# Patient Record
Sex: FEMALE | Race: WHITE | Employment: OTHER | URBAN - NONMETROPOLITAN AREA
[De-identification: names, ages, dates, MRNs, and addresses within clinical notes are randomized per-mention and may not be internally consistent; named-entity substitution may affect disease eponyms.]

---

## 2017-05-23 ENCOUNTER — HOSPITAL ENCOUNTER (OUTPATIENT)
Dept: LAB | Age: 52
Discharge: HOME | End: 2017-05-23
Attending: INTERNAL MEDICINE | Admitting: INTERNAL MEDICINE

## 2017-05-23 LAB
ALBUMIN SERPL-MCNC: 3.3 GM/DL (ref 3.4–5)
ANION GAP SERPL CALCULATED.3IONS-SCNC: 10 MMOL/L (ref 4–16)
BASOPHILS ABSOLUTE: 0.1 K/CU MM
BASOPHILS RELATIVE PERCENT: 0.6 % (ref 0–1)
BUN BLDV-MCNC: 30 MG/DL (ref 6–23)
CALCIUM SERPL-MCNC: 8.9 MG/DL (ref 8.3–10.6)
CHLORIDE BLD-SCNC: 107 MMOL/L (ref 99–110)
CO2: 24 MMOL/L (ref 21–32)
CREAT SERPL-MCNC: 3.2 MG/DL (ref 0.6–1.1)
DIFFERENTIAL TYPE: ABNORMAL
EOSINOPHILS ABSOLUTE: 0.4 K/CU MM
EOSINOPHILS RELATIVE PERCENT: 4.3 % (ref 0–3)
GFR AFRICAN AMERICAN: 18 ML/MIN/1.73M2
GFR NON-AFRICAN AMERICAN: 15 ML/MIN/1.73M2
GLUCOSE BLD-MCNC: 274 MG/DL (ref 70–140)
HCT VFR BLD CALC: 32.5 % (ref 37–47)
HEMOGLOBIN: 10.7 GM/DL (ref 12.5–16)
IMMATURE NEUTROPHIL %: 0.4 % (ref 0–0.43)
LYMPHOCYTES ABSOLUTE: 1.9 K/CU MM
LYMPHOCYTES RELATIVE PERCENT: 20.8 % (ref 24–44)
MCH RBC QN AUTO: 33.1 PG (ref 27–31)
MCHC RBC AUTO-ENTMCNC: 32.9 % (ref 32–36)
MCV RBC AUTO: 100.6 FL (ref 78–100)
MONOCYTES ABSOLUTE: 1 K/CU MM
MONOCYTES RELATIVE PERCENT: 10.8 % (ref 0–4)
PDW BLD-RTO: 13.8 % (ref 11.7–14.9)
PHOSPHORUS: 4.6 MG/DL (ref 2.5–4.9)
PLATELET # BLD: 164 K/CU MM (ref 140–440)
PMV BLD AUTO: 12.1 FL (ref 7.5–11.1)
POTASSIUM SERPL-SCNC: 5.2 MMOL/L (ref 3.5–5.1)
RBC # BLD: 3.23 M/CU MM (ref 4.2–5.4)
SEGMENTED NEUTROPHILS ABSOLUTE COUNT: 5.6 K/CU MM
SEGMENTED NEUTROPHILS RELATIVE PERCENT: 63.1 % (ref 36–66)
SODIUM BLD-SCNC: 141 MMOL/L (ref 135–145)
TOTAL IMMATURE NEUTOROPHIL: 0.04 K/CU MM
WBC # BLD: 8.9 K/CU MM (ref 4–10.5)

## 2017-06-05 ENCOUNTER — HOSPITAL ENCOUNTER (OUTPATIENT)
Dept: WOUND CARE | Age: 52
Discharge: HOME | End: 2017-06-05
Attending: PODIATRIST | Admitting: PODIATRIST

## 2017-06-05 VITALS
DIASTOLIC BLOOD PRESSURE: 92 MMHG | HEART RATE: 75 BPM | RESPIRATION RATE: 16 BRPM | SYSTOLIC BLOOD PRESSURE: 164 MMHG | TEMPERATURE: 96.7 F

## 2017-06-05 DIAGNOSIS — L97.522 ULCER OF LEFT FOOT, WITH FAT LAYER EXPOSED (HCC): ICD-10-CM

## 2017-06-05 DIAGNOSIS — Z72.0 TOBACCO ABUSE: Primary | ICD-10-CM

## 2017-06-05 DIAGNOSIS — E11.9 TYPE 2 DIABETES MELLITUS WITHOUT COMPLICATION, WITHOUT LONG-TERM CURRENT USE OF INSULIN (HCC): ICD-10-CM

## 2017-06-05 DIAGNOSIS — L97.512 CHRONIC ULCER OF RIGHT FOOT WITH FAT LAYER EXPOSED (HCC): ICD-10-CM

## 2017-06-05 DIAGNOSIS — E11.622 DIABETES MELLITUS WITH ULCER OF ANKLE (HCC): ICD-10-CM

## 2017-06-05 DIAGNOSIS — L97.309 DIABETES MELLITUS WITH ULCER OF ANKLE (HCC): ICD-10-CM

## 2017-06-06 ENCOUNTER — HOSPITAL ENCOUNTER (OUTPATIENT)
Dept: GENERAL RADIOLOGY | Age: 52
Discharge: HOME | End: 2017-06-06
Attending: PODIATRIST | Admitting: PODIATRIST

## 2017-06-06 ENCOUNTER — HOSPITAL ENCOUNTER (OUTPATIENT)
Dept: LAB | Age: 52
Discharge: HOME | End: 2017-06-06
Attending: INTERNAL MEDICINE | Admitting: INTERNAL MEDICINE

## 2017-06-06 DIAGNOSIS — L97.522 ULCER OF LEFT FOOT, WITH FAT LAYER EXPOSED (HCC): ICD-10-CM

## 2017-06-06 DIAGNOSIS — Z79.4 CONTROLLED TYPE 2 DIABETES MELLITUS WITHOUT COMPLICATION, WITH LONG-TERM CURRENT USE OF INSULIN (HCC): ICD-10-CM

## 2017-06-06 DIAGNOSIS — E11.9 CONTROLLED TYPE 2 DIABETES MELLITUS WITHOUT COMPLICATION, WITH LONG-TERM CURRENT USE OF INSULIN (HCC): ICD-10-CM

## 2017-06-06 LAB — VITAMIN D 25-HYDROXY: 7.32 NG/ML

## 2017-06-07 LAB — PARATHYROID HORMONE INTACT: 178

## 2017-06-09 LAB
CULTURE: NORMAL
ORGANISM: NORMAL
REPORT STATUS: NORMAL
REQUEST PROBLEM: NORMAL
SPECIMEN: NORMAL

## 2017-06-12 ENCOUNTER — HOSPITAL ENCOUNTER (OUTPATIENT)
Dept: ULTRASOUND IMAGING | Age: 52
Discharge: HOME | End: 2017-06-12
Attending: PODIATRIST | Admitting: PODIATRIST

## 2017-06-12 ENCOUNTER — HOSPITAL ENCOUNTER (OUTPATIENT)
Dept: WOUND CARE | Age: 52
Discharge: HOME | End: 2017-07-11
Attending: PODIATRIST | Admitting: PODIATRIST

## 2017-06-12 VITALS
RESPIRATION RATE: 16 BRPM | DIASTOLIC BLOOD PRESSURE: 83 MMHG | HEART RATE: 74 BPM | TEMPERATURE: 96.9 F | SYSTOLIC BLOOD PRESSURE: 149 MMHG

## 2017-06-12 DIAGNOSIS — I73.9 CLAUDICATION OF BOTH LOWER EXTREMITIES (HCC): ICD-10-CM

## 2017-06-12 DIAGNOSIS — L97.512 CHRONIC ULCER OF RIGHT FOOT WITH FAT LAYER EXPOSED (HCC): ICD-10-CM

## 2017-06-12 DIAGNOSIS — E11.9 TYPE 2 DIABETES MELLITUS WITHOUT COMPLICATION, WITHOUT LONG-TERM CURRENT USE OF INSULIN (HCC): Primary | ICD-10-CM

## 2017-06-12 DIAGNOSIS — E11.42 DIABETIC POLYNEUROPATHY ASSOCIATED WITH TYPE 2 DIABETES MELLITUS (HCC): ICD-10-CM

## 2017-06-12 DIAGNOSIS — L97.522 ULCER OF LEFT FOOT, WITH FAT LAYER EXPOSED (HCC): ICD-10-CM

## 2017-06-12 DIAGNOSIS — L97.522 NON-PRESSURE CHRONIC ULCER OF OTHER PART OF LEFT FOOT WITH FAT LAYER EXPOSED (HCC): ICD-10-CM

## 2017-06-13 ENCOUNTER — OFFICE VISIT (OUTPATIENT)
Dept: CARDIOLOGY CLINIC | Age: 52
End: 2017-06-13

## 2017-06-13 VITALS
HEIGHT: 63 IN | BODY MASS INDEX: 35.19 KG/M2 | HEART RATE: 76 BPM | WEIGHT: 198.6 LBS | SYSTOLIC BLOOD PRESSURE: 126 MMHG | DIASTOLIC BLOOD PRESSURE: 82 MMHG

## 2017-06-13 DIAGNOSIS — Z72.0 TOBACCO ABUSE: ICD-10-CM

## 2017-06-13 DIAGNOSIS — I10 ESSENTIAL HYPERTENSION: ICD-10-CM

## 2017-06-13 DIAGNOSIS — E11.9 TYPE 2 DIABETES MELLITUS WITHOUT COMPLICATION, UNSPECIFIED LONG TERM INSULIN USE STATUS: ICD-10-CM

## 2017-06-13 DIAGNOSIS — E78.5 HYPERLIPIDEMIA, UNSPECIFIED HYPERLIPIDEMIA TYPE: ICD-10-CM

## 2017-06-13 DIAGNOSIS — N18.4 CHRONIC KIDNEY DISEASE (CKD) STAGE G4/A3, SEVERELY DECREASED GLOMERULAR FILTRATION RATE (GFR) BETWEEN 15-29 ML/MIN/1.73 SQUARE METER AND ALBUMINURIA CREATININE RATIO GREATER THAN 300 MG/G (HCC): ICD-10-CM

## 2017-06-13 DIAGNOSIS — I25.10 CORONARY ARTERY DISEASE INVOLVING NATIVE CORONARY ARTERY OF NATIVE HEART WITHOUT ANGINA PECTORIS: Primary | ICD-10-CM

## 2017-06-13 PROCEDURE — G8427 DOCREV CUR MEDS BY ELIG CLIN: HCPCS | Performed by: INTERNAL MEDICINE

## 2017-06-13 PROCEDURE — 3046F HEMOGLOBIN A1C LEVEL >9.0%: CPT | Performed by: INTERNAL MEDICINE

## 2017-06-13 PROCEDURE — 99214 OFFICE O/P EST MOD 30 MIN: CPT | Performed by: INTERNAL MEDICINE

## 2017-06-13 PROCEDURE — 3014F SCREEN MAMMO DOC REV: CPT | Performed by: INTERNAL MEDICINE

## 2017-06-13 PROCEDURE — G8419 CALC BMI OUT NRM PARAM NOF/U: HCPCS | Performed by: INTERNAL MEDICINE

## 2017-06-13 PROCEDURE — 4004F PT TOBACCO SCREEN RCVD TLK: CPT | Performed by: INTERNAL MEDICINE

## 2017-06-13 PROCEDURE — G8598 ASA/ANTIPLAT THER USED: HCPCS | Performed by: INTERNAL MEDICINE

## 2017-06-13 PROCEDURE — 3017F COLORECTAL CA SCREEN DOC REV: CPT | Performed by: INTERNAL MEDICINE

## 2017-06-13 RX ORDER — CARVEDILOL 6.25 MG/1
6.25 TABLET ORAL 2 TIMES DAILY
COMMUNITY
Start: 2017-05-30

## 2017-06-13 RX ORDER — NITROGLYCERIN 0.4 MG/1
0.4 TABLET SUBLINGUAL EVERY 5 MIN PRN
Qty: 25 TABLET | Refills: 3 | Status: ON HOLD | OUTPATIENT
Start: 2017-06-13 | End: 2022-09-25 | Stop reason: HOSPADM

## 2017-06-19 ENCOUNTER — HOSPITAL ENCOUNTER (OUTPATIENT)
Dept: WOUND CARE | Age: 52
Discharge: HOME | End: 2017-06-19
Attending: PODIATRIST | Admitting: PODIATRIST

## 2017-06-19 VITALS
SYSTOLIC BLOOD PRESSURE: 158 MMHG | DIASTOLIC BLOOD PRESSURE: 85 MMHG | TEMPERATURE: 96.7 F | HEART RATE: 80 BPM | RESPIRATION RATE: 16 BRPM

## 2017-06-19 DIAGNOSIS — Z72.0 TOBACCO ABUSE: Primary | ICD-10-CM

## 2017-06-19 DIAGNOSIS — L97.509 TYPE 2 DIABETES MELLITUS WITH FOOT ULCER, WITH LONG-TERM CURRENT USE OF INSULIN (HCC): ICD-10-CM

## 2017-06-19 DIAGNOSIS — L97.522 CHRONIC ULCER OF LEFT FOOT WITH FAT LAYER EXPOSED (HCC): ICD-10-CM

## 2017-06-19 DIAGNOSIS — E11.42 DIABETIC POLYNEUROPATHY ASSOCIATED WITH TYPE 2 DIABETES MELLITUS (HCC): ICD-10-CM

## 2017-06-19 DIAGNOSIS — I73.9 CLAUDICATION OF BOTH LOWER EXTREMITIES (HCC): ICD-10-CM

## 2017-06-19 DIAGNOSIS — E11.621 TYPE 2 DIABETES MELLITUS WITH FOOT ULCER, WITH LONG-TERM CURRENT USE OF INSULIN (HCC): ICD-10-CM

## 2017-06-19 DIAGNOSIS — Z79.4 TYPE 2 DIABETES MELLITUS WITH FOOT ULCER, WITH LONG-TERM CURRENT USE OF INSULIN (HCC): ICD-10-CM

## 2017-06-19 ASSESSMENT — PAIN DESCRIPTION - ORIENTATION: ORIENTATION: LEFT

## 2017-06-19 ASSESSMENT — PAIN DESCRIPTION - PAIN TYPE: TYPE: CHRONIC PAIN

## 2017-06-19 ASSESSMENT — PAIN DESCRIPTION - DESCRIPTORS: DESCRIPTORS: SHOOTING

## 2017-06-19 ASSESSMENT — PAIN DESCRIPTION - FREQUENCY: FREQUENCY: CONTINUOUS

## 2017-06-19 ASSESSMENT — PAIN DESCRIPTION - ONSET: ONSET: ON-GOING

## 2017-06-19 ASSESSMENT — PAIN SCALES - GENERAL: PAINLEVEL_OUTOF10: 6

## 2017-06-19 ASSESSMENT — PAIN DESCRIPTION - LOCATION: LOCATION: FOOT

## 2017-06-26 ENCOUNTER — HOSPITAL ENCOUNTER (OUTPATIENT)
Dept: WOUND CARE | Age: 52
Discharge: HOME | End: 2017-06-26
Attending: PODIATRIST | Admitting: PODIATRIST

## 2017-06-26 VITALS
RESPIRATION RATE: 16 BRPM | HEART RATE: 76 BPM | TEMPERATURE: 97.4 F | DIASTOLIC BLOOD PRESSURE: 82 MMHG | SYSTOLIC BLOOD PRESSURE: 159 MMHG

## 2017-06-26 DIAGNOSIS — L97.509 TYPE 2 DIABETES MELLITUS WITH FOOT ULCER, WITH LONG-TERM CURRENT USE OF INSULIN (HCC): ICD-10-CM

## 2017-06-26 DIAGNOSIS — Z72.0 TOBACCO ABUSE: ICD-10-CM

## 2017-06-26 DIAGNOSIS — E11.42 DIABETIC POLYNEUROPATHY ASSOCIATED WITH TYPE 2 DIABETES MELLITUS (HCC): ICD-10-CM

## 2017-06-26 DIAGNOSIS — Z79.4 TYPE 2 DIABETES MELLITUS WITH FOOT ULCER, WITH LONG-TERM CURRENT USE OF INSULIN (HCC): ICD-10-CM

## 2017-06-26 DIAGNOSIS — E11.621 TYPE 2 DIABETES MELLITUS WITH FOOT ULCER, WITH LONG-TERM CURRENT USE OF INSULIN (HCC): ICD-10-CM

## 2017-06-26 DIAGNOSIS — L97.522 CHRONIC ULCER OF LEFT FOOT WITH FAT LAYER EXPOSED (HCC): Primary | ICD-10-CM

## 2017-06-26 ASSESSMENT — PAIN DESCRIPTION - PAIN TYPE: TYPE: CHRONIC PAIN

## 2017-06-26 ASSESSMENT — PAIN SCALES - GENERAL: PAINLEVEL_OUTOF10: 6

## 2017-06-26 ASSESSMENT — PAIN DESCRIPTION - ORIENTATION: ORIENTATION: LEFT

## 2017-06-26 ASSESSMENT — PAIN DESCRIPTION - LOCATION: LOCATION: FOOT

## 2017-06-26 ASSESSMENT — PAIN DESCRIPTION - ONSET: ONSET: ON-GOING

## 2017-06-26 ASSESSMENT — PAIN DESCRIPTION - DESCRIPTORS: DESCRIPTORS: ACHING;SHARP;DISCOMFORT;CONSTANT

## 2017-06-26 ASSESSMENT — PAIN DESCRIPTION - FREQUENCY: FREQUENCY: CONTINUOUS

## 2017-06-26 ASSESSMENT — PAIN DESCRIPTION - PROGRESSION: CLINICAL_PROGRESSION: GRADUALLY WORSENING

## 2017-06-28 ENCOUNTER — HOSPITAL ENCOUNTER (OUTPATIENT)
Dept: WOUND CARE | Age: 52
Discharge: HOME | End: 2017-06-28
Attending: INTERNAL MEDICINE | Admitting: INTERNAL MEDICINE

## 2017-06-28 VITALS
TEMPERATURE: 97 F | SYSTOLIC BLOOD PRESSURE: 132 MMHG | HEART RATE: 82 BPM | DIASTOLIC BLOOD PRESSURE: 75 MMHG | RESPIRATION RATE: 16 BRPM

## 2017-07-03 ENCOUNTER — HOSPITAL ENCOUNTER (OUTPATIENT)
Dept: WOUND CARE | Age: 52
Discharge: HOME | End: 2017-07-03
Attending: PODIATRIST | Admitting: INTERNAL MEDICINE

## 2017-07-03 VITALS
SYSTOLIC BLOOD PRESSURE: 154 MMHG | TEMPERATURE: 96.8 F | DIASTOLIC BLOOD PRESSURE: 84 MMHG | RESPIRATION RATE: 16 BRPM | HEART RATE: 69 BPM

## 2017-07-03 DIAGNOSIS — L97.522 DIABETIC ULCER OF LEFT FOOT ASSOCIATED WITH TYPE 2 DIABETES MELLITUS, WITH FAT LAYER EXPOSED (HCC): Primary | ICD-10-CM

## 2017-07-03 DIAGNOSIS — E11.621 DIABETIC ULCER OF LEFT FOOT ASSOCIATED WITH TYPE 2 DIABETES MELLITUS, WITH FAT LAYER EXPOSED (HCC): Primary | ICD-10-CM

## 2017-07-03 PROCEDURE — 11042 DBRDMT SUBQ TIS 1ST 20SQCM/<: CPT | Performed by: INTERNAL MEDICINE

## 2017-07-05 ENCOUNTER — HOSPITAL ENCOUNTER (OUTPATIENT)
Dept: WOUND CARE | Age: 52
Discharge: HOME | End: 2017-07-05
Attending: INTERNAL MEDICINE | Admitting: INTERNAL MEDICINE

## 2017-07-05 VITALS
HEART RATE: 76 BPM | TEMPERATURE: 97 F | SYSTOLIC BLOOD PRESSURE: 152 MMHG | RESPIRATION RATE: 16 BRPM | DIASTOLIC BLOOD PRESSURE: 82 MMHG

## 2017-07-05 DIAGNOSIS — L97.522 DIABETIC ULCER OF LEFT FOOT ASSOCIATED WITH TYPE 2 DIABETES MELLITUS, WITH FAT LAYER EXPOSED (HCC): Primary | ICD-10-CM

## 2017-07-05 DIAGNOSIS — E11.621 DIABETIC ULCER OF LEFT FOOT ASSOCIATED WITH TYPE 2 DIABETES MELLITUS, WITH FAT LAYER EXPOSED (HCC): Primary | ICD-10-CM

## 2017-07-05 PROCEDURE — 29445 APPL RIGID TOT CNTC LEG CAST: CPT | Performed by: INTERNAL MEDICINE

## 2017-07-10 ENCOUNTER — HOSPITAL ENCOUNTER (OUTPATIENT)
Dept: WOUND CARE | Age: 52
Discharge: HOME | End: 2017-07-10
Attending: PODIATRIST | Admitting: PODIATRIST

## 2017-07-10 VITALS
DIASTOLIC BLOOD PRESSURE: 78 MMHG | RESPIRATION RATE: 16 BRPM | SYSTOLIC BLOOD PRESSURE: 120 MMHG | TEMPERATURE: 96.2 F | HEART RATE: 73 BPM

## 2017-07-10 DIAGNOSIS — L97.522 DIABETIC ULCER OF LEFT FOOT ASSOCIATED WITH TYPE 2 DIABETES MELLITUS, WITH FAT LAYER EXPOSED (HCC): ICD-10-CM

## 2017-07-10 DIAGNOSIS — L97.509 TYPE 2 DIABETES MELLITUS WITH FOOT ULCER, WITH LONG-TERM CURRENT USE OF INSULIN (HCC): Primary | ICD-10-CM

## 2017-07-10 DIAGNOSIS — Z72.0 TOBACCO ABUSE: ICD-10-CM

## 2017-07-10 DIAGNOSIS — E11.621 TYPE 2 DIABETES MELLITUS WITH FOOT ULCER, WITH LONG-TERM CURRENT USE OF INSULIN (HCC): Primary | ICD-10-CM

## 2017-07-10 DIAGNOSIS — Z79.4 TYPE 2 DIABETES MELLITUS WITH FOOT ULCER, WITH LONG-TERM CURRENT USE OF INSULIN (HCC): Primary | ICD-10-CM

## 2017-07-10 DIAGNOSIS — E11.621 DIABETIC ULCER OF LEFT FOOT ASSOCIATED WITH TYPE 2 DIABETES MELLITUS, WITH FAT LAYER EXPOSED (HCC): ICD-10-CM

## 2017-07-13 ENCOUNTER — HOSPITAL ENCOUNTER (OUTPATIENT)
Dept: WOUND CARE | Age: 52
Discharge: HOME | End: 2017-07-13
Attending: INTERNAL MEDICINE | Admitting: INTERNAL MEDICINE

## 2017-07-13 VITALS
HEART RATE: 76 BPM | TEMPERATURE: 96.4 F | DIASTOLIC BLOOD PRESSURE: 98 MMHG | RESPIRATION RATE: 16 BRPM | SYSTOLIC BLOOD PRESSURE: 181 MMHG

## 2017-07-13 DIAGNOSIS — E11.621 DIABETIC ULCER OF LEFT FOOT ASSOCIATED WITH TYPE 2 DIABETES MELLITUS, WITH FAT LAYER EXPOSED (HCC): Primary | ICD-10-CM

## 2017-07-13 DIAGNOSIS — L97.522 DIABETIC ULCER OF LEFT FOOT ASSOCIATED WITH TYPE 2 DIABETES MELLITUS, WITH FAT LAYER EXPOSED (HCC): Primary | ICD-10-CM

## 2017-07-17 ENCOUNTER — HOSPITAL ENCOUNTER (OUTPATIENT)
Dept: WOUND CARE | Age: 52
Discharge: HOME | End: 2017-07-17
Attending: PODIATRIST | Admitting: INTERNAL MEDICINE

## 2017-07-17 VITALS
TEMPERATURE: 96.5 F | HEART RATE: 77 BPM | SYSTOLIC BLOOD PRESSURE: 159 MMHG | DIASTOLIC BLOOD PRESSURE: 89 MMHG | RESPIRATION RATE: 16 BRPM

## 2017-07-17 DIAGNOSIS — L97.522 DIABETIC ULCER OF LEFT FOOT ASSOCIATED WITH TYPE 2 DIABETES MELLITUS, WITH FAT LAYER EXPOSED (HCC): Primary | ICD-10-CM

## 2017-07-17 DIAGNOSIS — E11.621 DIABETIC ULCER OF LEFT FOOT ASSOCIATED WITH TYPE 2 DIABETES MELLITUS, WITH FAT LAYER EXPOSED (HCC): Primary | ICD-10-CM

## 2017-07-17 PROCEDURE — 97597 DBRDMT OPN WND 1ST 20 CM/<: CPT | Performed by: INTERNAL MEDICINE

## 2017-07-24 ENCOUNTER — HOSPITAL ENCOUNTER (OUTPATIENT)
Dept: WOUND CARE | Age: 52
Discharge: HOME | End: 2017-07-24
Attending: PODIATRIST | Admitting: PODIATRIST

## 2017-07-24 VITALS
RESPIRATION RATE: 16 BRPM | TEMPERATURE: 97.9 F | HEART RATE: 79 BPM | DIASTOLIC BLOOD PRESSURE: 89 MMHG | SYSTOLIC BLOOD PRESSURE: 156 MMHG

## 2017-07-24 DIAGNOSIS — E08.00 DIABETES MELLITUS DUE TO UNDERLYING CONDITION WITH HYPEROSMOLARITY WITHOUT COMA, WITHOUT LONG-TERM CURRENT USE OF INSULIN (HCC): ICD-10-CM

## 2017-07-24 DIAGNOSIS — E11.9 TYPE 2 DIABETES MELLITUS WITHOUT COMPLICATION, WITHOUT LONG-TERM CURRENT USE OF INSULIN (HCC): ICD-10-CM

## 2017-07-24 DIAGNOSIS — E11.621 DIABETIC ULCER OF LEFT FOOT ASSOCIATED WITH TYPE 2 DIABETES MELLITUS, WITH FAT LAYER EXPOSED (HCC): Primary | ICD-10-CM

## 2017-07-24 DIAGNOSIS — E11.9 TYPE 2 DIABETES MELLITUS WITHOUT COMPLICATION, UNSPECIFIED LONG TERM INSULIN USE STATUS: ICD-10-CM

## 2017-07-24 DIAGNOSIS — Z72.0 TOBACCO ABUSE: ICD-10-CM

## 2017-07-24 DIAGNOSIS — I73.9 CLAUDICATION OF BOTH LOWER EXTREMITIES (HCC): ICD-10-CM

## 2017-07-24 DIAGNOSIS — L97.522 DIABETIC ULCER OF LEFT FOOT ASSOCIATED WITH TYPE 2 DIABETES MELLITUS, WITH FAT LAYER EXPOSED (HCC): Primary | ICD-10-CM

## 2017-07-24 DIAGNOSIS — L97.509 ULCER OF OTHER PART OF FOOT: ICD-10-CM

## 2017-07-31 ENCOUNTER — HOSPITAL ENCOUNTER (OUTPATIENT)
Dept: WOUND CARE | Age: 52
Discharge: HOME | End: 2017-07-31
Attending: PODIATRIST | Admitting: PODIATRIST

## 2017-07-31 VITALS
RESPIRATION RATE: 16 BRPM | DIASTOLIC BLOOD PRESSURE: 70 MMHG | SYSTOLIC BLOOD PRESSURE: 180 MMHG | TEMPERATURE: 98 F | HEART RATE: 87 BPM

## 2017-07-31 DIAGNOSIS — L97.422 DIABETIC ULCER OF LEFT MIDFOOT ASSOCIATED WITH TYPE 2 DIABETES MELLITUS, WITH FAT LAYER EXPOSED (HCC): Primary | ICD-10-CM

## 2017-07-31 DIAGNOSIS — E11.42 DIABETIC POLYNEUROPATHY ASSOCIATED WITH TYPE 2 DIABETES MELLITUS (HCC): ICD-10-CM

## 2017-07-31 DIAGNOSIS — Z72.0 TOBACCO ABUSE: ICD-10-CM

## 2017-07-31 DIAGNOSIS — E11.621 DIABETIC ULCER OF LEFT MIDFOOT ASSOCIATED WITH TYPE 2 DIABETES MELLITUS, WITH FAT LAYER EXPOSED (HCC): Primary | ICD-10-CM

## 2017-08-07 ENCOUNTER — HOSPITAL ENCOUNTER (OUTPATIENT)
Dept: WOUND CARE | Age: 52
Discharge: HOME | End: 2017-08-07
Attending: PODIATRIST | Admitting: PODIATRIST

## 2017-08-07 VITALS
SYSTOLIC BLOOD PRESSURE: 163 MMHG | TEMPERATURE: 97.4 F | DIASTOLIC BLOOD PRESSURE: 89 MMHG | RESPIRATION RATE: 16 BRPM | HEART RATE: 76 BPM

## 2017-08-07 DIAGNOSIS — E11.42 DIABETIC POLYNEUROPATHY ASSOCIATED WITH TYPE 2 DIABETES MELLITUS (HCC): ICD-10-CM

## 2017-08-07 DIAGNOSIS — E11.9 TYPE 2 DIABETES MELLITUS WITHOUT COMPLICATION, WITHOUT LONG-TERM CURRENT USE OF INSULIN (HCC): Primary | ICD-10-CM

## 2017-08-07 DIAGNOSIS — L97.422 DIABETIC ULCER OF LEFT MIDFOOT ASSOCIATED WITH TYPE 2 DIABETES MELLITUS, WITH FAT LAYER EXPOSED (HCC): ICD-10-CM

## 2017-08-07 DIAGNOSIS — Z72.0 TOBACCO ABUSE: ICD-10-CM

## 2017-08-07 DIAGNOSIS — E11.621 DIABETIC ULCER OF LEFT MIDFOOT ASSOCIATED WITH TYPE 2 DIABETES MELLITUS, WITH FAT LAYER EXPOSED (HCC): ICD-10-CM

## 2017-08-14 ENCOUNTER — HOSPITAL ENCOUNTER (OUTPATIENT)
Dept: WOUND CARE | Age: 52
Discharge: HOME | End: 2017-08-14
Attending: PODIATRIST | Admitting: INTERNAL MEDICINE

## 2017-08-14 VITALS
TEMPERATURE: 96.8 F | DIASTOLIC BLOOD PRESSURE: 85 MMHG | SYSTOLIC BLOOD PRESSURE: 169 MMHG | HEART RATE: 82 BPM | RESPIRATION RATE: 16 BRPM

## 2017-08-14 DIAGNOSIS — E11.621 DIABETIC ULCER OF RIGHT MIDFOOT ASSOCIATED WITH TYPE 2 DIABETES MELLITUS, LIMITED TO BREAKDOWN OF SKIN (HCC): Primary | ICD-10-CM

## 2017-08-14 DIAGNOSIS — L97.411 DIABETIC ULCER OF RIGHT MIDFOOT ASSOCIATED WITH TYPE 2 DIABETES MELLITUS, LIMITED TO BREAKDOWN OF SKIN (HCC): Primary | ICD-10-CM

## 2017-08-14 PROCEDURE — 99213 OFFICE O/P EST LOW 20 MIN: CPT | Performed by: INTERNAL MEDICINE

## 2017-11-06 ENCOUNTER — HOSPITAL ENCOUNTER (OUTPATIENT)
Dept: WOUND CARE | Age: 52
Discharge: HOME | End: 2017-11-06
Attending: PODIATRIST | Admitting: PODIATRIST

## 2017-11-06 VITALS
HEIGHT: 63 IN | TEMPERATURE: 97.4 F | WEIGHT: 180 LBS | HEART RATE: 65 BPM | DIASTOLIC BLOOD PRESSURE: 86 MMHG | RESPIRATION RATE: 16 BRPM | SYSTOLIC BLOOD PRESSURE: 154 MMHG | BODY MASS INDEX: 31.89 KG/M2

## 2017-11-06 DIAGNOSIS — E11.621 TYPE 2 DIABETES MELLITUS WITH FOOT ULCER, WITH LONG-TERM CURRENT USE OF INSULIN (HCC): Primary | ICD-10-CM

## 2017-11-06 DIAGNOSIS — E11.42 DIABETIC POLYNEUROPATHY ASSOCIATED WITH TYPE 2 DIABETES MELLITUS (HCC): ICD-10-CM

## 2017-11-06 DIAGNOSIS — L97.509 TYPE 2 DIABETES MELLITUS WITH FOOT ULCER, WITH LONG-TERM CURRENT USE OF INSULIN (HCC): Primary | ICD-10-CM

## 2017-11-06 DIAGNOSIS — Z79.4 TYPE 2 DIABETES MELLITUS WITH FOOT ULCER, WITH LONG-TERM CURRENT USE OF INSULIN (HCC): Primary | ICD-10-CM

## 2017-11-06 DIAGNOSIS — E11.9 TYPE 2 DIABETES MELLITUS WITHOUT COMPLICATION, UNSPECIFIED LONG TERM INSULIN USE STATUS: ICD-10-CM

## 2017-11-06 DIAGNOSIS — L97.522 ULCER OF LEFT FOOT, WITH FAT LAYER EXPOSED (HCC): ICD-10-CM

## 2017-11-06 DIAGNOSIS — Z72.0 TOBACCO ABUSE: ICD-10-CM

## 2017-11-06 NOTE — PROGRESS NOTES
[facility] Progress Note with Procedure Note      Omari River  AGE: 46 y.o. GENDER: female  : 1965  EPISODE DATE:  2017     Subjective:     Chief Complaint   Patient presents with    Wound Check     Left Foot          HISTORY of PRESENT ILLNESS      Omari River is a 46 y.o. female who presents today for wound evaluation of Acute diabetic wound(s) of Lt. foot sub 4th metatarsal.  The wound is of moderate severity. The underlying cause of the wound is pressure and DM. Pt had healed the wound previously and it did recur but she did not return for evaluation. She thought she could take care of it on her own. Now, the wound is larger and is turning red around the wound. She is very concerned that there is an infection or abscess forming and wants to do things right. She has not been offloading and has not been wearing any DM inserts or offloading shoes.   Wound Pain Timing/Severity: intermittent  Quality of pain: aching  Severity of pain:  2  10   Modifying Factors: diabetes, chronic pressure and smoking  Associated Signs/Symptoms: edema and erythema        PAST MEDICAL HISTORY        Diagnosis Date    CAD (coronary artery disease)     s/p CABG x 4;  follows with Dr Alyson Lamar tunnel syndrome on both sides     CHF (congestive heart failure) ([address] 10/2010    Chronic diastolic; EF 74%    Chronic kidney disease     stage 4 kidney disease    Chronic ulcer of left ankle with fat layer exposed ([address] 10/7/2015    Chronic ulcer of left foot with fat layer exposed ([address] 3/17/2016    Chronic ulcer of right ankle with fat layer exposed ([address] 3/17/2016    Chronic ulcer of right foot with fat layer exposed ([address] 3/17/2016    Depression     Diabetes mellitus ([address]     Diabetes mellitus with neurological manifestations ([address]     Diabetes mellitus with ulcer of ankle ([address]     Family history of cardiovascular disease     Mother   Mer Mckoy GERD (gastroesophageal reflux disease)     H/O cardiac catheterization 10/18/2010, 6/3/2010    10/18/2010-Four bypass grafts all widely patent. 6/3/2010- Moderate to severe triple vessel disease, preserved LV systolic function.  H/O cardiovascular stress test 7/26/2012, 8/3/2011, 10/14/2010, 5/24/2010 7/26/2012-Lexiscan- Normal Myocardial Perfusion Study. Post stress myocardial perfusion images show a normal pattern of perfusion in all regions. Post stress LV normal size. Normal perfusion in the distribution of all coronaries. Normal LV size and function. Rest EF 70%    H/O chest x-ray 7/12/2012, 6/2/2010 7/12/2012-Perihilar peribronchial cuffing with mild basilar predominant interstital prominence, which may reflect interstitial edema or atypical pneumonia.  H/O Doppler ultrasound 6/4/2010    CAROTID DOPPLER-6/4/2010-No Doppler evidence of hemodynamically significant Carotid Stenosis with antegrade flow in the vertebral arteries bilaterally. 6/4/2010 PERIPHERAL VENOUS DOPPLER_ LEFT Saphenous Vein mapping    H/O echocardiogram 7/26/2012, 8/3/2011, 10/2010, 7/23/2010, 6/8/2010 7/26/2012-LV normal size. Normal LV wall thickness. LV systolic function normal. EF => 55%. LV wall motion normal. Mild MR. Mild TR.  History of complete ECG 7/26/2012 Leopold Dine), 7/13/2012 [facility]), 7/25/2011, 3/25/2011, 10/18/2010, 10/11/2010, 6/23/2010, 5/7/2010    Hx of cardiovascular stress test 9/3/2015    lexiscan-normal,EF66%    Hx of Doppler ultrasound 4/5/16    Arterial: There is a fluid collection in the left thigh, please refer to PCP for further monitoring, no vascular in etiology.  Hx of echocardiogram     4/16 EF40% Mild MR/TR and mild Pulm HTN. 9/15 EF 45-50%, Mildly dilated L atrium, mildly dilated R ventricle. Mild MR & mild TR     Hyperlipidemia     Neuropathy of foot     Pt reports starting approx. 6-7 years ago    Neuropathy of hand     Pt reports starting approx.  6-7 years ago    S/P CABG x 4 6/5/2010    LIMA-> LAD;  VG->CX;  VG->Diagonal; VG-> distal RCA  per  Dr Mei Ann or unspecified type diabetes mellitus with neurological manifestations, not stated as uncontrolled(250.60)     Type II or unspecified type diabetes mellitus with other specified manifestations, not stated as uncontrolled     Ulcer of left foot, with fat layer exposed ([address] 12/19/2013    Ulcer of other part of foot        PAST SURGICAL HISTORY    Past Surgical History:   Procedure Laterality Date    APPENDECTOMY      CARDIAC SURGERY      CARPAL TUNNEL RELEASE      L hand Nov. 2011, R hand Jan. 2012    CARPAL TUNNEL RELEASE Right 6/10/2013    recurrent    CARPAL TUNNEL RELEASE Left 7/29/2013    with ulnar nerve transpostion and L middle finger release    CHOLECYSTECTOMY      COLONOSCOPY      CORONARY ARTERY BYPASS GRAFT  6/5/2010 6/5/2010-LIMA->LAD;  VG-> Diagonal;  VG-> Obtuse marginal;  VG->Distal RCA-   Dr Malinda Schneider Right 6/10/2013    HYSTERECTOMY, TOTAL ABDOMINAL      TOE AMPUTATION Left 02/14/144th toe    TUBAL LIGATION      ULNAR TUNNEL RELEASE Right 6/10/2013       FAMILY HISTORY    Family History   Problem Relation Age of Onset    Heart Disease Mother     Kidney Disease Mother      dialysis    Cancer Father        SOCIAL HISTORY    Social History   Substance Use Topics    Smoking status: Current Every Day Smoker     Packs/day: 0.25     Years: 30.00     Types: Cigarettes    Smokeless tobacco: Never Used      Comment: quit smoking mia 04/04/16    Alcohol use No      Comment:                                    CAFFEINE: 2 sodas daily       ALLERGIES    Allergies   Allergen Reactions    Cortizone     Phenobarbital Other (See Comments)     Hallucinations        MEDICATIONS    Current Outpatient Prescriptions on File Prior to Encounter   Medication Sig Dispense Refill    carvedilol (COREG) 6.25 MG tablet 6.25 mg 2 times daily       nitroGLYCERIN (NITROSTAT) 0.4 MG SL neuropathy ([facility]    Type 2 diabetes mellitus without complication ([facility]      Other Visit Diagnoses    None. Procedure Note    Indications:  Based on my examination of this patient's wound(s) today, sharp excision into necrotic subcutaneous tissue is required to promote healing and evaluate the extent of previous healing. Performed by: Kasia Heller DPM    Consent obtained: Yes    Time out taken:  Yes    Pain Control: none       Debridement:Excisional Debridement    Using curette and tissue nippers the wound(s) was/were sharply debrided down through and including the removal of subcutaneous tissue. Devitalized Tissue Debrided:  biofilm, slough, exudate and callus    Pre Debridement Measurements:  Are located in the Wound Documentation Flow Sheet    All active wounds listed below with today's date are evaluated  Wound(s)    debrided this date include # : 7     Post  Debridement Measurements:  Wound 11/06/17 Wound #7 Left Lateral Plantar (Onset x 2 weeks) (Active)   Wound Image   11/6/2017  9:14 AM   Wound Type Wound 11/6/2017  9:14 AM   Dressing Status Clean;Dry; Intact 11/6/2017 10:18 AM   Dressing Changed Changed/New 11/6/2017 10:18 AM   Wound Cleansed Wound cleanser 11/6/2017  9:14 AM   Wound Length (cm) 3 cm 11/6/2017  9:59 AM   Wound Width (cm) 2.4 cm 11/6/2017  9:59 AM   Wound Depth (cm)  0.3 11/6/2017  9:59 AM   Calculated Wound Size (cm^2) (l*w) 7.2 cm^2 11/6/2017  9:59 AM   Change in Wound Size % (l*w) -1042.86 11/6/2017  9:59 AM   Distance Tunneling (cm) 0 cm 11/6/2017  9:14 AM   Tunneling Position ___ O'Clock 0 11/6/2017  9:14 AM   Undermining Starts ___ O'Clock 0 11/6/2017  9:14 AM   Undermining Ends___ O'Clock 0 11/6/2017  9:14 AM   Undermining Maxium Distance (cm) 0 11/6/2017  9:14 AM   Wound Assessment Red 11/6/2017  9:14 AM   Margins Defined edges 11/6/2017  9:14 AM   Lora-wound Assessment Calloused 11/6/2017  9:14 AM   Non-staged Wound Description Full thickness 11/6/2017  9:14 AM [place]%Wound Bed 0 11/6/2017  9:14 AM   Red%Wound Bed 100 11/6/2017  9:14 AM   Yellow%Wound Bed 0 11/6/2017  9:14 AM   Black%Wound Bed 0 11/6/2017  9:14 AM   Purple%Wound Bed 0 11/6/2017  9:14 AM   Other%Wound Bed 0 11/6/2017  9:14 AM   Drainage Amount None 11/6/2017  9:14 AM   Odor None 11/6/2017  9:14 AM   Debridement per physician Subcutaneous 11/6/2017  9:59 AM   Number of days: 0       Percent of Wound(s) Debrided: approximately 100%    Total Surface Area Debrided:  7.2 sq cm     Bleeding:  Minimal    Hemostasis Achieved:  by pressure    Procedural Pain:  0  / 10     Post Procedural Pain:  0 / 10     Response to treatment:  Well tolerated by patient. Wound(s) has recurred. Plan:       Discharge Instructions         Note:   PHYSICIAN ORDERS AND DISCHARGE INSTRUCTIONS      NOTE: Upon discharge from the [address], you will receive a patient experience survey. We would be grateful if you would take the time to fill this survey out.      Wound care order history:      KATHLEEN's Right Left 6/5/17 unable to compress--arterial study ordered  Vascular studies: Date ordered 6/5/17  Imaging: Date 6/5/17--xray left foot  Cultures: Date  6/5/17, 11/6/17  Labs/ HbA1c: Date Hg A1c--  Grafts: Date   HBO: To be determined  Antibiotics:   Earlier Wound care treatments:   Authorizations:     Consults: Date   PCP: Obed Hernandez  Continuing wound care orders and information:  Residence: home  Continue home health care with:   Your wound-care supplies will be provided by: patient buys own dressings  DME provider:  Compression with tubigrip e  Off loading: Date front offloading shoe-wedge  Wound Meds:  Wound cleansing:   Do not scrub or use excessive force. Wash hands with soap and water before and after dressing changes. Prior to applying a clean dressing, cleanse wound with normal saline,  wound cleanser, or mild soap and water. Ask your physician or nurse before getting the wound(s) wet in the shower.   Daily Wound

## 2017-11-10 LAB
CULTURE: NORMAL
REPORT STATUS: NORMAL
REQUEST PROBLEM: NORMAL
SPECIMEN: NORMAL

## 2017-11-20 ENCOUNTER — HOSPITAL ENCOUNTER (OUTPATIENT)
Dept: WOUND CARE | Age: 52
Discharge: HOME | End: 2017-11-20
Attending: PODIATRIST | Admitting: PODIATRIST

## 2017-11-20 VITALS
TEMPERATURE: 96.8 F | DIASTOLIC BLOOD PRESSURE: 82 MMHG | SYSTOLIC BLOOD PRESSURE: 154 MMHG | RESPIRATION RATE: 16 BRPM | HEART RATE: 71 BPM

## 2017-11-20 DIAGNOSIS — L97.522 ULCER OF LEFT FOOT, WITH FAT LAYER EXPOSED (HCC): ICD-10-CM

## 2017-11-20 DIAGNOSIS — Z72.0 TOBACCO ABUSE: ICD-10-CM

## 2017-11-20 DIAGNOSIS — E08.00 DIABETES MELLITUS DUE TO UNDERLYING CONDITION WITH HYPEROSMOLARITY WITHOUT COMA, WITHOUT LONG-TERM CURRENT USE OF INSULIN (HCC): Primary | ICD-10-CM

## 2017-11-20 DIAGNOSIS — E11.9 TYPE 2 DIABETES MELLITUS WITHOUT COMPLICATION, WITHOUT LONG-TERM CURRENT USE OF INSULIN (HCC): ICD-10-CM

## 2017-11-20 DIAGNOSIS — E11.42 DIABETIC POLYNEUROPATHY ASSOCIATED WITH TYPE 2 DIABETES MELLITUS (HCC): ICD-10-CM

## 2017-11-20 NOTE — PROGRESS NOTES
myocardial perfusion images show a normal pattern of perfusion in all regions. Post stress LV normal size. Normal perfusion in the distribution of all coronaries. Normal LV size and function. Rest EF 70%    H/O chest x-ray 7/12/2012, 6/2/2010 7/12/2012-Perihilar peribronchial cuffing with mild basilar predominant interstital prominence, which may reflect interstitial edema or atypical pneumonia.  H/O Doppler ultrasound 6/4/2010    CAROTID DOPPLER-6/4/2010-No Doppler evidence of hemodynamically significant Carotid Stenosis with antegrade flow in the vertebral arteries bilaterally. 6/4/2010 PERIPHERAL VENOUS DOPPLER_ LEFT Saphenous Vein mapping    H/O echocardiogram 7/26/2012, 8/3/2011, 10/2010, 7/23/2010, 6/8/2010 7/26/2012-LV normal size. Normal LV wall thickness. LV systolic function normal. EF => 55%. LV wall motion normal. Mild MR. Mild TR.  History of complete ECG 7/26/2012 Prateek Batch), 7/13/2012 [facility]), 7/25/2011, 3/25/2011, 10/18/2010, 10/11/2010, 6/23/2010, 5/7/2010    Hx of cardiovascular stress test 9/3/2015    lexiscan-normal,EF66%    Hx of Doppler ultrasound 4/5/16    Arterial: There is a fluid collection in the left thigh, please refer to PCP for further monitoring, no vascular in etiology.  Hx of echocardiogram     4/16 EF40% Mild MR/TR and mild Pulm HTN. 9/15 EF 45-50%, Mildly dilated L atrium, mildly dilated R ventricle. Mild MR & mild TR     Hyperlipidemia     Neuropathy of foot     Pt reports starting approx. 6-7 years ago    Neuropathy of hand     Pt reports starting approx.  6-7 years ago    S/P CABG x 4 6/5/2010    LIMA-> LAD;  VG->CX;  VG->Diagonal; VG-> distal RCA  per  Dr Matthew Waddell    Type II or unspecified type diabetes mellitus with neurological manifestations, not stated as uncontrolled(250.60)     Type II or unspecified type diabetes mellitus with other specified manifestations, not stated as uncontrolled     Ulcer of left foot, with fat layer exposed ([address] 12/19/2013    Ulcer of other part of foot        PAST SURGICAL HISTORY    Past Surgical History:   Procedure Laterality Date    APPENDECTOMY      CARDIAC SURGERY      CARPAL TUNNEL RELEASE      L hand Nov. 2011, R hand Jan. 2012    CARPAL TUNNEL RELEASE Right 6/10/2013    recurrent    CARPAL TUNNEL RELEASE Left 7/29/2013    with ulnar nerve transpostion and L middle finger release    CHOLECYSTECTOMY      COLONOSCOPY      CORONARY ARTERY BYPASS GRAFT  6/5/2010 6/5/2010-LIMA->LAD;  VG-> Diagonal;  VG-> Obtuse marginal;  VG->Distal RCA-   Dr Juana Rosado Right 6/10/2013    HYSTERECTOMY, TOTAL ABDOMINAL      TOE AMPUTATION Left 02/14/144th toe    TUBAL LIGATION      ULNAR TUNNEL RELEASE Right 6/10/2013       FAMILY HISTORY    Family History   Problem Relation Age of Onset    Heart Disease Mother     Kidney Disease Mother      dialysis    Cancer Father        SOCIAL HISTORY    Social History   Substance Use Topics    Smoking status: Current Every Day Smoker     Packs/day: 0.25     Years: 30.00     Types: Cigarettes    Smokeless tobacco: Never Used      Comment: quit smoking mia 04/04/16    Alcohol use No      Comment:                                    CAFFEINE: 2 sodas daily       ALLERGIES    Allergies   Allergen Reactions    Cortizone     Phenobarbital Other (See Comments)     Hallucinations        MEDICATIONS    Current Outpatient Prescriptions on File Prior to Encounter   Medication Sig Dispense Refill    carvedilol (COREG) 6.25 MG tablet 6.25 mg 2 times daily       nitroGLYCERIN (NITROSTAT) 0.4 MG SL tablet Place 1 tablet under the tongue every 5 minutes as needed for Chest pain 25 tablet 3    VICTOZA 18 MG/3ML SOPN SC injection daily       albuterol (PROAIR HFA) 108 (90 BASE) MCG/ACT inhaler Inhale 2 puffs into the lungs every 6 hours as needed for Wheezing      esomeprazole Magnesium (NEXIUM) 40 MG PACK Take 20 mg by mouth daily.  2 tabs      oxyCODONE-acetaminophen (PERCOCET)  MG per tablet Take 1 tablet by mouth every 6 hours as needed for Pain.  LORazepam (ATIVAN) 0.5 MG tablet Take 0.5 mg by mouth every 12 hours Indications: One Tablet twice daily       Insulin Aspart (NOVOLOG FLEXPEN SC) Inject 15 Units into the skin 3 times daily (before meals) On sliding scale      pravastatin (PRAVACHOL) 40 MG tablet 40 mg daily.  lisinopril (PRINIVIL;ZESTRIL) 10 MG tablet Take 20 mg by mouth daily       insulin glargine (LANTUS) 100 UNIT/ML injection Inject 50 Units into the skin nightly.  furosemide (LASIX) 20 MG tablet Take 40 mg by mouth 2 times daily       gabapentin (NEURONTIN) 400 MG capsule Take 1,600 mg by mouth 3 times daily        No current facility-administered medications on file prior to encounter. REVIEW OF SYSTEMS    Pertinent items are noted in HPI. Constitutional: Negative for systemic symptoms including fever, chills and malaise. Objective:      BP (!) 154/82   Pulse 71   Temp 96.8 °F (36 °C) (Temporal)   Resp 16     PHYSICAL EXAM    General: The patient is in no acute distress. Mental status:  Patient is appropriate, is  oriented to place and plan of care. Dermatologic exam: Visual inspection of the periwound reveals the skin to be edematous. Wound exam:  see wound description below     All active wounds listed below with today's date are evaluated           Assessment:       Problem List Items Addressed This Visit     Tobacco abuse    Ulcer of left foot, with fat layer exposed ([address]    Diabetic neuropathy ([address]    Type 2 diabetes mellitus without complication ([address]    DM (diabetes mellitus) ([address] - Primary      Other Visit Diagnoses    None. Wound(s) is healed. Plan:     Discharge Instructions         Note:   PHYSICIAN ORDERS AND DISCHARGE INSTRUCTIONS      NOTE: Upon discharge from the [address], you will receive a patient experience survey.  We would be grateful if you would take

## 2017-12-12 ENCOUNTER — TELEPHONE (OUTPATIENT)
Dept: CARDIOLOGY CLINIC | Age: 52
End: 2017-12-12

## 2018-02-05 ENCOUNTER — HOSPITAL ENCOUNTER (OUTPATIENT)
Dept: WOUND CARE | Age: 53
Discharge: HOME | End: 2018-02-05
Attending: PODIATRIST | Admitting: PODIATRIST

## 2018-02-05 VITALS
DIASTOLIC BLOOD PRESSURE: 70 MMHG | RESPIRATION RATE: 16 BRPM | TEMPERATURE: 98.2 F | HEART RATE: 97 BPM | SYSTOLIC BLOOD PRESSURE: 111 MMHG | BODY MASS INDEX: 31.54 KG/M2 | HEIGHT: 63 IN | WEIGHT: 178 LBS

## 2018-02-05 DIAGNOSIS — E11.9 TYPE 2 DIABETES MELLITUS WITHOUT COMPLICATION, WITHOUT LONG-TERM CURRENT USE OF INSULIN (HCC): ICD-10-CM

## 2018-02-05 DIAGNOSIS — E11.9 TYPE 2 DIABETES MELLITUS WITHOUT COMPLICATION, UNSPECIFIED LONG TERM INSULIN USE STATUS: ICD-10-CM

## 2018-02-05 DIAGNOSIS — E08.00 DIABETES MELLITUS DUE TO UNDERLYING CONDITION WITH HYPEROSMOLARITY WITHOUT COMA, WITHOUT LONG-TERM CURRENT USE OF INSULIN (HCC): Primary | ICD-10-CM

## 2018-02-05 DIAGNOSIS — L84 CALLUS OF FOOT: ICD-10-CM

## 2018-02-05 DIAGNOSIS — Z72.0 TOBACCO ABUSE: ICD-10-CM

## 2018-06-18 ENCOUNTER — HOSPITAL ENCOUNTER (OUTPATIENT)
Dept: WOUND CARE | Age: 53
Discharge: HOME | End: 2018-06-18
Attending: PODIATRIST | Admitting: PODIATRIST

## 2018-06-18 VITALS
SYSTOLIC BLOOD PRESSURE: 136 MMHG | HEART RATE: 73 BPM | DIASTOLIC BLOOD PRESSURE: 70 MMHG | TEMPERATURE: 98.6 F | RESPIRATION RATE: 16 BRPM

## 2018-06-18 DIAGNOSIS — Z72.0 TOBACCO ABUSE: ICD-10-CM

## 2018-06-18 DIAGNOSIS — L97.522 ULCER OF LEFT FOOT, WITH FAT LAYER EXPOSED (HCC): Primary | ICD-10-CM

## 2018-06-18 DIAGNOSIS — L84 CALLUS OF FOOT: ICD-10-CM

## 2018-06-18 DIAGNOSIS — E11.9 TYPE 2 DIABETES MELLITUS WITHOUT COMPLICATION, UNSPECIFIED LONG TERM INSULIN USE STATUS: ICD-10-CM

## 2018-06-25 ENCOUNTER — HOSPITAL ENCOUNTER (OUTPATIENT)
Dept: WOUND CARE | Age: 53
Discharge: HOME | End: 2018-06-25
Attending: PODIATRIST | Admitting: PODIATRIST

## 2018-06-25 VITALS
DIASTOLIC BLOOD PRESSURE: 78 MMHG | SYSTOLIC BLOOD PRESSURE: 154 MMHG | HEART RATE: 72 BPM | TEMPERATURE: 96.8 F | RESPIRATION RATE: 18 BRPM

## 2018-06-25 DIAGNOSIS — Z72.0 TOBACCO ABUSE: ICD-10-CM

## 2018-06-25 DIAGNOSIS — L97.522 ULCER OF LEFT FOOT, WITH FAT LAYER EXPOSED (HCC): Primary | ICD-10-CM

## 2018-06-25 DIAGNOSIS — E11.9 TYPE 2 DIABETES MELLITUS WITHOUT COMPLICATION, WITHOUT LONG-TERM CURRENT USE OF INSULIN (HCC): ICD-10-CM

## 2018-06-25 DIAGNOSIS — E11.9 TYPE 2 DIABETES MELLITUS WITHOUT COMPLICATION, UNSPECIFIED LONG TERM INSULIN USE STATUS: ICD-10-CM

## 2018-07-02 ENCOUNTER — HOSPITAL ENCOUNTER (OUTPATIENT)
Dept: WOUND CARE | Age: 53
Discharge: HOME | End: 2018-07-02
Attending: PODIATRIST | Admitting: PODIATRIST

## 2018-07-02 DIAGNOSIS — Z91.199 NON COMPLIANCE WITH MEDICAL TREATMENT: ICD-10-CM

## 2018-07-02 DIAGNOSIS — L97.522 ULCER OF LEFT FOOT, WITH FAT LAYER EXPOSED (HCC): Primary | ICD-10-CM

## 2018-07-02 DIAGNOSIS — Z72.0 TOBACCO ABUSE: ICD-10-CM

## 2018-07-02 DIAGNOSIS — E11.9 TYPE 2 DIABETES MELLITUS WITHOUT COMPLICATION, UNSPECIFIED LONG TERM INSULIN USE STATUS: ICD-10-CM

## 2018-07-02 NOTE — PROGRESS NOTES
Wound Care Center Progress Note with Procedure Note      Renato Mcmanus  AGE: 48 y.o. GENDER: female  : 1965  EPISODE DATE:  2018     Subjective:     Chief Complaint   Patient presents with    Wound Check     Left plantar         HISTORY of PRESENT ILLNESS      Renato Mcmanus is a 48 y.o. female who presents today for wound evaluation of Chronic diabetic wound(s) of Lt. foot sub 4th metatarsal.  The wound is of moderate severity. The underlying cause of the wound is DM, excess pressure, smoking, and non-compliance. Pt stated she had not been caring much for her wound for the past week. Wound Pain Timing/Severity: none  Quality of pain: N/A  Severity of pain:  0 / 10   Modifying Factors: diabetes  Associated Signs/Symptoms: none        PAST MEDICAL HISTORY        Diagnosis Date    CAD (coronary artery disease)     s/p CABG x 4;  follows with Dr Maldonado [place] of foot 2018    Carpal tunnel syndrome on both sides     CHF (congestive heart failure) ([address] 10/2010    Chronic diastolic; EF 93%    Chronic kidney disease     stage 4 kidney disease    Chronic ulcer of left ankle with fat layer exposed ([address] 10/7/2015    Chronic ulcer of left foot with fat layer exposed ([address] 3/17/2016    Chronic ulcer of right ankle with fat layer exposed ([address] 3/17/2016    Chronic ulcer of right foot with fat layer exposed ([address] 3/17/2016    Depression     Diabetes mellitus ([address]     Diabetes mellitus with neurological manifestations ([address]     Diabetes mellitus with ulcer of ankle ([address]     Family history of cardiovascular disease     Mother    GERD (gastroesophageal reflux disease)     H/O cardiac catheterization 10/18/2010, 6/3/2010    10/18/2010-Four bypass grafts all widely patent. 6/3/2010- Moderate to severe triple vessel disease, preserved LV systolic function.     H/O cardiovascular stress test 2012, 8/3/2011, 10/14/2010, 2010 7/26/2012-Lexiscan- Normal Myocardial Perfusion Study. Post stress myocardial perfusion images show a normal pattern of perfusion in all regions. Post stress LV normal size. Normal perfusion in the distribution of all coronaries. Normal LV size and function. Rest EF 70%    H/O chest x-ray 7/12/2012, 6/2/2010 7/12/2012-Perihilar peribronchial cuffing with mild basilar predominant interstital prominence, which may reflect interstitial edema or atypical pneumonia.  H/O Doppler ultrasound 6/4/2010    CAROTID DOPPLER-6/4/2010-No Doppler evidence of hemodynamically significant Carotid Stenosis with antegrade flow in the vertebral arteries bilaterally. 6/4/2010 PERIPHERAL VENOUS DOPPLER_ LEFT Saphenous Vein mapping    H/O echocardiogram 7/26/2012, 8/3/2011, 10/2010, 7/23/2010, 6/8/2010 7/26/2012-LV normal size. Normal LV wall thickness. LV systolic function normal. EF => 55%. LV wall motion normal. Mild MR. Mild TR.  History of complete ECG 7/26/2012 [facility], 7/13/2012 [facility], 7/25/2011, 3/25/2011, 10/18/2010, 10/11/2010, 6/23/2010, 5/7/2010    Hx of cardiovascular stress test 9/3/2015    lexiscan-normal,EF66%    Hx of Doppler ultrasound 4/5/16    Arterial: There is a fluid collection in the left thigh, please refer to PCP for further monitoring, no vascular in etiology.  Hx of echocardiogram     4/16 EF40% Mild MR/TR and mild Pulm HTN. 9/15 EF 45-50%, Mildly dilated L atrium, mildly dilated R ventricle. Mild MR & mild TR     Hyperlipidemia     Neuropathy of foot     Pt reports starting approx. 6-7 years ago    Neuropathy of hand     Pt reports starting approx.  6-7 years ago    Non compliance with medical treatment 7/2/2018    S/P CABG x 4 6/5/2010    LIMA-> LAD;  VG->CX;  VG->Diagonal; VG-> distal RCA  per  Dr Amalia Wells    Type II or unspecified type diabetes mellitus with neurological manifestations, not stated as uncontrolled(250.60)     Type II or unspecified type diabetes mellitus with other specified manifestations, not stated as uncontrolled     Ulcer of left foot, with fat layer exposed ([address] 12/19/2013    Ulcer of other part of foot        PAST SURGICAL HISTORY    Past Surgical History:   Procedure Laterality Date    APPENDECTOMY      CARDIAC SURGERY      CARPAL TUNNEL RELEASE      L hand Nov. 2011, R hand Jan. 2012    CARPAL TUNNEL RELEASE Right 6/10/2013    recurrent    CARPAL TUNNEL RELEASE Left 7/29/2013    with ulnar nerve transpostion and L middle finger release    CHOLECYSTECTOMY      COLONOSCOPY      CORONARY ARTERY BYPASS GRAFT  6/5/2010 6/5/2010-LIMA->LAD;  VG-> Diagonal;  VG-> Obtuse marginal;  VG->Distal RCA-   Dr Avril Nicole Right 6/10/2013    HYSTERECTOMY, TOTAL ABDOMINAL      TOE AMPUTATION Left 02/14/144th toe    TUBAL LIGATION      ULNAR TUNNEL RELEASE Right 6/10/2013       FAMILY HISTORY    Family History   Problem Relation Age of Onset    Heart Disease Mother     Kidney Disease Mother         dialysis    Cancer Father        SOCIAL HISTORY    Social History   Substance Use Topics    Smoking status: Current Every Day Smoker     Packs/day: 0.25     Years: 30.00     Types: Cigarettes    Smokeless tobacco: Never Used      Comment: quit smoking mia 04/04/16    Alcohol use No      Comment:                                    CAFFEINE: 2 sodas daily       ALLERGIES    Allergies   Allergen Reactions    Cortizone     Phenobarbital Other (See Comments)     Hallucinations        MEDICATIONS    Current Outpatient Prescriptions on File Prior to Encounter   Medication Sig Dispense Refill    carvedilol (COREG) 6.25 MG tablet 6.25 mg 2 times daily       VICTOZA 18 MG/3ML SOPN SC injection daily       albuterol (PROAIR HFA) 108 (90 BASE) MCG/ACT inhaler Inhale 2 puffs into the lungs every 6 hours as needed for Wheezing      oxyCODONE-acetaminophen (PERCOCET)  MG per tablet Take 1 tablet by mouth every 6 hours as needed for Pain.       Yes    Pain Control: none       Debridement:Excisional Debridement    Using tissue nippers the wound(s) was/were sharply debrided down through and including the removal of subcutaneous tissue. Devitalized Tissue Debrided:  biofilm, slough, exudate and callus    Pre Debridement Measurements:  Are located in the Wound Documentation Flow Sheet    All active wounds listed below with today's date are evaluated  Wound(s)    debrided this date include # : 8     Post  Debridement Measurements:  Negative Pressure Wound Therapy Foot (Active)   Number of days: 1598       Negative Pressure Wound Therapy Foot Distal (Active)   Number of days: 1909       Wound 06/18/18 wound 8 left plantar( 4 months) Diabetic Ozuna  (Active)   Wound Image   7/2/2018  8:50 AM   Wound Type Wound 7/2/2018  8:50 AM   Wound Traumatic 7/2/2018  8:50 AM   Dressing Status Clean;Dry; Intact 7/2/2018  9:20 AM   Dressing Changed Changed/New 7/2/2018  9:20 AM   Wound Cleansed Wound cleanser 7/2/2018  8:50 AM   Wound Length (cm) 0.3 cm 7/2/2018  8:50 AM   Wound Width (cm) 0.3 cm 7/2/2018  8:50 AM   Wound Depth (cm)  0.3 7/2/2018  8:50 AM   Calculated Wound Size (cm^2) (l*w) 0.09 cm^2 7/2/2018  8:50 AM   Change in Wound Size % (l*w) 82 7/2/2018  8:50 AM   Distance Tunneling (cm) 0 cm 7/2/2018  8:50 AM   Tunneling Position ___ O'Clock 0 7/2/2018  8:50 AM   Undermining Starts ___ O'Clock 0 7/2/2018  8:50 AM   Undermining Ends___ O'Clock 0 7/2/2018  8:50 AM   Undermining Maxium Distance (cm) 0 7/2/2018  8:50 AM   Wound Assessment Red 6/25/2018  8:50 AM   Drainage Amount Scant 7/2/2018  8:50 AM   Drainage Description Serosanguinous 7/2/2018  8:50 AM   Odor None 7/2/2018  8:50 AM   Margins Defined edges 7/2/2018  8:50 AM   Lora-wound Assessment Calloused 7/2/2018  8:50 AM   Non-staged Wound Description Full thickness 7/2/2018  8:50 AM   [place]%Wound Bed 0 7/2/2018  8:50 AM   Red%Wound Bed 100 7/2/2018  8:50 AM   Yellow%Wound Bed 0 7/2/2018  8:50 AM force.                          LPZO hands with soap and water before and after dressing changes.                         Prior to applying a clean dressing, cleanse wound with normal saline,                          wound cleanser, or mild soap and water.                           Ask your physician or nurse before getting the wound(s) wet in the shower.              Daily Wound management:                          Keep weight off wounds and reposition every 2 hours.                          Avoid standing for long periods of time.                          Apply wraps/stockings in AM and remove at bedtime.                          Elevate legs to the level of the heart or above for 30 minutes 4-5 times a day and/or when sitting.                                               When taking antibiotics take entire prescription as ordered by MD do not stop taking until medicine is all gone.                                                                 Orders for this week: 7/2/18     Left planter- wash with soap and water daily. Pat dry. Cover wound bed with damp dagoberto, fluffed 4x4. Wrap with conform and tape. Change daily.      Follow up with Dr Tai Stahl 1 week in the wound care center     Call [phone] for any questions or concerns.   Date__________   Time____________                    Treatment Note Wound 06/18/18 wound 8 left plantar( 4 months) Diabetic Isabella Juan F -Dressing/Treatment:  (dagoberto, 4x4, conform tape )    Written Patient Dismissal Instructions Given            Electronically signed by Anca Liu DPM on 7/2/2018 at 9:21 AM

## 2018-07-09 ENCOUNTER — HOSPITAL ENCOUNTER (OUTPATIENT)
Dept: WOUND CARE | Age: 53
Discharge: HOME | End: 2018-07-09
Attending: PODIATRIST | Admitting: PODIATRIST

## 2018-07-09 VITALS
TEMPERATURE: 97.5 F | RESPIRATION RATE: 16 BRPM | HEART RATE: 76 BPM | SYSTOLIC BLOOD PRESSURE: 146 MMHG | DIASTOLIC BLOOD PRESSURE: 80 MMHG

## 2018-07-09 DIAGNOSIS — Z79.4 TYPE 2 DIABETES MELLITUS WITH FOOT ULCER, WITH LONG-TERM CURRENT USE OF INSULIN (HCC): ICD-10-CM

## 2018-07-09 DIAGNOSIS — E11.9 TYPE 2 DIABETES MELLITUS WITHOUT COMPLICATION, WITHOUT LONG-TERM CURRENT USE OF INSULIN (HCC): ICD-10-CM

## 2018-07-09 DIAGNOSIS — E11.9 TYPE 2 DIABETES MELLITUS WITHOUT COMPLICATION, UNSPECIFIED LONG TERM INSULIN USE STATUS: ICD-10-CM

## 2018-07-09 DIAGNOSIS — E11.621 TYPE 2 DIABETES MELLITUS WITH FOOT ULCER, WITH LONG-TERM CURRENT USE OF INSULIN (HCC): ICD-10-CM

## 2018-07-09 DIAGNOSIS — Z91.199 NON COMPLIANCE WITH MEDICAL TREATMENT: ICD-10-CM

## 2018-07-09 DIAGNOSIS — Z72.0 TOBACCO ABUSE: ICD-10-CM

## 2018-07-09 DIAGNOSIS — L97.509 TYPE 2 DIABETES MELLITUS WITH FOOT ULCER, WITH LONG-TERM CURRENT USE OF INSULIN (HCC): ICD-10-CM

## 2018-07-09 DIAGNOSIS — L97.522 ULCER OF LEFT FOOT, WITH FAT LAYER EXPOSED (HCC): Primary | ICD-10-CM

## 2018-07-09 NOTE — PROGRESS NOTES
Wound Care Center Progress Note with Procedure Note      Laine Negron  AGE: 48 y.o. GENDER: female  : 1965  EPISODE DATE:  2018     Subjective:     Chief Complaint   Patient presents with    Wound Check     left foot         HISTORY of PRESENT ILLNESS      Laine Negron is a 48 y.o. female who presents today for wound evaluation of Chronic diabetic wound(s) of Lt. foot sub 4th metatarsal.  The wound is of mild severity. The underlying cause of the wound is DM. Wound Pain Timing/Severity: none  Quality of pain: N/A  Severity of pain:  0 / 10   Modifying Factors: diabetes, chronic pressure and smoking  Associated Signs/Symptoms: none        PAST MEDICAL HISTORY        Diagnosis Date    CAD (coronary artery disease)     s/p CABG x 4;  follows with Dr Justus Eller Callus of foot 2018    Carpal tunnel syndrome on both sides     CHF (congestive heart failure) ([address] 10/2010    Chronic diastolic; EF 22%    Chronic kidney disease     stage 4 kidney disease    Chronic ulcer of left ankle with fat layer exposed ([address] 10/7/2015    Chronic ulcer of left foot with fat layer exposed ([address] 3/17/2016    Chronic ulcer of right ankle with fat layer exposed ([address] 3/17/2016    Chronic ulcer of right foot with fat layer exposed ([address] 3/17/2016    Depression     Diabetes mellitus ([address]     Diabetes mellitus with neurological manifestations ([address]     Diabetes mellitus with ulcer of ankle ([address]     Family history of cardiovascular disease     Mother    GERD (gastroesophageal reflux disease)     H/O cardiac catheterization 10/18/2010, 6/3/2010    10/18/2010-Four bypass grafts all widely patent. 6/3/2010- Moderate to severe triple vessel disease, preserved LV systolic function.  H/O cardiovascular stress test 2012, 8/3/2011, 10/14/2010, 2010-Lexiscan- Normal Myocardial Perfusion Study. Post stress myocardial perfusion images show a [facility] 12/19/2013    Ulcer of other part of foot        PAST SURGICAL HISTORY    Past Surgical History:   Procedure Laterality Date    APPENDECTOMY      CARDIAC SURGERY      CARPAL TUNNEL RELEASE      L hand Nov. 2011, R hand Jan. 2012    CARPAL TUNNEL RELEASE Right 6/10/2013    recurrent    CARPAL TUNNEL RELEASE Left 7/29/2013    with ulnar nerve transpostion and L middle finger release    CHOLECYSTECTOMY      COLONOSCOPY      CORONARY ARTERY BYPASS GRAFT  6/5/2010 6/5/2010-LIMA->LAD;  VG-> Diagonal;  VG-> Obtuse marginal;  VG->Distal RCA-   Dr Tommy Morales Right 6/10/2013    HYSTERECTOMY, TOTAL ABDOMINAL      TOE AMPUTATION Left 02/14/144th toe    TUBAL LIGATION      ULNAR TUNNEL RELEASE Right 6/10/2013       FAMILY HISTORY    Family History   Problem Relation Age of Onset    Heart Disease Mother     Kidney Disease Mother         dialysis    Cancer Father        SOCIAL HISTORY    Social History   Substance Use Topics    Smoking status: Current Every Day Smoker     Packs/day: 0.25     Years: 30.00     Types: Cigarettes    Smokeless tobacco: Never Used      Comment: quit smoking ciagarettes 04/04/16    Alcohol use No      Comment:                                    CAFFEINE: 2 sodas daily       ALLERGIES    Allergies   Allergen Reactions    Cortizone     Phenobarbital Other (See Comments)     Hallucinations        MEDICATIONS    Current Outpatient Prescriptions on File Prior to Encounter   Medication Sig Dispense Refill    carvedilol (COREG) 6.25 MG tablet 6.25 mg 2 times daily       nitroGLYCERIN (NITROSTAT) 0.4 MG SL tablet Place 1 tablet under the tongue every 5 minutes as needed for Chest pain 25 tablet 3    VICTOZA 18 MG/3ML SOPN SC injection daily       albuterol (PROAIR HFA) 108 (90 BASE) MCG/ACT inhaler Inhale 2 puffs into the lungs every 6 hours as needed for Wheezing      esomeprazole Magnesium (NEXIUM) 40 MG PACK Take 20 mg by mouth daily.  2 tabs      taken: Yes    Pain Control: none       Debridement:Excisional Debridement    Using curette the wound(s) was/were sharply debrided down through and including the removal of subcutaneous tissue. Devitalized Tissue Debrided:  biofilm, slough, exudate and callus    Pre Debridement Measurements:  Are located in the Wound Documentation Flow Sheet    All active wounds listed below with today's date are evaluated  Wound(s)    debrided this date include # : 8     Post  Debridement Measurements:  Negative Pressure Wound Therapy Foot (Active)   Number of days: 1605       Negative Pressure Wound Therapy Foot Distal (Active)   Number of days: 6243       Wound 06/18/18 wound 8 left plantar( 4 months) Diabetic Ozuna  (Active)   Wound Image   7/2/2018  8:50 AM   Wound Type Wound 7/9/2018  8:52 AM   Wound Traumatic 7/2/2018  8:50 AM   Dressing Status Clean;Dry; Intact 7/9/2018  9:23 AM   Dressing Changed Changed/New 7/9/2018  9:23 AM   Wound Cleansed Wound cleanser 7/9/2018  8:52 AM   Wound Length (cm) 0.3 cm 7/9/2018  9:17 AM   Wound Width (cm) 0.4 cm 7/9/2018  9:17 AM   Wound Depth (cm)  0.2 7/9/2018  9:17 AM   Calculated Wound Size (cm^2) (l*w) 0.12 cm^2 7/9/2018  9:17 AM   Change in Wound Size % (l*w) 76 7/9/2018  9:17 AM   Distance Tunneling (cm) 0 cm 7/9/2018  8:52 AM   Tunneling Position ___ O'Clock 0 7/9/2018  8:52 AM   Undermining Starts ___ O'Clock 0 7/9/2018  8:52 AM   Undermining Ends___ O'Clock 0 7/9/2018  8:52 AM   Undermining Maxium Distance (cm) 0 7/9/2018  8:52 AM   Wound Assessment Red 7/9/2018  8:52 AM   Drainage Amount Scant 7/9/2018  8:52 AM   Drainage Description Serosanguinous 7/9/2018  8:52 AM   Odor None 7/9/2018  8:52 AM   Margins Defined edges 7/9/2018  8:52 AM   Lora-wound Assessment Calloused 7/9/2018  8:52 AM   Non-staged Wound Description Full thickness 7/9/2018  8:52 AM   [place]%Wound Bed 0 7/9/2018  8:52 AM   Red%Wound Bed 100 7/9/2018  8:52 AM   Yellow%Wound Bed 0 7/9/2018  8:52 AM force.                          WBDU hands with soap and water before and after dressing changes.                         Prior to applying a clean dressing, cleanse wound with normal saline,                          wound cleanser, or mild soap and water.                           Ask your physician or nurse before getting the wound(s) wet in the shower.              Daily Wound management:                          Keep weight off wounds and reposition every 2 hours.                          Avoid standing for long periods of time.                          Apply wraps/stockings in AM and remove at bedtime.                          Elevate legs to the level of the heart or above for 30 minutes 4-5 times a day and/or when sitting.                                               When taking antibiotics take entire prescription as ordered by MD do not stop taking until medicine is all gone.                                Orders for this week: 7/9/18     Left planter- wash with soap and water daily. Pat dry. Cover wound bed with damp dagoberto, fluffed 4x4. Wrap with conform and tape. Change daily.      Follow up with Dr Ghanshyam Bernstein 1 week in the wound care center     Call [phone] for any questions or concerns.   Date__________   Time____________                    Treatment Note Wound 06/18/18 wound 8 left plantar( 4 months) Diabetic Gwendolyn Pilsner -Dressing/Treatment:  (dagoberto, 4x4, conform tape )    Written Patient Dismissal Instructions Given            Electronically signed by Nenita Weinberg DPM on 7/9/2018 at 9:36 AM

## 2018-07-30 ENCOUNTER — HOSPITAL ENCOUNTER (OUTPATIENT)
Dept: WOUND CARE | Age: 53
Discharge: HOME | End: 2018-07-30
Attending: PODIATRIST | Admitting: PODIATRIST

## 2018-07-30 VITALS
TEMPERATURE: 96.6 F | HEART RATE: 71 BPM | DIASTOLIC BLOOD PRESSURE: 71 MMHG | SYSTOLIC BLOOD PRESSURE: 145 MMHG | RESPIRATION RATE: 18 BRPM

## 2018-07-30 DIAGNOSIS — L97.522 ULCER OF LEFT FOOT, WITH FAT LAYER EXPOSED (HCC): Primary | ICD-10-CM

## 2018-07-30 DIAGNOSIS — Z91.199 NON COMPLIANCE WITH MEDICAL TREATMENT: ICD-10-CM

## 2018-07-30 DIAGNOSIS — Z79.4 TYPE 2 DIABETES MELLITUS WITH FOOT ULCER, WITH LONG-TERM CURRENT USE OF INSULIN (HCC): ICD-10-CM

## 2018-07-30 DIAGNOSIS — E11.42 TYPE 2 DIABETES MELLITUS WITH DIABETIC POLYNEUROPATHY, WITH LONG-TERM CURRENT USE OF INSULIN (HCC): ICD-10-CM

## 2018-07-30 DIAGNOSIS — E11.621 TYPE 2 DIABETES MELLITUS WITH FOOT ULCER, WITH LONG-TERM CURRENT USE OF INSULIN (HCC): ICD-10-CM

## 2018-07-30 DIAGNOSIS — Z72.0 TOBACCO ABUSE: ICD-10-CM

## 2018-07-30 DIAGNOSIS — Z79.4 TYPE 2 DIABETES MELLITUS WITH DIABETIC POLYNEUROPATHY, WITH LONG-TERM CURRENT USE OF INSULIN (HCC): ICD-10-CM

## 2018-07-30 DIAGNOSIS — L97.509 TYPE 2 DIABETES MELLITUS WITH FOOT ULCER, WITH LONG-TERM CURRENT USE OF INSULIN (HCC): ICD-10-CM

## 2018-07-30 NOTE — PROGRESS NOTES
7/26/2012-Lexiscan- Normal Myocardial Perfusion Study. Post stress myocardial perfusion images show a normal pattern of perfusion in all regions. Post stress LV normal size. Normal perfusion in the distribution of all coronaries. Normal LV size and function. Rest EF 70%    H/O chest x-ray 7/12/2012, 6/2/2010 7/12/2012-Perihilar peribronchial cuffing with mild basilar predominant interstital prominence, which may reflect interstitial edema or atypical pneumonia.  H/O Doppler ultrasound 6/4/2010    CAROTID DOPPLER-6/4/2010-No Doppler evidence of hemodynamically significant Carotid Stenosis with antegrade flow in the vertebral arteries bilaterally. 6/4/2010 PERIPHERAL VENOUS DOPPLER_ LEFT Saphenous Vein mapping    H/O echocardiogram 7/26/2012, 8/3/2011, 10/2010, 7/23/2010, 6/8/2010 7/26/2012-LV normal size. Normal LV wall thickness. LV systolic function normal. EF => 55%. LV wall motion normal. Mild MR. Mild TR.  History of complete ECG 7/26/2012 Aiden Das), 7/13/2012 [facility]), 7/25/2011, 3/25/2011, 10/18/2010, 10/11/2010, 6/23/2010, 5/7/2010    Hx of cardiovascular stress test 9/3/2015    lexiscan-normal,EF66%    Hx of Doppler ultrasound 4/5/16    Arterial: There is a fluid collection in the left thigh, please refer to PCP for further monitoring, no vascular in etiology.  Hx of echocardiogram     4/16 EF40% Mild MR/TR and mild Pulm HTN. 9/15 EF 45-50%, Mildly dilated L atrium, mildly dilated R ventricle. Mild MR & mild TR     Hyperlipidemia     Neuropathy of foot     Pt reports starting approx. 6-7 years ago    Neuropathy of hand     Pt reports starting approx.  6-7 years ago    Non compliance with medical treatment 7/2/2018    S/P CABG x 4 6/5/2010    LIMA-> LAD;  VG->CX;  VG->Diagonal; VG-> distal RCA  per  Dr Gurinder Davila    Type 2 diabetes mellitus with diabetic polyneuropathy, with long-term current use of insulin ([address] 4/5/2016    Type II or unspecified type diabetes mellitus with neurological

## 2018-08-06 ENCOUNTER — HOSPITAL ENCOUNTER (OUTPATIENT)
Dept: WOUND CARE | Age: 53
Discharge: HOME | End: 2018-08-06
Attending: PODIATRIST | Admitting: PODIATRIST

## 2018-08-06 VITALS
RESPIRATION RATE: 16 BRPM | TEMPERATURE: 97 F | HEART RATE: 69 BPM | DIASTOLIC BLOOD PRESSURE: 66 MMHG | SYSTOLIC BLOOD PRESSURE: 129 MMHG

## 2018-08-06 DIAGNOSIS — E11.621 DIABETIC ULCER OF TOE OF LEFT FOOT ASSOCIATED WITH TYPE 2 DIABETES MELLITUS, WITH FAT LAYER EXPOSED (HCC): ICD-10-CM

## 2018-08-06 DIAGNOSIS — L97.522 ULCER OF LEFT FOOT, WITH FAT LAYER EXPOSED (HCC): Primary | ICD-10-CM

## 2018-08-06 DIAGNOSIS — L97.522 DIABETIC ULCER OF TOE OF LEFT FOOT ASSOCIATED WITH TYPE 2 DIABETES MELLITUS, WITH FAT LAYER EXPOSED (HCC): ICD-10-CM

## 2018-08-06 DIAGNOSIS — Z72.0 TOBACCO ABUSE: ICD-10-CM

## 2018-08-06 DIAGNOSIS — L84 CALLUS OF FOOT: ICD-10-CM

## 2018-08-06 DIAGNOSIS — Z91.199 NON COMPLIANCE WITH MEDICAL TREATMENT: ICD-10-CM

## 2018-08-06 NOTE — PROGRESS NOTES
nitroGLYCERIN (NITROSTAT) 0.4 MG SL tablet Place 1 tablet under the tongue every 5 minutes as needed for Chest pain 25 tablet 3    VICTOZA 18 MG/3ML SOPN SC injection daily       albuterol (PROAIR HFA) 108 (90 BASE) MCG/ACT inhaler Inhale 2 puffs into the lungs every 6 hours as needed for Wheezing      esomeprazole Magnesium (NEXIUM) 40 MG PACK Take 20 mg by mouth daily. 2 tabs      oxyCODONE-acetaminophen (PERCOCET)  MG per tablet Take 1 tablet by mouth every 6 hours as needed for Pain.  LORazepam (ATIVAN) 0.5 MG tablet Take 0.5 mg by mouth every 12 hours Indications: One Tablet twice daily       Insulin Aspart (NOVOLOG FLEXPEN SC) Inject 15 Units into the skin 3 times daily (before meals) On sliding scale      pravastatin (PRAVACHOL) 40 MG tablet 40 mg daily.  lisinopril (PRINIVIL;ZESTRIL) 10 MG tablet Take 20 mg by mouth daily       insulin glargine (LANTUS) 100 UNIT/ML injection Inject 50 Units into the skin nightly.  furosemide (LASIX) 20 MG tablet Take 40 mg by mouth 2 times daily       gabapentin (NEURONTIN) 400 MG capsule Take 1,600 mg by mouth 3 times daily        No current facility-administered medications on file prior to encounter. REVIEW OF SYSTEMS    Pertinent items are noted in HPI. Constitutional: Negative for systemic symptoms including fever, chills and malaise. Objective:      /66   Pulse 69   Temp 97 °F (36.1 °C) (Oral)   Resp 16     PHYSICAL EXAM    General: The patient is in no acute distress. Mental status:  Patient is appropriate, is  oriented to place and plan of care.   Dermatologic exam: Visual inspection of the periwound reveals the skin to be atrophic and edematous  Wound exam: see wound description below in procedure note      Assessment:     Problem List Items Addressed This Visit     Tobacco abuse    Ulcer of left foot, with fat layer exposed ([address] - Primary    Callus of foot    Non compliance with medical treatment    Diabetic ulcer Amount Small 8/6/2018  8:32 AM   Drainage Description Serosanguinous 8/6/2018  8:32 AM   Odor None 8/6/2018  8:32 AM   Margins Defined edges; Unattached edges 8/6/2018  8:32 AM   Lora-wound Assessment Calloused;Dark edges 8/6/2018  8:32 AM   Non-staged Wound Description Full thickness 8/6/2018  8:32 AM   [place]%Wound Bed 0 8/6/2018  8:32 AM   Red%Wound Bed 0 8/6/2018  8:32 AM   Yellow%Wound Bed 0 8/6/2018  8:32 AM   Black%Wound Bed 100 8/6/2018  8:32 AM   Purple%Wound Bed 0 8/6/2018  8:32 AM   Other%Wound Bed 0 8/6/2018  8:32 AM   Debridement per physician Subcutaneous 7/2/2018  9:27 AM   Number of days: 49       Incision 06/10/13 Wrist Right (Active)   Number of days: 1882       Incision 07/29/13 Wrist Left (Active)   Number of days: 1879       Incision 02/14/14 Toe (Comment  which one) (Active)   Number of days: 3856       Percent of Wound(s) Debrided: approximately 100%    Total Surface Area Debrided:  0.4 sq cm     Bleeding:  Minimal    Hemostasis Achieved:  by pressure    Procedural Pain:  0  / 10     Post Procedural Pain:  0 / 10     Response to treatment:  Well tolerated by patient. Status of wound progress and description from last visit:   Unchanged - maceration noted to the surrounding tissue around the ulcer. Plan:     PLANNING FOR [facility] NEXT VISIT. PT INFORMED THAT IT WILL BE APPLIED. Discharge Instructions         Plan:         Discharge Instructions           Discharge Instructions        .PHYSICIAN ORDERS AND DISCHARGE INSTRUCTIONS     NOTE: Upon discharge from the [address], you will receive a patient experience survey.  We would be grateful if you would take the time to fill this survey out.     Wound care order history:                 KATHLEEN's   Right       Left               Date 6/18- not needed per Dr. Chalino Cruz studies:   Date               Imaging: Miley Homar               Cultures:   Date               Labs/ HbA1c:   Date               Grafts:  Date               HBO: Given            Electronically signed by Jess Anaya DPM on 8/6/2018 at 9:03 AM

## 2018-08-13 ENCOUNTER — HOSPITAL ENCOUNTER (OUTPATIENT)
Dept: WOUND CARE | Age: 53
Discharge: HOME | End: 2018-08-13
Attending: PODIATRIST | Admitting: PODIATRIST

## 2018-08-13 VITALS
SYSTOLIC BLOOD PRESSURE: 167 MMHG | RESPIRATION RATE: 16 BRPM | TEMPERATURE: 98.4 F | DIASTOLIC BLOOD PRESSURE: 80 MMHG | HEART RATE: 82 BPM

## 2018-08-13 DIAGNOSIS — L97.522 ULCER OF LEFT FOOT, WITH FAT LAYER EXPOSED (HCC): Primary | ICD-10-CM

## 2018-08-13 DIAGNOSIS — E11.621 DIABETIC ULCER OF TOE OF LEFT FOOT ASSOCIATED WITH TYPE 2 DIABETES MELLITUS, WITH FAT LAYER EXPOSED (HCC): ICD-10-CM

## 2018-08-13 DIAGNOSIS — E11.621 TYPE 2 DIABETES MELLITUS WITH FOOT ULCER, WITH LONG-TERM CURRENT USE OF INSULIN (HCC): ICD-10-CM

## 2018-08-13 DIAGNOSIS — E11.42 TYPE 2 DIABETES MELLITUS WITH DIABETIC POLYNEUROPATHY, WITH LONG-TERM CURRENT USE OF INSULIN (HCC): ICD-10-CM

## 2018-08-13 DIAGNOSIS — Z72.0 TOBACCO ABUSE: ICD-10-CM

## 2018-08-13 DIAGNOSIS — Z79.4 TYPE 2 DIABETES MELLITUS WITH FOOT ULCER, WITH LONG-TERM CURRENT USE OF INSULIN (HCC): ICD-10-CM

## 2018-08-13 DIAGNOSIS — L84 CALLUS OF FOOT: ICD-10-CM

## 2018-08-13 DIAGNOSIS — L97.509 TYPE 2 DIABETES MELLITUS WITH FOOT ULCER, WITH LONG-TERM CURRENT USE OF INSULIN (HCC): ICD-10-CM

## 2018-08-13 DIAGNOSIS — Z79.4 TYPE 2 DIABETES MELLITUS WITH DIABETIC POLYNEUROPATHY, WITH LONG-TERM CURRENT USE OF INSULIN (HCC): ICD-10-CM

## 2018-08-13 DIAGNOSIS — Z91.199 NON COMPLIANCE WITH MEDICAL TREATMENT: ICD-10-CM

## 2018-08-13 DIAGNOSIS — L97.522 DIABETIC ULCER OF TOE OF LEFT FOOT ASSOCIATED WITH TYPE 2 DIABETES MELLITUS, WITH FAT LAYER EXPOSED (HCC): ICD-10-CM

## 2018-08-13 NOTE — PLAN OF CARE
Problem: Wound:  Intervention: Assess pain status  See flow sheet  Intervention: Assess wound size, appearance and drainage  See flow sheet  Intervention: Assess pedal pulses bilaterally if patient has a foot or leg ulcer  See flow sheet    Goal: Will show signs of wound healing; wound closure and no evidence of infection  Will show signs of wound healing; wound closure and no evidence of infection   Outcome: Ongoing  See flow sheet

## 2018-08-13 NOTE — PROGRESS NOTES
Progress Note and application of total contact cast      Shannon Domingo  AGE: 48 y.o. GENDER: female  : 1965  EPISODE DATE:  2018     Subjective:     Chief Complaint   Patient presents with    Wound Check     LLE         HISTORY of PRESENT ILLNESS      Shannon Domingo is a 48 y.o. female who presents today for wound evaluation of Chronic diabetic wound(s) of Lt. foot sub 4th metatarsal head. The wound is of moderate severity. The underlying cause of the wound is DM. The patient is being considered as a candidate for use of a total contact cast if indicated. Wound Pain Timing/Severity: none  Quality of pain: N/A  Severity of pain:  0 / 10   Modifying Factors: diabetes and chronic pressure  Associated Signs/Symptoms: none        PAST MEDICAL HISTORY        Diagnosis Date    CAD (coronary artery disease)     s/p CABG x 4;  follows with Dr Jossy Ng Callus of foot 2018    Carpal tunnel syndrome on both sides     CHF (congestive heart failure) ([address] 10/2010    Chronic diastolic; EF 95%    Chronic kidney disease     stage 4 kidney disease    Chronic ulcer of left ankle with fat layer exposed ([address] 10/7/2015    Chronic ulcer of left foot with fat layer exposed ([address] 3/17/2016    Chronic ulcer of right ankle with fat layer exposed ([address] 3/17/2016    Chronic ulcer of right foot with fat layer exposed ([address] 3/17/2016    Depression     Diabetes mellitus ([address]     Diabetes mellitus with neurological manifestations ([address]     Diabetes mellitus with ulcer of ankle ([address]     Diabetic ulcer of left foot associated with type 2 diabetes mellitus, with fat layer exposed ([address] 2018    Family history of cardiovascular disease     Mother    GERD (gastroesophageal reflux disease)     H/O cardiac catheterization 10/18/2010, 6/3/2010    10/18/2010-Four bypass grafts all widely patent. 6/3/2010- Moderate to severe triple vessel disease, preserved LV systolic function.  H/O cardiovascular stress test 7/26/2012, 8/3/2011, 10/14/2010, 5/24/2010 7/26/2012-Lexiscan- Normal Myocardial Perfusion Study. Post stress myocardial perfusion images show a normal pattern of perfusion in all regions. Post stress LV normal size. Normal perfusion in the distribution of all coronaries. Normal LV size and function. Rest EF 70%    H/O chest x-ray 7/12/2012, 6/2/2010 7/12/2012-Perihilar peribronchial cuffing with mild basilar predominant interstital prominence, which may reflect interstitial edema or atypical pneumonia.  H/O Doppler ultrasound 6/4/2010    CAROTID DOPPLER-6/4/2010-No Doppler evidence of hemodynamically significant Carotid Stenosis with antegrade flow in the vertebral arteries bilaterally. 6/4/2010 PERIPHERAL VENOUS DOPPLER_ LEFT Saphenous Vein mapping    H/O echocardiogram 7/26/2012, 8/3/2011, 10/2010, 7/23/2010, 6/8/2010 7/26/2012-LV normal size. Normal LV wall thickness. LV systolic function normal. EF => 55%. LV wall motion normal. Mild MR. Mild TR.  History of complete ECG 7/26/2012 Shima Landon), 7/13/2012 [facility]), 7/25/2011, 3/25/2011, 10/18/2010, 10/11/2010, 6/23/2010, 5/7/2010    Hx of cardiovascular stress test 9/3/2015    lexiscan-normal,EF66%    Hx of Doppler ultrasound 4/5/16    Arterial: There is a fluid collection in the left thigh, please refer to PCP for further monitoring, no vascular in etiology.  Hx of echocardiogram     4/16 EF40% Mild MR/TR and mild Pulm HTN. 9/15 EF 45-50%, Mildly dilated L atrium, mildly dilated R ventricle. Mild MR & mild TR     Hyperlipidemia     Neuropathy of foot     Pt reports starting approx. 6-7 years ago    Neuropathy of hand     Pt reports starting approx.  6-7 years ago    Non compliance with medical treatment 7/2/2018    S/P CABG x 4 6/5/2010    LIMA-> LAD;  VG->CX;  VG->Diagonal; VG-> distal RCA  per  Dr Yane Bray    Type 2 diabetes mellitus with diabetic polyneuropathy, with long-term current use of insulin inhaler Inhale 2 puffs into the lungs every 6 hours as needed for Wheezing      esomeprazole Magnesium (NEXIUM) 40 MG PACK Take 20 mg by mouth daily. 2 tabs      oxyCODONE-acetaminophen (PERCOCET)  MG per tablet Take 1 tablet by mouth every 6 hours as needed for Pain.  LORazepam (ATIVAN) 0.5 MG tablet Take 0.5 mg by mouth every 12 hours Indications: One Tablet twice daily       Insulin Aspart (NOVOLOG FLEXPEN SC) Inject 15 Units into the skin 3 times daily (before meals) On sliding scale      pravastatin (PRAVACHOL) 40 MG tablet 40 mg daily.  lisinopril (PRINIVIL;ZESTRIL) 10 MG tablet Take 20 mg by mouth daily       insulin glargine (LANTUS) 100 UNIT/ML injection Inject 50 Units into the skin nightly.  furosemide (LASIX) 20 MG tablet Take 40 mg by mouth 2 times daily       gabapentin (NEURONTIN) 400 MG capsule Take 1,600 mg by mouth 3 times daily       nitroGLYCERIN (NITROSTAT) 0.4 MG SL tablet Place 1 tablet under the tongue every 5 minutes as needed for Chest pain 25 tablet 3     No current facility-administered medications on file prior to encounter. REVIEW OF SYSTEMS    Pertinent items are noted in HPI. Constitutional: Negative for systemic symptoms including fever, chills and malaise. Objective:      BP (!) 167/80   Pulse 82   Temp 98.4 °F (36.9 °C) (Oral)   Resp 16     PHYSICAL EXAM    General: The patient is in no acute distress. Mental status:  Patient is appropriate, is  oriented to place and plan of care.   Dermatologic exam: Visual inspection of the periwound reveals the skin to be edematous  Wound exam: see wound description below in procedure note      Assessment:     Problem List Items Addressed This Visit     Diabetes mellitus ([address]    Tobacco abuse    Ulcer of left foot, with fat layer exposed ([address] - Primary    Type 2 diabetes mellitus with diabetic polyneuropathy, with long-term current use of insulin ([address]    Callus of foot    Non compliance with medical treatment    Diabetic ulcer of left foot associated with type 2 diabetes mellitus, with fat layer exposed ([address]          Procedure: The wound bed and lower extremity was cleansed and prepared as necessary for casting. Debridement was not required. Wound description and measurements:    Negative Pressure Wound Therapy Foot (Active)   Number of days: 1640       Negative Pressure Wound Therapy Foot Distal (Active)   Number of days: 3873       Wound 06/18/18 Wound #8 (onset 4 months - DM Wag 3) Left Plantar (Active)   Wound Image   7/30/2018  8:31 AM   Wound Type Wound 7/30/2018  8:31 AM   Wound Traumatic 7/30/2018  8:31 AM   Dressing Status Clean;Dry; Intact 8/13/2018 11:27 AM   Dressing Changed Changed/New 8/13/2018 11:27 AM   Wound Cleansed Wound cleanser 8/6/2018  8:32 AM   Wound Length (cm) 0.7 cm 8/13/2018 10:33 AM   Wound Width (cm) 0.2 cm 8/13/2018 10:33 AM   Wound Depth (cm)  0.3 8/13/2018 10:33 AM   Calculated Wound Size (cm^2) (l*w) 0.14 cm^2 8/13/2018 10:33 AM   Change in Wound Size % (l*w) 72 8/13/2018 10:33 AM   Distance Tunneling (cm) 0 cm 8/13/2018 10:33 AM   Tunneling Position ___ O'Clock 0 8/13/2018 10:33 AM   Undermining Starts ___ O'Clock 0 8/13/2018 10:33 AM   Undermining Ends___ O'Clock 0 8/13/2018 10:33 AM   Undermining Maxium Distance (cm) 0 8/13/2018 10:33 AM   Wound Assessment Pale;Pink 8/13/2018 10:33 AM   Drainage Amount Scant 8/13/2018 10:33 AM   Drainage Description Serosanguinous 8/13/2018 10:33 AM   Odor None 8/13/2018 10:33 AM   Margins Defined edges; Unattached edges 8/13/2018 10:33 AM   Lora-wound Assessment Calloused;Dry 8/13/2018 10:33 AM   Non-staged Wound Description Full thickness 8/13/2018 10:33 AM   [place]%Wound Bed 100 8/13/2018 10:33 AM   Red%Wound Bed 0 8/13/2018 10:33 AM   Yellow%Wound Bed 0 8/13/2018 10:33 AM   Black%Wound Bed 0 8/13/2018 10:33 AM   Purple%Wound Bed 0 8/13/2018 10:33 AM   Other%Wound Bed 0 8/13/2018 10:33 AM   Debridement per physician Subcutaneous 8/6/2018  9:04 AM   Number of days: 56       Incision 06/10/13 Wrist Right (Active)   Number of days: 1890       Incision 07/29/13 Wrist Left (Active)   Number of days: 1841       Incision 02/14/14 Toe (Comment  which one) (Active)   Number of days: 6189         Application of cast:    With the indications of casting being present and no contraindications, information and  instructions were given to the patient and the patient consented to the treatment. After proper wound care and appropriate padding. A total contact cast was applied according to protocol. Fall prevention safety and instructions on using various supportive walking devices were discussed with the patient. The patient was also instructed on what to do and how to get help if the cast became uncomfortable. The patient expressed understanding of all of these issues. The patient tolerated the procedure well. Status of wound progress and description from last visit:   No changes. Plan:       Discharge Instructions         Plan:      PLANNING FOR TCC NEXT VISIT. PT INFORMED THAT IT WILL BE APPLIED.     Discharge Instructions           Plan:         Discharge Instructions           Discharge Instructions        .PHYSICIAN ORDERS AND DISCHARGE INSTRUCTIONS     NOTE: Upon discharge from the [address], you will receive a patient experience survey.  We would be grateful if you would take the time to fill this survey out.     Wound care order history:                 KATHLEEN's   Right       Left               Date 6/18- not needed per Dr. Liliane Son studies:   Date               Imaging: Clerance Mort               Cultures:   Date               Labs/ HbA1c:   Date               Grafts:  Date               HBO:  TBD              Antibiotics:               Earlier Wound care treatments: félix Bragg               Authorizations:                        Consults:   Date                           PCP: Dr. Cornelio Che      Continuing wound care orders and information:              Residence: home              Continue home health care with: n/a              Your wound-care supplies will be provided by: Darrick Iniguez provider:              Compression with  Ranjit Dage loading:  Date               MLLPI Meds:              XUAXX cleansing:                           [place] not scrub or use excessive force.                          Wash hands with soap and water before and after dressing changes.                         Prior to applying a clean dressing, cleanse wound with normal saline,                          wound cleanser, or mild soap and water.                           Ask your physician or nurse before getting the wound(s) wet in the shower.              Daily Wound management:                          Keep weight off wounds and reposition every 2 hours.                          Avoid standing for long periods of time.                          Apply wraps/stockings in AM and remove at bedtime.                          Elevate legs to the level of the heart or above for 30 minutes 4-5 times a day and/or when sitting.                                               When taking antibiotics take entire prescription as ordered by MD do not stop taking until medicine is all gone.                                Orders for this week: 8/13/18     Left planter- wash with soap and water daily. Pat dry. Cover wound bed with damp dagoberto, reese alginate. fluffed 4x4. Bordered gauze. TCC. Return on thursday for cast change. Follow up with Dr Aga Galvan 1 week in the wound care center     Call [phone] for any questions or concerns.   Date__________   Time____________          Treatment Note Wound 06/18/18 Wound #8 (onset 4 months - DM Wag 3) Left Plantar-Dressing/Treatment:  (damp dagoberto,  ca alginate, 4x4, border gauze tcc)    Written Patient Dismissal Instructions Given            Electronically signed by Radha Cardona DPM on 8/13/2018 at 12:14 PM

## 2018-08-16 ENCOUNTER — HOSPITAL ENCOUNTER (OUTPATIENT)
Dept: WOUND CARE | Age: 53
Discharge: HOME | End: 2018-08-16
Attending: INTERNAL MEDICINE | Admitting: INTERNAL MEDICINE

## 2018-08-16 VITALS
DIASTOLIC BLOOD PRESSURE: 71 MMHG | RESPIRATION RATE: 16 BRPM | SYSTOLIC BLOOD PRESSURE: 129 MMHG | TEMPERATURE: 97.6 F | HEART RATE: 70 BPM

## 2018-08-16 DIAGNOSIS — L97.522 DIABETIC ULCER OF TOE OF LEFT FOOT ASSOCIATED WITH TYPE 2 DIABETES MELLITUS, WITH FAT LAYER EXPOSED (HCC): Primary | ICD-10-CM

## 2018-08-16 DIAGNOSIS — E11.621 DIABETIC ULCER OF TOE OF LEFT FOOT ASSOCIATED WITH TYPE 2 DIABETES MELLITUS, WITH FAT LAYER EXPOSED (HCC): Primary | ICD-10-CM

## 2018-08-16 NOTE — PROGRESS NOTES
Progress Note and application of total contact cast      Rocky Officer  AGE: 48 y.o. GENDER: female  : 1965  EPISODE DATE:  2018     Subjective:     Chief Complaint   Patient presents with    Wound Check         HISTORY of PRESENT ILLNESS      Rocky Officer is a 48 y.o. female who presents today for wound evaluation of Chronic diabetic ulcer of left foot. The ulcer is of moderate severity. The underlying cause of the wound is callous build-up and ulcer formation. She just had first TCC placed on Monday, this is her f/u for this and cast placement. She has no pain, she did well with the TCC. I discuss with her that she is a good candidate for TCC and that this is the best way to off-load.   Wound Pain Timing/Severity: none  Quality of pain: N/A  Severity of pain:  0 / 10   Modifying Factors: diabetes  Associated Signs/Symptoms: none        PAST MEDICAL HISTORY        Diagnosis Date    CAD (coronary artery disease)     s/p CABG x 4;  follows with Dr Arun Taylor Callus of foot 2018    Carpal tunnel syndrome on both sides     CHF (congestive heart failure) ([address] 10/2010    Chronic diastolic; EF 87%    Chronic kidney disease     stage 4 kidney disease    Chronic ulcer of left ankle with fat layer exposed ([address] 10/7/2015    Chronic ulcer of left foot with fat layer exposed ([address] 3/17/2016    Chronic ulcer of right ankle with fat layer exposed ([address] 3/17/2016    Chronic ulcer of right foot with fat layer exposed ([address] 3/17/2016    Depression     Diabetes mellitus ([address]     Diabetes mellitus with neurological manifestations ([address]     Diabetes mellitus with ulcer of ankle ([address]     Diabetic ulcer of left foot associated with type 2 diabetes mellitus, with fat layer exposed ([address] 2018    Family history of cardiovascular disease     Mother    GERD (gastroesophageal reflux disease)     H/O cardiac catheterization 10/18/2010, 6/3/2010    10/18/2010-Four Procedure: The wound bed and lower extremity was cleansed and prepared as necessary for casting. Debridement was not required. Wound description and measurements:    Negative Pressure Wound Therapy Foot (Active)   Number of days: 1643       Negative Pressure Wound Therapy Foot Distal (Active)   Number of days: 4120       Wound 06/18/18 Wound #8 (onset 4 months - DM Wag 3) Left Plantar (Active)   Wound Image   7/30/2018  8:31 AM   Wound Type Wound 8/16/2018  8:45 AM   Wound Traumatic 7/30/2018  8:31 AM   Dressing Status Clean;Dry; Intact 8/16/2018  9:05 AM   Dressing Changed Changed/New 8/16/2018  9:05 AM   Wound Cleansed Wound cleanser 8/6/2018  8:32 AM   Wound Length (cm) 0.4 cm 8/16/2018  8:45 AM   Wound Width (cm) 0.3 cm 8/16/2018  8:45 AM   Wound Depth (cm)  0.2 8/16/2018  8:45 AM   Calculated Wound Size (cm^2) (l*w) 0.12 cm^2 8/16/2018  8:45 AM   Change in Wound Size % (l*w) 76 8/16/2018  8:45 AM   Distance Tunneling (cm) 0 cm 8/16/2018  8:45 AM   Tunneling Position ___ O'Clock 0 8/16/2018  8:45 AM   Undermining Starts ___ O'Clock 0 8/16/2018  8:45 AM   Undermining Ends___ O'Clock 0 8/16/2018  8:45 AM   Undermining Maxium Distance (cm) 0 8/16/2018  8:45 AM   Wound Assessment Red 8/16/2018  8:45 AM   Drainage Amount Moderate 8/16/2018  8:45 AM   Drainage Description Serosanguinous 8/16/2018  8:45 AM   Odor None 8/16/2018  8:45 AM   Margins Defined edges 8/16/2018  8:45 AM   Lora-wound Assessment Calloused;Dry 8/16/2018  8:45 AM   Non-staged Wound Description Full thickness 8/16/2018  8:45 AM   [place]%Wound Bed 0 8/16/2018  8:45 AM   Red%Wound Bed 100 8/16/2018  8:45 AM   Yellow%Wound Bed 0 8/16/2018  8:45 AM   Black%Wound Bed 0 8/16/2018  8:45 AM   Purple%Wound Bed 0 8/16/2018  8:45 AM   Other%Wound Bed 0 8/16/2018  8:45 AM   Debridement per physician Subcutaneous 8/6/2018  9:04 AM   Number of days: 59       Incision 06/10/13 Wrist Right (Active)   Number of days: 1893       Incision 07/29/13 Wrist

## 2018-08-20 ENCOUNTER — HOSPITAL ENCOUNTER (OUTPATIENT)
Dept: WOUND CARE | Age: 53
Discharge: HOME | End: 2018-08-20
Attending: PODIATRIST | Admitting: PODIATRIST

## 2018-08-20 VITALS
HEART RATE: 73 BPM | DIASTOLIC BLOOD PRESSURE: 73 MMHG | RESPIRATION RATE: 18 BRPM | SYSTOLIC BLOOD PRESSURE: 146 MMHG | TEMPERATURE: 97 F

## 2018-08-20 DIAGNOSIS — E11.621 DIABETIC ULCER OF TOE OF LEFT FOOT ASSOCIATED WITH TYPE 2 DIABETES MELLITUS, WITH FAT LAYER EXPOSED (HCC): ICD-10-CM

## 2018-08-20 DIAGNOSIS — L97.522 DIABETIC ULCER OF TOE OF LEFT FOOT ASSOCIATED WITH TYPE 2 DIABETES MELLITUS, WITH FAT LAYER EXPOSED (HCC): ICD-10-CM

## 2018-08-20 DIAGNOSIS — L97.509 TYPE 2 DIABETES MELLITUS WITH FOOT ULCER, WITH LONG-TERM CURRENT USE OF INSULIN (HCC): ICD-10-CM

## 2018-08-20 DIAGNOSIS — E11.9 TYPE 2 DIABETES MELLITUS WITHOUT COMPLICATION, WITHOUT LONG-TERM CURRENT USE OF INSULIN (HCC): ICD-10-CM

## 2018-08-20 DIAGNOSIS — Z72.0 TOBACCO ABUSE: ICD-10-CM

## 2018-08-20 DIAGNOSIS — E11.42 DIABETIC POLYNEUROPATHY ASSOCIATED WITH TYPE 2 DIABETES MELLITUS (HCC): ICD-10-CM

## 2018-08-20 DIAGNOSIS — E11.42 TYPE 2 DIABETES MELLITUS WITH DIABETIC POLYNEUROPATHY, WITH LONG-TERM CURRENT USE OF INSULIN (HCC): ICD-10-CM

## 2018-08-20 DIAGNOSIS — Z79.4 TYPE 2 DIABETES MELLITUS WITH FOOT ULCER, WITH LONG-TERM CURRENT USE OF INSULIN (HCC): ICD-10-CM

## 2018-08-20 DIAGNOSIS — Z79.4 TYPE 2 DIABETES MELLITUS WITH DIABETIC POLYNEUROPATHY, WITH LONG-TERM CURRENT USE OF INSULIN (HCC): ICD-10-CM

## 2018-08-20 DIAGNOSIS — E11.621 TYPE 2 DIABETES MELLITUS WITH FOOT ULCER, WITH LONG-TERM CURRENT USE OF INSULIN (HCC): ICD-10-CM

## 2018-08-20 DIAGNOSIS — I73.9 CLAUDICATION OF BOTH LOWER EXTREMITIES (HCC): ICD-10-CM

## 2018-08-20 DIAGNOSIS — Z91.199 NON COMPLIANCE WITH MEDICAL TREATMENT: ICD-10-CM

## 2018-08-20 DIAGNOSIS — L97.522 ULCER OF LEFT FOOT, WITH FAT LAYER EXPOSED (HCC): Primary | ICD-10-CM

## 2018-08-20 NOTE — PROGRESS NOTES
neurological manifestations, not stated as uncontrolled(250.60)     Type II or unspecified type diabetes mellitus with other specified manifestations, not stated as uncontrolled     Ulcer of left foot, with fat layer exposed ([address] 12/19/2013    Ulcer of other part of foot        PAST SURGICAL HISTORY    Past Surgical History:   Procedure Laterality Date    APPENDECTOMY      CARDIAC SURGERY      CARPAL TUNNEL RELEASE      L hand Nov. 2011, R hand Jan. 2012    CARPAL TUNNEL RELEASE Right 6/10/2013    recurrent    CARPAL TUNNEL RELEASE Left 7/29/2013    with ulnar nerve transpostion and L middle finger release    CHOLECYSTECTOMY      COLONOSCOPY      CORONARY ARTERY BYPASS GRAFT  6/5/2010 6/5/2010-LIMA->LAD;  VG-> Diagonal;  VG-> Obtuse marginal;  VG->Distal RCA-   Dr Karen Lehman Right 6/10/2013    HYSTERECTOMY, TOTAL ABDOMINAL      TOE AMPUTATION Left 02/14/144th toe    TUBAL LIGATION      ULNAR TUNNEL RELEASE Right 6/10/2013       FAMILY HISTORY    Family History   Problem Relation Age of Onset    Heart Disease Mother     Kidney Disease Mother         dialysis    Cancer Father        SOCIAL HISTORY    Social History   Substance Use Topics    Smoking status: Current Every Day Smoker     Packs/day: 0.25     Years: 30.00     Types: Cigarettes    Smokeless tobacco: Never Used      Comment: quit smoking ciagarettes 04/04/16    Alcohol use No      Comment:                                    CAFFEINE: 2 sodas daily       ALLERGIES    Allergies   Allergen Reactions    Cortizone     Phenobarbital Other (See Comments)     Hallucinations        MEDICATIONS    Current Outpatient Prescriptions on File Prior to Encounter   Medication Sig Dispense Refill    carvedilol (COREG) 6.25 MG tablet 6.25 mg 2 times daily       VICTOZA 18 MG/3ML SOPN SC injection daily       albuterol (PROAIR HFA) 108 (90 BASE) MCG/ACT inhaler Inhale 2 puffs into the lungs every 6 hours as needed for Wheezing      esomeprazole Magnesium (NEXIUM) 40 MG PACK Take 20 mg by mouth daily. 2 tabs      oxyCODONE-acetaminophen (PERCOCET)  MG per tablet Take 1 tablet by mouth every 6 hours as needed for Pain.  LORazepam (ATIVAN) 0.5 MG tablet Take 0.5 mg by mouth every 12 hours Indications: One Tablet twice daily       Insulin Aspart (NOVOLOG FLEXPEN SC) Inject 15 Units into the skin 3 times daily (before meals) On sliding scale      pravastatin (PRAVACHOL) 40 MG tablet 40 mg daily.  lisinopril (PRINIVIL;ZESTRIL) 10 MG tablet Take 20 mg by mouth daily       insulin glargine (LANTUS) 100 UNIT/ML injection Inject 50 Units into the skin nightly.  furosemide (LASIX) 20 MG tablet Take 40 mg by mouth 2 times daily       nitroGLYCERIN (NITROSTAT) 0.4 MG SL tablet Place 1 tablet under the tongue every 5 minutes as needed for Chest pain 25 tablet 3    gabapentin (NEURONTIN) 400 MG capsule Take 1,600 mg by mouth 3 times daily        No current facility-administered medications on file prior to encounter. REVIEW OF SYSTEMS    Pertinent items are noted in HPI. Constitutional: Negative for systemic symptoms including fever, chills and malaise. Objective:      BP (!) 146/73   Pulse 73   Temp 97 °F (36.1 °C) (Oral)   Resp 18     PHYSICAL EXAM    General: The patient is in no acute distress. Mental status:  Patient is appropriate, is  oriented to place and plan of care.   Dermatologic exam: Visual inspection of the periwound reveals the skin to be atrophic  Wound exam: see wound description below in procedure note      Assessment:     Problem List Items Addressed This Visit     Diabetes mellitus ([address]    Tobacco abuse    Ulcer of left foot, with fat layer exposed ([address] - Primary    Diabetic neuropathy ([address]    Type 2 diabetes mellitus with diabetic polyneuropathy, with long-term current use of insulin ([address]    Claudication of both lower extremities ([address]    Type 2 diabetes mellitus without Ends___ O'Clock 0 8/20/2018  8:16 AM   Undermining Maxium Distance (cm) 0 8/20/2018  8:16 AM   Wound Assessment Brown 8/20/2018  8:16 AM   Drainage Amount None 8/20/2018  8:16 AM   Drainage Description Serosanguinous 8/16/2018  8:45 AM   Odor None 8/20/2018  8:16 AM   Margins Defined edges 8/20/2018  8:16 AM   Lora-wound Assessment Calloused 8/20/2018  8:16 AM   Non-staged Wound Description Not applicable 5/11/5854  5:43 AM   [place]%Wound Bed 0 8/20/2018  8:16 AM   Red%Wound Bed 0 8/20/2018  8:16 AM   Yellow%Wound Bed 0 8/20/2018  8:16 AM   Black%Wound Bed 0 8/20/2018  8:16 AM   Purple%Wound Bed 0 8/20/2018  8:16 AM   Other%Wound Bed 0 8/16/2018  8:45 AM   Debridement per physician Subcutaneous 8/20/2018  9:07 AM   Number of days: 63       Incision 06/10/13 Wrist Right (Active)   Number of days: 1896       Incision 07/29/13 Wrist Left (Active)   Number of days: 1848       Incision 02/14/14 Toe (Comment  which one) (Active)   Number of days: 1094       Percent of Wound(s) Debrided: approximately 100%    Total Surface Area Debrided:  0.08 sq cm     Bleeding:  Minimal    Hemostasis Achieved:  by pressure    Procedural Pain:  0  / 10     Post Procedural Pain:  0 / 10     Response to treatment:  Well tolerated by patient. Status of wound progress and description from last visit:   Improved with cast use. Plan:       Discharge Instructions         Plan:         Discharge Instructions           Discharge Instructions        .PHYSICIAN ORDERS AND DISCHARGE INSTRUCTIONS     NOTE: Upon discharge from the [address], you will receive a patient experience survey.  We would be grateful if you would take the time to fill this survey out.     Wound care order history:                 KATHLEEN's   Right       Left               Date 6/18- not needed per Dr. Liliane Son studies:   Date               Imaging: Clerance Mort               Cultures:   Date               Labs/ HbA1c:   Date               Grafts:  Date             HBO:  TBD              Antibiotics:               Earlier Wound care treatments: félix Bragg               Authorizations:                        Consults:   Date                           PCP: Dr. Jessica Hammonds wound care orders and information:              Residence: home              Continue home health care with: n/a              Your wound-care supplies will be provided by: Lidia Lechuga provider:              Compression with  Yeimi Pickerel loading:  Date               WUQTP Meds:              GJRHL cleansing:                           NE not scrub or use excessive force.                          Wash hands with soap and water before and after dressing changes.                         Prior to applying a clean dressing, cleanse wound with normal saline,                          wound cleanser, or mild soap and water.                           Ask your physician or nurse before getting the wound(s) wet in the shower.              Daily Wound management:                          Keep weight off wounds and reposition every 2 hours.                          Avoid standing for long periods of time.                          Apply wraps/stockings in AM and remove at bedtime.                          Elevate legs to the level of the heart or above for 30 minutes 4-5 times a day and/or when sitting.                                               When taking antibiotics take entire prescription as ordered by MD do not stop taking until medicine is all gone.                                Orders for this week: 8/20/18     Left planter- wash with soap and water daily. Pat dry. Cover wound bed with damp dagoberto, reese alginate, fluffed 4x4s wrap with conform. Change daily. Wear walking boot continuously on left foot.       Follow up with Dr Domi Manrique 1 week in the wound care center     Call [phone] for any questions or concerns.   Date__________

## 2018-08-27 ENCOUNTER — HOSPITAL ENCOUNTER (OUTPATIENT)
Dept: WOUND CARE | Age: 53
Discharge: HOME | End: 2018-08-27
Attending: PODIATRIST | Admitting: PODIATRIST

## 2018-08-27 VITALS
HEART RATE: 71 BPM | RESPIRATION RATE: 16 BRPM | TEMPERATURE: 97.1 F | SYSTOLIC BLOOD PRESSURE: 136 MMHG | DIASTOLIC BLOOD PRESSURE: 74 MMHG

## 2018-08-27 DIAGNOSIS — Z79.4 TYPE 2 DIABETES MELLITUS WITH DIABETIC POLYNEUROPATHY, WITH LONG-TERM CURRENT USE OF INSULIN (HCC): ICD-10-CM

## 2018-08-27 DIAGNOSIS — L97.522 ULCER OF LEFT FOOT, WITH FAT LAYER EXPOSED (HCC): Primary | ICD-10-CM

## 2018-08-27 DIAGNOSIS — L84 CALLUS OF FOOT: ICD-10-CM

## 2018-08-27 DIAGNOSIS — E08.00 DIABETES MELLITUS DUE TO UNDERLYING CONDITION WITH HYPEROSMOLARITY WITHOUT COMA, WITHOUT LONG-TERM CURRENT USE OF INSULIN (HCC): ICD-10-CM

## 2018-08-27 DIAGNOSIS — E11.42 TYPE 2 DIABETES MELLITUS WITH DIABETIC POLYNEUROPATHY, WITH LONG-TERM CURRENT USE OF INSULIN (HCC): ICD-10-CM

## 2018-08-27 DIAGNOSIS — E11.9 TYPE 2 DIABETES MELLITUS WITHOUT COMPLICATION, WITHOUT LONG-TERM CURRENT USE OF INSULIN (HCC): ICD-10-CM

## 2018-08-27 DIAGNOSIS — Z72.0 TOBACCO ABUSE: ICD-10-CM

## 2018-08-27 DIAGNOSIS — E11.621 DIABETIC ULCER OF TOE OF LEFT FOOT ASSOCIATED WITH TYPE 2 DIABETES MELLITUS, WITH FAT LAYER EXPOSED (HCC): ICD-10-CM

## 2018-08-27 DIAGNOSIS — L97.522 DIABETIC ULCER OF TOE OF LEFT FOOT ASSOCIATED WITH TYPE 2 DIABETES MELLITUS, WITH FAT LAYER EXPOSED (HCC): ICD-10-CM

## 2018-08-27 NOTE — PROGRESS NOTES
test 7/26/2012, 8/3/2011, 10/14/2010, 5/24/2010 7/26/2012-Lexiscan- Normal Myocardial Perfusion Study. Post stress myocardial perfusion images show a normal pattern of perfusion in all regions. Post stress LV normal size. Normal perfusion in the distribution of all coronaries. Normal LV size and function. Rest EF 70%    H/O chest x-ray 7/12/2012, 6/2/2010 7/12/2012-Perihilar peribronchial cuffing with mild basilar predominant interstital prominence, which may reflect interstitial edema or atypical pneumonia.  H/O Doppler ultrasound 6/4/2010    CAROTID DOPPLER-6/4/2010-No Doppler evidence of hemodynamically significant Carotid Stenosis with antegrade flow in the vertebral arteries bilaterally. 6/4/2010 PERIPHERAL VENOUS DOPPLER_ LEFT Saphenous Vein mapping    H/O echocardiogram 7/26/2012, 8/3/2011, 10/2010, 7/23/2010, 6/8/2010 7/26/2012-LV normal size. Normal LV wall thickness. LV systolic function normal. EF => 55%. LV wall motion normal. Mild MR. Mild TR.  History of complete ECG 7/26/2012 Denis Larissa), 7/13/2012 [facility]), 7/25/2011, 3/25/2011, 10/18/2010, 10/11/2010, 6/23/2010, 5/7/2010    Hx of cardiovascular stress test 9/3/2015    lexiscan-normal,EF66%    Hx of Doppler ultrasound 4/5/16    Arterial: There is a fluid collection in the left thigh, please refer to PCP for further monitoring, no vascular in etiology.  Hx of echocardiogram     4/16 EF40% Mild MR/TR and mild Pulm HTN. 9/15 EF 45-50%, Mildly dilated L atrium, mildly dilated R ventricle. Mild MR & mild TR     Hyperlipidemia     Neuropathy of foot     Pt reports starting approx. 6-7 years ago    Neuropathy of hand     Pt reports starting approx.  6-7 years ago    Non compliance with medical treatment 7/2/2018    S/P CABG x 4 6/5/2010    LIMA-> LAD;  VG->CX;  VG->Diagonal; VG-> distal RCA  per  Dr Jerald Hanley    Type 2 diabetes mellitus with diabetic polyneuropathy, with long-term current use of insulin ([address] 4/5/2016    Type II or unspecified type diabetes mellitus with neurological manifestations, not stated as uncontrolled(250.60)     Type II or unspecified type diabetes mellitus with other specified manifestations, not stated as uncontrolled     Ulcer of left foot, with fat layer exposed ([address] 12/19/2013    Ulcer of other part of foot        PAST SURGICAL HISTORY    Past Surgical History:   Procedure Laterality Date    APPENDECTOMY      CARDIAC SURGERY      CARPAL TUNNEL RELEASE      L hand Nov. 2011, R hand Jan. 2012    CARPAL TUNNEL RELEASE Right 6/10/2013    recurrent    CARPAL TUNNEL RELEASE Left 7/29/2013    with ulnar nerve transpostion and L middle finger release    CHOLECYSTECTOMY      COLONOSCOPY      CORONARY ARTERY BYPASS GRAFT  6/5/2010 6/5/2010-LIMA->LAD;  VG-> Diagonal;  VG-> Obtuse marginal;  VG->Distal RCA-   Dr James Ravi Right 6/10/2013    HYSTERECTOMY, TOTAL ABDOMINAL      TOE AMPUTATION Left 02/14/144th toe    TUBAL LIGATION      ULNAR TUNNEL RELEASE Right 6/10/2013       FAMILY HISTORY    Family History   Problem Relation Age of Onset    Heart Disease Mother     Kidney Disease Mother         dialysis    Cancer Father        SOCIAL HISTORY    Social History   Substance Use Topics    Smoking status: Current Every Day Smoker     Packs/day: 0.25     Years: 30.00     Types: Cigarettes    Smokeless tobacco: Never Used      Comment: quit smoking ciagarettes 04/04/16    Alcohol use No      Comment:                                    CAFFEINE: 2 sodas daily       ALLERGIES    Allergies   Allergen Reactions    Cortizone     Phenobarbital Other (See Comments)     Hallucinations        MEDICATIONS    Current Outpatient Prescriptions on File Prior to Encounter   Medication Sig Dispense Refill    carvedilol (COREG) 6.25 MG tablet 6.25 mg 2 times daily       nitroGLYCERIN (NITROSTAT) 0.4 MG SL tablet Place 1 tablet under the tongue every 5 minutes as needed for Chest pain 25 tablet 3 Distance (cm) 0 8/27/2018  8:51 AM   Wound Assessment Red 8/27/2018  8:51 AM   Drainage Amount Scant 8/27/2018  8:51 AM   Drainage Description Serosanguinous 8/27/2018  8:51 AM   Odor None 8/27/2018  8:51 AM   Margins Defined edges 8/27/2018  8:51 AM   Lora-wound Assessment Calloused 8/27/2018  8:51 AM   Non-staged Wound Description Full thickness 8/27/2018  8:51 AM   [place]%Wound Bed 0 8/27/2018  8:51 AM   Red%Wound Bed 100 8/27/2018  8:51 AM   Yellow%Wound Bed 0 8/27/2018  8:51 AM   Black%Wound Bed 0 8/27/2018  8:51 AM   Purple%Wound Bed 0 8/27/2018  8:51 AM   Other%Wound Bed 0 8/16/2018  8:45 AM   Debridement per physician Subcutaneous 8/20/2018  9:07 AM   Number of days: 70       Wound 08/27/18 Foot Left;Plantar Wound # 9 (onset 1 day) Left Distal plantar (Active)   Dressing Status Clean;Dry; Intact 8/27/2018  9:23 AM   Dressing Changed Changed/New 8/27/2018  9:23 AM   Dressing/Treatment Alginate;Silver dressing;4x4 8/27/2018  9:23 AM   Wound Cleansed Rinsed/Irrigated with saline 8/27/2018  9:23 AM   Wound Length (cm) 0.3 cm 8/27/2018  9:23 AM   Wound Width (cm) 0.2 cm 8/27/2018  9:23 AM   Wound Depth (cm)  0.1 8/27/2018  9:23 AM   Calculated Wound Size (cm^2) (l*w) 0.06 cm^2 8/27/2018  9:23 AM   Distance Tunneling (cm) 0 cm 8/27/2018  9:23 AM   Tunneling Position ___ O'Clock 0 8/27/2018  9:23 AM   Undermining Starts ___ O'Clock 0 8/27/2018  9:23 AM   Undermining Ends___ O'Clock 0 8/27/2018  9:23 AM   Undermining Maxium Distance (cm) 0 8/27/2018  9:23 AM   Wound Assessment Red 8/27/2018  9:23 AM   Drainage Amount Moderate 8/27/2018  9:23 AM   Drainage Description Sanguinous 8/27/2018  9:23 AM   Odor None 8/27/2018  9:23 AM   Margins Defined edges; Unattached edges 8/27/2018  9:23 AM   Lora-wound Assessment Dry;Pink 8/27/2018  9:23 AM   Non-staged Wound Description Full thickness 8/27/2018  9:23 AM   [place]%Wound Bed 0 8/27/2018  9:23 AM   Red%Wound Bed 100 8/27/2018  9:23 AM   Yellow%Wound Bed 0 8/27/2018  9:23 AM Black%Wound Bed 0 8/27/2018  9:23 AM   Purple%Wound Bed 0 8/27/2018  9:23 AM   Other%Wound Bed 0 8/27/2018  9:23 AM   Number of days: 0       Incision 06/10/13 Wrist Right (Active)   Number of days: 1903       Incision 07/29/13 Wrist Left (Active)   Number of days: 1855       Incision 02/14/14 Toe (Comment  which one) (Active)   Number of days: 0781       Percent of Wound(s) Debrided: approximately 100%    Total Surface Area Debrided:  0.1 sq cm     Bleeding:  Minimal    Hemostasis Achieved:  by pressure    Procedural Pain:  0  / 10     Post Procedural Pain:  0 / 10     Response to treatment:  Well tolerated by patient. Status of wound progress and description from last visit:   Original wound improved, new wound noted distal to first not due to old wound healing into two wounds. Plan:       Discharge Instructions         Plan:         Discharge Instructions           Discharge Instructions        .PHYSICIAN ORDERS AND DISCHARGE INSTRUCTIONS     NOTE: Upon discharge from the [address], you will receive a patient experience survey.  We would be grateful if you would take the time to fill this survey out.     Wound care order history:                 KATHLE[employer]'s [employer]       Left               Date 6/18- not needed per Dr. Zuhair Pierce studies:   Date               Imaging: Gracie [place]               Cultures:   Date               Labs/ HbA1c:   Date               Grafts:  Date               HBO:  TBD              Antibiotics:               Earlier Wound care treatments: félix Ragland               Authorizations:                        Consults:   Date                           PCP: Dr. Porsha Rodriguez wound care orders and information:              Residence: home              Continue home health care with: n/a              Your wound-care supplies will be provided by: Tracie Moura provider:              Compression with              BEQ loading:  Date               ZAGFJ Meds:              KLBPZ cleansing:                           BC not scrub or use excessive force.                          Wash hands with soap and water before and after dressing changes.                         Prior to applying a clean dressing, cleanse wound with normal saline,                          wound cleanser, or mild soap and water.                           Ask your physician or nurse before getting the wound(s) wet in the shower.              Daily Wound management:                          Keep weight off wounds and reposition every 2 hours.                          Avoid standing for long periods of time.                          Apply wraps/stockings in AM and remove at bedtime.                          Elevate legs to the level of the heart or above for 30 minutes 4-5 times a day and/or when sitting.                                               When taking antibiotics take entire prescription as ordered by MD do not stop taking until medicine is all gone.                                Orders for this week: 8/27/18     Left planter- wash with soap and water daily. Pat dry. Cover wound bed with damp dagoberto, reese alginate, fluffed 4x4s wrap with conform. Change daily.     Wear walking boot continuously on left foot.       Follow up with Dr Dale Can 2 weeks in the wound care center     Call [phone] for any questions or concerns.   Date__________   Time____________          Treatment Note Wound 06/18/18 Wound #8 (onset 4 months - DM Wag 3) Left Plantar-Dressing/Treatment: Alginate, Silver dressing, 4x4 (moist dagoberto, calcium alginate, fluffed 4x4, conform, tape,)  Wound 08/27/18 Foot Left;Plantar Wound # 9 (onset 1 day) Left Distal plantar-Dressing/Treatment: Alginate, Silver dressing, 4x4 (moist dagoberto, calcium alginate, fluffed 4x4, conform, tape,)    Written Patient Dismissal Instructions Given            Electronically signed by Jose L Cruz DPM on 8/27/2018 at 9:34 AM

## 2018-09-10 ENCOUNTER — HOSPITAL ENCOUNTER (OUTPATIENT)
Dept: WOUND CARE | Age: 53
Discharge: HOME | End: 2018-09-10
Attending: PODIATRIST | Admitting: PODIATRIST

## 2018-09-10 VITALS
HEART RATE: 75 BPM | TEMPERATURE: 98.3 F | DIASTOLIC BLOOD PRESSURE: 57 MMHG | SYSTOLIC BLOOD PRESSURE: 146 MMHG | RESPIRATION RATE: 18 BRPM

## 2018-09-10 DIAGNOSIS — L97.522 ULCER OF LEFT FOOT, WITH FAT LAYER EXPOSED (HCC): Primary | ICD-10-CM

## 2018-09-10 DIAGNOSIS — L97.509 TYPE 2 DIABETES MELLITUS WITH FOOT ULCER, WITH LONG-TERM CURRENT USE OF INSULIN (HCC): ICD-10-CM

## 2018-09-10 DIAGNOSIS — L84 CALLUS OF FOOT: ICD-10-CM

## 2018-09-10 DIAGNOSIS — L97.522 DIABETIC ULCER OF TOE OF LEFT FOOT ASSOCIATED WITH TYPE 2 DIABETES MELLITUS, WITH FAT LAYER EXPOSED (HCC): ICD-10-CM

## 2018-09-10 DIAGNOSIS — E11.621 DIABETIC ULCER OF TOE OF LEFT FOOT ASSOCIATED WITH TYPE 2 DIABETES MELLITUS, WITH FAT LAYER EXPOSED (HCC): ICD-10-CM

## 2018-09-10 DIAGNOSIS — Z79.4 TYPE 2 DIABETES MELLITUS WITH DIABETIC POLYNEUROPATHY, WITH LONG-TERM CURRENT USE OF INSULIN (HCC): ICD-10-CM

## 2018-09-10 DIAGNOSIS — E11.621 TYPE 2 DIABETES MELLITUS WITH FOOT ULCER, WITH LONG-TERM CURRENT USE OF INSULIN (HCC): ICD-10-CM

## 2018-09-10 DIAGNOSIS — Z91.199 NON COMPLIANCE WITH MEDICAL TREATMENT: ICD-10-CM

## 2018-09-10 DIAGNOSIS — E11.42 TYPE 2 DIABETES MELLITUS WITH DIABETIC POLYNEUROPATHY, WITH LONG-TERM CURRENT USE OF INSULIN (HCC): ICD-10-CM

## 2018-09-10 DIAGNOSIS — Z79.4 TYPE 2 DIABETES MELLITUS WITH FOOT ULCER, WITH LONG-TERM CURRENT USE OF INSULIN (HCC): ICD-10-CM

## 2018-09-10 NOTE — PROGRESS NOTES
Wound Care Center Progress Note with Procedure Note      Ankit Tian  AGE: 48 y.o. GENDER: female  : 1965  EPISODE DATE:  9/10/2018     Subjective:     Chief Complaint   Patient presents with    Wound Check     LEFT FOOT         HISTORY of PRESENT ILLNESS      Ankit Tian is a 48 y.o. female who presents today for wound evaluation of Chronic diabetic wound(s) of Lt. foot sub 4th met head. The wound is of mild severity. The underlying cause of the wound is DM. Wound Pain Timing/Severity: none  Quality of pain: N/A  Severity of pain:  0 / 10   Modifying Factors: diabetes and chronic pressure  Associated Signs/Symptoms: none        PAST MEDICAL HISTORY        Diagnosis Date    CAD (coronary artery disease)     s/p CABG x 4;  follows with Dr Werner Durand Callus of foot 2018    Carpal tunnel syndrome on both sides     CHF (congestive heart failure) ([address] 10/2010    Chronic diastolic; EF 34%    Chronic kidney disease     stage 4 kidney disease    Chronic ulcer of left ankle with fat layer exposed ([address] 10/7/2015    Chronic ulcer of left foot with fat layer exposed ([address] 3/17/2016    Chronic ulcer of right ankle with fat layer exposed ([address] 3/17/2016    Chronic ulcer of right foot with fat layer exposed ([address] 3/17/2016    Depression     Diabetes mellitus ([address]     Diabetes mellitus with neurological manifestations ([address]     Diabetes mellitus with ulcer of ankle ([address]     Diabetic ulcer of left foot associated with type 2 diabetes mellitus, with fat layer exposed ([address] 2018    Family history of cardiovascular disease     Mother    GERD (gastroesophageal reflux disease)     H/O cardiac catheterization 10/18/2010, 6/3/2010    10/18/2010-Four bypass grafts all widely patent. 6/3/2010- Moderate to severe triple vessel disease, preserved LV systolic function.     H/O cardiovascular stress test 2012, 8/3/2011, 10/14/2010, 2010 7/26/2012-Lexiscan- Normal Myocardial Perfusion Study. Post stress myocardial perfusion images show a normal pattern of perfusion in all regions. Post stress LV normal size. Normal perfusion in the distribution of all coronaries. Normal LV size and function. Rest EF 70%    H/O chest x-ray 7/12/2012, 6/2/2010 7/12/2012-Perihilar peribronchial cuffing with mild basilar predominant interstital prominence, which may reflect interstitial edema or atypical pneumonia.  H/O Doppler ultrasound 6/4/2010    CAROTID DOPPLER-6/4/2010-No Doppler evidence of hemodynamically significant Carotid Stenosis with antegrade flow in the vertebral arteries bilaterally. 6/4/2010 PERIPHERAL VENOUS DOPPLER_ LEFT Saphenous Vein mapping    H/O echocardiogram 7/26/2012, 8/3/2011, 10/2010, 7/23/2010, 6/8/2010 7/26/2012-LV normal size. Normal LV wall thickness. LV systolic function normal. EF => 55%. LV wall motion normal. Mild MR. Mild TR.  History of complete ECG 7/26/2012 Annetta Kehr), 7/13/2012 [facility]), 7/25/2011, 3/25/2011, 10/18/2010, 10/11/2010, 6/23/2010, 5/7/2010    Hx of cardiovascular stress test 9/3/2015    lexiscan-normal,EF66%    Hx of Doppler ultrasound 4/5/16    Arterial: There is a fluid collection in the left thigh, please refer to PCP for further monitoring, no vascular in etiology.  Hx of echocardiogram     4/16 EF40% Mild MR/TR and mild Pulm HTN. 9/15 EF 45-50%, Mildly dilated L atrium, mildly dilated R ventricle. Mild MR & mild TR     Hyperlipidemia     Neuropathy of foot     Pt reports starting approx. 6-7 years ago    Neuropathy of hand     Pt reports starting approx.  6-7 years ago    Non compliance with medical treatment 7/2/2018    S/P CABG x 4 6/5/2010    LIMA-> LAD;  VG->CX;  VG->Diagonal; VG-> distal RCA  per  Dr Paige Baron    Type 2 diabetes mellitus with diabetic polyneuropathy, with long-term current use of insulin ([address] 4/5/2016    Type II or unspecified type diabetes mellitus with neurological manifestations, not stated as uncontrolled(250.60)     Type II or unspecified type diabetes mellitus with other specified manifestations, not stated as uncontrolled     Ulcer of left foot, with fat layer exposed ([address] 12/19/2013    Ulcer of other part of foot        PAST SURGICAL HISTORY    Past Surgical History:   Procedure Laterality Date    APPENDECTOMY      CARDIAC SURGERY      CARPAL TUNNEL RELEASE      L hand Nov. 2011, R hand Jan. 2012    CARPAL TUNNEL RELEASE Right 6/10/2013    recurrent    CARPAL TUNNEL RELEASE Left 7/29/2013    with ulnar nerve transpostion and L middle finger release    CHOLECYSTECTOMY      COLONOSCOPY      CORONARY ARTERY BYPASS GRAFT  6/5/2010 6/5/2010-LIMA->LAD;  VG-> Diagonal;  VG-> Obtuse marginal;  VG->Distal RCA-   Dr Steffany Ritter Right 6/10/2013    HYSTERECTOMY, TOTAL ABDOMINAL      TOE AMPUTATION Left 02/14/144th toe    TUBAL LIGATION      ULNAR TUNNEL RELEASE Right 6/10/2013       FAMILY HISTORY    Family History   Problem Relation Age of Onset    Heart Disease Mother     Kidney Disease Mother         dialysis    Cancer Father        SOCIAL HISTORY    Social History   Substance Use Topics    Smoking status: Current Every Day Smoker     Packs/day: 0.25     Years: 30.00     Types: Cigarettes    Smokeless tobacco: Never Used      Comment: quit smoking ciagarettes 04/04/16    Alcohol use No      Comment:                                    CAFFEINE: 2 sodas daily       ALLERGIES    Allergies   Allergen Reactions    Cortizone     Phenobarbital Other (See Comments)     Hallucinations        MEDICATIONS    Current Outpatient Prescriptions on File Prior to Encounter   Medication Sig Dispense Refill    carvedilol (COREG) 6.25 MG tablet 6.25 mg 2 times daily       albuterol (PROAIR HFA) 108 (90 BASE) MCG/ACT inhaler Inhale 2 puffs into the lungs every 6 hours as needed for Wheezing      esomeprazole Magnesium (NEXIUM) 40 MG diabetes mellitus, with fat layer exposed ([facility]        Procedure Note    Indications:  Based on my examination of this patient's wound(s) today, sharp excision into necrotic subcutaneous tissue is required to promote healing and evaluate the extent of previous healing. Performed by: Brian Patterson DPM    Consent obtained: Yes    Time out taken:  Yes    Pain Control: none       Debridement:Excisional Debridement    Using #15 blade scalpel the wound(s) was/were sharply debrided down through and including the removal of subcutaneous tissue. Devitalized Tissue Debrided:  biofilm, slough, exudate and callus    Pre Debridement Measurements:  Are located in the Wound Documentation Flow Sheet    All active wounds listed below with today's date are evaluated  Wound(s)    debrided this date include # : 1     Post  Debridement Measurements:  Negative Pressure Wound Therapy Foot (Active)   Number of days: 1668       Negative Pressure Wound Therapy Foot Distal (Active)   Number of days: 1395       Wound 09/10/18 left plantar  ( wound # 1 onset date 1/2018) DM OZUNA 2 (Active)   Wound Image   9/10/2018  2:04 PM   Wound Type Wound 9/10/2018  2:04 PM   Wound Diabetic [place] 1 9/10/2018  2:04 PM   Wound Cleansed Rinsed/Irrigated with saline; Wound cleanser 9/10/2018  2:04 PM   Wound Length (cm) 1 cm 9/10/2018  2:46 PM   Wound Width (cm) 0.8 cm 9/10/2018  2:46 PM   Wound Depth (cm)  0.1 9/10/2018  2:46 PM   Calculated Wound Size (cm^2) (l*w) 0.8 cm^2 9/10/2018  2:46 PM   Change in Wound Size % (l*w) 0 9/10/2018  2:46 PM   Distance Tunneling (cm) 0 cm 9/10/2018  2:04 PM   Tunneling Position ___ O'Clock 0 9/10/2018  2:04 PM   Undermining Starts ___ O'Clock 0 9/10/2018  2:04 PM   Undermining Ends___ O'Clock 0 9/10/2018  2:04 PM   Undermining Maxium Distance (cm) 0 9/10/2018  2:04 PM   Wound Assessment Black 9/10/2018  2:04 PM   Drainage Amount Moderate 9/10/2018  2:04 PM   Drainage Description Serosanguinous; Yellow 9/10/2018

## 2018-09-17 ENCOUNTER — HOSPITAL ENCOUNTER (OUTPATIENT)
Dept: WOUND CARE | Age: 53
Discharge: HOME | End: 2018-09-17
Attending: NURSE PRACTITIONER | Admitting: NURSE PRACTITIONER

## 2018-09-17 VITALS
SYSTOLIC BLOOD PRESSURE: 126 MMHG | TEMPERATURE: 97.6 F | DIASTOLIC BLOOD PRESSURE: 68 MMHG | HEART RATE: 75 BPM | RESPIRATION RATE: 20 BRPM

## 2018-09-17 DIAGNOSIS — E11.42 TYPE 2 DIABETES MELLITUS WITH DIABETIC POLYNEUROPATHY, WITH LONG-TERM CURRENT USE OF INSULIN (HCC): ICD-10-CM

## 2018-09-17 DIAGNOSIS — Z79.4 TYPE 2 DIABETES MELLITUS WITH DIABETIC POLYNEUROPATHY, WITH LONG-TERM CURRENT USE OF INSULIN (HCC): ICD-10-CM

## 2018-09-17 DIAGNOSIS — L89.151 DECUBITUS ULCER OF SACRAL REGION, STAGE 1: ICD-10-CM

## 2018-09-17 DIAGNOSIS — L97.522 ULCER OF LEFT FOOT, WITH FAT LAYER EXPOSED (HCC): Primary | ICD-10-CM

## 2018-09-17 PROCEDURE — 11042 DBRDMT SUBQ TIS 1ST 20SQCM/<: CPT | Performed by: NURSE PRACTITIONER

## 2018-09-17 NOTE — PROGRESS NOTES
[facility] Center Progress Note with Procedure Note    Gary Huitron  AGE: 48 y.o. GENDER: female  : 1965  EPISODE DATE:  2018     Subjective:     Chief Complaint   Patient presents with    Wound Check         HISTORY of PRESENT ILLNESS      Gary Huitron is a 48 y.o. female who presents today for wound evaluation of Chronic diabetic wound(s) of the left foot sub 4th metatarsal head. The wound is of mild severity. The underlying cause of the wound is diabetes. Wound extends to fat layer. Patient also complains of pain at the coccyx area. On exam, there is unblanchable redness to the coccyx area. Order given for donut cushion for patient to use during hemodialysis. Wound Pain Timing/Severity: none  Quality of pain: N/A  Severity of pain:  0 / 10   Modifying Factors: diabetes and chronic pressure  Associated Signs/Symptoms: none        PAST MEDICAL HISTORY        Diagnosis Date    CAD (coronary artery disease)     s/p CABG x 4;  follows with Dr Micaela Pearce Callus of foot 2018    Carpal tunnel syndrome on both sides     CHF (congestive heart failure) ([address] 10/2010    Chronic diastolic; EF 27%    Chronic kidney disease     stage 4 kidney disease    Chronic ulcer of left ankle with fat layer exposed ([address] 10/7/2015    Chronic ulcer of left foot with fat layer exposed ([address] 3/17/2016    Chronic ulcer of right ankle with fat layer exposed ([address] 3/17/2016    Chronic ulcer of right foot with fat layer exposed ([address] 3/17/2016    Depression     Diabetes mellitus ([address]     Diabetes mellitus with neurological manifestations ([address]     Diabetes mellitus with ulcer of ankle ([address]     Diabetic ulcer of left foot associated with type 2 diabetes mellitus, with fat layer exposed ([address] 2018    Family history of cardiovascular disease     Mother    GERD (gastroesophageal reflux disease)     H/O cardiac catheterization 10/18/2010, 6/3/2010    10/18/2010-Four bypass grafts all widely patent. 6/3/2010- Moderate to severe triple vessel disease, preserved LV systolic function.  H/O cardiovascular stress test 7/26/2012, 8/3/2011, 10/14/2010, 5/24/2010 7/26/2012-Lexiscan- Normal Myocardial Perfusion Study. Post stress myocardial perfusion images show a normal pattern of perfusion in all regions. Post stress LV normal size. Normal perfusion in the distribution of all coronaries. Normal LV size and function. Rest EF 70%    H/O chest x-ray 7/12/2012, 6/2/2010 7/12/2012-Perihilar peribronchial cuffing with mild basilar predominant interstital prominence, which may reflect interstitial edema or atypical pneumonia.  H/O Doppler ultrasound 6/4/2010    CAROTID DOPPLER-6/4/2010-No Doppler evidence of hemodynamically significant Carotid Stenosis with antegrade flow in the vertebral arteries bilaterally. 6/4/2010 PERIPHERAL VENOUS DOPPLER_ LEFT Saphenous Vein mapping    H/O echocardiogram 7/26/2012, 8/3/2011, 10/2010, 7/23/2010, 6/8/2010 7/26/2012-LV normal size. Normal LV wall thickness. LV systolic function normal. EF => 55%. LV wall motion normal. Mild MR. Mild TR.  History of complete ECG 7/26/2012 Shima Landon), 7/13/2012 [facility]), 7/25/2011, 3/25/2011, 10/18/2010, 10/11/2010, 6/23/2010, 5/7/2010    Hx of cardiovascular stress test 9/3/2015    lexiscan-normal,EF66%    Hx of Doppler ultrasound 4/5/16    Arterial: There is a fluid collection in the left thigh, please refer to PCP for further monitoring, no vascular in etiology.  Hx of echocardiogram     4/16 EF40% Mild MR/TR and mild Pulm HTN. 9/15 EF 45-50%, Mildly dilated L atrium, mildly dilated R ventricle. Mild MR & mild TR     Hyperlipidemia     Neuropathy of foot     Pt reports starting approx. 6-7 years ago    Neuropathy of hand     Pt reports starting approx.  6-7 years ago    Non compliance with medical treatment 7/2/2018    S/P CABG x 4 6/5/2010    LIMA-> LAD;  VG->CX;  VG->Diagonal; VG-> distal RCA  per  Dr Che Speaker    Type 2 diabetes mellitus with diabetic polyneuropathy, with long-term current use of insulin ([facility] 4/5/2016    Type II or unspecified type diabetes mellitus with neurological manifestations, not stated as uncontrolled(250.60)     Type II or unspecified type diabetes mellitus with other specified manifestations, not stated as uncontrolled     Ulcer of left foot, with fat layer exposed ([address] 12/19/2013    Ulcer of other part of foot        PAST SURGICAL HISTORY    Past Surgical History:   Procedure Laterality Date    APPENDECTOMY      CARDIAC SURGERY      CARPAL TUNNEL RELEASE      L hand Nov. 2011, R hand Jan. 2012    CARPAL TUNNEL RELEASE Right 6/10/2013    recurrent    CARPAL TUNNEL RELEASE Left 7/29/2013    with ulnar nerve transpostion and L middle finger release    CHOLECYSTECTOMY      COLONOSCOPY      CORONARY ARTERY BYPASS GRAFT  6/5/2010 6/5/2010-LIMA->LAD;  VG-> Diagonal;  VG-> Obtuse marginal;  VG->Distal RCA-   Dr James Ravi Right 6/10/2013    HYSTERECTOMY, TOTAL ABDOMINAL      TOE AMPUTATION Left 02/14/144th toe    TUBAL LIGATION      ULNAR TUNNEL RELEASE Right 6/10/2013       FAMILY HISTORY    Family History   Problem Relation Age of Onset    Heart Disease Mother     Kidney Disease Mother         dialysis    Cancer Father        SOCIAL HISTORY    Social History   Substance Use Topics    Smoking status: Current Every Day Smoker     Packs/day: 0.25     Years: 30.00     Types: Cigarettes    Smokeless tobacco: Never Used      Comment: quit smoking mia 04/04/16    Alcohol use No      Comment:                                    CAFFEINE: 2 sodas daily       ALLERGIES    Allergies   Allergen Reactions    Latex     Cortizone     Phenobarbital Other (See Comments)     Hallucinations        MEDICATIONS    Current Outpatient Prescriptions on File Prior to Encounter   Medication Sig Dispense Refill    OXYGEN Inhale 2 L into the lungs daily as needed      carvedilol (COREG) 6.25 MG tablet 6.25 mg 2 times daily       nitroGLYCERIN (NITROSTAT) 0.4 MG SL tablet Place 1 tablet under the tongue every 5 minutes as needed for Chest pain 25 tablet 3    VICTOZA 18 MG/3ML SOPN SC injection daily       albuterol (PROAIR HFA) 108 (90 BASE) MCG/ACT inhaler Inhale 2 puffs into the lungs every 6 hours as needed for Wheezing      esomeprazole Magnesium (NEXIUM) 40 MG PACK Take 20 mg by mouth daily. 2 tabs      oxyCODONE-acetaminophen (PERCOCET)  MG per tablet Take 1 tablet by mouth every 6 hours as needed for Pain.  LORazepam (ATIVAN) 0.5 MG tablet Take 0.5 mg by mouth every 12 hours Indications: One Tablet twice daily       Insulin Aspart (NOVOLOG FLEXPEN SC) Inject 15 Units into the skin 3 times daily (before meals) On sliding scale      pravastatin (PRAVACHOL) 40 MG tablet 40 mg daily.  lisinopril (PRINIVIL;ZESTRIL) 10 MG tablet Take 20 mg by mouth daily       insulin glargine (LANTUS) 100 UNIT/ML injection Inject 50 Units into the skin nightly.  furosemide (LASIX) 20 MG tablet Take 40 mg by mouth 2 times daily       gabapentin (NEURONTIN) 400 MG capsule Take 1,600 mg by mouth 3 times daily        No current facility-administered medications on file prior to encounter. REVIEW OF SYSTEMS    Pertinent items are noted in HPI. Constitutional: Negative for systemic symptoms including fever, chills and malaise. Objective:      /68   Pulse 75   Temp 97.6 °F (36.4 °C) (Temporal)   Resp 20     PHYSICAL EXAM    General: The patient is in no acute distress. Mental status:  Patient is appropriate, is  oriented to place and plan of care.   Dermatologic exam: Visual inspection of the periwound reveals the skin to be normal in turgor and texture  Wound exam: see wound description below in procedure note      Assessment:     Problem List Items Addressed This Visit     Ulcer of left foot, with fat layer exposed ([address] - Primary    Type 2 loading: ELEVATE             RX DONUT SEAT CUSHION DX L89.151-- PATIENT TO TAKE TO LOCAL PHARMACY PER PATIENT REQUEST              NDJWR Meds:        Wound cleansing:                           NB not scrub or use excessive force.                          Wash hands with soap and water before and after dressing changes.                         Prior to applying a clean dressing, cleanse wound with normal saline,                          wound cleanser, or mild soap and water.                           Ask your physician or nurse before getting the wound(s) wet in the shower.                   Daily Wound management:                          Keep weight off wounds and reposition every 2 hours.                          Avoid standing for long periods of time.                          Apply wraps/stockings in AM and remove at bedtime.                          Elevate legs to the level of the heart or above for 30 minutes 4-5 times a day and/or when sitting.                                               When taking antibiotics take entire prescription as ordered by MD do not stop taking until medicine is all gone.                                        Orders for this week: 9/17/18     Left planter- wash with soap and water daily. Pat dry. Cover wound bed with damp dagoberto, reese alginate, fluffed 4x4s wrap with conform or cover with border gauze. Change daily.     Wear DIABETIC SHOES AT ALL TIMES when up!!!            Follow up with Dr Aga Galvan 1 week in the wound care center     Call [phone] for any questions or concerns.     Date__________   Time____________        Treatment Note Wound 09/10/18 left plantar  ( wound # 1 onset date 1/2018) DM ANTHONY 2-Dressing/Treatment:  (damp dagoberto,ca alginate,4x4,border gauze)    Written Patient Dismissal Instructions Given            Electronically signed by RYAN Sky CNP on 9/17/2018 at 4:55 PM

## 2018-09-24 ENCOUNTER — HOSPITAL ENCOUNTER (OUTPATIENT)
Dept: WOUND CARE | Age: 53
Discharge: HOME | End: 2018-09-24
Payer: MEDICARE

## 2018-09-24 VITALS — TEMPERATURE: 97.5 F

## 2018-09-24 DIAGNOSIS — Z79.4 TYPE 2 DIABETES MELLITUS WITH DIABETIC POLYNEUROPATHY, WITH LONG-TERM CURRENT USE OF INSULIN (HCC): ICD-10-CM

## 2018-09-24 DIAGNOSIS — E11.621 DIABETIC ULCER OF TOE OF LEFT FOOT ASSOCIATED WITH TYPE 2 DIABETES MELLITUS, WITH FAT LAYER EXPOSED (HCC): ICD-10-CM

## 2018-09-24 DIAGNOSIS — L97.522 DIABETIC ULCER OF TOE OF LEFT FOOT ASSOCIATED WITH TYPE 2 DIABETES MELLITUS, WITH FAT LAYER EXPOSED (HCC): ICD-10-CM

## 2018-09-24 DIAGNOSIS — L97.509 TYPE 2 DIABETES MELLITUS WITH FOOT ULCER, WITH LONG-TERM CURRENT USE OF INSULIN (HCC): ICD-10-CM

## 2018-09-24 DIAGNOSIS — L97.522 ULCER OF LEFT FOOT, WITH FAT LAYER EXPOSED (HCC): Primary | ICD-10-CM

## 2018-09-24 DIAGNOSIS — E11.621 TYPE 2 DIABETES MELLITUS WITH FOOT ULCER, WITH LONG-TERM CURRENT USE OF INSULIN (HCC): ICD-10-CM

## 2018-09-24 DIAGNOSIS — Z79.4 TYPE 2 DIABETES MELLITUS WITH FOOT ULCER, WITH LONG-TERM CURRENT USE OF INSULIN (HCC): ICD-10-CM

## 2018-09-24 DIAGNOSIS — E11.42 TYPE 2 DIABETES MELLITUS WITH DIABETIC POLYNEUROPATHY, WITH LONG-TERM CURRENT USE OF INSULIN (HCC): ICD-10-CM

## 2018-09-24 PROCEDURE — 99213 OFFICE O/P EST LOW 20 MIN: CPT

## 2018-09-24 NOTE — PROGRESS NOTES
manifestations, not stated as uncontrolled(250.60)     Type II or unspecified type diabetes mellitus with other specified manifestations, not stated as uncontrolled     Ulcer of left foot, with fat layer exposed ([address] 12/19/2013    Ulcer of other part of foot        PAST SURGICAL HISTORY    Past Surgical History:   Procedure Laterality Date    APPENDECTOMY      CARDIAC SURGERY      CARPAL TUNNEL RELEASE      L hand Nov. 2011, R hand Jan. 2012    CARPAL TUNNEL RELEASE Right 6/10/2013    recurrent    CARPAL TUNNEL RELEASE Left 7/29/2013    with ulnar nerve transpostion and L middle finger release    CHOLECYSTECTOMY      COLONOSCOPY      CORONARY ARTERY BYPASS GRAFT  6/5/2010 6/5/2010-LIMA->LAD;  VG-> Diagonal;  VG-> Obtuse marginal;  VG->Distal RCA-   Dr Maria Isabel Morel Right 6/10/2013    HYSTERECTOMY, TOTAL ABDOMINAL      TOE AMPUTATION Left 02/14/144th toe    TUBAL LIGATION      ULNAR TUNNEL RELEASE Right 6/10/2013       FAMILY HISTORY    Family History   Problem Relation Age of Onset    Heart Disease Mother     Kidney Disease Mother         dialysis    Cancer Father        SOCIAL HISTORY    Social History   Substance Use Topics    Smoking status: Current Every Day Smoker     Packs/day: 0.25     Years: 30.00     Types: Cigarettes    Smokeless tobacco: Never Used      Comment: quit smoking ciagarettes 04/04/16    Alcohol use No      Comment:                                    CAFFEINE: 2 sodas daily       ALLERGIES    Allergies   Allergen Reactions    Latex     Cortizone     Phenobarbital Other (See Comments)     Hallucinations        MEDICATIONS    Current Outpatient Prescriptions on File Prior to Encounter   Medication Sig Dispense Refill    carvedilol (COREG) 6.25 MG tablet 6.25 mg 2 times daily       VICTOZA 18 MG/3ML SOPN SC injection daily       albuterol (PROAIR HFA) 108 (90 BASE) MCG/ACT inhaler Inhale 2 puffs into the lungs every 6 hours as needed for Wheezing

## 2018-10-11 ENCOUNTER — TELEPHONE (OUTPATIENT)
Dept: CARDIOLOGY CLINIC | Age: 53
End: 2018-10-11

## 2018-10-11 NOTE — TELEPHONE ENCOUNTER
Faxed medical records to Dr Teresa Velasco at [facility]- 10/11/18 to [phone].

## 2018-11-26 ENCOUNTER — HOSPITAL ENCOUNTER (OUTPATIENT)
Dept: WOUND CARE | Age: 53
Discharge: HOME | End: 2018-11-26
Payer: MEDICARE

## 2018-11-26 VITALS
DIASTOLIC BLOOD PRESSURE: 81 MMHG | RESPIRATION RATE: 16 BRPM | HEART RATE: 75 BPM | BODY MASS INDEX: 31.54 KG/M2 | HEIGHT: 63 IN | WEIGHT: 178 LBS | TEMPERATURE: 98.3 F | SYSTOLIC BLOOD PRESSURE: 152 MMHG

## 2018-11-26 DIAGNOSIS — L97.522 ULCER OF LEFT FOOT, WITH FAT LAYER EXPOSED (HCC): Primary | ICD-10-CM

## 2018-11-26 DIAGNOSIS — Z72.0 TOBACCO ABUSE: ICD-10-CM

## 2018-11-26 DIAGNOSIS — L97.522 DIABETIC ULCER OF TOE OF LEFT FOOT ASSOCIATED WITH TYPE 2 DIABETES MELLITUS, WITH FAT LAYER EXPOSED (HCC): ICD-10-CM

## 2018-11-26 DIAGNOSIS — E11.621 DIABETIC ULCER OF TOE OF LEFT FOOT ASSOCIATED WITH TYPE 2 DIABETES MELLITUS, WITH FAT LAYER EXPOSED (HCC): ICD-10-CM

## 2018-11-26 PROCEDURE — 11042 DBRDMT SUBQ TIS 1ST 20SQCM/<: CPT

## 2018-11-26 PROCEDURE — 99213 OFFICE O/P EST LOW 20 MIN: CPT

## 2018-11-26 ASSESSMENT — PAIN DESCRIPTION - DESCRIPTORS: DESCRIPTORS: TENDER

## 2018-11-26 ASSESSMENT — PAIN SCALES - GENERAL: PAINLEVEL_OUTOF10: 3

## 2018-11-26 ASSESSMENT — PAIN DESCRIPTION - LOCATION: LOCATION: FOOT

## 2018-11-26 ASSESSMENT — PAIN DESCRIPTION - PAIN TYPE: TYPE: CHRONIC PAIN

## 2018-11-26 ASSESSMENT — PAIN DESCRIPTION - FREQUENCY: FREQUENCY: CONTINUOUS

## 2018-11-26 ASSESSMENT — PAIN DESCRIPTION - ORIENTATION: ORIENTATION: LEFT

## 2018-11-26 NOTE — PROGRESS NOTES
Vascular studies:   Date    Imaging:   Date    Cultures:   Date    Labs/ HbA1c:   Date    Grafts:  Date    HBO:     Antibiotics:               Earlier Wound care treatments:                Authorizations:      Consults:   Date     PCP:     Continuing wound care orders and information:              Residence:               Continue home health care with:    Your wound-care supplies will be provided by: Sunita SANDOVAL provider:   Compression with   Off loading:  Date    Wound Meds:    Wound cleansing:      Do not scrub or use excessive force. Wash hands with soap and water before and after dressing changes. Prior to applying a clean dressing, cleanse wound with normal saline,    wound cleanser, or mild soap and water. Ask your physician or nurse before getting the wound(s) wet in the shower. Daily Wound management:    Keep weight off wounds and reposition every 2 hours. Avoid standing for long periods of time. Apply wraps/stockings in AM and remove at bedtime. Elevate legs to the level of the heart or above for 30 minutes 4-5 times a day and/or when sitting. When taking antibiotics take entire prescription as ordered by MD do not stop taking until medicine is all gone. Orders for this week: 11/26/18   Left planter- wash with soap and water daily. Pat dry. Apply damp fibracol to wound bed, cover with fluffed 2x2s, wrap with conform and tape. Change daily    Follow up with Dr Hugh Parsons  In 1 week in the wound care center    Call [phone] for any questions or concerns.   Date__________   Time____________                  Treatment Note Wound 11/26/18 wound #10 left plantar -Dressing/Treatment:  (fibracol, 4x4, conform & tape, coban lightly wraped )    Written Patient Dismissal Instructions Given            Electronically signed by Rosa Thurman DPM on 11/26/2018 at 12:53 PM

## 2018-12-03 ENCOUNTER — HOSPITAL ENCOUNTER (OUTPATIENT)
Dept: WOUND CARE | Age: 53
Discharge: HOME | End: 2018-12-03

## 2018-12-10 ENCOUNTER — HOSPITAL ENCOUNTER (OUTPATIENT)
Dept: WOUND CARE | Age: 53
Discharge: HOME | End: 2018-12-10

## 2018-12-31 ENCOUNTER — HOSPITAL ENCOUNTER (OUTPATIENT)
Dept: WOUND CARE | Age: 53
Discharge: HOME | End: 2018-12-31

## 2019-01-07 ENCOUNTER — HOSPITAL ENCOUNTER (OUTPATIENT)
Dept: WOUND CARE | Age: 54
Discharge: HOME | End: 2019-01-07
Payer: MEDICARE

## 2019-01-07 VITALS
HEART RATE: 74 BPM | RESPIRATION RATE: 16 BRPM | SYSTOLIC BLOOD PRESSURE: 125 MMHG | TEMPERATURE: 97 F | DIASTOLIC BLOOD PRESSURE: 81 MMHG

## 2019-01-07 DIAGNOSIS — L97.522 DIABETIC ULCER OF TOE OF LEFT FOOT ASSOCIATED WITH TYPE 2 DIABETES MELLITUS, WITH FAT LAYER EXPOSED (HCC): ICD-10-CM

## 2019-01-07 DIAGNOSIS — Z72.0 TOBACCO ABUSE: ICD-10-CM

## 2019-01-07 DIAGNOSIS — E11.42 DIABETIC POLYNEUROPATHY ASSOCIATED WITH TYPE 2 DIABETES MELLITUS (HCC): ICD-10-CM

## 2019-01-07 DIAGNOSIS — E08.00 DIABETES MELLITUS DUE TO UNDERLYING CONDITION WITH HYPEROSMOLARITY WITHOUT COMA, WITHOUT LONG-TERM CURRENT USE OF INSULIN (HCC): ICD-10-CM

## 2019-01-07 DIAGNOSIS — Z79.4 TYPE 2 DIABETES MELLITUS WITH DIABETIC POLYNEUROPATHY, WITH LONG-TERM CURRENT USE OF INSULIN (HCC): ICD-10-CM

## 2019-01-07 DIAGNOSIS — E11.621 DIABETIC ULCER OF TOE OF LEFT FOOT ASSOCIATED WITH TYPE 2 DIABETES MELLITUS, WITH FAT LAYER EXPOSED (HCC): ICD-10-CM

## 2019-01-07 DIAGNOSIS — E11.42 TYPE 2 DIABETES MELLITUS WITH DIABETIC POLYNEUROPATHY, WITH LONG-TERM CURRENT USE OF INSULIN (HCC): ICD-10-CM

## 2019-01-07 DIAGNOSIS — N18.4 CHRONIC KIDNEY DISEASE (CKD) STAGE G4/A3, SEVERELY DECREASED GLOMERULAR FILTRATION RATE (GFR) BETWEEN 15-29 ML/MIN/1.73 SQUARE METER AND ALBUMINURIA CREATININE RATIO GREATER THAN 300 MG/G (HCC): ICD-10-CM

## 2019-01-07 DIAGNOSIS — L97.522 ULCER OF LEFT FOOT, WITH FAT LAYER EXPOSED (HCC): Primary | ICD-10-CM

## 2019-01-07 PROCEDURE — 11042 DBRDMT SUBQ TIS 1ST 20SQCM/<: CPT

## 2019-01-28 ENCOUNTER — HOSPITAL ENCOUNTER (OUTPATIENT)
Dept: WOUND CARE | Age: 54
Discharge: HOME | End: 2019-01-28
Payer: MEDICARE

## 2019-01-28 VITALS
DIASTOLIC BLOOD PRESSURE: 79 MMHG | RESPIRATION RATE: 16 BRPM | SYSTOLIC BLOOD PRESSURE: 144 MMHG | TEMPERATURE: 97.2 F | HEART RATE: 81 BPM

## 2019-01-28 DIAGNOSIS — E11.42 TYPE 2 DIABETES MELLITUS WITH DIABETIC POLYNEUROPATHY, WITH LONG-TERM CURRENT USE OF INSULIN (HCC): ICD-10-CM

## 2019-01-28 DIAGNOSIS — Z91.199 NON COMPLIANCE WITH MEDICAL TREATMENT: ICD-10-CM

## 2019-01-28 DIAGNOSIS — Z72.0 TOBACCO ABUSE: ICD-10-CM

## 2019-01-28 DIAGNOSIS — L97.522 DIABETIC ULCER OF TOE OF LEFT FOOT ASSOCIATED WITH TYPE 2 DIABETES MELLITUS, WITH FAT LAYER EXPOSED (HCC): ICD-10-CM

## 2019-01-28 DIAGNOSIS — Z79.4 TYPE 2 DIABETES MELLITUS WITH DIABETIC POLYNEUROPATHY, WITH LONG-TERM CURRENT USE OF INSULIN (HCC): ICD-10-CM

## 2019-01-28 DIAGNOSIS — L97.522 ULCER OF LEFT FOOT, WITH FAT LAYER EXPOSED (HCC): Primary | ICD-10-CM

## 2019-01-28 DIAGNOSIS — E11.621 DIABETIC ULCER OF TOE OF LEFT FOOT ASSOCIATED WITH TYPE 2 DIABETES MELLITUS, WITH FAT LAYER EXPOSED (HCC): ICD-10-CM

## 2019-01-28 PROCEDURE — 99212 OFFICE O/P EST SF 10 MIN: CPT

## 2019-02-11 PROBLEM — T82.898A PROBLEM WITH DIALYSIS ACCESS (HCC): Status: ACTIVE | Noted: 2018-10-25

## 2019-02-11 PROBLEM — N18.4 CHRONIC RENAL FAILURE, STAGE 4 (SEVERE) (HCC): Status: ACTIVE | Noted: 2018-03-22

## 2019-04-08 ENCOUNTER — HOSPITAL ENCOUNTER (OUTPATIENT)
Dept: WOUND CARE | Age: 54
Discharge: HOME | End: 2019-04-08
Payer: MEDICARE

## 2019-04-08 VITALS
RESPIRATION RATE: 16 BRPM | DIASTOLIC BLOOD PRESSURE: 61 MMHG | TEMPERATURE: 97 F | SYSTOLIC BLOOD PRESSURE: 119 MMHG | HEART RATE: 75 BPM

## 2019-04-08 DIAGNOSIS — Z87.891 HISTORY OF TOBACCO ABUSE: ICD-10-CM

## 2019-04-08 DIAGNOSIS — L97.422 DIABETIC ULCER OF LEFT MIDFOOT ASSOCIATED WITH TYPE 2 DIABETES MELLITUS, WITH FAT LAYER EXPOSED (HCC): Primary | ICD-10-CM

## 2019-04-08 DIAGNOSIS — E11.621 DIABETIC ULCER OF LEFT MIDFOOT ASSOCIATED WITH TYPE 2 DIABETES MELLITUS, WITH FAT LAYER EXPOSED (HCC): Primary | ICD-10-CM

## 2019-04-08 DIAGNOSIS — Z91.199 NON COMPLIANCE WITH MEDICAL TREATMENT: ICD-10-CM

## 2019-04-08 DIAGNOSIS — E11.8 TYPE 2 DIABETES MELLITUS WITH COMPLICATION, WITH LONG-TERM CURRENT USE OF INSULIN (HCC): ICD-10-CM

## 2019-04-08 DIAGNOSIS — L97.522 DIABETIC ULCER OF TOE OF LEFT FOOT ASSOCIATED WITH TYPE 2 DIABETES MELLITUS, WITH FAT LAYER EXPOSED (HCC): ICD-10-CM

## 2019-04-08 DIAGNOSIS — E11.621 DIABETIC ULCER OF TOE OF LEFT FOOT ASSOCIATED WITH TYPE 2 DIABETES MELLITUS, WITH FAT LAYER EXPOSED (HCC): ICD-10-CM

## 2019-04-08 DIAGNOSIS — N18.4 CHRONIC KIDNEY DISEASE (CKD) STAGE G4/A3, SEVERELY DECREASED GLOMERULAR FILTRATION RATE (GFR) BETWEEN 15-29 ML/MIN/1.73 SQUARE METER AND ALBUMINURIA CREATININE RATIO GREATER THAN 300 MG/G (HCC): ICD-10-CM

## 2019-04-08 DIAGNOSIS — Z79.4 TYPE 2 DIABETES MELLITUS WITH COMPLICATION, WITH LONG-TERM CURRENT USE OF INSULIN (HCC): ICD-10-CM

## 2019-04-08 PROCEDURE — 11042 DBRDMT SUBQ TIS 1ST 20SQCM/<: CPT

## 2019-04-08 PROCEDURE — 99213 OFFICE O/P EST LOW 20 MIN: CPT

## 2019-04-08 NOTE — PROGRESS NOTES
[facility]ound Care Center Progress Note With Procedure    Shalom Kerr  AGE: 48 y.o. GENDER: female  : 1965  EPISODE DATE:  2019     Subjective:     Chief Complaint   Patient presents with    Wound Check     Left foot          HISTORY of PRESENT ILLNESS      Shalom Kerr is a 48 y.o. female who presents today for wound evaluation of Chronic diabetic ulcer(s) of the left foot sub 4th metatarsal.  The ulcer is of moderate severity. The underlying cause of the wound is DM. Wound Pain Timing/Severity: none  Quality of pain: N/A  Severity of pain:  0 / 10   Modifying Factors: diabetes, arterial insufficiency and non-adherence  Associated Signs/Symptoms: edema        PAST MEDICAL HISTORY        Diagnosis Date    CAD (coronary artery disease)     s/p CABG x 4;  follows with Dr Samantha Tobias Callus of foot 2018    Carpal tunnel syndrome on both sides     CHF (congestive heart failure) ([address] 10/2010    Chronic diastolic; EF 97%    Chronic kidney disease     stage 4 kidney disease    Chronic ulcer of left ankle with fat layer exposed ([address] 10/7/2015    Chronic ulcer of left foot with fat layer exposed ([address] 3/17/2016    Chronic ulcer of right ankle with fat layer exposed ([address] 3/17/2016    Chronic ulcer of right foot with fat layer exposed ([address] 3/17/2016    Depression     Diabetes mellitus ([address]     Diabetes mellitus with neurological manifestations ([address]     Diabetes mellitus with ulcer of ankle ([address]     Diabetic ulcer of left foot associated with type 2 diabetes mellitus, with fat layer exposed ([address] 2018    Family history of cardiovascular disease     Mother    GERD (gastroesophageal reflux disease)     H/O cardiac catheterization 10/18/2010, 6/3/2010    10/18/2010-Four bypass grafts all widely patent. 6/3/2010- Moderate to severe triple vessel disease, preserved LV systolic function.     H/O cardiovascular stress test 2012, 8/3/2011, 10/14/2010, 2010 7/26/2012-Lexiscan- Normal Myocardial Perfusion Study. Post stress myocardial perfusion images show a normal pattern of perfusion in all regions. Post stress LV normal size. Normal perfusion in the distribution of all coronaries. Normal LV size and function. Rest EF 70%    H/O chest x-ray 7/12/2012, 6/2/2010 7/12/2012-Perihilar peribronchial cuffing with mild basilar predominant interstital prominence, which may reflect interstitial edema or atypical pneumonia.  H/O Doppler ultrasound 6/4/2010    CAROTID DOPPLER-6/4/2010-No Doppler evidence of hemodynamically significant Carotid Stenosis with antegrade flow in the vertebral arteries bilaterally. 6/4/2010 PERIPHERAL VENOUS DOPPLER_ LEFT Saphenous Vein mapping    H/O echocardiogram 7/26/2012, 8/3/2011, 10/2010, 7/23/2010, 6/8/2010 7/26/2012-LV normal size. Normal LV wall thickness. LV systolic function normal. EF => 55%. LV wall motion normal. Mild MR. Mild TR.  History of complete ECG 7/26/2012 Ramila Correia), 7/13/2012 [facility]), 7/25/2011, 3/25/2011, 10/18/2010, 10/11/2010, 6/23/2010, 5/7/2010    Hx of cardiovascular stress test 9/3/2015    lexiscan-normal,EF66%    Hx of Doppler ultrasound 4/5/16    Arterial: There is a fluid collection in the left thigh, please refer to PCP for further monitoring, no vascular in etiology.  Hx of echocardiogram     4/16 EF40% Mild MR/TR and mild Pulm HTN. 9/15 EF 45-50%, Mildly dilated L atrium, mildly dilated R ventricle. Mild MR & mild TR     Hyperlipidemia     Neuropathy of foot     Pt reports starting approx. 6-7 years ago    Neuropathy of hand     Pt reports starting approx.  6-7 years ago    Non compliance with medical treatment 7/2/2018    S/P CABG x 4 6/5/2010    LIMA-> LAD;  VG->CX;  VG->Diagonal; VG-> distal RCA  per  Dr Zackary Valenzuela    Type 2 diabetes mellitus with diabetic polyneuropathy, with long-term current use of insulin ([address] 4/5/2016    Type II or unspecified type diabetes mellitus with neurological manifestations, not stated as uncontrolled(250.60)     Type II or unspecified type diabetes mellitus with other specified manifestations, not stated as uncontrolled     Ulcer of left foot, with fat layer exposed ([address] 12/19/2013    Ulcer of other part of foot        PAST SURGICAL HISTORY    Past Surgical History:   Procedure Laterality Date    APPENDECTOMY      CARDIAC SURGERY      CARPAL TUNNEL RELEASE      L hand Nov. 2011, R hand Jan. 2012    CARPAL TUNNEL RELEASE Right 6/10/2013    recurrent    CARPAL TUNNEL RELEASE Left 7/29/2013    with ulnar nerve transpostion and L middle finger release    CHOLECYSTECTOMY      COLONOSCOPY      CORONARY ARTERY BYPASS GRAFT  6/5/2010 6/5/2010-LIMA->LAD;  VG-> Diagonal;  VG-> Obtuse marginal;  VG->Distal RCA-   Dr Guerrero Peters Right 6/10/2013    HYSTERECTOMY, TOTAL ABDOMINAL      TOE AMPUTATION Left 02/14/144th toe    TUBAL LIGATION      ULNAR TUNNEL RELEASE Right 6/10/2013       FAMILY HISTORY    Family History   Problem Relation Age of Onset    Heart Disease Mother     Kidney Disease Mother         dialysis    Cancer Father        SOCIAL HISTORY    Social History     Tobacco Use    Smoking status: Current Every Day Smoker     Packs/day: 0.25     Years: 30.00     Pack years: 7.50     Types: Cigarettes    Smokeless tobacco: Never Used    Tobacco comment: quit smoking mia 04/04/16   Substance Use Topics    Alcohol use: No     Alcohol/week: 0.0 oz     Comment:                                    CAFFEINE: 2 sodas daily    Drug use: No       ALLERGIES    Allergies   Allergen Reactions    Latex     Codeine     Cortisone     Cortizone     Iodides     Phenobarbital Other (See Comments)     Hallucinations     Vancomycin Other (See Comments)     \"makes me very sick\"       MEDICATIONS    Current Outpatient Medications on File Prior to Encounter   Medication Sig Dispense Refill    OXYGEN Inhale 2 L into the lungs daily as needed      carvedilol (COREG) 6.25 MG tablet 6.25 mg 2 times daily       VICTOZA 18 MG/3ML SOPN SC injection daily       albuterol (PROAIR HFA) 108 (90 BASE) MCG/ACT inhaler Inhale 2 puffs into the lungs every 6 hours as needed for Wheezing      esomeprazole Magnesium (NEXIUM) 40 MG PACK Take 20 mg by mouth daily. 2 tabs      oxyCODONE-acetaminophen (PERCOCET)  MG per tablet Take 1 tablet by mouth every 6 hours as needed for Pain.  LORazepam (ATIVAN) 0.5 MG tablet Take 0.5 mg by mouth every 12 hours Indications: One Tablet twice daily       Insulin Aspart (NOVOLOG FLEXPEN SC) Inject 15 Units into the skin 3 times daily (before meals) On sliding scale      pravastatin (PRAVACHOL) 40 MG tablet 40 mg daily.  lisinopril (PRINIVIL;ZESTRIL) 10 MG tablet Take 20 mg by mouth daily       insulin glargine (LANTUS) 100 UNIT/ML injection Inject 50 Units into the skin nightly.  furosemide (LASIX) 20 MG tablet Take 40 mg by mouth 2 times daily       gabapentin (NEURONTIN) 400 MG capsule Take 1,600 mg by mouth 3 times daily       nitroGLYCERIN (NITROSTAT) 0.4 MG SL tablet Place 1 tablet under the tongue every 5 minutes as needed for Chest pain 25 tablet 3     No current facility-administered medications on file prior to encounter. REVIEW OF SYSTEMS    Pertinent items are noted in HPI. Constitutional: Negative for systemic symptoms including fever, chills and malaise. Objective:      /61   Pulse 75   Temp 97 °F (36.1 °C) (Temporal)   Resp 16     PHYSICAL EXAM    General: The patient is in no acute distress. Mental status:  Patient is appropriate, is  oriented to place and plan of care.   Dermatologic exam: Visual inspection of the periwound reveals the skin to be atrophic and edematous  Wound exam: see wound description below in procedure note      Assessment:     Problem List Items Addressed This Visit     Diabetic foot ulcer ([address] - Primary    History of tobacco abuse Diabetes mellitus type 2 with complications (HCC)    Chronic kidney disease (CKD) stage G4/A3, severely decreased glomerular filtration rate (GFR) between 15-29 mL/min/1.73 square meter and albuminuria creatinine ratio greater than 300 mg/g (HCC)    Non compliance with medical treatment    Diabetic ulcer of left foot associated with type 2 diabetes mellitus, with fat layer exposed ([facility]        Procedure Note    Indications:  Based on my examination of this patient's wound(s) today, sharp excision into necrotic subcutaneous tissue is required to promote healing and evaluate the extent of previous healing. Performed by: Tommy Humphries DPM    Consent obtained: Yes    Time out taken:  Yes    Pain Control: none       Debridement:Excisional Debridement    Using #15 blade scalpel the wound(s) was/were sharply debrided down through and including the removal of subcutaneous tissue. Devitalized Tissue Debrided:  biofilm, slough, exudate and callus    Pre Debridement Measurements:   Are located in the Wound Documentation Flow Sheet    All active wounds listed below with today's date are evaluated  Wound(s)    debrided this date include # : 11     Post  Debridement Measurements:  Negative Pressure Wound Therapy Foot (Active)   Number of days: 1878       Negative Pressure Wound Therapy Foot Distal (Active)   Number of days: 1874       Incision 06/10/13 Wrist Right (Active)   Number of days: 2128       Incision 07/29/13 Wrist Left (Active)   Number of days: 2079       Incision 02/14/14 Toe (Comment  which one) (Active)   Number of days: 1878       Wound 04/08/19 Foot Left;Plantar Wound #11 Left Plantar (Onset x 2 weeks) (Active)   Wound Image   4/8/2019 11:06 AM   Dressing Status Clean;Dry; Intact 4/8/2019 11:52 AM   Dressing Changed Changed/New 4/8/2019 11:52 AM   Wound Cleansed Wound cleanser 4/8/2019 11:06 AM   Wound Length (cm) 0.1 cm 4/8/2019 11:06 AM   Wound Width (cm) 0.1 cm 4/8/2019 11:06 AM   Wound Depth (cm) 0.1 cm 4/8/2019 11:06 AM   Wound Surface Area (cm^2) 0.01 cm^2 4/8/2019 11:06 AM   Wound Volume (cm^3) 0 cm^3 4/8/2019 11:06 AM   Post-Procedure Length (cm) 1.2 cm 4/8/2019 11:29 AM   Post-Procedure Width (cm) 0.3 cm 4/8/2019 11:29 AM   Post-Procedure Depth (cm) 0.1 cm 4/8/2019 11:29 AM   Post-Procedure Surface Area (cm^2) 0.36 cm^2 4/8/2019 11:29 AM   Post-Procedure Volume (cm^3) 0.04 cm^3 4/8/2019 11:29 AM   Distance Tunneling (cm) 0 cm 4/8/2019 11:06 AM   Tunneling Position ___ O'Clock 0 4/8/2019 11:06 AM   Undermining Starts ___ O'Clock 0 4/8/2019 11:06 AM   Undermining Ends___ O'Clock 0 4/8/2019 11:06 AM   Undermining Maxium Distance (cm) 0 4/8/2019 11:06 AM   Wound Assessment Red 4/8/2019 11:06 AM   Drainage Amount None 4/8/2019 11:06 AM   Odor None 4/8/2019 11:06 AM   Margins Defined edges 4/8/2019 11:06 AM   Lora-wound Assessment Calloused 4/8/2019 11:06 AM   Non-staged Wound Description Partial thickness 4/8/2019 11:06 AM   Universal%Wound Bed 0 4/8/2019 11:06 AM   Red%Wound Bed 100 4/8/2019 11:06 AM   Yellow%Wound Bed 0 4/8/2019 11:06 AM   Black%Wound Bed 0 4/8/2019 11:06 AM   Purple%Wound Bed 0 4/8/2019 11:06 AM   Other%Wound Bed 0 4/8/2019 11:06 AM   Number of days: 0       Percent of Wound(s) Debrided: approximately 100%    Total  Area  Debrided:  0.36 sq cm     Bleeding:  Minimal    Hemostasis Achieved:  by pressure    Procedural Pain:  0  / 10     Post Procedural Pain:  0 / 10     Response to treatment:  Well tolerated by patient. Status of wound progress and description from last visit:   Wound is recurrent. Plan:       Discharge Instructions       PHYSICIAN ORDERS AND DISCHARGE INSTRUCTIONS    NOTE: Upon discharge from the [address] [place] [address],[address], you will receive a patient experience survey. We would be grateful if you would take the time to fill this survey out.     Wound care order history:     KATHLEEN's   Right       Left    Date NA to obtained   Vascular studies:   Date arterial study 6/2017   Imaging: Date    Cultures:   Date    Labs/ HbA1c:   Date    Grafts:  Date    HBO:  To be determined   Antibiotics:               Earlier Wound care treatments:                Authorizations:      Consults:   Date     PCP: Mario    Continuing wound care orders and information:              Residence: home              Continue home health care with:    Your wound-care supplies will be provided by: Francisco SANDOVAL provider:   Compression with tubigrip e   Off loading:  Date    Wound Meds:    Wound cleansing:      Do not scrub or use excessive force. Wash hands with soap and water before and after dressing changes. Prior to applying a clean dressing, cleanse wound with normal saline,    wound cleanser, or mild soap and water. Ask your physician or nurse before getting the wound(s) wet in the shower. Daily Wound management:    Keep weight off wounds and reposition every 2 hours. Avoid standing for long periods of time. Apply wraps/stockings in AM and remove at bedtime. Elevate legs to the level of the heart or above for 30 minutes 4-5 times a day and/or when sitting. When taking antibiotics take entire prescription as ordered by MD do not stop taking until medicine is all gone. Orders for this week: 4/8/19  Left plantar wound--dagoberto, ca alginate, fluffed 4x4, conform tape--tubigrip e--change dressing daily--please use scooter at home to stay off foot to help with healing      Poss culture next week if not improved    Follow up with Dr MARQUE[facility]  In 1 week in the wound care center    Call [phone] for any questions or concerns.     Date__________   Time____________                Treatment Note Wound 04/08/19 Foot Left;Plantar Wound #11 Left Plantar (Onset x 2 weeks)-Dressing/Treatment: (dagoberto, Ca+ alg, fluffed 4x4, conform & tape )    Written Patient Dismissal Instructions Given            Electronically signed by Ashwini Gambino DPM on 4/8/2019 at 1:30 PM

## 2019-04-15 ENCOUNTER — HOSPITAL ENCOUNTER (OUTPATIENT)
Dept: WOUND CARE | Age: 54
Discharge: HOME | End: 2019-04-15
Payer: MEDICARE

## 2019-04-15 VITALS
DIASTOLIC BLOOD PRESSURE: 64 MMHG | TEMPERATURE: 97.6 F | HEART RATE: 77 BPM | SYSTOLIC BLOOD PRESSURE: 133 MMHG | RESPIRATION RATE: 16 BRPM

## 2019-04-15 DIAGNOSIS — E11.42 DIABETIC POLYNEUROPATHY ASSOCIATED WITH TYPE 2 DIABETES MELLITUS (HCC): ICD-10-CM

## 2019-04-15 DIAGNOSIS — L97.422 DIABETIC ULCER OF LEFT MIDFOOT ASSOCIATED WITH TYPE 2 DIABETES MELLITUS, WITH FAT LAYER EXPOSED (HCC): Primary | ICD-10-CM

## 2019-04-15 DIAGNOSIS — Z79.4 TYPE 2 DIABETES MELLITUS WITH COMPLICATION, WITH LONG-TERM CURRENT USE OF INSULIN (HCC): ICD-10-CM

## 2019-04-15 DIAGNOSIS — Z87.891 HISTORY OF TOBACCO ABUSE: ICD-10-CM

## 2019-04-15 DIAGNOSIS — E11.8 TYPE 2 DIABETES MELLITUS WITH COMPLICATION, WITH LONG-TERM CURRENT USE OF INSULIN (HCC): ICD-10-CM

## 2019-04-15 DIAGNOSIS — E11.621 DIABETIC ULCER OF LEFT MIDFOOT ASSOCIATED WITH TYPE 2 DIABETES MELLITUS, WITH FAT LAYER EXPOSED (HCC): Primary | ICD-10-CM

## 2019-04-15 DIAGNOSIS — E11.621 DIABETIC ULCER OF TOE OF LEFT FOOT ASSOCIATED WITH TYPE 2 DIABETES MELLITUS, WITH FAT LAYER EXPOSED (HCC): ICD-10-CM

## 2019-04-15 DIAGNOSIS — L97.522 DIABETIC ULCER OF TOE OF LEFT FOOT ASSOCIATED WITH TYPE 2 DIABETES MELLITUS, WITH FAT LAYER EXPOSED (HCC): ICD-10-CM

## 2019-04-15 PROCEDURE — 99212 OFFICE O/P EST SF 10 MIN: CPT

## 2019-04-15 NOTE — PROGRESS NOTES
Wound Care Center Progress Note       Marylou Covington  AGE: 48 y.o. GENDER: female  : 1965  TODAY'S DATE:  4/15/2019        Subjective:     Chief Complaint   Patient presents with    Wound Check     Left foot          HISTORY of PRESENT ILLNESS     Marylou Covington is a 48 y.o. female who presents today for wound evaluation of Chronic diabetic ulcer(s) of the left plantar foot. The ulcer is of mild severity. The underlying cause of the wound is DM. Wound Pain Timing/Severity: none  Quality of pain: N/A  Severity of pain:  0 / 10   Modifying Factors: diabetes  Associated Signs/Symptoms: none        PAST MEDICAL HISTORY        Diagnosis Date    CAD (coronary artery disease)     s/p CABG x 4;  follows with Dr Mamadou Calvert Callus of foot 2018    Carpal tunnel syndrome on both sides     CHF (congestive heart failure) ([address] 10/2010    Chronic diastolic; EF 55%    Chronic kidney disease     stage 4 kidney disease    Chronic ulcer of left ankle with fat layer exposed ([address] 10/7/2015    Chronic ulcer of left foot with fat layer exposed ([address] 3/17/2016    Chronic ulcer of right ankle with fat layer exposed ([address] 3/17/2016    Chronic ulcer of right foot with fat layer exposed ([address] 3/17/2016    Depression     Diabetes mellitus ([address]     Diabetes mellitus with neurological manifestations ([address]     Diabetes mellitus with ulcer of ankle ([address]     Diabetic ulcer of left foot associated with type 2 diabetes mellitus, with fat layer exposed ([address] 2018    Family history of cardiovascular disease     Mother    GERD (gastroesophageal reflux disease)     H/O cardiac catheterization 10/18/2010, 6/3/2010    10/18/2010-Four bypass grafts all widely patent. 6/3/2010- Moderate to severe triple vessel disease, preserved LV systolic function.  H/O cardiovascular stress test 2012, 8/3/2011, 10/14/2010, 2010-Lexiscan- Normal Myocardial Perfusion Study. Post stress myocardial perfusion images show a normal pattern of perfusion in all regions. Post stress LV normal size. Normal perfusion in the distribution of all coronaries. Normal LV size and function. Rest EF 70%    H/O chest x-ray 7/12/2012, 6/2/2010 7/12/2012-Perihilar peribronchial cuffing with mild basilar predominant interstital prominence, which may reflect interstitial edema or atypical pneumonia.  H/O Doppler ultrasound 6/4/2010    CAROTID DOPPLER-6/4/2010-No Doppler evidence of hemodynamically significant Carotid Stenosis with antegrade flow in the vertebral arteries bilaterally. 6/4/2010 PERIPHERAL VENOUS DOPPLER_ LEFT Saphenous Vein mapping    H/O echocardiogram 7/26/2012, 8/3/2011, 10/2010, 7/23/2010, 6/8/2010 7/26/2012-LV normal size. Normal LV wall thickness. LV systolic function normal. EF => 55%. LV wall motion normal. Mild MR. Mild TR.  History of complete ECG 7/26/2012 Michael Stern), 7/13/2012 [facility]), 7/25/2011, 3/25/2011, 10/18/2010, 10/11/2010, 6/23/2010, 5/7/2010    Hx of cardiovascular stress test 9/3/2015    lexiscan-normal,EF66%    Hx of Doppler ultrasound 4/5/16    Arterial: There is a fluid collection in the left thigh, please refer to PCP for further monitoring, no vascular in etiology.  Hx of echocardiogram     4/16 EF40% Mild MR/TR and mild Pulm HTN. 9/15 EF 45-50%, Mildly dilated L atrium, mildly dilated R ventricle. Mild MR & mild TR     Hyperlipidemia     Neuropathy of foot     Pt reports starting approx. 6-7 years ago    Neuropathy of hand     Pt reports starting approx.  6-7 years ago    Non compliance with medical treatment 7/2/2018    S/P CABG x 4 6/5/2010    LIMA-> LAD;  VG->CX;  VG->Diagonal; VG-> distal RCA  per  Dr James Willis    Type 2 diabetes mellitus with diabetic polyneuropathy, with long-term current use of insulin ([facility] [address] 4/5/2016    Type II or unspecified type diabetes mellitus with neurological manifestations, not stated as uncontrolled(250.60)     Type II or unspecified type diabetes mellitus with other specified manifestations, not stated as uncontrolled     Ulcer of left foot, with fat layer exposed ([facility] [address] 12/19/2013    Ulcer of other part of foot        PAST SURGICAL HISTORY    Past Surgical History:   Procedure Laterality Date    APPENDECTOMY      CARDIAC SURGERY      CARPAL TUNNEL RELEASE      L hand Nov. 2011, R hand Jan. 2012    CARPAL TUNNEL RELEASE Right 6/10/2013    recurrent    CARPAL TUNNEL RELEASE Left 7/29/2013    with ulnar nerve transpostion and L middle finger release    CHOLECYSTECTOMY      COLONOSCOPY      CORONARY ARTERY BYPASS GRAFT  6/5/2010 6/5/2010-LIMA->LAD;  VG-> Diagonal;  VG-> Obtuse marginal;  VG->Distal RCA-   Dr Quintin Pulido Right 6/10/2013    HYSTERECTOMY, TOTAL ABDOMINAL      TOE AMPUTATION Left 02/14/144th toe    TUBAL LIGATION      ULNAR TUNNEL RELEASE Right 6/10/2013       FAMILY HISTORY    Family History   Problem Relation Age of Onset    Heart Disease Mother     Kidney Disease Mother         dialysis    Cancer Father        SOCIAL HISTORY    Social History     Tobacco Use    Smoking status: Current Every Day Smoker     Packs/day: 0.25     Years: 30.00     Pack years: 7.50     Types: Cigarettes    Smokeless tobacco: Never Used    Tobacco comment: quit smoking mia 04/04/16   Substance Use Topics    Alcohol use: No     Alcohol/week: 0.0 oz     Comment:                                    CAFFEINE: 2 sodas daily    Drug use: No       ALLERGIES    Allergies   Allergen Reactions    Latex     Codeine     Cortisone     Cortizone     Iodides     Phenobarbital Other (See Comments)     Hallucinations     Vancomycin Other (See Comments)     \"makes me very sick\"       MEDICATIONS    Current Outpatient Medications on File Prior to Encounter   Medication Sig Dispense Refill    OXYGEN Inhale 2 L into the lungs daily as needed      carvedilol (COREG) 6.25 MG tablet 6.25 mg 2 times daily Incision 06/10/13 Wrist Right (Active)   Number of days: 2134       Incision 07/29/13 Wrist Left (Active)   Number of days: 2086       Incision 02/14/14 Toe (Comment  which one) (Active)   Number of days: 1885       Wound 04/08/19 Foot Left;Plantar Wound #11 Left Plantar (Onset x 2 weeks)--dm wag II (Active)   Wound Image   4/8/2019 11:06 AM   Wound Diabetic [place] 2 4/15/2019 11:16 AM   Dressing Status Clean;Dry; Intact 4/8/2019 11:52 AM   Dressing Changed Changed/New 4/8/2019 11:52 AM   Wound Cleansed Wound cleanser 4/15/2019 11:16 AM   Wound Length (cm) 0 cm 4/15/2019 11:16 AM   Wound Width (cm) 0 cm 4/15/2019 11:16 AM   Wound Depth (cm) 0 cm 4/15/2019 11:16 AM   Wound Surface Area (cm^2) 0 cm^2 4/15/2019 11:16 AM   Change in Wound Size % (l*w) 100 4/15/2019 11:16 AM   Wound Volume (cm^3) 0 cm^3 4/15/2019 11:16 AM   Post-Procedure Length (cm) 1.2 cm 4/8/2019 11:29 AM   Post-Procedure Width (cm) 0.3 cm 4/8/2019 11:29 AM   Post-Procedure Depth (cm) 0.1 cm 4/8/2019 11:29 AM   Post-Procedure Surface Area (cm^2) 0.36 cm^2 4/8/2019 11:29 AM   Post-Procedure Volume (cm^3) 0.04 cm^3 4/8/2019 11:29 AM   Distance Tunneling (cm) 0 cm 4/15/2019 11:16 AM   Tunneling Position ___ O'Clock 0 4/15/2019 11:16 AM   Undermining Starts ___ O'Clock 0 4/15/2019 11:16 AM   Undermining Ends___ O'Clock 0 4/15/2019 11:16 AM   Undermining Maxium Distance (cm) 0 4/15/2019 11:16 AM   Wound Assessment Red 4/15/2019 11:16 AM   Drainage Amount None 4/15/2019 11:16 AM   Odor None 4/15/2019 11:16 AM   Margins Defined edges 4/15/2019 11:16 AM   Lora-wound Assessment Calloused 4/15/2019 11:16 AM   Non-staged Wound Description Partial thickness 4/15/2019 11:16 AM   [place]%Wound Bed 0 4/15/2019 11:16 AM   Red%Wound Bed 100 4/15/2019 11:16 AM   Yellow%Wound Bed 0 4/15/2019 11:16 AM   Black%Wound Bed 0 4/15/2019 11:16 AM   Purple%Wound Bed 0 4/15/2019 11:16 AM   Other%Wound Bed 0 4/15/2019 11:16 AM   Number of days: 7       Assessment:       Problem List Items Addressed This Visit     Diabetic foot ulcer ([facility] - Primary    History of tobacco abuse    Diabetic neuropathy ([facility]    Diabetes mellitus type 2 with complications ([facility]    Diabetic ulcer of left foot associated with type 2 diabetes mellitus, with fat layer exposed ([facility]          Status of wound progress and description from last visit:   Wound is closed. Plan:     Discharge Instructions         Plan:         Discharge Instructions        PHYSICIAN ORDERS AND DISCHARGE INSTRUCTIONS     NOTE: Upon discharge from the [address], you will receive a patient experience survey. We would be grateful if you would take the time to fill this survey out.     Wound care order history:                 KATHLEEN's   Right       Left               Date NA to obtained              Vascular studies:   Date arterial study 6/2017              Imaging:   Date               Cultures:   Date               Labs/ HbA1c:   Date               Grafts:  Date               HBO:  To be determined              Antibiotics:               Earlier Wound care treatments:                Authorizations:                        Consults:   Date                           PCP: Mario     Continuing wound care orders and information:              Residence: home              Continue home health care with: n/a              Your wound-care supplies will be provided by: chayo SANDOVAL provider:              Compression with tubigrip e              Off loading:  Date               Wound Meds:              Wound cleansing:                           Do not scrub or use excessive force. Wash hands with soap and water before and after dressing changes. Prior to applying a clean dressing, cleanse wound with normal saline,                          wound cleanser, or mild soap and water. Ask your physician or nurse before getting the wound(s) wet in the shower. Daily Wound management:                          Keep weight off wounds and reposition every 2 hours. Avoid standing for long periods of time. Apply wraps/stockings in AM and remove at bedtime. Elevate legs to the level of the heart or above for 30 minutes 4-5 times a day and/or when sitting. When taking antibiotics take entire prescription as ordered by MD do not stop taking until medicine is all gone.                                                                 Orders for this week: 4/15/19  Left plantar wound--healed. Use lotion and shoes.  please use scooter at home to stay off foot to help with healing           Follow up with Dr Vianca Dias  In 3 weeks in the wound care center     Call [phone] for any questions or concerns.     Date__________   Time____________                          Treatment Note      Written Patient Dismissal Instructions Given            Electronically signed by Jimmy Guevara DPM on 4/15/2019 at 11:32 AM

## 2019-06-03 ENCOUNTER — HOSPITAL ENCOUNTER (OUTPATIENT)
Dept: WOUND CARE | Age: 54
Discharge: HOME | End: 2019-05-20
Payer: MEDICARE

## 2019-08-05 ENCOUNTER — HOSPITAL ENCOUNTER (OUTPATIENT)
Dept: WOUND CARE | Age: 54
Discharge: HOME | End: 2019-08-05
Payer: MEDICARE

## 2019-08-05 VITALS
HEART RATE: 78 BPM | DIASTOLIC BLOOD PRESSURE: 65 MMHG | RESPIRATION RATE: 16 BRPM | TEMPERATURE: 98.3 F | SYSTOLIC BLOOD PRESSURE: 117 MMHG | HEIGHT: 63 IN | WEIGHT: 160 LBS | BODY MASS INDEX: 28.35 KG/M2

## 2019-08-05 DIAGNOSIS — E11.42 DIABETIC POLYNEUROPATHY ASSOCIATED WITH TYPE 2 DIABETES MELLITUS (HCC): ICD-10-CM

## 2019-08-05 DIAGNOSIS — L97.522 DIABETIC ULCER OF TOE OF LEFT FOOT ASSOCIATED WITH TYPE 2 DIABETES MELLITUS, WITH FAT LAYER EXPOSED (HCC): ICD-10-CM

## 2019-08-05 DIAGNOSIS — L97.509 TYPE 2 DIABETES MELLITUS WITH FOOT ULCER, WITH LONG-TERM CURRENT USE OF INSULIN (HCC): ICD-10-CM

## 2019-08-05 DIAGNOSIS — E11.9 TYPE 2 DIABETES MELLITUS WITHOUT COMPLICATION, WITHOUT LONG-TERM CURRENT USE OF INSULIN (HCC): ICD-10-CM

## 2019-08-05 DIAGNOSIS — E11.621 TYPE 2 DIABETES MELLITUS WITH FOOT ULCER, WITH LONG-TERM CURRENT USE OF INSULIN (HCC): ICD-10-CM

## 2019-08-05 DIAGNOSIS — E11.621 DIABETIC ULCER OF LEFT MIDFOOT ASSOCIATED WITH TYPE 2 DIABETES MELLITUS, WITH FAT LAYER EXPOSED (HCC): Primary | ICD-10-CM

## 2019-08-05 DIAGNOSIS — L97.422 DIABETIC ULCER OF LEFT MIDFOOT ASSOCIATED WITH TYPE 2 DIABETES MELLITUS, WITH FAT LAYER EXPOSED (HCC): Primary | ICD-10-CM

## 2019-08-05 DIAGNOSIS — E11.621 DIABETIC ULCER OF TOE OF LEFT FOOT ASSOCIATED WITH TYPE 2 DIABETES MELLITUS, WITH FAT LAYER EXPOSED (HCC): ICD-10-CM

## 2019-08-05 DIAGNOSIS — Z79.4 TYPE 2 DIABETES MELLITUS WITH FOOT ULCER, WITH LONG-TERM CURRENT USE OF INSULIN (HCC): ICD-10-CM

## 2019-08-05 PROCEDURE — 11042 DBRDMT SUBQ TIS 1ST 20SQCM/<: CPT

## 2019-08-05 PROCEDURE — 99213 OFFICE O/P EST LOW 20 MIN: CPT

## 2019-08-05 NOTE — PROGRESS NOTES
ulcer ([address] - Primary    Diabetes mellitus ([address]    Diabetic neuropathy ([address]    Type 2 diabetes mellitus without complication ([address]    Diabetic ulcer of left foot associated with type 2 diabetes mellitus, with fat layer exposed ([address]        Procedure Note    Indications:  Based on my examination of this patient's wound(s) today, sharp excision into necrotic subcutaneous tissue is required to promote healing and evaluate the extent of previous healing. Performed by: Rose Marie Silverio DPM    Consent obtained: Yes    Time out taken:  Yes    Pain Control: none       Debridement:Excisional Debridement    Using tissue nippers the wound(s) was/were sharply debrided down through and including the removal of subcutaneous tissue.         Devitalized Tissue Debrided:  biofilm, slough and exudate    Pre Debridement Measurements:  Are located in the Wound Documentation Flow Sheet    All active wounds listed below with today's date are evaluated  Wound(s)    debrided this date include # : 12     Post  Debridement Measurements:  Negative Pressure Wound Therapy Foot (Active)   Number of days: 1997       Negative Pressure Wound Therapy Foot Distal (Active)   Number of days: 1993       Incision 06/10/13 Wrist Right (Active)   Number of days: 2246       Incision 07/29/13 Wrist Left (Active)   Number of days: 2198       Incision 02/14/14 Toe (Comment  which one) (Active)   Number of days: 1997       Wound 08/05/19 #12 (onset 1 month) Left Distal Plantar (Active)   Wound Image   8/5/2019 11:05 AM   Wound Diabetic 8/5/2019 11:05 AM   Wound Cleansed Rinsed/Irrigated with saline 8/5/2019 11:05 AM   Wound Length (cm) 0.5 cm 8/5/2019 11:05 AM   Wound Width (cm) 0.1 cm 8/5/2019 11:05 AM   Wound Depth (cm) 0.1 cm 8/5/2019 11:05 AM   Wound Surface Area (cm^2) 0.05 cm^2 8/5/2019 11:05 AM   Wound Volume (cm^3) 0 cm^3 8/5/2019 11:05 AM   Distance Tunneling (cm) 0 cm 8/5/2019 11:05 AM   Tunneling Position ___ O'Clock 0 8/5/2019 11:05 AM

## 2019-08-26 ENCOUNTER — HOSPITAL ENCOUNTER (OUTPATIENT)
Dept: WOUND CARE | Age: 54
Discharge: HOME | End: 2019-08-26
Payer: MEDICARE

## 2019-08-26 ENCOUNTER — HOSPITAL ENCOUNTER (OUTPATIENT)
Age: 54
Discharge: HOME | End: 2019-08-26
Payer: MEDICARE

## 2019-08-26 ENCOUNTER — HOSPITAL ENCOUNTER (OUTPATIENT)
Dept: GENERAL RADIOLOGY | Age: 54
Discharge: HOME | End: 2019-08-26
Payer: MEDICARE

## 2019-08-26 VITALS
DIASTOLIC BLOOD PRESSURE: 61 MMHG | HEART RATE: 78 BPM | TEMPERATURE: 97.1 F | SYSTOLIC BLOOD PRESSURE: 138 MMHG | RESPIRATION RATE: 16 BRPM

## 2019-08-26 DIAGNOSIS — E11.621 DIABETIC ULCER OF TOE OF LEFT FOOT ASSOCIATED WITH TYPE 2 DIABETES MELLITUS, WITH FAT LAYER EXPOSED (HCC): ICD-10-CM

## 2019-08-26 DIAGNOSIS — E11.621 DIABETIC FOOT ULCER WITH OSTEOMYELITIS (HCC): ICD-10-CM

## 2019-08-26 DIAGNOSIS — L97.422 DIABETIC ULCER OF LEFT MIDFOOT ASSOCIATED WITH TYPE 2 DIABETES MELLITUS, WITH FAT LAYER EXPOSED (HCC): ICD-10-CM

## 2019-08-26 DIAGNOSIS — Z87.891 HISTORY OF TOBACCO ABUSE: Primary | ICD-10-CM

## 2019-08-26 DIAGNOSIS — L97.522 DIABETIC ULCER OF TOE OF LEFT FOOT ASSOCIATED WITH TYPE 2 DIABETES MELLITUS, WITH FAT LAYER EXPOSED (HCC): ICD-10-CM

## 2019-08-26 DIAGNOSIS — E11.621 DIABETIC ULCER OF LEFT MIDFOOT ASSOCIATED WITH TYPE 2 DIABETES MELLITUS, WITH FAT LAYER EXPOSED (HCC): ICD-10-CM

## 2019-08-26 DIAGNOSIS — Z79.4 TYPE 2 DIABETES MELLITUS WITH COMPLICATION, WITH LONG-TERM CURRENT USE OF INSULIN (HCC): ICD-10-CM

## 2019-08-26 DIAGNOSIS — E11.8 TYPE 2 DIABETES MELLITUS WITH COMPLICATION, WITH LONG-TERM CURRENT USE OF INSULIN (HCC): ICD-10-CM

## 2019-08-26 DIAGNOSIS — Z91.199 NON COMPLIANCE WITH MEDICAL TREATMENT: ICD-10-CM

## 2019-08-26 DIAGNOSIS — M86.9 DIABETIC FOOT ULCER WITH OSTEOMYELITIS (HCC): ICD-10-CM

## 2019-08-26 DIAGNOSIS — L97.522 ULCER OF LEFT FOOT, WITH FAT LAYER EXPOSED (HCC): ICD-10-CM

## 2019-08-26 DIAGNOSIS — E11.69 DIABETIC FOOT ULCER WITH OSTEOMYELITIS (HCC): ICD-10-CM

## 2019-08-26 DIAGNOSIS — L97.509 DIABETIC FOOT ULCER WITH OSTEOMYELITIS (HCC): ICD-10-CM

## 2019-08-26 PROCEDURE — 11042 DBRDMT SUBQ TIS 1ST 20SQCM/<: CPT

## 2019-08-26 PROCEDURE — 73630 X-RAY EXAM OF FOOT: CPT

## 2019-08-26 NOTE — PROGRESS NOTES
Wound Care Center Progress Note With Procedure    Glo Locke  AGE: 47 y.o. GENDER: female  : 1965  EPISODE DATE:  2019     Subjective:     Chief Complaint   Patient presents with    Wound Check     Left distal plantar         HISTORY of PRESENT ILLNESS      Glo Locke is a 47 y.o. female who presents today for wound evaluation of Chronic diabetic ulcer(s) of the the left foot sub 4th met head. The ulcer is of moderate severity. The underlying cause of the wound is pressure with DM and PVD issues. 4th metatarsal is noted to be elongated. Wound Pain Timing/Severity: none  Quality of pain: N/A  Severity of pain:  0 / 10   Modifying Factors: diabetes, chronic pressure, arterial insufficiency and non-adherence  Associated Signs/Symptoms: edema        PAST MEDICAL HISTORY        Diagnosis Date    CAD (coronary artery disease)     s/p CABG x 4;  follows with Dr Yakov Lake Callus of foot 2018    Carpal tunnel syndrome on both sides     CHF (congestive heart failure) ([address] 10/2010    Chronic diastolic; EF 38%    Chronic kidney disease     stage 4 kidney disease    Chronic ulcer of left ankle with fat layer exposed ([address] 10/7/2015    Chronic ulcer of left foot with fat layer exposed ([address] 3/17/2016    Chronic ulcer of right ankle with fat layer exposed ([address] 3/17/2016    Chronic ulcer of right foot with fat layer exposed ([address] 3/17/2016    Depression     Diabetes mellitus ([address]     Diabetes mellitus with neurological manifestations ([address]     Diabetes mellitus with ulcer of ankle ([address]     Diabetic ulcer of left foot associated with type 2 diabetes mellitus, with fat layer exposed ([address] 2018    Family history of cardiovascular disease     Mother    GERD (gastroesophageal reflux disease)     H/O cardiac catheterization 10/18/2010, 6/3/2010    10/18/2010-Four bypass grafts all widely patent.  6/3/2010- Moderate to severe triple vessel disease, preserved LV systolic take the time to fill this survey out.     Wound care order history:                 KATHLEEN's   Right       Left               Date               Vascular studies:   Date               Imaging:   Date               Cultures:   Date               Labs/ HbA1c:   Date               Grafts:  Date               HBO:                Antibiotics:               Earlier Wound care treatments:                Authorizations:                        Consults:   Date                           PCP: Dr. Gege Anderson wound care orders and information:              Residence: home              Continue home health care with:               Your wound-care supplies will be provided by: Hollie SANDOVAL provider:              Compression with              Off loading:  Date               Wound Meds:              Wound cleansing:                           Do not scrub or use excessive force. Wash hands with soap and water before and after dressing changes. Prior to applying a clean dressing, cleanse wound with normal saline,                          wound cleanser, or mild soap and water. Ask your physician or nurse before getting the wound(s) wet in the shower. Daily Wound management:                          Keep weight off wounds and reposition every 2 hours. Avoid standing for long periods of time. Apply wraps/stockings in AM and remove at bedtime. Elevate legs to the level of the heart or above for 30 minutes 4-5 times a day and/or when sitting. When taking antibiotics take entire prescription as ordered by MD do not stop taking until medicine is all gone.                                                                 Orders for this week: 8/26/19                Left planter- wash with soap and water daily. Pat dry.

## 2019-09-09 ENCOUNTER — HOSPITAL ENCOUNTER (OUTPATIENT)
Dept: WOUND CARE | Age: 54
Discharge: HOME | End: 2019-09-09
Payer: MEDICARE

## 2019-09-09 VITALS — SYSTOLIC BLOOD PRESSURE: 181 MMHG | DIASTOLIC BLOOD PRESSURE: 74 MMHG | RESPIRATION RATE: 16 BRPM | HEART RATE: 78 BPM

## 2019-09-09 DIAGNOSIS — E11.621 DIABETIC ULCER OF TOE OF LEFT FOOT ASSOCIATED WITH TYPE 2 DIABETES MELLITUS, WITH FAT LAYER EXPOSED (HCC): Primary | ICD-10-CM

## 2019-09-09 DIAGNOSIS — L97.522 DIABETIC ULCER OF TOE OF LEFT FOOT ASSOCIATED WITH TYPE 2 DIABETES MELLITUS, WITH FAT LAYER EXPOSED (HCC): Primary | ICD-10-CM

## 2019-09-09 PROCEDURE — 11042 DBRDMT SUBQ TIS 1ST 20SQCM/<: CPT

## 2019-09-09 PROCEDURE — 11042 DBRDMT SUBQ TIS 1ST 20SQCM/<: CPT | Performed by: NURSE PRACTITIONER

## 2019-09-09 NOTE — PROGRESS NOTES
Wound Care Center Progress Note With Procedure    Shilpa Epstein  AGE: 47 y.o. GENDER: female  : 1965  EPISODE DATE:  2019     Subjective:     Chief Complaint   Patient presents with    Wound Check     left plantar         HISTORY of PRESENT ILLNESS      Shilpa Epstein is a 47 y.o. female who presents today for wound evaluation of Chronic diabetic ulcer(s) of the left foot sub 4th met head. The ulcer is of moderate severity. The underlying cause of the wound is pressure with DM and PVD. Wound Pain Timing/Severity: none  Quality of pain: N/A  Severity of pain:  0 / 10   Modifying Factors: diabetes, chronic pressure, arterial insufficiency and non-adherence  Associated Signs/Symptoms: edema        PAST MEDICAL HISTORY        Diagnosis Date    CAD (coronary artery disease)     s/p CABG x 4;  follows with Dr Margaretta Canavan Callus of foot 2018    Carpal tunnel syndrome on both sides     CHF (congestive heart failure) ([address] 10/2010    Chronic diastolic; EF 10%    Chronic kidney disease     stage 4 kidney disease    Chronic ulcer of left ankle with fat layer exposed ([address] 10/7/2015    Chronic ulcer of left foot with fat layer exposed ([address] 3/17/2016    Chronic ulcer of right ankle with fat layer exposed ([address] 3/17/2016    Chronic ulcer of right foot with fat layer exposed ([address] 3/17/2016    Depression     Diabetes mellitus ([address]     Diabetes mellitus with neurological manifestations ([address]     Diabetes mellitus with ulcer of ankle ([address]     Diabetic ulcer of left foot associated with type 2 diabetes mellitus, with fat layer exposed ([address] 2018    Family history of cardiovascular disease     Mother    GERD (gastroesophageal reflux disease)     H/O cardiac catheterization 10/18/2010, 6/3/2010    10/18/2010-Four bypass grafts all widely patent. 6/3/2010- Moderate to severe triple vessel disease, preserved LV systolic function.     H/O cardiovascular stress test 2012, 8/3/2011, mellitus with neurological manifestations, not stated as uncontrolled(250.60)     Type II or unspecified type diabetes mellitus with other specified manifestations, not stated as uncontrolled     Ulcer of left foot, with fat layer exposed ([address] 12/19/2013    Ulcer of other part of foot        PAST SURGICAL HISTORY    Past Surgical History:   Procedure Laterality Date    APPENDECTOMY      CARDIAC SURGERY      CARPAL TUNNEL RELEASE      L hand Nov. 2011, R hand Jan. 2012    CARPAL TUNNEL RELEASE Right 6/10/2013    recurrent    CARPAL TUNNEL RELEASE Left 7/29/2013    with ulnar nerve transpostion and L middle finger release    CHOLECYSTECTOMY      COLONOSCOPY      CORONARY ARTERY BYPASS GRAFT  6/5/2010 6/5/2010-LIMA->LAD;  VG-> Diagonal;  VG-> Obtuse marginal;  VG->Distal RCA-   Dr Finn Sanchez Right 6/10/2013    HYSTERECTOMY, TOTAL ABDOMINAL      TOE AMPUTATION Left 02/14/144th toe    TUBAL LIGATION      ULNAR TUNNEL RELEASE Right 6/10/2013       FAMILY HISTORY    Family History   Problem Relation Age of Onset    Heart Disease Mother     Kidney Disease Mother         dialysis    Cancer Father        SOCIAL HISTORY    Social History     Tobacco Use    Smoking status: Current Every Day Smoker     Packs/day: 0.25     Years: 30.00     Pack years: 7.50     Types: Cigarettes    Smokeless tobacco: Never Used    Tobacco comment: quit smoking mia 04/04/16   Substance Use Topics    Alcohol use: No     Alcohol/week: 0.0 standard drinks     Comment:                                    CAFFEINE: 2 sodas daily    Drug use: No       ALLERGIES    Allergies   Allergen Reactions    Latex     Codeine     Cortisone     Cortizone     Iodides     Phenobarbital Other (See Comments)     Hallucinations     Vancomycin Other (See Comments)     \"makes me very sick\"       MEDICATIONS    Current Outpatient Medications on File Prior to Encounter   Medication Sig Dispense Refill    OXYGEN

## 2019-09-16 ENCOUNTER — HOSPITAL ENCOUNTER (OUTPATIENT)
Dept: WOUND CARE | Age: 54
Discharge: HOME | End: 2019-09-16
Payer: MEDICARE

## 2019-09-16 VITALS
DIASTOLIC BLOOD PRESSURE: 77 MMHG | RESPIRATION RATE: 16 BRPM | TEMPERATURE: 97 F | SYSTOLIC BLOOD PRESSURE: 137 MMHG | HEART RATE: 71 BPM

## 2019-09-16 DIAGNOSIS — Z87.891 HISTORY OF TOBACCO ABUSE: ICD-10-CM

## 2019-09-16 DIAGNOSIS — E08.00 DIABETES MELLITUS DUE TO UNDERLYING CONDITION WITH HYPEROSMOLARITY WITHOUT COMA, WITHOUT LONG-TERM CURRENT USE OF INSULIN (HCC): ICD-10-CM

## 2019-09-16 DIAGNOSIS — E11.621 TYPE 2 DIABETES MELLITUS WITH FOOT ULCER, WITH LONG-TERM CURRENT USE OF INSULIN (HCC): ICD-10-CM

## 2019-09-16 DIAGNOSIS — E11.621 DIABETIC ULCER OF LEFT MIDFOOT ASSOCIATED WITH TYPE 2 DIABETES MELLITUS, WITH FAT LAYER EXPOSED (HCC): Primary | ICD-10-CM

## 2019-09-16 DIAGNOSIS — L97.509 TYPE 2 DIABETES MELLITUS WITH FOOT ULCER, WITH LONG-TERM CURRENT USE OF INSULIN (HCC): ICD-10-CM

## 2019-09-16 DIAGNOSIS — L97.422 DIABETIC ULCER OF LEFT MIDFOOT ASSOCIATED WITH TYPE 2 DIABETES MELLITUS, WITH FAT LAYER EXPOSED (HCC): Primary | ICD-10-CM

## 2019-09-16 DIAGNOSIS — Z79.4 TYPE 2 DIABETES MELLITUS WITH FOOT ULCER, WITH LONG-TERM CURRENT USE OF INSULIN (HCC): ICD-10-CM

## 2019-09-16 DIAGNOSIS — E11.621 DIABETIC ULCER OF TOE OF LEFT FOOT ASSOCIATED WITH TYPE 2 DIABETES MELLITUS, WITH FAT LAYER EXPOSED (HCC): ICD-10-CM

## 2019-09-16 DIAGNOSIS — Z91.199 NON COMPLIANCE WITH MEDICAL TREATMENT: ICD-10-CM

## 2019-09-16 DIAGNOSIS — L97.522 DIABETIC ULCER OF TOE OF LEFT FOOT ASSOCIATED WITH TYPE 2 DIABETES MELLITUS, WITH FAT LAYER EXPOSED (HCC): ICD-10-CM

## 2019-09-16 PROCEDURE — 99213 OFFICE O/P EST LOW 20 MIN: CPT

## 2019-09-16 NOTE — PROGRESS NOTES
as uncontrolled(250.60)     Type II or unspecified type diabetes mellitus with other specified manifestations, not stated as uncontrolled     Ulcer of left foot, with fat layer exposed ([address] 12/19/2013    Ulcer of other part of foot        PAST SURGICAL HISTORY    Past Surgical History:   Procedure Laterality Date    APPENDECTOMY      CARDIAC SURGERY      CARPAL TUNNEL RELEASE      L hand Nov. 2011, R hand Jan. 2012    CARPAL TUNNEL RELEASE Right 6/10/2013    recurrent    CARPAL TUNNEL RELEASE Left 7/29/2013    with ulnar nerve transpostion and L middle finger release    CHOLECYSTECTOMY      COLONOSCOPY      CORONARY ARTERY BYPASS GRAFT  6/5/2010 6/5/2010-LIMA->LAD;  VG-> Diagonal;  VG-> Obtuse marginal;  VG->Distal RCA-   Dr Stiven Olivas Right 6/10/2013    HYSTERECTOMY, TOTAL ABDOMINAL      TOE AMPUTATION Left 02/14/144th toe    TUBAL LIGATION      ULNAR TUNNEL RELEASE Right 6/10/2013       FAMILY HISTORY    Family History   Problem Relation Age of Onset    Heart Disease Mother     Kidney Disease Mother         dialysis    Cancer Father        SOCIAL HISTORY    Social History     Tobacco Use    Smoking status: Current Every Day Smoker     Packs/day: 0.25     Years: 30.00     Pack years: 7.50     Types: Cigarettes    Smokeless tobacco: Never Used    Tobacco comment: quit smoking ciagarettes 04/04/16   Substance Use Topics    Alcohol use: No     Alcohol/week: 0.0 standard drinks     Comment:                                    CAFFEINE: 2 sodas daily    Drug use: No       ALLERGIES    Allergies   Allergen Reactions    Latex     Codeine     Cortisone     Cortizone     Iodides     Phenobarbital Other (See Comments)     Hallucinations     Vancomycin Other (See Comments)     \"makes me very sick\"       MEDICATIONS    Current Outpatient Medications on File Prior to Encounter   Medication Sig Dispense Refill    OXYGEN Inhale 2 L into the lungs daily as needed      carvedilol (COREG) 6.25 MG tablet 6.25 mg 2 times daily       nitroGLYCERIN (NITROSTAT) 0.4 MG SL tablet Place 1 tablet under the tongue every 5 minutes as needed for Chest pain 25 tablet 3    VICTOZA 18 MG/3ML SOPN SC injection daily       albuterol (PROAIR HFA) 108 (90 BASE) MCG/ACT inhaler Inhale 2 puffs into the lungs every 6 hours as needed for Wheezing      esomeprazole Magnesium (NEXIUM) 40 MG PACK Take 20 mg by mouth daily. 2 tabs      oxyCODONE-acetaminophen (PERCOCET)  MG per tablet Take 1 tablet by mouth every 6 hours as needed for Pain.  LORazepam (ATIVAN) 0.5 MG tablet Take 0.5 mg by mouth every 12 hours Indications: One Tablet twice daily       Insulin Aspart (NOVOLOG FLEXPEN SC) Inject 15 Units into the skin 3 times daily (before meals) On sliding scale      pravastatin (PRAVACHOL) 40 MG tablet 40 mg daily.  lisinopril (PRINIVIL;ZESTRIL) 10 MG tablet Take 20 mg by mouth daily       insulin glargine (LANTUS) 100 UNIT/ML injection Inject 50 Units into the skin nightly.  furosemide (LASIX) 20 MG tablet Take 40 mg by mouth 2 times daily       gabapentin (NEURONTIN) 400 MG capsule Take 1,600 mg by mouth 3 times daily        No current facility-administered medications on file prior to encounter. REVIEW OF SYSTEMS    Pertinent items are noted in HPI. Constitutional: Negative for systemic symptoms including fever, chills and malaise. Objective:      /77   Pulse 71   Temp 97 °F (36.1 °C) (Temporal)   Resp 16     PHYSICAL EXAM    General: The patient is in no acute distress. Mental status:  Patient is appropriate, is  oriented to place and plan of care. Dermatologic exam: Visual inspection of the periwound reveals the skin to be atrophic and edematous.   Wound exam:  see wound description below     All active wounds listed below with today's date are evaluated      Negative Pressure Wound Therapy Foot (Active)   Number of days: 2039       Negative Pressure Black%Wound Bed 0 9/16/2019 10:50 AM   Purple%Wound Bed 0 9/16/2019 10:50 AM   Other%Wound Bed 0 8/26/2019 11:12 AM   Number of days: 42       Assessment:       Problem List Items Addressed This Visit     Diabetic foot ulcer [address] - Primary    Diabetes mellitus ([facility]    History of tobacco abuse    DM (diabetes mellitus) ([facility]    Non compliance with medical treatment    Diabetic ulcer of left foot associated with type 2 diabetes mellitus, with fat layer exposed ([facility] [facility]          Status of wound progress and description from last visit:   improved. Plan:     Discharge Instructions         Plan:         Discharge Instructions        .PHYSICIAN ORDERS AND DISCHARGE INSTRUCTIONS     NOTE: Upon discharge from the [address], you will receive a patient experience survey. We would be grateful if you would take the time to fill this survey out.     Wound care order history:                 KATHLEEN's   Right       Left               Date               Vascular studies:   Date               Imaging:   Date               Cultures:   Date               Labs/ HbA1c:   Date               Grafts:  Date               HBO:                Antibiotics:               Earlier Wound care treatments:                Authorizations:                        Consults:   Date                           PCP: Dr. Michele Brown     Continuing wound care orders and information:              Residence: home              Continue home health care with:               Your wound-care supplies will be provided by: Tara Farias provider:              [email or web address] with              Off loading:  Date               EIZ Meds:              VWUEE cleansing:                           IV not scrub or use excessive force.                          Wash hands with soap and water before and after dressing changes.                           Prior to applying a clean dressing, cleanse wound with normal saline,                          wound cleanser, or mild soap

## 2019-10-07 ENCOUNTER — HOSPITAL ENCOUNTER (OUTPATIENT)
Dept: WOUND CARE | Age: 54
Discharge: HOME | End: 2019-10-07
Payer: MEDICARE

## 2019-10-07 VITALS
SYSTOLIC BLOOD PRESSURE: 147 MMHG | DIASTOLIC BLOOD PRESSURE: 67 MMHG | TEMPERATURE: 97.3 F | RESPIRATION RATE: 16 BRPM | HEART RATE: 74 BPM

## 2019-10-07 DIAGNOSIS — Z87.891 HISTORY OF TOBACCO ABUSE: ICD-10-CM

## 2019-10-07 DIAGNOSIS — I73.9 CLAUDICATION OF BOTH LOWER EXTREMITIES (HCC): ICD-10-CM

## 2019-10-07 DIAGNOSIS — L97.422 DIABETIC ULCER OF LEFT MIDFOOT ASSOCIATED WITH TYPE 2 DIABETES MELLITUS, WITH FAT LAYER EXPOSED (HCC): ICD-10-CM

## 2019-10-07 DIAGNOSIS — Z91.199 NON COMPLIANCE WITH MEDICAL TREATMENT: Primary | ICD-10-CM

## 2019-10-07 DIAGNOSIS — E11.621 DIABETIC ULCER OF LEFT MIDFOOT ASSOCIATED WITH TYPE 2 DIABETES MELLITUS, WITH FAT LAYER EXPOSED (HCC): ICD-10-CM

## 2019-10-07 DIAGNOSIS — E11.9 TYPE 2 DIABETES MELLITUS WITHOUT COMPLICATION, WITHOUT LONG-TERM CURRENT USE OF INSULIN (HCC): ICD-10-CM

## 2019-10-07 PROCEDURE — 11042 DBRDMT SUBQ TIS 1ST 20SQCM/<: CPT

## 2019-10-14 ENCOUNTER — HOSPITAL ENCOUNTER (OUTPATIENT)
Dept: WOUND CARE | Age: 54
Discharge: HOME | End: 2019-10-14
Payer: MEDICARE

## 2019-10-14 VITALS
RESPIRATION RATE: 16 BRPM | DIASTOLIC BLOOD PRESSURE: 71 MMHG | SYSTOLIC BLOOD PRESSURE: 142 MMHG | HEART RATE: 89 BPM | TEMPERATURE: 97 F

## 2019-10-14 DIAGNOSIS — L97.422 DIABETIC ULCER OF LEFT MIDFOOT ASSOCIATED WITH TYPE 2 DIABETES MELLITUS, WITH FAT LAYER EXPOSED (HCC): Primary | ICD-10-CM

## 2019-10-14 DIAGNOSIS — E11.621 DIABETIC ULCER OF TOE OF LEFT FOOT ASSOCIATED WITH TYPE 2 DIABETES MELLITUS, WITH FAT LAYER EXPOSED (HCC): ICD-10-CM

## 2019-10-14 DIAGNOSIS — L97.522 DIABETIC ULCER OF TOE OF LEFT FOOT ASSOCIATED WITH TYPE 2 DIABETES MELLITUS, WITH FAT LAYER EXPOSED (HCC): ICD-10-CM

## 2019-10-14 DIAGNOSIS — Z87.891 HISTORY OF TOBACCO ABUSE: ICD-10-CM

## 2019-10-14 DIAGNOSIS — E11.621 DIABETIC ULCER OF LEFT MIDFOOT ASSOCIATED WITH TYPE 2 DIABETES MELLITUS, WITH FAT LAYER EXPOSED (HCC): Primary | ICD-10-CM

## 2019-10-14 DIAGNOSIS — Z91.199 NON COMPLIANCE WITH MEDICAL TREATMENT: ICD-10-CM

## 2019-10-14 PROCEDURE — 11042 DBRDMT SUBQ TIS 1ST 20SQCM/<: CPT

## 2019-10-21 ENCOUNTER — HOSPITAL ENCOUNTER (OUTPATIENT)
Dept: WOUND CARE | Age: 54
Discharge: HOME | End: 2019-10-21
Payer: MEDICARE

## 2019-10-21 VITALS
DIASTOLIC BLOOD PRESSURE: 69 MMHG | HEART RATE: 79 BPM | SYSTOLIC BLOOD PRESSURE: 126 MMHG | RESPIRATION RATE: 17 BRPM | TEMPERATURE: 98.2 F

## 2019-10-21 DIAGNOSIS — N18.4 CHRONIC KIDNEY DISEASE (CKD) STAGE G4/A3, SEVERELY DECREASED GLOMERULAR FILTRATION RATE (GFR) BETWEEN 15-29 ML/MIN/1.73 SQUARE METER AND ALBUMINURIA CREATININE RATIO GREATER THAN 300 MG/G (HCC): ICD-10-CM

## 2019-10-21 DIAGNOSIS — L97.522 DIABETIC ULCER OF TOE OF LEFT FOOT ASSOCIATED WITH TYPE 2 DIABETES MELLITUS, WITH FAT LAYER EXPOSED (HCC): ICD-10-CM

## 2019-10-21 DIAGNOSIS — E11.621 DIABETIC ULCER OF TOE OF LEFT FOOT ASSOCIATED WITH TYPE 2 DIABETES MELLITUS, WITH FAT LAYER EXPOSED (HCC): ICD-10-CM

## 2019-10-21 DIAGNOSIS — E11.621 DIABETIC ULCER OF LEFT MIDFOOT ASSOCIATED WITH TYPE 2 DIABETES MELLITUS, WITH FAT LAYER EXPOSED (HCC): Primary | ICD-10-CM

## 2019-10-21 DIAGNOSIS — L97.422 DIABETIC ULCER OF LEFT MIDFOOT ASSOCIATED WITH TYPE 2 DIABETES MELLITUS, WITH FAT LAYER EXPOSED (HCC): Primary | ICD-10-CM

## 2019-10-21 DIAGNOSIS — Z91.199 NON COMPLIANCE WITH MEDICAL TREATMENT: ICD-10-CM

## 2019-10-21 DIAGNOSIS — N18.4 CHRONIC RENAL FAILURE, STAGE 4 (SEVERE) (HCC): ICD-10-CM

## 2019-10-21 DIAGNOSIS — I25.700 CORONARY ARTERY DISEASE INVOLVING CORONARY BYPASS GRAFT OF NATIVE HEART WITH UNSTABLE ANGINA PECTORIS (HCC): ICD-10-CM

## 2019-10-21 DIAGNOSIS — Z87.891 HISTORY OF TOBACCO ABUSE: ICD-10-CM

## 2019-10-21 PROCEDURE — 11042 DBRDMT SUBQ TIS 1ST 20SQCM/<: CPT

## 2019-11-11 ENCOUNTER — HOSPITAL ENCOUNTER (OUTPATIENT)
Dept: WOUND CARE | Age: 54
Discharge: HOME | End: 2019-11-11
Payer: MEDICARE

## 2019-11-11 ENCOUNTER — TELEPHONE (OUTPATIENT)
Dept: CARDIOLOGY CLINIC | Age: 54
End: 2019-11-11

## 2019-11-11 VITALS
HEART RATE: 78 BPM | RESPIRATION RATE: 15 BRPM | TEMPERATURE: 98.5 F | DIASTOLIC BLOOD PRESSURE: 65 MMHG | SYSTOLIC BLOOD PRESSURE: 118 MMHG

## 2019-11-11 DIAGNOSIS — Z87.891 HISTORY OF TOBACCO ABUSE: ICD-10-CM

## 2019-11-11 DIAGNOSIS — L97.422 DIABETIC ULCER OF LEFT MIDFOOT ASSOCIATED WITH TYPE 2 DIABETES MELLITUS, WITH FAT LAYER EXPOSED (HCC): Primary | ICD-10-CM

## 2019-11-11 DIAGNOSIS — Z91.199 NON COMPLIANCE WITH MEDICAL TREATMENT: ICD-10-CM

## 2019-11-11 DIAGNOSIS — E11.621 DIABETIC ULCER OF LEFT MIDFOOT ASSOCIATED WITH TYPE 2 DIABETES MELLITUS, WITH FAT LAYER EXPOSED (HCC): Primary | ICD-10-CM

## 2019-11-11 DIAGNOSIS — L97.522 DIABETIC ULCER OF TOE OF LEFT FOOT ASSOCIATED WITH TYPE 2 DIABETES MELLITUS, WITH FAT LAYER EXPOSED (HCC): ICD-10-CM

## 2019-11-11 DIAGNOSIS — E11.621 DIABETIC ULCER OF TOE OF LEFT FOOT ASSOCIATED WITH TYPE 2 DIABETES MELLITUS, WITH FAT LAYER EXPOSED (HCC): ICD-10-CM

## 2019-11-11 PROCEDURE — 11042 DBRDMT SUBQ TIS 1ST 20SQCM/<: CPT

## 2019-11-18 ENCOUNTER — HOSPITAL ENCOUNTER (OUTPATIENT)
Dept: WOUND CARE | Age: 54
Discharge: HOME | End: 2019-11-18

## 2019-11-25 ENCOUNTER — HOSPITAL ENCOUNTER (OUTPATIENT)
Dept: WOUND CARE | Age: 54
Discharge: HOME | End: 2019-11-25
Payer: MEDICARE

## 2019-11-25 VITALS
DIASTOLIC BLOOD PRESSURE: 74 MMHG | RESPIRATION RATE: 16 BRPM | HEART RATE: 75 BPM | TEMPERATURE: 97.3 F | SYSTOLIC BLOOD PRESSURE: 123 MMHG

## 2019-11-25 DIAGNOSIS — E11.42 DIABETIC POLYNEUROPATHY ASSOCIATED WITH TYPE 2 DIABETES MELLITUS (HCC): ICD-10-CM

## 2019-11-25 DIAGNOSIS — E08.00 DIABETES MELLITUS DUE TO UNDERLYING CONDITION WITH HYPEROSMOLARITY WITHOUT COMA, WITHOUT LONG-TERM CURRENT USE OF INSULIN (HCC): ICD-10-CM

## 2019-11-25 DIAGNOSIS — E11.9 TYPE 2 DIABETES MELLITUS WITHOUT COMPLICATION, WITHOUT LONG-TERM CURRENT USE OF INSULIN (HCC): ICD-10-CM

## 2019-11-25 DIAGNOSIS — Z87.891 HISTORY OF TOBACCO ABUSE: ICD-10-CM

## 2019-11-25 DIAGNOSIS — L97.522 DIABETIC ULCER OF TOE OF LEFT FOOT ASSOCIATED WITH TYPE 2 DIABETES MELLITUS, WITH FAT LAYER EXPOSED (HCC): Primary | ICD-10-CM

## 2019-11-25 DIAGNOSIS — E11.621 DIABETIC ULCER OF TOE OF LEFT FOOT ASSOCIATED WITH TYPE 2 DIABETES MELLITUS, WITH FAT LAYER EXPOSED (HCC): Primary | ICD-10-CM

## 2019-11-25 DIAGNOSIS — E11.621 DIABETIC ULCER OF LEFT MIDFOOT ASSOCIATED WITH TYPE 2 DIABETES MELLITUS, WITH FAT LAYER EXPOSED (HCC): ICD-10-CM

## 2019-11-25 DIAGNOSIS — Z91.199 NON COMPLIANCE WITH MEDICAL TREATMENT: ICD-10-CM

## 2019-11-25 DIAGNOSIS — E11.8 DIABETES MELLITUS TYPE 2 WITH COMPLICATIONS (HCC): ICD-10-CM

## 2019-11-25 DIAGNOSIS — L97.422 DIABETIC ULCER OF LEFT MIDFOOT ASSOCIATED WITH TYPE 2 DIABETES MELLITUS, WITH FAT LAYER EXPOSED (HCC): ICD-10-CM

## 2019-11-25 PROCEDURE — 11042 DBRDMT SUBQ TIS 1ST 20SQCM/<: CPT

## 2019-12-02 ENCOUNTER — HOSPITAL ENCOUNTER (OUTPATIENT)
Dept: WOUND CARE | Age: 54
Discharge: HOME | End: 2019-12-02
Payer: MEDICARE

## 2019-12-02 VITALS
RESPIRATION RATE: 16 BRPM | HEART RATE: 83 BPM | TEMPERATURE: 97.7 F | DIASTOLIC BLOOD PRESSURE: 71 MMHG | SYSTOLIC BLOOD PRESSURE: 161 MMHG

## 2019-12-02 DIAGNOSIS — L97.522 DIABETIC ULCER OF TOE OF LEFT FOOT ASSOCIATED WITH TYPE 2 DIABETES MELLITUS, WITH FAT LAYER EXPOSED (HCC): Primary | ICD-10-CM

## 2019-12-02 DIAGNOSIS — E11.621 DIABETIC ULCER OF TOE OF LEFT FOOT ASSOCIATED WITH TYPE 2 DIABETES MELLITUS, WITH FAT LAYER EXPOSED (HCC): Primary | ICD-10-CM

## 2019-12-02 DIAGNOSIS — E08.00 DIABETES MELLITUS DUE TO UNDERLYING CONDITION WITH HYPEROSMOLARITY WITHOUT COMA, WITHOUT LONG-TERM CURRENT USE OF INSULIN (HCC): ICD-10-CM

## 2019-12-02 DIAGNOSIS — Z91.199 NON COMPLIANCE WITH MEDICAL TREATMENT: ICD-10-CM

## 2019-12-02 PROCEDURE — 11042 DBRDMT SUBQ TIS 1ST 20SQCM/<: CPT

## 2019-12-09 ENCOUNTER — HOSPITAL ENCOUNTER (OUTPATIENT)
Dept: WOUND CARE | Age: 54
Discharge: HOME | End: 2019-12-09
Payer: MEDICARE

## 2019-12-09 VITALS
SYSTOLIC BLOOD PRESSURE: 128 MMHG | HEART RATE: 77 BPM | TEMPERATURE: 97.1 F | DIASTOLIC BLOOD PRESSURE: 73 MMHG | RESPIRATION RATE: 16 BRPM

## 2019-12-09 DIAGNOSIS — E08.00 DIABETES MELLITUS DUE TO UNDERLYING CONDITION WITH HYPEROSMOLARITY WITHOUT COMA, WITHOUT LONG-TERM CURRENT USE OF INSULIN (HCC): ICD-10-CM

## 2019-12-09 DIAGNOSIS — E11.621 DIABETIC ULCER OF TOE OF LEFT FOOT ASSOCIATED WITH TYPE 2 DIABETES MELLITUS, WITH FAT LAYER EXPOSED (HCC): Primary | ICD-10-CM

## 2019-12-09 DIAGNOSIS — Z91.199 NON COMPLIANCE WITH MEDICAL TREATMENT: ICD-10-CM

## 2019-12-09 DIAGNOSIS — L97.522 DIABETIC ULCER OF TOE OF LEFT FOOT ASSOCIATED WITH TYPE 2 DIABETES MELLITUS, WITH FAT LAYER EXPOSED (HCC): Primary | ICD-10-CM

## 2019-12-09 PROCEDURE — 29445 APPL RIGID TOT CNTC LEG CAST: CPT

## 2019-12-16 ENCOUNTER — HOSPITAL ENCOUNTER (OUTPATIENT)
Dept: WOUND CARE | Age: 54
Discharge: HOME | End: 2019-12-16
Payer: MEDICARE

## 2019-12-16 VITALS
TEMPERATURE: 98.5 F | HEART RATE: 76 BPM | SYSTOLIC BLOOD PRESSURE: 130 MMHG | DIASTOLIC BLOOD PRESSURE: 65 MMHG | RESPIRATION RATE: 16 BRPM

## 2019-12-16 DIAGNOSIS — E11.621 DIABETIC ULCER OF TOE OF LEFT FOOT ASSOCIATED WITH TYPE 2 DIABETES MELLITUS, WITH FAT LAYER EXPOSED (HCC): ICD-10-CM

## 2019-12-16 DIAGNOSIS — E11.621 DIABETIC ULCER OF LEFT MIDFOOT ASSOCIATED WITH TYPE 2 DIABETES MELLITUS, WITH FAT LAYER EXPOSED (HCC): Primary | ICD-10-CM

## 2019-12-16 DIAGNOSIS — E11.8 DIABETES MELLITUS TYPE 2 WITH COMPLICATIONS (HCC): ICD-10-CM

## 2019-12-16 DIAGNOSIS — Z91.199 NON COMPLIANCE WITH MEDICAL TREATMENT: ICD-10-CM

## 2019-12-16 DIAGNOSIS — Z87.891 HISTORY OF TOBACCO ABUSE: ICD-10-CM

## 2019-12-16 DIAGNOSIS — L97.522 DIABETIC ULCER OF TOE OF LEFT FOOT ASSOCIATED WITH TYPE 2 DIABETES MELLITUS, WITH FAT LAYER EXPOSED (HCC): ICD-10-CM

## 2019-12-16 DIAGNOSIS — L97.422 DIABETIC ULCER OF LEFT MIDFOOT ASSOCIATED WITH TYPE 2 DIABETES MELLITUS, WITH FAT LAYER EXPOSED (HCC): Primary | ICD-10-CM

## 2019-12-16 PROCEDURE — 29445 APPL RIGID TOT CNTC LEG CAST: CPT

## 2019-12-23 ENCOUNTER — HOSPITAL ENCOUNTER (OUTPATIENT)
Dept: WOUND CARE | Age: 54
Discharge: HOME | End: 2019-12-23
Payer: MEDICARE

## 2019-12-23 VITALS
DIASTOLIC BLOOD PRESSURE: 83 MMHG | TEMPERATURE: 97.6 F | SYSTOLIC BLOOD PRESSURE: 149 MMHG | HEART RATE: 71 BPM | RESPIRATION RATE: 16 BRPM

## 2019-12-23 DIAGNOSIS — L97.422 DIABETIC ULCER OF LEFT MIDFOOT ASSOCIATED WITH TYPE 2 DIABETES MELLITUS, WITH FAT LAYER EXPOSED (HCC): Primary | ICD-10-CM

## 2019-12-23 DIAGNOSIS — L97.522 DIABETIC ULCER OF TOE OF LEFT FOOT ASSOCIATED WITH TYPE 2 DIABETES MELLITUS, WITH FAT LAYER EXPOSED (HCC): ICD-10-CM

## 2019-12-23 DIAGNOSIS — Z91.199 NON COMPLIANCE WITH MEDICAL TREATMENT: ICD-10-CM

## 2019-12-23 DIAGNOSIS — E11.621 DIABETIC ULCER OF LEFT MIDFOOT ASSOCIATED WITH TYPE 2 DIABETES MELLITUS, WITH FAT LAYER EXPOSED (HCC): Primary | ICD-10-CM

## 2019-12-23 DIAGNOSIS — E11.621 DIABETIC ULCER OF TOE OF LEFT FOOT ASSOCIATED WITH TYPE 2 DIABETES MELLITUS, WITH FAT LAYER EXPOSED (HCC): ICD-10-CM

## 2019-12-23 PROCEDURE — 11042 DBRDMT SUBQ TIS 1ST 20SQCM/<: CPT

## 2019-12-30 ENCOUNTER — HOSPITAL ENCOUNTER (OUTPATIENT)
Dept: WOUND CARE | Age: 54
Discharge: HOME | End: 2019-12-30
Payer: MEDICARE

## 2019-12-30 VITALS
DIASTOLIC BLOOD PRESSURE: 79 MMHG | TEMPERATURE: 97.6 F | HEART RATE: 73 BPM | RESPIRATION RATE: 16 BRPM | SYSTOLIC BLOOD PRESSURE: 132 MMHG

## 2019-12-30 DIAGNOSIS — L97.522 DIABETIC ULCER OF TOE OF LEFT FOOT ASSOCIATED WITH TYPE 2 DIABETES MELLITUS, WITH FAT LAYER EXPOSED (HCC): Primary | ICD-10-CM

## 2019-12-30 DIAGNOSIS — E11.621 DIABETIC ULCER OF TOE OF LEFT FOOT ASSOCIATED WITH TYPE 2 DIABETES MELLITUS, WITH FAT LAYER EXPOSED (HCC): Primary | ICD-10-CM

## 2019-12-30 PROCEDURE — 11042 DBRDMT SUBQ TIS 1ST 20SQCM/<: CPT | Performed by: NURSE PRACTITIONER

## 2019-12-30 PROCEDURE — 11042 DBRDMT SUBQ TIS 1ST 20SQCM/<: CPT

## 2020-01-06 ENCOUNTER — HOSPITAL ENCOUNTER (OUTPATIENT)
Dept: WOUND CARE | Age: 55
Discharge: HOME | End: 2020-01-06
Payer: MEDICARE

## 2020-01-06 VITALS
SYSTOLIC BLOOD PRESSURE: 126 MMHG | HEART RATE: 73 BPM | RESPIRATION RATE: 17 BRPM | TEMPERATURE: 96.3 F | DIASTOLIC BLOOD PRESSURE: 60 MMHG

## 2020-01-06 PROCEDURE — 87073 CULTURE BACTERIA ANAEROBIC: CPT

## 2020-01-06 PROCEDURE — 11042 DBRDMT SUBQ TIS 1ST 20SQCM/<: CPT

## 2020-01-06 PROCEDURE — 87071 CULTURE AEROBIC QUANT OTHER: CPT

## 2020-01-06 RX ORDER — LIDOCAINE HYDROCHLORIDE 40 MG/ML
SOLUTION TOPICAL ONCE
Status: DISCONTINUED | OUTPATIENT
Start: 2020-01-06 | End: 2020-01-07 | Stop reason: HOSPADM

## 2020-01-06 NOTE — PROGRESS NOTES
Wound Care Center Progress Note With Procedure    Tom Roy  AGE: 47 y.o. GENDER: female  : 1965  EPISODE DATE:  2020     Subjective:     Chief Complaint   Patient presents with    Wound Check     left foot         HISTORY of PRESENT ILLNESS      Tom Roy is a 47 y.o. female who presents today for wound evaluation of Chronic diabetic ulcer(s) of the left foot sub 4th metatarsal.  The ulcer is of moderate severity. The underlying cause of the wound is DM and non-compliance. Pt wound is much worse secondary to her weight bearing and non-compliance. Wound Pain Timing/Severity: none  Quality of pain: N/A  Severity of pain:  0 / 10   Modifying Factors: diabetes, chronic pressure and non-adherence  Associated Signs/Symptoms: edema and drainage        PAST MEDICAL HISTORY        Diagnosis Date    CAD (coronary artery disease)     s/p CABG x 4;  follows with Dr Augie Lopez Callus of foot 2018    Carpal tunnel syndrome on both sides     CHF (congestive heart failure) ([address] 10/2010    Chronic diastolic; EF 97%    Chronic kidney disease     stage 4 kidney disease    Chronic ulcer of left ankle with fat layer exposed ([address] 10/7/2015    Chronic ulcer of left foot with fat layer exposed ([address] 3/17/2016    Chronic ulcer of right ankle with fat layer exposed ([address] 3/17/2016    Chronic ulcer of right foot with fat layer exposed ([address] 3/17/2016    Depression     Diabetes mellitus ([address]     Diabetes mellitus with neurological manifestations ([address]     Diabetes mellitus with ulcer of ankle ([address]     Diabetic ulcer of left foot associated with type 2 diabetes mellitus, with fat layer exposed ([address] 2018    Family history of cardiovascular disease     Mother    GERD (gastroesophageal reflux disease)     H/O cardiac catheterization 10/18/2010, 6/3/2010    10/18/2010-Four bypass grafts all widely patent.  6/3/2010- Moderate to severe triple vessel disease, preserved LV systolic function.  H/O cardiovascular stress test 7/26/2012, 8/3/2011, 10/14/2010, 5/24/2010 7/26/2012-Lexiscan- Normal Myocardial Perfusion Study. Post stress myocardial perfusion images show a normal pattern of perfusion in all regions. Post stress LV normal size. Normal perfusion in the distribution of all coronaries. Normal LV size and function. Rest EF 70%    H/O chest x-ray 7/12/2012, 6/2/2010 7/12/2012-Perihilar peribronchial cuffing with mild basilar predominant interstital prominence, which may reflect interstitial edema or atypical pneumonia.  H/O Doppler ultrasound 6/4/2010    CAROTID DOPPLER-6/4/2010-No Doppler evidence of hemodynamically significant Carotid Stenosis with antegrade flow in the vertebral arteries bilaterally. 6/4/2010 PERIPHERAL VENOUS DOPPLER_ LEFT Saphenous Vein mapping    H/O echocardiogram 7/26/2012, 8/3/2011, 10/2010, 7/23/2010, 6/8/2010 7/26/2012-LV normal size. Normal LV wall thickness. LV systolic function normal. EF => 55%. LV wall motion normal. Mild MR. Mild TR.  History of complete ECG 7/26/2012 Vickie Noreen), 7/13/2012 [facility] 7/25/2011, 3/25/2011, 10/18/2010, 10/11/2010, 6/23/2010, 5/7/2010    Hx of cardiovascular stress test 9/3/2015    lexiscan-normal,EF66%    Hx of Doppler ultrasound 4/5/16    Arterial: There is a fluid collection in the left thigh, please refer to PCP for further monitoring, no vascular in etiology.  Hx of echocardiogram     4/16 EF40% Mild MR/TR and mild Pulm HTN. 9/15 EF 45-50%, Mildly dilated L atrium, mildly dilated R ventricle. Mild MR & mild TR     Hyperlipidemia     Neuropathy of foot     Pt reports starting approx. 6-7 years ago    Neuropathy of hand     Pt reports starting approx.  6-7 years ago    Non compliance with medical treatment 7/2/2018    S/P CABG x 4 6/5/2010    LIMA-> LAD;  VG->CX;  VG->Diagonal; VG-> distal RCA  per  Dr Rachell Barker    Type 2 diabetes mellitus with diabetic polyneuropathy, with long-term current use of insulin ([facility] 4/5/2016    Type II or unspecified type diabetes mellitus with neurological manifestations, not stated as uncontrolled(250.60)     Type II or unspecified type diabetes mellitus with other specified manifestations, not stated as uncontrolled     Ulcer of left foot, with fat layer exposed ([facility] 12/19/2013    Ulcer of other part of foot        PAST SURGICAL HISTORY    Past Surgical History:   Procedure Laterality Date    APPENDECTOMY      CARDIAC SURGERY      CARPAL TUNNEL RELEASE      L hand Nov. 2011, R hand Jan. 2012    CARPAL TUNNEL RELEASE Right 6/10/2013    recurrent    CARPAL TUNNEL RELEASE Left 7/29/2013    with ulnar nerve transpostion and L middle finger release    CHOLECYSTECTOMY      COLONOSCOPY      CORONARY ARTERY BYPASS GRAFT  6/5/2010 6/5/2010-LIMA->LAD;  VG-> Diagonal;  VG-> Obtuse marginal;  VG->Distal RCA-   Dr Estela Jonas Right 6/10/2013    HYSTERECTOMY, TOTAL ABDOMINAL      TOE AMPUTATION Left 02/14/144th toe    TUBAL LIGATION      ULNAR TUNNEL RELEASE Right 6/10/2013       FAMILY HISTORY    Family History   Problem Relation Age of Onset    Heart Disease Mother     Kidney Disease Mother         dialysis    Cancer Father        SOCIAL HISTORY    Social History     Tobacco Use    Smoking status: Current Every Day Smoker     Packs/day: 0.25     Years: 30.00     Pack years: 7.50     Types: Cigarettes    Smokeless tobacco: Never Used    Tobacco comment: quit smoking mia 04/04/16   Substance Use Topics    Alcohol use: No     Alcohol/week: 0.0 standard drinks     Comment:                                    CAFFEINE: 2 sodas daily    Drug use: No       ALLERGIES    Allergies   Allergen Reactions    Latex     Codeine     Cortisone     Cortizone     Iodides     Phenobarbital Other (See Comments)     Hallucinations     Vancomycin Other (See Comments)     \"makes me very sick\"       MEDICATIONS    Current Outpatient Medications on 1/6/2020  9:35 AM   Wound Depth (cm) 0 cm 1/6/2020  9:35 AM   Wound Surface Area (cm^2) 0 cm^2 1/6/2020  9:35 AM   Change in Wound Size % (l*w) 100 1/6/2020  9:35 AM   Wound Volume (cm^3) 0 cm^3 1/6/2020  9:35 AM   Post-Procedure Length (cm) 1.9 cm 12/30/2019  9:25 AM   Post-Procedure Width (cm) 0.6 cm 12/30/2019  9:25 AM   Post-Procedure Depth (cm) 0.3 cm 12/30/2019  9:25 AM   Post-Procedure Surface Area (cm^2) 1.14 cm^2 12/30/2019  9:25 AM   Post-Procedure Volume (cm^3) 0.34 cm^3 12/30/2019  9:25 AM   Distance Tunneling (cm) 0 cm 1/6/2020  9:35 AM   Tunneling Position ___ O'Clock 0 1/6/2020  9:35 AM   Undermining Starts ___ O'Clock 0 1/6/2020  9:35 AM   Undermining Ends___ O'Clock 0 1/6/2020  9:35 AM   Undermining Maxium Distance (cm) 0 1/6/2020  9:35 AM   Wound Assessment Red 1/6/2020  9:35 AM   Drainage Amount None 1/6/2020  9:35 AM   Drainage Description Serosanguinous 12/30/2019  9:08 AM   Odor None 1/6/2020  9:35 AM   Margins Defined edges 1/6/2020  9:35 AM   Lora-wound Assessment Calloused 1/6/2020  9:35 AM   Non-staged Wound Description Full thickness 12/30/2019  9:08 AM   [place]%Wound Bed 0 1/6/2020  9:35 AM   Red%Wound Bed 100 1/6/2020  9:35 AM   Yellow%Wound Bed 0 1/6/2020  9:35 AM   Black%Wound Bed 0 1/6/2020  9:35 AM   Purple%Wound Bed 0 1/6/2020  9:35 AM   Other%Wound Bed 0 1/6/2020  9:35 AM   Number of days: 154       Percent of Wound(s) Debrided: approximately 100%    Total  Area  Debrided:  1.14 sq cm     Bleeding:  Minimal    Hemostasis Achieved:  by pressure    Procedural Pain:  0  / 10     Post Procedural Pain:  0 / 10     Response to treatment:  Well tolerated by patient. Status of wound progress and description from last visit:   Wound significantly worse. Plan:       Discharge Instructions         Plan:         Discharge Instructions        .PHYSICIAN ORDERS AND DISCHARGE INSTRUCTIONS     NOTE: Upon discharge from the [address], you will receive a patient experience survey.  We would be grateful if you would take the time to fill this survey out.     Wound care order history:                 KATHLEEN's   Right       Left               Date               Vascular studies:   Date               Imaging:   Date               Cultures:   Date               Labs/ HbA1c:   Date               Grafts:  Date               HBO:                Antibiotics:               Earlier Wound care treatments:                Authorizations:                        Consults:   Date                           PCP: Dr. Iris Lopez     Continuing wound care orders and information:              Residence: home              Continue home health care with:               Your wound-care supplies will be provided by: Willie Koenig provider:              LOU with              Off loading:  Date               [facility] Meds:              RDAPT cleansing:                           FO not scrub or use excessive force.                          Wash hands with soap and water before and after dressing changes.                         Prior to applying a clean dressing, cleanse wound with normal saline,                          wound cleanser, or mild soap and water.                           Ask your physician or nurse before getting the wound(s) wet in the shower.              Daily Wound management:                          Keep weight off wounds and reposition every 2 hours.                          Avoid standing for long periods of time.                          Apply wraps/stockings in AM and remove at bedtime.                          Elevate legs to the level of the heart or above for 30 minutes 4-5 times a day and/or when sitting.                                               When taking antibiotics take entire prescription as ordered by MD do not stop taking until medicine is all gone.                                                                 Orders for this week: 1/6/20     Left planter- wash with mild soap and water. Place saline damp dagoberto in wound bed, cover with calcium alginate. Cover with fluffed 4x4s,  Wrap with conform and secure with tape. change daily  Wear offloading boot.      Follow up with Dr. Irene Gan 1 week in the wound care center     Xray of foot ordered cbc, ua, bmp ekg    Surgery to be scheduled by office of Dr. MARQUEZ [facility]   Call [phone] for any questions or concerns.   Date__________   Time____________                   Treatment Note      Written Patient Dismissal Instructions Given            Electronically signed by Kait Malhotra DPM on 1/6/2020 at 10:17 AM

## 2020-01-07 ENCOUNTER — HOSPITAL ENCOUNTER (OUTPATIENT)
Dept: GENERAL RADIOLOGY | Age: 55
Discharge: HOME | End: 2020-01-07
Payer: MEDICARE

## 2020-01-07 ENCOUNTER — HOSPITAL ENCOUNTER (OUTPATIENT)
Age: 55
Discharge: HOME | End: 2020-01-07
Payer: MEDICARE

## 2020-01-07 LAB
ALBUMIN SERPL-MCNC: 4.4 GM/DL (ref 3.4–5)
ALP BLD-CCNC: 120 IU/L (ref 40–129)
ALT SERPL-CCNC: 14 U/L (ref 10–40)
ANION GAP SERPL CALCULATED.3IONS-SCNC: 13 MMOL/L (ref 4–16)
AST SERPL-CCNC: 21 IU/L (ref 15–37)
BACTERIA: ABNORMAL /HPF
BASOPHILS ABSOLUTE: 0.1 K/CU MM
BASOPHILS RELATIVE PERCENT: 0.6 % (ref 0–1)
BILIRUB SERPL-MCNC: 0.5 MG/DL (ref 0–1)
BILIRUBIN URINE: NEGATIVE MG/DL
BLOOD, URINE: ABNORMAL
BUN BLDV-MCNC: 25 MG/DL (ref 6–23)
CALCIUM SERPL-MCNC: 10 MG/DL (ref 8.3–10.6)
CAST TYPE: ABNORMAL /HPF
CHLORIDE BLD-SCNC: 95 MMOL/L (ref 99–110)
CLARITY: CLEAR
CO2: 31 MMOL/L (ref 21–32)
COLOR: YELLOW
CREAT SERPL-MCNC: 5.1 MG/DL (ref 0.6–1.1)
CRYSTAL TYPE: ABNORMAL /HPF
DIFFERENTIAL TYPE: ABNORMAL
EKG ATRIAL RATE: 68 BPM
EKG DIAGNOSIS: NORMAL
EKG P AXIS: 62 DEGREES
EKG P-R INTERVAL: 162 MS
EKG Q-T INTERVAL: 486 MS
EKG QRS DURATION: 150 MS
EKG QTC CALCULATION (BAZETT): 516 MS
EKG R AXIS: 111 DEGREES
EKG T AXIS: 102 DEGREES
EKG VENTRICULAR RATE: 68 BPM
EOSINOPHILS ABSOLUTE: 0.4 K/CU MM
EOSINOPHILS RELATIVE PERCENT: 4.3 % (ref 0–3)
EPITHELIAL CELLS, UA: ABNORMAL /HPF
GFR AFRICAN AMERICAN: 11 ML/MIN/1.73M2
GFR NON-AFRICAN AMERICAN: 9 ML/MIN/1.73M2
GLUCOSE FASTING: 159 MG/DL (ref 70–99)
GLUCOSE, URINE: NEGATIVE MG/DL
HCT VFR BLD CALC: 42.1 % (ref 37–47)
HEMOGLOBIN: 13.9 GM/DL (ref 12.5–16)
IMMATURE NEUTROPHIL %: 0.6 % (ref 0–0.43)
KETONES, URINE: NEGATIVE MG/DL
LEUKOCYTE ESTERASE, URINE: NEGATIVE
LYMPHOCYTES ABSOLUTE: 1.6 K/CU MM
LYMPHOCYTES RELATIVE PERCENT: 16 % (ref 24–44)
MCH RBC QN AUTO: 34.4 PG (ref 27–31)
MCHC RBC AUTO-ENTMCNC: 33 % (ref 32–36)
MCV RBC AUTO: 104.2 FL (ref 78–100)
MONOCYTES ABSOLUTE: 1.1 K/CU MM
MONOCYTES RELATIVE PERCENT: 10.5 % (ref 0–4)
MUCUS: ABNORMAL HPF
NITRITE URINE, QUANTITATIVE: NEGATIVE
PDW BLD-RTO: 12.8 % (ref 11.7–14.9)
PH, URINE: 7.5 (ref 5–8)
PLATELET # BLD: 182 K/CU MM (ref 140–440)
PMV BLD AUTO: 11.1 FL (ref 7.5–11.1)
POTASSIUM SERPL-SCNC: 4.5 MMOL/L (ref 3.5–5.1)
PROTEIN UA: >300 MG/DL
RBC # BLD: 4.04 M/CU MM (ref 4.2–5.4)
RBC URINE: ABNORMAL /HPF (ref 0–6)
SEGMENTED NEUTROPHILS ABSOLUTE COUNT: 6.9 K/CU MM
SEGMENTED NEUTROPHILS RELATIVE PERCENT: 68 % (ref 36–66)
SODIUM BLD-SCNC: 139 MMOL/L (ref 135–145)
SPECIFIC GRAVITY UA: 1.02 (ref 1–1.03)
TOTAL IMMATURE NEUTOROPHIL: 0.06 K/CU MM
TOTAL PROTEIN: 8.1 GM/DL (ref 6.4–8.2)
UROBILINOGEN, URINE: 2 MG/DL (ref 0.2–1)
VOLUME, (UVOL): 12 ML (ref 10–12)
WBC # BLD: 10.2 K/CU MM (ref 4–10.5)
WBC UA: ABNORMAL /HPF (ref 0–5)

## 2020-01-07 PROCEDURE — 85025 COMPLETE CBC W/AUTO DIFF WBC: CPT

## 2020-01-07 PROCEDURE — 36415 COLL VENOUS BLD VENIPUNCTURE: CPT

## 2020-01-07 PROCEDURE — 80053 COMPREHEN METABOLIC PANEL: CPT

## 2020-01-07 PROCEDURE — 71046 X-RAY EXAM CHEST 2 VIEWS: CPT

## 2020-01-07 PROCEDURE — 93010 ELECTROCARDIOGRAM REPORT: CPT | Performed by: INTERNAL MEDICINE

## 2020-01-07 PROCEDURE — 73630 X-RAY EXAM OF FOOT: CPT

## 2020-01-07 PROCEDURE — 81001 URINALYSIS AUTO W/SCOPE: CPT

## 2020-01-07 PROCEDURE — 93005 ELECTROCARDIOGRAM TRACING: CPT | Performed by: PODIATRIST

## 2020-01-10 LAB
CULTURE: ABNORMAL
Lab: ABNORMAL
SPECIMEN: ABNORMAL

## 2020-01-13 ENCOUNTER — HOSPITAL ENCOUNTER (OUTPATIENT)
Dept: WOUND CARE | Age: 55
Discharge: HOME | End: 2020-01-13
Payer: MEDICARE

## 2020-01-13 VITALS
HEART RATE: 81 BPM | RESPIRATION RATE: 18 BRPM | SYSTOLIC BLOOD PRESSURE: 124 MMHG | TEMPERATURE: 97.4 F | DIASTOLIC BLOOD PRESSURE: 62 MMHG

## 2020-01-13 PROBLEM — Z78.9 PRESENCE OF SURGICAL INCISION: Status: ACTIVE | Noted: 2020-01-13

## 2020-01-13 PROCEDURE — 11042 DBRDMT SUBQ TIS 1ST 20SQCM/<: CPT | Performed by: NURSE PRACTITIONER

## 2020-01-13 PROCEDURE — 11042 DBRDMT SUBQ TIS 1ST 20SQCM/<: CPT

## 2020-01-13 RX ORDER — CEPHALEXIN 500 MG/1
500 CAPSULE ORAL 2 TIMES DAILY
Status: ON HOLD | COMMUNITY
End: 2022-09-25 | Stop reason: HOSPADM

## 2020-01-13 ASSESSMENT — PAIN SCALES - GENERAL: PAINLEVEL_OUTOF10: 3

## 2020-01-13 NOTE — PROGRESS NOTES
[facility] Progress Note With Procedure    Ciaran Bender  AGE: 47 y.o. GENDER: female  : 1965  EPISODE DATE:  2020     Subjective:     Chief Complaint   Patient presents with    Wound Check     left foot          HISTORY of PRESENT ILLNESS      Ciaran Bender is a 47 y.o. female who presents today for wound evaluation of Chronic diabetic ulcer(s) of the left foot sub 4th metatarsal.  The ulcer is of moderate severity. The underlying cause of the wound is diabetes and non-compliance. Wound Pain Timing/Severity: none  Quality of pain: N/A  Severity of pain:  0 / 10   Modifying Factors: diabetes, chronic pressure and non-adherence  Associated Signs/Symptoms: edema        PAST MEDICAL HISTORY        Diagnosis Date    CAD (coronary artery disease)     s/p CABG x 4;  follows with Dr Alba Gottron Callus of foot 2018    Carpal tunnel syndrome on both sides     CHF (congestive heart failure) ([address] 10/2010    Chronic diastolic; EF 87%    Chronic kidney disease     stage 4 kidney disease    Chronic ulcer of left ankle with fat layer exposed ([address] 10/7/2015    Chronic ulcer of left foot with fat layer exposed ([address] 3/17/2016    Chronic ulcer of right ankle with fat layer exposed ([address] 3/17/2016    Chronic ulcer of right foot with fat layer exposed ([address] 3/17/2016    Depression     Diabetes mellitus ([address]     Diabetes mellitus with neurological manifestations ([address]     Diabetes mellitus with ulcer of ankle ([address]     Diabetic ulcer of left foot associated with type 2 diabetes mellitus, with fat layer exposed ([address] 2018    Family history of cardiovascular disease     Mother    GERD (gastroesophageal reflux disease)     H/O cardiac catheterization 10/18/2010, 6/3/2010    10/18/2010-Four bypass grafts all widely patent. 6/3/2010- Moderate to severe triple vessel disease, preserved LV systolic function.     H/O cardiovascular stress test 2012, 8/3/2011, 10/14/2010, 2010 7/26/2012-Lexiscan- Normal Myocardial Perfusion Study. Post stress myocardial perfusion images show a normal pattern of perfusion in all regions. Post stress LV normal size. Normal perfusion in the distribution of all coronaries. Normal LV size and function. Rest EF 70%    H/O chest x-ray 7/12/2012, 6/2/2010 7/12/2012-Perihilar peribronchial cuffing with mild basilar predominant interstital prominence, which may reflect interstitial edema or atypical pneumonia.  H/O Doppler ultrasound 6/4/2010    CAROTID DOPPLER-6/4/2010-No Doppler evidence of hemodynamically significant Carotid Stenosis with antegrade flow in the vertebral arteries bilaterally. 6/4/2010 PERIPHERAL VENOUS DOPPLER_ LEFT Saphenous Vein mapping    H/O echocardiogram 7/26/2012, 8/3/2011, 10/2010, 7/23/2010, 6/8/2010 7/26/2012-LV normal size. Normal LV wall thickness. LV systolic function normal. EF => 55%. LV wall motion normal. Mild MR. Mild TR.  History of complete ECG 7/26/2012 Cammy Shaikh), 7/13/2012 [facility]), 7/25/2011, 3/25/2011, 10/18/2010, 10/11/2010, 6/23/2010, 5/7/2010    Hx of cardiovascular stress test 9/3/2015    lexiscan-normal,EF66%    Hx of Doppler ultrasound 4/5/16    Arterial: There is a fluid collection in the left thigh, please refer to PCP for further monitoring, no vascular in etiology.  Hx of echocardiogram     4/16 EF40% Mild MR/TR and mild Pulm HTN. 9/15 EF 45-50%, Mildly dilated L atrium, mildly dilated R ventricle. Mild MR & mild TR     Hyperlipidemia     Neuropathy of foot     Pt reports starting approx. 6-7 years ago    Neuropathy of hand     Pt reports starting approx.  6-7 years ago    Non compliance with medical treatment 7/2/2018    S/P CABG x 4 6/5/2010    LIMA-> LAD;  VG->CX;  VG->Diagonal; VG-> distal RCA  per  Dr Erica Cortés    Type 2 diabetes mellitus with diabetic polyneuropathy, with long-term current use of insulin ([address] 4/5/2016    Type II or unspecified type diabetes mellitus with Items Addressed This Visit     Diabetic ulcer of left foot associated with type 2 diabetes mellitus, with fat layer exposed ([address] - Primary    WD-Presence of surgical incision        Procedure Note    Indications:  Based on my examination of this patient's wound(s) today, sharp excision into necrotic subcutaneous tissue is required to promote healing and evaluate the extent of previous healing. Performed by: RYNA Chavez CNP    Consent obtained: Yes    Time out taken:  Yes    Pain Control: None needed       Debridement:Excisional Debridement    Using curette the wound(s) was/were sharply debrided down through and including the removal of subcutaneous tissue. Devitalized Tissue Debrided:  biofilm, slough, necrotic/eschar and exudate    Pre Debridement Measurements:  Are located in the Wound Documentation Flow Sheet    All active wounds listed below with today's date are evaluated  Wound(s)    debrided this date include # : 12     Post  Debridement Measurements:      Wound 08/05/19 #12 (onset 1 month) Left Distal Plantar (Active)   Wound Image   12/30/2019  9:08 AM   Wound Diabetic [place] 2 1/13/2020  9:26 AM   Offloading for Diabetic Foot Ulcers Wheelchair 1/13/2020  9:26 AM   Dressing Status Clean;Dry; Intact 1/13/2020 10:02 AM   Dressing Changed Changed/New 1/13/2020 10:02 AM   Wound Cleansed Not Cleansed 1/13/2020  9:26 AM   Wound Length (cm) 0.1 cm 1/13/2020  9:26 AM   Wound Width (cm) 0.1 cm 1/13/2020  9:26 AM   Wound Depth (cm) 0.1 cm 1/13/2020  9:26 AM   Wound Surface Area (cm^2) 0.01 cm^2 1/13/2020  9:26 AM   Change in Wound Size % (l*w) 80 1/13/2020  9:26 AM   Wound Volume (cm^3) 0 cm^3 1/13/2020  9:26 AM   Post-Procedure Length (cm) 0.1 cm 1/13/2020 10:01 AM   Post-Procedure Width (cm) 0.1 cm 1/13/2020 10:01 AM   Post-Procedure Depth (cm) 0.1 cm 1/13/2020 10:01 AM   Post-Procedure Surface Area (cm^2) 0.01 cm^2 1/13/2020 10:01 AM   Post-Procedure Volume (cm^3) 0 cm^3 1/13/2020 10:01 AM Distance Tunneling (cm) 0 cm 1/13/2020  9:26 AM   Tunneling Position ___ O'Clock 0 1/13/2020  9:26 AM   Undermining Starts ___ O'Clock 0 1/13/2020  9:26 AM   Undermining Ends___ O'Clock 0 1/13/2020  9:26 AM   Undermining Maxium Distance (cm) 0 1/13/2020  9:26 AM   Wound Assessment [place] 1/13/2020  9:26 AM   Drainage Amount Scant 1/13/2020  9:26 AM   Drainage Description Serosanguinous 1/13/2020  9:26 AM   Odor None 1/13/2020  9:26 AM   Margins Defined edges 1/13/2020  9:26 AM   Lora-wound Assessment [place] 1/13/2020  9:26 AM   Non-staged Wound Description Full thickness 1/13/2020  9:26 AM   [place]%Wound Bed 100 1/13/2020  9:26 AM   Red%Wound Bed 0 1/13/2020  9:26 AM   Yellow%Wound Bed 0 1/13/2020  9:26 AM   Black%Wound Bed 0 1/13/2020  9:26 AM   Purple%Wound Bed 0 1/13/2020  9:26 AM   Other%Wound Bed 0 1/13/2020  9:26 AM   Number of days: 161       Percent of Wound(s) Debrided: approximately 100%    Total  Area  Debrided:  0.01 sq cm     Bleeding:  None    Hemostasis Achieved:  not needed    Procedural Pain:  0  / 10     Post Procedural Pain:  0 / 10     Response to treatment:  Well tolerated by patient. Status of wound progress and description from last visit: Wound greatly improved. The patient had a partial 4th ray amputation left foot per Dr. Saint Clair 1/10/20. The incision is well approximated, no S&S infection or dehiscence. Plan:       Discharge Instructions         Plan:         Discharge Instructions        .PHYSICIAN ORDERS AND DISCHARGE INSTRUCTIONS     NOTE: Upon discharge from the [address], you will receive a patient experience survey.  We would be grateful if you would take the time to fill this survey out.     Wound care order history:                 KATHLEEN's   Right       Left               Date               Vascular studies:   Date               Imaging:   Date               Cultures:   Date               Labs/ HbA1c:   Date               Grafts:  Date               HBO:

## 2020-01-20 ENCOUNTER — HOSPITAL ENCOUNTER (OUTPATIENT)
Dept: WOUND CARE | Age: 55
Discharge: HOME | End: 2020-01-20
Payer: MEDICARE

## 2020-01-20 VITALS
SYSTOLIC BLOOD PRESSURE: 113 MMHG | HEART RATE: 78 BPM | TEMPERATURE: 97.7 F | DIASTOLIC BLOOD PRESSURE: 65 MMHG | RESPIRATION RATE: 16 BRPM

## 2020-01-20 PROCEDURE — 99213 OFFICE O/P EST LOW 20 MIN: CPT

## 2020-01-20 NOTE — PROGRESS NOTES
neurological manifestations, not stated as uncontrolled(250.60)     Type II or unspecified type diabetes mellitus with other specified manifestations, not stated as uncontrolled     Ulcer of left foot, with fat layer exposed ([address] 12/19/2013    Ulcer of other part of foot        PAST SURGICAL HISTORY    Past Surgical History:   Procedure Laterality Date    APPENDECTOMY      CARDIAC SURGERY      CARPAL TUNNEL RELEASE      L hand Nov. 2011, R hand Jan. 2012    CARPAL TUNNEL RELEASE Right 6/10/2013    recurrent    CARPAL TUNNEL RELEASE Left 7/29/2013    with ulnar nerve transpostion and L middle finger release    CHOLECYSTECTOMY      COLONOSCOPY      CORONARY ARTERY BYPASS GRAFT  6/5/2010 6/5/2010-LIMA->LAD;  VG-> Diagonal;  VG-> Obtuse marginal;  VG->Distal RCA-   Dr Honorio Saravia Right 6/10/2013    HYSTERECTOMY, TOTAL ABDOMINAL      TOE AMPUTATION Left 02/14/144th toe    TUBAL LIGATION      ULNAR TUNNEL RELEASE Right 6/10/2013       FAMILY HISTORY    Family History   Problem Relation Age of Onset    Heart Disease Mother     Kidney Disease Mother         dialysis    Cancer Father        SOCIAL HISTORY    Social History     Tobacco Use    Smoking status: Current Every Day Smoker     Packs/day: 0.25     Years: 30.00     Pack years: 7.50     Types: Cigarettes    Smokeless tobacco: Never Used    Tobacco comment: quit smoking ciagarettes 04/04/16   Substance Use Topics    Alcohol use: No     Alcohol/week: 0.0 standard drinks     Comment:                                    CAFFEINE: 2 sodas daily    Drug use: No       ALLERGIES    Allergies   Allergen Reactions    Latex     Codeine     Cortisone     Cortizone     Iodides     Phenobarbital Other (See Comments)     Hallucinations     Vancomycin Other (See Comments)     \"makes me very sick\"       MEDICATIONS    Current Outpatient Medications on File Prior to Encounter   Medication Sig Dispense Refill    cephALEXin (Remi Nay) 500 MG capsule Take 500 mg by mouth 2 times daily      OXYGEN Inhale 2 L into the lungs daily as needed      carvedilol (COREG) 6.25 MG tablet 6.25 mg 2 times daily       nitroGLYCERIN (NITROSTAT) 0.4 MG SL tablet Place 1 tablet under the tongue every 5 minutes as needed for Chest pain 25 tablet 3    VICTOZA 18 MG/3ML SOPN SC injection daily       albuterol (PROAIR HFA) 108 (90 BASE) MCG/ACT inhaler Inhale 2 puffs into the lungs every 6 hours as needed for Wheezing      esomeprazole Magnesium (NEXIUM) 40 MG PACK Take 20 mg by mouth daily. 2 tabs      oxyCODONE-acetaminophen (PERCOCET)  MG per tablet Take 1 tablet by mouth every 6 hours as needed for Pain.  LORazepam (ATIVAN) 0.5 MG tablet Take 0.5 mg by mouth every 12 hours Indications: One Tablet twice daily       pravastatin (PRAVACHOL) 40 MG tablet 40 mg daily.  lisinopril (PRINIVIL;ZESTRIL) 10 MG tablet Take 20 mg by mouth daily       furosemide (LASIX) 20 MG tablet Take 40 mg by mouth 2 times daily       gabapentin (NEURONTIN) 400 MG capsule Take 1,600 mg by mouth 3 times daily       Insulin Aspart (NOVOLOG FLEXPEN SC) Inject 15 Units into the skin 3 times daily (before meals) On sliding scale      insulin glargine (LANTUS) 100 UNIT/ML injection Inject 50 Units into the skin nightly. No current facility-administered medications on file prior to encounter. REVIEW OF SYSTEMS    Pertinent items are noted in HPI. Constitutional: Negative for systemic symptoms including fever, chills and malaise. Objective:      /65   Pulse 78   Temp 97.7 °F (36.5 °C) (Temporal)   Resp 16     PHYSICAL EXAM    General: The patient is in no acute distress. Mental status:  Patient is appropriate, is  oriented to place and plan of care. Dermatologic exam: Visual inspection of the periwound reveals the skin to be atrophic and edematous.   Wound exam:  see wound description below     All active wounds listed below with today's

## 2020-01-20 NOTE — PLAN OF CARE
Problem: Wound:  Goal: Will show signs of wound healing; wound closure and no evidence of infection  Description  Will show signs of wound healing; wound closure and no evidence of infection  Outcome: Ongoing     Problem: Wound:  Intervention: Assess pain status  Note:   See flowsheet  Intervention: Assess wound size, appearance and drainage  Note:   See flowsheet  Intervention: Assess pedal pulses bilaterally if patient has a foot or leg ulcer  Note:   See flowsheet  Intervention: Doppler if unable to palpate pedal pulse  Note:   See flowsheet

## 2020-01-27 ENCOUNTER — HOSPITAL ENCOUNTER (OUTPATIENT)
Dept: WOUND CARE | Age: 55
Discharge: HOME | End: 2020-01-27
Payer: MEDICARE

## 2020-01-27 VITALS
TEMPERATURE: 97 F | DIASTOLIC BLOOD PRESSURE: 65 MMHG | SYSTOLIC BLOOD PRESSURE: 108 MMHG | RESPIRATION RATE: 16 BRPM | HEART RATE: 80 BPM

## 2020-01-27 PROCEDURE — 99213 OFFICE O/P EST LOW 20 MIN: CPT

## 2020-01-27 NOTE — PROGRESS NOTES
7/26/2012-Lexiscan- Normal Myocardial Perfusion Study. Post stress myocardial perfusion images show a normal pattern of perfusion in all regions. Post stress LV normal size. Normal perfusion in the distribution of all coronaries. Normal LV size and function. Rest EF 70%    H/O chest x-ray 7/12/2012, 6/2/2010 7/12/2012-Perihilar peribronchial cuffing with mild basilar predominant interstital prominence, which may reflect interstitial edema or atypical pneumonia.  H/O Doppler ultrasound 6/4/2010    CAROTID DOPPLER-6/4/2010-No Doppler evidence of hemodynamically significant Carotid Stenosis with antegrade flow in the vertebral arteries bilaterally. 6/4/2010 PERIPHERAL VENOUS DOPPLER_ LEFT Saphenous Vein mapping    H/O echocardiogram 7/26/2012, 8/3/2011, 10/2010, 7/23/2010, 6/8/2010 7/26/2012-LV normal size. Normal LV wall thickness. LV systolic function normal. EF => 55%. LV wall motion normal. Mild MR. Mild TR.  History of complete ECG 7/26/2012 Vimal Alvarado), 7/13/2012 [facility]), 7/25/2011, 3/25/2011, 10/18/2010, 10/11/2010, 6/23/2010, 5/7/2010    Hx of cardiovascular stress test 9/3/2015    lexiscan-normal,EF66%    Hx of Doppler ultrasound 4/5/16    Arterial: There is a fluid collection in the left thigh, please refer to PCP for further monitoring, no vascular in etiology.  Hx of echocardiogram     4/16 EF40% Mild MR/TR and mild Pulm HTN. 9/15 EF 45-50%, Mildly dilated L atrium, mildly dilated R ventricle. Mild MR & mild TR     Hyperlipidemia     Neuropathy of foot     Pt reports starting approx. 6-7 years ago    Neuropathy of hand     Pt reports starting approx.  6-7 years ago    Non compliance with medical treatment 7/2/2018    S/P CABG x 4 6/5/2010    LIMA-> LAD;  VG->CX;  VG->Diagonal; VG-> distal RCA  per  Dr Prince Shipley    Type 2 diabetes mellitus with diabetic polyneuropathy, with long-term current use of insulin ([facility] 4/5/2016    Type II or unspecified type diabetes mellitus with neurological manifestations, not stated as uncontrolled(250.60)     Type II or unspecified type diabetes mellitus with other specified manifestations, not stated as uncontrolled     Ulcer of left foot, with fat layer exposed ([address] 12/19/2013    Ulcer of other part of foot        PAST SURGICAL HISTORY    Past Surgical History:   Procedure Laterality Date    APPENDECTOMY      CARDIAC SURGERY      CARPAL TUNNEL RELEASE      L hand Nov. 2011, R hand Jan. 2012    CARPAL TUNNEL RELEASE Right 6/10/2013    recurrent    CARPAL TUNNEL RELEASE Left 7/29/2013    with ulnar nerve transpostion and L middle finger release    CHOLECYSTECTOMY      COLONOSCOPY      CORONARY ARTERY BYPASS GRAFT  6/5/2010 6/5/2010-LIMA->LAD;  VG-> Diagonal;  VG-> Obtuse marginal;  VG->Distal RCA-   Dr Nellie Padilla Right 6/10/2013    HYSTERECTOMY, TOTAL ABDOMINAL      TOE AMPUTATION Left 02/14/144th toe    TUBAL LIGATION      ULNAR TUNNEL RELEASE Right 6/10/2013       FAMILY HISTORY    Family History   Problem Relation Age of Onset    Heart Disease Mother     Kidney Disease Mother         dialysis    Cancer Father        SOCIAL HISTORY    Social History     Tobacco Use    Smoking status: Current Every Day Smoker     Packs/day: 0.25     Years: 30.00     Pack years: 7.50     Types: Cigarettes    Smokeless tobacco: Never Used    Tobacco comment: quit smoking ciagarettes 04/04/16   Substance Use Topics    Alcohol use: No     Alcohol/week: 0.0 standard drinks     Comment:                                    CAFFEINE: 2 sodas daily    Drug use: No       ALLERGIES    Allergies   Allergen Reactions    Latex     Codeine     Cortisone     Cortizone     Iodides     Phenobarbital Other (See Comments)     Hallucinations     Vancomycin Other (See Comments)     \"makes me very sick\"       MEDICATIONS    Current Outpatient Medications on File Prior to Encounter   Medication Sig Dispense Refill    cephALEXin (KEFLEX) 500 MG capsule Take 500 mg by mouth 2 times daily      OXYGEN Inhale 2 L into the lungs daily as needed      carvedilol (COREG) 6.25 MG tablet 6.25 mg 2 times daily       VICTOZA 18 MG/3ML SOPN SC injection daily       albuterol (PROAIR HFA) 108 (90 BASE) MCG/ACT inhaler Inhale 2 puffs into the lungs every 6 hours as needed for Wheezing      esomeprazole Magnesium (NEXIUM) 40 MG PACK Take 20 mg by mouth daily. 2 tabs      oxyCODONE-acetaminophen (PERCOCET)  MG per tablet Take 1 tablet by mouth every 6 hours as needed for Pain.  LORazepam (ATIVAN) 0.5 MG tablet Take 0.5 mg by mouth every 12 hours Indications: One Tablet twice daily       Insulin Aspart (NOVOLOG FLEXPEN SC) Inject 15 Units into the skin 3 times daily (before meals) On sliding scale      pravastatin (PRAVACHOL) 40 MG tablet 40 mg daily.  lisinopril (PRINIVIL;ZESTRIL) 10 MG tablet Take 20 mg by mouth daily       insulin glargine (LANTUS) 100 UNIT/ML injection Inject 50 Units into the skin nightly.  furosemide (LASIX) 20 MG tablet Take 40 mg by mouth 2 times daily       gabapentin (NEURONTIN) 400 MG capsule Take 1,600 mg by mouth 3 times daily       nitroGLYCERIN (NITROSTAT) 0.4 MG SL tablet Place 1 tablet under the tongue every 5 minutes as needed for Chest pain 25 tablet 3     No current facility-administered medications on file prior to encounter. REVIEW OF SYSTEMS    Pertinent items are noted in HPI. Constitutional: Negative for systemic symptoms including fever, chills and malaise. Objective:      /65   Pulse 80   Temp 97 °F (36.1 °C) (Temporal)   Resp 16     PHYSICAL EXAM    General: The patient is in no acute distress. Mental status:  Patient is appropriate, is  oriented to place and plan of care. Dermatologic exam: Visual inspection of the periwound reveals the skin to be edematous.   Wound exam:  see wound description below     All active wounds listed below with today's date are

## 2020-02-03 ENCOUNTER — HOSPITAL ENCOUNTER (OUTPATIENT)
Dept: WOUND CARE | Age: 55
Discharge: HOME | End: 2020-02-03
Payer: MEDICARE

## 2020-02-03 VITALS
SYSTOLIC BLOOD PRESSURE: 114 MMHG | RESPIRATION RATE: 16 BRPM | TEMPERATURE: 97.2 F | DIASTOLIC BLOOD PRESSURE: 61 MMHG | HEART RATE: 80 BPM

## 2020-02-03 PROCEDURE — 99212 OFFICE O/P EST SF 10 MIN: CPT

## 2020-02-03 NOTE — PROGRESS NOTES
Wound Care Center Progress Note       Adelaida Collins  AGE: 47 y.o. GENDER: female  : 1965  TODAY'S DATE:  2/3/2020        Subjective:     Chief Complaint   Patient presents with    Wound Check     le ft ampsite         HISTORY of PRESENT ILLNESS     Adelaida Collins is a 47 y.o. female who presents today for wound evaluation of Chronic diabetic ulcer(s) of the left foot. The ulcer is of mild severity. The underlying cause of the wound is DM and pressure. Pt is s/p partial 4th ray amputation with noted plantar ulceration. Wound Pain Timing/Severity: none  Quality of pain: N/A  Severity of pain:  0 / 10   Modifying Factors: diabetes, chronic pressure and non-adherence  Associated Signs/Symptoms: none        PAST MEDICAL HISTORY        Diagnosis Date    CAD (coronary artery disease)     s/p CABG x 4;  follows with Dr Tsering Mojicaoring Callus of foot 2018    Carpal tunnel syndrome on both sides     CHF (congestive heart failure) ([address] 10/2010    Chronic diastolic; EF 14%    Chronic kidney disease     stage 4 kidney disease    Chronic ulcer of left ankle with fat layer exposed ([address] 10/7/2015    Chronic ulcer of left foot with fat layer exposed ([address] 3/17/2016    Chronic ulcer of right ankle with fat layer exposed ([address] 3/17/2016    Chronic ulcer of right foot with fat layer exposed ([address] 3/17/2016    Depression     Diabetes mellitus ([address]     Diabetes mellitus with neurological manifestations ([address]     Diabetes mellitus with ulcer of ankle ([address]     Diabetic ulcer of left foot associated with type 2 diabetes mellitus, with fat layer exposed ([address] 2018    Family history of cardiovascular disease     Mother    GERD (gastroesophageal reflux disease)     H/O cardiac catheterization 10/18/2010, 6/3/2010    10/18/2010-Four bypass grafts all widely patent. 6/3/2010- Moderate to severe triple vessel disease, preserved LV systolic function.     H/O cardiovascular stress test 2012, 8/3/2011, 10/14/2010, 5/24/2010 7/26/2012-Lexiscan- Normal Myocardial Perfusion Study. Post stress myocardial perfusion images show a normal pattern of perfusion in all regions. Post stress LV normal size. Normal perfusion in the distribution of all coronaries. Normal LV size and function. Rest EF 70%    H/O chest x-ray 7/12/2012, 6/2/2010 7/12/2012-Perihilar peribronchial cuffing with mild basilar predominant interstital prominence, which may reflect interstitial edema or atypical pneumonia.  H/O Doppler ultrasound 6/4/2010    CAROTID DOPPLER-6/4/2010-No Doppler evidence of hemodynamically significant Carotid Stenosis with antegrade flow in the vertebral arteries bilaterally. 6/4/2010 PERIPHERAL VENOUS DOPPLER_ LEFT Saphenous Vein mapping    H/O echocardiogram 7/26/2012, 8/3/2011, 10/2010, 7/23/2010, 6/8/2010 7/26/2012-LV normal size. Normal LV wall thickness. LV systolic function normal. EF => 55%. LV wall motion normal. Mild MR. Mild TR.  History of complete ECG 7/26/2012 Norshana Alvarado), 7/13/2012 [facility]), 7/25/2011, 3/25/2011, 10/18/2010, 10/11/2010, 6/23/2010, 5/7/2010    Hx of cardiovascular stress test 9/3/2015    lexiscan-normal,EF66%    Hx of Doppler ultrasound 4/5/16    Arterial: There is a fluid collection in the left thigh, please refer to PCP for further monitoring, no vascular in etiology.  Hx of echocardiogram     4/16 EF40% Mild MR/TR and mild Pulm HTN. 9/15 EF 45-50%, Mildly dilated L atrium, mildly dilated R ventricle. Mild MR & mild TR     Hyperlipidemia     Neuropathy of foot     Pt reports starting approx. 6-7 years ago    Neuropathy of hand     Pt reports starting approx.  6-7 years ago    Non compliance with medical treatment 7/2/2018    S/P CABG x 4 6/5/2010    LIMA-> LAD;  VG->CX;  VG->Diagonal; VG-> distal RCA  per  Dr Naomi Matias    Type 2 diabetes mellitus with diabetic polyneuropathy, with long-term current use of insulin ([facility] 4/5/2016    Type II or unspecified type diabetes cephALEXin (KEFLEX) 500 MG capsule Take 500 mg by mouth 2 times daily      OXYGEN Inhale 2 L into the lungs daily as needed      carvedilol (COREG) 6.25 MG tablet 6.25 mg 2 times daily       nitroGLYCERIN (NITROSTAT) 0.4 MG SL tablet Place 1 tablet under the tongue every 5 minutes as needed for Chest pain 25 tablet 3    VICTOZA 18 MG/3ML SOPN SC injection daily       albuterol (PROAIR HFA) 108 (90 BASE) MCG/ACT inhaler Inhale 2 puffs into the lungs every 6 hours as needed for Wheezing      esomeprazole Magnesium (NEXIUM) 40 MG PACK Take 20 mg by mouth daily. 2 tabs      oxyCODONE-acetaminophen (PERCOCET)  MG per tablet Take 1 tablet by mouth every 6 hours as needed for Pain.  LORazepam (ATIVAN) 0.5 MG tablet Take 0.5 mg by mouth every 12 hours Indications: One Tablet twice daily       Insulin Aspart (NOVOLOG FLEXPEN SC) Inject 15 Units into the skin 3 times daily (before meals) On sliding scale      pravastatin (PRAVACHOL) 40 MG tablet 40 mg daily.  lisinopril (PRINIVIL;ZESTRIL) 10 MG tablet Take 20 mg by mouth daily       insulin glargine (LANTUS) 100 UNIT/ML injection Inject 50 Units into the skin nightly.  furosemide (LASIX) 20 MG tablet Take 40 mg by mouth 2 times daily       gabapentin (NEURONTIN) 400 MG capsule Take 1,600 mg by mouth 3 times daily        No current facility-administered medications on file prior to encounter. REVIEW OF SYSTEMS    Pertinent items are noted in HPI. Constitutional: Negative for systemic symptoms including fever, chills and malaise. Objective:      /61   Pulse 80   Temp 97.2 °F (36.2 °C) (Oral)   Resp 16     PHYSICAL EXAM    General: The patient is in no acute distress. Mental status:  Patient is appropriate, is  oriented to place and plan of care. Dermatologic exam: Visual inspection of the periwound reveals the skin to be atrophic and edematous.   Wound exam:  see wound description below     All active wounds listed below not scrub or use excessive force.                          Wash hands with soap and water before and after dressing changes.                         Prior to applying a clean dressing, cleanse wound with normal saline,                          wound cleanser, or mild soap and water.                           Ask your physician or nurse before getting the wound(s) wet in the shower.              Daily Wound management:                          Keep weight off wounds and reposition every 2 hours.                          Avoid standing for long periods of time.                          Apply wraps/stockings in AM and remove at bedtime.                          Elevate legs to the level of the heart or above for 30 minutes 4-5 times a day and/or when sitting.                                               When taking antibiotics take entire prescription as ordered by MD do not stop taking until medicine is all gone.                                                                 Orders for this week: 2/3/20   discharged, follow up with Dr. Jerald Camp in one month in his office     Left foot - healed    Wear inserts at all times      Follow up with the wound care center if needed           Call [phone] for any questions or concerns.   Date__________   Time____________          Treatment Note      Written Patient Dismissal Instructions Given            Electronically signed by Destinee Angela DPM on 2/3/2020 at 10:49 AM

## 2022-04-05 ENCOUNTER — TELEPHONE (OUTPATIENT)
Dept: OTHER | Age: 57
End: 2022-04-05

## 2022-04-05 NOTE — TELEPHONE ENCOUNTER
Mary Meier were referred to the CP program by Court Scale  of [facility]. Both have health issues and they are struggling to take care of themselves. I reached out to see if they would agree to a home visit. It is scheduled for Monday 4/11/22 at 2:00.

## 2022-04-11 ENCOUNTER — PARAMEDICINE (OUTPATIENT)
Dept: OTHER | Age: 57
End: 2022-04-11

## 2022-04-11 NOTE — PROGRESS NOTES
Made a home visit today with Cam Hatchet and [facility]. Both are participants in the program. [facility] is on dialysis three days a week. She drives herself because she said it would cost her $75 a week for transportation. She is also on a transplant list. She is in need of transportation. She also needs some help around the home. I called Passport/Waiver intake to see if [facility] qualified. They will be calling her to come out and do an assessment. Cam Hatchet was just approved for [employer]. Will continue to follow both her and Cam Hatchet.

## 2022-04-19 ENCOUNTER — TELEPHONE (OUTPATIENT)
Dept: OTHER | Age: 57
End: 2022-04-19

## 2022-04-19 NOTE — TELEPHONE ENCOUNTER
Reached out to [facility] to see how she and Fabián Eid are doing. Stated they were doing fine. They have three upcoming appointments this week from [facility] for assessments. I advised I would call her on Monday to see what services they were able to receive.

## 2022-05-06 ENCOUNTER — HOSPITAL ENCOUNTER (EMERGENCY)
Age: 57
Discharge: HOME | End: 2022-05-06
Attending: EMERGENCY MEDICINE

## 2022-05-06 DIAGNOSIS — Z02.83 ENCOUNTER FOR BLOOD-ALCOHOL AND BLOOD-DRUG TEST: Primary | ICD-10-CM

## 2022-05-06 NOTE — ED PROVIDER NOTES
Patient was brought to the emergency department by police for legal blood draw. I was not involved in any of the care treatment or evaluation of this patient. She declined medical screening evaluation.      Gisell Angela MD  05/06/22 2521

## 2022-05-06 NOTE — ED NOTES
Blood drawn with #21 butterfly needle via right forearm. Tolerated without difficulty. Pressure applied to site for 2 minutes. Specimens labeled and sealed. Box given to officer. No complaint voiced.       Kristen Adams RN  05/06/22 9975

## 2022-06-11 ENCOUNTER — HOSPITAL ENCOUNTER (EMERGENCY)
Age: 57
Discharge: HOME | End: 2022-06-11
Attending: EMERGENCY MEDICINE
Payer: MEDICAID

## 2022-06-11 VITALS
OXYGEN SATURATION: 98 % | DIASTOLIC BLOOD PRESSURE: 71 MMHG | SYSTOLIC BLOOD PRESSURE: 162 MMHG | BODY MASS INDEX: 28.34 KG/M2 | WEIGHT: 160 LBS | RESPIRATION RATE: 18 BRPM | HEART RATE: 83 BPM | TEMPERATURE: 97.1 F

## 2022-06-11 DIAGNOSIS — R21 RASH AND OTHER NONSPECIFIC SKIN ERUPTION: Primary | ICD-10-CM

## 2022-06-11 DIAGNOSIS — L29.9 PRURITIC DISORDER: ICD-10-CM

## 2022-06-11 PROCEDURE — 6370000000 HC RX 637 (ALT 250 FOR IP): Performed by: EMERGENCY MEDICINE

## 2022-06-11 PROCEDURE — 99283 EMERGENCY DEPT VISIT LOW MDM: CPT

## 2022-06-11 RX ORDER — HYDROXYZINE PAMOATE 25 MG/1
50 CAPSULE ORAL ONCE
Status: COMPLETED | OUTPATIENT
Start: 2022-06-11 | End: 2022-06-11

## 2022-06-11 RX ORDER — HYDROXYZINE PAMOATE 25 MG/1
25 CAPSULE ORAL 3 TIMES DAILY PRN
Qty: 12 CAPSULE | Refills: 0 | Status: SHIPPED | OUTPATIENT
Start: 2022-06-11 | End: 2022-06-12 | Stop reason: SDUPTHER

## 2022-06-11 RX ADMIN — HYDROXYZINE PAMOATE 50 MG: 25 CAPSULE ORAL at 20:26

## 2022-06-11 NOTE — ED PROVIDER NOTES
CHIEF COMPLAINT  Chief Complaint   Patient presents with    Rash     pt report a rash and feeling itchy all over do to extra stress in her life         HPI  Stefani Cox is a 64 y.o. female with history of CAD status post CABG, ESRD on HD without any missed dialysis, diabetes who presents pruritic arm rash and \"itching all over\". Patient has had diffuse pruritus that started Wednesday after she put her  in a inpatient facility. He had a stroke and has been having multiple medical problems which has been stressing her out. She denies any abnormalities or uremia or abnormal labs. She denies any problems with her blood sugar. She does scratch her arms and has multiple excoriations over the left forearm as she has some neuropathy in bilateral hands which makes scratching more dangerous for her. She denies any active bleeding, redness, fevers, chills, chest pain, shortness of breath, abdominal pain, vomiting. She denies any new exposures. She states \"I am just really itching and scratching because I am so stressed out, this always happens to me\".       REVIEW OF SYSTEMS  Review of Systems   History obtained from chart review and the patient  General ROS: negative for - fever  Ophthalmic ROS: negative for - double vision  ENT ROS: No oral rashes  Hematological and Lymphatic ROS: negative for - bleeding problems  Endocrine ROS: negative  Respiratory ROS: no cough, shortness of breath, or wheezing  Cardiovascular ROS: no chest pain or dyspnea on exertion  Gastrointestinal ROS: no abdominal pain, change in bowel habits, or black or bloody stools  Genito-Urinary ROS: no dysuria, trouble voiding, or hematuria  Musculoskeletal ROS: positive for -diffuse pruritus  Neurological ROS: no TIA or stroke symptoms      PAST MEDICAL HISTORY  Past Medical History:   Diagnosis Date    CAD (coronary artery disease)     s/p CABG x 4;  follows with Dr Kayla Maciel    Callus of foot 2/5/2018    Carpal tunnel syndrome on both sides     CHF (congestive heart failure) ([address] 10/2010    Chronic diastolic; EF 85%    Chronic kidney disease     stage 4 kidney disease    Chronic ulcer of left ankle with fat layer exposed ([address] 10/7/2015    Chronic ulcer of left foot with fat layer exposed ([address] 3/17/2016    Chronic ulcer of right ankle with fat layer exposed ([address] 3/17/2016    Chronic ulcer of right foot with fat layer exposed ([address] 3/17/2016    Depression     Diabetes mellitus ([address]     Diabetes mellitus with neurological manifestations ([address]     Diabetes mellitus with ulcer of ankle ([address]     Diabetic ulcer of left foot associated with type 2 diabetes mellitus, with fat layer exposed ([address] 8/6/2018    Family history of cardiovascular disease     Mother    GERD (gastroesophageal reflux disease)     H/O cardiac catheterization 10/18/2010, 6/3/2010    10/18/2010-Four bypass grafts all widely patent. 6/3/2010- Moderate to severe triple vessel disease, preserved LV systolic function.  H/O cardiovascular stress test 7/26/2012, 8/3/2011, 10/14/2010, 5/24/2010 7/26/2012-Lexiscan- Normal Myocardial Perfusion Study. Post stress myocardial perfusion images show a normal pattern of perfusion in all regions. Post stress LV normal size. Normal perfusion in the distribution of all coronaries. Normal LV size and function. Rest EF 70%    H/O chest x-ray 7/12/2012, 6/2/2010 7/12/2012-Perihilar peribronchial cuffing with mild basilar predominant interstital prominence, which may reflect interstitial edema or atypical pneumonia.  H/O Doppler ultrasound 6/4/2010    CAROTID DOPPLER-6/4/2010-No Doppler evidence of hemodynamically significant Carotid Stenosis with antegrade flow in the vertebral arteries bilaterally. 6/4/2010 PERIPHERAL VENOUS DOPPLER_ LEFT Saphenous Vein mapping    H/O echocardiogram 7/26/2012, 8/3/2011, 10/2010, 7/23/2010, 6/8/2010 7/26/2012-LV normal size. Normal LV wall thickness.  LV systolic function normal. EF => 55%. LV wall motion normal. Mild MR. Mild TR.  History of complete ECG 7/26/2012 María Michael), 7/13/2012 [facility]), 7/25/2011, 3/25/2011, 10/18/2010, 10/11/2010, 6/23/2010, 5/7/2010    Hx of cardiovascular stress test 9/3/2015    lexiscan-normal,EF66%    Hx of Doppler ultrasound 4/5/16    Arterial: There is a fluid collection in the left thigh, please refer to PCP for further monitoring, no vascular in etiology.  Hx of echocardiogram     4/16 EF40% Mild MR/TR and mild Pulm HTN. 9/15 EF 45-50%, Mildly dilated L atrium, mildly dilated R ventricle. Mild MR & mild TR     Hyperlipidemia     Neuropathy of foot     Pt reports starting approx. 6-7 years ago    Neuropathy of hand     Pt reports starting approx.  6-7 years ago    Non compliance with medical treatment 7/2/2018    S/P CABG x 4 6/5/2010    LIMA-> LAD;  VG->CX;  VG->Diagonal; VG-> distal RCA  per  Dr Johnson Never    Type 2 diabetes mellitus with diabetic polyneuropathy, with long-term current use of insulin ([address] 4/5/2016    Type II or unspecified type diabetes mellitus with neurological manifestations, not stated as uncontrolled(250.60)     Type II or unspecified type diabetes mellitus with other specified manifestations, not stated as uncontrolled     Ulcer of left foot, with fat layer exposed ([address] 12/19/2013    Ulcer of other part of foot        FAMILY HISTORY  Family History   Problem Relation Age of Onset    Heart Disease Mother     Kidney Disease Mother         dialysis    Cancer Father        SOCIAL HISTORY  Social History     Socioeconomic History    Marital status:      Spouse name: None    Number of children: 3    Years of education: None    Highest education level: None   Occupational History    None   Tobacco Use    Smoking status: Current Every Day Smoker     Packs/day: 0.25     Years: 30.00     Pack years: 7.50     Types: Cigarettes    Smokeless tobacco: Never Used    Tobacco comment: quit smoking mia 04/04/16 Vaping Use    Vaping Use: Every day    Substances: Never   Substance and Sexual Activity    Alcohol use: No     Alcohol/week: 0.0 standard drinks     Comment:                                    CAFFEINE: 2 sodas daily    Drug use: No    Sexual activity: Not Currently   Other Topics Concern    None   Social History Narrative    None     Social Determinants of Health     Financial Resource Strain:     Difficulty of Paying Living Expenses: Not on file   Food Insecurity:     Worried About Running Out of Food in the Last Year: Not on file    Nusrat of Food in the Last Year: Not on file   Transportation Needs:     Lack of Transportation (Medical): Not on file    Lack of Transportation (Non-Medical):  Not on file   Physical Activity:     Days of Exercise per Week: Not on file    Minutes of Exercise per Session: Not on file   Stress:     Feeling of Stress : Not on file   Social Connections:     Frequency of Communication with Friends and Family: Not on file    Frequency of Social Gatherings with Friends and Family: Not on file    Attends Holiness Services: Not on file    Active Member of [address] [place] [address] or Organizations: Not on file    Attends Club or Organization Meetings: Not on file    Marital Status: Not on file   Intimate Partner Violence:     Fear of Current or Ex-Partner: Not on file    Emotionally Abused: Not on file    Physically Abused: Not on file    Sexually Abused: Not on file   Housing Stability:     Unable to Pay for Housing in the Last Year: Not on file    Number of [place] in the Last Year: Not on file    Unstable Housing in the Last Year: Not on file       SURGICAL HISTORY  Past Surgical History:   Procedure Laterality Date    [address]Y     [address]      L hand Nov. 2011, R hand Jan. 2012    CARPAL TUNNEL RELEASE Right 6/10/2013    recurrent    CARPAL TUNNEL RELEASE Left 7/29/2013    with ulnar nerve transpostion and L middle finger release    CHOLECYSTECTOMY      COLONOSCOPY      CORONARY ARTERY BYPASS GRAFT  6/5/2010 6/5/2010-LIMA->LAD;  VG-> Diagonal;  VG-> Obtuse marginal;  VG->Distal RCA-   Dr Alisha Pelayo Right 6/10/2013    HYSTERECTOMY, TOTAL ABDOMINAL (CERVIX REMOVED)      TOE AMPUTATION Left 02/14/144th toe    TUBAL LIGATION      ULNAR TUNNEL RELEASE Right 6/10/2013       CURRENT MEDICATIONS  No current facility-administered medications on file prior to encounter. Current Outpatient Medications on File Prior to Encounter   Medication Sig Dispense Refill    cephALEXin (KEFLEX) 500 MG capsule Take 500 mg by mouth 2 times daily      OXYGEN Inhale 2 L into the lungs daily as needed      carvedilol (COREG) 6.25 MG tablet 6.25 mg 2 times daily       nitroGLYCERIN (NITROSTAT) 0.4 MG SL tablet Place 1 tablet under the tongue every 5 minutes as needed for Chest pain 25 tablet 3    VICTOZA 18 MG/3ML SOPN SC injection daily       albuterol (PROAIR HFA) 108 (90 BASE) MCG/ACT inhaler Inhale 2 puffs into the lungs every 6 hours as needed for Wheezing      esomeprazole Magnesium (NEXIUM) 40 MG PACK Take 20 mg by mouth daily. 2 tabs      oxyCODONE-acetaminophen (PERCOCET)  MG per tablet Take 1 tablet by mouth every 6 hours as needed for Pain.  LORazepam (ATIVAN) 0.5 MG tablet Take 0.5 mg by mouth every 12 hours Indications: One Tablet twice daily       Insulin Aspart (NOVOLOG FLEXPEN SC) Inject 15 Units into the skin 3 times daily (before meals) On sliding scale      pravastatin (PRAVACHOL) 40 MG tablet 40 mg daily.  lisinopril (PRINIVIL;ZESTRIL) 10 MG tablet Take 20 mg by mouth daily       insulin glargine (LANTUS) 100 UNIT/ML injection Inject 50 Units into the skin nightly.       furosemide (LASIX) 20 MG tablet Take 40 mg by mouth 2 times daily       gabapentin (NEURONTIN) 400 MG capsule Take 1,600 mg by mouth 3 times daily            ALLERGIES  Allergies   Allergen Reactions    Latex  Codeine     Cortisone     Cortizone     Iodides     Phenobarbital Other (See Comments)     Hallucinations     Vancomycin Other (See Comments)     \"makes me very sick\"       PHYSICAL EXAM  VITAL SIGNS: BP (!) 162/71   Pulse 83   Temp 97.1 °F (36.2 °C) (Oral)   Resp 18   Wt 160 lb (72.6 kg)   SpO2 98%   BMI 28.34 kg/m²   Constitutional: Resting in bed, chronically unwell in appearance  HENT: Normocephalic, Atraumatic, Bilateral external ears normal, Oropharynx moist, No oral exudates, Nose normal.   Eyes: PERRL, EOMI, Conjunctiva normal, No discharge. Neck: Normal range of motion, Supple, No stridor. Cardiovascular: Normal heart rate, Normal rhythm, No murmurs, No rubs, No gallops. Thorax & Lungs: Normal breath sounds, No respiratory distress, No wheezing, No chest tenderness. Abdomen:  Soft, No tenderness, no guarding, no rebound, No masses, No pulsatile masses. Skin: Warm, Dry, No erythema, No rash. Excoriations left forearm, no surrounding erythema, no lymphangitis  Extremities: Intact distal pulses, No edema, No tenderness, No cyanosis, No clubbing. Palpable left fistula proximal arm with bruit intact, no overlying erythema or skin break  Musculoskeletal: Good gross range of motion in all major joints. No major deformities noted. Neurologic: Alert & oriented x 3, Normal gross motor function, Normal gross sensory function, No focal deficits noted. Psychiatric: Affect flat with mood anxious      RADIOLOGY/PROCEDURES/LABS      Medications   hydrOXYzine pamoate (VISTARIL) capsule 50 mg (50 mg Oral Given 6/11/22 2026)       COURSE & MEDICAL DECISION MAKING  Pertinent Labs & Imaging studies reviewed. (See chart for details)    59-year-old female presents with pruritic rash, she relates it to stress that she has had similar signs and symptoms in the past with stress. She has been going to dialysis, she does not have any signs of uremic frost, there is no clear irruption or urticaria. There is no superimposed infection overlying the area. She will be started on Aquaphor, directed to wear socks on her hands when she sleeps. I discussed gentle scratching using a washcloth to decrease risk of infection. She be started on Vistaril which will decrease not only her anxiety but also the pruritus. She is agreeable with plan of care, is discharged to follow with dialysis and primary care for recheck. FINAL IMPRESSION  Problem List Items Addressed This Visit     None      Visit Diagnoses     Rash and other nonspecific skin eruption    -  Primary    Pruritic disorder          1.    2.   3.    Patient gave me permission to discuss medical history, care, and plan with those present in the room.   Electronically signed by: Katelynn Soto MD, 6/11/2022  MD Katelynn Helton MD  06/11/22 2032

## 2022-06-12 RX ORDER — HYDROXYZINE PAMOATE 25 MG/1
25 CAPSULE ORAL 3 TIMES DAILY PRN
Qty: 12 CAPSULE | Refills: 0 | Status: SHIPPED | OUTPATIENT
Start: 2022-06-12 | End: 2022-06-26

## 2022-08-04 ENCOUNTER — TELEPHONE (OUTPATIENT)
Dept: OTHER | Age: 57
End: 2022-08-04

## 2022-08-04 NOTE — TELEPHONE ENCOUNTER
Reached out to [facility] to see how Shahram Schafer was and to follow up to make sure the [employer] had contacted her. She stated the [employer] came out and built the wheelchair ramp for Shahram Schafer. They also provided her with a gas card to get back and fourth to dialysis. [facility] is very grateful. Denied any other needs at this time. Will continue to follow.

## 2022-09-12 NOTE — DISCHARGE INSTRUCTIONS
PHYSICIAN ORDERS AND DISCHARGE INSTRUCTIONS  NOTE: Upon discharge from the [address], you will receive a patient experience survey via E-mail. We would be grateful if you would take the time to fill this survey out. Wound care order history:   KATHLEEN's   Right       Left    Date    Vascular studies/Intervention: . Cultures: .9/13/22               Antibiotics: .Cipro and Doxy 9/13/22               HbA1c:  .6/29/22 8.2               Grafts: Fernando Kettle: . Continuing wound care orders and information:              Residence: . Private              Continue home health care with: . N/A    Your wound-care supplies will be provided by: . [facility]              Pharmacy: [facility] in [place]  Wound cleansing:      Do not scrub or use excessive force. Wash hands with soap and water before and after dressing changes. Prior to applying a clean dressing, cleanse wound with normal saline,    wound cleanser, or mild soap and water. Ask your physician or nurse before getting the wound(s) wet in the shower. Daily Wound management:    Keep weight off wounds and reposition every 2 hours. Avoid standing for long periods of time. Evaluate legs to the level of the heart or above for 30 minutes 4-5 times a day and/or when sitting. When taking antibiotics take entire prescription as ordered by MD do not stop taking until medicine is all gone. Orders for this week (9/12/22) : Right Heel- Wash with mild soap and water, rinse with saline, pat dry with 4x4  Apply Ioplex to wound bed  Cover with Sorbex  Wrap with conform and secure with tape  Change Tuesday, Thursday, Saturday     Please give Heel Off  today 9/13/22    Oral Antiobiotics sent to pharmacy    Xray Ordered 9/13/22    Follow up with Leah Hernandez CNP In 1 weeks in the wound care center  Call [phone] for any questions or concerns.   Date__________   Time____________

## 2022-09-13 ENCOUNTER — HOSPITAL ENCOUNTER (OUTPATIENT)
Dept: WOUND CARE | Age: 57
Discharge: HOME | End: 2022-09-13
Payer: MEDICARE

## 2022-09-13 VITALS
HEART RATE: 79 BPM | RESPIRATION RATE: 18 BRPM | DIASTOLIC BLOOD PRESSURE: 71 MMHG | TEMPERATURE: 98.3 F | SYSTOLIC BLOOD PRESSURE: 149 MMHG

## 2022-09-13 DIAGNOSIS — E11.621 DIABETIC ULCER OF RIGHT HEEL ASSOCIATED WITH TYPE 2 DIABETES MELLITUS, WITH MUSCLE INVOLVEMENT WITHOUT EVIDENCE OF NECROSIS (HCC): ICD-10-CM

## 2022-09-13 DIAGNOSIS — M79.671 ACUTE PAIN OF RIGHT FOOT: ICD-10-CM

## 2022-09-13 DIAGNOSIS — L97.415 DIABETIC ULCER OF RIGHT HEEL ASSOCIATED WITH TYPE 2 DIABETES MELLITUS, WITH MUSCLE INVOLVEMENT WITHOUT EVIDENCE OF NECROSIS (HCC): ICD-10-CM

## 2022-09-13 PROCEDURE — 11042 DBRDMT SUBQ TIS 1ST 20SQCM/<: CPT

## 2022-09-13 PROCEDURE — 87075 CULTR BACTERIA EXCEPT BLOOD: CPT

## 2022-09-13 PROCEDURE — 87186 SC STD MICRODIL/AGAR DIL: CPT

## 2022-09-13 PROCEDURE — 99213 OFFICE O/P EST LOW 20 MIN: CPT

## 2022-09-13 PROCEDURE — 87070 CULTURE OTHR SPECIMN AEROBIC: CPT

## 2022-09-13 PROCEDURE — 11043 DBRDMT MUSC&/FSCA 1ST 20/<: CPT

## 2022-09-13 PROCEDURE — 87076 CULTURE ANAEROBE IDENT EACH: CPT

## 2022-09-13 PROCEDURE — 87077 CULTURE AEROBIC IDENTIFY: CPT

## 2022-09-13 RX ORDER — LIDOCAINE HYDROCHLORIDE 20 MG/ML
JELLY TOPICAL ONCE
Status: CANCELLED | OUTPATIENT
Start: 2022-09-13 | End: 2022-09-13

## 2022-09-13 RX ORDER — GINSENG 100 MG
CAPSULE ORAL ONCE
Status: CANCELLED | OUTPATIENT
Start: 2022-09-13 | End: 2022-09-13

## 2022-09-13 RX ORDER — BETAMETHASONE DIPROPIONATE 0.05 %
OINTMENT (GRAM) TOPICAL ONCE
Status: CANCELLED | OUTPATIENT
Start: 2022-09-13 | End: 2022-09-13

## 2022-09-13 RX ORDER — BACITRACIN ZINC AND POLYMYXIN B SULFATE 500; 1000 [USP'U]/G; [USP'U]/G
OINTMENT TOPICAL ONCE
Status: CANCELLED | OUTPATIENT
Start: 2022-09-13 | End: 2022-09-13

## 2022-09-13 RX ORDER — LIDOCAINE 40 MG/G
CREAM TOPICAL ONCE
Status: CANCELLED | OUTPATIENT
Start: 2022-09-13 | End: 2022-09-13

## 2022-09-13 RX ORDER — BACITRACIN, NEOMYCIN, POLYMYXIN B 400; 3.5; 5 [USP'U]/G; MG/G; [USP'U]/G
OINTMENT TOPICAL ONCE
Status: CANCELLED | OUTPATIENT
Start: 2022-09-13 | End: 2022-09-13

## 2022-09-13 RX ORDER — CLOBETASOL PROPIONATE 0.5 MG/G
OINTMENT TOPICAL ONCE
Status: CANCELLED | OUTPATIENT
Start: 2022-09-13 | End: 2022-09-13

## 2022-09-13 RX ORDER — LIDOCAINE 50 MG/G
OINTMENT TOPICAL ONCE
Status: CANCELLED | OUTPATIENT
Start: 2022-09-13 | End: 2022-09-13

## 2022-09-13 RX ORDER — DOXYCYCLINE HYCLATE 100 MG
100 TABLET ORAL 2 TIMES DAILY
Qty: 14 TABLET | Refills: 0 | Status: ON HOLD
Start: 2022-09-13 | End: 2022-09-25 | Stop reason: HOSPADM

## 2022-09-13 RX ORDER — CIPROFLOXACIN 250 MG/1
250 TABLET, FILM COATED ORAL 2 TIMES DAILY
Qty: 14 TABLET | Refills: 0 | Status: ON HOLD
Start: 2022-09-13 | End: 2022-09-25 | Stop reason: HOSPADM

## 2022-09-13 RX ORDER — LIDOCAINE HYDROCHLORIDE 40 MG/ML
SOLUTION TOPICAL ONCE
Status: CANCELLED | OUTPATIENT
Start: 2022-09-13 | End: 2022-09-13

## 2022-09-13 RX ORDER — GENTAMICIN SULFATE 1 MG/G
OINTMENT TOPICAL ONCE
Status: CANCELLED | OUTPATIENT
Start: 2022-09-13 | End: 2022-09-13

## 2022-09-13 NOTE — PROGRESS NOTES
Wound Care Center Progress Note With Procedure    Ottoniel Vincent  AGE: 62 y.o. GENDER: female  : 1965  EPISODE DATE:  2022     Subjective:     Chief Complaint   Patient presents with    Wound Check     Right Foot          HISTORY of PRESENT ILLNESS      Ottoniel Vincent is a 62 y.o. female who presents today for wound evaluation of Chronic diabetic and pressure ulcer(s) of the right heel. The ulcer is of marked severity. The underlying cause of the wound is diabetes and pressure.     Wound Pain Timing/Severity: none  Quality of pain: tender  Severity of pain:  2 / 10   Modifying Factors: diabetes, chronic pressure, smoking, and poor hygiene  Associated Signs/Symptoms: edema, erythema, drainage, and odor        PAST MEDICAL HISTORY        Diagnosis Date    Acute pain of right foot 2022    CAD (coronary artery disease)     s/p CABG x 4;  follows with Dr David Huggins of foot 2018    Carpal tunnel syndrome on both sides     CHF (congestive heart failure) ([address] 10/2010    Chronic diastolic; EF 53%    Chronic kidney disease     stage 4 kidney disease    Chronic ulcer of left ankle with fat layer exposed ([address] 10/7/2015    Chronic ulcer of left foot with fat layer exposed ([address] 3/17/2016    Chronic ulcer of right ankle with fat layer exposed ([address] 3/17/2016    Chronic ulcer of right foot with fat layer exposed ([address] 3/17/2016    Depression     Diabetes mellitus ([address]     Diabetes mellitus with neurological manifestations ([address]     Diabetes mellitus with ulcer of ankle ([address]     Diabetic ulcer of left foot associated with type 2 diabetes mellitus, with fat layer exposed ([address] 2018    Diabetic ulcer of right heel associated with type 2 diabetes mellitus, with muscle involvement without evidence of necrosis ([address] 2022    Family history of cardiovascular disease     Mother    GERD (gastroesophageal reflux disease)     H/O cardiac catheterization 10/18/2010, 6/3/2010    10/18/2010-[facility]    Type 2 diabetes mellitus with diabetic polyneuropathy, with long-term current use of insulin ([facility] 4/5/2016    Type II or unspecified type diabetes mellitus with neurological manifestations, not stated as uncontrolled(250.60)     Type II or unspecified type diabetes mellitus with other specified manifestations, not stated as uncontrolled     Ulcer of left foot, with fat layer exposed ([address] 12/19/2013    Ulcer of other part of foot        PAST SURGICAL HISTORY    Past Surgical History:   Procedure Laterality Date    APPENDECTOMY      CARDIAC SURGERY      CARPAL TUNNEL RELEASE      L hand Nov. 2011, R hand Jan. 2012    CARPAL TUNNEL RELEASE Right 6/10/2013    recurrent    CARPAL TUNNEL RELEASE Left 7/29/2013    with ulnar nerve transpostion and L middle finger release    CHOLECYSTECTOMY      COLONOSCOPY      CORONARY ARTERY BYPASS GRAFT  6/5/2010 6/5/2010-LIMA->LAD;  VG-> Diagonal;  VG-> Obtuse marginal;  VG->Distal RCA-   Dr Vernard Blizzard Right 6/10/2013    HYSTERECTOMY, TOTAL ABDOMINAL (CERVIX REMOVED)      TOE AMPUTATION Left 02/14/144th toe    TUBAL LIGATION      ULNAR TUNNEL RELEASE Right 6/10/2013       FAMILY HISTORY    Family History   Problem Relation Age of Onset    Heart Disease Mother     Kidney Disease Mother         dialysis    Cancer Father        SOCIAL HISTORY    Social History     Tobacco Use    Smoking status: Every Day     Packs/day: 0.25     Years: 30.00     Pack years: 7.50     Types: Cigarettes    Smokeless tobacco: Never    Tobacco comments:     quit smoking mia 04/04/16   Vaping Use    Vaping Use: Every day    Substances: Never   Substance Use Topics    Alcohol use: No     Alcohol/week: 0.0 standard drinks     Comment:                                    CAFFEINE: 2 sodas daily    Drug use: No       ALLERGIES    Allergies   Allergen Reactions    Latex     Codeine     Cortisone     Cortizone     Iodides     Phenobarbital Other (See Comments) Hallucinations     Vancomycin Other (See Comments)     \"makes me very sick\"       MEDICATIONS    Current Outpatient Medications on File Prior to Encounter   Medication Sig Dispense Refill    mineral oil-hydrophilic petrolatum (AQUAPHOR) ointment Apply topically as needed. 396 g 0    cephALEXin (KEFLEX) 500 MG capsule Take 500 mg by mouth 2 times daily      OXYGEN Inhale 2 L into the lungs daily as needed      carvedilol (COREG) 6.25 MG tablet 6.25 mg 2 times daily       nitroGLYCERIN (NITROSTAT) 0.4 MG SL tablet Place 1 tablet under the tongue every 5 minutes as needed for Chest pain 25 tablet 3    VICTOZA 18 MG/3ML SOPN SC injection daily       albuterol (PROAIR HFA) 108 (90 BASE) MCG/ACT inhaler Inhale 2 puffs into the lungs every 6 hours as needed for Wheezing      esomeprazole Magnesium (NEXIUM) 40 MG PACK Take 20 mg by mouth daily. 2 tabs      oxyCODONE-acetaminophen (PERCOCET)  MG per tablet Take 1 tablet by mouth every 6 hours as needed for Pain. LORazepam (ATIVAN) 0.5 MG tablet Take 0.5 mg by mouth every 12 hours Indications: One Tablet twice daily       Insulin Aspart (NOVOLOG FLEXPEN SC) Inject 15 Units into the skin 3 times daily (before meals) On sliding scale      pravastatin (PRAVACHOL) 40 MG tablet 40 mg daily. lisinopril (PRINIVIL;ZESTRIL) 10 MG tablet Take 20 mg by mouth daily       insulin glargine (LANTUS) 100 UNIT/ML injection Inject 50 Units into the skin nightly. furosemide (LASIX) 20 MG tablet Take 40 mg by mouth 2 times daily       gabapentin (NEURONTIN) 400 MG capsule Take 1,600 mg by mouth 3 times daily        No current facility-administered medications on file prior to encounter. REVIEW OF SYSTEMS    Pertinent items are noted in HPI. Constitutional: Negative for systemic symptoms including fever, chills and malaise.       Objective:      BP (!) 149/71   Pulse 79   Temp 98.3 °F (36.8 °C)   Resp 18     PHYSICAL EXAM      General: The patient is in no acute distress. Mental status:  Patient is appropriate, is  oriented to place and plan of care. Dermatologic exam: Visual inspection of the periwound reveals the skin to be moist, hot, and edematous  Wound exam: see wound description below in procedure note      Assessment:     Problem List Items Addressed This Visit       Diabetic ulcer of right heel associated with type 2 diabetes mellitus, with muscle involvement without evidence of necrosis ([address]    Relevant Orders    Culture, Wound Aerobic Only    Acute pain of right foot     Procedure Note    Indications:  Based on my examination of this patient's wound(s) today, sharp excision into necrotic muscle/fascia is required to promote healing and evaluate the extent of previous healing. Performed by: Ping Barba MD    Consent obtained: Yes    Time out taken:  Yes    Pain Control:       Debridement:Excisional Debridement    Using scissors, forceps, and # 10 blade scalpel the wound(s) was/were sharply debrided down through and including the removal of muscle/fascia.         Devitalized Tissue Debrided:  slough, necrotic/eschar, and exudate    Pre Debridement Measurements:  Are located in the Wound Documentation Flow Sheet    All active wounds listed below with today's date are evaluated  Wound(s)    debrided this date include # : 1     Post  Debridement Measurements:  Negative Pressure Wound Therapy Foot (Active)   Number of days: 3132       Negative Pressure Wound Therapy Foot Distal (Active)   Number of days: 3128       Incision 06/10/13 Wrist Right (Active)   Number of days: 3381       Incision 07/29/13 Wrist Left (Active)   Number of days: 6397       Incision 02/14/14 Toe (Comment  which one) (Active)   Number of days: 2960       Wound 09/13/22 #1 Right Foot Heel (Active)   Wound Image   09/13/22 0924   Wound Cleansed Wound cleanser 09/13/22 0924   Wound Length (cm) 6.5 cm 09/13/22 0924   Wound Width (cm) 5.8 cm 09/13/22 0924   Wound Depth (cm) 0.3 cm 09/13/22 0924   Wound Surface Area (cm^2) 37.7 cm^2 09/13/22 0924   Wound Volume (cm^3) 11.31 cm^3 09/13/22 0924   Post-Procedure Length (cm) 6.5 cm 09/13/22 1002   Post-Procedure Width (cm) 5.8 cm 09/13/22 1002   Post-Procedure Depth (cm) 0.3 cm 09/13/22 1002   Post-Procedure Surface Area (cm^2) 37.7 cm^2 09/13/22 1002   Post-Procedure Volume (cm^3) 11.31 cm^3 09/13/22 1002   Distance Tunneling (cm) 0 cm 09/13/22 0924   Tunneling Position ___ O'Clock 0 09/13/22 0924   Undermining Starts ___ O'Clock 0 09/13/22 0924   Undermining Ends___ O'Clock 0 09/13/22 0924   Undermining Maxium Distance (cm) 0 09/13/22 0924   Wound Assessment Dry;Eschar dry;Devitalized tissue;Slough;Pink/red 09/13/22 0924   Drainage Amount Moderate 09/13/22 0924   Drainage Description Serosanguinous; Yellow 09/13/22 0924   Odor None 09/13/22 0924   Lora-wound Assessment Edematous; Blanchable erythema; Hyperkeratosis (callous) 09/13/22 0924   Margins Attached edges; Undefined edges; Unattached edges 09/13/22 3189   Wound Thickness Description not for Pressure Injury Full thickness 09/13/22 0924   Number of days: 0       Percent of Wound(s) Debrided: approximately 50%    Total  Area  Debrided:  18 sq cm     Bleeding:  Minimal    Hemostasis Achieved:  not needed    Procedural Pain:  0  / 10     Post Procedural Pain:  0 / 10     Response to treatment:  Well tolerated by patient. Status of wound progress and description from last visit:   Necrotic tissue debrided. Start abx's, f/u wound cx. Plan:       Discharge Instructions         PHYSICIAN ORDERS AND DISCHARGE INSTRUCTIONS  NOTE: Upon discharge from the [address], you will receive a patient experience survey via E-mail. We would be grateful if you would take the time to fill this survey out. Wound care order history:   KATHLEEN's   Right       Left    Date    Vascular studies/Intervention: .      Cultures: .9/13/22               Antibiotics: .Cipro and Doxy 9/13/22               HbA1c:  .6/29/22 8.2               Grafts: Jovanny Peña: . Continuing wound care orders and information:              Residence: . Private              Continue home health care with: . N/A    Your wound-care supplies will be provided by: . [facility]              Pharmacy: [facility] in [place]  Wound cleansing:      Do not scrub or use excessive force. Wash hands with soap and water before and after dressing changes. Prior to applying a clean dressing, cleanse wound with normal saline,    wound cleanser, or mild soap and water. Ask your physician or nurse before getting the wound(s) wet in the shower. Daily Wound management:    Keep weight off wounds and reposition every 2 hours. Avoid standing for long periods of time. Evaluate legs to the level of the heart or above for 30 minutes 4-5 times a day and/or when sitting. When taking antibiotics take entire prescription as ordered by MD do not stop taking until medicine is all gone. Orders for this week (9/12/22) : Right Heel- Wash with mild soap and water, rinse with saline, pat dry with 4x4  Apply Ioplex to wound bed  Cover with Sorbex  Wrap with conform and secure with tape  Change Tuesday, Thursday, Saturday     Please give Heel Off  today 9/13/22    Oral Antiobiotics sent to pharmacy    Xray Ordered 9/13/22    Follow up with Brigida Peter CNP In 1 weeks in the wound care center  Call [phone] for any questions or concerns.   Date__________   Time____________                Treatment Note      Written Patient Dismissal Instructions Given            Electronically signed by Shabbir Krishnamurthy MD on 9/13/2022 at 10:33 AM

## 2022-09-19 ENCOUNTER — HOSPITAL ENCOUNTER (OUTPATIENT)
Dept: WOUND CARE | Age: 57
Discharge: HOME | End: 2022-09-19
Payer: MEDICARE

## 2022-09-19 VITALS
TEMPERATURE: 97.5 F | RESPIRATION RATE: 18 BRPM | HEART RATE: 81 BPM | SYSTOLIC BLOOD PRESSURE: 125 MMHG | DIASTOLIC BLOOD PRESSURE: 70 MMHG

## 2022-09-19 LAB
CULTURE: ABNORMAL
Lab: ABNORMAL
SPECIMEN: ABNORMAL

## 2022-09-19 PROCEDURE — 99213 OFFICE O/P EST LOW 20 MIN: CPT

## 2022-09-20 ENCOUNTER — HOSPITAL ENCOUNTER (OUTPATIENT)
Dept: WOUND CARE | Age: 57
Discharge: HOME | End: 2022-09-20
Payer: MEDICARE

## 2022-09-20 ENCOUNTER — APPOINTMENT (OUTPATIENT)
Dept: GENERAL RADIOLOGY | Age: 57
DRG: 638 | End: 2022-09-20
Payer: MEDICARE

## 2022-09-20 ENCOUNTER — HOSPITAL ENCOUNTER (INPATIENT)
Age: 57
LOS: 5 days | Discharge: HOME HEALTH | DRG: 638 | End: 2022-09-25
Attending: EMERGENCY MEDICINE | Admitting: STUDENT IN AN ORGANIZED HEALTH CARE EDUCATION/TRAINING PROGRAM
Payer: MEDICARE

## 2022-09-20 DIAGNOSIS — E11.621 DIABETIC ULCER OF RIGHT HEEL ASSOCIATED WITH TYPE 2 DIABETES MELLITUS, WITH MUSCLE INVOLVEMENT WITHOUT EVIDENCE OF NECROSIS (HCC): Primary | ICD-10-CM

## 2022-09-20 DIAGNOSIS — M79.671 ACUTE PAIN OF RIGHT FOOT: ICD-10-CM

## 2022-09-20 DIAGNOSIS — N18.6 ESRD (END STAGE RENAL DISEASE) (HCC): ICD-10-CM

## 2022-09-20 DIAGNOSIS — L97.415 DIABETIC ULCER OF RIGHT HEEL ASSOCIATED WITH TYPE 2 DIABETES MELLITUS, WITH MUSCLE INVOLVEMENT WITHOUT EVIDENCE OF NECROSIS (HCC): Primary | ICD-10-CM

## 2022-09-20 DIAGNOSIS — D72.829 LEUKOCYTOSIS, UNSPECIFIED TYPE: ICD-10-CM

## 2022-09-20 PROBLEM — S91.302S: Status: ACTIVE | Noted: 2022-09-20

## 2022-09-20 LAB
ALBUMIN SERPL-MCNC: 3.8 GM/DL (ref 3.4–5)
ALP BLD-CCNC: 108 IU/L (ref 40–128)
ALT SERPL-CCNC: 6 U/L (ref 10–40)
ANION GAP SERPL CALCULATED.3IONS-SCNC: 13 MMOL/L (ref 4–16)
AST SERPL-CCNC: 12 IU/L (ref 15–37)
BASOPHILS ABSOLUTE: 0.1 K/CU MM
BASOPHILS RELATIVE PERCENT: 0.5 % (ref 0–1)
BILIRUB SERPL-MCNC: 0.4 MG/DL (ref 0–1)
BUN BLDV-MCNC: 20 MG/DL (ref 6–23)
CALCIUM SERPL-MCNC: 9.2 MG/DL (ref 8.3–10.6)
CHLORIDE BLD-SCNC: 96 MMOL/L (ref 99–110)
CO2: 27 MMOL/L (ref 21–32)
CREAT SERPL-MCNC: 7 MG/DL (ref 0.6–1.1)
DIFFERENTIAL TYPE: ABNORMAL
EOSINOPHILS ABSOLUTE: 0.2 K/CU MM
EOSINOPHILS RELATIVE PERCENT: 1.1 % (ref 0–3)
GFR AFRICAN AMERICAN: 7 ML/MIN/1.73M2
GFR NON-AFRICAN AMERICAN: 6 ML/MIN/1.73M2
GLUCOSE BLD-MCNC: 169 MG/DL (ref 70–99)
GLUCOSE BLD-MCNC: 201 MG/DL (ref 70–99)
HCT VFR BLD CALC: 35 % (ref 37–47)
HEMOGLOBIN: 11.8 GM/DL (ref 12.5–16)
IMMATURE NEUTROPHIL %: 0.9 % (ref 0–0.43)
LACTIC ACID, SEPSIS: 1.2 MMOL/L (ref 0.5–1.9)
LYMPHOCYTES ABSOLUTE: 1.3 K/CU MM
LYMPHOCYTES RELATIVE PERCENT: 8.9 % (ref 24–44)
MCH RBC QN AUTO: 34.5 PG (ref 27–31)
MCHC RBC AUTO-ENTMCNC: 33.7 % (ref 32–36)
MCV RBC AUTO: 102.3 FL (ref 78–100)
MONOCYTES ABSOLUTE: 1.5 K/CU MM
MONOCYTES RELATIVE PERCENT: 9.9 % (ref 0–4)
NUCLEATED RBC %: 0 %
PDW BLD-RTO: 12.1 % (ref 11.7–14.9)
PLATELET # BLD: 291 K/CU MM (ref 140–440)
PMV BLD AUTO: 10 FL (ref 7.5–11.1)
POTASSIUM SERPL-SCNC: 4 MMOL/L (ref 3.5–5.1)
RBC # BLD: 3.42 M/CU MM (ref 4.2–5.4)
SEGMENTED NEUTROPHILS ABSOLUTE COUNT: 11.7 K/CU MM
SEGMENTED NEUTROPHILS RELATIVE PERCENT: 78.7 % (ref 36–66)
SODIUM BLD-SCNC: 136 MMOL/L (ref 135–145)
TOTAL IMMATURE NEUTOROPHIL: 0.13 K/CU MM
TOTAL NUCLEATED RBC: 0 K/CU MM
TOTAL PROTEIN: 7.4 GM/DL (ref 6.4–8.2)
VITAMIN D 25-HYDROXY: 9.83 NG/ML
WBC # BLD: 14.8 K/CU MM (ref 4–10.5)

## 2022-09-20 PROCEDURE — 6370000000 HC RX 637 (ALT 250 FOR IP): Performed by: STUDENT IN AN ORGANIZED HEALTH CARE EDUCATION/TRAINING PROGRAM

## 2022-09-20 PROCEDURE — 2580000003 HC RX 258: Performed by: STUDENT IN AN ORGANIZED HEALTH CARE EDUCATION/TRAINING PROGRAM

## 2022-09-20 PROCEDURE — 86141 C-REACTIVE PROTEIN HS: CPT

## 2022-09-20 PROCEDURE — 6360000002 HC RX W HCPCS: Performed by: NURSE PRACTITIONER

## 2022-09-20 PROCEDURE — 87040 BLOOD CULTURE FOR BACTERIA: CPT

## 2022-09-20 PROCEDURE — 80053 COMPREHEN METABOLIC PANEL: CPT

## 2022-09-20 PROCEDURE — 11043 DBRDMT MUSC&/FSCA 1ST 20/<: CPT

## 2022-09-20 PROCEDURE — 96374 THER/PROPH/DIAG INJ IV PUSH: CPT

## 2022-09-20 PROCEDURE — 2500000003 HC RX 250 WO HCPCS: Performed by: NURSE PRACTITIONER

## 2022-09-20 PROCEDURE — 85652 RBC SED RATE AUTOMATED: CPT

## 2022-09-20 PROCEDURE — 83605 ASSAY OF LACTIC ACID: CPT

## 2022-09-20 PROCEDURE — 73630 X-RAY EXAM OF FOOT: CPT

## 2022-09-20 PROCEDURE — 1200000000 HC SEMI PRIVATE

## 2022-09-20 PROCEDURE — 96375 TX/PRO/DX INJ NEW DRUG ADDON: CPT

## 2022-09-20 PROCEDURE — 82306 VITAMIN D 25 HYDROXY: CPT

## 2022-09-20 PROCEDURE — 6370000000 HC RX 637 (ALT 250 FOR IP): Performed by: EMERGENCY MEDICINE

## 2022-09-20 PROCEDURE — 82962 GLUCOSE BLOOD TEST: CPT

## 2022-09-20 PROCEDURE — 2580000003 HC RX 258: Performed by: NURSE PRACTITIONER

## 2022-09-20 PROCEDURE — 85025 COMPLETE CBC W/AUTO DIFF WBC: CPT

## 2022-09-20 PROCEDURE — 6360000002 HC RX W HCPCS: Performed by: STUDENT IN AN ORGANIZED HEALTH CARE EDUCATION/TRAINING PROGRAM

## 2022-09-20 PROCEDURE — 99285 EMERGENCY DEPT VISIT HI MDM: CPT

## 2022-09-20 RX ORDER — HEPARIN SODIUM 5000 [USP'U]/ML
5000 INJECTION, SOLUTION INTRAVENOUS; SUBCUTANEOUS EVERY 8 HOURS SCHEDULED
Status: DISCONTINUED | OUTPATIENT
Start: 2022-09-20 | End: 2022-09-25 | Stop reason: HOSPADM

## 2022-09-20 RX ORDER — LISINOPRIL 20 MG/1
20 TABLET ORAL DAILY
Status: CANCELLED | OUTPATIENT
Start: 2022-09-21

## 2022-09-20 RX ORDER — LIDOCAINE 40 MG/G
CREAM TOPICAL ONCE
Status: CANCELLED | OUTPATIENT
Start: 2022-09-20 | End: 2022-09-20

## 2022-09-20 RX ORDER — BETAMETHASONE DIPROPIONATE 0.05 %
OINTMENT (GRAM) TOPICAL ONCE
Status: CANCELLED | OUTPATIENT
Start: 2022-09-20 | End: 2022-09-20

## 2022-09-20 RX ORDER — ACETAMINOPHEN 325 MG/1
650 TABLET ORAL EVERY 6 HOURS PRN
Status: DISCONTINUED | OUTPATIENT
Start: 2022-09-20 | End: 2022-09-25 | Stop reason: HOSPADM

## 2022-09-20 RX ORDER — ONDANSETRON 2 MG/ML
4 INJECTION INTRAMUSCULAR; INTRAVENOUS EVERY 6 HOURS PRN
Status: DISCONTINUED | OUTPATIENT
Start: 2022-09-20 | End: 2022-09-25 | Stop reason: HOSPADM

## 2022-09-20 RX ORDER — ONDANSETRON 4 MG/1
4 TABLET, ORALLY DISINTEGRATING ORAL EVERY 8 HOURS PRN
Status: DISCONTINUED | OUTPATIENT
Start: 2022-09-20 | End: 2022-09-25 | Stop reason: HOSPADM

## 2022-09-20 RX ORDER — SODIUM CHLORIDE 0.9 % (FLUSH) 0.9 %
5-40 SYRINGE (ML) INJECTION PRN
Status: DISCONTINUED | OUTPATIENT
Start: 2022-09-20 | End: 2022-09-25 | Stop reason: HOSPADM

## 2022-09-20 RX ORDER — ACETAMINOPHEN 650 MG/1
650 SUPPOSITORY RECTAL EVERY 6 HOURS PRN
Status: DISCONTINUED | OUTPATIENT
Start: 2022-09-20 | End: 2022-09-25 | Stop reason: HOSPADM

## 2022-09-20 RX ORDER — INSULIN GLARGINE 100 [IU]/ML
30 INJECTION, SOLUTION SUBCUTANEOUS NIGHTLY
Status: CANCELLED | OUTPATIENT
Start: 2022-09-20

## 2022-09-20 RX ORDER — ASPIRIN 81 MG/1
81 TABLET, CHEWABLE ORAL DAILY
Status: DISCONTINUED | OUTPATIENT
Start: 2022-09-21 | End: 2022-09-25 | Stop reason: HOSPADM

## 2022-09-20 RX ORDER — PANTOPRAZOLE SODIUM 20 MG/1
40 TABLET, DELAYED RELEASE ORAL
Status: DISCONTINUED | OUTPATIENT
Start: 2022-09-21 | End: 2022-09-25 | Stop reason: HOSPADM

## 2022-09-20 RX ORDER — INSULIN GLARGINE 100 [IU]/ML
10 INJECTION, SOLUTION SUBCUTANEOUS NIGHTLY
Status: DISCONTINUED | OUTPATIENT
Start: 2022-09-20 | End: 2022-09-25 | Stop reason: HOSPADM

## 2022-09-20 RX ORDER — FUROSEMIDE 40 MG/1
40 TABLET ORAL 2 TIMES DAILY
Status: DISCONTINUED | OUTPATIENT
Start: 2022-09-20 | End: 2022-09-23

## 2022-09-20 RX ORDER — OXYCODONE HYDROCHLORIDE AND ACETAMINOPHEN 5; 325 MG/1; MG/1
1 TABLET ORAL EVERY 4 HOURS PRN
Status: DISCONTINUED | OUTPATIENT
Start: 2022-09-20 | End: 2022-09-25 | Stop reason: HOSPADM

## 2022-09-20 RX ORDER — OXYCODONE HYDROCHLORIDE AND ACETAMINOPHEN 5; 325 MG/1; MG/1
2 TABLET ORAL EVERY 4 HOURS PRN
Status: DISCONTINUED | OUTPATIENT
Start: 2022-09-20 | End: 2022-09-25 | Stop reason: HOSPADM

## 2022-09-20 RX ORDER — DEXTROSE MONOHYDRATE 100 MG/ML
INJECTION, SOLUTION INTRAVENOUS CONTINUOUS PRN
Status: DISCONTINUED | OUTPATIENT
Start: 2022-09-20 | End: 2022-09-25 | Stop reason: HOSPADM

## 2022-09-20 RX ORDER — SODIUM CHLORIDE 9 MG/ML
INJECTION, SOLUTION INTRAVENOUS PRN
Status: DISCONTINUED | OUTPATIENT
Start: 2022-09-20 | End: 2022-09-25 | Stop reason: HOSPADM

## 2022-09-20 RX ORDER — POLYETHYLENE GLYCOL 3350 17 G/17G
17 POWDER, FOR SOLUTION ORAL DAILY PRN
Status: DISCONTINUED | OUTPATIENT
Start: 2022-09-20 | End: 2022-09-25 | Stop reason: HOSPADM

## 2022-09-20 RX ORDER — LIDOCAINE HYDROCHLORIDE 20 MG/ML
JELLY TOPICAL ONCE
Status: CANCELLED | OUTPATIENT
Start: 2022-09-20 | End: 2022-09-20

## 2022-09-20 RX ORDER — INSULIN GLARGINE 100 [IU]/ML
25 INJECTION, SOLUTION SUBCUTANEOUS NIGHTLY
Status: CANCELLED | OUTPATIENT
Start: 2022-09-20

## 2022-09-20 RX ORDER — INSULIN LISPRO 100 [IU]/ML
0-8 INJECTION, SOLUTION INTRAVENOUS; SUBCUTANEOUS
Status: DISCONTINUED | OUTPATIENT
Start: 2022-09-21 | End: 2022-09-25 | Stop reason: HOSPADM

## 2022-09-20 RX ORDER — LORAZEPAM 0.5 MG/1
0.5 TABLET ORAL EVERY 12 HOURS
Status: DISCONTINUED | OUTPATIENT
Start: 2022-09-20 | End: 2022-09-21

## 2022-09-20 RX ORDER — CLOBETASOL PROPIONATE 0.5 MG/G
OINTMENT TOPICAL ONCE
Status: CANCELLED | OUTPATIENT
Start: 2022-09-20 | End: 2022-09-20

## 2022-09-20 RX ORDER — LIDOCAINE 50 MG/G
OINTMENT TOPICAL ONCE
Status: CANCELLED | OUTPATIENT
Start: 2022-09-20 | End: 2022-09-20

## 2022-09-20 RX ORDER — BACITRACIN ZINC AND POLYMYXIN B SULFATE 500; 1000 [USP'U]/G; [USP'U]/G
OINTMENT TOPICAL ONCE
Status: CANCELLED | OUTPATIENT
Start: 2022-09-20 | End: 2022-09-20

## 2022-09-20 RX ORDER — INSULIN LISPRO 100 [IU]/ML
0-4 INJECTION, SOLUTION INTRAVENOUS; SUBCUTANEOUS NIGHTLY
Status: DISCONTINUED | OUTPATIENT
Start: 2022-09-20 | End: 2022-09-25 | Stop reason: HOSPADM

## 2022-09-20 RX ORDER — METRONIDAZOLE 500 MG/100ML
500 INJECTION, SOLUTION INTRAVENOUS EVERY 8 HOURS
Status: DISCONTINUED | OUTPATIENT
Start: 2022-09-20 | End: 2022-09-20

## 2022-09-20 RX ORDER — OXYCODONE HYDROCHLORIDE AND ACETAMINOPHEN 5; 325 MG/1; MG/1
1 TABLET ORAL ONCE
Status: COMPLETED | OUTPATIENT
Start: 2022-09-20 | End: 2022-09-20

## 2022-09-20 RX ORDER — GINSENG 100 MG
CAPSULE ORAL ONCE
Status: CANCELLED | OUTPATIENT
Start: 2022-09-20 | End: 2022-09-20

## 2022-09-20 RX ORDER — GABAPENTIN 400 MG/1
400 CAPSULE ORAL 3 TIMES DAILY
Status: DISCONTINUED | OUTPATIENT
Start: 2022-09-20 | End: 2022-09-25 | Stop reason: HOSPADM

## 2022-09-20 RX ORDER — BACITRACIN, NEOMYCIN, POLYMYXIN B 400; 3.5; 5 [USP'U]/G; MG/G; [USP'U]/G
OINTMENT TOPICAL ONCE
Status: CANCELLED | OUTPATIENT
Start: 2022-09-20 | End: 2022-09-20

## 2022-09-20 RX ORDER — ALBUTEROL SULFATE 90 UG/1
2 AEROSOL, METERED RESPIRATORY (INHALATION) EVERY 6 HOURS PRN
Status: DISCONTINUED | OUTPATIENT
Start: 2022-09-20 | End: 2022-09-25 | Stop reason: HOSPADM

## 2022-09-20 RX ORDER — PRAVASTATIN SODIUM 40 MG
40 TABLET ORAL NIGHTLY
Status: DISCONTINUED | OUTPATIENT
Start: 2022-09-20 | End: 2022-09-25 | Stop reason: HOSPADM

## 2022-09-20 RX ORDER — LIDOCAINE HYDROCHLORIDE 40 MG/ML
SOLUTION TOPICAL ONCE
Status: CANCELLED | OUTPATIENT
Start: 2022-09-20 | End: 2022-09-20

## 2022-09-20 RX ORDER — GENTAMICIN SULFATE 1 MG/G
OINTMENT TOPICAL ONCE
Status: CANCELLED | OUTPATIENT
Start: 2022-09-20 | End: 2022-09-20

## 2022-09-20 RX ORDER — CARVEDILOL 6.25 MG/1
6.25 TABLET ORAL 2 TIMES DAILY
Status: DISCONTINUED | OUTPATIENT
Start: 2022-09-20 | End: 2022-09-25 | Stop reason: HOSPADM

## 2022-09-20 RX ORDER — LINEZOLID 2 MG/ML
600 INJECTION, SOLUTION INTRAVENOUS EVERY 12 HOURS
Status: DISCONTINUED | OUTPATIENT
Start: 2022-09-21 | End: 2022-09-24

## 2022-09-20 RX ORDER — SODIUM CHLORIDE 0.9 % (FLUSH) 0.9 %
5-40 SYRINGE (ML) INJECTION EVERY 12 HOURS SCHEDULED
Status: DISCONTINUED | OUTPATIENT
Start: 2022-09-20 | End: 2022-09-25 | Stop reason: HOSPADM

## 2022-09-20 RX ADMIN — METRONIDAZOLE 500 MG: 500 INJECTION, SOLUTION INTRAVENOUS at 17:20

## 2022-09-20 RX ADMIN — OXYCODONE AND ACETAMINOPHEN 1 TABLET: 5; 325 TABLET ORAL at 17:17

## 2022-09-20 RX ADMIN — HEPARIN SODIUM 5000 UNITS: 5000 INJECTION INTRAVENOUS; SUBCUTANEOUS at 23:36

## 2022-09-20 RX ADMIN — SODIUM CHLORIDE, PRESERVATIVE FREE 10 ML: 5 INJECTION INTRAVENOUS at 23:43

## 2022-09-20 RX ADMIN — LORAZEPAM 0.5 MG: 0.5 TABLET ORAL at 23:35

## 2022-09-20 RX ADMIN — CEFAZOLIN 2000 MG: 2 INJECTION, POWDER, FOR SOLUTION INTRAMUSCULAR; INTRAVENOUS at 17:19

## 2022-09-20 RX ADMIN — PRAVASTATIN SODIUM 40 MG: 40 TABLET ORAL at 23:35

## 2022-09-20 RX ADMIN — GABAPENTIN 400 MG: 400 CAPSULE ORAL at 23:35

## 2022-09-20 ASSESSMENT — PAIN DESCRIPTION - LOCATION
LOCATION: GENERALIZED
LOCATION: FOOT
LOCATION: FOOT

## 2022-09-20 ASSESSMENT — PAIN DESCRIPTION - FREQUENCY: FREQUENCY: INTERMITTENT

## 2022-09-20 ASSESSMENT — ENCOUNTER SYMPTOMS
WHEEZING: 0
BACK PAIN: 0
COUGH: 0
NAUSEA: 0
SORE THROAT: 0
DIARRHEA: 0
SINUS PRESSURE: 0
SHORTNESS OF BREATH: 0
ABDOMINAL PAIN: 0
COLOR CHANGE: 0
CONSTIPATION: 0
VOMITING: 0
SINUS PAIN: 0

## 2022-09-20 ASSESSMENT — PAIN SCALES - GENERAL
PAINLEVEL_OUTOF10: 6
PAINLEVEL_OUTOF10: 5
PAINLEVEL_OUTOF10: 9
PAINLEVEL_OUTOF10: 4
PAINLEVEL_OUTOF10: 6

## 2022-09-20 ASSESSMENT — PAIN DESCRIPTION - DESCRIPTORS
DESCRIPTORS: SHARP
DESCRIPTORS: HEAVINESS;POUNDING;PRESSURE

## 2022-09-20 ASSESSMENT — PAIN DESCRIPTION - PAIN TYPE: TYPE: CHRONIC PAIN

## 2022-09-20 ASSESSMENT — PAIN DESCRIPTION - ORIENTATION: ORIENTATION: RIGHT

## 2022-09-20 ASSESSMENT — PAIN - FUNCTIONAL ASSESSMENT: PAIN_FUNCTIONAL_ASSESSMENT: ACTIVITIES ARE NOT PREVENTED

## 2022-09-20 ASSESSMENT — PAIN DESCRIPTION - ONSET: ONSET: ON-GOING

## 2022-09-20 NOTE — DISCHARGE INSTRUCTIONS
PHYSICIAN ORDERS AND DISCHARGE INSTRUCTIONS  NOTE: Upon discharge from the [address], you will receive a patient experience survey via E-mail. We would be grateful if you would take the time to fill this survey out. Wound care order history:              KATHLEEN's   Right       Left    Date               Vascular studies/Intervention: . Cultures: .9/13/22               Antibiotics: .Cipro and Doxy 9/13/22                         HbA1c:  .6/29/22 8.2               Grafts: Harlon Grates: . Continuing wound care orders and information:              Residence: . Private              Continue home health care with: . N/A               Your wound-care supplies will be provided by: . [facility]              Pharmacy: [facility] in [place]  Wound cleansing:                           Do not scrub or use excessive force. Wash hands with soap and water before and after dressing changes. Prior to applying a clean dressing, cleanse wound with normal saline,                          wound cleanser, or mild soap and water. Ask your physician or nurse before getting the wound(s) wet in the shower. Daily Wound management:                          Keep weight off wounds and reposition every 2 hours. Avoid standing for long periods of time. Evaluate legs to the level of the heart or above for 30 minutes 4-5 times a day and/or when sitting. When taking antibiotics take entire prescription as ordered by MD do not stop taking until medicine is all gone. Orders for this week (9/20/22) :   Right Heel- Wash with mild soap and water, rinse with saline, pat dry with 4x4  Apply Ioplex to wound bed  Cover with Sorbex  Wrap with conform and secure with tape  Change Tuesday, Thursday, Saturday Recommend patient go to [facility] for admission for IV antibiotics and Wound Vac    Follow up with Dr. America Morris In 1 weeks in the wound care center  Call [phone] for any questions or concerns.   Date__________   Time____________

## 2022-09-20 NOTE — ED PROVIDER NOTES
[address] ENCOUNTER        Pt Name: Georgi Carballo  MRN: [id number]  [place] 1965  Date of evaluation: 9/20/2022  Provider: RYAN Lopez - JULES  PCP: Shahid Gonzalez MD     I have seen and evaluated this patient with my supervising physician No att. providers found. Dr. Shanel Sanders       Chief Complaint   Patient presents with    Wound Check     Right foot Dr. Sirena Edwards wound care  [address] [facility] [address]      Chief Complaint: Right foot wound infection    Georgi Carballo is a 62 y.o. female who presents for evaluation of a nonhealing and infected diabetic right heel wound. Patient has been following with general surgery Dr. Shalom Hall for treatment. He reevaluated her this morning and sent her to the ED for admission for IV antibiotics and wound VAC placement. Patient has been on oral Cipro and Doxy since 9/13/2022. She denies any pain. She has a history of significant diabetic peripheral neuropathy. She denies any fevers, chills, nausea, vomiting, body aches or general malaise. She has a history significant for diabetes, coronary disease, hyperlipidemia, depression, and end-stage renal disease on dialysis. She was dialyzed yesterday. Nursing Notes were all reviewed and agreed with or any disagreements were addressed in the HPI.     REVIEW OF SYSTEMS     Constitutional:   Denies fever, chills, weight loss or weakness   HENT:   Denies URI symptoms  Cardiovascular:   Denies chest pain, palpitations   Respiratory:  Denies cough or shortness of breath    GI:   Denies abdominal pain, nausea, vomiting, or diarrhea  :  Denies any urinary symptoms   Musculoskeletal:   Denies back pain, extremity pain  Skin:   Endorses right heel diabetic wound  Neurologic:   Denies headache, focal weakness or sensory changes   Endocrine:  Denies polyuria or polydypsia Lymphatic:  Denies swollen glands     PAST MEDICAL HISTORY     Past Medical History:   Diagnosis Date    Acute pain of right foot 9/13/2022    CAD (coronary artery disease)     s/p CABG x 4;  follows with Dr Daniela Aragon of foot 2/5/2018    Carpal tunnel syndrome on both sides     CHF (congestive heart failure) ([address] 10/2010    Chronic diastolic; EF 15%    Chronic kidney disease     stage 4 kidney disease    Chronic ulcer of left ankle with fat layer exposed ([address] 10/7/2015    Chronic ulcer of left foot with fat layer exposed ([address] 3/17/2016    Chronic ulcer of right ankle with fat layer exposed ([address] 3/17/2016    Chronic ulcer of right foot with fat layer exposed ([address] 3/17/2016    Depression     Diabetes mellitus ([address]     Diabetes mellitus with neurological manifestations ([address]     Diabetes mellitus with ulcer of ankle ([address]     Diabetic ulcer of left foot associated with type 2 diabetes mellitus, with fat layer exposed ([address] 8/6/2018    Diabetic ulcer of right heel associated with type 2 diabetes mellitus, with muscle involvement without evidence of necrosis ([address] 9/13/2022    Family history of cardiovascular disease     Mother    GERD (gastroesophageal reflux disease)     H/O cardiac catheterization 10/18/2010, 6/3/2010    10/18/2010-Four bypass grafts all widely patent. 6/3/2010- Moderate to severe triple vessel disease, preserved LV systolic function. H/O cardiovascular stress test 7/26/2012, 8/3/2011, 10/14/2010, 5/24/2010 7/26/2012-Lexiscan- Normal Myocardial Perfusion Study. Post stress myocardial perfusion images show a normal pattern of perfusion in all regions. Post stress LV normal size. Normal perfusion in the distribution of all coronaries. Normal LV size and function. Rest EF 70%    H/O chest x-ray 7/12/2012, 6/2/2010 7/12/2012-Perihilar peribronchial cuffing with mild basilar predominant interstital prominence, which may reflect interstitial edema or atypical pneumonia.     H/O Doppler ultrasound 6/4/2010    CAROTID DOPPLER-6/4/2010-No Doppler evidence of hemodynamically significant Carotid Stenosis with antegrade flow in the vertebral arteries bilaterally. 6/4/2010 PERIPHERAL VENOUS DOPPLER_ LEFT Saphenous Vein mapping    H/O echocardiogram 7/26/2012, 8/3/2011, 10/2010, 7/23/2010, 6/8/2010 7/26/2012-LV normal size. Normal LV wall thickness. LV systolic function normal. EF => 55%. LV wall motion normal. Mild MR. Mild TR. History of complete ECG 7/26/2012 [facility]), 7/13/2012 [facility]), 7/25/2011, 3/25/2011, 10/18/2010, 10/11/2010, 6/23/2010, 5/7/2010    Hx of cardiovascular stress test 9/3/2015    lexiscan-normal,EF66%    Hx of Doppler ultrasound 4/5/16    Arterial: There is a fluid collection in the left thigh, please refer to PCP for further monitoring, no vascular in etiology. Hx of echocardiogram     4/16 EF40% Mild MR/TR and mild Pulm HTN. 9/15 EF 45-50%, Mildly dilated L atrium, mildly dilated R ventricle. Mild MR & mild TR     Hyperlipidemia     Neuropathy of foot     Pt reports starting approx. 6-7 years ago    Neuropathy of hand     Pt reports starting approx.  6-7 years ago    Non compliance with medical treatment 7/2/2018    S/P CABG x 4 6/5/2010    LIMA-> LAD;  VG->CX;  VG->Diagonal; VG-> distal RCA  per  Dr Cintia Cortes    Type 2 diabetes mellitus with diabetic polyneuropathy, with long-term current use of insulin ([address] 4/5/2016    Type II or unspecified type diabetes mellitus with neurological manifestations, not stated as uncontrolled(250.60)     Type II or unspecified type diabetes mellitus with other specified manifestations, not stated as uncontrolled     Ulcer of left foot, with fat layer exposed ([address] 12/19/2013    Ulcer of other part of foot        SURGICAL HISTORY     Past Surgical History:   Procedure Laterality Date    APPENDECTOMY      CARDIAC SURGERY      CARPAL TUNNEL RELEASE      L hand Nov. 2011, R hand Jan. 2012    [address] Right 6/10/2013    recurrent CARPAL TUNNEL RELEASE Left 7/29/2013    with ulnar nerve transpostion and L middle finger release    CHOLECYSTECTOMY      COLONOSCOPY      CORONARY ARTERY BYPASS GRAFT  6/5/2010 6/5/2010-LIMA->LAD;  VG-> Diagonal;  VG-> Obtuse marginal;  VG->Distal RCA-   Dr Zander Sorenson Right 6/10/2013    HYSTERECTOMY, TOTAL ABDOMINAL (CERVIX REMOVED)      TOE AMPUTATION Left 02/14/144th toe    TUBAL LIGATION      ULNAR TUNNEL RELEASE Right 6/10/2013       CURRENTMEDICATIONS       Previous Medications    ALBUTEROL SULFATE HFA (PROVENTIL;VENTOLIN;PROAIR) 108 (90 BASE) MCG/ACT INHALER    Inhale 2 puffs into the lungs every 6 hours as needed for Wheezing    CARVEDILOL (COREG) 6.25 MG TABLET    6.25 mg 2 times daily     CEPHALEXIN (KEFLEX) 500 MG CAPSULE    Take 500 mg by mouth 2 times daily    CIPROFLOXACIN (CIPRO) 250 MG TABLET    Take 1 tablet by mouth 2 times daily for 7 days    DOXYCYCLINE HYCLATE (VIBRA-TABS) 100 MG TABLET    Take 1 tablet by mouth 2 times daily for 7 days    ESOMEPRAZOLE MAGNESIUM (NEXIUM) 40 MG PACK    Take 20 mg by mouth daily. 2 tabs    FUROSEMIDE (LASIX) 20 MG TABLET    Take 40 mg by mouth 2 times daily     GABAPENTIN (NEURONTIN) 400 MG CAPSULE    Take 1,600 mg by mouth 3 times daily     INSULIN ASPART (NOVOLOG FLEXPEN SC)    Inject 15 Units into the skin 3 times daily (before meals) On sliding scale    INSULIN GLARGINE (LANTUS) 100 UNIT/ML INJECTION    Inject 50 Units into the skin nightly. LISINOPRIL (PRINIVIL;ZESTRIL) 10 MG TABLET    Take 20 mg by mouth daily     LORAZEPAM (ATIVAN) 0.5 MG TABLET    Take 0.5 mg by mouth every 12 hours Indications: One Tablet twice daily     MINERAL OIL-HYDROPHILIC PETROLATUM (AQUAPHOR) OINTMENT    Apply topically as needed.     NITROGLYCERIN (NITROSTAT) 0.4 MG SL TABLET    Place 1 tablet under the tongue every 5 minutes as needed for Chest pain    OXYCODONE-ACETAMINOPHEN (PERCOCET)  MG PER TABLET    Take 1 tablet by mouth every 6 hours as needed for Pain. OXYGEN    Inhale 2 L into the lungs daily as needed    PRAVASTATIN (PRAVACHOL) 40 MG TABLET    40 mg daily. VICTOZA 18 MG/3ML SOPN SC INJECTION    daily        ALLERGIES     Latex, Codeine, Cortisone, Cortizone, Iodides, Phenobarbital, and Vancomycin    FAMILYHISTORY       Family History   Problem Relation Age of Onset    Heart Disease Mother     Kidney Disease Mother         dialysis    Cancer Father         SOCIAL HISTORY       Social History     Socioeconomic History    Marital status:      Spouse name: None    Number of children: 4    Years of education: None    Highest education level: None   Tobacco Use    Smoking status: Every Day     Packs/day: 0.25     Years: 30.00     Pack years: 7.50     Types: Cigarettes    Smokeless tobacco: Never    Tobacco comments:     quit smoking ciagarettes 04/04/16   Vaping Use    Vaping Use: Every day    Substances: Never   Substance and Sexual Activity    Alcohol use: No     Alcohol/week: 0.0 standard drinks     Comment:                                    CAFFEINE: 2 sodas daily    Drug use: No    Sexual activity: Not Currently       SCREENINGS           PHYSICAL EXAM       ED Triage Vitals [09/20/22 1315]   BP Temp Temp Source Heart Rate Resp SpO2 Height Weight   (!) 133/56 98.7 °F (37.1 °C) Oral 87 18 96 % -- --      Constitutional:  Well developed, Well nourished. No distress  HENT:  Normocephalic, Atraumatic, PERRL. Neck/Lymphatics: supple, no JVD, no swollen nodes  Cardiovascular:   RRR,  no murmurs/rubs/gallops. 1+ pedal and dorsalis pedis pulses  Respiratory:   Nonlabored breathing. Normal breath sounds, No wheezing  Abdomen: Bowel sounds normal, Soft, No tenderness, no masses. Musculoskeletal: No tenderness noted over right foot. Integument: Ulcerated and necrotic deep diabetic ulcer noted over the lateral aspect of the right heel. There is minimal surrounding edema and erythema.   Neurologic:  Alert & oriented , No focal deficits noted.     Psychiatric:  Affect normal, Mood normal.     DIAGNOSTIC RESULTS   LABS:    Labs Reviewed   CBC WITH AUTO DIFFERENTIAL - Abnormal; Notable for the following components:       Result Value    WBC 14.8 (*)     RBC 3.42 (*)     Hemoglobin 11.8 (*)     Hematocrit 35.0 (*)     .3 (*)     MCH 34.5 (*)     Segs Relative 78.7 (*)     Lymphocytes % 8.9 (*)     Monocytes % 9.9 (*)     Immature Neutrophil % 0.9 (*)     All other components within normal limits   COMPREHENSIVE METABOLIC PANEL - Abnormal; Notable for the following components:    Chloride 96 (*)     Creatinine 7.0 (*)     Glucose 169 (*)     ALT 6 (*)     AST 12 (*)     GFR Non- 6 (*)     GFR  7 (*)     All other components within normal limits   CULTURE, BLOOD 1   CULTURE, BLOOD 1   LACTATE, SEPSIS   CBC WITH AUTO DIFFERENTIAL   LACTATE, SEPSIS   C-REACTIVE PROTEIN   SEDIMENTATION RATE   VITAMIN D 25 HYDROXY       When ordered, only abnormal lab results are displayed. All other labs were within normal range or not returned as of this dictation. EKG: When ordered, EKG's are interpreted by the Emergency Department Physician in the absence of a cardiologist.  Please see their note for interpretation of EKG. RADIOLOGY:   Non-plain film images such as CT, Ultrasound and MRI are read by the radiologist. Plain radiographic images are visualized and preliminarily interpreted by the  ED Provider with the below findings:    Interpretation [facility] Radiologist below, if available at the time of this note:    XR FOOT RIGHT (MIN 3 VIEWS)   Final Result   No acute osseous abnormality. XR FOOT RIGHT (MIN 3 VIEWS)    Result Date: 9/20/2022  EXAMINATION: THREE XRAY VIEWS OF THE RIGHT FOOT 9/20/2022 3:52 pm COMPARISON: None.  HISTORY: ORDERING SYSTEM PROVIDED HISTORY: r/o osteomyelitis TECHNOLOGIST PROVIDED HISTORY: Reason for exam:->r/o osteomyelitis Reason for Exam: r/o osteomyelitis Additional signs and symptoms: na Relevant Medical/Surgical History: diabetes, cad, chf, ckd FINDINGS: There is no evidence of acute fracture. There is normal alignment of the tarsometatarsal joints. No acute joint abnormality. No focal osseous lesion. Soft tissue ulceration in the region of the calcaneus. No acute osseous abnormality. PROCEDURES   Unless otherwise noted below, none         CRITICAL CARE   CRITICAL CARE NOTE:   N/A    CONSULTS:  IP CONSULT TO HOSPITALIST  IP CONSULT TO GENERAL SURGERY  PHARMACY TO DOSE VANCOMYCIN  IP CONSULT TO GENERAL SURGERY  IP CONSULT TO NEPHROLOGY      EMERGENCY DEPARTMENT COURSE and MDM:   Vitals:    Vitals:    09/20/22 1315 09/20/22 1445   BP: (!) 133/56 (!) 136/58   Pulse: 87 81   Resp: 18 18   Temp: 98.7 °F (37.1 °C) 98.7 °F (37.1 °C)   TempSrc: Oral    SpO2: 96% 97%       Patient was given thefollowing medications:  Medications   ceFAZolin (ANCEF) 2,000 mg in dextrose 5 % 50 mL IVPB (mini-bag) (2,000 mg IntraVENous New Bag 9/20/22 1719)   metronidazole (FLAGYL) 500 mg in 0.9% NaCl 100 mL IVPB premix (500 mg IntraVENous New Bag 9/20/22 1720)   oxyCODONE-acetaminophen (PERCOCET) 5-325 MG per tablet 1 tablet (1 tablet Oral Given 9/20/22 1717)         Is this patient to be included in the SEP-1 Core Measure due to severe sepsis or septic shock? No   Exclusion criteria - the patient is NOT to be included for SEP-1 Core Measure due to:  May have criteria for sepsis, but does not meet criteria for severe sepsis or septic shock    MDM:  Patient presents as above. Emergent etiologies considered. Patient seen and examined. Work-up initiated secondary to presentation, physical exam findings, vital signs and medical chart review. In brief, patient presents for evaluation of a nonhealing right diabetic foot ulcer. She was sent by Dr. Jeannette Gaytan, gen surgery for admission for IV antibiotics and wound VAC placement. On exam, she has a deep necrotic wound on her right heel.   X-ray today shows no evidence of osteomyelitis. CMP shows mildly elevated glucose and creatinine of 7.0, patient was dialyzed yesterday. WBC is 14.8. Lactate is pending. Blood cultures were ordered as well as initial antibiotics of Ancef and Flagyl based on culture results from 9/13/2022. Patient will be admitted for further evaluation and care of her diabetic foot wound. Dr. Nallely Bach consulted. 1656:  Spoke with Dr. Nilesh Nagel with hospitalist service, updated on history, exam, and ED course. Will accept admission. 1537:  Spoke with Dr. Nallely Bach and updated on ED course, labs and plan. He was in agreement. CLINICAL IMPRESSION      1. Diabetic ulcer of right heel associated with type 2 diabetes mellitus, with muscle involvement without evidence of necrosis (HCC)    2. Leukocytosis, unspecified type    3. ESRD (end stage renal disease) ([facility]           DISPOSITION/PLAN   DISPOSITION Admitted 09/20/2022 05:34:34 PM      PATIENT REFERREDTO:  No follow-up provider specified.     DISCHARGE MEDICATIONS:  New Prescriptions    No medications on file       DISCONTINUED MEDICATIONS:  Discontinued Medications    No medications on file              (Please note that portions ofthis note were completed with a voice recognition program.  Efforts were made to edit the dictations but occasionally words are mis-transcribed.)    Donay Wolfe, RYAN - CNP (electronically signed)             Jhoana Cano, RYAN - CNP  09/20/22 Linda Linton 1485, RYAN - CNP  09/20/22 6123 Franklin Campbell, RYAN - CNP  09/20/22 1138

## 2022-09-20 NOTE — LETTER
WORK RELEASE  [facility]  [address] [place] [address] [place]  [phone]  Dept: [address] RECORD NUMBER: [id number]   AGE: 62 y.o. GENDER: female : 1965   TODAYS DATE: 2022       Ms. Espitia was seen in the [address] [place] [address] Road on 2022. It was recommended at this time that she go to [facility] to be evaluated in the emergency room. She was admitted for a diabetic ulcer of her right foot. The amount of time she will be hospitalized is unknown but is estimated to be approximately 7 to 10 days.     Electronically signed by Deanna Tirado RN on 2022 at 8:52 AM    Sincerely,      [address] [place] [address] Pkwy and [facility]

## 2022-09-20 NOTE — H&P
V2.0  History and Physical      Name:  Rosas Ledezma /Age/Sex: 1965  (62 y.o. female)   MRN & CSN:  [id number] & [id number] Encounter Date/Time: 2022 4:50 PM EDT   Location:  ED18/ED-18 PCP: Bela Morris [address] Maria T Farias Day: 1    History from:     The Patient    History of Present Illness:     Chief Complaint: <principal problem not specified>    Rosas Ledezma is a 62 y.o. female with pmh of type 2 diabetes, ESRD on HD [], hypertension, GERD, Hyperlipidemia, CAD s/p CABG x4 in , neuropathy, COPD, and anxiety who presents from wound care center at the recommendation of her surgeon. It was determined that she would benefit from hospitalization IV antibiotics and a wound VAC. Patient presents with a wound on her right heel, and elevated WBC count, is afebrile, and has no other acute complaints at this time. Patient's vitals are stable and she is in no acute distress saturating well on room air. Assessment and Plan:   Rosas Ledezma is a 62 y.o. female with a pmh of ype 2 diabetes, ESRD on HD [], hypertension, GERD, Hyperlipidemia, CAD s/p CABG x4 in , neuropathy, COPD, and anxiety who presents with right heel wound. Right heel diabetic foot wound. Patient presents from her wound care clinic for admission and higher level wound care with wound VAC from her surgeon. General surgery consulted  IV antibiotics: Vancomycin and cefepime  Inflammatory markers ordered  Trend WBC count  I will make patient n.p.o. at midnight to ensure she is able to get procedures needs with tomorrow. ESRD on HD []  Patient appears euvolemic  Nephrology consulted for inpatient dialysis    Hypertension  Blood pressure controlled  Coreg 6.5 twice daily  Lasix 40 twice daily    CAD s/p CABG x4 in   Patient with a complex cardiac history. Has no chest pain at this time.   Continue home ASA and Statin    Type 2 diabetes  Per the patient's last PCP note on 9/8/2022 she takes 15 units of Lantus nightly and Januvia. Patient was having problems with hypoglycemia. Will start Lantus 10 units nightly  Medium dose sliding scale    Neuropathy  Gabapentin 400 3 times daily. [Patient does take more this at home]    GERD  Continue home Nexium    Hyperlipidemia  Continue statin    COPD, not in acute exacerbation  Patient is satting well on room air  As needed inhalers    Anxiety   Patient takes Ativan 0.5 twice daily  Continue as needed    Disposition:   Current Living situation: home with  who had a recent stroke and brother in law who is helping take care of her   Expected Disposition: home vs SNF/Swing   Estimated D/C: 3-5 days    Diet No diet orders on file   DVT Prophylaxis [] Lovenox, [x]  Heparin, [] SCDs, [] Ambulation,  [] Eliquis, [] Xarelto   Code Status Prior   Surrogate Decision Maker/ POA       Review of Systems: Need 10 Elements   Review of Systems   Constitutional:  Negative for activity change, appetite change, chills, fatigue and fever. HENT:  Negative for congestion, sinus pressure, sinus pain and sore throat. Eyes:  Negative for visual disturbance. Respiratory:  Negative for cough, shortness of breath and wheezing. Cardiovascular:  Negative for chest pain, palpitations and leg swelling. Gastrointestinal:  Negative for abdominal pain, constipation, diarrhea, nausea and vomiting. Endocrine: Negative for polydipsia and polyuria. Genitourinary:  Negative for dysuria. Musculoskeletal:  Negative for arthralgias and back pain. Skin:  Positive for wound. Negative for color change. Neurological:  Negative for dizziness, weakness and headaches. Psychiatric/Behavioral:  Negative for agitation, behavioral problems and confusion.         Objective:   No intake or output data in the 24 hours ending 09/20/22 1650   Vitals:   Vitals:    09/20/22 1315 09/20/22 1445   BP: (!) 133/56 (!) 136/58   Pulse: 87 81 Resp: 18 18   Temp: 98.7 °F (37.1 °C) 98.7 °F (37.1 °C)   TempSrc: Oral    SpO2: 96% 97%       Medications Prior to Admission     Prior to Admission medications    Medication Sig Start Date End Date Taking? Authorizing Provider   ciprofloxacin (CIPRO) 250 MG tablet Take 1 tablet by mouth 2 times daily for 7 days 9/13/22 9/20/22  Peggy Ballard MD   doxycycline hyclate (VIBRA-TABS) 100 MG tablet Take 1 tablet by mouth 2 times daily for 7 days 9/13/22 9/20/22  Peggy Ballard MD   mineral oil-hydrophilic petrolatum (AQUAPHOR) ointment Apply topically as needed. 6/12/22   Heidi Perez MD   cephALEXin (KEFLEX) 500 MG capsule Take 500 mg by mouth 2 times daily    Historical Provider, MD   OXYGEN Inhale 2 L into the lungs daily as needed    Historical Provider, MD   carvedilol (COREG) 6.25 MG tablet 6.25 mg 2 times daily  5/30/17   Historical Provider, MD   nitroGLYCERIN (NITROSTAT) 0.4 MG SL tablet Place 1 tablet under the tongue every 5 minutes as needed for Chest pain 6/13/17   Tarik Varela MD   VICTOZA 18 MG/3ML SOPN SC injection daily  3/7/16   Historical Provider, MD   albuterol sulfate HFA (PROVENTIL;VENTOLIN;PROAIR) 108 (90 Base) MCG/ACT inhaler Inhale 2 puffs into the lungs every 6 hours as needed for Wheezing    Historical Provider, MD   esomeprazole Magnesium (NEXIUM) 40 MG PACK Take 20 mg by mouth daily. 2 tabs    Historical Provider, MD   oxyCODONE-acetaminophen (PERCOCET)  MG per tablet Take 1 tablet by mouth every 6 hours as needed for Pain. Historical Provider, MD   LORazepam (ATIVAN) 0.5 MG tablet Take 0.5 mg by mouth every 12 hours Indications: One Tablet twice daily     Historical Provider, MD   Insulin Aspart (NOVOLOG FLEXPEN SC) Inject 15 Units into the skin 3 times daily (before meals) On sliding scale    Historical Provider, MD   pravastatin (PRAVACHOL) 40 MG tablet 40 mg daily.  3/9/13   Historical Provider, MD   lisinopril (PRINIVIL;ZESTRIL) 10 MG tablet Take 20 mg by mouth daily  12/18/12   Historical Provider, MD   insulin glargine (LANTUS) 100 UNIT/ML injection Inject 50 Units into the skin nightly. Historical Provider, MD   furosemide (LASIX) 20 MG tablet Take 40 mg by mouth 2 times daily     Historical Provider, MD   gabapentin (NEURONTIN) 400 MG capsule Take 1,600 mg by mouth 3 times daily     Historical Provider, MD       Physical Exam: Need 8 Elements     Physical Exam  Vitals reviewed. Constitutional:       Appearance: Normal appearance. She is normal weight. HENT:      Head: Normocephalic and atraumatic. Mouth/Throat:      Mouth: Mucous membranes are moist.      Pharynx: Oropharynx is clear. Eyes:      Extraocular Movements: Extraocular movements intact. Conjunctiva/sclera: Conjunctivae normal.      Pupils: Pupils are equal, round, and reactive to light. Cardiovascular:      Rate and Rhythm: Normal rate and regular rhythm. Pulses: Normal pulses. Heart sounds: Normal heart sounds. Pulmonary:      Effort: Pulmonary effort is normal.      Breath sounds: Normal breath sounds. Abdominal:      General: Abdomen is flat. Bowel sounds are normal.      Palpations: Abdomen is soft. Musculoskeletal:         General: No swelling. Normal range of motion. Cervical back: Normal range of motion and neck supple. Feet:    Feet:      Right foot:      Skin integrity: Skin breakdown, erythema and warmth present. Skin:     General: Skin is warm and dry. Neurological:      General: No focal deficit present. Mental Status: She is alert and oriented to person, place, and time. Mental status is at baseline. Psychiatric:         Mood and Affect: Mood normal.         Behavior: Behavior normal.         Thought Content:  Thought content normal.         Judgment: Judgment normal.       Past Medical History:   PMHx   Past Medical History:   Diagnosis Date    Acute pain of right foot 9/13/2022    CAD (coronary artery disease)     s/p CABG x 4; follows with Dr Markus Adam of foot 2/5/2018    Carpal tunnel syndrome on both sides     CHF (congestive heart failure) ([address] 10/2010    Chronic diastolic; EF 32%    Chronic kidney disease     stage 4 kidney disease    Chronic ulcer of left ankle with fat layer exposed ([address] 10/7/2015    Chronic ulcer of left foot with fat layer exposed ([address] 3/17/2016    Chronic ulcer of right ankle with fat layer exposed ([address] 3/17/2016    Chronic ulcer of right foot with fat layer exposed ([address] 3/17/2016    Depression     Diabetes mellitus ([address]     Diabetes mellitus with neurological manifestations ([address]     Diabetes mellitus with ulcer of ankle ([address]     Diabetic ulcer of left foot associated with type 2 diabetes mellitus, with fat layer exposed ([address] 8/6/2018    Diabetic ulcer of right heel associated with type 2 diabetes mellitus, with muscle involvement without evidence of necrosis ([address] 9/13/2022    Family history of cardiovascular disease     Mother    GERD (gastroesophageal reflux disease)     H/O cardiac catheterization 10/18/2010, 6/3/2010    10/18/2010-Four bypass grafts all widely patent. 6/3/2010- Moderate to severe triple vessel disease, preserved LV systolic function. H/O cardiovascular stress test 7/26/2012, 8/3/2011, 10/14/2010, 5/24/2010 7/26/2012-Lexiscan- Normal Myocardial Perfusion Study. Post stress myocardial perfusion images show a normal pattern of perfusion in all regions. Post stress LV normal size. Normal perfusion in the distribution of all coronaries. Normal LV size and function. Rest EF 70%    H/O chest x-ray 7/12/2012, 6/2/2010 7/12/2012-Perihilar peribronchial cuffing with mild basilar predominant interstital prominence, which may reflect interstitial edema or atypical pneumonia. H/O Doppler ultrasound 6/4/2010    CAROTID DOPPLER-6/4/2010-No Doppler evidence of hemodynamically significant Carotid Stenosis with antegrade flow in the vertebral arteries bilaterally.  6/4/2010 PERIPHERAL VENOUS DOPPLER_ LEFT Saphenous Vein mapping    H/O echocardiogram 7/26/2012, 8/3/2011, 10/2010, 7/23/2010, 6/8/2010 7/26/2012-LV normal size. Normal LV wall thickness. LV systolic function normal. EF => 55%. LV wall motion normal. Mild MR. Mild TR. History of complete ECG 7/26/2012 Brendon Christian), 7/13/2012 [facility]), 7/25/2011, 3/25/2011, 10/18/2010, 10/11/2010, 6/23/2010, 5/7/2010    Hx of cardiovascular stress test 9/3/2015    lexiscan-normal,EF66%    Hx of Doppler ultrasound 4/5/16    Arterial: There is a fluid collection in the left thigh, please refer to PCP for further monitoring, no vascular in etiology. Hx of echocardiogram     4/16 EF40% Mild MR/TR and mild Pulm HTN. 9/15 EF 45-50%, Mildly dilated L atrium, mildly dilated R ventricle. Mild MR & mild TR     Hyperlipidemia     Neuropathy of foot     Pt reports starting approx. 6-7 years ago    Neuropathy of hand     Pt reports starting approx. 6-7 years ago    Non compliance with medical treatment 7/2/2018    S/P CABG x 4 6/5/2010    LIMA-> LAD;  VG->CX;  VG->Diagonal; VG-> distal RCA  per  Dr Hanane Langley    Type 2 diabetes mellitus with diabetic polyneuropathy, with long-term current use of insulin ([address] 4/5/2016    Type II or unspecified type diabetes mellitus with neurological manifestations, not stated as uncontrolled(250.60)     Type II or unspecified type diabetes mellitus with other specified manifestations, not stated as uncontrolled     Ulcer of left foot, with fat layer exposed ([address] 12/19/2013    Ulcer of other part of foot      PSHX:  has a past surgical history that includes Carpal tunnel release; Hysterectomy, total abdominal; Cholecystectomy; Appendectomy; Tubal ligation; Coronary artery bypass graft (6/5/2010); Finger trigger release (Right, 6/10/2013); Ulnar tunnel release (Right, 6/10/2013); Carpal tunnel release (Right, 6/10/2013); Carpal tunnel release (Left, 7/29/2013); Cardiac surgery;  Toe amputation (Left, 02/14/144th toe); and Colonoscopy. Allergies: Allergies   Allergen Reactions    Latex     Codeine     Cortisone     Cortizone     Iodides     Phenobarbital Other (See Comments)     Hallucinations     Vancomycin Other (See Comments)     \"makes me very sick\"     Fam HX:  family history includes Cancer in her father; Heart Disease in her mother; Kidney Disease in her mother. Soc HX:   Social History     Socioeconomic History    Marital status:      Spouse name: None    Number of children: 4    Years of education: None    Highest education level: None   Tobacco Use    Smoking status: Every Day     Packs/day: 0.25     Years: 30.00     Pack years: 7.50     Types: Cigarettes    Smokeless tobacco: Never    Tobacco comments:     quit smoking ciagarettes 04/04/16   Vaping Use    Vaping Use: Every day    Substances: Never   Substance and Sexual Activity    Alcohol use: No     Alcohol/week: 0.0 standard drinks     Comment:                                    CAFFEINE: 2 sodas daily    Drug use: No    Sexual activity: Not Currently       Medications:   Medications:    ceFAZolin  2,000 mg IntraVENous Once    metroNIDAZOLE  500 mg IntraVENous Q8H      Infusions:   PRN Meds:      Labs      CBC:   Recent Labs     09/20/22  1510   WBC 14.8*   HGB 11.8*        BMP:    Recent Labs     09/20/22  1510      K 4.0   CL 96*   CO2 27   BUN 20   CREATININE 7.0*   GLUCOSE 169*     Hepatic:   Recent Labs     09/20/22  1510   AST 12*   ALT 6*   BILITOT 0.4   ALKPHOS 108     Lipids:   Lab Results   Component Value Date/Time    CHOL 248 10/15/2014 09:56 AM    HDL 31 10/15/2014 09:56 AM    TRIG 953 10/15/2014 09:56 AM     Hemoglobin A1C:   Lab Results   Component Value Date/Time    LABA1C 13.6 10/15/2014 09:56 AM     TSH: No results found for: TSH  Troponin: No results found for: TROPONINT  Lactic Acid: No results for input(s): LACTA in the last 72 hours. BNP: No results for input(s): PROBNP in the last 72 hours.   UA:  Lab Results   Component Value Date/Time    NITRU NEGATIVE 01/07/2020 04:00 PM    COLORU YELLOW 01/07/2020 04:00 PM    WBCUA 0 TO 1 01/07/2020 04:00 PM    RBCUA 2 TO 3 01/07/2020 04:00 PM    MUCUS 1+ 01/07/2020 04:00 PM    YEAST OCCASIONAL 11/17/2010 09:30 AM    BACTERIA OCCASIONAL 01/07/2020 04:00 PM    CLARITYU CLEAR 01/07/2020 04:00 PM    SPECGRAV 1.020 01/07/2020 04:00 PM    LEUKOCYTESUR NEGATIVE 01/07/2020 04:00 PM    UROBILINOGEN 2.0 01/07/2020 04:00 PM    BILIRUBINUR NEGATIVE 01/07/2020 04:00 PM    BLOODU SMALL 01/07/2020 04:00 PM    KETUA NEGATIVE 01/07/2020 04:00 PM     Urine Cultures: No results found for: LABURIN  Blood Cultures: No results found for: BC  No results found for: BLOODCULT2  Organism:   Lab Results   Component Value Date/Time    ORG ENC 06/05/2017 09:12 AM       Imaging/Diagnostics Last 24 Hours   XR FOOT RIGHT (MIN 3 VIEWS)    Result Date: 9/20/2022  EXAMINATION: THREE XRAY VIEWS OF THE RIGHT FOOT 9/20/2022 3:52 pm COMPARISON: None. HISTORY: ORDERING SYSTEM PROVIDED HISTORY: r/o osteomyelitis TECHNOLOGIST PROVIDED HISTORY: Reason for exam:->r/o osteomyelitis Reason for Exam: r/o osteomyelitis Additional signs and symptoms: na Relevant Medical/Surgical History: diabetes, cad, chf, ckd FINDINGS: There is no evidence of acute fracture. There is normal alignment of the tarsometatarsal joints. No acute joint abnormality. No focal osseous lesion. Soft tissue ulceration in the region of the calcaneus. No acute osseous abnormality. Personally reviewed Lab Studies, Imaging, and discussed case with the patient.     Electronically signed by Sandra House MD on 9/20/2022 at 4:50 PM

## 2022-09-20 NOTE — PROGRESS NOTES
Wound Care Center Progress Note With Procedure    Yessi Esquivel  AGE: 62 y.o. GENDER: female  : 1965  EPISODE DATE:  2022     Subjective:     Chief Complaint   Patient presents with    Wound Check     Right Foot          HISTORY of PRESENT ILLNESS      Yessi Esquivel is a 62 y.o. female who presents today for wound evaluation of Chronic diabetic and pressure ulcer(s) of the right heel. The ulcer is of marked severity. The underlying cause of the wound is diabetes and pressure. I did review the wound culture results and the organisms are sensitive to the Cipro. The wound does look worse today with more necrotic tissue on the heel I will debride and I have also recommended she go to the [facility] ER and be admitted under the hospitalist service, placed on IV antibiotics and then I would like to start an irrigating wound VAC. She is in agreement to be hospitalized.   Wound Pain Timing/Severity: none  Quality of pain: N/A  Severity of pain:  0 / 10   Modifying Factors: diabetes, chronic pressure, and ESRD on HD  Associated Signs/Symptoms: edema, erythema, drainage, and odor        PAST MEDICAL HISTORY        Diagnosis Date    Acute pain of right foot 2022    CAD (coronary artery disease)     s/p CABG x 4;  follows with Dr Florin Alonzo of foot 2018    Carpal tunnel syndrome on both sides     CHF (congestive heart failure) ([address] 10/2010    Chronic diastolic; EF 27%    Chronic kidney disease     stage 4 kidney disease    Chronic ulcer of left ankle with fat layer exposed ([address] 10/7/2015    Chronic ulcer of left foot with fat layer exposed ([address] 3/17/2016    Chronic ulcer of right ankle with fat layer exposed ([address] 3/17/2016    Chronic ulcer of right foot with fat layer exposed ([address] 3/17/2016    Depression     Diabetes mellitus ([address]     Diabetes mellitus with neurological manifestations ([address]     Diabetes mellitus with ulcer of ankle ([address]     Diabetic ulcer of left foot associated with type 2 diabetes mellitus, with fat layer exposed ([facility] 8/6/2018    Diabetic ulcer of right heel associated with type 2 diabetes mellitus, with muscle involvement without evidence of necrosis ([facility] [facility] 9/13/2022    Family history of cardiovascular disease     Mother    GERD (gastroesophageal reflux disease)     H/O cardiac catheterization 10/18/2010, 6/3/2010    10/18/2010-Four bypass grafts all widely patent. 6/3/2010- Moderate to severe triple vessel disease, preserved LV systolic function. H/O cardiovascular stress test 7/26/2012, 8/3/2011, 10/14/2010, 5/24/2010 7/26/2012-Lexiscan- Normal Myocardial Perfusion Study. Post stress myocardial perfusion images show a normal pattern of perfusion in all regions. Post stress LV normal size. Normal perfusion in the distribution of all coronaries. Normal LV size and function. Rest EF 70%    H/O chest x-ray 7/12/2012, 6/2/2010 7/12/2012-Perihilar peribronchial cuffing with mild basilar predominant interstital prominence, which may reflect interstitial edema or atypical pneumonia. H/O Doppler ultrasound 6/4/2010    CAROTID DOPPLER-6/4/2010-No Doppler evidence of hemodynamically significant Carotid Stenosis with antegrade flow in the vertebral arteries bilaterally. 6/4/2010 PERIPHERAL VENOUS DOPPLER_ LEFT Saphenous Vein mapping    H/O echocardiogram 7/26/2012, 8/3/2011, 10/2010, 7/23/2010, 6/8/2010 7/26/2012-LV normal size. Normal LV wall thickness. LV systolic function normal. EF => 55%. LV wall motion normal. Mild MR. Mild TR. History of complete ECG 7/26/2012 [facility]), 7/13/2012 [facility]), 7/25/2011, 3/25/2011, 10/18/2010, 10/11/2010, 6/23/2010, 5/7/2010    Hx of cardiovascular stress test 9/3/2015    lexiscan-normal,EF66%    Hx of Doppler ultrasound 4/5/16    Arterial: There is a fluid collection in the left thigh, please refer to PCP for further monitoring, no vascular in etiology.      Hx of echocardiogram     4/16 EF40% Mild MR/TR and mild Pulm HTN. 9/15 EF 45-50%, Mildly dilated L atrium, mildly dilated R ventricle. Mild MR & mild TR     Hyperlipidemia     Neuropathy of foot     Pt reports starting approx. 6-7 years ago    Neuropathy of hand     Pt reports starting approx.  6-7 years ago    Non compliance with medical treatment 7/2/2018    S/P CABG x 4 6/5/2010    LIMA-> LAD;  VG->CX;  VG->Diagonal; VG-> distal RCA  per  Dr Sarahi Morales    Type 2 diabetes mellitus with diabetic polyneuropathy, with long-term current use of insulin ([facility] 4/5/2016    Type II or unspecified type diabetes mellitus with neurological manifestations, not stated as uncontrolled(250.60)     Type II or unspecified type diabetes mellitus with other specified manifestations, not stated as uncontrolled     Ulcer of left foot, with fat layer exposed ([facility] 12/19/2013    Ulcer of other part of foot        PAST SURGICAL HISTORY    Past Surgical History:   Procedure Laterality Date    APPENDECTOMY      CARDIAC SURGERY      CARPAL TUNNEL RELEASE      L hand Nov. 2011, R hand Jan. 2012    CARPAL TUNNEL RELEASE Right 6/10/2013    recurrent    CARPAL TUNNEL RELEASE Left 7/29/2013    with ulnar nerve transpostion and L middle finger release    CHOLECYSTECTOMY      COLONOSCOPY      CORONARY ARTERY BYPASS GRAFT  6/5/2010 6/5/2010-LIMA->LAD;  VG-> Diagonal;  VG-> Obtuse marginal;  VG->Distal RCA-   Dr Zach Varghese Right 6/10/2013    HYSTERECTOMY, TOTAL ABDOMINAL (CERVIX REMOVED)      TOE AMPUTATION Left 02/14/144th toe    TUBAL LIGATION      ULNAR TUNNEL RELEASE Right 6/10/2013       FAMILY HISTORY    Family History   Problem Relation Age of Onset    Heart Disease Mother     Kidney Disease Mother         dialysis    Cancer Father        SOCIAL HISTORY    Social History     Tobacco Use    Smoking status: Every Day     Packs/day: 0.25     Years: 30.00     Pack years: 7.50     Types: Cigarettes    Smokeless tobacco: Never    Tobacco comments:     quit smoking mia 04/04/16 Vaping Use    Vaping Use: Every day    Substances: Never   Substance Use Topics    Alcohol use: No     Alcohol/week: 0.0 standard drinks     Comment:                                    CAFFEINE: 2 sodas daily    Drug use: No       ALLERGIES    Allergies   Allergen Reactions    Latex     Codeine     Cortisone     Cortizone     Iodides     Phenobarbital Other (See Comments)     Hallucinations     Vancomycin Other (See Comments)     \"makes me very sick\"       MEDICATIONS    Current Outpatient Medications on File Prior to Encounter   Medication Sig Dispense Refill    ciprofloxacin (CIPRO) 250 MG tablet Take 1 tablet by mouth 2 times daily for 7 days 14 tablet 0    doxycycline hyclate (VIBRA-TABS) 100 MG tablet Take 1 tablet by mouth 2 times daily for 7 days 14 tablet 0    mineral oil-hydrophilic petrolatum (AQUAPHOR) ointment Apply topically as needed. 396 g 0    cephALEXin (KEFLEX) 500 MG capsule Take 500 mg by mouth 2 times daily      OXYGEN Inhale 2 L into the lungs daily as needed      carvedilol (COREG) 6.25 MG tablet 6.25 mg 2 times daily       nitroGLYCERIN (NITROSTAT) 0.4 MG SL tablet Place 1 tablet under the tongue every 5 minutes as needed for Chest pain 25 tablet 3    VICTOZA 18 MG/3ML SOPN SC injection daily       albuterol sulfate HFA (PROVENTIL;VENTOLIN;PROAIR) 108 (90 Base) MCG/ACT inhaler Inhale 2 puffs into the lungs every 6 hours as needed for Wheezing      esomeprazole Magnesium (NEXIUM) 40 MG PACK Take 20 mg by mouth daily. 2 tabs      oxyCODONE-acetaminophen (PERCOCET)  MG per tablet Take 1 tablet by mouth every 6 hours as needed for Pain. LORazepam (ATIVAN) 0.5 MG tablet Take 0.5 mg by mouth every 12 hours Indications: One Tablet twice daily       Insulin Aspart (NOVOLOG FLEXPEN SC) Inject 15 Units into the skin 3 times daily (before meals) On sliding scale      pravastatin (PRAVACHOL) 40 MG tablet 40 mg daily.       lisinopril (PRINIVIL;ZESTRIL) 10 MG tablet Take 20 mg by mouth daily insulin glargine (LANTUS) 100 UNIT/ML injection Inject 50 Units into the skin nightly. furosemide (LASIX) 20 MG tablet Take 40 mg by mouth 2 times daily       gabapentin (NEURONTIN) 400 MG capsule Take 1,600 mg by mouth 3 times daily        No current facility-administered medications on file prior to encounter. REVIEW OF SYSTEMS    Pertinent items are noted in HPI. Constitutional: Negative for systemic symptoms including fever, chills and malaise. Objective: There were no vitals taken for this visit. PHYSICAL EXAM      General: The patient is in no acute distress. Mental status:  Patient is appropriate, is  oriented to place and plan of care. Dermatologic exam: Visual inspection of the periwound reveals the skin to be moist and edematous  Wound exam: see wound description below in procedure note      Assessment:     Problem List Items Addressed This Visit       Diabetic ulcer of right heel associated with type 2 diabetes mellitus, with muscle involvement without evidence of necrosis ([facility] - Primary    Relevant Orders    Initiate Outpatient Wound Care Protocol    Acute pain of right foot    Relevant Orders    Initiate Outpatient Wound Care Protocol     Procedure Note    Indications:  Based on my examination of this patient's wound(s) today, sharp excision into necrotic muscle/fascia is required to promote healing and evaluate the extent of previous healing. Performed by: Shabbir Krishnamurthy MD    Consent obtained: Yes    Time out taken:  Yes    Pain Control:       Debridement:Excisional Debridement    Using #15 blade scalpel, scissors, and forceps the wound(s) was/were sharply debrided down through and including the removal of muscle/fascia.         Devitalized Tissue Debrided:  slough, necrotic/eschar, and exudate    Pre Debridement Measurements:  Are located in the Wound Documentation Flow Sheet    All active wounds listed below with today's date are evaluated  Wound(s) debrided this date include # : 1     Post  Debridement Measurements:  Negative Pressure Wound Therapy Foot (Active)   Number of days: 3139       Negative Pressure Wound Therapy Foot Distal (Active)   Number of days: 7873       Incision 06/10/13 Wrist Right (Active)   Number of days: 5972       Incision 07/29/13 Wrist Left (Active)   Number of days: 3340       Incision 02/14/14 Toe (Comment  which one) (Active)   Number of days: 6608       Wound 09/13/22 #1 Right Foot Heel (Active)   Wound Image   09/20/22 0927   Dressing Status New dressing applied;Clean;Dry 09/20/22 1006   Wound Cleansed Wound cleanser 09/20/22 0927   Offloading for Diabetic Foot Ulcers Back offloading shoe 09/20/22 0927   Wound Length (cm) 3.5 cm 09/20/22 0927   Wound Width (cm) 3.7 cm 09/20/22 0927   Wound Depth (cm) 0.1 cm 09/20/22 0927   Wound Surface Area (cm^2) 12.95 cm^2 09/20/22 0927   Change in Wound Size % (l*w) 65.65 09/20/22 0927   Wound Volume (cm^3) 1.295 cm^3 09/20/22 0927   Wound Healing % 89 09/20/22 0927   Post-Procedure Length (cm) 3.5 cm 09/20/22 0949   Post-Procedure Width (cm) 3.7 cm 09/20/22 0949   Post-Procedure Depth (cm) 0.1 cm 09/20/22 0949   Post-Procedure Surface Area (cm^2) 12.95 cm^2 09/20/22 0949   Post-Procedure Volume (cm^3) 1.295 cm^3 09/20/22 0949   Distance Tunneling (cm) 0 cm 09/20/22 0927   Tunneling Position ___ O'Clock 0 09/20/22 0927   Undermining Starts ___ O'Clock 0 09/20/22 0927   Undermining Ends___ O'Clock 0 09/20/22 0927   Undermining Maxium Distance (cm) 0 09/20/22 0927   Wound Assessment Dry;Eschar dry 09/20/22 0927   Drainage Amount Moderate 09/20/22 0927   Drainage Description Yellow;Serosanguinous 09/20/22 0927   Odor None 09/20/22 0927   Lora-wound Assessment Dry/flaky;Fragile 09/20/22 0927   Margins Defined edges 09/20/22 0927   Wound Thickness Description not for Pressure Injury Full thickness 09/20/22 0927   Number of days: 7       Percent of Wound(s) Debrided: approximately 100%    Total Area  Debrided:  13 sq cm     Bleeding:  Minimal    Hemostasis Achieved:  by pressure and by silver nitrate stick    Procedural Pain:  0  / 10     Post Procedural Pain:  0 / 10     Response to treatment:  Well tolerated by patient. Status of wound progress and description from last visit:   Has worsened. Plan:       Discharge Instructions         PHYSICIAN ORDERS AND DISCHARGE INSTRUCTIONS  NOTE: Upon discharge from the [address], you will receive a patient experience survey via E-mail. We would be grateful if you would take the time to fill this survey out. Wound care order history:              KATHLEEN's   Right       Left    Date               Vascular studies/Intervention: . Cultures: .9/13/22               Antibiotics: .Cipro and Doxy 9/13/22                         HbA1c:  .6/29/22 8.2               Grafts: Gabriela Mcdonald: . Continuing wound care orders and information:              Residence: . Private              Continue home health care with: . N/A               Your wound-care supplies will be provided by: . [facility]              Pharmacy: [facility] in [place]  Wound cleansing:                           Do not scrub or use excessive force. Wash hands with soap and water before and after dressing changes. Prior to applying a clean dressing, cleanse wound with normal saline,                          wound cleanser, or mild soap and water. Ask your physician or nurse before getting the wound(s) wet in the shower. Daily Wound management:                          Keep weight off wounds and reposition every 2 hours. Avoid standing for long periods of time. Evaluate legs to the level of the heart or above for 30 minutes 4-5 times a day and/or when sitting.                                                When taking antibiotics take entire prescription as ordered by MD

## 2022-09-20 NOTE — ED PROVIDER NOTES
I independently examined and evaluated Caitlin Brizuela. I personally saw the patient and performed a substantive portion of the visit including all aspects of the medical decision making. In brief their history revealed patient is here with worsening right heel ulcer for the past month. Patient has been working with general surgery for this. Patient was evaluated today and sent in for need for wound VAC and further inpatient care as she is failing outpatient management. Patient is working with Dr. Rico Haines. No fevers. Patient does still have some sensation into the foot with some increasing pain. Patient otherwise is with neuropathy which is at baseline. Their focused exam revealed the patient is afebrile and hemodynamically stable on room air. The patient appears age appropriate, appears well-hydrated, well-nourished. Laying comfortably in bed watching TV. Mucous membranes are moist. Speech is clear. Breathing is unlabored. Speaking clearly in full sentences. Skin is dry. 4.5 x 6 cm deep R heel dm ulcer. There is surrounding erythema and induration. No crepitance. No lymphangitic streaking. Mental status is normal. The patient moves all extremities and is without facial droop. ED course: Pt presents as above. Emergent conditions considered. Presentation prompted initial labs and imaging. IVs established and IV Ancef and Flagyl are given based on prior culture sensitivity. Blood cultures are sent prior to antibiotic administration. Patient is overall hemodynamically stable and other than leukocytosis does not meet SIRS criteria. I have low suspicion for sepsis at this time. Patient's general surgery team is on board. Patient is amendable to plan for admission for the above. Case discussed with hospitalist, and patient admitted to medicine. Questions sought and answered with the patient. They voice understanding and agree with plan.  Instructed to return for any worsening or worrisome concerns. Is this patient to be included in the SEP-1 Core Measure due to severe sepsis or septic shock? No   Exclusion criteria - the patient is NOT to be included for SEP-1 Core Measure due to:  2+ SIRS criteria are not met        All decisions regarding differential diagnosis, lab/radiology/EKG interpretation, risk of significant illness, specific reasons for performing tests, management/treatment, response to management/treatment, disposition, reasons for consults, results of consults, etc. were made by myself in conjunction with the Advanced Practice Provider. For all further details of the patient's emergency department visit, please see the Advanced Practice Provider's documentation.        Florin Srinivasan MD  09/20/22 4112

## 2022-09-21 ENCOUNTER — APPOINTMENT (OUTPATIENT)
Dept: ULTRASOUND IMAGING | Age: 57
DRG: 638 | End: 2022-09-21
Payer: MEDICARE

## 2022-09-21 PROBLEM — Z99.2 ESRD ON HEMODIALYSIS (HCC): Status: ACTIVE | Noted: 2022-09-21

## 2022-09-21 PROBLEM — N18.6 ESRD ON HEMODIALYSIS (HCC): Status: ACTIVE | Noted: 2022-09-21

## 2022-09-21 LAB
ANION GAP SERPL CALCULATED.3IONS-SCNC: 12 MMOL/L (ref 4–16)
BASOPHILS ABSOLUTE: 0 K/CU MM
BASOPHILS RELATIVE PERCENT: 0.4 % (ref 0–1)
BUN BLDV-MCNC: 25 MG/DL (ref 6–23)
CALCIUM SERPL-MCNC: 8.6 MG/DL (ref 8.3–10.6)
CHLORIDE BLD-SCNC: 96 MMOL/L (ref 99–110)
CO2: 29 MMOL/L (ref 21–32)
CREAT SERPL-MCNC: 8.4 MG/DL (ref 0.6–1.1)
DIFFERENTIAL TYPE: ABNORMAL
EOSINOPHILS ABSOLUTE: 0.3 K/CU MM
EOSINOPHILS RELATIVE PERCENT: 2.3 % (ref 0–3)
ERYTHROCYTE SEDIMENTATION RATE: 79 MM/HR (ref 0–30)
GFR AFRICAN AMERICAN: 6 ML/MIN/1.73M2
GFR NON-AFRICAN AMERICAN: 5 ML/MIN/1.73M2
GLUCOSE BLD-MCNC: 100 MG/DL (ref 70–99)
GLUCOSE BLD-MCNC: 106 MG/DL (ref 70–99)
GLUCOSE BLD-MCNC: 106 MG/DL (ref 70–99)
GLUCOSE BLD-MCNC: 107 MG/DL (ref 70–99)
GLUCOSE BLD-MCNC: 166 MG/DL (ref 70–99)
GLUCOSE BLD-MCNC: 168 MG/DL (ref 70–99)
GLUCOSE BLD-MCNC: 171 MG/DL (ref 70–99)
HCT VFR BLD CALC: 33.4 % (ref 37–47)
HEMOGLOBIN: 10.9 GM/DL (ref 12.5–16)
HIGH SENSITIVE C-REACTIVE PROTEIN: 137.6 MG/L (ref 0–5)
IMMATURE NEUTROPHIL %: 0.9 % (ref 0–0.43)
INR BLD: 1.11 INDEX
LYMPHOCYTES ABSOLUTE: 1.6 K/CU MM
LYMPHOCYTES RELATIVE PERCENT: 14.8 % (ref 24–44)
MCH RBC QN AUTO: 34.1 PG (ref 27–31)
MCHC RBC AUTO-ENTMCNC: 32.6 % (ref 32–36)
MCV RBC AUTO: 104.4 FL (ref 78–100)
MONOCYTES ABSOLUTE: 1.3 K/CU MM
MONOCYTES RELATIVE PERCENT: 12.4 % (ref 0–4)
NUCLEATED RBC %: 0 %
PDW BLD-RTO: 12.1 % (ref 11.7–14.9)
PLATELET # BLD: 262 K/CU MM (ref 140–440)
PMV BLD AUTO: 10.3 FL (ref 7.5–11.1)
POTASSIUM SERPL-SCNC: 3.8 MMOL/L (ref 3.5–5.1)
PROTHROMBIN TIME: 14.3 SECONDS (ref 11.7–14.5)
RBC # BLD: 3.2 M/CU MM (ref 4.2–5.4)
SEGMENTED NEUTROPHILS ABSOLUTE COUNT: 7.4 K/CU MM
SEGMENTED NEUTROPHILS RELATIVE PERCENT: 69.2 % (ref 36–66)
SODIUM BLD-SCNC: 137 MMOL/L (ref 135–145)
TOTAL IMMATURE NEUTOROPHIL: 0.1 K/CU MM
TOTAL NUCLEATED RBC: 0 K/CU MM
WBC # BLD: 10.7 K/CU MM (ref 4–10.5)

## 2022-09-21 PROCEDURE — 97605 NEG PRS WND THER DME<=50SQCM: CPT

## 2022-09-21 PROCEDURE — 85610 PROTHROMBIN TIME: CPT

## 2022-09-21 PROCEDURE — 82962 GLUCOSE BLOOD TEST: CPT

## 2022-09-21 PROCEDURE — 85025 COMPLETE CBC W/AUTO DIFF WBC: CPT

## 2022-09-21 PROCEDURE — 93925 LOWER EXTREMITY STUDY: CPT

## 2022-09-21 PROCEDURE — 1200000000 HC SEMI PRIVATE

## 2022-09-21 PROCEDURE — 6360000002 HC RX W HCPCS: Performed by: STUDENT IN AN ORGANIZED HEALTH CARE EDUCATION/TRAINING PROGRAM

## 2022-09-21 PROCEDURE — 80048 BASIC METABOLIC PNL TOTAL CA: CPT

## 2022-09-21 PROCEDURE — 2580000003 HC RX 258: Performed by: STUDENT IN AN ORGANIZED HEALTH CARE EDUCATION/TRAINING PROGRAM

## 2022-09-21 PROCEDURE — 6360000002 HC RX W HCPCS: Performed by: NURSE PRACTITIONER

## 2022-09-21 PROCEDURE — 6370000000 HC RX 637 (ALT 250 FOR IP): Performed by: STUDENT IN AN ORGANIZED HEALTH CARE EDUCATION/TRAINING PROGRAM

## 2022-09-21 PROCEDURE — 36415 COLL VENOUS BLD VENIPUNCTURE: CPT

## 2022-09-21 RX ORDER — LORAZEPAM 0.5 MG/1
0.5 TABLET ORAL EVERY 12 HOURS PRN
Status: DISCONTINUED | OUTPATIENT
Start: 2022-09-21 | End: 2022-09-25 | Stop reason: HOSPADM

## 2022-09-21 RX ADMIN — FUROSEMIDE 40 MG: 40 TABLET ORAL at 09:38

## 2022-09-21 RX ADMIN — HEPARIN SODIUM 5000 UNITS: 5000 INJECTION INTRAVENOUS; SUBCUTANEOUS at 23:45

## 2022-09-21 RX ADMIN — FUROSEMIDE 40 MG: 40 TABLET ORAL at 20:05

## 2022-09-21 RX ADMIN — SODIUM CHLORIDE, PRESERVATIVE FREE 10 ML: 5 INJECTION INTRAVENOUS at 09:44

## 2022-09-21 RX ADMIN — OXYCODONE HYDROCHLORIDE AND ACETAMINOPHEN 2 TABLET: 5; 325 TABLET ORAL at 09:52

## 2022-09-21 RX ADMIN — OXYCODONE HYDROCHLORIDE AND ACETAMINOPHEN 2 TABLET: 5; 325 TABLET ORAL at 20:03

## 2022-09-21 RX ADMIN — LINEZOLID 600 MG: 600 INJECTION, SOLUTION INTRAVENOUS at 14:07

## 2022-09-21 RX ADMIN — OXYCODONE HYDROCHLORIDE AND ACETAMINOPHEN 2 TABLET: 5; 325 TABLET ORAL at 01:38

## 2022-09-21 RX ADMIN — GABAPENTIN 400 MG: 400 CAPSULE ORAL at 20:03

## 2022-09-21 RX ADMIN — PIPERACILLIN AND TAZOBACTAM 2250 MG: 2; .25 INJECTION, POWDER, LYOPHILIZED, FOR SOLUTION INTRAVENOUS at 09:43

## 2022-09-21 RX ADMIN — HEPARIN SODIUM 5000 UNITS: 5000 INJECTION INTRAVENOUS; SUBCUTANEOUS at 14:08

## 2022-09-21 RX ADMIN — PIPERACILLIN AND TAZOBACTAM 2250 MG: 2; .25 INJECTION, POWDER, LYOPHILIZED, FOR SOLUTION INTRAVENOUS at 17:23

## 2022-09-21 RX ADMIN — GABAPENTIN 400 MG: 400 CAPSULE ORAL at 14:08

## 2022-09-21 RX ADMIN — SODIUM CHLORIDE: 9 INJECTION, SOLUTION INTRAVENOUS at 17:21

## 2022-09-21 RX ADMIN — GABAPENTIN 400 MG: 400 CAPSULE ORAL at 09:38

## 2022-09-21 RX ADMIN — SODIUM CHLORIDE: 9 INJECTION, SOLUTION INTRAVENOUS at 09:40

## 2022-09-21 RX ADMIN — CEFEPIME HYDROCHLORIDE 1000 MG: 1 INJECTION, POWDER, FOR SOLUTION INTRAMUSCULAR; INTRAVENOUS at 00:17

## 2022-09-21 RX ADMIN — LINEZOLID 600 MG: 600 INJECTION, SOLUTION INTRAVENOUS at 01:00

## 2022-09-21 RX ADMIN — CARVEDILOL 6.25 MG: 6.25 TABLET, FILM COATED ORAL at 09:38

## 2022-09-21 RX ADMIN — OXYCODONE HYDROCHLORIDE AND ACETAMINOPHEN 2 TABLET: 5; 325 TABLET ORAL at 14:15

## 2022-09-21 RX ADMIN — PRAVASTATIN SODIUM 40 MG: 40 TABLET ORAL at 20:05

## 2022-09-21 RX ADMIN — PANTOPRAZOLE SODIUM 40 MG: 20 TABLET, DELAYED RELEASE ORAL at 06:20

## 2022-09-21 RX ADMIN — ASPIRIN 81 MG CHEWABLE TABLET 81 MG: 81 TABLET CHEWABLE at 09:38

## 2022-09-21 RX ADMIN — SODIUM CHLORIDE, PRESERVATIVE FREE 10 ML: 5 INJECTION INTRAVENOUS at 20:08

## 2022-09-21 RX ADMIN — HEPARIN SODIUM 5000 UNITS: 5000 INJECTION INTRAVENOUS; SUBCUTANEOUS at 06:20

## 2022-09-21 ASSESSMENT — ENCOUNTER SYMPTOMS
WHEEZING: 0
SHORTNESS OF BREATH: 0
DIARRHEA: 0
COLOR CHANGE: 0
SINUS PAIN: 0
VOMITING: 0
SINUS PRESSURE: 0
CONSTIPATION: 0
NAUSEA: 0
ABDOMINAL PAIN: 0
SORE THROAT: 0
COUGH: 0
BACK PAIN: 0

## 2022-09-21 ASSESSMENT — PAIN DESCRIPTION - ORIENTATION
ORIENTATION: RIGHT
ORIENTATION: RIGHT
ORIENTATION: LEFT;RIGHT

## 2022-09-21 ASSESSMENT — PAIN - FUNCTIONAL ASSESSMENT
PAIN_FUNCTIONAL_ASSESSMENT: ACTIVITIES ARE NOT PREVENTED
PAIN_FUNCTIONAL_ASSESSMENT: PREVENTS OR INTERFERES SOME ACTIVE ACTIVITIES AND ADLS

## 2022-09-21 ASSESSMENT — PAIN SCALES - GENERAL
PAINLEVEL_OUTOF10: 2
PAINLEVEL_OUTOF10: 7
PAINLEVEL_OUTOF10: 9
PAINLEVEL_OUTOF10: 8
PAINLEVEL_OUTOF10: 6
PAINLEVEL_OUTOF10: 6
PAINLEVEL_OUTOF10: 5

## 2022-09-21 ASSESSMENT — PAIN DESCRIPTION - LOCATION
LOCATION: OTHER (COMMENT)
LOCATION: LEG
LOCATION: OTHER (COMMENT)
LOCATION: BACK

## 2022-09-21 ASSESSMENT — PAIN DESCRIPTION - DESCRIPTORS
DESCRIPTORS: SHARP;STABBING;THROBBING
DESCRIPTORS: CRAMPING;DISCOMFORT
DESCRIPTORS: THROBBING

## 2022-09-21 NOTE — CONSULTS
[facility] Continence Nurse  Consult Note       Rosas Ledezma  AGE: 62 y.o. GENDER: female  : 1965  TODAY'S DATE:  2022    Subjective:     Reason for  Evaluation and Assessment wound care eval for irrigating NPWT placement rt heel. Rosas Ledezma is a 62 y.o. female referred by:   [x] Physician  [] Nursing  [] Other:     Wound Identification:  Wound Type: diabetic and pressure  Contributing Factors: diabetes and chronic pressure        PAST MEDICAL HISTORY        Diagnosis Date    Acute pain of right foot 2022    CAD (coronary artery disease)     s/p CABG x 4;  follows with Dr Krysten Bhatti of foot 2018    Carpal tunnel syndrome on both sides     CHF (congestive heart failure) ([address] 10/2010    Chronic diastolic; EF 50%    Chronic kidney disease     stage 4 kidney disease    Chronic ulcer of left ankle with fat layer exposed ([address] 10/7/2015    Chronic ulcer of left foot with fat layer exposed ([address] 3/17/2016    Chronic ulcer of right ankle with fat layer exposed ([address] 3/17/2016    Chronic ulcer of right foot with fat layer exposed ([address] 3/17/2016    Depression     Diabetes mellitus ([address]     Diabetes mellitus with neurological manifestations ([address]     Diabetes mellitus with ulcer of ankle ([address]     Diabetic ulcer of left foot associated with type 2 diabetes mellitus, with fat layer exposed ([address] 2018    Diabetic ulcer of right heel associated with type 2 diabetes mellitus, with muscle involvement without evidence of necrosis ([address] 2022    ESRD on hemodialysis ([address] 2022    Family history of cardiovascular disease     Mother    GERD (gastroesophageal reflux disease)     H/O cardiac catheterization 10/18/2010, 6/3/2010    10/18/2010-Four bypass grafts all widely patent. 6/3/2010- Moderate to severe triple vessel disease, preserved LV systolic function.     H/O cardiovascular stress test 2012, 8/3/2011, 10/14/2010, 2010-Lexiscan- Normal Myocardial Perfusion Study. Post stress myocardial perfusion images show a normal pattern of perfusion in all regions. Post stress LV normal size. Normal perfusion in the distribution of all coronaries. Normal LV size and function. Rest EF 70%    H/O chest x-ray 7/12/2012, 6/2/2010 7/12/2012-Perihilar peribronchial cuffing with mild basilar predominant interstital prominence, which may reflect interstitial edema or atypical pneumonia. H/O Doppler ultrasound 6/4/2010    CAROTID DOPPLER-6/4/2010-No Doppler evidence of hemodynamically significant Carotid Stenosis with antegrade flow in the vertebral arteries bilaterally. 6/4/2010 PERIPHERAL VENOUS DOPPLER_ LEFT Saphenous Vein mapping    H/O echocardiogram 7/26/2012, 8/3/2011, 10/2010, 7/23/2010, 6/8/2010 7/26/2012-LV normal size. Normal LV wall thickness. LV systolic function normal. EF => 55%. LV wall motion normal. Mild MR. Mild TR. History of complete ECG 7/26/2012 Robby Joaquin), 7/13/2012 [facility]), 7/25/2011, 3/25/2011, 10/18/2010, 10/11/2010, 6/23/2010, 5/7/2010    Hx of cardiovascular stress test 9/3/2015    lexiscan-normal,EF66%    Hx of Doppler ultrasound 4/5/16    Arterial: There is a fluid collection in the left thigh, please refer to PCP for further monitoring, no vascular in etiology. Hx of echocardiogram     4/16 EF40% Mild MR/TR and mild Pulm HTN. 9/15 EF 45-50%, Mildly dilated L atrium, mildly dilated R ventricle. Mild MR & mild TR     Hyperlipidemia     Neuropathy of foot     Pt reports starting approx. 6-7 years ago    Neuropathy of hand     Pt reports starting approx.  6-7 years ago    Non compliance with medical treatment 7/2/2018    S/P CABG x 4 6/5/2010    LIMA-> LAD;  VG->CX;  VG->Diagonal; VG-> distal RCA  per  Dr Malone Proffer    Type 2 diabetes mellitus with diabetic polyneuropathy, with long-term current use of insulin ([facility] 4/5/2016    Type II or unspecified type diabetes mellitus with neurological manifestations, not stated as Encounter   Medication Sig Dispense Refill    SITagliptin (JANUVIA) 25 MG tablet Take 25 mg by mouth daily      mineral oil-hydrophilic petrolatum (AQUAPHOR) ointment Apply topically as needed. (Patient not taking: Reported on 9/21/2022) 396 g 0    cephALEXin (KEFLEX) 500 MG capsule Take 500 mg by mouth 2 times daily (Patient not taking: Reported on 9/21/2022)      OXYGEN Inhale 2 L into the lungs daily as needed      carvedilol (COREG) 6.25 MG tablet 6.25 mg 2 times daily       nitroGLYCERIN (NITROSTAT) 0.4 MG SL tablet Place 1 tablet under the tongue every 5 minutes as needed for Chest pain 25 tablet 3    VICTOZA 18 MG/3ML SOPN SC injection daily  (Patient not taking: Reported on 9/21/2022)      albuterol sulfate HFA (PROVENTIL;VENTOLIN;PROAIR) 108 (90 Base) MCG/ACT inhaler Inhale 2 puffs into the lungs every 6 hours as needed for Wheezing      esomeprazole Magnesium (NEXIUM) 40 MG PACK Take 20 mg by mouth 2 times daily 2 tabs      oxyCODONE-acetaminophen (PERCOCET)  MG per tablet Take 1 tablet by mouth every 6 hours as needed for Pain. LORazepam (ATIVAN) 0.5 MG tablet Take 0.5 mg by mouth every 12 hours Indications: One Tablet twice daily       Insulin Aspart (NOVOLOG FLEXPEN SC) Inject 15 Units into the skin 3 times daily (before meals) On sliding scale (Patient not taking: Reported on 9/21/2022)      pravastatin (PRAVACHOL) 40 MG tablet 40 mg daily. lisinopril (PRINIVIL;ZESTRIL) 10 MG tablet Take 20 mg by mouth daily  (Patient not taking: Reported on 9/21/2022)      insulin glargine (LANTUS) 100 UNIT/ML injection Inject 50 Units into the skin nightly.       furosemide (LASIX) 20 MG tablet Take 40 mg by mouth 2 times daily       gabapentin (NEURONTIN) 400 MG capsule Take 1,600 mg by mouth 3 times daily            Objective:      BP (!) 128/50   Pulse 68   Temp 97.9 °F (36.6 °C)   Resp 15   Ht 5' 3\" (1.6 m)   Wt 157 lb 10.1 oz (71.5 kg)   SpO2 93%   BMI 27.92 kg/m²   Grady Risk Score: Grady Scale Score: 20    LABS    CBC:   Lab Results   Component Value Date/Time    WBC 10.7 09/21/2022 06:10 AM    RBC 3.20 09/21/2022 06:10 AM    HGB 10.9 09/21/2022 06:10 AM    HCT 33.4 09/21/2022 06:10 AM    .4 09/21/2022 06:10 AM    MCH 34.1 09/21/2022 06:10 AM    MCHC 32.6 09/21/2022 06:10 AM    RDW 12.1 09/21/2022 06:10 AM     09/21/2022 06:10 AM    MPV 10.3 09/21/2022 06:10 AM     CMP:    Lab Results   Component Value Date/Time     09/21/2022 06:10 AM    K 3.8 09/21/2022 06:10 AM    CL 96 09/21/2022 06:10 AM    CO2 29 09/21/2022 06:10 AM    BUN 25 09/21/2022 06:10 AM    CREATININE 8.4 09/21/2022 06:10 AM    GFRAA 6 09/21/2022 06:10 AM    LABGLOM 5 09/21/2022 06:10 AM    GLUCOSE 107 09/21/2022 06:10 AM    PROT 7.4 09/20/2022 03:10 PM    PROT 6.2 02/01/2013 07:55 AM    LABALBU 3.8 09/20/2022 03:10 PM    CALCIUM 8.6 09/21/2022 06:10 AM    BILITOT 0.4 09/20/2022 03:10 PM    ALKPHOS 108 09/20/2022 03:10 PM    AST 12 09/20/2022 03:10 PM    ALT 6 09/20/2022 03:10 PM     Albumin:    Lab Results   Component Value Date/Time    LABALBU 3.8 09/20/2022 03:10 PM     PT/INR:    Lab Results   Component Value Date/Time    PROTIME 14.3 09/21/2022 06:10 AM    PROTIME 10.6 10/18/2010 11:21 AM    INR 1.11 09/21/2022 06:10 AM     HgBA1c:    Lab Results   Component Value Date/Time    LABA1C 13.6 10/15/2014 09:56 AM         Assessment:     Patient Active Problem List   Diagnosis    CAD (coronary artery disease)    Hyperlipidemia    Diabetes mellitus (HCC)    Chest pain    Hoarseness of voice    Fracture of metacarpal bone    Wrist sprain    History of tobacco abuse    Wrist pain    Carpal tunnel syndrome    Cubital tunnel syndrome    Trigger finger    S/P CABG x 4    Diabetic foot ulcer (HCC)    Diabetic neuropathy ([facility]    Diabetes mellitus type 2 with complications (HCC)    Claudication of both lower extremities (HCC)    SOB (shortness of breath)    Bilateral leg edema    SOB (shortness of breath)    Type 2 diabetes mellitus without complication ([facility]    Coronary artery disease involving coronary bypass graft of native heart with unstable angina pectoris ([facility])    S/P cardiac cath    Chronic renal failure, stage 2 (mild)    Chronic kidney disease (CKD) stage G4/A3, severely decreased glomerular filtration rate (GFR) between 15-29 mL/min/1.73 square meter and albuminuria creatinine ratio greater than 300 mg/g ([facility])    DM (diabetes mellitus) ([facility]    Coronary atherosclerosis    HTN (hypertension)    Proteinuria    Callus of foot    Non compliance with medical treatment    Diabetic ulcer of left foot associated with type 2 diabetes mellitus, with fat layer exposed ([facility]    Decubitus ulcer of sacral region, stage 1    Chronic renal failure, stage 4 (severe) ([facility])    Problem with dialysis access [facility])    WD-Presence of surgical incision    Diabetic ulcer of right heel associated with type 2 diabetes mellitus, with muscle involvement without evidence of necrosis ([facility])    Acute pain of right foot    Wound, open, foot with complication, left, sequela    ESRD on hemodialysis ([facility]       Measurements:  Negative Pressure Wound Therapy Foot (Active)   Number of days: 3140       Negative Pressure Wound Therapy Foot Distal (Active)   Number of days: 3136       Negative Pressure Wound Therapy (Active)   Wound Type Diabetic foot ulcer 09/21/22 1421   Unit Type kci 09/21/22 1421   Dressing Type Other (Comment) 09/21/22 1421   Number of pieces used 4 09/21/22 1421   Cycle Continuous 09/21/22 1421   Target Pressure (mmHg) 125 09/21/22 1421   Irrigation Solution Sodium chloride 0.9% 09/21/22 1421   Instillation Volume  8 mL 09/21/22 1421   Soak Time  2 minutes 09/21/22 1421   Vac Frequency 2 hours 09/21/22 1421   Canister changed?  Yes 09/21/22 1421   Dressing Changed Changed/New 09/21/22 1421   Drainage Amount Moderate 09/21/22 1421   Drainage Description Serosanguinous 09/21/22 1421   Dressing Change Due 09/23/22 09/21/22 1421 Lora-wound Assessment Fragile 09/21/22 1421   Odor None 09/21/22 1421   Number of days: 0       Incision 06/10/13 Wrist Right (Active)   Number of days: 3390       Incision 07/29/13 Wrist Left (Active)   Number of days: 3341       Incision 02/14/14 Toe (Comment  which one) (Active)   Number of days: 3140       Wound 09/13/22 #1 Right Foot Heel (Active)   Wound Image   09/21/22 1421   Wound Etiology Diabetic 09/21/22 1421   Dressing Status New dressing applied 09/21/22 1421   Wound Cleansed Cleansed with saline 09/21/22 1421   Dressing/Treatment Negative pressure wound therapy 09/21/22 1421   Offloading for Diabetic Foot Ulcers Back offloading shoe 09/20/22 0927   Dressing Change Due 09/23/22 09/21/22 1421   Wound Length (cm) 5 cm 09/21/22 1421   Wound Width (cm) 3.2 cm 09/21/22 1421   Wound Depth (cm) 1 cm 09/21/22 1421   Wound Surface Area (cm^2) 16 cm^2 09/21/22 1421   Change in Wound Size % (l*w) 57.56 09/21/22 1421   Wound Volume (cm^3) 16 cm^3 09/21/22 1421   Wound Healing % -41 09/21/22 1421   Post-Procedure Length (cm) 3.5 cm 09/20/22 0949   Post-Procedure Width (cm) 3.7 cm 09/20/22 0949   Post-Procedure Depth (cm) 0.1 cm 09/20/22 0949   Post-Procedure Surface Area (cm^2) 12.95 cm^2 09/20/22 0949   Post-Procedure Volume (cm^3) 1.295 cm^3 09/20/22 0949   Distance Tunneling (cm) 0 cm 09/21/22 1421   Tunneling Position ___ O'Clock 0 09/21/22 1421   Undermining Starts ___ O'Clock 0 09/21/22 1421   Undermining Ends___ O'Clock 0 09/21/22 1421   Undermining Maxium Distance (cm) 0 09/21/22 1421   Wound Assessment Eschar dry 09/21/22 1421   Drainage Amount Moderate 09/21/22 1421   Drainage Description Serosanguinous 09/21/22 1421   Odor None 09/21/22 1421   Lora-wound Assessment Dry/flaky;Fragile 09/21/22 1421   Margins Defined edges 09/21/22 1421   Wound Thickness Description not for Pressure Injury Full thickness 09/21/22 1421   Number of days: 8       Response to treatment:  With complaints of pain.      Pain Assessment:  Severity:  moderate  Quality of pain: sore  Wound Pain Timing/Severity: wound care  Premedicated: yes    Plan:     Plan of Care: Wound 09/13/22 #1 Right Foot Heel-Dressing/Treatment: Negative pressure wound therapy (veraflo-cleansechoice x 4 pieces)    Patient in bed nurse at bedside administering pain medication. Pt agreeable to wound care kaylah/irrigating vac placement. Pt has a rt heel wound diabetic/pressure chronic has been going to the wound clinic treated by Dr Shalom Hall had a debridement yesterday. Dr Shalom Hall asked wound care to apply irrigating wound vac. Cleansed with NS. Measured and pictured. Wound with eschar/brown tissue. Applied veraflo-cleanse choice dressing x 4 pieces bridged to rt dorsal foot adequate seal obtained @ 125mmhg continuous. Settings 8ml ns soak for minutes every 2 hrs. Wound care to change again on Friday. Pt is generally not at risk for skin breakdown AEB guerda. Specialty Bed Required : no  [] Low Air Loss   [] Pressure Redistribution  [] Fluid Immersion  [] Bariatric  [] Total Pressure Relief  [] Other:     Discharge Plan:  Placement for patient upon discharge: tbd  Hospice Care: no  Patient appropriate for Outpatient [address]: yes    Patient/Caregiver Teaching:  Level of patient/caregiver understanding able to: pt voiced understanding. Electronically signed by Ilene Edmondson RN,  on 9/21/2022 at 3:38 PM

## 2022-09-21 NOTE — PROGRESS NOTES
V2.0  [facility] Hospitalist Progress Note      Name:  Ottoniel Vincent /Age/Sex: 1965  (62 y.o. female)   MRN & CSN:  [id number] & [id number] Encounter Date/Time: 2022 7:22 AM EDT    Location:  1222/9154-V PCP: Vane Molina MD       Hospital Day: 2    Subjective:     Chief Complaint: infected wound. Sent from wound care center     Patient ok today. She is anxiously awaiting her wound vac placement. She denies chest pain, shortness of breath and abdominal pain at this time. Assessment and Plan:   Ottoniel Vincent is a 62 y.o. female with a pmh of ype 2 diabetes, ESRD on HD [], hypertension, GERD, Hyperlipidemia, CAD s/p CABG x4 in , neuropathy, COPD, and anxiety who presents with right heel wound. Plan:    Ottoniel Vincent is a 62 y.o. female with a pmh of ype 2 diabetes, ESRD on HD [], hypertension, GERD, Hyperlipidemia, CAD s/p CABG x4 in , neuropathy, COPD, and anxiety who presents with right heel wound. Right heel diabetic foot wound. Patient presents from her wound care clinic for admission and higher level wound care with wound VAC from her surgeon. General surgery consulted  IV antibiotics: Zosyn and Zyvox  Patient is allergic to Vancomycin  Trend  markers ordered  Trend WBC count     ESRD on HD []  Patient appears euvolemic  Nephrology consulted for inpatient dialysis     Hypertension  Blood pressure controlled  Coreg 6.5 twice daily  Lasix 40 twice daily     CAD s/p CABG x4 in   Patient with a complex cardiac history. Has no chest pain at this time. Continue home ASA and Statin     Type 2 diabetes  Per the patient's last PCP note on 2022 she takes 15 units of Lantus nightly and Januvia. Patient was having problems with hypoglycemia. Will start Lantus 10 units nightly  Medium dose sliding scale     Neuropathy  Gabapentin 400 3 times daily.   [Patient does take more this at home] GERD  Continue home Nexium     Hyperlipidemia  Continue statin     COPD, not in acute exacerbation  Patient is satting well on room air  As needed inhalers     Anxiety   Patient takes Ativan 0.5 twice daily  Continue as needed    Diet Diet NPO Exceptions are: Sips of Water with Meds, Ice Chips   DVT Prophylaxis [] Lovenox, [x]  Heparin, [] SCDs, [] Ambulation,    [] Eliquis, [] Xarelto  [] Coumadin [] other   Code Status Full Code   Disposition From: home  Expected Disposition: home  Estimated Date of Discharge: 2-3 days  Patient requires continued admission due to wound vac placement + IV Abx   Surrogate Decision Maker/ POA        Review of Systems:    Review of Systems   Constitutional:  Negative for activity change, appetite change, chills, fatigue and fever. HENT:  Negative for congestion, sinus pressure, sinus pain and sore throat. Eyes:  Negative for visual disturbance. Respiratory:  Negative for cough, shortness of breath and wheezing. Cardiovascular:  Negative for chest pain, palpitations and leg swelling. Gastrointestinal:  Negative for abdominal pain, constipation, diarrhea, nausea and vomiting. Endocrine: Negative for polydipsia and polyuria. Genitourinary:  Negative for dysuria. Musculoskeletal:  Negative for arthralgias and back pain. Skin:  Positive for wound. Negative for color change. Neurological:  Negative for dizziness, weakness and headaches. Psychiatric/Behavioral:  Negative for agitation, behavioral problems and confusion. Objective: Intake/Output Summary (Last 24 hours) at 9/21/2022 0722  Last data filed at 9/21/2022 0200  Gross per 24 hour   Intake 345.24 ml   Output 200 ml   Net 145.24 ml        Vitals:   Vitals:    09/21/22 0418   BP: (!) 112/49   Pulse: 69   Resp: 15   Temp: 98.4 °F (36.9 °C)   SpO2: 93%       Physical Exam:     Physical Exam  Vitals reviewed. Constitutional:       Appearance: Normal appearance. She is normal weight.    HENT: Head: Normocephalic and atraumatic. Mouth/Throat:      Mouth: Mucous membranes are moist.      Pharynx: Oropharynx is clear. Eyes:      Extraocular Movements: Extraocular movements intact. Conjunctiva/sclera: Conjunctivae normal.      Pupils: Pupils are equal, round, and reactive to light. Cardiovascular:      Rate and Rhythm: Normal rate and regular rhythm. Pulses: Normal pulses. Heart sounds: Normal heart sounds. Pulmonary:      Effort: Pulmonary effort is normal.      Breath sounds: Normal breath sounds. Abdominal:      General: Abdomen is flat. Bowel sounds are normal.      Palpations: Abdomen is soft. Musculoskeletal:         General: No swelling. Normal range of motion. Cervical back: Normal range of motion and neck supple. Feet:    Feet:      Right foot:      Skin integrity: Ulcer present. Comments: Wound on right heal  Skin:     General: Skin is warm and dry. Neurological:      General: No focal deficit present. Mental Status: She is alert and oriented to person, place, and time. Mental status is at baseline. Psychiatric:         Mood and Affect: Mood normal.         Behavior: Behavior normal.         Thought Content:  Thought content normal.         Judgment: Judgment normal.       Medications:   Medications:    carvedilol  6.25 mg Oral BID    pantoprazole  40 mg Oral QAM AC    insulin lispro  0-8 Units SubCUTAneous TID WC    insulin lispro  0-4 Units SubCUTAneous Nightly    LORazepam  0.5 mg Oral Q12H    pravastatin  40 mg Oral Nightly    sodium chloride flush  5-40 mL IntraVENous 2 times per day    heparin (porcine)  5,000 Units SubCUTAneous 3 times per day    cefepime  1,000 mg IntraVENous Q24H    furosemide  40 mg Oral BID    gabapentin  400 mg Oral TID    aspirin  81 mg Oral Daily    insulin glargine  10 Units SubCUTAneous Nightly    linezolid  600 mg IntraVENous Q12H      Infusions:    sodium chloride      dextrose       PRN Meds: albuterol sulfate HFA, 2 puff, Q6H PRN  oxyCODONE-acetaminophen, 1 tablet, Q4H PRN   Or  oxyCODONE-acetaminophen, 2 tablet, Q4H PRN  sodium chloride flush, 5-40 mL, PRN  sodium chloride, , PRN  ondansetron, 4 mg, Q8H PRN   Or  ondansetron, 4 mg, Q6H PRN  polyethylene glycol, 17 g, Daily PRN  acetaminophen, 650 mg, Q6H PRN   Or  acetaminophen, 650 mg, Q6H PRN  glucose, 4 tablet, PRN  dextrose bolus, 125 mL, PRN   Or  dextrose bolus, 250 mL, PRN  glucagon (rDNA), 1 mg, PRN  dextrose, , Continuous PRN        Labs      Recent Results (from the past 24 hour(s))   CBC with Auto Differential    Collection Time: 09/20/22  3:10 PM   Result Value Ref Range    WBC 14.8 (H) 4.0 - 10.5 K/CU MM    RBC 3.42 (L) 4.2 - 5.4 M/CU MM    Hemoglobin 11.8 (L) 12.5 - 16.0 GM/DL    Hematocrit 35.0 (L) 37 - 47 %    .3 (H) 78 - 100 FL    MCH 34.5 (H) 27 - 31 PG    MCHC 33.7 32.0 - 36.0 %    RDW 12.1 11.7 - 14.9 %    Platelets 713 534 - 891 K/CU MM    MPV 10.0 7.5 - 11.1 FL    Differential Type AUTOMATED DIFFERENTIAL     Segs Relative 78.7 (H) 36 - 66 %    Lymphocytes % 8.9 (L) 24 - 44 %    Monocytes % 9.9 (H) 0 - 4 %    Eosinophils % 1.1 0 - 3 %    Basophils % 0.5 0 - 1 %    Segs Absolute 11.7 K/CU MM    Lymphocytes Absolute 1.3 K/CU MM    Monocytes Absolute 1.5 K/CU MM    Eosinophils Absolute 0.2 K/CU MM    Basophils Absolute 0.1 K/CU MM    Nucleated RBC % 0.0 %    Total Nucleated RBC 0.0 K/CU MM    Total Immature Neutrophil 0.13 K/CU MM    Immature Neutrophil % 0.9 (H) 0 - 0.43 %   Comprehensive Metabolic Panel    Collection Time: 09/20/22  3:10 PM   Result Value Ref Range    Sodium 136 135 - 145 MMOL/L    Potassium 4.0 3.5 - 5.1 MMOL/L    Chloride 96 (L) 99 - 110 mMol/L    CO2 27 21 - 32 MMOL/L    BUN 20 6 - 23 MG/DL    Creatinine 7.0 (H) 0.6 - 1.1 MG/DL    Glucose 169 (H) 70 - 99 MG/DL    Calcium 9.2 8.3 - 10.6 MG/DL    Albumin 3.8 3.4 - 5.0 GM/DL    Total Protein 7.4 6.4 - 8.2 GM/DL    Total Bilirubin 0.4 0.0 - 1.0 MG/DL    ALT 6 (L) 10 - 40 U/L    AST 12 (L) 15 - 37 IU/L    Alkaline Phosphatase 108 40 - 128 IU/L    GFR Non-African American 6 (L) >60 mL/min/1.73m2    GFR  7 (L) >60 mL/min/1.73m2    Anion Gap 13 4 - 16   C-Reactive Protein    Collection Time: 09/20/22  3:10 PM   Result Value Ref Range    CRP, High Sensitivity 137.6 (H) 0.0 - 5.0 mg/L   Sedimentation Rate    Collection Time: 09/20/22  3:10 PM   Result Value Ref Range    Sed Rate 79 (H) 0 - 30 MM/HR   Vitamin D 25 Hydroxy    Collection Time: 09/20/22  3:10 PM   Result Value Ref Range    Vit D, 25-Hydroxy 9.83 (L) >20 NG/ML   Lactate, Sepsis    Collection Time: 09/20/22  4:52 PM   Result Value Ref Range    Lactic Acid, Sepsis 1.2 0.5 - 1.9 mMOL/L   POCT Glucose    Collection Time: 09/20/22  9:32 PM   Result Value Ref Range    POC Glucose 201 (H) 70 - 99 MG/DL        Imaging/Diagnostics Last 24 Hours   XR FOOT RIGHT (MIN 3 VIEWS)    Result Date: 9/20/2022  EXAMINATION: THREE XRAY VIEWS OF THE RIGHT FOOT 9/20/2022 3:52 pm COMPARISON: None. HISTORY: ORDERING SYSTEM PROVIDED HISTORY: r/o osteomyelitis TECHNOLOGIST PROVIDED HISTORY: Reason for exam:->r/o osteomyelitis Reason for Exam: r/o osteomyelitis Additional signs and symptoms: na Relevant Medical/Surgical History: diabetes, cad, chf, ckd FINDINGS: There is no evidence of acute fracture. There is normal alignment of the tarsometatarsal joints. No acute joint abnormality. No focal osseous lesion. Soft tissue ulceration in the region of the calcaneus. No acute osseous abnormality.        Electronically signed by Glenda Madsen MD on 9/21/2022 at 7:22 AM

## 2022-09-21 NOTE — CONSULTS
Patient:   Trey Gatica    Date:  22  :  1965, 62 y.o. Nephrologist: Yue Welsh MD  Provider: Yessenia Gutiérrez MD    Reason for Consult: End-stage kidney disease on maintenance dialysis    Consult requested by : Dr Nadeem Gonzalez    Chief Complaint:   Right heel ulcer    HISTORY OF PRESENT ILLNESS/Hospital course  AND  brief background history    Ms. Rhiannon Patricio, is a 51-year-old female, was sent to the emergency department by general surgeon for potential wound VAC and other treatment for nonhealing right heel ulcer. She had it for a long time and seeing Dr. Edie Huntley in the wound clinic. On arrival in the emergency department, she was afebrile and hemodynamic stable, her oxygen saturation was 96%, while breathing ambient air. She underwent a foot x-ray which was unrevealing. Biochemical testing showed leukocytosis but acceptable hemoglobin and electrolytes. Appropriate culture were drawn and empirical antibiotic were started, she was subsequently admitted to medical floor for further evaluation. When I saw her this morning, she is alert awake and oriented without any distress. I recently met her in [facility] dialysis unit. Although I saw her back in 2016 in the office, only once for progressive CKD and supra nephrotic range proteinuria. I have not had a chance to see her, she was followed by nephrologist locally in [place] as she is from [facility]. Apparently her kidney function worsened and started dialysis about 4 years ago. Although initially she was dialyzing in [place] recently transferred to [facility] hemodialysis unit, that is where I saw her again recently. She dialyzes Monday, Wednesday, Friday only 2-1/2 hours. She still has some residual kidney function left. She had minimal intradialytic weight gain. She does have a well-developed left upper extremity brachiocephalic fistula. Her last dialysis was Monday.     PMH :   End-stage kidney disease on etc.    PLAN:    Hemodialysis with ultrafiltration today  Appropriate treatment for heel ulcer  Strongly advised to stop smoking  She is on fairly good dose of gabapentin, watch for adverse effect  Optimize diet and lifestyle  Education, counseling, follow clinically

## 2022-09-21 NOTE — PLAN OF CARE
Problem: Skin/Tissue Integrity  Goal: Absence of new skin breakdown  Description: 1. Monitor for areas of redness and/or skin breakdown  2. Assess vascular access sites hourly  3. Every 4-6 hours minimum:  Change oxygen saturation probe site  4. Every 4-6 hours:  If on nasal continuous positive airway pressure, respiratory therapy assess nares and determine need for appliance change or resting period.   Outcome: Progressing     Problem: Discharge Planning  Goal: Discharge to home or other facility with appropriate resources  Outcome: Progressing     Problem: Pain  Goal: Verbalizes/displays adequate comfort level or baseline comfort level  Outcome: Progressing     Problem: ABCDS Injury Assessment  Goal: Absence of physical injury  Outcome: Progressing

## 2022-09-21 NOTE — PROGRESS NOTES
Comprehensive Nutrition Assessment    Type and Reason for Visit:  Initial, Positive Nutrition Screen (weight loss, poor intake)    Nutrition Recommendations/Plan:   Continue carb controlled diet with renal restrictions as needed   Offer oral nutrition supplement  Will continue to follow up during stay      Malnutrition Assessment:  Malnutrition Status: At risk for malnutrition (Comment) (09/21/22 1440)    Context:  Chronic Illness       Nutrition Assessment:    Admit with non-healing right foot ulcer. NPO this morning, now able to resume carb controlled diet. Will follow at moderate nutrition risk with increased needs. No significant weight loss noted per weight hx past 3-4 months. Limited po data at this time. Nutrition Related Findings:    not available on attemtped visits-receiving care/wound care. hx DM recent HbA1c 8.2%, abn renal labs with ESRD on HD , meds noted insulin, zyvox Wound Type: Wound Vac (foot ulcer)       Current Nutrition Intake & Therapies:    Average Meal Intake: Unable to assess (has been NPO today)  Average Supplements Intake: None Ordered  ADULT DIET; Regular; 4 carb choices (60 gm/meal)    Anthropometric Measures:  Height: 5' 3\" (160 cm)  Ideal Body Weight (IBW): 115 lbs (52 kg)       Current Body Weight: 157 lb 10.1 oz (71.5 kg), 137.1 % IBW. Weight Source: Bed Scale  Current BMI (kg/m2): 27.9  Usual Body Weight: 160 lb 0.9 oz (72.6 kg) (per hx June)  % Weight Change (Calculated): -1.5  Weight Adjustment For: No Adjustment                 BMI Categories: Overweight (BMI 25.0-29. 9)    Estimated Daily Nutrient Needs:  Energy Requirements Based On: Kcal/kg  Weight Used for Energy Requirements: Ideal  Energy (kcal/day): 2601-4926 (30-35 reese/kg)  Weight Used for Protein Requirements: Ideal  Protein (g/day): 62-73 (1.2-1.4 g/kg)  Method Used for Fluid Requirements: Standard Renal  Fluid (ml/day):      Nutrition Diagnosis:   Increased nutrient needs related to increase demand for energy/nutrients, renal dysfunction as evidenced by wounds, dialysis    Nutrition Interventions:   Food and/or Nutrient Delivery: Continue Current Diet, Start Oral Nutrition Supplement  Nutrition Education/Counseling: No recommendation at this time  Coordination of Nutrition Care: Continue to monitor while inpatient       Goals:     Goals: PO intake 75% or greater, by next RD assessment       Nutrition Monitoring and Evaluation:   Behavioral-Environmental Outcomes: None Identified  Food/Nutrient Intake Outcomes: Diet Advancement/Tolerance, Food and Nutrient Intake, Supplement Intake  Physical Signs/Symptoms Outcomes: Biochemical Data, Meal Time Behavior, Skin, Weight    Discharge Planning:    Continue current diet     Andrea Zavaleta RD, LD  Contact: [phone]

## 2022-09-22 LAB
ANION GAP SERPL CALCULATED.3IONS-SCNC: 17 MMOL/L (ref 4–16)
BASOPHILS ABSOLUTE: 0.1 K/CU MM
BASOPHILS RELATIVE PERCENT: 0.5 % (ref 0–1)
BUN BLDV-MCNC: 28 MG/DL (ref 6–23)
C-REACTIVE PROTEIN, HIGH SENSITIVITY: 77 MG/L
CALCIUM SERPL-MCNC: 8.1 MG/DL (ref 8.3–10.6)
CHLORIDE BLD-SCNC: 94 MMOL/L (ref 99–110)
CO2: 25 MMOL/L (ref 21–32)
CREAT SERPL-MCNC: 8.7 MG/DL (ref 0.6–1.1)
DIFFERENTIAL TYPE: ABNORMAL
EOSINOPHILS ABSOLUTE: 0.3 K/CU MM
EOSINOPHILS RELATIVE PERCENT: 3 % (ref 0–3)
GFR AFRICAN AMERICAN: 6 ML/MIN/1.73M2
GFR NON-AFRICAN AMERICAN: 5 ML/MIN/1.73M2
GLUCOSE BLD-MCNC: 138 MG/DL (ref 70–99)
GLUCOSE BLD-MCNC: 155 MG/DL (ref 70–99)
GLUCOSE BLD-MCNC: 185 MG/DL (ref 70–99)
GLUCOSE BLD-MCNC: 224 MG/DL (ref 70–99)
HCT VFR BLD CALC: 32.1 % (ref 37–47)
HEMOGLOBIN: 10.4 GM/DL (ref 12.5–16)
IMMATURE NEUTROPHIL %: 1.2 % (ref 0–0.43)
LYMPHOCYTES ABSOLUTE: 1.1 K/CU MM
LYMPHOCYTES RELATIVE PERCENT: 10.8 % (ref 24–44)
MCH RBC QN AUTO: 34.2 PG (ref 27–31)
MCHC RBC AUTO-ENTMCNC: 32.4 % (ref 32–36)
MCV RBC AUTO: 105.6 FL (ref 78–100)
MONOCYTES ABSOLUTE: 1.1 K/CU MM
MONOCYTES RELATIVE PERCENT: 10.6 % (ref 0–4)
NUCLEATED RBC %: 0 %
PDW BLD-RTO: 12.2 % (ref 11.7–14.9)
PLATELET # BLD: 238 K/CU MM (ref 140–440)
PMV BLD AUTO: 10.2 FL (ref 7.5–11.1)
POTASSIUM SERPL-SCNC: 3.8 MMOL/L (ref 3.5–5.1)
RBC # BLD: 3.04 M/CU MM (ref 4.2–5.4)
SEGMENTED NEUTROPHILS ABSOLUTE COUNT: 7.6 K/CU MM
SEGMENTED NEUTROPHILS RELATIVE PERCENT: 73.9 % (ref 36–66)
SODIUM BLD-SCNC: 136 MMOL/L (ref 135–145)
TOTAL IMMATURE NEUTOROPHIL: 0.12 K/CU MM
TOTAL NUCLEATED RBC: 0 K/CU MM
WBC # BLD: 10.3 K/CU MM (ref 4–10.5)

## 2022-09-22 PROCEDURE — 36415 COLL VENOUS BLD VENIPUNCTURE: CPT

## 2022-09-22 PROCEDURE — 86140 C-REACTIVE PROTEIN: CPT

## 2022-09-22 PROCEDURE — 6360000002 HC RX W HCPCS: Performed by: NURSE PRACTITIONER

## 2022-09-22 PROCEDURE — 6370000000 HC RX 637 (ALT 250 FOR IP): Performed by: STUDENT IN AN ORGANIZED HEALTH CARE EDUCATION/TRAINING PROGRAM

## 2022-09-22 PROCEDURE — 80048 BASIC METABOLIC PNL TOTAL CA: CPT

## 2022-09-22 PROCEDURE — 2580000003 HC RX 258: Performed by: STUDENT IN AN ORGANIZED HEALTH CARE EDUCATION/TRAINING PROGRAM

## 2022-09-22 PROCEDURE — 82962 GLUCOSE BLOOD TEST: CPT

## 2022-09-22 PROCEDURE — 85025 COMPLETE CBC W/AUTO DIFF WBC: CPT

## 2022-09-22 PROCEDURE — 94761 N-INVAS EAR/PLS OXIMETRY MLT: CPT

## 2022-09-22 PROCEDURE — 1200000000 HC SEMI PRIVATE

## 2022-09-22 PROCEDURE — 6360000002 HC RX W HCPCS: Performed by: STUDENT IN AN ORGANIZED HEALTH CARE EDUCATION/TRAINING PROGRAM

## 2022-09-22 RX ADMIN — OXYCODONE HYDROCHLORIDE AND ACETAMINOPHEN 2 TABLET: 5; 325 TABLET ORAL at 15:37

## 2022-09-22 RX ADMIN — GABAPENTIN 400 MG: 400 CAPSULE ORAL at 09:18

## 2022-09-22 RX ADMIN — FUROSEMIDE 40 MG: 40 TABLET ORAL at 09:18

## 2022-09-22 RX ADMIN — PANTOPRAZOLE SODIUM 40 MG: 20 TABLET, DELAYED RELEASE ORAL at 06:25

## 2022-09-22 RX ADMIN — GABAPENTIN 400 MG: 400 CAPSULE ORAL at 21:51

## 2022-09-22 RX ADMIN — HEPARIN SODIUM 5000 UNITS: 5000 INJECTION INTRAVENOUS; SUBCUTANEOUS at 13:27

## 2022-09-22 RX ADMIN — PRAVASTATIN SODIUM 40 MG: 40 TABLET ORAL at 22:03

## 2022-09-22 RX ADMIN — ASPIRIN 81 MG CHEWABLE TABLET 81 MG: 81 TABLET CHEWABLE at 09:18

## 2022-09-22 RX ADMIN — OXYCODONE HYDROCHLORIDE AND ACETAMINOPHEN 2 TABLET: 5; 325 TABLET ORAL at 09:20

## 2022-09-22 RX ADMIN — PIPERACILLIN AND TAZOBACTAM 2250 MG: 2; .25 INJECTION, POWDER, LYOPHILIZED, FOR SOLUTION INTRAVENOUS at 10:20

## 2022-09-22 RX ADMIN — LINEZOLID 600 MG: 600 INJECTION, SOLUTION INTRAVENOUS at 13:44

## 2022-09-22 RX ADMIN — HEPARIN SODIUM 5000 UNITS: 5000 INJECTION INTRAVENOUS; SUBCUTANEOUS at 06:28

## 2022-09-22 RX ADMIN — SODIUM CHLORIDE: 9 INJECTION, SOLUTION INTRAVENOUS at 10:19

## 2022-09-22 RX ADMIN — HEPARIN SODIUM 5000 UNITS: 5000 INJECTION INTRAVENOUS; SUBCUTANEOUS at 21:54

## 2022-09-22 RX ADMIN — SODIUM CHLORIDE, PRESERVATIVE FREE 10 ML: 5 INJECTION INTRAVENOUS at 21:57

## 2022-09-22 RX ADMIN — OXYCODONE HYDROCHLORIDE AND ACETAMINOPHEN 2 TABLET: 5; 325 TABLET ORAL at 21:51

## 2022-09-22 RX ADMIN — PIPERACILLIN AND TAZOBACTAM 2250 MG: 2; .25 INJECTION, POWDER, LYOPHILIZED, FOR SOLUTION INTRAVENOUS at 18:59

## 2022-09-22 RX ADMIN — CARVEDILOL 6.25 MG: 6.25 TABLET, FILM COATED ORAL at 21:52

## 2022-09-22 RX ADMIN — PIPERACILLIN AND TAZOBACTAM 2250 MG: 2; .25 INJECTION, POWDER, LYOPHILIZED, FOR SOLUTION INTRAVENOUS at 03:18

## 2022-09-22 RX ADMIN — GABAPENTIN 400 MG: 400 CAPSULE ORAL at 13:27

## 2022-09-22 RX ADMIN — CARVEDILOL 6.25 MG: 6.25 TABLET, FILM COATED ORAL at 09:18

## 2022-09-22 RX ADMIN — LINEZOLID 600 MG: 600 INJECTION, SOLUTION INTRAVENOUS at 01:47

## 2022-09-22 ASSESSMENT — ENCOUNTER SYMPTOMS
SORE THROAT: 0
ABDOMINAL PAIN: 0
CONSTIPATION: 0
BACK PAIN: 0
DIARRHEA: 0
SINUS PAIN: 0
WHEEZING: 0
COLOR CHANGE: 0
NAUSEA: 0
SHORTNESS OF BREATH: 0
SINUS PRESSURE: 0
VOMITING: 0
COUGH: 0

## 2022-09-22 ASSESSMENT — PAIN DESCRIPTION - ONSET: ONSET: ON-GOING

## 2022-09-22 ASSESSMENT — PAIN DESCRIPTION - LOCATION
LOCATION: BACK
LOCATION: LEG

## 2022-09-22 ASSESSMENT — PAIN DESCRIPTION - ORIENTATION
ORIENTATION: LOWER
ORIENTATION: LEFT;RIGHT

## 2022-09-22 ASSESSMENT — PAIN SCALES - GENERAL
PAINLEVEL_OUTOF10: 7
PAINLEVEL_OUTOF10: 4
PAINLEVEL_OUTOF10: 8
PAINLEVEL_OUTOF10: 7

## 2022-09-22 ASSESSMENT — PAIN DESCRIPTION - DESCRIPTORS
DESCRIPTORS: ACHING
DESCRIPTORS: ACHING

## 2022-09-22 ASSESSMENT — PAIN - FUNCTIONAL ASSESSMENT: PAIN_FUNCTIONAL_ASSESSMENT: PREVENTS OR INTERFERES SOME ACTIVE ACTIVITIES AND ADLS

## 2022-09-22 ASSESSMENT — PAIN DESCRIPTION - PAIN TYPE: TYPE: CHRONIC PAIN

## 2022-09-22 ASSESSMENT — PAIN DESCRIPTION - FREQUENCY: FREQUENCY: INTERMITTENT

## 2022-09-22 ASSESSMENT — PAIN SCALES - WONG BAKER: WONGBAKER_NUMERICALRESPONSE: 0

## 2022-09-22 NOTE — PROGRESS NOTES
Nephrology Progress Note  9/22/2022 7:05 AM        Subjective:   Admit Date: 9/20/2022  PCP: Rafa Gruber MD    Interval History: Tolerated dialysis well  No major event overnight    Diet: Reasonable    ROS: No shortness of breath  No fever      Data:     Current meds:    piperacillin-tazobactam  2,250 mg IntraVENous Q8H    carvedilol  6.25 mg Oral BID    pantoprazole  40 mg Oral QAM AC    insulin lispro  0-8 Units SubCUTAneous TID WC    insulin lispro  0-4 Units SubCUTAneous Nightly    pravastatin  40 mg Oral Nightly    sodium chloride flush  5-40 mL IntraVENous 2 times per day    heparin (porcine)  5,000 Units SubCUTAneous 3 times per day    furosemide  40 mg Oral BID    gabapentin  400 mg Oral TID    aspirin  81 mg Oral Daily    insulin glargine  10 Units SubCUTAneous Nightly    linezolid  600 mg IntraVENous Q12H      sodium chloride 25 mL/hr at 09/21/22 1721    dextrose           I/O last 3 completed shifts:   In: 345.2 [I.V.:10; IV Piggyback:335.2]  Out: 300 [Urine:300]    CBC:   Recent Labs     09/20/22  1510 09/21/22  0610 09/22/22  0553   WBC 14.8* 10.7* 10.3   HGB 11.8* 10.9* 10.4*    262 238          Recent Labs     09/20/22  1510 09/21/22  0610    137   K 4.0 3.8   CL 96* 96*   CO2 27 29   BUN 20 25*   CREATININE 7.0* 8.4*   GLUCOSE 169* 107*       Lab Results   Component Value Date    CALCIUM 8.6 09/21/2022    PHOS 4.6 05/23/2017       Objective:     Vitals: BP (!) 125/54   Pulse 69   Temp 98.4 °F (36.9 °C) (Oral)   Resp 10   Ht 5' 3\" (1.6 m)   Wt 162 lb 3.2 oz (73.6 kg)   SpO2 93%   BMI 28.73 kg/m² ,    General appearance: Alert, awake and oriented  HEENT: Negative conjunctival pallor  Neck: Supple  Lungs: No crackles on auscultation  Heart: Seemed regular rate and rhythm-well-healed incision from previous sternotomy  Abdomen: Soft  Extremities: Right heel ulcer, some leg edema  Left upper extremity brachiocephalic AV fistula  Looks like she has a wound VAC      Problem List : Impression :     End-stage kidney disease on maintenance dialysis-she was dialyzed yesterday  Right heel ulcer with likely underlying atherosclerotic cardiovascular disease  Underlying coronary artery disease, diabetes, dyslipidemia, COPD and smoking    Recommendation/Plan  :     I strongly advised her to abstain from smoking  With wound VAC and antimicrobial agent-I will discuss with Dr. Shanna Washburn the necessity and if so the duration of antibiotic-as he was able to see the wound very well  Good glycemic control, follow clinically  If she stays here, will arrange for dialysis tomorrow    Priscilla Watson MD MD

## 2022-09-22 NOTE — PLAN OF CARE
Problem: Skin/Tissue Integrity  Goal: Absence of new skin breakdown  Description: 1. Monitor for areas of redness and/or skin breakdown  2. Assess vascular access sites hourly  3. Every 4-6 hours minimum:  Change oxygen saturation probe site  4. Every 4-6 hours:  If on nasal continuous positive airway pressure, respiratory therapy assess nares and determine need for appliance change or resting period.   Outcome: Progressing     Problem: Pain  Goal: Verbalizes/displays adequate comfort level or baseline comfort level  Outcome: Progressing  Flowsheets (Taken 9/22/2022 0151 by Fouzia Sosa RN)  Verbalizes/displays adequate comfort level or baseline comfort level: Encourage patient to monitor pain and request assistance

## 2022-09-22 NOTE — PROGRESS NOTES
V2.0  [facility] Hospitalist Progress Note      Name:  Trey Gatica /Age/Sex: 1965  (62 y.o. female)   MRN & CSN:  [id number] & [id number] Encounter Date/Time: 2022 7:22 AM EDT    Location:  7413/4060-D PCP: Yessenia Gutiérrez MD       Hospital Day: 3    Subjective:     Chief Complaint: infected wound. Sent from wound care center     Wound vac placed yesterday and having high output. Patient continues to be anxious surrounding her treatment. She does understand she will be in the hospital for a number of days while she will VAC is in place and on IV antibiotics. Assessment and Plan:   Trey Gatica is a 62 y.o. female with a pmh of ype 2 diabetes, ESRD on HD [], hypertension, GERD, Hyperlipidemia, CAD s/p CABG x4 in , neuropathy, COPD, and anxiety who presents with right heel wound. Plan:    Trey Gatica is a 62 y.o. female with a pmh of ype 2 diabetes, ESRD on HD [], hypertension, GERD, Hyperlipidemia, CAD s/p CABG x4 in , neuropathy, COPD, and anxiety who presents with right heel wound. Right heel diabetic foot wound. Patient presents from her wound care clinic for admission and higher level wound care with wound VAC from her surgeon. General surgery consulted  IV antibiotics: Zosyn and Zyvox  Patient is allergic to Vancomycin  Trend  markers ordered  Trend WBC count     ESRD on HD []  Patient appears euvolemic  Nephrology consulted for inpatient dialysis     Hypertension  Blood pressure controlled  Coreg 6.5 twice daily  Lasix 40 twice daily     CAD s/p CABG x4 in   Patient with a complex cardiac history. Has no chest pain at this time. Continue home ASA and Statin     Type 2 diabetes  Per the patient's last PCP note on 2022 she takes 15 units of Lantus nightly and Januvia. Patient was having problems with hypoglycemia.   Will start Lantus 10 units nightly  Medium dose sliding scale Physical Exam  Vitals reviewed. Constitutional:       Appearance: Normal appearance. She is normal weight. HENT:      Head: Normocephalic and atraumatic. Mouth/Throat:      Mouth: Mucous membranes are moist.      Pharynx: Oropharynx is clear. Eyes:      Extraocular Movements: Extraocular movements intact. Conjunctiva/sclera: Conjunctivae normal.      Pupils: Pupils are equal, round, and reactive to light. Cardiovascular:      Rate and Rhythm: Normal rate and regular rhythm. Pulses: Normal pulses. Heart sounds: Normal heart sounds. Pulmonary:      Effort: Pulmonary effort is normal.      Breath sounds: Normal breath sounds. Abdominal:      General: Abdomen is flat. Bowel sounds are normal.      Palpations: Abdomen is soft. Musculoskeletal:         General: No swelling. Normal range of motion. Cervical back: Normal range of motion and neck supple. Feet:    Feet:      Right foot:      Skin integrity: Ulcer present. Comments: Wound on right heal  Skin:     General: Skin is warm and dry. Neurological:      General: No focal deficit present. Mental Status: She is alert and oriented to person, place, and time. Mental status is at baseline. Psychiatric:         Mood and Affect: Mood normal.         Behavior: Behavior normal.         Thought Content:  Thought content normal.         Judgment: Judgment normal.       Medications:   Medications:    piperacillin-tazobactam  2,250 mg IntraVENous Q8H    carvedilol  6.25 mg Oral BID    pantoprazole  40 mg Oral QAM AC    insulin lispro  0-8 Units SubCUTAneous TID WC    insulin lispro  0-4 Units SubCUTAneous Nightly    pravastatin  40 mg Oral Nightly    sodium chloride flush  5-40 mL IntraVENous 2 times per day    heparin (porcine)  5,000 Units SubCUTAneous 3 times per day    furosemide  40 mg Oral BID    gabapentin  400 mg Oral TID    aspirin  81 mg Oral Daily    insulin glargine  10 Units SubCUTAneous Nightly linezolid  600 mg IntraVENous Q12H      Infusions:    sodium chloride 25 mL/hr at 09/21/22 1721    dextrose       PRN Meds: LORazepam, 0.5 mg, Q12H PRN  albuterol sulfate HFA, 2 puff, Q6H PRN  oxyCODONE-acetaminophen, 1 tablet, Q4H PRN   Or  oxyCODONE-acetaminophen, 2 tablet, Q4H PRN  sodium chloride flush, 5-40 mL, PRN  sodium chloride, , PRN  ondansetron, 4 mg, Q8H PRN   Or  ondansetron, 4 mg, Q6H PRN  polyethylene glycol, 17 g, Daily PRN  acetaminophen, 650 mg, Q6H PRN   Or  acetaminophen, 650 mg, Q6H PRN  glucose, 4 tablet, PRN  dextrose bolus, 125 mL, PRN   Or  dextrose bolus, 250 mL, PRN  glucagon (rDNA), 1 mg, PRN  dextrose, , Continuous PRN      Labs      Recent Results (from the past 24 hour(s))   POCT Glucose    Collection Time: 09/21/22 11:13 AM   Result Value Ref Range    POC Glucose 106 (H) 70 - 99 MG/DL   POCT Glucose    Collection Time: 09/21/22  1:44 PM   Result Value Ref Range    POC Glucose 106 (H) 70 - 99 MG/DL   POCT Glucose    Collection Time: 09/21/22  7:54 PM   Result Value Ref Range    POC Glucose 171 (H) 70 - 99 MG/DL   POCT Glucose    Collection Time: 09/21/22  9:33 PM   Result Value Ref Range    POC Glucose 166 (H) 70 - 99 MG/DL   POCT Glucose    Collection Time: 09/21/22 10:17 PM   Result Value Ref Range    POC Glucose 168 (H) 70 - 99 MG/DL   POCT Glucose    Collection Time: 09/22/22  1:51 AM   Result Value Ref Range    POC Glucose 155 (H) 70 - 99 MG/DL   Basic Metabolic Panel w/ Reflex to MG    Collection Time: 09/22/22  5:53 AM   Result Value Ref Range    Sodium 136 135 - 145 MMOL/L    Potassium 3.8 3.5 - 5.1 MMOL/L    Chloride 94 (L) 99 - 110 mMol/L    CO2 25 21 - 32 MMOL/L    Anion Gap 17 (H) 4 - 16    BUN 28 (H) 6 - 23 MG/DL    Creatinine 8.7 (H) 0.6 - 1.1 MG/DL    Glucose 224 (H) 70 - 99 MG/DL    Calcium 8.1 (L) 8.3 - 10.6 MG/DL    GFR Non-African American 5 (L) >60 mL/min/1.73m2    GFR  6 (L) >60 mL/min/1.73m2   CBC with Auto Differential    Collection Time:

## 2022-09-22 NOTE — CONSULTS
[address] [place] [address], [address] [place] [address]                                  CONSULTATION    PATIENT NAME: Corwin Alvarado                   :        1965  MED REC NO:   [id number]                          ROOM:       5897  ACCOUNT NO:   [de-identified]                           ADMIT DATE: 2022  PROVIDER:     Gasper Fisher MD    CONSULT DATE:  2022    SURGICAL CONSULTATION    REFERRING PROVIDER:  Hospitalist Service. REASON FOR CONSULTATION:  Right heel ulceration. CHIEF COMPLAINT AND HISTORY OF PRESENT ILLNESS:  As follows: The  patient is a 62year-old  female, who I saw for the first time  last week at the [facility]. She at that time presented  with a necrotic pressure and diabetic ulceration on the right heel. I  then had to extensively debride this area and yesterday, she came back  for followup and there was more area of necrotic tissue, which also  required excisional debridement all the way down to the plantar fascia. During that excisional debridement, I was able to remove the majority of  necrotic tissue and there was no exposure of the calcaneus but she still  has some erythematous changes around this ulceration. I had already  previously placed her on antibiotics and her wound culture was  polymicrobial.  Due to my concern of the necrotic tissue increasing and  also with underlying cellulitis, I did instruct her to go to the  emergency room at [facility] for evaluation and  admission to the hospital for IV antibiotics and then I will be placing  an irrigating negative pressure wound therapy system on the wound. I  told her that I was very concerned that this could lead to an amputation  if we do not get her hospitalized. She was in agreement to get  hospitalized.   She did get evaluated, and she had a right foot x-ray,  which revealed no acute osseous abnormalities. No evidence of any  underlying osteomyelitis. No crepitus and no soft tissue gas either. She was also shown to have a leukocytosis of 15,000 with CRP of 137. Sed rate was also elevated at 79. She does have multiple comorbidities  and also has end-stage renal artery disease and is on hemodialysis. She  does not report any fevers or chills. PAST MEDICAL HISTORY:  End-stage renal artery disease - on hemodialysis,  insulin-dependent diabetes mellitus, essential hypertension, GERD,  hyperlipidemia, coronary artery disease status post a CABG in 2010,  COPD, and anxiety. PAST SURGICAL HISTORY:  Total abdominal hysterectomy, cholecystectomy,  appendectomy, CABG x4 in 2010, right trigger finger release 2013, ulnar  tunnel release in 2013 on the right, bilateral carpal tunnel releases,  left fourth toe amputation in 2014, and colonoscopy. ALLERGIES:  Latex, CODEINE, CORTISONE, _____, PHENOBARBITAL, and  VANCOMYCIN. FAMILY HISTORY:  Noncontributory. SOCIAL HISTORY:  , four kids. Quarter pack per day for smoking,  30 years. Denies alcohol or IV drug abuse. MEDICATIONS:  Please refer to medication reconciliation sheet. PHYSICAL EXAMINATION:  VITAL SIGNS:  Temperature is 98.4. Respirations 15. Pulse 69. Blood  pressure 112/49. O2 sat 96% on room air. GENERAL:  Generally speaking, no acute distress. Alert, awake, and  oriented x3. HEAD, EYES, EARS, NOSE, AND THROAT:  Normocephalic, atraumatic. Pupils  are equal, round, and reactive to light. Extraocular movements are  intact. Trachea is midline. No lymphadenopathy. Anicteric sclerae. Dry mucous membranes. NECK:  Supple. HEART:  Positive S1, S2.  Normal sinus rhythms, no murmurs or gallops. Point of maximal impulse, fifth intercostal space at midclavicular line. Well-healed midline sternotomy incision. CHEST:  Clear to auscultation bilaterally. No use of accessory muscles  of respiration. ABDOMEN:  Soft.   No rebound, no guarding and nontender. No hernias,  pulsations, or fluid shifts. No inguinal lymphadenopathy. No  organomegaly. No flank tenderness. No CVA tenderness. EXTREMITIES:  Negative for erythema, edema, cellulitis, or cyanosis  except for the lateral aspect of the right heel. It does have some  erythematous changes around the wound. There is no soft tissue  crepitus. She has got bilateral palpable femoral popliteal with weak PT  pulses. Some slight hyperpigmentation, anterior lower legs. Negative  Homans' sign. PLAN:  The patient is a 35-year-old  female, who has a very  severe skin and soft tissue infection of the right heel, secondary to  her diabetes and end-stage renal artery disease. She has been placed on  broad-spectrum antibiotics. I will contact the wound care nurse, so we  can go ahead and start an irrigating wound VAC system and then follow  this closely because it may require further excisional debridement. We  will continue to get tight control of the blood glucose levels and treat  all of her comorbidities as well. I will go ahead and check a duplex  bilateral arterial ultrasound to make sure she does not have any  underlying PAD or stenotic areas, which would benefit from surgical  intervention. We will offload the right foot as much as possible, as  this has been a contributing factor as well. I did inform the patient  that I am very concerned that we need to get good control of the right  foot infection and wound because this could easily lead to an  amputation. We will go ahead and consult her nephrologist, Dr. Maxine Babb,  so he may be able to manage her dialysis during this hospitalization. I would like to thank the Hospitalist Service for giving me the  opportunity to assist in surgical care and evaluation.         Marek Adams MD    D: 09/21/2022 19:06:16       T: 09/21/2022 19:10:28     [email or web address]  Job#: [id number]     Doc#: [id number]    CC:

## 2022-09-22 NOTE — PROGRESS NOTES
Pt seen and examined. AF VSS. Irrigating VAC in place and working well. Appreciate WCN help. WBC normalized. Blood cx's NGTD. Arterial U/S reviewed and no acute issues to address with her LE's, but she could have aortoiliac dz. I can address this as an outpt and will eventually have her see Dr. Adarsh Pierce. Severe DM right heel ulceration to the fascia with SSTI. Continue with broad spectrum abx's. Offload foot. Change VAC tomorrow. ESRD on HD.   CAD, h/o CABG  CPM for comorbidities.

## 2022-09-23 LAB
ANION GAP SERPL CALCULATED.3IONS-SCNC: 18 MMOL/L (ref 4–16)
BASOPHILS ABSOLUTE: 0.1 K/CU MM
BASOPHILS RELATIVE PERCENT: 0.5 % (ref 0–1)
BUN BLDV-MCNC: 31 MG/DL (ref 6–23)
CALCIUM SERPL-MCNC: 8.3 MG/DL (ref 8.3–10.6)
CHLORIDE BLD-SCNC: 96 MMOL/L (ref 99–110)
CO2: 24 MMOL/L (ref 21–32)
CREAT SERPL-MCNC: 9.9 MG/DL (ref 0.6–1.1)
DIFFERENTIAL TYPE: ABNORMAL
EOSINOPHILS ABSOLUTE: 0.3 K/CU MM
EOSINOPHILS RELATIVE PERCENT: 3.3 % (ref 0–3)
GFR AFRICAN AMERICAN: 5 ML/MIN/1.73M2
GFR NON-AFRICAN AMERICAN: 4 ML/MIN/1.73M2
GLUCOSE BLD-MCNC: 103 MG/DL (ref 70–99)
GLUCOSE BLD-MCNC: 106 MG/DL (ref 70–99)
GLUCOSE BLD-MCNC: 140 MG/DL (ref 70–99)
GLUCOSE BLD-MCNC: 92 MG/DL (ref 70–99)
GLUCOSE BLD-MCNC: 96 MG/DL (ref 70–99)
HBV SURFACE AB TITR SER: <3.5 {TITER}
HCT VFR BLD CALC: 34.8 % (ref 37–47)
HEMOGLOBIN: 11.5 GM/DL (ref 12.5–16)
HEPATITIS B SURFACE ANTIGEN: NON REACTIVE
HIGH SENSITIVE C-REACTIVE PROTEIN: 90.1 MG/L (ref 0–5)
IMMATURE NEUTROPHIL %: 1.2 % (ref 0–0.43)
LYMPHOCYTES ABSOLUTE: 1.3 K/CU MM
LYMPHOCYTES RELATIVE PERCENT: 12.5 % (ref 24–44)
MCH RBC QN AUTO: 34.3 PG (ref 27–31)
MCHC RBC AUTO-ENTMCNC: 33 % (ref 32–36)
MCV RBC AUTO: 103.9 FL (ref 78–100)
MONOCYTES ABSOLUTE: 1 K/CU MM
MONOCYTES RELATIVE PERCENT: 9.5 % (ref 0–4)
NUCLEATED RBC %: 0 %
PDW BLD-RTO: 12.2 % (ref 11.7–14.9)
PLATELET # BLD: 262 K/CU MM (ref 140–440)
PMV BLD AUTO: 10.1 FL (ref 7.5–11.1)
POTASSIUM SERPL-SCNC: 4.4 MMOL/L (ref 3.5–5.1)
RBC # BLD: 3.35 M/CU MM (ref 4.2–5.4)
SEGMENTED NEUTROPHILS ABSOLUTE COUNT: 7.6 K/CU MM
SEGMENTED NEUTROPHILS RELATIVE PERCENT: 73 % (ref 36–66)
SODIUM BLD-SCNC: 138 MMOL/L (ref 135–145)
TOTAL IMMATURE NEUTOROPHIL: 0.12 K/CU MM
TOTAL NUCLEATED RBC: 0 K/CU MM
WBC # BLD: 10.3 K/CU MM (ref 4–10.5)

## 2022-09-23 PROCEDURE — 87340 HEPATITIS B SURFACE AG IA: CPT

## 2022-09-23 PROCEDURE — 86706 HEP B SURFACE ANTIBODY: CPT

## 2022-09-23 PROCEDURE — 6370000000 HC RX 637 (ALT 250 FOR IP): Performed by: STUDENT IN AN ORGANIZED HEALTH CARE EDUCATION/TRAINING PROGRAM

## 2022-09-23 PROCEDURE — 6360000002 HC RX W HCPCS: Performed by: STUDENT IN AN ORGANIZED HEALTH CARE EDUCATION/TRAINING PROGRAM

## 2022-09-23 PROCEDURE — 82962 GLUCOSE BLOOD TEST: CPT

## 2022-09-23 PROCEDURE — 99221 1ST HOSP IP/OBS SF/LOW 40: CPT | Performed by: NURSE PRACTITIONER

## 2022-09-23 PROCEDURE — 6360000002 HC RX W HCPCS: Performed by: NURSE PRACTITIONER

## 2022-09-23 PROCEDURE — 90935 HEMODIALYSIS ONE EVALUATION: CPT

## 2022-09-23 PROCEDURE — 5A1D70Z PERFORMANCE OF URINARY FILTRATION, INTERMITTENT, LESS THAN 6 HOURS PER DAY: ICD-10-PCS | Performed by: INTERNAL MEDICINE

## 2022-09-23 PROCEDURE — 1200000000 HC SEMI PRIVATE

## 2022-09-23 PROCEDURE — 86704 HEP B CORE ANTIBODY TOTAL: CPT

## 2022-09-23 PROCEDURE — 36415 COLL VENOUS BLD VENIPUNCTURE: CPT

## 2022-09-23 PROCEDURE — 80048 BASIC METABOLIC PNL TOTAL CA: CPT

## 2022-09-23 PROCEDURE — 94761 N-INVAS EAR/PLS OXIMETRY MLT: CPT

## 2022-09-23 PROCEDURE — 86141 C-REACTIVE PROTEIN HS: CPT

## 2022-09-23 PROCEDURE — 2580000003 HC RX 258: Performed by: STUDENT IN AN ORGANIZED HEALTH CARE EDUCATION/TRAINING PROGRAM

## 2022-09-23 PROCEDURE — 85025 COMPLETE CBC W/AUTO DIFF WBC: CPT

## 2022-09-23 PROCEDURE — 97605 NEG PRS WND THER DME<=50SQCM: CPT

## 2022-09-23 RX ORDER — GABAPENTIN 400 MG/1
800 CAPSULE ORAL 2 TIMES DAILY
COMMUNITY

## 2022-09-23 RX ORDER — FUROSEMIDE 40 MG/1
40 TABLET ORAL 2 TIMES DAILY
Status: DISCONTINUED | OUTPATIENT
Start: 2022-09-23 | End: 2022-09-25 | Stop reason: HOSPADM

## 2022-09-23 RX ADMIN — HEPARIN SODIUM 5000 UNITS: 5000 INJECTION INTRAVENOUS; SUBCUTANEOUS at 20:48

## 2022-09-23 RX ADMIN — SODIUM CHLORIDE, PRESERVATIVE FREE 10 ML: 5 INJECTION INTRAVENOUS at 20:47

## 2022-09-23 RX ADMIN — GABAPENTIN 400 MG: 400 CAPSULE ORAL at 09:25

## 2022-09-23 RX ADMIN — PIPERACILLIN AND TAZOBACTAM 2250 MG: 2; .25 INJECTION, POWDER, LYOPHILIZED, FOR SOLUTION INTRAVENOUS at 09:32

## 2022-09-23 RX ADMIN — SODIUM CHLORIDE, PRESERVATIVE FREE 10 ML: 5 INJECTION INTRAVENOUS at 09:25

## 2022-09-23 RX ADMIN — SODIUM CHLORIDE: 9 INJECTION, SOLUTION INTRAVENOUS at 17:27

## 2022-09-23 RX ADMIN — GABAPENTIN 400 MG: 400 CAPSULE ORAL at 20:47

## 2022-09-23 RX ADMIN — PRAVASTATIN SODIUM 40 MG: 40 TABLET ORAL at 20:47

## 2022-09-23 RX ADMIN — HEPARIN SODIUM 5000 UNITS: 5000 INJECTION INTRAVENOUS; SUBCUTANEOUS at 17:16

## 2022-09-23 RX ADMIN — PIPERACILLIN AND TAZOBACTAM 2250 MG: 2; .25 INJECTION, POWDER, LYOPHILIZED, FOR SOLUTION INTRAVENOUS at 01:15

## 2022-09-23 RX ADMIN — CARVEDILOL 6.25 MG: 6.25 TABLET, FILM COATED ORAL at 20:48

## 2022-09-23 RX ADMIN — LINEZOLID 600 MG: 600 INJECTION, SOLUTION INTRAVENOUS at 01:15

## 2022-09-23 RX ADMIN — SODIUM CHLORIDE: 9 INJECTION, SOLUTION INTRAVENOUS at 09:31

## 2022-09-23 RX ADMIN — SODIUM CHLORIDE: 9 INJECTION, SOLUTION INTRAVENOUS at 17:31

## 2022-09-23 RX ADMIN — LINEZOLID 600 MG: 600 INJECTION, SOLUTION INTRAVENOUS at 17:33

## 2022-09-23 RX ADMIN — GABAPENTIN 400 MG: 400 CAPSULE ORAL at 17:17

## 2022-09-23 RX ADMIN — CARVEDILOL 6.25 MG: 6.25 TABLET, FILM COATED ORAL at 09:25

## 2022-09-23 RX ADMIN — OXYCODONE HYDROCHLORIDE AND ACETAMINOPHEN 2 TABLET: 5; 325 TABLET ORAL at 09:25

## 2022-09-23 RX ADMIN — PIPERACILLIN AND TAZOBACTAM 2250 MG: 2; .25 INJECTION, POWDER, LYOPHILIZED, FOR SOLUTION INTRAVENOUS at 17:28

## 2022-09-23 RX ADMIN — PANTOPRAZOLE SODIUM 40 MG: 20 TABLET, DELAYED RELEASE ORAL at 06:14

## 2022-09-23 RX ADMIN — FUROSEMIDE 40 MG: 40 TABLET ORAL at 09:25

## 2022-09-23 RX ADMIN — OXYCODONE HYDROCHLORIDE AND ACETAMINOPHEN 2 TABLET: 5; 325 TABLET ORAL at 17:14

## 2022-09-23 RX ADMIN — ASPIRIN 81 MG CHEWABLE TABLET 81 MG: 81 TABLET CHEWABLE at 09:25

## 2022-09-23 RX ADMIN — OXYCODONE HYDROCHLORIDE AND ACETAMINOPHEN 2 TABLET: 5; 325 TABLET ORAL at 21:08

## 2022-09-23 RX ADMIN — HEPARIN SODIUM 5000 UNITS: 5000 INJECTION INTRAVENOUS; SUBCUTANEOUS at 06:14

## 2022-09-23 ASSESSMENT — ENCOUNTER SYMPTOMS
NAUSEA: 0
SINUS PRESSURE: 0
SINUS PAIN: 0
ABDOMINAL PAIN: 0
COLOR CHANGE: 0
SHORTNESS OF BREATH: 0
BACK PAIN: 0
VOMITING: 0
CONSTIPATION: 0
SORE THROAT: 0
COUGH: 0
WHEEZING: 0
DIARRHEA: 0

## 2022-09-23 ASSESSMENT — PAIN DESCRIPTION - LOCATION
LOCATION: OTHER (COMMENT)
LOCATION: ABDOMEN;BACK

## 2022-09-23 ASSESSMENT — PAIN DESCRIPTION - ORIENTATION: ORIENTATION: RIGHT

## 2022-09-23 ASSESSMENT — PAIN SCALES - GENERAL
PAINLEVEL_OUTOF10: 8
PAINLEVEL_OUTOF10: 8

## 2022-09-23 ASSESSMENT — PAIN DESCRIPTION - DESCRIPTORS
DESCRIPTORS: ACHING
DESCRIPTORS: DISCOMFORT;CRAMPING;NAGGING

## 2022-09-23 ASSESSMENT — PAIN - FUNCTIONAL ASSESSMENT: PAIN_FUNCTIONAL_ASSESSMENT: PREVENTS OR INTERFERES SOME ACTIVE ACTIVITIES AND ADLS

## 2022-09-23 NOTE — CONSULTS
[address] [address] Continence Nurse  Consult Note       Nikita Oh  AGE: 62 y.o. GENDER: female  : 1965  TODAY'S DATE:  2022    Subjective:     Reason for Evaluation and Assessment: NPWT dressing change to rt heel. Nikita Oh is a 62 y.o. female referred by:   [x] Physician  [] Nursing  [] Other:     Wound Identification:  Wound Type: diabetic and pressure  Contributing Factors: diabetes and chronic pressure        PAST MEDICAL HISTORY        Diagnosis Date    Acute pain of right foot 2022    CAD (coronary artery disease)     s/p CABG x 4;  follows with Dr Pascual Garcia of foot 2018    Carpal tunnel syndrome on both sides     CHF (congestive heart failure) ([address] 10/2010    Chronic diastolic; EF 74%    Chronic kidney disease     stage 4 kidney disease    Chronic ulcer of left ankle with fat layer exposed ([address] 10/7/2015    Chronic ulcer of left foot with fat layer exposed ([address] 3/17/2016    Chronic ulcer of right ankle with fat layer exposed ([address] 3/17/2016    Chronic ulcer of right foot with fat layer exposed ([address] 3/17/2016    Depression     Diabetes mellitus ([address]     Diabetes mellitus with neurological manifestations ([address]     Diabetes mellitus with ulcer of ankle ([address]     Diabetic ulcer of left foot associated with type 2 diabetes mellitus, with fat layer exposed ([address] 2018    Diabetic ulcer of right heel associated with type 2 diabetes mellitus, with muscle involvement without evidence of necrosis ([address] 2022    ESRD on hemodialysis ([address] 2022    Family history of cardiovascular disease     Mother    GERD (gastroesophageal reflux disease)     H/O cardiac catheterization 10/18/2010, 6/3/2010    10/18/2010-Four bypass grafts all widely patent. 6/3/2010- Moderate to severe triple vessel disease, preserved LV systolic function.     H/O cardiovascular stress test 2012, 8/3/2011, 10/14/2010, 2010-Lexiscan- Normal Myocardial Perfusion Study.Post stress myocardial perfusion images show a normal pattern of perfusion in all regions. Post stress LV normal size. Normal perfusion in the distribution of all coronaries. Normal LV size and function. Rest EF 70%    H/O chest x-ray 7/12/2012, 6/2/2010 7/12/2012-Perihilar peribronchial cuffing with mild basilar predominant interstital prominence, which may reflect interstitial edema or atypical pneumonia. H/O Doppler ultrasound 6/4/2010    CAROTID DOPPLER-6/4/2010-No Doppler evidence of hemodynamically significant Carotid Stenosis with antegrade flow in the vertebral arteries bilaterally. 6/4/2010 PERIPHERAL VENOUS DOPPLER_ LEFT Saphenous Vein mapping    H/O echocardiogram 7/26/2012, 8/3/2011, 10/2010, 7/23/2010, 6/8/2010 7/26/2012-LV normal size. Normal LV wall thickness. LV systolic function normal. EF => 55%. LV wall motion normal. Mild MR. Mild TR. History of complete ECG 7/26/2012 Lysbeth Ralphs), 7/13/2012 [facility]), 7/25/2011, 3/25/2011, 10/18/2010, 10/11/2010, 6/23/2010, 5/7/2010    Hx of cardiovascular stress test 9/3/2015    lexiscan-normal,EF66%    Hx of Doppler ultrasound 4/5/16    Arterial: There is a fluid collection in the left thigh, please refer to PCP for further monitoring, no vascular in etiology. Hx of echocardiogram     4/16 EF40% Mild MR/TR and mild Pulm HTN. 9/15 EF 45-50%, Mildly dilated L atrium, mildly dilated R ventricle. Mild MR & mild TR     Hyperlipidemia     Neuropathy of foot     Pt reports starting approx. 6-7 years ago    Neuropathy of hand     Pt reports starting approx.  6-7 years ago    Non compliance with medical treatment 7/2/2018    S/P CABG x 4 6/5/2010    LIMA-> LAD;  VG->CX;  VG->Diagonal; VG-> distal RCA  per  Dr Franklin Cardoza    Type 2 diabetes mellitus with diabetic polyneuropathy, with long-term current use of insulin ([facility] 4/5/2016    Type II or unspecified type diabetes mellitus with neurological manifestations, not stated as uncontrolled(250.60)     Type II or unspecified type diabetes mellitus with other specified manifestations, not stated as uncontrolled     Ulcer of left foot, with fat layer exposed ([facility] 12/19/2013    Ulcer of other part of foot        PAST SURGICAL HISTORY    Past Surgical History:   Procedure Laterality Date    APPENDECTOMY      CARDIAC SURGERY      CARPAL TUNNEL RELEASE      L hand Nov. 2011, R hand Jan. 2012    CARPAL TUNNEL RELEASE Right 6/10/2013    recurrent    CARPAL TUNNEL RELEASE Left 7/29/2013    with ulnar nerve transpostion and L middle finger release    CHOLECYSTECTOMY      COLONOSCOPY      CORONARY ARTERY BYPASS GRAFT  6/5/2010 6/5/2010-LIMA->LAD;  VG-> Diagonal;  VG-> Obtuse marginal;  VG->Distal RCA-   Dr Zander Sorenson Right 6/10/2013    HYSTERECTOMY, TOTAL ABDOMINAL (CERVIX REMOVED)      TOE AMPUTATION Left 02/14/144th toe    TUBAL LIGATION      ULNAR TUNNEL RELEASE Right 6/10/2013       FAMILY HISTORY    Family History   Problem Relation Age of Onset    Heart Disease Mother     Kidney Disease Mother         dialysis    Cancer Father        SOCIAL HISTORY    Social History     Tobacco Use    Smoking status: Every Day     Packs/day: 0.25     Years: 30.00     Pack years: 7.50     Types: Cigarettes    Smokeless tobacco: Never    Tobacco comments:     quit smoking mia 04/04/16   Vaping Use    Vaping Use: Every day    Substances: Never   Substance Use Topics    Alcohol use: No     Alcohol/week: 0.0 standard drinks     Comment:                                    CAFFEINE: 2 sodas daily    Drug use: No       ALLERGIES    Allergies   Allergen Reactions    Latex     Codeine     Cortisone     Cortizone     Iodides     Phenobarbital Other (See Comments)     Hallucinations     Vancomycin Other (See Comments)     \"makes me very sick\"       MEDICATIONS    No current facility-administered medications on file prior to encounter.      Current Outpatient Medications on File Prior to Encounter   Medication Sig Dispense Refill    SITagliptin (JANUVIA) 25 MG tablet Take 25 mg by mouth daily      mineral oil-hydrophilic petrolatum (AQUAPHOR) ointment Apply topically as needed. (Patient not taking: Reported on 9/21/2022) 396 g 0    cephALEXin (KEFLEX) 500 MG capsule Take 500 mg by mouth 2 times daily (Patient not taking: Reported on 9/21/2022)      OXYGEN Inhale 2 L into the lungs daily as needed      carvedilol (COREG) 6.25 MG tablet 6.25 mg 2 times daily       nitroGLYCERIN (NITROSTAT) 0.4 MG SL tablet Place 1 tablet under the tongue every 5 minutes as needed for Chest pain 25 tablet 3    VICTOZA 18 MG/3ML SOPN SC injection daily  (Patient not taking: Reported on 9/21/2022)      albuterol sulfate HFA (PROVENTIL;VENTOLIN;PROAIR) 108 (90 Base) MCG/ACT inhaler Inhale 2 puffs into the lungs every 6 hours as needed for Wheezing      esomeprazole Magnesium (NEXIUM) 40 MG PACK Take 20 mg by mouth 2 times daily 2 tabs      oxyCODONE-acetaminophen (PERCOCET)  MG per tablet Take 1 tablet by mouth every 6 hours as needed for Pain. LORazepam (ATIVAN) 0.5 MG tablet Take 0.5 mg by mouth every 12 hours Indications: One Tablet twice daily       Insulin Aspart (NOVOLOG FLEXPEN SC) Inject 15 Units into the skin 3 times daily (before meals) On sliding scale (Patient not taking: Reported on 9/21/2022)      pravastatin (PRAVACHOL) 40 MG tablet 40 mg daily. lisinopril (PRINIVIL;ZESTRIL) 10 MG tablet Take 20 mg by mouth daily  (Patient not taking: Reported on 9/21/2022)      insulin glargine (LANTUS) 100 UNIT/ML injection Inject 50 Units into the skin nightly.       furosemide (LASIX) 20 MG tablet Take 40 mg by mouth 2 times daily       gabapentin (NEURONTIN) 400 MG capsule Take 1,600 mg by mouth 3 times daily            Objective:      BP (!) 149/65   Pulse 81   Temp 98.1 °F (36.7 °C)   Resp 16   Ht 5' 3\" (1.6 m)   Wt 166 lb 14.4 oz (75.7 kg)   SpO2 93%   BMI 29.57 kg/m²   Grady Risk Score: Grady Scale Score: 20    LABS    CBC: Lab Results   Component Value Date/Time    WBC 10.3 09/23/2022 07:55 AM    RBC 3.35 09/23/2022 07:55 AM    HGB 11.5 09/23/2022 07:55 AM    HCT 34.8 09/23/2022 07:55 AM    .9 09/23/2022 07:55 AM    MCH 34.3 09/23/2022 07:55 AM    MCHC 33.0 09/23/2022 07:55 AM    RDW 12.2 09/23/2022 07:55 AM     09/23/2022 07:55 AM    MPV 10.1 09/23/2022 07:55 AM     CMP:    Lab Results   Component Value Date/Time     09/23/2022 07:55 AM    K 4.4 09/23/2022 07:55 AM    CL 96 09/23/2022 07:55 AM    CO2 24 09/23/2022 07:55 AM    BUN 31 09/23/2022 07:55 AM    CREATININE 9.9 09/23/2022 07:55 AM    GFRAA 5 09/23/2022 07:55 AM    LABGLOM 4 09/23/2022 07:55 AM    GLUCOSE 103 09/23/2022 07:55 AM    PROT 7.4 09/20/2022 03:10 PM    PROT 6.2 02/01/2013 07:55 AM    LABALBU 3.8 09/20/2022 03:10 PM    CALCIUM 8.3 09/23/2022 07:55 AM    BILITOT 0.4 09/20/2022 03:10 PM    ALKPHOS 108 09/20/2022 03:10 PM    AST 12 09/20/2022 03:10 PM    ALT 6 09/20/2022 03:10 PM     Albumin:    Lab Results   Component Value Date/Time    LABALBU 3.8 09/20/2022 03:10 PM     PT/INR:    Lab Results   Component Value Date/Time    PROTIME 14.3 09/21/2022 06:10 AM    PROTIME 10.6 10/18/2010 11:21 AM    INR 1.11 09/21/2022 06:10 AM     HgBA1c:    Lab Results   Component Value Date/Time    LABA1C 13.6 10/15/2014 09:56 AM         Assessment:     Patient Active Problem List   Diagnosis    CAD (coronary artery disease)    Hyperlipidemia    Diabetes mellitus (HCC)    Chest pain    Hoarseness of voice    Fracture of metacarpal bone    Wrist sprain    History of tobacco abuse    Wrist pain    Carpal tunnel syndrome    Cubital tunnel syndrome    Trigger finger    S/P CABG x 4    Diabetic foot ulcer (HCC)    Diabetic neuropathy ([address]    Diabetes mellitus type 2 with complications ([address]    Claudication of both lower extremities (HCC)    SOB (shortness of breath)    Bilateral leg edema    SOB (shortness of breath)    Type 2 diabetes mellitus without complication [facility])    Coronary artery disease involving coronary bypass graft of native heart with unstable angina pectoris ([facility]    S/P cardiac cath    Chronic renal failure, stage 2 (mild)    Chronic kidney disease (CKD) stage G4/A3, severely decreased glomerular filtration rate (GFR) between 15-29 mL/min/1.73 square meter and albuminuria creatinine ratio greater than 300 mg/g ([facility])    DM (diabetes mellitus) ([place] [id number]    Coronary atherosclerosis    HTN (hypertension)    Proteinuria    Callus of foot    Non compliance with medical treatment    Diabetic ulcer of left foot associated with type 2 diabetes mellitus, with fat layer exposed ([place] [id number]    Decubitus ulcer of sacral region, stage 1    Chronic renal failure, stage 4 (severe) ([facility])    Problem with dialysis access [facility])    WD-Presence of surgical incision    Diabetic ulcer of right heel associated with type 2 diabetes mellitus, with muscle involvement without evidence of necrosis ([facility])    Acute pain of right foot    Wound, open, foot with complication, left, sequela    ESRD on hemodialysis ([place] [id number].)       Measurements:  Negative Pressure Wound Therapy Foot (Active)   Number of days: 3142       Negative Pressure Wound Therapy Foot Distal (Active)   Number of days: 3138       Negative Pressure Wound Therapy (Active)   Wound Type Diabetic foot ulcer 09/23/22 0845   Unit Type kci 09/23/22 0845   Dressing Type Other (Comment) 09/23/22 0845   Number of pieces used 3 09/23/22 0845   Number of pieces removed 4 09/23/22 0845   Cycle Continuous 09/23/22 0845   Target Pressure (mmHg) 125 09/23/22 0845   Irrigation Solution Sodium chloride 0.9% 09/23/22 0845   Instillation Volume  10 mL 09/23/22 0845   Soak Time  2 minutes 09/23/22 0845   Vac Frequency 2 hours 09/23/22 0845   Canister changed?  No 09/23/22 0845   Dressing Status New dressing applied 09/23/22 0845   Dressing Changed Changed/New 09/23/22 0845   Drainage Amount Moderate 09/23/22 0845   Drainage Description Serosanguinous 09/23/22 0845 Dressing Change Due 09/26/22 09/23/22 0845   Lora-wound Assessment Fragile 09/23/22 0845   Odor None 09/23/22 0845   Number of days: 1       Incision 06/10/13 Wrist Right (Active)   Number of days: 3757       Incision 07/29/13 Wrist Left (Active)   Number of days: 3343       Incision 02/14/14 Toe (Comment  which one) (Active)   Number of days: 2486       Wound 09/13/22 #1 Right Foot Heel (Active)   Wound Image   09/23/22 0845   Wound Etiology Diabetic 09/23/22 0845   Dressing Status New dressing applied 09/23/22 0845   Wound Cleansed Cleansed with saline 09/23/22 0845   Dressing/Treatment Negative pressure wound therapy 09/23/22 0845   Offloading for Diabetic Foot Ulcers Back offloading shoe 09/20/22 0927   Dressing Change Due 09/26/22 09/23/22 0845   Wound Length (cm) 5 cm 09/21/22 1421   Wound Width (cm) 3.2 cm 09/21/22 1421   Wound Depth (cm) 1 cm 09/21/22 1421   Wound Surface Area (cm^2) 16 cm^2 09/21/22 1421   Change in Wound Size % (l*w) 57.56 09/21/22 1421   Wound Volume (cm^3) 16 cm^3 09/21/22 1421   Wound Healing % -41 09/21/22 1421   Post-Procedure Length (cm) 3.5 cm 09/20/22 0949   Post-Procedure Width (cm) 3.7 cm 09/20/22 0949   Post-Procedure Depth (cm) 0.1 cm 09/20/22 0949   Post-Procedure Surface Area (cm^2) 12.95 cm^2 09/20/22 0949   Post-Procedure Volume (cm^3) 1.295 cm^3 09/20/22 0949   Distance Tunneling (cm) 0 cm 09/23/22 0845   Tunneling Position ___ O'Clock 0 09/23/22 0845   Undermining Starts ___ O'Clock 0 09/23/22 0845   Undermining Ends___ O'Clock 0 09/23/22 0845   Undermining Maxium Distance (cm) 0 09/23/22 0845   Wound Assessment Pink/red 09/23/22 0845   Drainage Amount Moderate 09/23/22 0845   Drainage Description Serosanguinous 09/23/22 0845   Odor None 09/23/22 0845   Lora-wound Assessment Fragile 09/23/22 0845   Margins Defined edges 09/23/22 0845   Wound Thickness Description not for Pressure Injury Full thickness 09/23/22 0845   Number of days: 10       Response to treatment:  Well tolerated by patient. Pain Assessment:  Severity:  none  Quality of pain:   Wound Pain Timing/Severity:   Premedicated: no    Plan:     Plan of Care: Wound 09/13/22 #1 Right Foot Heel-Dressing/Treatment: Negative pressure wound therapy (cleanse choice x 3 pieces)    Patient in bed asleep woke up for wound care to change vac dressing to rt heel. Removed vac dressing cleansed wound with NS. Wound has more pink tissue and surrounding kamla wound with flaky/red/fragile. Applied new vac dressing veraflo-cleanse choice x 3 pieces with adequate seal obtained @ 125mmhg continuous. Wound care to change again on Monday. Pt is generally not at risk for skin breakdown AEB guerda. Specialty Bed Required : no  [] Low Air Loss   [] Pressure Redistribution  [] Fluid Immersion  [] Bariatric  [] Total Pressure Relief  [] Other:     Discharge Plan:  Placement for patient upon discharge: home   Hospice Care: no  Patient appropriate for Outpatient [address]: yes to continue to follow up. Patient/Caregiver Teaching:  Level of patient/caregiver understanding able to: pt voiced understanding. Electronically signed by Ernst Durand.  LORE Edmondson,  on 9/23/2022 at 9:41 AM

## 2022-09-23 NOTE — PROGRESS NOTES
V2.0  [facility] Hospitalist Progress Note      Name:  Ottoniel Vincent /Age/Sex: 1965  (62 y.o. female)   MRN & CSN:  [id number] & [id number] Encounter Date/Time: 2022 7:22 AM EDT    Location:  9664/[id number] PCP: Vane Molina MD       Hospital Day: 4    Subjective:     Chief Complaint: infected wound. Sent from wound care center     Patient has a wound VAC in place and will be receiving dialysis today. Arrangements are being made for her to have her wound VAC upon discharge. This likely will not be able to be done today. Assessment and Plan:   Ottoniel Vincent is a 62 y.o. female with a pmh of ype 2 diabetes, ESRD on HD [], hypertension, GERD, Hyperlipidemia, CAD s/p CABG x4 in , neuropathy, COPD, and anxiety who presents with right heel wound. Plan:    Ottoniel Vincent is a 62 y.o. female with a pmh of ype 2 diabetes, ESRD on HD [], hypertension, GERD, Hyperlipidemia, CAD s/p CABG x4 in , neuropathy, COPD, and anxiety who presents with right heel wound. Right heel diabetic foot wound. Patient presents from her wound care clinic for admission and higher level wound care with wound VAC from her surgeon. General surgery consulted  IV antibiotics: Zosyn and Zyvox:  Patient is allergic to Vancomycin  Continue while in-house  Her surgeon is okay with her leaving on oral antibiotics and be seen as an outpatient once her wound VAC has been arranged. Trend  markers ordered  Trend WBC count     ESRD on HD []  Patient appears euvolemic  Nephrology consulted for inpatient dialysis     Hypertension  Blood pressure controlled  Coreg 6.5 twice daily  Lasix 40 twice daily     CAD s/p CABG x4 in   Patient with a complex cardiac history. Has no chest pain at this time. Continue home ASA and Statin     Type 2 diabetes  Per the patient's last PCP note on 2022 she takes 15 units of Lantus nightly and Januvia.   Patient was having problems with hypoglycemia. Will start Lantus 10 units nightly  Medium dose sliding scale     Neuropathy  Gabapentin 400 3 times daily. [Patient does take more this at home]     GERD  Continue home Nexium     Hyperlipidemia  Continue statin     COPD, not in acute exacerbation  Patient is satting well on room air  As needed inhalers     Anxiety   Patient takes Ativan 0.5 twice daily  Continue as needed    Diet ADULT DIET; Regular; 4 carb choices (60 gm/meal)  ADULT ORAL NUTRITION SUPPLEMENT; Dinner; Renal Oral Supplement   DVT Prophylaxis [] Lovenox, [x]  Heparin, [] SCDs, [] Ambulation,    [] Eliquis, [] Xarelto  [] Coumadin [] other   Code Status Full Code   Disposition From: home  Expected Disposition: home  Estimated Date of Discharge: 2-3 days  Patient requires continued admission due to wound vac placement + IV Abx   Surrogate Decision Maker/ POA        Review of Systems:    Review of Systems   Constitutional:  Negative for activity change, appetite change, chills, fatigue and fever. HENT:  Negative for congestion, sinus pressure, sinus pain and sore throat. Eyes:  Negative for visual disturbance. Respiratory:  Negative for cough, shortness of breath and wheezing. Cardiovascular:  Negative for chest pain, palpitations and leg swelling. Gastrointestinal:  Negative for abdominal pain, constipation, diarrhea, nausea and vomiting. Endocrine: Negative for polydipsia and polyuria. Genitourinary:  Negative for dysuria. Musculoskeletal:  Negative for arthralgias and back pain. Skin:  Positive for wound. Negative for color change. Neurological:  Negative for dizziness, weakness and headaches. Psychiatric/Behavioral:  Negative for agitation, behavioral problems and confusion. Objective:      Intake/Output Summary (Last 24 hours) at 9/23/2022 1155  Last data filed at 9/23/2022 0255  Gross per 24 hour   Intake 360 ml   Output 251 ml   Net 109 ml          Vitals:   Vitals: 09/23/22 0955   BP:    Pulse:    Resp: 16   Temp:    SpO2:        Physical Exam:     Physical Exam  Vitals reviewed. Constitutional:       Appearance: Normal appearance. She is normal weight. HENT:      Head: Normocephalic and atraumatic. Mouth/Throat:      Mouth: Mucous membranes are moist.      Pharynx: Oropharynx is clear. Eyes:      Extraocular Movements: Extraocular movements intact. Conjunctiva/sclera: Conjunctivae normal.      Pupils: Pupils are equal, round, and reactive to light. Cardiovascular:      Rate and Rhythm: Normal rate and regular rhythm. Pulses: Normal pulses. Heart sounds: Normal heart sounds. Pulmonary:      Effort: Pulmonary effort is normal.      Breath sounds: Normal breath sounds. Abdominal:      General: Abdomen is flat. Bowel sounds are normal.      Palpations: Abdomen is soft. Musculoskeletal:         General: No swelling. Normal range of motion. Cervical back: Normal range of motion and neck supple. Feet:    Feet:      Right foot:      Skin integrity: Ulcer present. Comments: Wound on right heal  Skin:     General: Skin is warm and dry. Neurological:      General: No focal deficit present. Mental Status: She is alert and oriented to person, place, and time. Mental status is at baseline. Psychiatric:         Mood and Affect: Mood normal.         Behavior: Behavior normal.         Thought Content:  Thought content normal.         Judgment: Judgment normal.       Medications:   Medications:    furosemide  40 mg Oral BID    piperacillin-tazobactam  2,250 mg IntraVENous Q8H    carvedilol  6.25 mg Oral BID    pantoprazole  40 mg Oral QAM AC    insulin lispro  0-8 Units SubCUTAneous TID WC    insulin lispro  0-4 Units SubCUTAneous Nightly    pravastatin  40 mg Oral Nightly    sodium chloride flush  5-40 mL IntraVENous 2 times per day    heparin (porcine)  5,000 Units SubCUTAneous 3 times per day    gabapentin  400 mg Oral TID aspirin  81 mg Oral Daily    [Held by provider] insulin glargine  10 Units SubCUTAneous Nightly    linezolid  600 mg IntraVENous Q12H      Infusions:    sodium chloride 50 mL/hr at 09/23/22 0931    dextrose       PRN Meds: LORazepam, 0.5 mg, Q12H PRN  albuterol sulfate HFA, 2 puff, Q6H PRN  oxyCODONE-acetaminophen, 1 tablet, Q4H PRN   Or  oxyCODONE-acetaminophen, 2 tablet, Q4H PRN  sodium chloride flush, 5-40 mL, PRN  sodium chloride, , PRN  ondansetron, 4 mg, Q8H PRN   Or  ondansetron, 4 mg, Q6H PRN  polyethylene glycol, 17 g, Daily PRN  acetaminophen, 650 mg, Q6H PRN   Or  acetaminophen, 650 mg, Q6H PRN  glucose, 4 tablet, PRN  dextrose bolus, 125 mL, PRN   Or  dextrose bolus, 250 mL, PRN  glucagon (rDNA), 1 mg, PRN  dextrose, , Continuous PRN      Labs      Recent Results (from the past 24 hour(s))   POCT Glucose    Collection Time: 09/22/22  8:17 PM   Result Value Ref Range    POC Glucose 138 (H) 70 - 99 MG/DL   POCT Glucose    Collection Time: 09/23/22  6:54 AM   Result Value Ref Range    POC Glucose 106 (H) 70 - 99 MG/DL   Basic Metabolic Panel w/ Reflex to MG    Collection Time: 09/23/22  7:55 AM   Result Value Ref Range    Sodium 138 135 - 145 MMOL/L    Potassium 4.4 3.5 - 5.1 MMOL/L    Chloride 96 (L) 99 - 110 mMol/L    CO2 24 21 - 32 MMOL/L    Anion Gap 18 (H) 4 - 16    BUN 31 (H) 6 - 23 MG/DL    Creatinine 9.9 (H) 0.6 - 1.1 MG/DL    Glucose 103 (H) 70 - 99 MG/DL    Calcium 8.3 8.3 - 10.6 MG/DL    GFR Non-African American 4 (L) >60 mL/min/1.73m2    GFR  5 (L) >60 mL/min/1.73m2   CBC with Auto Differential    Collection Time: 09/23/22  7:55 AM   Result Value Ref Range    WBC 10.3 4.0 - 10.5 K/CU MM    RBC 3.35 (L) 4.2 - 5.4 M/CU MM    Hemoglobin 11.5 (L) 12.5 - 16.0 GM/DL    Hematocrit 34.8 (L) 37 - 47 %    .9 (H) 78 - 100 FL    MCH 34.3 (H) 27 - 31 PG    MCHC 33.0 32.0 - 36.0 %    RDW 12.2 11.7 - 14.9 %    Platelets 442 708 - 538 K/CU MM    MPV 10.1 7.5 - 11.1 FL    Differential Type AUTOMATED DIFFERENTIAL     Segs Relative 73.0 (H) 36 - 66 %    Lymphocytes % 12.5 (L) 24 - 44 %    Monocytes % 9.5 (H) 0 - 4 %    Eosinophils % 3.3 (H) 0 - 3 %    Basophils % 0.5 0 - 1 %    Segs Absolute 7.6 K/CU MM    Lymphocytes Absolute 1.3 K/CU MM    Monocytes Absolute 1.0 K/CU MM    Eosinophils Absolute 0.3 K/CU MM    Basophils Absolute 0.1 K/CU MM    Nucleated RBC % 0.0 %    Total Nucleated RBC 0.0 K/CU MM    Total Immature Neutrophil 0.12 K/CU MM    Immature Neutrophil % 1.2 (H) 0 - 0.43 %   C-Reactive Protein    Collection Time: 09/23/22  7:55 AM   Result Value Ref Range    CRP, High Sensitivity 90.1 (H) 0.0 - 5.0 mg/L   Hepatitis B Surface Antibody    Collection Time: 09/23/22  7:55 AM   Result Value Ref Range    Hep B S Ab <3.5    Hepatitis B Surface Antigen    Collection Time: 09/23/22  7:55 AM   Result Value Ref Range    Hepatitis B Surface Ag NON REACTIVE NON REACTIVE   POCT Glucose    Collection Time: 09/23/22 10:53 AM   Result Value Ref Range    POC Glucose 96 70 - 99 MG/DL        Imaging/Diagnostics Last 24 Hours   XR FOOT RIGHT (MIN 3 VIEWS)    Result Date: 9/20/2022  EXAMINATION: THREE XRAY VIEWS OF THE RIGHT FOOT 9/20/2022 3:52 pm COMPARISON: None. HISTORY: ORDERING SYSTEM PROVIDED HISTORY: r/o osteomyelitis TECHNOLOGIST PROVIDED HISTORY: Reason for exam:->r/o osteomyelitis Reason for Exam: r/o osteomyelitis Additional signs and symptoms: na Relevant Medical/Surgical History: diabetes, cad, chf, ckd FINDINGS: There is no evidence of acute fracture. There is normal alignment of the tarsometatarsal joints. No acute joint abnormality. No focal osseous lesion. Soft tissue ulceration in the region of the calcaneus. No acute osseous abnormality.        Electronically signed by Giovanni Rosas MD on 9/23/2022 at 11:55 AM

## 2022-09-23 NOTE — CONSULTS
Initial Psychiatric History and Physical    Addy Tinajero  [id number]  9/20/2022 09/23/22    ID: Patient is a 62 yrs y.o. female    CC:\"Oh no!!\"    HPI: Addy Tinajero is a 62 y.o. female with a pmh of ype 2 diabetes, ESRD on HD [Monday Wednesday Friday], hypertension, GERD, Hyperlipidemia, CAD s/p CABG x4 in 2010, neuropathy, COPD, and anxiety who presents with right heel wound. Dc is expected soon with [facility]. Consults included: wound care, general surgery and nephrology. Psychiatry was consulted by Dr Jaclyn Downing as Elizabethine Kimberly voices. Also had very bad anxiety. \"    Met with patient at bedside. She is alert, anxious but oriented x 4. Patient is upset that psychiatry was called and believes \"this will go on my record. \" And possibly effect her on a transplant list. Patient was provided with a lot of support and encouragement and was able to calm herself. Currently denies auditory hallucinations. She believes she had a reaction to medication. Also as she has not had dailysis per her report this may be 2/2 to that and discussed other possibilities that lend hallucinations to medical and not psychiatric in nature. Denies SI/HI. Denies auditory and visual hallucinations. She denies depression and anxiety but S/S suggest otherwise. She declines any medication assistance but will discuss with her PCP at [place]. She is willing to obtain counseling due to increase need for support. Other supports at home include her  and brother in law. Insight is fair. Judgment is adequate. Past Psychiatric History:   Ativan 0.5 mg q12 hours prn anxiety. Family Psychiatric History:   Family History   Problem Relation Age of Onset    Heart Disease Mother     Kidney Disease Mother         dialysis    Cancer Father         Allergies:   Allergies   Allergen Reactions    Latex     Codeine     Cortisone     Cortizone     Iodides     Phenobarbital Other (See Comments)     Hallucinations     Vancomycin Other (See Comments)     \"makes me very sick\"        OBJECTIVE  Vital Signs:  Vitals:    09/23/22 0955   BP:    Pulse:    Resp: 16   Temp:    SpO2:        Labs:  Recent Results (from the past 48 hour(s))   POCT Glucose    Collection Time: 09/21/22  1:44 PM   Result Value Ref Range    POC Glucose 106 (H) 70 - 99 MG/DL   POCT Glucose    Collection Time: 09/21/22  7:54 PM   Result Value Ref Range    POC Glucose 171 (H) 70 - 99 MG/DL   POCT Glucose    Collection Time: 09/21/22  9:33 PM   Result Value Ref Range    POC Glucose 166 (H) 70 - 99 MG/DL   POCT Glucose    Collection Time: 09/21/22 10:17 PM   Result Value Ref Range    POC Glucose 168 (H) 70 - 99 MG/DL   POCT Glucose    Collection Time: 09/22/22  1:51 AM   Result Value Ref Range    POC Glucose 155 (H) 70 - 99 MG/DL   Basic Metabolic Panel w/ Reflex to MG    Collection Time: 09/22/22  5:53 AM   Result Value Ref Range    Sodium 136 135 - 145 MMOL/L    Potassium 3.8 3.5 - 5.1 MMOL/L    Chloride 94 (L) 99 - 110 mMol/L    CO2 25 21 - 32 MMOL/L    Anion Gap 17 (H) 4 - 16    BUN 28 (H) 6 - 23 MG/DL    Creatinine 8.7 (H) 0.6 - 1.1 MG/DL    Glucose 224 (H) 70 - 99 MG/DL    Calcium 8.1 (L) 8.3 - 10.6 MG/DL    GFR Non-African American 5 (L) >60 mL/min/1.73m2    GFR  6 (L) >60 mL/min/1.73m2   CBC with Auto Differential    Collection Time: 09/22/22  5:53 AM   Result Value Ref Range    WBC 10.3 4.0 - 10.5 K/CU MM    RBC 3.04 (L) 4.2 - 5.4 M/CU MM    Hemoglobin 10.4 (L) 12.5 - 16.0 GM/DL    Hematocrit 32.1 (L) 37 - 47 %    .6 (H) 78 - 100 FL    MCH 34.2 (H) 27 - 31 PG    MCHC 32.4 32.0 - 36.0 %    RDW 12.2 11.7 - 14.9 %    Platelets 059 365 - 547 K/CU MM    MPV 10.2 7.5 - 11.1 FL    Differential Type AUTOMATED DIFFERENTIAL     Segs Relative 73.9 (H) 36 - 66 %    Lymphocytes % 10.8 (L) 24 - 44 %    Monocytes % 10.6 (H) 0 - 4 %    Eosinophils % 3.0 0 - 3 %    Basophils % 0.5 0 - 1 %    Segs Absolute 7.6 K/CU MM    Lymphocytes Absolute 1.1 K/CU MM    Monocytes Absolute 1.1 K/CU MM Eosinophils Absolute 0.3 K/CU MM    Basophils Absolute 0.1 K/CU MM    Nucleated RBC % 0.0 %    Total Nucleated RBC 0.0 K/CU MM    Total Immature Neutrophil 0.12 K/CU MM    Immature Neutrophil % 1.2 (H) 0 - 0.43 %   High sensitivity CRP    Collection Time: 09/22/22  5:53 AM   Result Value Ref Range    CRP High Sensitivity 77.0 (H) <5.0 mg/L   POCT Glucose    Collection Time: 09/22/22  6:56 AM   Result Value Ref Range    POC Glucose 185 (H) 70 - 99 MG/DL   POCT Glucose    Collection Time: 09/22/22  8:17 PM   Result Value Ref Range    POC Glucose 138 (H) 70 - 99 MG/DL   POCT Glucose    Collection Time: 09/23/22  6:54 AM   Result Value Ref Range    POC Glucose 106 (H) 70 - 99 MG/DL   Basic Metabolic Panel w/ Reflex to MG    Collection Time: 09/23/22  7:55 AM   Result Value Ref Range    Sodium 138 135 - 145 MMOL/L    Potassium 4.4 3.5 - 5.1 MMOL/L    Chloride 96 (L) 99 - 110 mMol/L    CO2 24 21 - 32 MMOL/L    Anion Gap 18 (H) 4 - 16    BUN 31 (H) 6 - 23 MG/DL    Creatinine 9.9 (H) 0.6 - 1.1 MG/DL    Glucose 103 (H) 70 - 99 MG/DL    Calcium 8.3 8.3 - 10.6 MG/DL    GFR Non-African American 4 (L) >60 mL/min/1.73m2    GFR  5 (L) >60 mL/min/1.73m2   CBC with Auto Differential    Collection Time: 09/23/22  7:55 AM   Result Value Ref Range    WBC 10.3 4.0 - 10.5 K/CU MM    RBC 3.35 (L) 4.2 - 5.4 M/CU MM    Hemoglobin 11.5 (L) 12.5 - 16.0 GM/DL    Hematocrit 34.8 (L) 37 - 47 %    .9 (H) 78 - 100 FL    MCH 34.3 (H) 27 - 31 PG    MCHC 33.0 32.0 - 36.0 %    RDW 12.2 11.7 - 14.9 %    Platelets 197 863 - 638 K/CU MM    MPV 10.1 7.5 - 11.1 FL    Differential Type AUTOMATED DIFFERENTIAL     Segs Relative 73.0 (H) 36 - 66 %    Lymphocytes % 12.5 (L) 24 - 44 %    Monocytes % 9.5 (H) 0 - 4 %    Eosinophils % 3.3 (H) 0 - 3 %    Basophils % 0.5 0 - 1 %    Segs Absolute 7.6 K/CU MM    Lymphocytes Absolute 1.3 K/CU MM    Monocytes Absolute 1.0 K/CU MM    Eosinophils Absolute 0.3 K/CU MM    Basophils Absolute 0.1 K/CU MM Nucleated RBC % 0.0 %    Total Nucleated RBC 0.0 K/CU MM    Total Immature Neutrophil 0.12 K/CU MM    Immature Neutrophil % 1.2 (H) 0 - 0.43 %   C-Reactive Protein    Collection Time: 09/23/22  7:55 AM   Result Value Ref Range    CRP, High Sensitivity 90.1 (H) 0.0 - 5.0 mg/L   Hepatitis B Surface Antibody    Collection Time: 09/23/22  7:55 AM   Result Value Ref Range    Hep B S Ab <3.5    Hepatitis B Surface Antigen    Collection Time: 09/23/22  7:55 AM   Result Value Ref Range    Hepatitis B Surface Ag NON REACTIVE NON REACTIVE   POCT Glucose    Collection Time: 09/23/22 10:53 AM   Result Value Ref Range    POC Glucose 96 70 - 99 MG/DL       Review of Systems:  Reports of no current cardiovascular, respiratory, gastrointestinal, genitourinary, integumentary, neurological, muscuoskeletal, or immunological symptoms today. PSYCHIATRIC: See HPI above. PSYCHIATRIC EXAMINATION / MENTAL STATUS EXAM    CONSTITUTIONAL:    Vitals:   Vitals:    09/23/22 0955   BP:    Pulse:    Resp: 16   Temp:    SpO2:       General appearance: [] appears age, [x]  appears older than stated age,               [x]  adequately dressed and groomed, [] disheveled,               []  in no acute distress, [x] appears mildly distressed, [] other           MUSCULOSKELETAL:   Gait:   [] normal, [] antalgic, [] unsteady, [x] gait not evaluated   Station:             [] erect, [] sitting, [x] recumbent, [] other        Strength/tone:  [x] muscle strength and tone appear consistent with age and                                        condition     [] atrophy      [] abnormal movements     Vitals: Blood pressure (!) 149/65, pulse 81, temperature 98.1 °F (36.7 °C), resp. rate 16, height 5' 3\" (1.6 m), weight 166 lb 14.4 oz (75.7 kg), SpO2 93 %. CONSTITUTIONAL:    Appearance: appears stated age. alert and oriented to person, place, time & situation. Mild distress. Adequate grooming and hygeine. Good eye contact.  No prominent physical abnormalities. Attitude: Manner is cooperative and pleasant but guarded  Motor: Noted psychomotor agitation, no retardation or abnormal movements noted  Speech: Clearly articulated; normal rate, volume, tone & amount. Language: intact understanding and production  Mood:anxious  Affect: tearful  Thought Production: Spontaneous. Thought Form: Coherent, linear, logical & goal-directed. No tangentiality or circumstantiality. No flight of ideas or loosening of associations. Thought Content/Perceptions: No GABI, no AVH, no delusion  Insight: good  Judgment adequate  Memory: Immediate, recent, and remote appear intact, though not formally tested. Attention: maintained throughout interview  Fund of knowledge: Average  Gait/Balance: ALEJANDRO    Impression:   SIMRAN  MDD, recurrent moderate    Problem List:   Wound, open, foot with complication, left, sequela    Plan:   Patient is ok to be dc from a psychiatric point of view when medically appropriate. Recommend starting zoloft 25 mg po daily. Patient declines at this time but could be beneficial in assisting with anxiety. Recommend melatonin to assist with improved sleep  Continue with ativan 0.5 mg po q 12 hours for anxiety. Do not recommend increasing medication. Recommend counseling to assist with ongoing support. Psychiatry will sign off. Thank you for this consult  PS to Dr Renata Holloway regarding recommendations.     Electronically signed by RYAN Lopez CNP on 9/23/2022 at 11:54 AM

## 2022-09-23 NOTE — CARE COORDINATION
Spoke with patient regarding discharge planning. Patient is from home with her  and reported normally being independent with mobility but added that she does have a w/c at home . Patient has PCP and insurance to assist with RX coverage. Patient stated that she goes to HD in [place] MWF with Dr William Torres and added that her brother in law drives her. Patient agreeable to [facility] and has no preference on the agency. Referral to [facility] at [facility]. Phoned Familia Stewart  [phone] at [facility] to order wound [facility] and faxed orders fax # [phone].

## 2022-09-23 NOTE — PROGRESS NOTES
Pt seen and examined. AF VSS. Irrigating VAC in place and working well. WBC normalized. Blood cx's NGTD. Arterial U/S reviewed and no acute issues to address with her LE's, but she could have aortoiliac dz. I can address this as an outpt and will eventually have her see Dr. Harris Beltran. Severe DM right heel ulceration to the fascia with SSTI. Continue with broad spectrum abx's. Offload foot. Change VAC today. ESRD on HD. Will do HD today. Discussed with Dr. Blake Fitzgerald. CAD, h/o CABG  CPM for comorbidities. Ok to discharge home with [facility] if we can get it arranged. Discharge on PO abx's and f/u next Tuesday with me at the [facility].   My partner Dr. Darrell Mcgraw is on call this weekend for me if needed,

## 2022-09-23 NOTE — CARE COORDINATION
[id number] Liaison spoke with pt and pt is agreeable to c at discharge. Spoke with pt and verified address and number.  Please place inpatient consult to home health needs order in [facility] [place].

## 2022-09-23 NOTE — PROGRESS NOTES
Patient Name: Marylee Chestnut  Patient : 1965  MRN: [id number]     Acct: [de-identified]  Date of Admission: 2022  Room/Bed: 1118/[facility]-A  Code Status:  Full Code  Allergies:    Allergies   Allergen Reactions    Latex     Codeine     Cortisone     Cortizone     Iodides     Phenobarbital Other (See Comments)     Hallucinations     Vancomycin Other (See Comments)     \"makes me very sick\"     Diagnosis:    Patient Active Problem List   Diagnosis    CAD (coronary artery disease)    Hyperlipidemia    Diabetes mellitus ([facility])    Chest pain    Hoarseness of voice    Fracture of metacarpal bone    Wrist sprain    History of tobacco abuse    Wrist pain    Carpal tunnel syndrome    Cubital tunnel syndrome    Trigger finger    S/P CABG x 4    Diabetic foot ulcer ([address]    Diabetic neuropathy ([address]    Diabetes mellitus type 2 with complications ([facility])    Claudication of both lower extremities ([facility])    SOB (shortness of breath)    Bilateral leg edema    SOB (shortness of breath)    Type 2 diabetes mellitus without complication (HCC)    Coronary artery disease involving coronary bypass graft of native heart with unstable angina pectoris ([facility])    S/P cardiac cath    Chronic renal failure, stage 2 (mild)    Chronic kidney disease (CKD) stage G4/A3, severely decreased glomerular filtration rate (GFR) between 15-29 mL/min/1.73 square meter and albuminuria creatinine ratio greater than 300 mg/g ([facility])    DM (diabetes mellitus) ([facility])    Coronary atherosclerosis    HTN (hypertension)    Proteinuria    Callus of foot    Non compliance with medical treatment    Diabetic ulcer of left foot associated with type 2 diabetes mellitus, with fat layer exposed ([address]    Decubitus ulcer of sacral region, stage 1    Chronic renal failure, stage 4 (severe) ([facility])    Problem with dialysis access [facility])    WD-Presence of surgical incision    Diabetic ulcer of right heel associated with type 2 diabetes mellitus, with muscle involvement without evidence of necrosis ([facility]    Acute pain of right foot    Wound, open, foot with complication, left, sequela    ESRD on hemodialysis ([facility]         Treatment:  Hemodilaysis 2:1  Priority: Routine  Location: Acute Room    Diabetic: Yes  NPO: No  Isolation Precautions: Dialysis     Consent for Treatment Verified: Yes  Blood Consent Verified: Not Applicable     Safety Verified: Identify (I), Consent (C), Equipment (E), HepB Status (B), Orders Complete (O), Access Verified (A), and Timeliness (T)  Time out performed prior to access at 1323 hours. Report Received from Primary RN at 1300 hours. Primary RN (First Initial, Last Name, Title): MAHSA Ortiz RN  Incapacitated Nurse Education Completed: Not Applicable     HBsAg ONLY:  Date Drawn: September 23, 2022       Results: Negative  HBsAb:  Date Drawn:  September 23, 2022       Results: Susceptible <10    Order  Dialysis Bath  K+ (Potassium): 3  Ca+ (Calcium): 2.5  Na+ (Sodium): 137  HCO3 (Bicarb): 30  Bicarbonate Concentrate Lot No.: [id number]  Acid Concentrate Lot No.: [id number]     Na+ Modeling: Not Applicable  Dialyzer: [id number]  Dialysate Temperature (C):  35  Blood Flow Rate (BFR):  350   Dialysate Flow Rate (DFR):   700        Access to be Utilized   Access: AVF  Location: Upper Extremity  Side: Left   Needle gauge:  16  + Bruit/Thrill: Yes    First Use X-ray Verified: Not Applicable  OK to use line order: Not Applicable    Site Assessment:  Signs and Symptoms of Infection/Inflammation: None  If yes: Not Applicable  Dressing: Dry and Intact  Site Prep: Medical Aseptic Technique  Comment:    Flows: Good, Patent  If access problem, who was notified:     Pre and Post-Assessment  Patient Vitals for the past 8 hrs:   Level of Consciousness Oriented X Heart Rhythm Respiratory Quality/Effort O2 Device Bilateral Breath Sounds Skin Condition/Temp Abdomen Inspection Bowel Sounds (All Quadrants) Edema RLE Edema   09/23/22 1333 0 4 Regular Unlabored None (Room air) Clear Dry; Warm Soft Active Right lower extremity +2   09/23/22 1600 0 4 Regular Unlabored None (Room air) Clear Dry; Warm Soft Active Right lower extremity +2     Labs  Recent Labs     09/21/22  0610 09/22/22  0553 09/23/22  0755   WBC 10.7* 10.3 10.3   HGB 10.9* 10.4* 11.5*   HCT 33.4* 32.1* 34.8*    238 262                                                                  Recent Labs     09/21/22  0610 09/22/22  0553 09/23/22  0755    136 138   K 3.8 3.8 4.4   CL 96* 94* 96*   CO2 29 25 24   BUN 25* 28* 31*   CREATININE 8.4* 8.7* 9.9*   GLUCOSE 107* 224* 103*     IV Drips and Rate/Dose   sodium chloride 50 mL/hr at 09/23/22 0931    dextrose        Safety - Before each treatment:   Dialysis Machine No.: [id number]   Machine Number: [id number]  Dialyzer Lot No.: [id number]   Machine Log Sheet Completed: Yes  Machine Alarm Self Test: Completed; Passed (09/23/22 1333)     Air Foam Detector: Tested, Proper Function, pH Reading  Extracorporeal Circuit Tested for Integrity: Yes  Machine Conductivity: 13.6  Manual Conductivity: 13.6  Machine Ph: 7.2  Bicarbonate Concentrate Lot No.: [id number]  Acid Concentrate Lot No.: [id number]     Bleach Test (Neg): Yes  Bath Temperature: 95 °F (35 °C)  Tubing Lot#: [id number]  Conductivity Meter Serial #: [id number]  All Connections Secure?: Yes  Venous Parameters Set?: Yes  Arterial Parameters Set?: Yes  Saline Line Double Clamped?: Yes  Air Foam Detector Engaged?: Yes  Machine Functioning Alarm Free?  Yes  Prime Given: 200ml    Chlorine Testing - Before each treatment and every 4 hours:   Treatment  Time On: 1333  Time Off: 1600  Weight: 166 lb 14.4 oz (75.7 kg) (09/23/22 0600)  1st check: less than 0.1 ppm at: 1130 hours  2nd check: less than 0.1 ppm at: 1455 hours  3rd check: Not Applicable  (if greater than 0.1 ppm, then check every 30 minutes from secondary)    Access Flows and Pressures  Patient Vitals for the past 8 hrs:   Blood Flow Rate (ml/min) Ultrafiltration Rate (ml/hr) Arterial Pressure (mmHg) Venous Pressure (mmHg) TMP DFR Comments Access Visible   09/23/22 1333 200 ml/min 600 ml/hr -30 mmHg 100 50 500 tx initiated Yes   09/23/22 1345 350 ml/min 600 ml/hr -170 mmHg 180 40 500 lines secure Yes   09/23/22 1415 350 ml/min 600 ml/hr -170 mmHg 180 40 500 pt stable Yes   09/23/22 1430 350 ml/min 600 ml/hr -130 mmHg 200 40 500 on bedpan Yes   09/23/22 1445 350 ml/min 600 ml/hr -130 mmHg 210 40 500 pt stable Yes   09/23/22 1500 350 ml/min 600 ml/hr -130 mmHg 210 40 500 alert Yes   09/23/22 1515 350 ml/min 600 ml/hr -130 mmHg 180 40 500 pt stable Yes   09/23/22 1530 350 ml/min 600 ml/hr -130 mmHg 180 40 500 pt stable Yes   09/23/22 1545 350 ml/min 600 ml/hr -130 mmHg 180 40 500 alert Yes   09/23/22 1600 -- -- -- -- -- -- tx ended Yes     Vital Signs  Patient Vitals for the past 8 hrs:   BP Temp Pulse Resp SpO2   09/23/22 0820 (!) 149/65 98.1 °F (36.7 °C) 81 -- 93 %   09/23/22 0955 -- -- -- 16 --   09/23/22 1333 (!) 153/60 98.1 °F (36.7 °C) 81 (!) 7 94 %   09/23/22 1345 (!) 122/52 -- 77 (!) 7 --   09/23/22 1400 (!) 122/56 -- 80 14 --   09/23/22 1415 (!) 125/49 -- 78 (!) 9 --   09/23/22 1430 (!) 125/91 -- 85 15 --   09/23/22 1445 (!) 142/62 -- 80 12 --   09/23/22 1500 (!) 128/59 -- 78 12 --   09/23/22 1515 (!) 107/55 -- 75 (!) 8 --   09/23/22 1530 (!) 100/53 -- 75 (!) 7 --   09/23/22 1545 (!) 102/52 -- 75 (!) 7 --   09/23/22 1600 (!) 129/57 98 °F (36.7 °C) 76 11 95 %     Post-Dialysis  Arterial Catheter Locking Solution: Not Applicable  Venous Catheter Locking Solution: Not Applicable  Post-Treatment Procedures: Blood returned, Access bleeding time < 10 minutes  Machine Disinfection Process: Acid/Vinegar Clean, Heat Disinfect, Exterior Machine Disinfection  Rinseback Volume (ml): 300 ml     Dialyzer Clearance: Lightly streaked  Duration of Treatment (minutes): 150 minutes     Hemodialysis Intake (ml): 500 ml  Hemodialysis Output (ml): 1500 ml     Tolerated Treatment: Good  Patient Response to Treatment: good          Provider Notification        Handoff complete and report given to Primary RN at 1403 hours. Primary RN (First Initial, Last Name, Title): MAHSA Lopes RN     Education  Person Educated: Patient   Knowledge Base: Substantial  Barriers to Learning?: None  Preferred method of Learning: Oral  Topic(s): Access Care, Signs and Symptoms of Infection, and Procedural   Teaching Tools: Explanation   Response to Education: Verbalized Understanding       Electronically signed by Windy Liriano RN on 9/23/2022 at 4:04 PM

## 2022-09-23 NOTE — PROGRESS NOTES
Nephrology Progress Note  9/23/2022 6:50 AM        Subjective:   Admit Date: 9/20/2022  PCP: John Ge MD    Interval History: No major event overnight, somehow her dialysis was missed on Wednesday  When I found it out, I offered her dialysis yesterday but she would prefer to do it only today  X-ray electrolytes are okay and she did not gain much weight and not drinking and eating    Diet: Will get better now    ROS: Has wound VAC now  No shortness of breath  No fever and acceptable blood pressure    Data:     Current meds:    piperacillin-tazobactam  2,250 mg IntraVENous Q8H    carvedilol  6.25 mg Oral BID    pantoprazole  40 mg Oral QAM AC    insulin lispro  0-8 Units SubCUTAneous TID WC    insulin lispro  0-4 Units SubCUTAneous Nightly    pravastatin  40 mg Oral Nightly    sodium chloride flush  5-40 mL IntraVENous 2 times per day    heparin (porcine)  5,000 Units SubCUTAneous 3 times per day    furosemide  40 mg Oral BID    gabapentin  400 mg Oral TID    aspirin  81 mg Oral Daily    insulin glargine  10 Units SubCUTAneous Nightly    linezolid  600 mg IntraVENous Q12H      sodium chloride 75 mL/hr at 09/22/22 1858    dextrose           I/O last 3 completed shifts:   In: 480 [P.O.:480]  Out: 101 [Urine:101]    CBC:   Recent Labs     09/20/22  1510 09/21/22  0610 09/22/22  0553   WBC 14.8* 10.7* 10.3   HGB 11.8* 10.9* 10.4*    262 238          Recent Labs     09/20/22  1510 09/21/22  0610 09/22/22  0553    137 136   K 4.0 3.8 3.8   CL 96* 96* 94*   CO2 27 29 25   BUN 20 25* 28*   CREATININE 7.0* 8.4* 8.7*   GLUCOSE 169* 107* 224*       Lab Results   Component Value Date    CALCIUM 8.1 (L) 09/22/2022    PHOS 4.6 05/23/2017       Objective:     Vitals: /61   Pulse 76   Temp 98.1 °F (36.7 °C)   Resp 16   Ht 5' 3\" (1.6 m)   Wt 166 lb 14.4 oz (75.7 kg)   SpO2 92%   BMI 29.57 kg/m² ,    General appearance: Alert, awake and oriented, she is thin  HEENT: Mild conjunctival pallor, no scleral icterus  Neck: Supple  Lungs: Coarse breath sound but no gross crackles  Heart: Regular rate and rhythm, well-healed incision from previous sternotomy  Abdomen: Soft, nontender on palpation  Extremities: Mild leg edema and ankle edema with wound VAC in the right heel  Left upper extremity brachiocephalic AV fistula with good thrill on palpation good bruit auscultation      Problem List :         Impression :     End-stage kidney disease on maintenance dialysis-she was not dialyzed on Wednesday by mistake.   She will get dialysis today  Right heel ulcer with wound VAC now  Underlying coronary artery disease, diabetes, COPD with smoking, anemia and secondary hyperparathyroidism    Recommendation/Plan  :     Hemodialysis and ultrafiltration today  From kidney standpoint she can be discharged  She can resume her outpatient dialysis on Monday   Good glycemic control, watch for iatrogenic and nosocomial complication  Follow clinically and biochemically  If she needs antibiotic-please let me know, either I can arrange HD during dialysis, but if she needs frequent antibiotic she may need tunnel right IJ PICC      Erin Toribio MD MD

## 2022-09-24 LAB
ANION GAP SERPL CALCULATED.3IONS-SCNC: 15 MMOL/L (ref 4–16)
BASOPHILS ABSOLUTE: 0.1 K/CU MM
BASOPHILS RELATIVE PERCENT: 0.6 % (ref 0–1)
BUN BLDV-MCNC: 21 MG/DL (ref 6–23)
CALCIUM SERPL-MCNC: 8.9 MG/DL (ref 8.3–10.6)
CHLORIDE BLD-SCNC: 97 MMOL/L (ref 99–110)
CO2: 24 MMOL/L (ref 21–32)
CREAT SERPL-MCNC: 7.7 MG/DL (ref 0.6–1.1)
DIFFERENTIAL TYPE: ABNORMAL
EOSINOPHILS ABSOLUTE: 0.4 K/CU MM
EOSINOPHILS RELATIVE PERCENT: 3 % (ref 0–3)
GFR AFRICAN AMERICAN: 7 ML/MIN/1.73M2
GFR NON-AFRICAN AMERICAN: 5 ML/MIN/1.73M2
GLUCOSE BLD-MCNC: 107 MG/DL (ref 70–99)
GLUCOSE BLD-MCNC: 115 MG/DL (ref 70–99)
GLUCOSE BLD-MCNC: 138 MG/DL (ref 70–99)
GLUCOSE BLD-MCNC: 150 MG/DL (ref 70–99)
GLUCOSE BLD-MCNC: 188 MG/DL (ref 70–99)
HCT VFR BLD CALC: 34.8 % (ref 37–47)
HEMOGLOBIN: 11.6 GM/DL (ref 12.5–16)
HEPATITIS B CORE TOTAL ANTIBODY: NEGATIVE
HIGH SENSITIVE C-REACTIVE PROTEIN: 96.5 MG/L (ref 0–5)
IMMATURE NEUTROPHIL %: 1.3 % (ref 0–0.43)
LYMPHOCYTES ABSOLUTE: 1.5 K/CU MM
LYMPHOCYTES RELATIVE PERCENT: 11.9 % (ref 24–44)
MCH RBC QN AUTO: 34.8 PG (ref 27–31)
MCHC RBC AUTO-ENTMCNC: 33.3 % (ref 32–36)
MCV RBC AUTO: 104.5 FL (ref 78–100)
MONOCYTES ABSOLUTE: 1.1 K/CU MM
MONOCYTES RELATIVE PERCENT: 8.8 % (ref 0–4)
NUCLEATED RBC %: 0 %
PDW BLD-RTO: 12.3 % (ref 11.7–14.9)
PLATELET # BLD: 267 K/CU MM (ref 140–440)
PMV BLD AUTO: 10.5 FL (ref 7.5–11.1)
POTASSIUM SERPL-SCNC: 5 MMOL/L (ref 3.5–5.1)
RBC # BLD: 3.33 M/CU MM (ref 4.2–5.4)
SEGMENTED NEUTROPHILS ABSOLUTE COUNT: 9.4 K/CU MM
SEGMENTED NEUTROPHILS RELATIVE PERCENT: 74.4 % (ref 36–66)
SODIUM BLD-SCNC: 136 MMOL/L (ref 135–145)
TOTAL IMMATURE NEUTOROPHIL: 0.16 K/CU MM
TOTAL NUCLEATED RBC: 0 K/CU MM
WBC # BLD: 12.7 K/CU MM (ref 4–10.5)

## 2022-09-24 PROCEDURE — 6360000002 HC RX W HCPCS: Performed by: STUDENT IN AN ORGANIZED HEALTH CARE EDUCATION/TRAINING PROGRAM

## 2022-09-24 PROCEDURE — 82962 GLUCOSE BLOOD TEST: CPT

## 2022-09-24 PROCEDURE — 94761 N-INVAS EAR/PLS OXIMETRY MLT: CPT

## 2022-09-24 PROCEDURE — 6370000000 HC RX 637 (ALT 250 FOR IP): Performed by: STUDENT IN AN ORGANIZED HEALTH CARE EDUCATION/TRAINING PROGRAM

## 2022-09-24 PROCEDURE — 80048 BASIC METABOLIC PNL TOTAL CA: CPT

## 2022-09-24 PROCEDURE — 36415 COLL VENOUS BLD VENIPUNCTURE: CPT

## 2022-09-24 PROCEDURE — 85025 COMPLETE CBC W/AUTO DIFF WBC: CPT

## 2022-09-24 PROCEDURE — 1200000000 HC SEMI PRIVATE

## 2022-09-24 PROCEDURE — 6360000002 HC RX W HCPCS: Performed by: NURSE PRACTITIONER

## 2022-09-24 PROCEDURE — 2580000003 HC RX 258: Performed by: STUDENT IN AN ORGANIZED HEALTH CARE EDUCATION/TRAINING PROGRAM

## 2022-09-24 PROCEDURE — 86141 C-REACTIVE PROTEIN HS: CPT

## 2022-09-24 RX ADMIN — SODIUM CHLORIDE: 9 INJECTION, SOLUTION INTRAVENOUS at 14:22

## 2022-09-24 RX ADMIN — FUROSEMIDE 40 MG: 40 TABLET ORAL at 13:40

## 2022-09-24 RX ADMIN — HEPARIN SODIUM 5000 UNITS: 5000 INJECTION INTRAVENOUS; SUBCUTANEOUS at 22:57

## 2022-09-24 RX ADMIN — LINEZOLID 600 MG: 600 INJECTION, SOLUTION INTRAVENOUS at 14:25

## 2022-09-24 RX ADMIN — SODIUM CHLORIDE, PRESERVATIVE FREE 10 ML: 5 INJECTION INTRAVENOUS at 09:55

## 2022-09-24 RX ADMIN — PIPERACILLIN AND TAZOBACTAM 2250 MG: 2; .25 INJECTION, POWDER, LYOPHILIZED, FOR SOLUTION INTRAVENOUS at 05:05

## 2022-09-24 RX ADMIN — GABAPENTIN 400 MG: 400 CAPSULE ORAL at 09:44

## 2022-09-24 RX ADMIN — GABAPENTIN 400 MG: 400 CAPSULE ORAL at 13:40

## 2022-09-24 RX ADMIN — PRAVASTATIN SODIUM 40 MG: 40 TABLET ORAL at 22:57

## 2022-09-24 RX ADMIN — OXYCODONE HYDROCHLORIDE AND ACETAMINOPHEN 2 TABLET: 5; 325 TABLET ORAL at 22:57

## 2022-09-24 RX ADMIN — OXYCODONE HYDROCHLORIDE AND ACETAMINOPHEN 2 TABLET: 5; 325 TABLET ORAL at 09:43

## 2022-09-24 RX ADMIN — SODIUM CHLORIDE: 9 INJECTION, SOLUTION INTRAVENOUS at 17:53

## 2022-09-24 RX ADMIN — OXYCODONE HYDROCHLORIDE AND ACETAMINOPHEN 2 TABLET: 5; 325 TABLET ORAL at 14:16

## 2022-09-24 RX ADMIN — PANTOPRAZOLE SODIUM 40 MG: 20 TABLET, DELAYED RELEASE ORAL at 05:52

## 2022-09-24 RX ADMIN — HEPARIN SODIUM 5000 UNITS: 5000 INJECTION INTRAVENOUS; SUBCUTANEOUS at 05:52

## 2022-09-24 RX ADMIN — ASPIRIN 81 MG CHEWABLE TABLET 81 MG: 81 TABLET CHEWABLE at 09:44

## 2022-09-24 RX ADMIN — PIPERACILLIN AND TAZOBACTAM 2250 MG: 2; .25 INJECTION, POWDER, LYOPHILIZED, FOR SOLUTION INTRAVENOUS at 10:00

## 2022-09-24 RX ADMIN — HEPARIN SODIUM 5000 UNITS: 5000 INJECTION INTRAVENOUS; SUBCUTANEOUS at 13:40

## 2022-09-24 RX ADMIN — LINEZOLID 600 MG: 600 INJECTION, SOLUTION INTRAVENOUS at 01:25

## 2022-09-24 RX ADMIN — SODIUM CHLORIDE: 9 INJECTION, SOLUTION INTRAVENOUS at 09:59

## 2022-09-24 RX ADMIN — CARVEDILOL 6.25 MG: 6.25 TABLET, FILM COATED ORAL at 22:57

## 2022-09-24 RX ADMIN — GABAPENTIN 400 MG: 400 CAPSULE ORAL at 22:57

## 2022-09-24 RX ADMIN — FUROSEMIDE 40 MG: 40 TABLET ORAL at 09:44

## 2022-09-24 RX ADMIN — PIPERACILLIN AND TAZOBACTAM 2250 MG: 2; .25 INJECTION, POWDER, LYOPHILIZED, FOR SOLUTION INTRAVENOUS at 17:55

## 2022-09-24 RX ADMIN — CARVEDILOL 6.25 MG: 6.25 TABLET, FILM COATED ORAL at 09:44

## 2022-09-24 ASSESSMENT — PAIN DESCRIPTION - DESCRIPTORS
DESCRIPTORS: ACHING
DESCRIPTORS: THROBBING
DESCRIPTORS: ACHING
DESCRIPTORS: ACHING

## 2022-09-24 ASSESSMENT — PAIN SCALES - GENERAL
PAINLEVEL_OUTOF10: 7
PAINLEVEL_OUTOF10: 8
PAINLEVEL_OUTOF10: 5
PAINLEVEL_OUTOF10: 5
PAINLEVEL_OUTOF10: 0
PAINLEVEL_OUTOF10: 8
PAINLEVEL_OUTOF10: 5

## 2022-09-24 ASSESSMENT — ENCOUNTER SYMPTOMS
COLOR CHANGE: 0
ABDOMINAL PAIN: 0
SINUS PRESSURE: 0
VOMITING: 0
COUGH: 0
SHORTNESS OF BREATH: 0
BACK PAIN: 0
CONSTIPATION: 0
NAUSEA: 0
SORE THROAT: 0
DIARRHEA: 0
WHEEZING: 0
SINUS PAIN: 0

## 2022-09-24 ASSESSMENT — PAIN DESCRIPTION - ORIENTATION
ORIENTATION: LOWER
ORIENTATION: LOWER;RIGHT;LEFT
ORIENTATION: RIGHT;LEFT;UPPER;LOWER
ORIENTATION: LOWER

## 2022-09-24 ASSESSMENT — PAIN - FUNCTIONAL ASSESSMENT
PAIN_FUNCTIONAL_ASSESSMENT: ACTIVITIES ARE NOT PREVENTED
PAIN_FUNCTIONAL_ASSESSMENT: PREVENTS OR INTERFERES SOME ACTIVE ACTIVITIES AND ADLS
PAIN_FUNCTIONAL_ASSESSMENT: ACTIVITIES ARE NOT PREVENTED
PAIN_FUNCTIONAL_ASSESSMENT: ACTIVITIES ARE NOT PREVENTED

## 2022-09-24 ASSESSMENT — PAIN DESCRIPTION - FREQUENCY
FREQUENCY: INTERMITTENT

## 2022-09-24 ASSESSMENT — PAIN DESCRIPTION - ONSET
ONSET: ON-GOING

## 2022-09-24 ASSESSMENT — PAIN DESCRIPTION - PAIN TYPE
TYPE: CHRONIC PAIN

## 2022-09-24 ASSESSMENT — PAIN DESCRIPTION - LOCATION
LOCATION: BACK
LOCATION: BACK;LEG
LOCATION: BACK
LOCATION: ABDOMEN;BACK

## 2022-09-24 ASSESSMENT — PAIN SCALES - WONG BAKER
WONGBAKER_NUMERICALRESPONSE: 6
WONGBAKER_NUMERICALRESPONSE: 8

## 2022-09-24 NOTE — PROGRESS NOTES
01/07/2020 04:00 PM    WBCUA 0 TO 1 01/07/2020 04:00 PM    RBCUA 2 TO 3 01/07/2020 04:00 PM    MUCUS 1+ 01/07/2020 04:00 PM    YEAST OCCASIONAL 11/17/2010 09:30 AM    BACTERIA OCCASIONAL 01/07/2020 04:00 PM    CLARITYU CLEAR 01/07/2020 04:00 PM    SPECGRAV 1.020 01/07/2020 04:00 PM    UROBILINOGEN 2.0 01/07/2020 04:00 PM    BILIRUBINUR NEGATIVE 01/07/2020 04:00 PM    BLOODU SMALL 01/07/2020 04:00 PM    KETUA NEGATIVE 01/07/2020 04:00 PM     ABG:  No results found for: PHART, OJI5FYY, PO2ART, LVT5DMS, BEART, THGBART, FFW0HIC, R8GPJHRM  HgBA1c:    Lab Results   Component Value Date/Time    LABA1C 13.6 10/15/2014 09:56 AM     Microalbumen/Creatinine ratio:  No components found for: RUCREAT  TSH:  No results found for: TSH  IRON:  No results found for: IRON  Iron Saturation:  No components found for: PERCENTFE  TIBC:  No results found for: TIBC  FERRITIN:  No results found for: FERRITIN  RPR:  No results found for: RPR  COLE:  No results found for: ANATITER, COLE  24 Hour Urine for Creatinine Clearance:  No components found for: CREAT4, UHRS10, UTV10      Objective:   I/O: 09/23 0701 - 09/24 0700  In: 500   Out: 1500   I/O last 3 completed shifts: In: 500   Out: 1750 [Urine:250]  I/O this shift:  In: 340 [P.O.:330; I.V.:10]  Out: -   Vitals: BP (!) 141/62   Pulse 77   Temp 98.6 °F (37 °C) (Oral)   Resp 16   Ht 5' 3\" (1.6 m)   Wt 166 lb 14.4 oz (75.7 kg)   SpO2 98%   BMI 29.57 kg/m²  {  General appearance: awake weak  HEENT: Head: Normal, normocephalic, atraumatic.   Neck: supple, symmetrical, trachea midline  Lungs: diminished breath sounds bilaterally  Heart: S1, S2 normal  Abdomen: abnormal findings:  soft nt  Extremities: edema trace positive wound VAC  Neurologic: Mental status: alertness: alert        Assessment and Plan:      IMP:  #1 end-stage renal disease on dialysis Monday Wednesday Friday  #2 right heel ulcer with wound VAC  #3 coronary disease  #4 type 2 diabetes    Plan     #1 do next dialysis on Monday  #2 wound care wound VAC  #3 cardiac stable  #4 glucose monitor  We will follow supportive care           Keyonna Hogue MD, MD

## 2022-09-24 NOTE — CARE COORDINATION
Patient on the weekend follow up list. NERIW checked the [facility] website and the patient wound vac has been approved. LSW assigned the wound vac number of [id number].  Message to PCP notifying of the approval.

## 2022-09-24 NOTE — PROGRESS NOTES
V2.0  [facility] Hospitalist Progress Note      Name:  Addy Tinajero /Age/Sex: 1965  (62 y.o. female)   MRN & CSN:  [id number] & [id number] Encounter Date/Time: 2022 7:22 AM EDT    Location:  6669/1250-D PCP: Rafa Gruber MD       Hospital Day: 5    Subjective:     Chief Complaint: infected wound. Sent from wound care center     Wound vac in place, overnight patient have no new complaints, denies any complaints at this time pending wound VAC approval patient continue with IV antibiotics. Patient's blood cultures from  no growth till today patient's wound culture from  showed Klebsiella oxytoca, E. coli, Streptococcus canis, beta Streptococcus, Prevotella species sensitivities Zosyn        Assessment and Plan:   Addy Tinajero is a 62 y.o. female with a pmh of ype 2 diabetes, ESRD on HD [], hypertension, GERD, Hyperlipidemia, CAD s/p CABG x4 in , neuropathy, COPD, and anxiety who presents with right heel wound. Plan:    Addy Tinajero is a 62 y.o. female with a pmh of ype 2 diabetes, ESRD on HD [], hypertension, GERD, Hyperlipidemia, CAD s/p CABG x4 in , neuropathy, COPD, and anxiety who presents with right heel wound. Right heel diabetic foot wound. Patient presents from her wound care clinic for admission and higher level wound care with wound VAC from her surgeon. General surgery consulted  IV antibiotics: Zosyn and will DC Zyvox  Patient is allergic to Vancomycin  Trend  markers ordered  Trend WBC count     ESRD on HD []  Patient appears euvolemic  Nephrology consulted for inpatient dialysis     Hypertension  Blood pressure controlled  Coreg 6.5 twice daily  Lasix 40 twice daily     CAD s/p CABG x4 in   Patient with a complex cardiac history. Has no chest pain at this time.   Continue home ASA and Statin     Type 2 diabetes  Per the patient's last PCP note on 2022 she takes 15 units of Lantus nightly and Januvia. Patient was having problems with hypoglycemia. Continue Lantus 10 units nightly  Medium dose sliding scale     Neuropathy  Gabapentin 400 3 times daily. [Patient does take more this at home]     GERD  Continue home Nexium     Hyperlipidemia  Continue statin     COPD, not in acute exacerbation  Patient is satting well on room air  As needed inhalers     Anxiety   Patient takes Ativan 0.5 twice daily  Continue as needed    Diet ADULT DIET; Regular; 4 carb choices (60 gm/meal)  ADULT ORAL NUTRITION SUPPLEMENT; Dinner; Renal Oral Supplement   DVT Prophylaxis [] Lovenox, [x]  Heparin, [] SCDs, [] Ambulation,    [] Eliquis, [] Xarelto  [] Coumadin [] other   Code Status Full Code   Disposition From: home  Expected Disposition: home  Estimated Date of Discharge: 2-3 days  Patient requires continued admission due to wound vac placement + IV Abx   Surrogate Decision Maker/ POA        Review of Systems:    Review of Systems   Constitutional:  Negative for activity change, appetite change, chills, fatigue and fever. HENT:  Negative for congestion, sinus pressure, sinus pain and sore throat. Eyes:  Negative for visual disturbance. Respiratory:  Negative for cough, shortness of breath and wheezing. Cardiovascular:  Negative for chest pain, palpitations and leg swelling. Gastrointestinal:  Negative for abdominal pain, constipation, diarrhea, nausea and vomiting. Endocrine: Negative for polydipsia and polyuria. Genitourinary:  Negative for dysuria. Musculoskeletal:  Negative for arthralgias and back pain. Skin:  Positive for wound. Negative for color change. Neurological:  Negative for dizziness, weakness and headaches. Psychiatric/Behavioral:  Negative for agitation, behavioral problems and confusion. Objective:      Intake/Output Summary (Last 24 hours) at 9/24/2022 1404  Last data filed at 9/24/2022 0955  Gross per 24 hour   Intake 840 ml   Output 1500 ml   Net -660 ml          Vitals:   Vitals:    09/24/22 0940   BP: (!) 141/62   Pulse: 77   Resp: 16   Temp: 98.6 °F (37 °C)   SpO2: 98%       Physical Exam:     Physical Exam  Vitals reviewed. Constitutional:       Appearance: Normal appearance. She is normal weight. HENT:      Head: Normocephalic and atraumatic. Mouth/Throat:      Mouth: Mucous membranes are moist.      Pharynx: Oropharynx is clear. Eyes:      Extraocular Movements: Extraocular movements intact. Conjunctiva/sclera: Conjunctivae normal.      Pupils: Pupils are equal, round, and reactive to light. Cardiovascular:      Rate and Rhythm: Normal rate and regular rhythm. Pulses: Normal pulses. Heart sounds: Normal heart sounds. Pulmonary:      Effort: Pulmonary effort is normal.      Breath sounds: Normal breath sounds. Abdominal:      General: Abdomen is flat. Bowel sounds are normal.      Palpations: Abdomen is soft. Musculoskeletal:         General: No swelling. Normal range of motion. Cervical back: Normal range of motion and neck supple. Feet:    Feet:      Right foot:      Skin integrity: Ulcer present. Comments: Wound on right heal  Skin:     General: Skin is warm and dry. Neurological:      General: No focal deficit present. Mental Status: She is alert and oriented to person, place, and time. Mental status is at baseline. Psychiatric:         Mood and Affect: Mood normal.         Behavior: Behavior normal.         Thought Content:  Thought content normal.         Judgment: Judgment normal.       Medications:   Medications:    furosemide  40 mg Oral BID    piperacillin-tazobactam  2,250 mg IntraVENous Q8H    carvedilol  6.25 mg Oral BID    pantoprazole  40 mg Oral QAM AC    insulin lispro  0-8 Units SubCUTAneous TID WC    insulin lispro  0-4 Units SubCUTAneous Nightly    pravastatin  40 mg Oral Nightly    sodium chloride flush  5-40 mL IntraVENous 2 times per day    heparin (porcine)  5,000 Units SubCUTAneous 3 times per day    gabapentin  400 mg Oral TID    aspirin  81 mg Oral Daily    [Held by provider] insulin glargine  10 Units SubCUTAneous Nightly    linezolid  600 mg IntraVENous Q12H      Infusions:    sodium chloride 25 mL/hr at 09/24/22 0959    dextrose       PRN Meds: LORazepam, 0.5 mg, Q12H PRN  albuterol sulfate HFA, 2 puff, Q6H PRN  oxyCODONE-acetaminophen, 1 tablet, Q4H PRN   Or  oxyCODONE-acetaminophen, 2 tablet, Q4H PRN  sodium chloride flush, 5-40 mL, PRN  sodium chloride, , PRN  ondansetron, 4 mg, Q8H PRN   Or  ondansetron, 4 mg, Q6H PRN  polyethylene glycol, 17 g, Daily PRN  acetaminophen, 650 mg, Q6H PRN   Or  acetaminophen, 650 mg, Q6H PRN  glucose, 4 tablet, PRN  dextrose bolus, 125 mL, PRN   Or  dextrose bolus, 250 mL, PRN  glucagon (rDNA), 1 mg, PRN  dextrose, , Continuous PRN      Labs      Recent Results (from the past 24 hour(s))   POCT Glucose    Collection Time: 09/23/22  4:17 PM   Result Value Ref Range    POC Glucose 92 70 - 99 MG/DL   POCT Glucose    Collection Time: 09/23/22  8:47 PM   Result Value Ref Range    POC Glucose 140 (H) 70 - 99 MG/DL   POCT Glucose    Collection Time: 09/24/22  7:05 AM   Result Value Ref Range    POC Glucose 138 (H) 70 - 99 MG/DL   CBC with Auto Differential    Collection Time: 09/24/22 10:26 AM   Result Value Ref Range    WBC 12.7 (H) 4.0 - 10.5 K/CU MM    RBC 3.33 (L) 4.2 - 5.4 M/CU MM    Hemoglobin 11.6 (L) 12.5 - 16.0 GM/DL    Hematocrit 34.8 (L) 37 - 47 %    .5 (H) 78 - 100 FL    MCH 34.8 (H) 27 - 31 PG    MCHC 33.3 32.0 - 36.0 %    RDW 12.3 11.7 - 14.9 %    Platelets 984 111 - 357 K/CU MM    MPV 10.5 7.5 - 11.1 FL    Differential Type AUTOMATED DIFFERENTIAL     Segs Relative 74.4 (H) 36 - 66 %    Lymphocytes % 11.9 (L) 24 - 44 %    Monocytes % 8.8 (H) 0 - 4 %    Eosinophils % 3.0 0 - 3 %    Basophils % 0.6 0 - 1 %    Segs Absolute 9.4 K/CU MM    Lymphocytes Absolute 1.5 K/CU MM    Monocytes Absolute 1.1 K/CU MM    Eosinophils Absolute 0.4 K/CU MM    Basophils Absolute 0.1 K/CU MM    Nucleated RBC % 0.0 %    Total Nucleated RBC 0.0 K/CU MM    Total Immature Neutrophil 0.16 K/CU MM    Immature Neutrophil % 1.3 (H) 0 - 0.43 %   POCT Glucose    Collection Time: 09/24/22 11:13 AM   Result Value Ref Range    POC Glucose 115 (H) 70 - 99 MG/DL        Imaging/Diagnostics Last 24 Hours   XR FOOT RIGHT (MIN 3 VIEWS)    Result Date: 9/20/2022  EXAMINATION: THREE XRAY VIEWS OF THE RIGHT FOOT 9/20/2022 3:52 pm COMPARISON: None. HISTORY: ORDERING SYSTEM PROVIDED HISTORY: r/o osteomyelitis TECHNOLOGIST PROVIDED HISTORY: Reason for exam:->r/o osteomyelitis Reason for Exam: r/o osteomyelitis Additional signs and symptoms: na Relevant Medical/Surgical History: diabetes, cad, chf, ckd FINDINGS: There is no evidence of acute fracture. There is normal alignment of the tarsometatarsal joints. No acute joint abnormality. No focal osseous lesion. Soft tissue ulceration in the region of the calcaneus. No acute osseous abnormality.        Electronically signed by Rachell Garber MD on 9/24/2022 at 2:04 PM

## 2022-09-25 VITALS
RESPIRATION RATE: 16 BRPM | HEIGHT: 63 IN | SYSTOLIC BLOOD PRESSURE: 149 MMHG | OXYGEN SATURATION: 95 % | TEMPERATURE: 98.1 F | WEIGHT: 166.9 LBS | BODY MASS INDEX: 29.57 KG/M2 | DIASTOLIC BLOOD PRESSURE: 59 MMHG | HEART RATE: 76 BPM

## 2022-09-25 LAB
ANION GAP SERPL CALCULATED.3IONS-SCNC: 15 MMOL/L (ref 4–16)
BASOPHILS ABSOLUTE: 0.1 K/CU MM
BASOPHILS RELATIVE PERCENT: 0.7 % (ref 0–1)
BUN BLDV-MCNC: 25 MG/DL (ref 6–23)
CALCIUM SERPL-MCNC: 9 MG/DL (ref 8.3–10.6)
CHLORIDE BLD-SCNC: 100 MMOL/L (ref 99–110)
CO2: 22 MMOL/L (ref 21–32)
CREAT SERPL-MCNC: 8.4 MG/DL (ref 0.6–1.1)
CULTURE: NORMAL
CULTURE: NORMAL
DIFFERENTIAL TYPE: ABNORMAL
EOSINOPHILS ABSOLUTE: 0.3 K/CU MM
EOSINOPHILS RELATIVE PERCENT: 2.9 % (ref 0–3)
GFR AFRICAN AMERICAN: 6 ML/MIN/1.73M2
GFR NON-AFRICAN AMERICAN: 5 ML/MIN/1.73M2
GLUCOSE BLD-MCNC: 100 MG/DL (ref 70–99)
GLUCOSE BLD-MCNC: 103 MG/DL (ref 70–99)
GLUCOSE BLD-MCNC: 90 MG/DL (ref 70–99)
GLUCOSE BLD-MCNC: 99 MG/DL (ref 70–99)
HCT VFR BLD CALC: 32 % (ref 37–47)
HEMOGLOBIN: 10.3 GM/DL (ref 12.5–16)
HIGH SENSITIVE C-REACTIVE PROTEIN: 78.3 MG/L (ref 0–5)
IMMATURE NEUTROPHIL %: 1.1 % (ref 0–0.43)
LYMPHOCYTES ABSOLUTE: 1.7 K/CU MM
LYMPHOCYTES RELATIVE PERCENT: 15.8 % (ref 24–44)
Lab: NORMAL
Lab: NORMAL
MCH RBC QN AUTO: 33.2 PG (ref 27–31)
MCHC RBC AUTO-ENTMCNC: 32.2 % (ref 32–36)
MCV RBC AUTO: 103.2 FL (ref 78–100)
MONOCYTES ABSOLUTE: 1.1 K/CU MM
MONOCYTES RELATIVE PERCENT: 10.1 % (ref 0–4)
NUCLEATED RBC %: 0 %
PDW BLD-RTO: 12.3 % (ref 11.7–14.9)
PLATELET # BLD: 231 K/CU MM (ref 140–440)
PMV BLD AUTO: 10.2 FL (ref 7.5–11.1)
POTASSIUM SERPL-SCNC: 4.7 MMOL/L (ref 3.5–5.1)
RBC # BLD: 3.1 M/CU MM (ref 4.2–5.4)
SEGMENTED NEUTROPHILS ABSOLUTE COUNT: 7.6 K/CU MM
SEGMENTED NEUTROPHILS RELATIVE PERCENT: 69.4 % (ref 36–66)
SODIUM BLD-SCNC: 137 MMOL/L (ref 135–145)
SPECIMEN: NORMAL
SPECIMEN: NORMAL
TOTAL IMMATURE NEUTOROPHIL: 0.12 K/CU MM
TOTAL NUCLEATED RBC: 0 K/CU MM
WBC # BLD: 10.9 K/CU MM (ref 4–10.5)

## 2022-09-25 PROCEDURE — 2580000003 HC RX 258: Performed by: STUDENT IN AN ORGANIZED HEALTH CARE EDUCATION/TRAINING PROGRAM

## 2022-09-25 PROCEDURE — 86141 C-REACTIVE PROTEIN HS: CPT

## 2022-09-25 PROCEDURE — 6360000002 HC RX W HCPCS: Performed by: STUDENT IN AN ORGANIZED HEALTH CARE EDUCATION/TRAINING PROGRAM

## 2022-09-25 PROCEDURE — 94761 N-INVAS EAR/PLS OXIMETRY MLT: CPT

## 2022-09-25 PROCEDURE — 82962 GLUCOSE BLOOD TEST: CPT

## 2022-09-25 PROCEDURE — 6370000000 HC RX 637 (ALT 250 FOR IP): Performed by: STUDENT IN AN ORGANIZED HEALTH CARE EDUCATION/TRAINING PROGRAM

## 2022-09-25 PROCEDURE — 85025 COMPLETE CBC W/AUTO DIFF WBC: CPT

## 2022-09-25 PROCEDURE — 36415 COLL VENOUS BLD VENIPUNCTURE: CPT

## 2022-09-25 PROCEDURE — 80048 BASIC METABOLIC PNL TOTAL CA: CPT

## 2022-09-25 RX ORDER — SULFAMETHOXAZOLE AND TRIMETHOPRIM 800; 160 MG/1; MG/1
1 TABLET ORAL 2 TIMES DAILY
Qty: 14 TABLET | Refills: 0 | Status: SHIPPED | OUTPATIENT
Start: 2022-09-25 | End: 2022-10-02

## 2022-09-25 RX ORDER — ASPIRIN 81 MG/1
81 TABLET, CHEWABLE ORAL DAILY
Qty: 30 TABLET | Refills: 3 | Status: SHIPPED | OUTPATIENT
Start: 2022-09-26 | End: 2022-10-12

## 2022-09-25 RX ADMIN — SODIUM CHLORIDE, PRESERVATIVE FREE 10 ML: 5 INJECTION INTRAVENOUS at 09:34

## 2022-09-25 RX ADMIN — GABAPENTIN 400 MG: 400 CAPSULE ORAL at 13:19

## 2022-09-25 RX ADMIN — FUROSEMIDE 40 MG: 40 TABLET ORAL at 08:20

## 2022-09-25 RX ADMIN — OXYCODONE HYDROCHLORIDE AND ACETAMINOPHEN 2 TABLET: 5; 325 TABLET ORAL at 13:12

## 2022-09-25 RX ADMIN — GABAPENTIN 400 MG: 400 CAPSULE ORAL at 08:20

## 2022-09-25 RX ADMIN — OXYCODONE HYDROCHLORIDE AND ACETAMINOPHEN 2 TABLET: 5; 325 TABLET ORAL at 03:24

## 2022-09-25 RX ADMIN — PANTOPRAZOLE SODIUM 40 MG: 20 TABLET, DELAYED RELEASE ORAL at 06:21

## 2022-09-25 RX ADMIN — FUROSEMIDE 40 MG: 40 TABLET ORAL at 13:19

## 2022-09-25 RX ADMIN — PIPERACILLIN AND TAZOBACTAM 2250 MG: 2; .25 INJECTION, POWDER, LYOPHILIZED, FOR SOLUTION INTRAVENOUS at 02:39

## 2022-09-25 RX ADMIN — PIPERACILLIN AND TAZOBACTAM 2250 MG: 2; .25 INJECTION, POWDER, LYOPHILIZED, FOR SOLUTION INTRAVENOUS at 09:33

## 2022-09-25 RX ADMIN — OXYCODONE HYDROCHLORIDE AND ACETAMINOPHEN 2 TABLET: 5; 325 TABLET ORAL at 08:20

## 2022-09-25 RX ADMIN — ASPIRIN 81 MG CHEWABLE TABLET 81 MG: 81 TABLET CHEWABLE at 08:21

## 2022-09-25 RX ADMIN — SODIUM CHLORIDE 25 ML: 9 INJECTION, SOLUTION INTRAVENOUS at 09:32

## 2022-09-25 RX ADMIN — CARVEDILOL 6.25 MG: 6.25 TABLET, FILM COATED ORAL at 08:21

## 2022-09-25 ASSESSMENT — PAIN SCALES - GENERAL
PAINLEVEL_OUTOF10: 8
PAINLEVEL_OUTOF10: 8
PAINLEVEL_OUTOF10: 1
PAINLEVEL_OUTOF10: 8
PAINLEVEL_OUTOF10: 0

## 2022-09-25 ASSESSMENT — PAIN DESCRIPTION - FREQUENCY: FREQUENCY: INTERMITTENT

## 2022-09-25 ASSESSMENT — PAIN DESCRIPTION - ORIENTATION
ORIENTATION: LOWER
ORIENTATION: RIGHT;LEFT;LOWER

## 2022-09-25 ASSESSMENT — PAIN - FUNCTIONAL ASSESSMENT
PAIN_FUNCTIONAL_ASSESSMENT: ACTIVITIES ARE NOT PREVENTED
PAIN_FUNCTIONAL_ASSESSMENT: ACTIVITIES ARE NOT PREVENTED

## 2022-09-25 ASSESSMENT — PAIN DESCRIPTION - ONSET: ONSET: ON-GOING

## 2022-09-25 ASSESSMENT — PAIN DESCRIPTION - LOCATION: LOCATION: LEG

## 2022-09-25 ASSESSMENT — PAIN DESCRIPTION - DESCRIPTORS: DESCRIPTORS: THROBBING

## 2022-09-25 ASSESSMENT — PAIN DESCRIPTION - PAIN TYPE: TYPE: CHRONIC PAIN

## 2022-09-25 ASSESSMENT — PAIN SCALES - WONG BAKER
WONGBAKER_NUMERICALRESPONSE: 8
WONGBAKER_NUMERICALRESPONSE: 0

## 2022-09-25 NOTE — CARE COORDINATION
NERIW delivered wound vac to the patient room. NERIW faxed the confirmation to [facility] the [facility]. NERIW called and faxed information to [phone].

## 2022-09-25 NOTE — PROGRESS NOTES
Reviewed discharge instructions with patient. Patient verbalized understanding. Patient discharged via wheelchair with wound-vac, belongings, home medications-januvia, and patient instructions. IV access removed and telemetry discontinued prior to departure. Wound -vac machine with dressing change supplies and extra canisters sent home with patient. Patient left hospital with  to home with home-care services.

## 2022-09-25 NOTE — PROGRESS NOTES
Nephrology Progress Note  9/25/2022 1:35 PM  Subjective: Interval History: Dory Black is a 62 y.o. female presented stable and questions about her diabetic meds but otherwise stable        Data:   Scheduled Meds:   furosemide  40 mg Oral BID    piperacillin-tazobactam  2,250 mg IntraVENous Q8H    carvedilol  6.25 mg Oral BID    pantoprazole  40 mg Oral QAM AC    insulin lispro  0-8 Units SubCUTAneous TID WC    insulin lispro  0-4 Units SubCUTAneous Nightly    pravastatin  40 mg Oral Nightly    sodium chloride flush  5-40 mL IntraVENous 2 times per day    heparin (porcine)  5,000 Units SubCUTAneous 3 times per day    gabapentin  400 mg Oral TID    aspirin  81 mg Oral Daily    [Held by provider] insulin glargine  10 Units SubCUTAneous Nightly     Continuous Infusions:   sodium chloride 25 mL (09/25/22 0932)    dextrose           CBC   Recent Labs     09/23/22  0755 09/24/22  1026 09/25/22 0227   WBC 10.3 12.7* 10.9*   HGB 11.5* 11.6* 10.3*   HCT 34.8* 34.8* 32.0*    267 231      BMP   Recent Labs     09/23/22  0755 09/24/22  1026 09/25/22 0227    136 137   K 4.4 5.0 4.7   CL 96* 97* 100   CO2 24 24 22   BUN 31* 21 25*   CREATININE 9.9* 7.7* 8.4*     Hepatic: No results for input(s): AST, ALT, ALB, BILITOT, ALKPHOS in the last 72 hours. Troponin: No results for input(s): TROPONINI in the last 72 hours. BNP: No results for input(s): BNP in the last 72 hours. Lipids: No results for input(s): CHOL, HDL in the last 72 hours. Invalid input(s): LDLCALCU  ABGs: No results found for: PHART, PO2ART, IPG2WRP  INR: No results for input(s): INR in the last 72 hours.   Renal Labs  Albumin:    Lab Results   Component Value Date/Time    LABALBU 3.8 09/20/2022 03:10 PM     Calcium:    Lab Results   Component Value Date/Time    CALCIUM 9.0 09/25/2022 02:27 AM     Phosphorus:    Lab Results   Component Value Date/Time    PHOS 4.6 05/23/2017 01:00 PM     U/A:    Lab Results   Component Value Date/Time NITRU NEGATIVE 01/07/2020 04:00 PM    COLORU YELLOW 01/07/2020 04:00 PM    WBCUA 0 TO 1 01/07/2020 04:00 PM    RBCUA 2 TO 3 01/07/2020 04:00 PM    MUCUS 1+ 01/07/2020 04:00 PM    YEAST OCCASIONAL 11/17/2010 09:30 AM    BACTERIA OCCASIONAL 01/07/2020 04:00 PM    CLARITYU CLEAR 01/07/2020 04:00 PM    SPECGRAV 1.020 01/07/2020 04:00 PM    UROBILINOGEN 2.0 01/07/2020 04:00 PM    BILIRUBINUR NEGATIVE 01/07/2020 04:00 PM    BLOODU SMALL 01/07/2020 04:00 PM    KETUA NEGATIVE 01/07/2020 04:00 PM     ABG:  No results found for: PHART, AWD4JUD, PO2ART, HOL8GQF, BEART, THGBART, CSR0MPP, S6GIQDWQ  HgBA1c:    Lab Results   Component Value Date/Time    LABA1C 13.6 10/15/2014 09:56 AM     Microalbumen/Creatinine ratio:  No components found for: RUCREAT  TSH:  No results found for: TSH  IRON:  No results found for: IRON  Iron Saturation:  No components found for: PERCENTFE  TIBC:  No results found for: TIBC  FERRITIN:  No results found for: FERRITIN  RPR:  No results found for: RPR  COLE:  No results found for: ANATITER, COLE  24 Hour Urine for Creatinine Clearance:  No components found for: CREAT4, UHRS10, UTV10      Objective:   I/O: 09/24 0701 - 09/25 0700  In: 340 [P.O.:330; I.V.:10]  Out: -   I/O last 3 completed shifts: In: 340 [P.O.:330; I.V.:10]  Out: -   No intake/output data recorded. Vitals: BP (!) 149/59   Pulse 76   Temp 98.1 °F (36.7 °C) (Oral)   Resp 18   Ht 5' 3\" (1.6 m)   Wt 166 lb 14.4 oz (75.7 kg)   SpO2 95%   BMI 29.57 kg/m²  {  General appearance: awake weak  HEENT: Head: Normal, normocephalic, atraumatic.   Neck: supple, symmetrical, trachea midline  Lungs: diminished breath sounds bilaterally  Heart: S1, S2 normal  Abdomen: abnormal findings:  soft nt  Extremities: edema trace positive wound VAC  Neurologic: Mental status: alertness: alert        Assessment and Plan:      IMP:  #1 end-stage renal disease on dialysis Monday Wednesday Friday  #2 right heel ulcer with wound VAC  #3 coronary disease  #4 type 2 diabetes    Plan     #1 Next dialysis Monday  #2 wound care follow-up with surgery  #3 cardiac stable  #4 maintain on Neurontin to help with pain control  Okay for discharge planning from renal monitor blood pressure           Brunilda Benavides MD, MD

## 2022-09-25 NOTE — DISCHARGE SUMMARY
V2.0  Discharge Summary    Name:  Hiram Shaw /Age/Sex: 1965 (62 y.o. female)   Admit Date: 2022  Discharge Date: 22    MRN & CSN:  [id number] & [id number] Encounter Date and Time 22 2:05 PM EDT    Attending:  Ever Pressley MD Discharging Provider: Ever Pressley MD       Hospital Course:     Brief HPI: Hiram Shaw is a 62 y.o. female with a pmh of ype 2 diabetes, ESRD on HD [], hypertension, GERD, Hyperlipidemia, CAD s/p CABG x4 in , neuropathy, COPD, and anxiety who presents with right heel wound. Brief Problem Based Course:   Severe skin and soft tissue infection of the right heel secondary to diabetes and end-stage renal disease: Sent from wound care clinic, obtain blood and wound cultures and placed on broad-spectrum antibiotics, general surgery consulted wound VAC was irrigated. WBC normalized, blood cultures no growth till date, arterial ultrasound no acute issues, continued on broad-spectrum antibiotics with offload foot and wound VAC care, when the wound VAC is set up for home discharge patient in a stable condition on p.o. Bactrim. To follow-up with surgeon on Tuesday at [place] wound care clinic. ESRD on hemodialysis followed by nephrology and got dialysis while inpatient    Hypertension continued on Coreg and Lasix    CAD s/p CABG x4 in  continued on aspirin and statin    Type 2 diabetes mellitus continued on Lantus and sliding scale    Hyperlipidemia continued on statin    GERD continued on PPI    Discharged home in a stable condition to complete oral antibiotic Bactrim and to follow-up with surgery on Tuesday for wound VAC management. The patient expressed appropriate understanding of, and agreement with the discharge recommendations, medications, and plan.      Consults this admission:  IP CONSULT TO HOSPITALIST  IP CONSULT TO GENERAL SURGERY  IP CONSULT TO GENERAL SURGERY  IP CONSULT TO NEPHROLOGY  PHARMACY TO DOSE VANCOMYCIN  IP CONSULT TO NEPHROLOGY  IP CONSULT TO PHARMACY  IP CONSULT TO PSYCHIATRY  IP CONSULT TO SOCIAL WORK  IP CONSULT TO HOME CARE NEEDS    Discharge Diagnosis:   Wound, open, foot with complication, left, sequela  ESRD on hemodialysis  Hypertension  Diabetes mellitus  GERD    Discharge Instruction:   Follow up appointments: PCP and general surgery  Primary care physician: Bela Morris MD within 2 weeks  Diet: diabetic diet   Activity: activity as tolerated  Disposition: Discharged to:   []Home, []HHC, []SNF, []Acute Rehab, []Hospice   Condition on discharge: Stable  Labs and Tests to be Followed up as an outpatient by PCP or Specialist:     Discharge Medications:        Medication List        START taking these medications      aspirin 81 MG chewable tablet  Take 1 tablet by mouth daily  Start taking on: September 26, 2022     sulfamethoxazole-trimethoprim 800-160 MG per tablet  Commonly known as: BACTRIM DS;SEPTRA DS  Take 1 tablet by mouth 2 times daily for 7 days            CONTINUE taking these medications      albuterol sulfate  (90 Base) MCG/ACT inhaler  Commonly known as: PROVENTIL;VENTOLIN;PROAIR     carvedilol 6.25 MG tablet  Commonly known as: COREG     esomeprazole Magnesium 40 MG Pack  Commonly known as: NEXIUM     furosemide 20 MG tablet  Commonly known as: LASIX     gabapentin 400 MG capsule  Commonly known as: NEURONTIN     insulin glargine 100 UNIT/ML injection vial  Commonly known as: LANTUS     Januvia 25 MG tablet  Generic drug: SITagliptin     LORazepam 0.5 MG tablet  Commonly known as: ATIVAN     NOVOLOG FLEXPEN SC     oxyCODONE-acetaminophen  MG per tablet  Commonly known as: PERCOCET     OXYGEN     pravastatin 40 MG tablet  Commonly known as: PRAVACHOL            STOP taking these medications      cephALEXin 500 MG capsule  Commonly known as: KEFLEX     ciprofloxacin 250 MG tablet  Commonly known as: CIPRO     doxycycline hyclate 100 MG tablet  Commonly known as: VIBRA-TABS     mineral oil-hydrophilic petrolatum ointment     nitroGLYCERIN 0.4 MG SL tablet  Commonly known as: Nitrostat            ASK your doctor about these medications      lisinopril 10 MG tablet  Commonly known as: PRINIVIL;ZESTRIL     Victoza 18 MG/3ML Sopn SC injection  Generic drug: Liraglutide               Where to Get Your Medications        These medications were sent to [address] [place] [address], [facility]  [address], [address] [place] [address]      Phone: [phone]   aspirin 81 MG chewable tablet  sulfamethoxazole-trimethoprim 800-160 MG per tablet        Objective Findings at Discharge:   BP (!) 149/59   Pulse 76   Temp 98.1 °F (36.7 °C) (Oral)   Resp 18   Ht 5' 3\" (1.6 m)   Wt 166 lb 14.4 oz (75.7 kg)   SpO2 95%   BMI 29.57 kg/m²       Physical Exam:   General: Afebrile, no distress  Eyes: EOMI  ENT: neck supple  Cardiovascular: Regular rate. Respiratory: Clear to auscultation  Gastrointestinal: Soft, non tender  Genitourinary: no suprapubic tenderness  Musculoskeletal:      Right foot:      Skin integrity: Ulcer present. Comments: Wound on right heal with wound VAC in place  Skin: warm, dry  Neuro: Alert. Psych: Mood appropriate. Labs and Imaging   XR FOOT RIGHT (MIN 3 VIEWS)    Result Date: 9/20/2022  EXAMINATION: THREE XRAY VIEWS OF THE RIGHT FOOT 9/20/2022 3:52 pm COMPARISON: None. HISTORY: ORDERING SYSTEM PROVIDED HISTORY: r/o osteomyelitis TECHNOLOGIST PROVIDED HISTORY: Reason for exam:->r/o osteomyelitis Reason for Exam: r/o osteomyelitis Additional signs and symptoms: na Relevant Medical/Surgical History: diabetes, cad, chf, ckd FINDINGS: There is no evidence of acute fracture. There is normal alignment of the tarsometatarsal joints. No acute joint abnormality. No focal osseous lesion. Soft tissue ulceration in the region of the calcaneus. No acute osseous abnormality.      VL DUP LOWER EXTREMITY ARTERIES BILATERAL    Result Date: 9/21/2022  EXAMINATION: ARTERIAL DUPLEX ULTRASOUND OF THE BILATERAL LOWER EXTREMITIES  9/21/2022 3:54 pm TECHNIQUE: Duplex ultrasound using B-mode/gray scaled imaging, Doppler spectral analysis and color flow Doppler was obtained of the bilateral lower extremities. COMPARISON: June 12, 2017 HISTORY: ORDERING SYSTEM PROVIDED HISTORY: PAD, diabetic foot ulceration TECHNOLOGIST PROVIDED HISTORY: Reason for exam:->PAD, diabetic foot ulceration FINDINGS: The right circulation demonstrates mostly monophasic waveforms from the femoral circulation to the trifurcation vessels. This implicates aorta iliac disease. Similarly, the left arterial circulation also demonstrates mostly monophasic waveforms implicating aortoiliac occlusive disease. In the left medial distal thigh a complex cystic structure can be seen which may represent seroma, lymphocele or old hematoma. This has a benign appearance. Right: Flow velocities were measured as follows: Com. Fem. 145 cm/sec Prof.           93 cm/sec SFA Prox. 79 cm/sec SFA Mid.     149 cm/sec SFA Dist.     107 cm/sec Pop.           95 cm/sec PTA           46 cm/sec Peron. 58 cm/sec JAN           51 cm/sec Left: Flow velocities were measured as follows: Com. Fem. 134 cm/sec Prof.           138 cm/sec SFA Prox. 106 cm/sec SFA Mid. 101 cm/sec SFA Dist.     65 cm/sec Pop.           46 cm/sec PTA           24 cm/sec Peron. 51 cm/sec JAN           24 cm/sec     Arterial circulation is patent in bilateral lower extremities; however, monophasic waveforms from the femoral to the trifurcation arteries implicates significant aortoiliac disease. No evidence of arterial occlusions. Large cystic structure in the left medial distal thigh which may represent a lymphocele, seroma or old hematoma.  RECOMMENDATIONS: Unavailable       CBC:   Recent Labs     09/23/22  0755 09/24/22  1026 09/25/22  0227   WBC 10.3 12.7* 10.9*   HGB 11.5* 11.6* 10.3*    267 231     BMP:    Recent Labs     09/23/22  0755 09/24/22  1026 09/25/22  0227    136 137   K 4.4 5.0 4.7   CL 96* 97* 100   CO2 24 24 22   BUN 31* 21 25*   CREATININE 9.9* 7.7* 8.4*   GLUCOSE 103* 107* 90     Hepatic: No results for input(s): AST, ALT, ALB, BILITOT, ALKPHOS in the last 72 hours. Lipids:   Lab Results   Component Value Date/Time    CHOL 248 10/15/2014 09:56 AM    HDL 31 10/15/2014 09:56 AM    TRIG 953 10/15/2014 09:56 AM     Hemoglobin A1C:   Lab Results   Component Value Date/Time    LABA1C 13.6 10/15/2014 09:56 AM     TSH: No results found for: TSH  Troponin: No results found for: TROPONINT  Lactic Acid: No results for input(s): LACTA in the last 72 hours. BNP: No results for input(s): PROBNP in the last 72 hours.   UA:  Lab Results   Component Value Date/Time    NITRU NEGATIVE 01/07/2020 04:00 PM    COLORU YELLOW 01/07/2020 04:00 PM    WBCUA 0 TO 1 01/07/2020 04:00 PM    RBCUA 2 TO 3 01/07/2020 04:00 PM    MUCUS 1+ 01/07/2020 04:00 PM    YEAST OCCASIONAL 11/17/2010 09:30 AM    BACTERIA OCCASIONAL 01/07/2020 04:00 PM    CLARITYU CLEAR 01/07/2020 04:00 PM    SPECGRAV 1.020 01/07/2020 04:00 PM    LEUKOCYTESUR NEGATIVE 01/07/2020 04:00 PM    UROBILINOGEN 2.0 01/07/2020 04:00 PM    BILIRUBINUR NEGATIVE 01/07/2020 04:00 PM    BLOODU SMALL 01/07/2020 04:00 PM    KETUA NEGATIVE 01/07/2020 04:00 PM     Urine Cultures: No results found for: LABURIN  Blood Cultures: No results found for: BC  No results found for: BLOODCULT2  Organism:   Lab Results   Component Value Date/Time    [facility] 06/05/2017 09:12 AM       Time Spent Discharging patient 33 minutes    Electronically signed by Santa Ba MD on 9/25/2022 at 2:05 PM

## 2022-09-27 ENCOUNTER — HOSPITAL ENCOUNTER (OUTPATIENT)
Dept: WOUND CARE | Age: 57
Discharge: HOME | End: 2022-09-27
Payer: MEDICARE

## 2022-09-27 DIAGNOSIS — M79.671 ACUTE PAIN OF RIGHT FOOT: ICD-10-CM

## 2022-09-27 DIAGNOSIS — L97.415 DIABETIC ULCER OF RIGHT HEEL ASSOCIATED WITH TYPE 2 DIABETES MELLITUS, WITH MUSCLE INVOLVEMENT WITHOUT EVIDENCE OF NECROSIS (HCC): Primary | ICD-10-CM

## 2022-09-27 DIAGNOSIS — E11.621 DIABETIC ULCER OF RIGHT HEEL ASSOCIATED WITH TYPE 2 DIABETES MELLITUS, WITH MUSCLE INVOLVEMENT WITHOUT EVIDENCE OF NECROSIS (HCC): Primary | ICD-10-CM

## 2022-09-27 PROCEDURE — 97605 NEG PRS WND THER DME<=50SQCM: CPT

## 2022-09-27 PROCEDURE — 11043 DBRDMT MUSC&/FSCA 1ST 20/<: CPT

## 2022-09-27 RX ORDER — CLOBETASOL PROPIONATE 0.5 MG/G
OINTMENT TOPICAL ONCE
Status: CANCELLED | OUTPATIENT
Start: 2022-09-27 | End: 2022-09-27

## 2022-09-27 RX ORDER — BETAMETHASONE DIPROPIONATE 0.05 %
OINTMENT (GRAM) TOPICAL ONCE
Status: CANCELLED | OUTPATIENT
Start: 2022-09-27 | End: 2022-09-27

## 2022-09-27 RX ORDER — LIDOCAINE HYDROCHLORIDE 20 MG/ML
JELLY TOPICAL ONCE
Status: CANCELLED | OUTPATIENT
Start: 2022-09-27 | End: 2022-09-27

## 2022-09-27 RX ORDER — GENTAMICIN SULFATE 1 MG/G
OINTMENT TOPICAL ONCE
Status: CANCELLED | OUTPATIENT
Start: 2022-09-27 | End: 2022-09-27

## 2022-09-27 RX ORDER — LIDOCAINE 40 MG/G
CREAM TOPICAL ONCE
Status: CANCELLED | OUTPATIENT
Start: 2022-09-27 | End: 2022-09-27

## 2022-09-27 RX ORDER — GINSENG 100 MG
CAPSULE ORAL ONCE
Status: CANCELLED | OUTPATIENT
Start: 2022-09-27 | End: 2022-09-27

## 2022-09-27 RX ORDER — BACITRACIN, NEOMYCIN, POLYMYXIN B 400; 3.5; 5 [USP'U]/G; MG/G; [USP'U]/G
OINTMENT TOPICAL ONCE
Status: CANCELLED | OUTPATIENT
Start: 2022-09-27 | End: 2022-09-27

## 2022-09-27 RX ORDER — BACITRACIN ZINC AND POLYMYXIN B SULFATE 500; 1000 [USP'U]/G; [USP'U]/G
OINTMENT TOPICAL ONCE
Status: CANCELLED | OUTPATIENT
Start: 2022-09-27 | End: 2022-09-27

## 2022-09-27 RX ORDER — LIDOCAINE 50 MG/G
OINTMENT TOPICAL ONCE
Status: CANCELLED | OUTPATIENT
Start: 2022-09-27 | End: 2022-09-27

## 2022-09-27 RX ORDER — LIDOCAINE HYDROCHLORIDE 40 MG/ML
SOLUTION TOPICAL ONCE
Status: CANCELLED | OUTPATIENT
Start: 2022-09-27 | End: 2022-09-27

## 2022-09-27 NOTE — PROGRESS NOTES
Wound Care Center Progress Note With Procedure    Addy Tinajero  AGE: 62 y.o. GENDER: female  : 1965  EPISODE DATE:  2022     Subjective:     Chief Complaint   Patient presents with    Wound Check     Right Foot          HISTORY of PRESENT ILLNESS      Addy Tinajero is a 62 y.o. female who presents today for wound evaluation of Chronic diabetic and pressure ulcer(s) of the right heel. The ulcer is of moderate severity. The underlying cause of the wound is diabetes, pressure, ESRD on HD. Improved since being in hospital last week.    Wound Pain Timing/Severity: none  Quality of pain: N/A  Severity of pain:  0 / 10   Modifying Factors: diabetes, smoking, and ESRD on HD  Associated Signs/Symptoms: edema and drainage        PAST MEDICAL HISTORY        Diagnosis Date    Acute pain of right foot 2022    CAD (coronary artery disease)     s/p CABG x 4;  follows with Dr Oleg Avalos of foot 2018    Carpal tunnel syndrome on both sides     CHF (congestive heart failure) ([address] 10/2010    Chronic diastolic; EF 43%    Chronic kidney disease     stage 4 kidney disease    Chronic ulcer of left ankle with fat layer exposed ([address] 10/7/2015    Chronic ulcer of left foot with fat layer exposed ([address] 3/17/2016    Chronic ulcer of right ankle with fat layer exposed ([address] 3/17/2016    Chronic ulcer of right foot with fat layer exposed ([address] 3/17/2016    Depression     Diabetes mellitus ([address]     Diabetes mellitus with neurological manifestations ([address]     Diabetes mellitus with ulcer of ankle ([address]     Diabetic ulcer of left foot associated with type 2 diabetes mellitus, with fat layer exposed ([address] 2018    Diabetic ulcer of right heel associated with type 2 diabetes mellitus, with muscle involvement without evidence of necrosis ([address] 2022    ESRD on hemodialysis ([address] 2022    Family history of cardiovascular disease     Mother    GERD (gastroesophageal reflux disease)     H/O cardiac catheterization 10/18/2010, 6/3/2010    10/18/2010-Four bypass grafts all widely patent. 6/3/2010- Moderate to severe triple vessel disease, preserved LV systolic function. H/O cardiovascular stress test 7/26/2012, 8/3/2011, 10/14/2010, 5/24/2010 7/26/2012-Lexiscan- Normal Myocardial Perfusion Study. Post stress myocardial perfusion images show a normal pattern of perfusion in all regions. Post stress LV normal size. Normal perfusion in the distribution of all coronaries. Normal LV size and function. Rest EF 70%    H/O chest x-ray 7/12/2012, 6/2/2010 7/12/2012-Perihilar peribronchial cuffing with mild basilar predominant interstital prominence, which may reflect interstitial edema or atypical pneumonia. H/O Doppler ultrasound 6/4/2010    CAROTID DOPPLER-6/4/2010-No Doppler evidence of hemodynamically significant Carotid Stenosis with antegrade flow in the vertebral arteries bilaterally. 6/4/2010 PERIPHERAL VENOUS DOPPLER_ LEFT Saphenous Vein mapping    H/O echocardiogram 7/26/2012, 8/3/2011, 10/2010, 7/23/2010, 6/8/2010 7/26/2012-LV normal size. Normal LV wall thickness. LV systolic function normal. EF => 55%. LV wall motion normal. Mild MR. Mild TR. History of complete ECG 7/26/2012 Tiffanie Augustin), 7/13/2012 [facility]), 7/25/2011, 3/25/2011, 10/18/2010, 10/11/2010, 6/23/2010, 5/7/2010    Hx of cardiovascular stress test 9/3/2015    lexiscan-normal,EF66%    Hx of Doppler ultrasound 4/5/16    Arterial: There is a fluid collection in the left thigh, please refer to PCP for further monitoring, no vascular in etiology. Hx of echocardiogram     4/16 EF40% Mild MR/TR and mild Pulm HTN. 9/15 EF 45-50%, Mildly dilated L atrium, mildly dilated R ventricle. Mild MR & mild TR     Hyperlipidemia     Neuropathy of foot     Pt reports starting approx. 6-7 years ago    Neuropathy of hand     Pt reports starting approx.  6-7 years ago    Non compliance with medical treatment 7/2/2018    S/P CABG x 4 6/5/2010    LIMA-> LAD;  VG->CX;  VG->Diagonal; VG-> distal RCA  per  Dr Marina Cain    Type 2 diabetes mellitus with diabetic polyneuropathy, with long-term current use of insulin ([facility] 4/5/2016    Type II or unspecified type diabetes mellitus with neurological manifestations, not stated as uncontrolled(250.60)     Type II or unspecified type diabetes mellitus with other specified manifestations, not stated as uncontrolled     Ulcer of left foot, with fat layer exposed ([address] 12/19/2013    Ulcer of other part of foot        PAST SURGICAL HISTORY    Past Surgical History:   Procedure Laterality Date    APPENDECTOMY      CARDIAC SURGERY      CARPAL TUNNEL RELEASE      L hand Nov. 2011, R hand Jan. 2012    CARPAL TUNNEL RELEASE Right 6/10/2013    recurrent    CARPAL TUNNEL RELEASE Left 7/29/2013    with ulnar nerve transpostion and L middle finger release    CHOLECYSTECTOMY      COLONOSCOPY      CORONARY ARTERY BYPASS GRAFT  6/5/2010 6/5/2010-LIMA->LAD;  VG-> Diagonal;  VG-> Obtuse marginal;  VG->Distal RCA-   Dr Amezcua Mode Right 6/10/2013    HYSTERECTOMY, TOTAL ABDOMINAL (CERVIX REMOVED)      TOE AMPUTATION Left 02/14/144th toe    TUBAL LIGATION      ULNAR TUNNEL RELEASE Right 6/10/2013       FAMILY HISTORY    Family History   Problem Relation Age of Onset    Heart Disease Mother     Kidney Disease Mother         dialysis    Cancer Father        SOCIAL HISTORY    Social History     Tobacco Use    Smoking status: Every Day     Packs/day: 0.25     Years: 30.00     Pack years: 7.50     Types: Cigarettes    Smokeless tobacco: Never    Tobacco comments:     quit smoking mia 04/04/16   Vaping Use    Vaping Use: Every day    Substances: Never   Substance Use Topics    Alcohol use: No     Alcohol/week: 0.0 standard drinks     Comment:                                    CAFFEINE: 2 sodas daily    Drug use: No       ALLERGIES    Allergies   Allergen Reactions    Latex     Codeine     Cortisone     Cortizone     Iodides symptoms including fever, chills and malaise. Objective: There were no vitals taken for this visit. PHYSICAL EXAM      General: The patient is in no acute distress. Mental status:  Patient is appropriate, is  oriented to place and plan of care. Dermatologic exam: Visual inspection of the periwound reveals the skin to be moist  Wound exam: see wound description below in procedure note      Assessment:     Problem List Items Addressed This Visit       Diabetic ulcer of right heel associated with type 2 diabetes mellitus, with muscle involvement without evidence of necrosis ([address] - Primary    Relevant Orders    Initiate Outpatient Wound Care Protocol    Acute pain of right foot    Relevant Orders    Initiate Outpatient Wound Care Protocol     Procedure Note    Indications:  Based on my examination of this patient's wound(s) today, sharp excision into necrotic muscle/fascia is required to promote healing and evaluate the extent of previous healing. Performed by: Steven Ledbetter MD    Consent obtained: Yes    Time out taken:  Yes    Pain Control:       Debridement:Excisional Debridement    Using #15 blade scalpel the wound(s) was/were sharply debrided down through and including the removal of muscle/fascia.         Devitalized Tissue Debrided:  slough, necrotic/eschar, and exudate    Pre Debridement Measurements:  Are located in the Wound Documentation Flow Sheet    All active wounds listed below with today's date are evaluated  Wound(s)    debrided this date include # : 1     Post  Debridement Measurements:  Negative Pressure Wound Therapy Foot (Active)   Number of days: 3146       Negative Pressure Wound Therapy Foot Distal (Active)   Number of days: 9884       Negative Pressure Wound Therapy (Active)   Wound Type Diabetic foot ulcer 09/25/22 0905   Unit Type kci 09/25/22 0905   Dressing Type Other (Comment) 09/23/22 0845   Number of pieces used 3 09/25/22 0905   Number of pieces removed 4 O'Clock 0 09/27/22 0925   Undermining Ends___ O'Clock 0 09/27/22 0925   Undermining Maxium Distance (cm) 0 09/27/22 0925   Wound Assessment Eschar moist;Pink/red;Slough 09/27/22 0925   Drainage Amount Moderate 09/27/22 0925   Drainage Description Serosanguinous 09/27/22 0925   Odor None 09/27/22 0925   Lora-wound Assessment Fragile; Hyperkeratosis (callous) 09/27/22 0925   Margins Defined edges 09/27/22 0925   Wound Thickness Description not for Pressure Injury Full thickness 09/27/22 0925   Number of days: 14       Percent of Wound(s) Debrided: approximately 100%    Total  Area  Debrided:  20 sq cm     Bleeding:  Minimal    Hemostasis Achieved:  by pressure    Procedural Pain:  0  / 10     Post Procedural Pain:  0 / 10     Response to treatment:  Well tolerated by patient. Status of wound progress and description from last visit:   Improving. Plan:       Discharge Instructions           PHYSICIAN ORDERS AND DISCHARGE INSTRUCTIONS  NOTE: Upon discharge from the [address], you will receive a patient experience survey via E-mail. We would be grateful if you would take the time to fill this survey out. Wound care order history:              KATHLEEN's   Right       Left    Date               Vascular studies/Intervention: . Cultures: .9/13/22               Antibiotics: .Cipro and Doxy 9/13/22                         HbA1c:  .6/29/22 8.2               Grafts: Jazmyne Grey: . Continuing wound care orders and information:              Residence: . [facility] home health care with: . [address]              Your wound-care supplies will be provided by: . [facility]              Pharmacy: [facility] in [facility]  Wound cleansing:                           Do not scrub or use excessive force. Wash hands with soap and water before and after dressing changes.                           Prior to applying a clean dressing, cleanse wound with normal saline, wound cleanser, or mild soap and water. Ask your physician or nurse before getting the wound(s) wet in the shower. Daily Wound management:                          Keep weight off wounds and reposition every 2 hours. Avoid standing for long periods of time. Evaluate legs to the level of the heart or above for 30 minutes 4-5 times a day and/or when sitting. When taking antibiotics take entire prescription as ordered by MD do not stop taking until medicine is all gone. Orders for this week (9/27/22) : Right Heel- Wash with mild soap and water, rinse with saline, pat dry with 4x4  Apply mastisol and duoderm to periwound for protection. Santyl to wound bed   Gently pack wound with black foam, be sure to tuck into any tunnels or undermining  Secure with vac drape. Bridge up to leg, pad any areas where tubing will lay with foam   Set wound vac to 125 continuous suction. Change canister with each dressing change or as needed. Change on Tuesday, Thursday and Saturday    Script for theresa sent to pharmacy     Follow up with Dr. Myah Padron In 1 weeks in the wound care center  Call [phone] for any questions or concerns.   Date__________   Time____________               Treatment Note      Written Patient Dismissal Instructions Given            Electronically signed by Kimberlee Metzger MD on 9/27/2022 at 10:25 AM

## 2022-09-27 NOTE — PROGRESS NOTES
Nonselective enzymatic debridement performed with Santyl per physician order to wound(s) of the right heel. Patient tolerated the procedure well. Negative Pressure    NAME:  Gaby Aguero  YOB: 1965  MEDICAL RECORD NUMBER:  [id number]  DATE:  9/27/2022    Applied Negative Pressure to Right Heel wound(s)/ulcer(s). [x] Applied mastisol to kamla-wound. [x] Cut strips of duoderm to picture frame wound so that kamla-wound is  covered   [x] If bridging dressing to less prominent site, cover any intact skin that will come in contact with the Negative Pressure Therapy sponge, gauze or channel drain with plastic drape. The sponge should never touch intact skin. [x] Cut sponge, gauze or channel drain to size which will fit into the wound/ulcer bed without being forced. [x] Be sure the sponge is large enough to hold the entire round plastic flange which is attached to the tubing. Never allow flange to be larger than the sponge or it will produce suction damaging intact skin. [x] If bridging the dressing away from the primary site, be sure the bridge leads to a piece of sponge large enough to hold the entire flange without allowing any of the flange to overlap onto intact skin. [x] Covered sponge, gauze or channel drain with plastic drape. [x] Cut a hole in this plastic drape directly over the sponge the same size as the plastic drain tubing. [x] Removed plastic liner from flange and apply it directly over the hole you cut. [x] Removed the plastic cover from the flange. [x] Attached the tubing to the wound/ulcer Negative Pressure Therapy and turn it on to be sure a vacuum is created and that there are no leaks. [x] If air leaks occur, use plastic drape to patch them. [x] Secured Negative Pressure Therapy dressing with ace wrap loosely if located on an extremity. Maintain tubing outside of ace wrap. Tubing must not exert pressure on intact skin.     Applied per Garcia Gerardo

## 2022-09-27 NOTE — DISCHARGE INSTRUCTIONS
PHYSICIAN ORDERS AND DISCHARGE INSTRUCTIONS  NOTE: Upon discharge from the [address], you will receive a patient experience survey via E-mail. We would be grateful if you would take the time to fill this survey out. Wound care order history:              KATHLEEN's   Right       Left    Date               Vascular studies/Intervention: . Cultures: .9/13/22               Antibiotics: .Cipro and Doxy 9/13/22                         HbA1c:  .6/29/22 8.2               Grafts: Vivian Taoism: . Continuing wound care orders and information:              Residence: . Private              [facility] home health care with: . [facility]              Your wound-care supplies will be provided by: . [facility]              Pharmacy: [facility]  Wound cleansing:                           Do not scrub or use excessive force. Wash hands with soap and water before and after dressing changes. Prior to applying a clean dressing, cleanse wound with normal saline,                          wound cleanser, or mild soap and water. Ask your physician or nurse before getting the wound(s) wet in the shower. Daily Wound management:                          Keep weight off wounds and reposition every 2 hours. Avoid standing for long periods of time. Evaluate legs to the level of the heart or above for 30 minutes 4-5 times a day and/or when sitting. When taking antibiotics take entire prescription as ordered by MD do not stop taking until medicine is all gone. Orders for this week (9/27/22) : Right Heel- Wash with mild soap and water, rinse with saline, pat dry with 4x4  Apply mastisol and duoderm to periwound for protection.   Santyl to wound bed   Gently pack wound with black foam, be sure to tuck into any tunnels or undermining  Secure with vac drape. Bridge up to leg, pad any areas where tubing will lay with foam   Set wound vac to 125 continuous suction. Change canister with each dressing change or as needed. Change on Tuesday, Thursday and Saturday    Script for theresa sent to pharmacy     Follow up with Dr. Luiz Milan In 1 weeks in the wound care center  Call [phone] for any questions or concerns.   Date__________   Time____________

## 2022-09-29 ENCOUNTER — HOSPITAL ENCOUNTER (EMERGENCY)
Age: 57
Discharge: HOME | End: 2022-09-30
Payer: MEDICARE

## 2022-09-29 VITALS
WEIGHT: 157 LBS | SYSTOLIC BLOOD PRESSURE: 167 MMHG | TEMPERATURE: 98.2 F | BODY MASS INDEX: 27.82 KG/M2 | OXYGEN SATURATION: 98 % | HEART RATE: 84 BPM | HEIGHT: 63 IN | RESPIRATION RATE: 18 BRPM | DIASTOLIC BLOOD PRESSURE: 67 MMHG

## 2022-09-29 DIAGNOSIS — Z46.89 ENCOUNTER FOR MANAGEMENT OF WOUND VAC: Primary | ICD-10-CM

## 2022-09-29 PROCEDURE — 99282 EMERGENCY DEPT VISIT SF MDM: CPT

## 2022-09-30 NOTE — ED NOTES
Translucent dressing reinforced at this time. PT and family changed wound vac canister at their will at this time.        Dominique Daugherty RN  09/30/22 3243

## 2022-09-30 NOTE — ED PROVIDER NOTES
Triage Chief Complaint:   Wound Check (Wound vac problem, wound bleeding through dressing)    [place]:  Today in the ED I had the pleasure of caring for Shorty Echeverria who is a 62 y.o. female that presents today to the ED for wound check. Pt has chronic wound along the R lateral heel with wound vac in place. She states it is leaking on her socks and request wound care. She states she has supplies at home to change it herlsef but was told to come here. No f/c/n/v/d. No worsening pain     ROS:  REVIEW OF SYSTEMS    At least 10 systems reviewed      All other review of systems are negative  See HPI and nursing notes for additional information       Past Medical History:   Diagnosis Date    Acute pain of right foot 9/13/2022    CAD (coronary artery disease)     s/p CABG x 4;  follows with Dr Anup Orozco of foot 2/5/2018    Carpal tunnel syndrome on both sides     CHF (congestive heart failure) ([address] 10/2010    Chronic diastolic; EF 10%    Chronic kidney disease     stage 4 kidney disease    Chronic ulcer of left ankle with fat layer exposed ([address] 10/7/2015    Chronic ulcer of left foot with fat layer exposed ([address] 3/17/2016    Chronic ulcer of right ankle with fat layer exposed ([address] 3/17/2016    Chronic ulcer of right foot with fat layer exposed ([address] 3/17/2016    Depression     Diabetes mellitus ([address]     Diabetes mellitus with neurological manifestations ([address]     Diabetes mellitus with ulcer of ankle ([address]     Diabetic ulcer of left foot associated with type 2 diabetes mellitus, with fat layer exposed ([address] 8/6/2018    Diabetic ulcer of right heel associated with type 2 diabetes mellitus, with muscle involvement without evidence of necrosis ([address] 9/13/2022    ESRD on hemodialysis ([address] 9/21/2022    Family history of cardiovascular disease     Mother    GERD (gastroesophageal reflux disease)     H/O cardiac catheterization 10/18/2010, 6/3/2010    10/18/2010-Four bypass grafts all widely patent.  6/3/2010- Moderate to severe triple vessel disease, preserved LV systolic function. H/O cardiovascular stress test 7/26/2012, 8/3/2011, 10/14/2010, 5/24/2010 7/26/2012-Lexiscan- Normal Myocardial Perfusion Study. Post stress myocardial perfusion images show a normal pattern of perfusion in all regions. Post stress LV normal size. Normal perfusion in the distribution of all coronaries. Normal LV size and function. Rest EF 70%    H/O chest x-ray 7/12/2012, 6/2/2010 7/12/2012-Perihilar peribronchial cuffing with mild basilar predominant interstital prominence, which may reflect interstitial edema or atypical pneumonia. H/O Doppler ultrasound 6/4/2010    CAROTID DOPPLER-6/4/2010-No Doppler evidence of hemodynamically significant Carotid Stenosis with antegrade flow in the vertebral arteries bilaterally. 6/4/2010 PERIPHERAL VENOUS DOPPLER_ LEFT Saphenous Vein mapping    H/O echocardiogram 7/26/2012, 8/3/2011, 10/2010, 7/23/2010, 6/8/2010 7/26/2012-LV normal size. Normal LV wall thickness. LV systolic function normal. EF => 55%. LV wall motion normal. Mild MR. Mild TR. History of complete ECG 7/26/2012 [facility]), 7/13/2012 [facility]), 7/25/2011, 3/25/2011, 10/18/2010, 10/11/2010, 6/23/2010, 5/7/2010    Hx of cardiovascular stress test 9/3/2015    lexiscan-normal,EF66%    Hx of Doppler ultrasound 4/5/16    Arterial: There is a fluid collection in the left thigh, please refer to PCP for further monitoring, no vascular in etiology. Hx of echocardiogram     4/16 EF40% Mild MR/TR and mild Pulm HTN. 9/15 EF 45-50%, Mildly dilated L atrium, mildly dilated R ventricle. Mild MR & mild TR     Hyperlipidemia     Neuropathy of foot     Pt reports starting approx. 6-7 years ago    Neuropathy of hand     Pt reports starting approx.  6-7 years ago    Non compliance with medical treatment 7/2/2018    S/P CABG x 4 6/5/2010    LIMA-> LAD;  VG->CX;  VG->Diagonal; VG-> distal RCA  per  Dr Martha Alvarez    Type 2 diabetes mellitus with diabetic polyneuropathy, with long-term current use of insulin ([facility] 2016    Type II or unspecified type diabetes mellitus with neurological manifestations, not stated as uncontrolled(250.60)     Type II or unspecified type diabetes mellitus with other specified manifestations, not stated as uncontrolled     Ulcer of left foot, with fat layer exposed ([facility] 2013    Ulcer of other part of foot      Past Surgical History:   Procedure Laterality Date    APPENDECTOMY      CARDIAC SURGERY      CARPAL TUNNEL RELEASE      L hand 2011, R hand 2012    CARPAL TUNNEL RELEASE Right 6/10/2013    recurrent    CARPAL TUNNEL RELEASE Left 2013    with ulnar nerve transpostion and L middle finger release    CHOLECYSTECTOMY      COLONOSCOPY      CORONARY ARTERY BYPASS GRAFT  2010-LIMA->LAD;  VG-> Diagonal;  VG-> Obtuse marginal;  VG->Distal RCA-   Dr Deana Lizarraga Right 6/10/2013    HYSTERECTOMY, TOTAL ABDOMINAL (CERVIX REMOVED)      TOE AMPUTATION Left  toe    TUBAL LIGATION      ULNAR TUNNEL RELEASE Right 6/10/2013     Family History   Problem Relation Age of Onset    Heart Disease Mother     Kidney Disease Mother         dialysis    Cancer Father      Social History     Socioeconomic History    Marital status:      Spouse name: Not on file    Number of children: 4    Years of education: Not on file    Highest education level: Not on file   Occupational History    Not on file   Tobacco Use    Smoking status: Former     Packs/day: 0.25     Years: 30.00     Pack years: 7.50     Types: Cigarettes     Quit date: 2022     Years since quittin.0    Smokeless tobacco: Never    Tobacco comments:     quit smoking mia 16   Vaping Use    Vaping Use: Every day    Substances: Never   Substance and Sexual Activity    Alcohol use: No     Alcohol/week: 0.0 standard drinks     Comment:                                    CAFFEINE: 2 sodas daily    Drug use:  No Sexual activity: Not Currently   Other Topics Concern    Not on file   Social History Narrative    Not on file     Social Determinants of Health     Financial Resource Strain: Not on file   Food Insecurity: Not on file   Transportation Needs: Not on file   Physical Activity: Not on file   Stress: Not on file   Social Connections: Not on file   Intimate Partner Violence: Not on file   Housing Stability: Not on file     No current facility-administered medications for this encounter. Current Outpatient Medications   Medication Sig Dispense Refill    collagenase (SANTYL) 250 UNIT/GM ointment Apply topically daily. 30 g 2    aspirin 81 MG chewable tablet Take 1 tablet by mouth daily 30 tablet 3    sulfamethoxazole-trimethoprim (BACTRIM DS;SEPTRA DS) 800-160 MG per tablet Take 1 tablet by mouth 2 times daily for 7 days 14 tablet 0    gabapentin (NEURONTIN) 400 MG capsule Take 800 mg by mouth in the morning and at bedtime. SITagliptin (JANUVIA) 25 MG tablet Take 25 mg by mouth daily      OXYGEN Inhale 2 L into the lungs daily as needed      carvedilol (COREG) 6.25 MG tablet 6.25 mg 2 times daily       VICTOZA 18 MG/3ML SOPN SC injection daily  (Patient not taking: No sig reported)      albuterol sulfate HFA (PROVENTIL;VENTOLIN;PROAIR) 108 (90 Base) MCG/ACT inhaler Inhale 2 puffs into the lungs every 6 hours as needed for Wheezing      esomeprazole Magnesium (NEXIUM) 40 MG PACK Take 20 mg by mouth 2 times daily 2 tabs      oxyCODONE-acetaminophen (PERCOCET)  MG per tablet Take 1 tablet by mouth every 6 hours as needed for Pain. LORazepam (ATIVAN) 0.5 MG tablet Take 0.5 mg by mouth every 12 hours Indications: One Tablet twice daily       Insulin Aspart (NOVOLOG FLEXPEN SC) Inject 15 Units into the skin 3 times daily (before meals) On sliding scale (Patient not taking: No sig reported)      pravastatin (PRAVACHOL) 40 MG tablet 40 mg daily.       lisinopril (PRINIVIL;ZESTRIL) 10 MG tablet Take 20 mg by mouth daily  (Patient not taking: No sig reported)      insulin glargine (LANTUS) 100 UNIT/ML injection Inject 50 Units into the skin nightly. furosemide (LASIX) 20 MG tablet Take 40 mg by mouth 2 times daily        Allergies   Allergen Reactions    Latex     Codeine     Cortisone     Cortizone     Iodides     Phenobarbital Other (See Comments)     Hallucinations     Vancomycin Other (See Comments)     \"makes me very sick\"       Nursing Notes Reviewed    Physical Exam:  ED Triage Vitals [09/29/22 2151]   Enc Vitals Group      BP (!) 167/67      Heart Rate 84      Resp 18      Temp 98.2 °F (36.8 °C)      Temp src       SpO2 98 %      Weight 157 lb (71.2 kg)      Height 5' 3\" (1.6 m)      Head Circumference       Peak Flow       Pain Score       Pain Loc       Pain Edu? Excl. in [address]? General :Patient is awake alert oriented person place and time no acute distress nontoxic appearing  HEENT: Pupils are equally round and reactive to light extraocular motors are intact conjunctivae clear sclerae white there is no injection no icterus. Nose without any rhinorrhea or epistaxis. Oral mucosa is moist no exudate buccal mucosa shows no ulcerations. Uvula is midline    Neck: Neck is supple full range of motion trachea midline thyroid nonpalpable  Cardiac: Heart regular rate rhythm no murmurs rubs clicks or gallops  Lungs: Lungs are clear to auscultation there is no wheezing rhonchi or rales. There is no use of accessory muscles no nasal flaring identified. Chest wall: There is no tenderness to palpation over the chest wall or over ribs  Abdomen: Abdomen is soft nontender nondistended.  There is no firm or pulsatile masses no rebound rigidity or guarding negative Stevenson's negative McBurney, no peritoneal signs  Suprapubic:  there is no tenderness to palpation over the external bladder   Musculoskeletal: 5 out of 5 strength in all 4 extremities full flexion extension abduction and adduction supination pronation of all extremities and all digits. No obvious muscle atrophy is noted. No focal muscle deficits are appreciated  Dermatology: Skin is warm and dry there is no obvious abscesses. R lateral heel shows wound with VAC attached. Patent and actively draining serosangeuinous fluid. No surrounding erythema. No crepitus. There overlying tegaderm does leak scantly along the bottom portion. No detatchment. Psych: Mentation is grossly normal cognition is grossly normal. Affect is appropriate  Neuro: Motor intact sensory intact cranial nerves II through XII are intact level of consciousness is normal cerebellar function is normal reflexes are grossly normal. No evidence of incontinence or loss of bowel or bladder no saddle anesthesia noted Lymphatic: There is no submandibular or cervical adenopathy appreciated. I have reviewed and interpreted all of the currently available lab results from this visit (if applicable):  No results found for this visit on 09/29/22. Radiographs (if obtained):  [] The following radiograph was interpreted by myself in the absence of a radiologist:   [] Radiologist's Report Reviewed:  No orders to display       EKG (if obtained):   Please See Note of attending physician for EKG interpretation. Chart review shows recent radiograph(s):  XR FOOT RIGHT (MIN 3 VIEWS)    Result Date: 9/20/2022  EXAMINATION: THREE XRAY VIEWS OF THE RIGHT FOOT 9/20/2022 3:52 pm COMPARISON: None. HISTORY: ORDERING SYSTEM PROVIDED HISTORY: r/o osteomyelitis TECHNOLOGIST PROVIDED HISTORY: Reason for exam:->r/o osteomyelitis Reason for Exam: r/o osteomyelitis Additional signs and symptoms: na Relevant Medical/Surgical History: diabetes, cad, chf, ckd FINDINGS: There is no evidence of acute fracture. There is normal alignment of the tarsometatarsal joints. No acute joint abnormality. No focal osseous lesion. Soft tissue ulceration in the region of the calcaneus. No acute osseous abnormality.      VL DUP LOWER EXTREMITY ARTERIES BILATERAL    Result Date: 9/21/2022  EXAMINATION: ARTERIAL DUPLEX ULTRASOUND OF THE BILATERAL LOWER EXTREMITIES  9/21/2022 3:54 pm TECHNIQUE: Duplex ultrasound using B-mode/gray scaled imaging, Doppler spectral analysis and color flow Doppler was obtained of the bilateral lower extremities. COMPARISON: June 12, 2017 HISTORY: ORDERING SYSTEM PROVIDED HISTORY: PAD, diabetic foot ulceration TECHNOLOGIST PROVIDED HISTORY: Reason for exam:->PAD, diabetic foot ulceration FINDINGS: The right circulation demonstrates mostly monophasic waveforms from the femoral circulation to the trifurcation vessels. This implicates aorta iliac disease. Similarly, the left arterial circulation also demonstrates mostly monophasic waveforms implicating aortoiliac occlusive disease. In the left medial distal thigh a complex cystic structure can be seen which may represent seroma, lymphocele or old hematoma. This has a benign appearance. Right: Flow velocities were measured as follows: Com. Fem. 145 cm/sec Prof.           93 cm/sec SFA Prox. 79 cm/sec SFA Mid.     149 cm/sec SFA Dist.     107 cm/sec Pop.           95 cm/sec PTA           46 cm/sec Peron. 58 cm/sec JAN           51 cm/sec Left: Flow velocities were measured as follows: Com. Fem. 134 cm/sec Prof.           138 cm/sec SFA Prox. 106 cm/sec SFA Mid. 101 cm/sec SFA Dist.     65 cm/sec Pop.           46 cm/sec PTA           24 cm/sec Peron. 51 cm/sec JAN           24 cm/sec     Arterial circulation is patent in bilateral lower extremities; however, monophasic waveforms from the femoral to the trifurcation arteries implicates significant aortoiliac disease. No evidence of arterial occlusions. Large cystic structure in the left medial distal thigh which may represent a lymphocele, seroma or old hematoma.  RECOMMENDATIONS: Unavailable       MDM:     Interventions given this visit: No orders of the defined types were placed in this encounter. This today to the emergency department with wound check. Hemodynamically stable here in the emergency department no evidence of superimposed infection. Wound VAC was leaking a little bit of serosanguineous fluid out side of the bandage. So the bandage was reinforced. Wound VAC itself was not tampered with as it is evident that it is patent and seems to be working very well. She is educated on return precautions, follow-up with wound care   I independently managed patient today in the ED.     BP (!) 167/67   Pulse 84   Temp 98.2 °F (36.8 °C)   Resp 18   Ht 5' 3\" (1.6 m)   Wt 157 lb (71.2 kg)   SpO2 98%   BMI 27.81 kg/m²       Clinical Impression:  1. Encounter for management of wound VAC        Disposition referral (if applicable):  Fercho Martin MD  [address] [place] [address]  [phone]    In 2 days      [facility] Emergency Department  [place] [id number] [phone]    If symptoms worsen or persist  Disposition medications (if applicable):  New Prescriptions    No medications on file         Comment: Please note this report has been produced using speech recognition software and may contain errors related to that system including errors in grammar, punctuation, and spelling, as well as words and phrases that may be inappropriate. If there are any questions or concerns please feel free to contact the dictating provider for clarification.       Falguni Sanchez, [address] [place] [address]  09/30/22 [id number]

## 2022-09-30 NOTE — ED NOTES
Pt was questioning order to reinforce transparent dressing. MAGALIE Simon notified.       Carolina Blanton RN  09/29/22 [id number]

## 2022-10-04 ENCOUNTER — HOSPITAL ENCOUNTER (OUTPATIENT)
Dept: WOUND CARE | Age: 57
Discharge: HOME | End: 2022-10-04
Payer: MEDICARE

## 2022-10-04 VITALS
SYSTOLIC BLOOD PRESSURE: 105 MMHG | DIASTOLIC BLOOD PRESSURE: 67 MMHG | RESPIRATION RATE: 18 BRPM | HEART RATE: 88 BPM | TEMPERATURE: 98.1 F

## 2022-10-04 DIAGNOSIS — E11.621 DIABETIC ULCER OF RIGHT HEEL ASSOCIATED WITH TYPE 2 DIABETES MELLITUS, WITH NECROSIS OF BONE (HCC): ICD-10-CM

## 2022-10-04 DIAGNOSIS — M79.671 ACUTE PAIN OF RIGHT FOOT: ICD-10-CM

## 2022-10-04 DIAGNOSIS — L97.414 DIABETIC ULCER OF RIGHT HEEL ASSOCIATED WITH TYPE 2 DIABETES MELLITUS, WITH NECROSIS OF BONE (HCC): ICD-10-CM

## 2022-10-04 DIAGNOSIS — E11.621 DIABETIC ULCER OF RIGHT HEEL ASSOCIATED WITH TYPE 2 DIABETES MELLITUS, WITH MUSCLE INVOLVEMENT WITHOUT EVIDENCE OF NECROSIS (HCC): Primary | ICD-10-CM

## 2022-10-04 DIAGNOSIS — L97.415 DIABETIC ULCER OF RIGHT HEEL ASSOCIATED WITH TYPE 2 DIABETES MELLITUS, WITH MUSCLE INVOLVEMENT WITHOUT EVIDENCE OF NECROSIS (HCC): Primary | ICD-10-CM

## 2022-10-04 PROCEDURE — 11044 DBRDMT BONE 1ST 20 SQ CM/<: CPT

## 2022-10-04 PROCEDURE — 11043 DBRDMT MUSC&/FSCA 1ST 20/<: CPT

## 2022-10-04 PROCEDURE — 97605 NEG PRS WND THER DME<=50SQCM: CPT

## 2022-10-04 RX ORDER — GENTAMICIN SULFATE 1 MG/G
OINTMENT TOPICAL ONCE
Status: CANCELLED | OUTPATIENT
Start: 2022-10-04 | End: 2022-10-04

## 2022-10-04 RX ORDER — BACITRACIN, NEOMYCIN, POLYMYXIN B 400; 3.5; 5 [USP'U]/G; MG/G; [USP'U]/G
OINTMENT TOPICAL ONCE
Status: CANCELLED | OUTPATIENT
Start: 2022-10-04 | End: 2022-10-04

## 2022-10-04 RX ORDER — LIDOCAINE HYDROCHLORIDE 40 MG/ML
SOLUTION TOPICAL ONCE
Status: CANCELLED | OUTPATIENT
Start: 2022-10-04 | End: 2022-10-04

## 2022-10-04 RX ORDER — LIDOCAINE HYDROCHLORIDE 20 MG/ML
JELLY TOPICAL ONCE
Status: CANCELLED | OUTPATIENT
Start: 2022-10-04 | End: 2022-10-04

## 2022-10-04 RX ORDER — CLOBETASOL PROPIONATE 0.5 MG/G
OINTMENT TOPICAL ONCE
Status: CANCELLED | OUTPATIENT
Start: 2022-10-04 | End: 2022-10-04

## 2022-10-04 RX ORDER — GINSENG 100 MG
CAPSULE ORAL ONCE
Status: CANCELLED | OUTPATIENT
Start: 2022-10-04 | End: 2022-10-04

## 2022-10-04 RX ORDER — LIDOCAINE 50 MG/G
OINTMENT TOPICAL ONCE
Status: CANCELLED | OUTPATIENT
Start: 2022-10-04 | End: 2022-10-04

## 2022-10-04 RX ORDER — BETAMETHASONE DIPROPIONATE 0.05 %
OINTMENT (GRAM) TOPICAL ONCE
Status: CANCELLED | OUTPATIENT
Start: 2022-10-04 | End: 2022-10-04

## 2022-10-04 RX ORDER — BACITRACIN ZINC AND POLYMYXIN B SULFATE 500; 1000 [USP'U]/G; [USP'U]/G
OINTMENT TOPICAL ONCE
Status: CANCELLED | OUTPATIENT
Start: 2022-10-04 | End: 2022-10-04

## 2022-10-04 RX ORDER — LIDOCAINE 40 MG/G
CREAM TOPICAL ONCE
Status: CANCELLED | OUTPATIENT
Start: 2022-10-04 | End: 2022-10-04

## 2022-10-04 NOTE — DISCHARGE INSTRUCTIONS
PHYSICIAN ORDERS AND DISCHARGE INSTRUCTIONS  NOTE: Upon discharge from the [address], you will receive a patient experience survey via E-mail. We would be grateful if you would take the time to fill this survey out. Wound care order history:              KATHLEEN's   Right       Left    Date               Vascular studies/Intervention: . Cultures: .9/13/22               Antibiotics: .Cipro and Doxy 9/13/22                         HbA1c:  .6/29/22 8.2               Grafts: Octavio Slot: . Continuing wound care orders and information:              Residence: . Private              [facility] home health care with: [address]              Your wound-care supplies will be provided by: . [facility]              Pharmacy: .[facility] in [facility]  Wound cleansing:                           Do not scrub or use excessive force. Wash hands with soap and water before and after dressing changes. Prior to applying a clean dressing, cleanse wound with normal saline,                          wound cleanser, or mild soap and water. Ask your physician or nurse before getting the wound(s) wet in the shower. Daily Wound management:                          Keep weight off wounds and reposition every 2 hours. Avoid standing for long periods of time. Evaluate legs to the level of the heart or above for 30 minutes 4-5 times a day and/or when sitting. When taking antibiotics take entire prescription as ordered by MD do not stop taking until medicine is all gone. Orders for this week (10/4/22) : Right Heel- Wash with mild soap and water, rinse with saline, pat dry with 4x4  Apply mastisol and duoderm to periwound for protection.   Santyl to wound bed   Gently pack wound with black foam, be sure to tuck into any tunnels or undermining  Secure with vac drape. Bridge up to leg, pad any areas where tubing will lay with foam   Set wound vac to 125 continuous suction. Change canister with each dressing change or as needed. Change on Tuesday, Thursday and Saturday    Follow up with Dr. Aristeo Perales In 1 weeks in the wound care center  Call [phone] for any questions or concerns.   Date__________   Time____________

## 2022-10-04 NOTE — WOUND CARE
Nonselective enzymatic debridement performed with Santyl per physician order to wound(s) of the foot   Patient tolerated the procedure well.

## 2022-10-04 NOTE — PROGRESS NOTES
Wound Care Center Progress Note With Procedure    William Mckeon  AGE: 62 y.o. GENDER: female  : 1965  EPISODE DATE:  10/4/2022     Subjective:     Chief Complaint   Patient presents with    Wound Check     RLE         HISTORY of PRESENT ILLNESS      William Mckeon is a 62 y.o. female who presents today for wound evaluation of Chronic diabetic and pressure ulcer(s) of the right heel. The ulcer is of marked severity. The underlying cause of the wound is diabetes, pressure, SSTI. Has worsened.   Wound Pain Timing/Severity: none  Quality of pain: N/A  Severity of pain:  0 / 10   Modifying Factors: diabetes and ESRD on HD  Associated Signs/Symptoms: edema, erythema, drainage, and odor        PAST MEDICAL HISTORY        Diagnosis Date    Acute pain of right foot 2022    CAD (coronary artery disease)     s/p CABG x 4;  follows with Dr Jl Malagon of foot 2018    Carpal tunnel syndrome on both sides     CHF (congestive heart failure) ([address] 10/2010    Chronic diastolic; EF 44%    Chronic kidney disease     stage 4 kidney disease    Chronic ulcer of left ankle with fat layer exposed ([address] 10/7/2015    Chronic ulcer of left foot with fat layer exposed ([address] 3/17/2016    Chronic ulcer of right ankle with fat layer exposed ([address] 3/17/2016    Chronic ulcer of right foot with fat layer exposed ([address] 3/17/2016    Depression     Diabetes mellitus ([address]     Diabetes mellitus with neurological manifestations ([address]     Diabetes mellitus with ulcer of ankle ([address]     Diabetic ulcer of left foot associated with type 2 diabetes mellitus, with fat layer exposed ([address] 2018    Diabetic ulcer of right heel associated with type 2 diabetes mellitus, with muscle involvement without evidence of necrosis ([address] 2022    Diabetic ulcer of right heel associated with type 2 diabetes mellitus, with necrosis of bone ([address] 2022    ESRD on hemodialysis ([address] 2022    Family history of cardiovascular disease     Mother    GERD (gastroesophageal reflux disease)     H/O cardiac catheterization 10/18/2010, 6/3/2010    10/18/2010-Four bypass grafts all widely patent. 6/3/2010- Moderate to severe triple vessel disease, preserved LV systolic function. H/O cardiovascular stress test 7/26/2012, 8/3/2011, 10/14/2010, 5/24/2010 7/26/2012-Lexiscan- Normal Myocardial Perfusion Study. Post stress myocardial perfusion images show a normal pattern of perfusion in all regions. Post stress LV normal size. Normal perfusion in the distribution of all coronaries. Normal LV size and function. Rest EF 70%    H/O chest x-ray 7/12/2012, 6/2/2010 7/12/2012-Perihilar peribronchial cuffing with mild basilar predominant interstital prominence, which may reflect interstitial edema or atypical pneumonia. H/O Doppler ultrasound 6/4/2010    CAROTID DOPPLER-6/4/2010-No Doppler evidence of hemodynamically significant Carotid Stenosis with antegrade flow in the vertebral arteries bilaterally. 6/4/2010 PERIPHERAL VENOUS DOPPLER_ LEFT Saphenous Vein mapping    H/O echocardiogram 7/26/2012, 8/3/2011, 10/2010, 7/23/2010, 6/8/2010 7/26/2012-LV normal size. Normal LV wall thickness. LV systolic function normal. EF => 55%. LV wall motion normal. Mild MR. Mild TR. History of complete ECG 7/26/2012 [facility]), 7/13/2012 [facility]), 7/25/2011, 3/25/2011, 10/18/2010, 10/11/2010, 6/23/2010, 5/7/2010    Hx of cardiovascular stress test 9/3/2015    lexiscan-normal,EF66%    Hx of Doppler ultrasound 4/5/16    Arterial: There is a fluid collection in the left thigh, please refer to PCP for further monitoring, no vascular in etiology. Hx of echocardiogram     4/16 EF40% Mild MR/TR and mild Pulm HTN. 9/15 EF 45-50%, Mildly dilated L atrium, mildly dilated R ventricle. Mild MR & mild TR     Hyperlipidemia     Neuropathy of foot     Pt reports starting approx. 6-7 years ago    Neuropathy of hand     Pt reports starting approx.  6-7 years ago    Non compliance with medical treatment 2018    S/P CABG x 4 2010    LIMA-> LAD;  VG->CX;  VG->Diagonal; VG-> distal RCA  per  Dr Ambrose Segundo    Type 2 diabetes mellitus with diabetic polyneuropathy, with long-term current use of insulin ([facility] 2016    Type II or unspecified type diabetes mellitus with neurological manifestations, not stated as uncontrolled(250.60)     Type II or unspecified type diabetes mellitus with other specified manifestations, not stated as uncontrolled     Ulcer of left foot, with fat layer exposed ([facility] 2013    Ulcer of other part of foot        PAST SURGICAL HISTORY    Past Surgical History:   Procedure Laterality Date    APPENDECTOMY      CARDIAC SURGERY      CARPAL TUNNEL RELEASE      L hand 2011, R hand 2012    CARPAL TUNNEL RELEASE Right 6/10/2013    recurrent    CARPAL TUNNEL RELEASE Left 2013    with ulnar nerve transpostion and L middle finger release    CHOLECYSTECTOMY      COLONOSCOPY      CORONARY ARTERY BYPASS GRAFT  2010-LIMA->LAD;  VG-> Diagonal;  VG-> Obtuse marginal;  VG->Distal RCA-   Dr Ana Carolina Right 6/10/2013    HYSTERECTOMY, TOTAL ABDOMINAL (CERVIX REMOVED)      TOE AMPUTATION Left 144 toe    TUBAL LIGATION      ULNAR TUNNEL RELEASE Right 6/10/2013       FAMILY HISTORY    Family History   Problem Relation Age of Onset    Heart Disease Mother     Kidney Disease Mother         dialysis    Cancer Father        SOCIAL HISTORY    Social History     Tobacco Use    Smoking status: Former     Packs/day: 0.25     Years: 30.00     Pack years: 7.50     Types: Cigarettes     Quit date: 2022     Years since quittin.0    Smokeless tobacco: Never    Tobacco comments:     quit smoking mia 16   Vaping Use    Vaping Use: Every day    Substances: Never   Substance Use Topics    Alcohol use: No     Alcohol/week: 0.0 standard drinks     Comment:                                    CAFFEINE: 2 sodas daily    Drug use: No       ALLERGIES    Allergies   Allergen Reactions    Latex     Codeine     Cortisone     Cortizone     Iodides     Phenobarbital Other (See Comments)     Hallucinations     Vancomycin Other (See Comments)     \"makes me very sick\"       MEDICATIONS    Current Outpatient Medications on File Prior to Encounter   Medication Sig Dispense Refill    collagenase (SANTYL) 250 UNIT/GM ointment Apply topically daily. 30 g 2    aspirin 81 MG chewable tablet Take 1 tablet by mouth daily 30 tablet 3    gabapentin (NEURONTIN) 400 MG capsule Take 800 mg by mouth in the morning and at bedtime. SITagliptin (JANUVIA) 25 MG tablet Take 25 mg by mouth daily      OXYGEN Inhale 2 L into the lungs daily as needed      carvedilol (COREG) 6.25 MG tablet 6.25 mg 2 times daily       VICTOZA 18 MG/3ML SOPN SC injection daily  (Patient not taking: No sig reported)      albuterol sulfate HFA (PROVENTIL;VENTOLIN;PROAIR) 108 (90 Base) MCG/ACT inhaler Inhale 2 puffs into the lungs every 6 hours as needed for Wheezing      esomeprazole Magnesium (NEXIUM) 40 MG PACK Take 20 mg by mouth 2 times daily 2 tabs      oxyCODONE-acetaminophen (PERCOCET)  MG per tablet Take 1 tablet by mouth every 6 hours as needed for Pain. LORazepam (ATIVAN) 0.5 MG tablet Take 0.5 mg by mouth every 12 hours Indications: One Tablet twice daily       Insulin Aspart (NOVOLOG FLEXPEN SC) Inject 15 Units into the skin 3 times daily (before meals) On sliding scale (Patient not taking: No sig reported)      pravastatin (PRAVACHOL) 40 MG tablet 40 mg daily. lisinopril (PRINIVIL;ZESTRIL) 10 MG tablet Take 20 mg by mouth daily  (Patient not taking: No sig reported)      insulin glargine (LANTUS) 100 UNIT/ML injection Inject 50 Units into the skin nightly. furosemide (LASIX) 20 MG tablet Take 40 mg by mouth 2 times daily        No current facility-administered medications on file prior to encounter.        REVIEW OF SYSTEMS    Pertinent items are noted in HPI. Constitutional: Negative for systemic symptoms including fever, chills and malaise. Objective:      /67   Pulse 88   Temp 98.1 °F (36.7 °C) (Temporal)   Resp 18     PHYSICAL EXAM      General: The patient is in no acute distress. Mental status:  Patient is appropriate, is  oriented to place and plan of care. Dermatologic exam: Visual inspection of the periwound reveals the skin to be moist and cool   Wound exam: see wound description below in procedure note      Assessment:     Problem List Items Addressed This Visit       Diabetic ulcer of right heel associated with type 2 diabetes mellitus, with necrosis of bone ([facility] - Primary    Relevant Medications    collagenase ointment (Start on 10/4/2022 10:30 AM)    Acute pain of right foot    Relevant Medications    collagenase ointment (Start on 10/4/2022 10:30 AM)    Other Relevant Orders    Initiate Outpatient Wound Care Protocol     Procedure Note    Indications:  Based on my examination of this patient's wound(s) today, sharp excision into necrotic bone is required to promote healing and evaluate the extent of previous healing. Performed by: Zarina Toro MD    Consent obtained: Yes    Time out taken:  Yes    Pain Control:       Debridement:Excisional Debridement    Using scissors, forceps, and # 10 blade scalpel the wound(s) was/were sharply debrided down through and including the removal of bone.         Devitalized Tissue Debrided:  slough, necrotic/eschar, exudate, and clots    Pre Debridement Measurements:  Are located in the Wound Documentation Flow Sheet    All active wounds listed below with today's date are evaluated  Wound(s)    debrided this date include # : 1     Post  Debridement Measurements:  Negative Pressure Wound Therapy Foot (Active)   Number of days: 3153       Negative Pressure Wound Therapy Foot Distal (Active)   Number of days: 3149       Negative Pressure Wound Therapy (Active)   Wound Type Diabetic foot ulcer 09/25/22 0905   Unit Type kci 09/27/22 1045   Dressing Type Black Foam 09/27/22 1045   Number of pieces used 2 09/27/22 1045   Number of pieces removed 4 09/25/22 0905   Cycle Continuous 09/27/22 1045   Target Pressure (mmHg) 125 09/27/22 1045   Irrigation Solution Sodium chloride 0.9% 09/23/22 0845   Instillation Volume  10 mL 09/23/22 0845   Soak Time  2 minutes 09/23/22 0845   Vac Frequency 2 hours 09/23/22 0845   Canister changed? No 09/25/22 0905   Dressing Status New dressing applied;Clean, dry & intact 09/27/22 1045   Dressing Changed Changed/New 09/27/22 1045   Drainage Amount Moderate 09/25/22 0905   Drainage Description Serosanguinous 09/25/22 0905   Dressing Change Due 09/26/22 09/25/22 0905   Lora-wound Assessment Fragile 09/23/22 0845   Odor None 09/23/22 0845   Number of days: 12       Incision 06/10/13 Wrist Right (Active)   Number of days: 2548       Incision 07/29/13 Wrist Left (Active)   Number of days: 3354       Incision 02/14/14 Toe (Comment  which one) (Active)   Number of days: 4061       Wound 09/13/22 #1 Right Foot Heel (Active)   Wound Image   10/04/22 0935   Wound Etiology Diabetic 09/23/22 0845   Dressing Status New dressing applied;Clean;Dry 09/27/22 1017   Wound Cleansed Soap and water; Wound cleanser 10/04/22 0935   Dressing/Treatment Negative pressure wound therapy 09/27/22 1017   Offloading for Diabetic Foot Ulcers Back offloading shoe 10/04/22 0935   Dressing Change Due 09/26/22 09/25/22 0905   Wound Length (cm) 5.5 cm 10/04/22 0935   Wound Width (cm) 3.5 cm 10/04/22 0935   Wound Depth (cm) 1 cm 10/04/22 0935   Wound Surface Area (cm^2) 19.25 cm^2 10/04/22 0935   Change in Wound Size % (l*w) 48.94 10/04/22 0935   Wound Volume (cm^3) 19.25 cm^3 10/04/22 0935   Wound Healing % -70 10/04/22 0935   Post-Procedure Length (cm) 5.5 cm 10/04/22 1000   Post-Procedure Width (cm) 3.5 cm 10/04/22 1000   Post-Procedure Depth (cm) 0.1 cm 10/04/22 1000   Post-Procedure Surface Area (cm^2) 19.25 cm^2 10/04/22 1000   Post-Procedure Volume (cm^3) 1.925 cm^3 10/04/22 1000   Distance Tunneling (cm) 0 cm 10/04/22 0935   Tunneling Position ___ O'Clock 0 10/04/22 0935   Undermining Starts ___ O'Clock 0 10/04/22 0935   Undermining Ends___ O'Clock 0 10/04/22 0935   Undermining Maxium Distance (cm) 0 10/04/22 0935   Wound Assessment Eschar moist;Pink/red;Ruptured blister 10/04/22 0935   Drainage Amount Moderate 10/04/22 0935   Drainage Description Serosanguinous 10/04/22 0935   Odor None 10/04/22 0935   Lora-wound Assessment Fragile; Hyperkeratosis (callous) 10/04/22 0935   Margins Defined edges 10/04/22 0935   Wound Thickness Description not for Pressure Injury Full thickness 10/04/22 0935   Number of days: 21       Percent of Wound(s) Debrided: approximately 100%    Total  Area  Debrided:  19 sq cm     Bleeding:  Minimal    Hemostasis Achieved:  by pressure    Procedural Pain:  0  / 10     Post Procedural Pain:  0 / 10     Response to treatment:  Well tolerated by patient. Status of wound progress and description from last visit:   Slightly worse, concerned that I may not be able to salvage her foot. Plan:       Discharge Instructions         PHYSICIAN ORDERS AND DISCHARGE INSTRUCTIONS  NOTE: Upon discharge from the [address], you will receive a patient experience survey via E-mail. We would be grateful if you would take the time to fill this survey out. Wound care order history:              KATHLEEN's   Right       Left    Date               Vascular studies/Intervention: . Cultures: .9/13/22               Antibiotics: .Cipro and Doxy 9/13/22                         HbA1c:  .6/29/22 8.2               Grafts: Maru Culver: . Continuing wound care orders and information:              Residence: . Private              Continue home health care with: . N/A               Your wound-care supplies will be provided by: . [facility]              Pharmacy: [facility] in [place]  Wound cleansing:                           Do not scrub or use excessive force. Wash hands with soap and water before and after dressing changes. Prior to applying a clean dressing, cleanse wound with normal saline,                          wound cleanser, or mild soap and water. Ask your physician or nurse before getting the wound(s) wet in the shower. Daily Wound management:                          Keep weight off wounds and reposition every 2 hours. Avoid standing for long periods of time. Evaluate legs to the level of the heart or above for 30 minutes 4-5 times a day and/or when sitting. When taking antibiotics take entire prescription as ordered by MD do not stop taking until medicine is all gone. Orders for this week (10/4/22) : Right Heel- Wash with mild soap and water, rinse with saline, pat dry with 4x4  Apply mastisol and duoderm to periwound for protection. Santyl to wound bed   Gently pack wound with black foam, be sure to tuck into any tunnels or undermining  Secure with vac drape. Bridge up to leg, pad any areas where tubing will lay with foam   Set wound vac to 125 continuous suction. Change canister with each dressing change or as needed. Change on Tuesday, Thursday and Saturday    Follow up with Dr. Ellis Jackson In 1 weeks in the wound care center  Call [phone] for any questions or concerns.   Date__________   Time____________        Treatment Note      Written Patient Dismissal Instructions Given            Electronically signed by Amalia Jessica MD on 10/4/2022 at 10:09 AM

## 2022-10-04 NOTE — WOUND CARE
WOUND VAC THERAPY:     DUODERM TO PERIWOUND FOR PROTECTION. APPLY BLACK FOAM TO WOUND  APPLY  AREAS AT  Coccyx O'CLOCK  MAY USE MEPITEL TO WOUND BED TO PREVENT BLACK FOAM FROM ADHERING TO WOUND BED. SECURE VAC. DRESSING WITH DRAPE. SET WOUND VAC  CONTINUOUS SUCTION. CANISTER CHANGE WITH EACH DRESSING CHANGE OR ACCORDING TO VOLUME OF DRAINAGE.     WOUND VAC DRESSING TO BE CHANGED MON, WED, FRI

## 2022-10-11 ENCOUNTER — HOSPITAL ENCOUNTER (OUTPATIENT)
Dept: WOUND CARE | Age: 57
Discharge: HOME | End: 2022-10-11
Payer: MEDICARE

## 2022-10-11 VITALS
RESPIRATION RATE: 18 BRPM | TEMPERATURE: 98.4 F | SYSTOLIC BLOOD PRESSURE: 127 MMHG | DIASTOLIC BLOOD PRESSURE: 72 MMHG | HEART RATE: 88 BPM

## 2022-10-11 DIAGNOSIS — M79.671 ACUTE PAIN OF RIGHT FOOT: ICD-10-CM

## 2022-10-11 DIAGNOSIS — E11.621 DIABETIC ULCER OF RIGHT HEEL ASSOCIATED WITH TYPE 2 DIABETES MELLITUS, WITH NECROSIS OF BONE (HCC): Primary | ICD-10-CM

## 2022-10-11 DIAGNOSIS — L97.414 DIABETIC ULCER OF RIGHT HEEL ASSOCIATED WITH TYPE 2 DIABETES MELLITUS, WITH NECROSIS OF BONE (HCC): Primary | ICD-10-CM

## 2022-10-11 PROCEDURE — 11044 DBRDMT BONE 1ST 20 SQ CM/<: CPT

## 2022-10-11 PROCEDURE — 97605 NEG PRS WND THER DME<=50SQCM: CPT

## 2022-10-11 RX ORDER — LIDOCAINE HYDROCHLORIDE 20 MG/ML
JELLY TOPICAL ONCE
Status: CANCELLED | OUTPATIENT
Start: 2022-10-11 | End: 2022-10-11

## 2022-10-11 RX ORDER — BETAMETHASONE DIPROPIONATE 0.05 %
OINTMENT (GRAM) TOPICAL ONCE
Status: CANCELLED | OUTPATIENT
Start: 2022-10-11 | End: 2022-10-11

## 2022-10-11 RX ORDER — LIDOCAINE 50 MG/G
OINTMENT TOPICAL ONCE
Status: CANCELLED | OUTPATIENT
Start: 2022-10-11 | End: 2022-10-11

## 2022-10-11 RX ORDER — LIDOCAINE 40 MG/G
CREAM TOPICAL ONCE
Status: CANCELLED | OUTPATIENT
Start: 2022-10-11 | End: 2022-10-11

## 2022-10-11 RX ORDER — GINSENG 100 MG
CAPSULE ORAL ONCE
Status: CANCELLED | OUTPATIENT
Start: 2022-10-11 | End: 2022-10-11

## 2022-10-11 RX ORDER — BACITRACIN ZINC AND POLYMYXIN B SULFATE 500; 1000 [USP'U]/G; [USP'U]/G
OINTMENT TOPICAL ONCE
Status: CANCELLED | OUTPATIENT
Start: 2022-10-11 | End: 2022-10-11

## 2022-10-11 RX ORDER — LIDOCAINE HYDROCHLORIDE 40 MG/ML
SOLUTION TOPICAL ONCE
Status: CANCELLED | OUTPATIENT
Start: 2022-10-11 | End: 2022-10-11

## 2022-10-11 RX ORDER — GENTAMICIN SULFATE 1 MG/G
OINTMENT TOPICAL ONCE
Status: CANCELLED | OUTPATIENT
Start: 2022-10-11 | End: 2022-10-11

## 2022-10-11 RX ORDER — BACITRACIN, NEOMYCIN, POLYMYXIN B 400; 3.5; 5 [USP'U]/G; MG/G; [USP'U]/G
OINTMENT TOPICAL ONCE
Status: CANCELLED | OUTPATIENT
Start: 2022-10-11 | End: 2022-10-11

## 2022-10-11 RX ORDER — CLOBETASOL PROPIONATE 0.5 MG/G
OINTMENT TOPICAL ONCE
Status: CANCELLED | OUTPATIENT
Start: 2022-10-11 | End: 2022-10-11

## 2022-10-11 NOTE — PROGRESS NOTES
Negative Pressure Wound Therapy    NAME:  Massiel Monroe  YOB: 1965  MEDICAL RECORD NUMBER:  [id number]  DATE:  10/11/2022    Applied Negative Pressure to right heel wound(s)/ulcer(s). [x] Applied skin barrier prep to kamla-wound. [x] Cut strips of plastic drape to picture frame wound so that kamla-wound is     covered with the drape. [x] If bridging dressing to less prominent site, cover any intact skin that will come in contact with the Negative Pressure Therapy sponge, gauze or channel drain with plastic drape. The sponge should never touch intact skin. [x] Cut sponge, gauze or channel drain to size which will fit into the wound/ulcer bed without being forced. [x] Be sure the sponge is large enough to hold the entire round plastic flange which is attached to the tubing. Never allow flange to be larger than the sponge or it will produce suction damaging intact skin. Total number of individual pieces of foam used within the wound bed: ONE    [x] If bridging the dressing away from the primary site, be sure the bridge leads to a piece of sponge large enough to hold the entire flange without allowing any of the flange to overlap onto intact skin. [x] Covered sponge, gauze or channel drain with plastic drape. [x] Cut a hole in this plastic drape directly over the sponge the same size as the plastic drain tubing. [x] Removed plastic liner from flange and apply it directly over the hole you cut. [x] Removed the plastic cover from the flange. [x] Attached the tubing to the wound/ulcer Negative Pressure Therapy and turn it on to be sure a vacuum is created and that there are no leaks. [x] If air leaks occur, use plastic drape to patch them. [x] Secured Negative Pressure Therapy dressing with ace wrap loosely if located on an extremity. Maintain tubing outside of ace wrap. Tubing must not exert pressure on intact skin.     Applied per  Guidelines      Electronically signed by Donell Donald LPN on 17/80/3775 at 11:43 AM

## 2022-10-11 NOTE — DISCHARGE INSTRUCTIONS
PHYSICIAN ORDERS AND DISCHARGE INSTRUCTIONS  NOTE: Upon discharge from the [address], you will receive a patient experience survey via E-mail. We would be grateful if you would take the time to fill this survey out. Wound care order history:              KATHLEEN's   Right       Left    Date               Vascular studies/Intervention: . Cultures: .9/13/22               Antibiotics: .Cipro and Doxy 9/13/22                         HbA1c:  .6/29/22 8.2               Grafts: Gloria Edge: . Continuing wound care orders and information:              Residence: . Private              [facility] home health care with: [id number]              Your wound-care supplies will be provided by: . [facility]              Pharmacy: [facility]  Wound cleansing:                           Do not scrub or use excessive force. Wash hands with soap and water before and after dressing changes. Prior to applying a clean dressing, cleanse wound with normal saline,                          wound cleanser, or mild soap and water. Ask your physician or nurse before getting the wound(s) wet in the shower. Daily Wound management:                          Keep weight off wounds and reposition every 2 hours. Avoid standing for long periods of time. Evaluate legs to the level of the heart or above for 30 minutes 4-5 times a day and/or when sitting. When taking antibiotics take entire prescription as ordered by MD do not stop taking until medicine is all gone. Orders for this week (10/11/22) : Right Heel- Wash with mild soap and water, rinse with saline, pat dry with 4x4  Apply mastisol and duoderm to periwound for protection.   Santyl to wound bed   Gently pack wound with black foam, be sure to tuck

## 2022-10-11 NOTE — PROGRESS NOTES
Nonselective enzymatic debridement performed with Santyl per physician order to wound(s) of the right heel. Patient tolerated the procedure well.

## 2022-10-12 ENCOUNTER — HOSPITAL ENCOUNTER (EMERGENCY)
Age: 57
Discharge: ANOTHER HOSPITAL | End: 2022-10-12
Attending: EMERGENCY MEDICINE
Payer: MEDICARE

## 2022-10-12 ENCOUNTER — APPOINTMENT (OUTPATIENT)
Dept: GENERAL RADIOLOGY | Age: 57
End: 2022-10-12
Payer: MEDICARE

## 2022-10-12 VITALS
OXYGEN SATURATION: 100 % | BODY MASS INDEX: 27.82 KG/M2 | WEIGHT: 157 LBS | TEMPERATURE: 100.2 F | HEART RATE: 84 BPM | DIASTOLIC BLOOD PRESSURE: 43 MMHG | SYSTOLIC BLOOD PRESSURE: 123 MMHG | HEIGHT: 63 IN | RESPIRATION RATE: 11 BRPM

## 2022-10-12 DIAGNOSIS — A41.9 SEPSIS, DUE TO UNSPECIFIED ORGANISM, UNSPECIFIED WHETHER ACUTE ORGAN DYSFUNCTION PRESENT (HCC): Primary | ICD-10-CM

## 2022-10-12 DIAGNOSIS — R41.0 DISORIENTATION: ICD-10-CM

## 2022-10-12 LAB
ALBUMIN SERPL-MCNC: 3.4 GM/DL (ref 3.4–5)
ALP BLD-CCNC: 234 IU/L (ref 40–129)
ALT SERPL-CCNC: 69 U/L (ref 10–40)
ANION GAP SERPL CALCULATED.3IONS-SCNC: 18 MMOL/L (ref 4–16)
AST SERPL-CCNC: 161 IU/L (ref 15–37)
BILIRUB SERPL-MCNC: 0.6 MG/DL (ref 0–1)
BUN BLDV-MCNC: 37 MG/DL (ref 6–23)
CALCIUM SERPL-MCNC: 8.9 MG/DL (ref 8.3–10.6)
CHLORIDE BLD-SCNC: 95 MMOL/L (ref 99–110)
CO2: 25 MMOL/L (ref 21–32)
CREAT SERPL-MCNC: 5.6 MG/DL (ref 0.6–1.1)
DIFFERENTIAL TYPE: ABNORMAL
GFR AFRICAN AMERICAN: 9 ML/MIN/1.73M2
GFR NON-AFRICAN AMERICAN: 8 ML/MIN/1.73M2
GLUCOSE BLD-MCNC: 108 MG/DL (ref 70–99)
GLUCOSE BLD-MCNC: 110 MG/DL (ref 70–99)
GLUCOSE BLD-MCNC: 96 MG/DL (ref 70–99)
HCT VFR BLD CALC: 30.4 % (ref 37–47)
HEMOGLOBIN: 10.2 GM/DL (ref 12.5–16)
LACTIC ACID, SEPSIS: 1.3 MMOL/L (ref 0.5–1.9)
LYMPHOCYTES ABSOLUTE: 1.2 K/CU MM
LYMPHOCYTES RELATIVE PERCENT: 5 % (ref 24–44)
MACROCYTES: ABNORMAL
MCH RBC QN AUTO: 33.7 PG (ref 27–31)
MCHC RBC AUTO-ENTMCNC: 33.6 % (ref 32–36)
MCV RBC AUTO: 100.3 FL (ref 78–100)
MONOCYTES ABSOLUTE: 2.4 K/CU MM
MONOCYTES RELATIVE PERCENT: 10 % (ref 0–4)
PDW BLD-RTO: 12.5 % (ref 11.7–14.9)
PLATELET # BLD: 313 K/CU MM (ref 140–440)
PLT MORPHOLOGY: ABNORMAL
PMV BLD AUTO: 11.6 FL (ref 7.5–11.1)
POTASSIUM SERPL-SCNC: 5.2 MMOL/L (ref 3.5–5.1)
RBC # BLD: 3.03 M/CU MM (ref 4.2–5.4)
SARS-COV-2, NAAT: NOT DETECTED
SEGMENTED NEUTROPHILS ABSOLUTE COUNT: 20.1 K/CU MM
SEGMENTED NEUTROPHILS RELATIVE PERCENT: 85 % (ref 36–66)
SODIUM BLD-SCNC: 138 MMOL/L (ref 135–145)
SOURCE: NORMAL
TOTAL PROTEIN: 6.7 GM/DL (ref 6.4–8.2)
WBC # BLD: 23.7 K/CU MM (ref 4–10.5)

## 2022-10-12 PROCEDURE — 96365 THER/PROPH/DIAG IV INF INIT: CPT

## 2022-10-12 PROCEDURE — 99285 EMERGENCY DEPT VISIT HI MDM: CPT

## 2022-10-12 PROCEDURE — 87635 SARS-COV-2 COVID-19 AMP PRB: CPT

## 2022-10-12 PROCEDURE — 87150 DNA/RNA AMPLIFIED PROBE: CPT

## 2022-10-12 PROCEDURE — 6360000002 HC RX W HCPCS: Performed by: EMERGENCY MEDICINE

## 2022-10-12 PROCEDURE — 6370000000 HC RX 637 (ALT 250 FOR IP): Performed by: EMERGENCY MEDICINE

## 2022-10-12 PROCEDURE — 96368 THER/DIAG CONCURRENT INF: CPT

## 2022-10-12 PROCEDURE — 96366 THER/PROPH/DIAG IV INF ADDON: CPT

## 2022-10-12 PROCEDURE — 82962 GLUCOSE BLOOD TEST: CPT

## 2022-10-12 PROCEDURE — 71045 X-RAY EXAM CHEST 1 VIEW: CPT

## 2022-10-12 PROCEDURE — 73630 X-RAY EXAM OF FOOT: CPT

## 2022-10-12 PROCEDURE — 80053 COMPREHEN METABOLIC PANEL: CPT

## 2022-10-12 PROCEDURE — 83605 ASSAY OF LACTIC ACID: CPT

## 2022-10-12 PROCEDURE — 85027 COMPLETE CBC AUTOMATED: CPT

## 2022-10-12 PROCEDURE — 87186 SC STD MICRODIL/AGAR DIL: CPT

## 2022-10-12 PROCEDURE — 85007 BL SMEAR W/DIFF WBC COUNT: CPT

## 2022-10-12 PROCEDURE — 2580000003 HC RX 258: Performed by: EMERGENCY MEDICINE

## 2022-10-12 PROCEDURE — 87040 BLOOD CULTURE FOR BACTERIA: CPT

## 2022-10-12 RX ORDER — 0.9 % SODIUM CHLORIDE 0.9 %
1000 INTRAVENOUS SOLUTION INTRAVENOUS ONCE
Status: DISCONTINUED | OUTPATIENT
Start: 2022-10-12 | End: 2022-10-12

## 2022-10-12 RX ORDER — ACETAMINOPHEN 650 MG/1
650 SUPPOSITORY RECTAL ONCE
Status: DISCONTINUED | OUTPATIENT
Start: 2022-10-12 | End: 2022-10-12

## 2022-10-12 RX ORDER — ACETAMINOPHEN 325 MG/1
650 TABLET ORAL ONCE
Status: COMPLETED | OUTPATIENT
Start: 2022-10-12 | End: 2022-10-12

## 2022-10-12 RX ORDER — GABAPENTIN 400 MG/1
800 CAPSULE ORAL ONCE
Status: COMPLETED | OUTPATIENT
Start: 2022-10-12 | End: 2022-10-12

## 2022-10-12 RX ORDER — LINEZOLID 2 MG/ML
600 INJECTION, SOLUTION INTRAVENOUS EVERY 12 HOURS
Status: DISCONTINUED | OUTPATIENT
Start: 2022-10-12 | End: 2022-10-12 | Stop reason: HOSPADM

## 2022-10-12 RX ADMIN — LINEZOLID 600 MG: 600 INJECTION, SOLUTION INTRAVENOUS at 16:01

## 2022-10-12 RX ADMIN — ACETAMINOPHEN 650 MG: 325 TABLET, FILM COATED ORAL at 19:45

## 2022-10-12 RX ADMIN — GABAPENTIN 800 MG: 400 CAPSULE ORAL at 18:33

## 2022-10-12 RX ADMIN — CEFEPIME 1000 MG: 1 INJECTION, POWDER, FOR SOLUTION INTRAMUSCULAR; INTRAVENOUS at 17:34

## 2022-10-12 ASSESSMENT — PAIN - FUNCTIONAL ASSESSMENT: PAIN_FUNCTIONAL_ASSESSMENT: FACE, LEGS, ACTIVITY, CRY, AND CONSOLABILITY (FLACC)

## 2022-10-12 NOTE — ED NOTES
[id number] spoke with Justin Thurman and gave update that family now wants pt to go to [address] [facility] [address]  10/12/22 3650

## 2022-10-12 NOTE — ED NOTES
22G IV placed in right forearm. Blood cultures and labs all drawn and sent to lab. Patient placed in gown and monitor applied, pulse ox applied, oxygen is at 3lpm via [place]. Family is at bedside at this time.       Negra Arriola RN  10/12/22 [id number]

## 2022-10-12 NOTE — ED PROVIDER NOTES
[address]      Pt Name: Medardo Rodas  MRN: [id number]  [place] 1965  Date of evaluation: 10/12/2022  Provider: Christina Frost, [address] [place] [address]       Chief Complaint   Patient presents with    Altered Mental Status     Arrived via EMS for c/o AMS that began Sunday. Has not had any medications since Saturday. Patient has wound vac to right foot and also coccyx. HISTORY OF PRESENT ILLNESS   (Location/Symptom, Timing/Onset, Context/Setting, Quality, Duration, Modifying Factors, Severity)  Note limiting factors. Medardo Rodas is a 62 y.o. female who presents to the emergency department with altered mental status family states that she has not been taking her medicine she did miss dialysis on Monday but did have it this morning states that she has been groggy unresponsive she does have peripheral neuropathy she does have a wound VAC on her foot which was changed yesterday she did finish dialysis today no other aggravating associated alleviating signs or symptoms    The history is provided by the patient. Nursing Notes were reviewed. REVIEW OF SYSTEMS    (2-9 systems for level 4, 10 or more for level 5)     Review of Systems    Except as noted above the remainder of the review of systems was reviewed and negative.        PAST MEDICAL HISTORY     Past Medical History:   Diagnosis Date    Acute pain of right foot 9/13/2022    CAD (coronary artery disease)     s/p CABG x 4;  follows with Dr Na Moy of foot 2/5/2018    Carpal tunnel syndrome on both sides     CHF (congestive heart failure) ([address] 10/2010    Chronic diastolic; EF 91%    Chronic kidney disease     stage 4 kidney disease    Chronic ulcer of left ankle with fat layer exposed ([address] 10/7/2015    Chronic ulcer of left foot with fat layer exposed ([address] 3/17/2016    Chronic ulcer of right ankle with fat layer exposed ([address] 3/17/2016    Chronic ulcer of right foot with fat layer exposed ([address] 3/17/2016    Depression     Diabetes mellitus ([address]     Diabetes mellitus with neurological manifestations ([address]     Diabetes mellitus with ulcer of ankle ([address]     Diabetic ulcer of left foot associated with type 2 diabetes mellitus, with fat layer exposed ([address] 8/6/2018    Diabetic ulcer of right heel associated with type 2 diabetes mellitus, with muscle involvement without evidence of necrosis ([address] 9/13/2022    Diabetic ulcer of right heel associated with type 2 diabetes mellitus, with necrosis of bone ([address] 9/13/2022    ESRD on hemodialysis ([address] 9/21/2022    Family history of cardiovascular disease     Mother    GERD (gastroesophageal reflux disease)     H/O cardiac catheterization 10/18/2010, 6/3/2010    10/18/2010-Four bypass grafts all widely patent. 6/3/2010- Moderate to severe triple vessel disease, preserved LV systolic function. H/O cardiovascular stress test 7/26/2012, 8/3/2011, 10/14/2010, 5/24/2010 7/26/2012-Lexiscan- Normal Myocardial Perfusion Study. Post stress myocardial perfusion images show a normal pattern of perfusion in all regions. Post stress LV normal size. Normal perfusion in the distribution of all coronaries. Normal LV size and function. Rest EF 70%    H/O chest x-ray 7/12/2012, 6/2/2010 7/12/2012-Perihilar peribronchial cuffing with mild basilar predominant interstital prominence, which may reflect interstitial edema or atypical pneumonia. H/O Doppler ultrasound 6/4/2010    CAROTID DOPPLER-6/4/2010-No Doppler evidence of hemodynamically significant Carotid Stenosis with antegrade flow in the vertebral arteries bilaterally. 6/4/2010 PERIPHERAL VENOUS DOPPLER_ LEFT Saphenous Vein mapping    H/O echocardiogram 7/26/2012, 8/3/2011, 10/2010, 7/23/2010, 6/8/2010 7/26/2012-LV normal size. Normal LV wall thickness. LV systolic function normal. EF => 55%. LV wall motion normal. Mild MR. Mild TR.      History of complete ECG 7/26/2012 Maxim Perkins), 7/13/2012 ([facility]), 7/25/2011, 3/25/2011, 10/18/2010, 10/11/2010, 6/23/2010, 5/7/2010    Hx of cardiovascular stress test 9/3/2015    lexiscan-normal,EF66%    Hx of Doppler ultrasound 4/5/16    Arterial: There is a fluid collection in the left thigh, please refer to PCP for further monitoring, no vascular in etiology. Hx of echocardiogram     4/16 EF40% Mild MR/TR and mild Pulm HTN. 9/15 EF 45-50%, Mildly dilated L atrium, mildly dilated R ventricle. Mild MR & mild TR     Hyperlipidemia     Neuropathy of foot     Pt reports starting approx. 6-7 years ago    Neuropathy of hand     Pt reports starting approx.  6-7 years ago    Non compliance with medical treatment 7/2/2018    S/P CABG x 4 6/5/2010    LIMA-> LAD;  VG->CX;  VG->Diagonal; VG-> distal RCA  per  Dr Yaneth Marquez    Type 2 diabetes mellitus with diabetic polyneuropathy, with long-term current use of insulin ([address] 4/5/2016    Type II or unspecified type diabetes mellitus with neurological manifestations, not stated as uncontrolled(250.60)     Type II or unspecified type diabetes mellitus with other specified manifestations, not stated as uncontrolled     Ulcer of left foot, with fat layer exposed ([address] 12/19/2013    Ulcer of other part of foot          SURGICAL HISTORY       Past Surgical History:   Procedure Laterality Date    APPENDECTOMY      CARDIAC SURGERY      CARPAL TUNNEL RELEASE      L hand Nov. 2011, R hand Jan. 2012    CARPAL TUNNEL RELEASE Right 6/10/2013    recurrent    CARPAL TUNNEL RELEASE Left 7/29/2013    with ulnar nerve transpostion and L middle finger release    CHOLECYSTECTOMY      COLONOSCOPY      CORONARY ARTERY BYPASS GRAFT  6/5/2010 6/5/2010-LIMA->LAD;  VG-> Diagonal;  VG-> Obtuse marginal;  VG->Distal RCA-   Dr Vanessa Thao Right 6/10/2013    HYSTERECTOMY, TOTAL ABDOMINAL (CERVIX REMOVED)      TOE AMPUTATION Left 02/14/144th toe    TUBAL LIGATION      ULNAR TUNNEL RELEASE Right 6/10/2013         CURRENT MEDICATIONS Previous Medications    ALBUTEROL SULFATE HFA (PROVENTIL;VENTOLIN;PROAIR) 108 (90 BASE) MCG/ACT INHALER    Inhale 2 puffs into the lungs every 6 hours as needed for Wheezing    CARVEDILOL (COREG) 6.25 MG TABLET    6.25 mg 2 times daily     ESOMEPRAZOLE MAGNESIUM (NEXIUM) 40 MG PACK    Take 20 mg by mouth 2 times daily 2 tabs    FUROSEMIDE (LASIX) 20 MG TABLET    Take 40 mg by mouth 2 times daily     GABAPENTIN (NEURONTIN) 400 MG CAPSULE    Take 800 mg by mouth in the morning and at bedtime. INSULIN ASPART (NOVOLOG FLEXPEN SC)    Inject 15 Units into the skin 3 times daily (before meals) On sliding scale    INSULIN GLARGINE (LANTUS) 100 UNIT/ML INJECTION    Inject 50 Units into the skin nightly. LISINOPRIL (PRINIVIL;ZESTRIL) 10 MG TABLET    Take 20 mg by mouth daily     LORAZEPAM (ATIVAN) 0.5 MG TABLET    Take 0.5 mg by mouth every 12 hours Indications: One Tablet twice daily     OXYCODONE-ACETAMINOPHEN (PERCOCET)  MG PER TABLET    Take 1 tablet by mouth every 6 hours as needed for Pain. OXYGEN    Inhale 2 L into the lungs daily as needed    PRAVASTATIN (PRAVACHOL) 40 MG TABLET    40 mg daily.     VICTOZA 18 MG/3ML SOPN SC INJECTION    daily        ALLERGIES     Latex, Codeine, Cortisone, Cortizone, Iodides, Phenobarbital, and Vancomycin    FAMILY HISTORY       Family History   Problem Relation Age of Onset    Heart Disease Mother     Kidney Disease Mother         dialysis    Cancer Father           SOCIAL HISTORY       Social History     Socioeconomic History    Marital status:      Spouse name: None    Number of children: 4    Years of education: None    Highest education level: None   Tobacco Use    Smoking status: Former     Packs/day: 0.25     Years: 30.00     Pack years: 7.50     Types: Cigarettes     Quit date: 2022     Years since quittin.0    Smokeless tobacco: Never    Tobacco comments:     quit smoking toddgarettes 16   Vaping Use    Vaping Use: Every day Substances: Never   Substance and Sexual Activity    Alcohol use: No     Alcohol/week: 0.0 standard drinks     Comment:                                    CAFFEINE: 2 sodas daily    Drug use: No    Sexual activity: Not Currently       SCREENINGS         [place] Coma Scale  Eye Opening: Spontaneous  Best Verbal Response: Oriented  Best Motor Response: Obeys commands  [place] Coma Scale Score: 15                     CIWA Assessment  BP: (!) 137/51  Heart Rate: 86                 PHYSICAL EXAM    (up to 7 for level 4, 8 or more for level 5)     ED Triage Vitals [10/12/22 1422]   BP Temp Temp Source Heart Rate Resp SpO2 Height Weight   (!) 145/65 (!) 101.2 °F (38.4 °C) Oral 96 16 99 % 5' 3\" (1.6 m) 157 lb (71.2 kg)       Physical Exam  Constitutional: Patient is awake alert oriented to person place and time. In no acute distress. Well nourished, non-toxic appearance. HENT:  Head is normocephalic and atraumatic. Neck:  Supple full ROM   Eyes:  pupils are equally round reactive light extraoccular motor are intact there's no injection no icterus, Conjunctiva normal, no discharge. Respiratory:  Lungs are clear no increased work of breathing noted. No obvious wheezing or stridor  Cardiovascular: No tachycardia noted  GI:  Abdomen does not appear distended   Musculoskeletal:  Moving all 4 extremities with ease extremities distally are warm and viable wound VAC on right lateral ankle she is hypersensitive to touch bilateral lower extremities secondary to her chronic pain and neuropathy  Integument:  Warm, dry there are no lacerations no abscess identified. Neurologic:  Alert & oriented to person and place but not time with no focal deficits noted.   Groggy but does follow commands and answers questions  Psychiatric:  Affect appropriate for the situation no homicidal or suicidal thoughts      DIAGNOSTIC RESULTS     EKG: All EKG's are interpreted by the Emergency Department Physician who either signs or Co-signs this chart in the absence of a cardiologist.      RADIOLOGY:   Non-plain film images such as CT, Ultrasound and MRI are read by the radiologist. Plain radiographic images are visualized and preliminarily interpreted by the emergency physician with the below findings:    Cardiomegaly vascular congestion    Interpretation per the Radiologist below, if available at the time of this note:    XR CHEST PORTABLE   Final Result   1. Cardiomegaly with pulmonary vascular congestion and low lung volumes. ED BEDSIDE ULTRASOUND:   Performed by ED Physician - none    LABS:  Labs Reviewed   CBC WITH AUTO DIFFERENTIAL - Abnormal; Notable for the following components:       Result Value    WBC 23.7 (*)     RBC 3.03 (*)     Hemoglobin 10.2 (*)     Hematocrit 30.4 (*)     .3 (*)     MCH 33.7 (*)     MPV 11.6 (*)     Segs Relative 85.0 (*)     Lymphocytes % 5.0 (*)     Monocytes % 10.0 (*)     All other components within normal limits   COMPREHENSIVE METABOLIC PANEL - Abnormal; Notable for the following components:    Potassium 5.2 (*)     Chloride 95 (*)     BUN 37 (*)     Creatinine 5.6 (*)     Glucose 108 (*)     ALT 69 (*)      (*)     Alkaline Phosphatase 234 (*)     GFR Non- 8 (*)     GFR  9 (*)     Anion Gap 18 (*)     All other components within normal limits   POCT GLUCOSE - Abnormal; Notable for the following components:    POC Glucose 110 (*)     All other components within normal limits   COVID-19, RAPID   CULTURE, BLOOD 1   CULTURE, BLOOD 2   LACTATE, SEPSIS   LACTATE, SEPSIS   LACTATE, SEPSIS   URINALYSIS       All other labs were within normal range or not returned as of this dictation.     EMERGENCY DEPARTMENT COURSE and DIFFERENTIAL DIAGNOSIS/MDM:   Vitals:    Vitals:    10/12/22 1422 10/12/22 1535 10/12/22 1600 10/12/22 1630   BP: (!) 145/65 (!) 134/51 (!) 115/39 (!) 137/51   Pulse: 96  86    Resp: 16      Temp: (!) 101.2 °F (38.4 °C)      TempSrc: Oral      SpO2: 99% 99% 99% 99%   Weight: 157 lb (71.2 kg)      Height: 5' 3\" (1.6 m)          Patient with fever one 1.2 she was given some rectal Tylenol she does trigger sepsis she was started on broad-spectrum antibiotic with Zid and cefepime per emesis recommendation as she does have a Zosyn allergy she was given fluids she is being transferred to [facility] after discussion with Dr. Derek Carmona:   There was a high probability of life-threatening clinical deterioration in the patient's condition requiring my urgent intervention. The services I provided to this patient were to treat and/or prevent clinically significant deterioration that could result in:   __respiratory failure   _X_neurologic failure   __circulatory failure/ACS/ Chest pain/ Unstable angina  __ Sever Blood pressure abnormality requiring intervention   __overwhelming infection/ possible urgent surgical situation   __renal failure   _X_severe Sepsis  __metabolic failure   __multi-organ failure   __preventing self harm/harm to staff or other Psychiatric  Services included the following: chart data review, reviewing nursing notes and/or old charts, documentation time, consultant collaboration regarding findings and treatment options, medication orders and management, direct patient care, re-evaluations, vital sign assessments and ordering, interpreting and reviewing diagnostic studies/lab tests.    Aggregate critical care time was 65 minutes, which includes only time during which I was engaged in work directly related to the patient's care, as described above, whether at the bedside or elsewhere in the Emergency Department, or in consultation with specialist. It did not include time spent performing other reported procedures or the services of residents, students, nurses or physician assistants and is in addition to and exclusion of all other seperatly billable charges and procedures. This does include blood pressure monitoring, monitoring of other vital signs as well as frequent patient reassessment for any deterioration in their condition. CONSULTS:  None  Dr. Nolan Curtis hospitalist     PROCEDURES:  Unless otherwise noted below, none     Procedures    FINAL IMPRESSION      1. Sepsis, due to unspecified organism, unspecified whether acute organ dysfunction present ([facility]    2. Disorientation          DISPOSITION/PLAN   DISPOSITION Decision To Transfer 10/12/2022 04:37:44 PM      PATIENT REFERRED TO:  No follow-up provider specified. DISCHARGE MEDICATIONS:  New Prescriptions    No medications on file     Controlled Substances Monitoring:     No flowsheet data found.     (Please note that portions of this note were completed with a voice recognition program.  Efforts were made to edit the dictations but occasionally words are mis-transcribed.)    Reinier Mera DO (electronically signed)  Attending Emergency Physician            Reinier Mera DO  10/12/22 [id number]

## 2022-10-12 NOTE — ED NOTES
Spoke with Maritza Bliss at the [employer] and [facility] has a long waiting list. Family stated try [facility] for placement     Austin sHu  10/12/22 1921

## 2022-10-12 NOTE — ED NOTES
Called the [facility] spoke with Rabun and gave a update and they will start working on Transfer to [employer]  10/12/22 3053

## 2022-10-13 NOTE — ED NOTES
ED TO INPATIENT SBAR HANDOFF    Patient Name: Jackelin Gomez   :  1965  62 y.o. MRN:  [id number]  Preferred Name    ED Room #:    Family/Caregiver Present yes   Restraints no   Sitter no   Sepsis Risk Score Sepsis Risk Score: 15.34    Situation  Code Status: Prior Full    Allergies: Latex, Codeine, Cortisone, Cortizone, Iodides, Phenobarbital, and Vancomycin  Weight: Patient Vitals for the past 96 hrs (Last 3 readings):   Weight   10/12/22 1422 157 lb (71.2 kg)     Arrived from: home  Chief Complaint:   Chief Complaint   Patient presents with    Altered Mental Status     Arrived via EMS for c/o AMS that began . Has not had any medications since Saturday. Patient has wound vac to right foot and also coccyx. Hospital Problem/Diagnosis:  Active Problems:    * No active hospital problems. *  Resolved Problems:    * No resolved hospital problems. *    Imaging:   XR FOOT LEFT (MIN 3 VIEWS)   Final Result   1. No definite radiographic features of osteomyelitis. 2. Abnormally low bone mineralization typical of osteopenia or osteoporosis. 3. Possible acute, nondisplaced hairline fracture mid and distal aspect   proximal phalanx left little toe (possibly prominent vascular channel). Correlate with associated pain or edema. 4. Peripheral vascular disease. XR CHEST PORTABLE   Final Result   1. Cardiomegaly with pulmonary vascular congestion and low lung volumes.            Abnormal labs:   Abnormal Labs Reviewed   CBC WITH AUTO DIFFERENTIAL - Abnormal; Notable for the following components:       Result Value    WBC 23.7 (*)     RBC 3.03 (*)     Hemoglobin 10.2 (*)     Hematocrit 30.4 (*)     .3 (*)     MCH 33.7 (*)     MPV 11.6 (*)     Segs Relative 85.0 (*)     Lymphocytes % 5.0 (*)     Monocytes % 10.0 (*)     All other components within normal limits   COMPREHENSIVE METABOLIC PANEL - Abnormal; Notable for the following components:    Potassium 5.2 (*)     Chloride 95 (*) BUN 37 (*)     Creatinine 5.6 (*)     Glucose 108 (*)     ALT 69 (*)      (*)     Alkaline Phosphatase 234 (*)     GFR Non- 8 (*)     GFR  9 (*)     Anion Gap 18 (*)     All other components within normal limits   POCT GLUCOSE - Abnormal; Notable for the following components:    POC Glucose 110 (*)     All other components within normal limits     Critical values: YES     Abnormal Assessment Findings: elevated- WBC, K+, BUN, creat, ALT, AST. Wound Vac to R heel region.  Dialysis pt     Background  Hospital admissions in last 30 days?  no   History:   Past Medical History:   Diagnosis Date    Acute pain of right foot 9/13/2022    CAD (coronary artery disease)     s/p CABG x 4;  follows with Dr Huy Ghosh Callus of foot 2/5/2018    Carpal tunnel syndrome on both sides     CHF (congestive heart failure) ([address] 10/2010    Chronic diastolic; EF 88%    Chronic kidney disease     stage 4 kidney disease    Chronic ulcer of left ankle with fat layer exposed ([address] 10/7/2015    Chronic ulcer of left foot with fat layer exposed ([address] 3/17/2016    Chronic ulcer of right ankle with fat layer exposed ([address] 3/17/2016    Chronic ulcer of right foot with fat layer exposed ([address] 3/17/2016    Depression     Diabetes mellitus ([address]     Diabetes mellitus with neurological manifestations ([address]     Diabetes mellitus with ulcer of ankle ([address]     Diabetic ulcer of left foot associated with type 2 diabetes mellitus, with fat layer exposed ([address] 8/6/2018    Diabetic ulcer of right heel associated with type 2 diabetes mellitus, with muscle involvement without evidence of necrosis ([address] 9/13/2022    Diabetic ulcer of right heel associated with type 2 diabetes mellitus, with necrosis of bone ([address] 9/13/2022    ESRD on hemodialysis ([address] 9/21/2022    Family history of cardiovascular disease     Mother    GERD (gastroesophageal reflux disease)     H/O cardiac catheterization 10/18/2010, 6/3/2010    10/18/2010-Four bypass grafts all widely patent. 6/3/2010- Moderate to severe triple vessel disease, preserved LV systolic function.  H/O cardiovascular stress test 7/26/2012, 8/3/2011, 10/14/2010, 5/24/2010 7/26/2012-Lexiscan- Normal Myocardial Perfusion Study. Post stress myocardial perfusion images show a normal pattern of perfusion in all regions. Post stress LV normal size. Normal perfusion in the distribution of all coronaries. Normal LV size and function. Rest EF 70%    H/O chest x-ray 7/12/2012, 6/2/2010 7/12/2012-Perihilar peribronchial cuffing with mild basilar predominant interstital prominence, which may reflect interstitial edema or atypical pneumonia.  H/O Doppler ultrasound 6/4/2010    CAROTID DOPPLER-6/4/2010-No Doppler evidence of hemodynamically significant Carotid Stenosis with antegrade flow in the vertebral arteries bilaterally. 6/4/2010 PERIPHERAL VENOUS DOPPLER_ LEFT Saphenous Vein mapping    H/O echocardiogram 7/26/2012, 8/3/2011, 10/2010, 7/23/2010, 6/8/2010 7/26/2012-LV normal size. Normal LV wall thickness. LV systolic function normal. EF => 55%. LV wall motion normal. Mild MR. Mild TR.  History of complete ECG 7/26/2012 Nelly Leon), 7/13/2012 [facility]), 7/25/2011, 3/25/2011, 10/18/2010, 10/11/2010, 6/23/2010, 5/7/2010    Hx of cardiovascular stress test 9/3/2015    lexiscan-normal,EF66%    Hx of Doppler ultrasound 4/5/16    Arterial: There is a fluid collection in the left thigh, please refer to PCP for further monitoring, no vascular in etiology.  Hx of echocardiogram     4/16 EF40% Mild MR/TR and mild Pulm HTN. 9/15 EF 45-50%, Mildly dilated L atrium, mildly dilated R ventricle. Mild MR & mild TR     Hyperlipidemia     Neuropathy of foot     Pt reports starting approx. 6-7 years ago    Neuropathy of hand     Pt reports starting approx.  6-7 years ago    Non compliance with medical treatment 7/2/2018    S/P CABG x 4 6/5/2010    LIMA-> LAD;  VG->CX; VG->Diagonal; VG-> distal RCA  per  Dr Dory Fernandes Type 2 diabetes mellitus with diabetic polyneuropathy, with long-term current use of insulin ([facility] 4/5/2016    Type II or unspecified type diabetes mellitus with neurological manifestations, not stated as uncontrolled(250.60)     Type II or unspecified type diabetes mellitus with other specified manifestations, not stated as uncontrolled     Ulcer of left foot, with fat layer exposed ([address] 12/19/2013    Ulcer of other part of foot        Assessment    Vitals/MEWS: MEWS Score: 2  Level of Consciousness: Alert (0)   Vitals:    10/12/22 2002 10/12/22 2017 10/12/22 2032 10/12/22 2047   BP: (!) 136/49 (!) 128/44 (!) 126/52 (!) 123/43   Pulse: 90 88 92 86   Resp: 14 14 13 13   Temp:       TempSrc:       SpO2: 99% 100% 100% 100%   Weight:       Height:         FiO2 (%): {O2 DELIVERY: NC  O2 Flow Rate: 3L NC  Cardiac Rhythm:  Sinus  Pain Assessment:  [] Verbal [] Silvestre Sharp Scale  Pain Scale: Pain Assessment  Pain Assessment: Face, Legs, Activity, Cry, and Consolability (FLACC)  Last documented pain score (0-10 scale)    Last documented pain medication administered: unable to verbalize   Mental Status: disoriented- pt has not spoke for this nurse. Able to arouse with effort and follow simple commands   C-SSRS: Risk of Suicide: No Risk  Bedside swallow:    [place] Coma Scale (GCS): [place] Coma Scale  Eye Opening: Spontaneous  Best Verbal Response: Oriented  Best Motor Response: Obeys commands  [place] Coma Scale Score: 15  Active LDA's:   Peripheral IV 10/12/22 Right Forearm (Active)     PO Status: Pt about to swallow meds whole when aroused   Pertinent or High Risk Medications/Drips: no   o If Yes, please provide details:   Pending Blood Product Administration: no     Blood Cultures: see ED pt care timeline or ED Event log    Recommendation    Pending orders   Plan for Discharge (if known):    Additional Comments:   If any further questions, please call Sending RN at [phone]    Electronically signed by: Electronically signed by Adam Upton RN on 10/12/2022 at 9:11 PM       Adam Upton, [address] [place] [address]  10/12/22 7332

## 2022-10-13 NOTE — ED PROVIDER NOTES
Emergency Department Encounter    Patient: Korey Poole  MRN: [id number]  : 1965  Date of Evaluation: 10/12/2022  ED Provider:  Indu Zamora MD    Briefly, Korey Poole is a 62 y.o. female presented to the emergency department for altered mental status. She did meet sirs/sepsis criteria upon presentation. She currently has a wound VAC on the left foot wound. She is seen by previous physician. Please see that note for full HPI.     I have reviewed and interpreted all of the currently available lab results from this visit (if applicable)  Results for orders placed or performed during the hospital encounter of 10/12/22   COVID-19, Rapid    Specimen: Nasopharyngeal   Result Value Ref Range    Source UNKNOWN     SARS-CoV-2, NAAT NOT DETECTED NOT DETECTED   Lactate, Sepsis   Result Value Ref Range    Lactic Acid, Sepsis 1.3 0.5 - 1.9 mMOL/L   CBC with Auto Differential   Result Value Ref Range    WBC 23.7 (H) 4.0 - 10.5 K/CU MM    RBC 3.03 (L) 4.2 - 5.4 M/CU MM    Hemoglobin 10.2 (L) 12.5 - 16.0 GM/DL    Hematocrit 30.4 (L) 37 - 47 %    .3 (H) 78 - 100 FL    MCH 33.7 (H) 27 - 31 PG    MCHC 33.6 32.0 - 36.0 %    RDW 12.5 11.7 - 14.9 %    Platelets 071 181 - 412 K/CU MM    MPV 11.6 (H) 7.5 - 11.1 FL    Segs Relative 85.0 (H) 36 - 66 %    Lymphocytes % 5.0 (L) 24 - 44 %    Monocytes % 10.0 (H) 0 - 4 %    Segs Absolute 20.1 K/CU MM    Lymphocytes Absolute 1.2 K/CU MM    Monocytes Absolute 2.4 K/CU MM    Differential Type MANUAL DIFFERENTIAL     Macrocytes 1+     PLT Morphology PLATELETS NORMAL IN NUMBER AND SIZE    CMP   Result Value Ref Range    Sodium 138 135 - 145 MMOL/L    Potassium 5.2 (H) 3.5 - 5.1 MMOL/L    Chloride 95 (L) 99 - 110 mMol/L    CO2 25 21 - 32 MMOL/L    BUN 37 (H) 6 - 23 MG/DL    Creatinine 5.6 (H) 0.6 - 1.1 MG/DL    Glucose 108 (H) 70 - 99 MG/DL    Calcium 8.9 8.3 - 10.6 MG/DL    Albumin 3.4 3.4 - 5.0 GM/DL    Total Protein 6.7 6.4 - 8.2 GM/DL    Total Bilirubin 0.6 0.0 - 1.0 MG/DL    ALT 69 (H) 10 - 40 U/L     (H) 15 - 37 IU/L    Alkaline Phosphatase 234 (H) 40 - 129 IU/L    GFR Non- 8 (L) >60 mL/min/1.73m2    GFR  9 (L) >60 mL/min/1.73m2    Anion Gap 18 (H) 4 - 16   POCT Glucose   Result Value Ref Range    POC Glucose 110 (H) 70 - 99 MG/DL      Radiographs (if obtained):    [] Radiologist's Report Reviewed:  XR CHEST PORTABLE   Final Result   1. Cardiomegaly with pulmonary vascular congestion and low lung volumes. XR FOOT LEFT (MIN 3 VIEWS)    (Results Pending)       MDM:      59-year-old female presented to the emergency department altered mental status. Her work-up did reveal sepsis. The source might be a wound that she has on her left foot. Family initially declined that she be admitted at [facility] so we are currently awaiting a different placement. We have contacted several [facility] hospitals. I did speak with the hospitalist at [facility] who is excepted patient. She is transferred there in stable condition. Clinical Impression:  1. Sepsis, due to unspecified organism, unspecified whether acute organ dysfunction present ([facility] [address] [address]    2. Disorientation      Disposition referral (if applicable):  No follow-up provider specified. Disposition medications (if applicable):  New Prescriptions    No medications on file       Comment: Please note this report has been produced using speech recognition software and may contain errors related to that system including errors in grammar, punctuation, and spelling, as well as words and phrases that may be inappropriate. Efforts were made to edit the dictations.        Abiodun Noel MD  10/12/22 1264

## 2022-10-13 NOTE — ED NOTES
Patients family called this nurse to bedside stating \"her sugar is dropping, she's getting clammy. \" Bedside POC 96. Family states \"that's really low for her. \" Dr. Gramajo Prudent notified, patient given a glass of orange juice to drink.       Kourtney Mijares RN  10/12/22 2057

## 2022-10-13 NOTE — ED NOTES
Report given to [address] at [facility]. Made aware of ETA 2140.      Alice Cronin RN  10/12/22 2138

## 2022-10-15 LAB
CULTURE: ABNORMAL
Lab: ABNORMAL
Lab: ABNORMAL
SPECIMEN: ABNORMAL
SPECIMEN: ABNORMAL

## 2022-10-18 ENCOUNTER — HOSPITAL ENCOUNTER (OUTPATIENT)
Dept: WOUND CARE | Age: 57
Discharge: HOME | End: 2022-10-18

## 2022-10-25 ENCOUNTER — HOSPITAL ENCOUNTER (OUTPATIENT)
Dept: WOUND CARE | Age: 57
Discharge: HOME | End: 2022-10-25
Payer: MEDICARE

## 2022-10-25 VITALS
TEMPERATURE: 98.9 F | RESPIRATION RATE: 14 BRPM | SYSTOLIC BLOOD PRESSURE: 123 MMHG | DIASTOLIC BLOOD PRESSURE: 70 MMHG | HEART RATE: 87 BPM

## 2022-10-25 DIAGNOSIS — L97.414 DIABETIC ULCER OF RIGHT HEEL ASSOCIATED WITH TYPE 2 DIABETES MELLITUS, WITH NECROSIS OF BONE (HCC): Primary | ICD-10-CM

## 2022-10-25 DIAGNOSIS — M79.671 ACUTE PAIN OF RIGHT FOOT: ICD-10-CM

## 2022-10-25 DIAGNOSIS — E11.621 DIABETIC ULCER OF RIGHT HEEL ASSOCIATED WITH TYPE 2 DIABETES MELLITUS, WITH NECROSIS OF BONE (HCC): Primary | ICD-10-CM

## 2022-10-25 PROCEDURE — 11042 DBRDMT SUBQ TIS 1ST 20SQCM/<: CPT | Performed by: NURSE PRACTITIONER

## 2022-10-25 PROCEDURE — 11046 DBRDMT MUSC&/FSCA EA ADDL: CPT

## 2022-10-25 PROCEDURE — 11043 DBRDMT MUSC&/FSCA 1ST 20/<: CPT

## 2022-10-25 PROCEDURE — 11045 DBRDMT SUBQ TISS EACH ADDL: CPT | Performed by: NURSE PRACTITIONER

## 2022-10-25 RX ORDER — GINSENG 100 MG
CAPSULE ORAL ONCE
Status: CANCELLED | OUTPATIENT
Start: 2022-10-25 | End: 2022-10-25

## 2022-10-25 RX ORDER — LIDOCAINE HYDROCHLORIDE 20 MG/ML
JELLY TOPICAL ONCE
Status: CANCELLED | OUTPATIENT
Start: 2022-10-25 | End: 2022-10-25

## 2022-10-25 RX ORDER — BACITRACIN ZINC AND POLYMYXIN B SULFATE 500; 1000 [USP'U]/G; [USP'U]/G
OINTMENT TOPICAL ONCE
Status: CANCELLED | OUTPATIENT
Start: 2022-10-25 | End: 2022-10-25

## 2022-10-25 RX ORDER — BACITRACIN, NEOMYCIN, POLYMYXIN B 400; 3.5; 5 [USP'U]/G; MG/G; [USP'U]/G
OINTMENT TOPICAL ONCE
Status: CANCELLED | OUTPATIENT
Start: 2022-10-25 | End: 2022-10-25

## 2022-10-25 RX ORDER — BETAMETHASONE DIPROPIONATE 0.05 %
OINTMENT (GRAM) TOPICAL ONCE
Status: CANCELLED | OUTPATIENT
Start: 2022-10-25 | End: 2022-10-25

## 2022-10-25 RX ORDER — LIDOCAINE 40 MG/G
CREAM TOPICAL ONCE
Status: CANCELLED | OUTPATIENT
Start: 2022-10-25 | End: 2022-10-25

## 2022-10-25 RX ORDER — CLOBETASOL PROPIONATE 0.5 MG/G
OINTMENT TOPICAL ONCE
Status: CANCELLED | OUTPATIENT
Start: 2022-10-25 | End: 2022-10-25

## 2022-10-25 RX ORDER — GENTAMICIN SULFATE 1 MG/G
OINTMENT TOPICAL ONCE
Status: CANCELLED | OUTPATIENT
Start: 2022-10-25 | End: 2022-10-25

## 2022-10-25 RX ORDER — LIDOCAINE HYDROCHLORIDE 40 MG/ML
SOLUTION TOPICAL ONCE
Status: CANCELLED | OUTPATIENT
Start: 2022-10-25 | End: 2022-10-25

## 2022-10-25 RX ORDER — LIDOCAINE 50 MG/G
OINTMENT TOPICAL ONCE
Status: CANCELLED | OUTPATIENT
Start: 2022-10-25 | End: 2022-10-25

## 2022-10-25 ASSESSMENT — PAIN SCALES - GENERAL: PAINLEVEL_OUTOF10: 10

## 2022-10-25 ASSESSMENT — PAIN DESCRIPTION - LOCATION: LOCATION: FOOT

## 2022-10-25 ASSESSMENT — PAIN DESCRIPTION - FREQUENCY: FREQUENCY: CONTINUOUS

## 2022-10-25 ASSESSMENT — PAIN DESCRIPTION - ONSET: ONSET: ON-GOING

## 2022-10-25 ASSESSMENT — PAIN DESCRIPTION - DESCRIPTORS: DESCRIPTORS: SORE;THROBBING;TENDER

## 2022-10-25 ASSESSMENT — PAIN DESCRIPTION - PAIN TYPE: TYPE: CHRONIC PAIN

## 2022-10-25 NOTE — PROGRESS NOTES
Wound Care Center Progress Note With Procedure    Marisa Sue  AGE: 62 y.o. GENDER: female  : 1965  EPISODE DATE:  10/25/2022     Subjective:     Chief Complaint   Patient presents with    Wound Check     Right heel          HISTORY of PRESENT ILLNESS     Marisa Sue is a 62 y.o. female who presents today for wound evaluation of Chronic arterial, diabetic, and pressure ulcer(s) of the right heel. The ulcer is of marked severity. The underlying cause of the wound is diabetes, pressure and PAD. Patient was admitted for sepsis and was hospitalized for about a week and a half. She received a surgical debridement at that time and was seen by ID, who placed a PICC and started IV antibiotics, which patient is now getting at home  Wound looks much worse and is surrounded with new necrotic tissue on all sides. Near liquid tissue present and no healing to be seen  Patient was scheduled to see Dr. Danilo Nance for arterial work up but missed this due to admission.  Will need to get her in ASAP    Wound Pain Timing/Severity: none  Quality of pain: N/A  Severity of pain:  0 / 10   Modifying Factors: diabetes and arterial insufficiency  Associated Signs/Symptoms: edema, erythema, drainage, and odor        PAST MEDICAL HISTORY        Diagnosis Date    Acute pain of right foot 2022    CAD (coronary artery disease)     s/p CABG x 4;  follows with Dr Jonny Chapa of foot 2018    Carpal tunnel syndrome on both sides     CHF (congestive heart failure) ([address] 10/2010    Chronic diastolic; EF 96%    Chronic kidney disease     stage 4 kidney disease    Chronic ulcer of left ankle with fat layer exposed ([address] 10/7/2015    Chronic ulcer of left foot with fat layer exposed ([address] 3/17/2016    Chronic ulcer of right ankle with fat layer exposed ([address] 3/17/2016    Chronic ulcer of right foot with fat layer exposed ([address] 3/17/2016    Depression     Diabetes mellitus ([address]     Diabetes mellitus with neurological manifestations ([address]     Diabetes mellitus with ulcer of ankle ([address]     Diabetic ulcer of left foot associated with type 2 diabetes mellitus, with fat layer exposed ([address] 8/6/2018    Diabetic ulcer of right heel associated with type 2 diabetes mellitus, with muscle involvement without evidence of necrosis ([address] 9/13/2022    Diabetic ulcer of right heel associated with type 2 diabetes mellitus, with necrosis of bone ([address] 9/13/2022    ESRD on hemodialysis ([address] 9/21/2022    Family history of cardiovascular disease     Mother    GERD (gastroesophageal reflux disease)     H/O cardiac catheterization 10/18/2010, 6/3/2010    10/18/2010-Four bypass grafts all widely patent. 6/3/2010- Moderate to severe triple vessel disease, preserved LV systolic function. H/O cardiovascular stress test 7/26/2012, 8/3/2011, 10/14/2010, 5/24/2010 7/26/2012-Lexiscan- Normal Myocardial Perfusion Study. Post stress myocardial perfusion images show a normal pattern of perfusion in all regions. Post stress LV normal size. Normal perfusion in the distribution of all coronaries. Normal LV size and function. Rest EF 70%    H/O chest x-ray 7/12/2012, 6/2/2010 7/12/2012-Perihilar peribronchial cuffing with mild basilar predominant interstital prominence, which may reflect interstitial edema or atypical pneumonia. H/O Doppler ultrasound 6/4/2010    CAROTID DOPPLER-6/4/2010-No Doppler evidence of hemodynamically significant Carotid Stenosis with antegrade flow in the vertebral arteries bilaterally. 6/4/2010 PERIPHERAL VENOUS DOPPLER_ LEFT Saphenous Vein mapping    H/O echocardiogram 7/26/2012, 8/3/2011, 10/2010, 7/23/2010, 6/8/2010 7/26/2012-LV normal size. Normal LV wall thickness. LV systolic function normal. EF => 55%. LV wall motion normal. Mild MR. Mild TR.      History of complete ECG 7/26/2012 [facility] Vora), 7/13/2012 [facility]), 7/25/2011, 3/25/2011, 10/18/2010, 10/11/2010, 6/23/2010, 5/7/2010    Hx of cardiovascular stress test 9/3/2015    lexiscan-normal,EF66%    Hx of Doppler ultrasound 4/5/16    Arterial: There is a fluid collection in the left thigh, please refer to PCP for further monitoring, no vascular in etiology. Hx of echocardiogram     4/16 EF40% Mild MR/TR and mild Pulm HTN. 9/15 EF 45-50%, Mildly dilated L atrium, mildly dilated R ventricle. Mild MR & mild TR     Hyperlipidemia     Neuropathy of foot     Pt reports starting approx. 6-7 years ago    Neuropathy of hand     Pt reports starting approx.  6-7 years ago    Non compliance with medical treatment 7/2/2018    S/P CABG x 4 6/5/2010    LIMA-> LAD;  VG->CX;  VG->Diagonal; VG-> distal RCA  per  Dr Guanako Nazario    Type 2 diabetes mellitus with diabetic polyneuropathy, with long-term current use of insulin ([facility] [address] 4/5/2016    Type II or unspecified type diabetes mellitus with neurological manifestations, not stated as uncontrolled(250.60)     Type II or unspecified type diabetes mellitus with other specified manifestations, not stated as uncontrolled     Ulcer of left foot, with fat layer exposed ([address] 12/19/2013    Ulcer of other part of foot        PAST SURGICAL HISTORY    Past Surgical History:   Procedure Laterality Date    APPENDECTOMY      CARDIAC SURGERY      CARPAL TUNNEL RELEASE      L hand Nov. 2011, R hand Jan. 2012    CARPAL TUNNEL RELEASE Right 6/10/2013    recurrent    CARPAL TUNNEL RELEASE Left 7/29/2013    with ulnar nerve transpostion and L middle finger release    CHOLECYSTECTOMY      COLONOSCOPY      CORONARY ARTERY BYPASS GRAFT  6/5/2010 6/5/2010-LIMA->LAD;  VG-> Diagonal;  VG-> Obtuse marginal;  VG->Distal RCA-   Dr Estrella Kaur Right 6/10/2013    HYSTERECTOMY, TOTAL ABDOMINAL (CERVIX REMOVED)      TOE AMPUTATION Left 02/14/144th toe    TUBAL LIGATION      ULNAR TUNNEL RELEASE Right 6/10/2013       FAMILY HISTORY    Family History   Problem Relation Age of Onset    Heart Disease Mother     Kidney Disease Mother         dialysis    Cancer Father        SOCIAL HISTORY    Social History     Tobacco Use    Smoking status: Former     Packs/day: 0.25     Years: 30.00     Pack years: 7.50     Types: Cigarettes     Quit date: 2022     Years since quittin.0    Smokeless tobacco: Never    Tobacco comments:     quit smoking mia 16   Vaping Use    Vaping Use: Every day    Substances: Never   Substance Use Topics    Alcohol use: No     Alcohol/week: 0.0 standard drinks     Comment:                                    CAFFEINE: 2 sodas daily    Drug use: No       ALLERGIES    Allergies   Allergen Reactions    Latex     Codeine     Cortisone     Cortizone     Iodides     Phenobarbital Other (See Comments)     Hallucinations     Vancomycin Other (See Comments)     \"makes me very sick\"       MEDICATIONS    Current Outpatient Medications on File Prior to Encounter   Medication Sig Dispense Refill    gabapentin (NEURONTIN) 400 MG capsule Take 800 mg by mouth in the morning and at bedtime. OXYGEN Inhale 2 L into the lungs daily as needed      carvedilol (COREG) 6.25 MG tablet 6.25 mg 2 times daily       VICTOZA 18 MG/3ML SOPN SC injection daily  (Patient not taking: No sig reported)      albuterol sulfate HFA (PROVENTIL;VENTOLIN;PROAIR) 108 (90 Base) MCG/ACT inhaler Inhale 2 puffs into the lungs every 6 hours as needed for Wheezing      esomeprazole Magnesium (NEXIUM) 40 MG PACK Take 20 mg by mouth 2 times daily 2 tabs      oxyCODONE-acetaminophen (PERCOCET)  MG per tablet Take 1 tablet by mouth every 6 hours as needed for Pain. LORazepam (ATIVAN) 0.5 MG tablet Take 0.5 mg by mouth every 12 hours Indications: One Tablet twice daily       Insulin Aspart (NOVOLOG FLEXPEN SC) Inject 15 Units into the skin 3 times daily (before meals) On sliding scale (Patient not taking: No sig reported)      pravastatin (PRAVACHOL) 40 MG tablet 40 mg daily.       lisinopril (PRINIVIL;ZESTRIL) 10 MG tablet Take 20 mg by mouth daily  (Patient not taking: No sig reported)      insulin glargine (LANTUS) 100 UNIT/ML injection Inject 50 Units into the skin nightly. furosemide (LASIX) 20 MG tablet Take 40 mg by mouth 2 times daily        No current facility-administered medications on file prior to encounter. REVIEW OF SYSTEMS    Pertinent items are noted in HPI. Constitutional: Negative for systemic symptoms including fever, chills and malaise. Objective:      /70   Pulse 87   Temp 98.9 °F (37.2 °C) (Temporal)   Resp 14     PHYSICAL EXAM      General: The patient is in no acute distress. Mental status:  Patient is appropriate, is  oriented to place and plan of care. Dermatologic exam: Visual inspection of the periwound reveals the skin to be moist, clammy, coarse, sclerotic, and scaly  Wound exam: see wound description below in procedure note      Assessment:     Problem List Items Addressed This Visit          Endocrine    Diabetic ulcer of right heel associated with type 2 diabetes mellitus, with necrosis of bone ([address] - Primary    Relevant Orders    Initiate Outpatient Wound Care Protocol       Other    Acute pain of right foot    Relevant Orders    Initiate Outpatient Wound Care Protocol     Procedure Note    Indications:  Based on my examination of this patient's wound(s) today, sharp excision into necrotic subcutaneous tissue is required to promote healing and evaluate the extent of previous healing. Performed by: RYAN Vargas - CNP    Consent obtained: Yes    Time out taken:  Yes    Pain Control: N/A      Debridement:Excisional Debridement    Using #15 blade scalpel and forceps the wound(s) was/were sharply debrided down through and including the removal of subcutaneous tissue.         Devitalized Tissue Debrided:  fibrin, biofilm, slough, necrotic/eschar, exudate, and callus    Pre Debridement Measurements:  Are located in the Wound Documentation Flow Sheet    All active wounds listed below with today's date are evaluated  Wound(s)    debrided this date include # : 1     Post  Debridement Measurements:  Negative Pressure Wound Therapy Foot (Active)   Number of days: 3174       Negative Pressure Wound Therapy Foot Distal (Active)   Number of days: 3170       Negative Pressure Wound Therapy (Active)   Wound Type Diabetic foot ulcer 10/11/22 1145   Unit Type kci 10/11/22 1145   Dressing Type Black Foam 10/11/22 1145   Number of pieces used 1 10/11/22 1145   Number of pieces removed 2 10/11/22 1145   Cycle Continuous 10/11/22 1145   Target Pressure (mmHg) 125 10/11/22 1145   Irrigation Solution Sodium chloride 0.9% 09/23/22 0845   Instillation Volume  10 mL 09/23/22 0845   Soak Time  2 minutes 09/23/22 0845   Vac Frequency 2 hours 09/23/22 0845   Canister changed? No 09/25/22 0905   Dressing Status New dressing applied;Clean, dry & intact 09/27/22 1045   Dressing Changed Changed/New 09/27/22 1045   Drainage Amount Moderate 09/25/22 0905   Drainage Description Serosanguinous 09/25/22 0905   Dressing Change Due 09/26/22 09/25/22 0905   Lora-wound Assessment Fragile 09/23/22 0845   Odor None 09/23/22 0845   Number of days: 34       Incision 06/10/13 Wrist Right (Active)   Number of days: 3025       Incision 07/29/13 Wrist Left (Active)   Number of days: 0292       Incision 02/14/14 Toe (Comment  which one) (Active)   Number of days: 2826       Wound 09/13/22 #1 Right Foot Heel (Active)   Wound Image   10/25/22 1441   Wound Etiology Diabetic 09/23/22 0845   Dressing Status Clean;Dry;New dressing applied 10/11/22 1138   Wound Cleansed Soap and water; Wound cleanser;Cleansed with saline 10/25/22 1441   Dressing/Treatment Negative pressure wound therapy 09/27/22 1017   Offloading for Diabetic Foot Ulcers Back offloading shoe 10/25/22 1441   Dressing Change Due 09/26/22 09/25/22 0905   Wound Length (cm) 6 cm 10/25/22 1441   Wound Width (cm) 7.5 cm 10/25/22 1441   Wound Depth (cm) 2.3 cm 10/25/22 1441   Wound Surface Area (cm^2) 45 cm^2 10/25/22 1441   Change in Wound Size % (l*w) -19.36 10/25/22 1441   Wound Volume (cm^3) 103.5 cm^3 10/25/22 1441   Wound Healing % -815 10/25/22 1441   Post-Procedure Length (cm) 6 cm 10/25/22 1453   Post-Procedure Width (cm) 7.5 cm 10/25/22 1453   Post-Procedure Depth (cm) 2.3 cm 10/25/22 1453   Post-Procedure Surface Area (cm^2) 45 cm^2 10/25/22 1453   Post-Procedure Volume (cm^3) 103.5 cm^3 10/25/22 1453   Distance Tunneling (cm) 0 cm 10/25/22 1441   Tunneling Position ___ O'Clock 0 10/25/22 1441   Undermining Starts ___ O'Clock 0 10/25/22 1441   Undermining Ends___ O'Clock 0 10/25/22 1441   Undermining Maxium Distance (cm) 0 10/25/22 1441   Wound Assessment Eschar dry;Devitalized tissue;Slough;Pink/red;Eschar moist;Exposed structure bone 10/25/22 1441   Drainage Amount Moderate 10/25/22 1441   Drainage Description Serosanguinous; Yellow 10/25/22 1441   Odor None 10/25/22 1441   Lora-wound Assessment Dry/flaky 10/25/22 1441   Margins Defined edges 10/25/22 1441   Wound Thickness Description not for Pressure Injury Full thickness 10/25/22 1441   Number of days: 42       Percent of Wound(s) Debrided: approximately 100%    Total  Area  Debrided:  45 sq cm     Bleeding:  Minimal    Hemostasis Achieved:  by pressure    Procedural Pain:  4  / 10     Post Procedural Pain:  2 / 10     Response to treatment:  With complaints of pain. Status of wound progress and description from last visit:   Patient wound continues to worsen. This is likely due to arterial insufficiency and we will only be managing necrosis until this is addressed. Needs to see Dr. Mejia Tapia next week for further care  Will attempt to keep as dry and clean as possible until then        Plan:       Discharge Instructions         [address]  NOTE: [facility] you will receive a patient experience survey via E-mail. We would be grateful if you would take the time to fill this survey out.   Wound care order history:              KATHLEEN's   Right       Left    Date               Vascular studies/Intervention: . Cultures: .9/13/22               Antibiotics: .Cipro and Doxy 9/13/22                         HbA1c:  .6/29/22 8.2               Grafts: [place] Fang: . Continuing wound care orders and information:              Residence: . Private              Continue home health care with: [facility]              Your wound-care supplies will be provided by: . [facility]              Pharmacy: .[facility]  Wound cleansing:                           Do not scrub or use excessive force. Wash hands with soap and water before and after dressing changes. Prior to applying a clean dressing, cleanse wound with normal saline,                          wound cleanser, or mild soap and water. Ask your physician or nurse before getting the wound(s) wet in the shower. Daily Wound management:                          Keep weight off wounds and reposition every 2 hours. Avoid standing for long periods of time. Evaluate legs to the level of the heart or above for 30 minutes 4-5 times a day and/or when sitting. When taking antibiotics take entire prescription as ordered by MD do not stop taking until medicine is all gone. Orders for this week (10/25/22) : Right Heel- Wash with mild soap and water, rinse with saline, pat dry with 4x4  Gently pack with 4x4 soaked betadine  Cover with ABD,  Wrap with conform and secure with tape    Change on Tuesday, Thursday and Saturday     Follow up with Dr. Aristeo Perales In 1 weeks in the wound care center  Call [phone] for any questions or concerns.   Date__________   Time____________        Treatment Note      Written Patient Dismissal Instructions Given            Electronically signed by RYNA Ennis CNP on 10/25/2022 at 3:05 PM

## 2022-10-25 NOTE — DISCHARGE INSTRUCTIONS
PHYSICIAN ORDERS AND DISCHARGE INSTRUCTIONS  NOTE: Upon discharge from the [address], you will receive a patient experience survey via E-mail. We would be grateful if you would take the time to fill this survey out. Wound care order history:              KATHLEEN's   Right       Left    Date               Vascular studies/Intervention: . Cultures: .9/13/22               Antibiotics: .Cipro and Doxy 9/13/22                         HbA1c:  .6/29/22 8.2               Grafts: Dimitris Rosa: . Continuing wound care orders and information:              Residence: . Private              [facility] care with: [facility]              Your wound-care supplies will be provided by: [facility]              Pharmacy: .[facility] in [place]  Wound cleansing:                           Do not scrub or use excessive force. Wash hands with soap and water before and after dressing changes. Prior to applying a clean dressing, cleanse wound with normal saline,                          wound cleanser, or mild soap and water. Ask your physician or nurse before getting the wound(s) wet in the shower. Daily Wound management:                          Keep weight off wounds and reposition every 2 hours. Avoid standing for long periods of time. Evaluate legs to the level of the heart or above for 30 minutes 4-5 times a day and/or when sitting. When taking antibiotics take entire prescription as ordered by MD do not stop taking until medicine is all gone. Orders for this week (10/25/22) :   Right Heel- Wash with mild soap and water, rinse with saline, pat dry with 4x4  Gently pack with 4x4 soaked betadine  Cover with ABD,  Wrap with conform and secure with tape    Change on Tuesday, Thursday and Saturday     Follow up with Dr. Mejia Tapia In 1 weeks in the wound care center  Call [phone] for any questions or concerns.   Date__________   Time____________

## 2022-11-01 ENCOUNTER — HOSPITAL ENCOUNTER (OUTPATIENT)
Dept: WOUND CARE | Age: 57
Discharge: HOME | End: 2022-11-01
Payer: MEDICARE

## 2022-11-01 VITALS
RESPIRATION RATE: 16 BRPM | SYSTOLIC BLOOD PRESSURE: 123 MMHG | TEMPERATURE: 98.7 F | HEART RATE: 93 BPM | DIASTOLIC BLOOD PRESSURE: 63 MMHG

## 2022-11-01 DIAGNOSIS — E11.621 DIABETIC ULCER OF RIGHT HEEL ASSOCIATED WITH TYPE 2 DIABETES MELLITUS, WITH NECROSIS OF BONE (HCC): Primary | ICD-10-CM

## 2022-11-01 DIAGNOSIS — M79.671 ACUTE PAIN OF RIGHT FOOT: ICD-10-CM

## 2022-11-01 DIAGNOSIS — L97.414 DIABETIC ULCER OF RIGHT HEEL ASSOCIATED WITH TYPE 2 DIABETES MELLITUS, WITH NECROSIS OF BONE (HCC): Primary | ICD-10-CM

## 2022-11-01 PROCEDURE — 11044 DBRDMT BONE 1ST 20 SQ CM/<: CPT

## 2022-11-01 PROCEDURE — 11047 DBRDMT BONE EACH ADDL: CPT

## 2022-11-01 RX ORDER — LIDOCAINE 50 MG/G
OINTMENT TOPICAL ONCE
Status: CANCELLED | OUTPATIENT
Start: 2022-11-01 | End: 2022-11-01

## 2022-11-01 RX ORDER — LIDOCAINE 40 MG/G
CREAM TOPICAL ONCE
Status: CANCELLED | OUTPATIENT
Start: 2022-11-01 | End: 2022-11-01

## 2022-11-01 RX ORDER — LIDOCAINE HYDROCHLORIDE 20 MG/ML
JELLY TOPICAL ONCE
Status: CANCELLED | OUTPATIENT
Start: 2022-11-01 | End: 2022-11-01

## 2022-11-01 RX ORDER — BACITRACIN ZINC AND POLYMYXIN B SULFATE 500; 1000 [USP'U]/G; [USP'U]/G
OINTMENT TOPICAL ONCE
Status: CANCELLED | OUTPATIENT
Start: 2022-11-01 | End: 2022-11-01

## 2022-11-01 RX ORDER — BACITRACIN, NEOMYCIN, POLYMYXIN B 400; 3.5; 5 [USP'U]/G; MG/G; [USP'U]/G
OINTMENT TOPICAL ONCE
Status: CANCELLED | OUTPATIENT
Start: 2022-11-01 | End: 2022-11-01

## 2022-11-01 RX ORDER — GINSENG 100 MG
CAPSULE ORAL ONCE
Status: CANCELLED | OUTPATIENT
Start: 2022-11-01 | End: 2022-11-01

## 2022-11-01 RX ORDER — BETAMETHASONE DIPROPIONATE 0.05 %
OINTMENT (GRAM) TOPICAL ONCE
Status: CANCELLED | OUTPATIENT
Start: 2022-11-01 | End: 2022-11-01

## 2022-11-01 RX ORDER — LIDOCAINE HYDROCHLORIDE 40 MG/ML
SOLUTION TOPICAL ONCE
Status: CANCELLED | OUTPATIENT
Start: 2022-11-01 | End: 2022-11-01

## 2022-11-01 RX ORDER — GENTAMICIN SULFATE 1 MG/G
OINTMENT TOPICAL ONCE
Status: CANCELLED | OUTPATIENT
Start: 2022-11-01 | End: 2022-11-01

## 2022-11-01 RX ORDER — CLOBETASOL PROPIONATE 0.5 MG/G
OINTMENT TOPICAL ONCE
Status: CANCELLED | OUTPATIENT
Start: 2022-11-01 | End: 2022-11-01

## 2022-11-01 NOTE — PROGRESS NOTES
Bexarotene Pregnancy And Lactation Text: This medication is Pregnancy Category X and should not be given to women who are pregnant or may become pregnant. This medication should not be used if you are breast feeding. Wound Care Center Progress Note With Procedure    Eduardo Long  AGE: 62 y.o. GENDER: female  : 1965  EPISODE DATE:  2022     Subjective:     Chief Complaint   Patient presents with    Wound Check     RLE         HISTORY of PRESENT ILLNESS      Eduardo Long is a 62 y.o. female who presents today for wound evaluation of Chronic arterial and diabetic ulcer(s) of the heel of the right foot. The ulcer is of marked severity. The underlying cause of the wound is diabetes and pressure. The wound is markedly worse and her whole calcaneus has been fragmented and it is fracturing. She was recently admitted to [facility] about 2 weeks ago and she did have a surgery done on her foot with removal of portions of the calcaneus. She has missed a couple times at the wound care center and this has gotten markedly worse and I unfortunately told her that this is no longer salvageable and she needs a below the knee amputation. She also has a new stage II pressure ulceration in the sacral area.   Wound Pain Timing/Severity: none    Quality of pain: N/A  Severity of pain:  0 / 10   Modifying Factors: diabetes, poor glucose control, poor hygiene, non-adherence, and ESRD on hemodialysis  Associated Signs/Symptoms: edema, erythema, drainage, and odor        PAST MEDICAL HISTORY        Diagnosis Date    Acute pain of right foot 2022    CAD (coronary artery disease)     s/p CABG x 4;  follows with Dr Monika Anderson of foot 2018    Carpal tunnel syndrome on both sides     CHF (congestive heart failure) ([address] 10/2010    Chronic diastolic; EF 92%    Chronic kidney disease     stage 4 kidney disease    Chronic ulcer of left ankle with fat layer exposed ([address] 10/7/2015    Chronic ulcer of left foot with fat layer exposed ([address] 3/17/2016    Chronic ulcer of right ankle with fat layer exposed ([address] 3/17/2016    Chronic ulcer of right foot with fat layer exposed ([address] 3/17/2016    Depression Albendazole Pregnancy And Lactation Text: This medication is Pregnancy Category C and it isn't known if it is safe during pregnancy. It is also excreted in breast milk. Diabetes mellitus ([address]     Diabetes mellitus with neurological manifestations ([address]     Diabetes mellitus with ulcer of ankle ([address]     Diabetic ulcer of left foot associated with type 2 diabetes mellitus, with fat layer exposed ([address] 8/6/2018    Diabetic ulcer of right heel associated with type 2 diabetes mellitus, with muscle involvement without evidence of necrosis ([address] 9/13/2022    Diabetic ulcer of right heel associated with type 2 diabetes mellitus, with necrosis of bone ([address] 9/13/2022    ESRD on hemodialysis ([address] 9/21/2022    Family history of cardiovascular disease     Mother    GERD (gastroesophageal reflux disease)     H/O cardiac catheterization 10/18/2010, 6/3/2010    10/18/2010-Four bypass grafts all widely patent. 6/3/2010- Moderate to severe triple vessel disease, preserved LV systolic function. H/O cardiovascular stress test 7/26/2012, 8/3/2011, 10/14/2010, 5/24/2010 7/26/2012-Lexiscan- Normal Myocardial Perfusion Study. Post stress myocardial perfusion images show a normal pattern of perfusion in all regions. Post stress LV normal size. Normal perfusion in the distribution of all coronaries. Normal LV size and function. Rest EF 70%    H/O chest x-ray 7/12/2012, 6/2/2010 7/12/2012-Perihilar peribronchial cuffing with mild basilar predominant interstital prominence, which may reflect interstitial edema or atypical pneumonia. H/O Doppler ultrasound 6/4/2010    CAROTID DOPPLER-6/4/2010-No Doppler evidence of hemodynamically significant Carotid Stenosis with antegrade flow in the vertebral arteries bilaterally. 6/4/2010 PERIPHERAL VENOUS DOPPLER_ LEFT Saphenous Vein mapping    H/O echocardiogram 7/26/2012, 8/3/2011, 10/2010, 7/23/2010, 6/8/2010 7/26/2012-LV normal size. Normal LV wall thickness. LV systolic function normal. EF => 55%. LV wall motion normal. Mild MR. Mild TR.      History of complete ECG 7/26/2012 [facility]), 7/13/2012 [facility]), 7/25/2011, 3/25/2011, 10/18/2010, 10/11/2010, 6/23/2010, 5/7/2010    Hx of cardiovascular stress test 9/3/2015    lexiscan-normal,EF66%    Hx of Doppler ultrasound 4/5/16    Arterial: There is a fluid collection in the left thigh, please refer to PCP for further monitoring, no vascular in etiology. Hx of echocardiogram     4/16 EF40% Mild MR/TR and mild Pulm HTN. 9/15 EF 45-50%, Mildly dilated L atrium, mildly dilated R ventricle. Mild MR & mild TR     Hyperlipidemia     Neuropathy of foot     Pt reports starting approx. 6-7 years ago    Neuropathy of hand     Pt reports starting approx.  6-7 years ago    Non compliance with medical treatment 7/2/2018    S/P CABG x 4 6/5/2010    LIMA-> LAD;  VG->CX;  VG->Diagonal; VG-> distal RCA  per  Dr Rick Ray    Type 2 diabetes mellitus with diabetic polyneuropathy, with long-term current use of insulin ([address] 4/5/2016    Type II or unspecified type diabetes mellitus with neurological manifestations, not stated as uncontrolled(250.60)     Type II or unspecified type diabetes mellitus with other specified manifestations, not stated as uncontrolled     Ulcer of left foot, with fat layer exposed ([address] 12/19/2013    Ulcer of other part of foot        PAST SURGICAL HISTORY    Past Surgical History:   Procedure Laterality Date    APPENDECTOMY      CARDIAC SURGERY      CARPAL TUNNEL RELEASE      L hand Nov. 2011, R hand Jan. 2012    CARPAL TUNNEL RELEASE Right 6/10/2013    recurrent    CARPAL TUNNEL RELEASE Left 7/29/2013    with ulnar nerve transpostion and L middle finger release    CHOLECYSTECTOMY      COLONOSCOPY      CORONARY ARTERY BYPASS GRAFT  6/5/2010 6/5/2010-LIMA->LAD;  VG-> Diagonal;  VG-> Obtuse marginal;  VG->Distal RCA-   Dr Simona Banks Right 6/10/2013    HYSTERECTOMY, TOTAL ABDOMINAL (CERVIX REMOVED)      TOE AMPUTATION Left 02/14/144th toe    TUBAL LIGATION      ULNAR TUNNEL RELEASE Right 6/10/2013       FAMILY HISTORY    Family History   Problem Relation Age of Onset    Heart Disease Gabapentin Pregnancy And Lactation Text: This medication is Pregnancy Category C and isn't considered safe during pregnancy. It is excreted in breast milk. High Dose Vitamin A Pregnancy And Lactation Text: High dose vitamin A therapy is contraindicated during pregnancy and breast feeding. Mother     Kidney Disease Mother         dialysis    Cancer Father        SOCIAL HISTORY    Social History     Tobacco Use    Smoking status: Former     Packs/day: 0.25     Years: 30.00     Pack years: 7.50     Types: Cigarettes     Quit date: 2022     Years since quittin.1    Smokeless tobacco: Never    Tobacco comments:     quit smoking mia 16   Vaping Use    Vaping Use: Every day    Substances: Never   Substance Use Topics    Alcohol use: No     Alcohol/week: 0.0 standard drinks     Comment:                                    CAFFEINE: 2 sodas daily    Drug use: No       ALLERGIES    Allergies   Allergen Reactions    Latex     Codeine     Cortisone     Cortizone     Iodides     Phenobarbital Other (See Comments)     Hallucinations     Vancomycin Other (See Comments)     \"makes me very sick\"       MEDICATIONS    Current Outpatient Medications on File Prior to Encounter   Medication Sig Dispense Refill    gabapentin (NEURONTIN) 400 MG capsule Take 800 mg by mouth in the morning and at bedtime. OXYGEN Inhale 2 L into the lungs daily as needed      carvedilol (COREG) 6.25 MG tablet 6.25 mg 2 times daily       VICTOZA 18 MG/3ML SOPN SC injection daily  (Patient not taking: No sig reported)      albuterol sulfate HFA (PROVENTIL;VENTOLIN;PROAIR) 108 (90 Base) MCG/ACT inhaler Inhale 2 puffs into the lungs every 6 hours as needed for Wheezing      esomeprazole Magnesium (NEXIUM) 40 MG PACK Take 20 mg by mouth 2 times daily 2 tabs      oxyCODONE-acetaminophen (PERCOCET)  MG per tablet Take 1 tablet by mouth every 6 hours as needed for Pain. LORazepam (ATIVAN) 0.5 MG tablet Take 0.5 mg by mouth every 12 hours Indications: One Tablet twice daily       Insulin Aspart (NOVOLOG FLEXPEN SC) Inject 15 Units into the skin 3 times daily (before meals) On sliding scale (Patient not taking: No sig reported)      pravastatin (PRAVACHOL) 40 MG tablet 40 mg daily.       lisinopril (PRINIVIL;ZESTRIL) 10 MG tablet Take 20 mg by mouth daily  (Patient not taking: No sig reported)      insulin glargine (LANTUS) 100 UNIT/ML injection Inject 50 Units into the skin nightly. furosemide (LASIX) 20 MG tablet Take 40 mg by mouth 2 times daily        No current facility-administered medications on file prior to encounter. REVIEW OF SYSTEMS    Pertinent items are noted in HPI. Constitutional: Negative for systemic symptoms including fever, chills and malaise. Objective:      /63   Pulse 93   Temp 98.7 °F (37.1 °C) (Temporal)   Resp 16     PHYSICAL EXAM      General: The patient is in no acute distress. Mental status:  Patient is appropriate, is  oriented to place and plan of care. Dermatologic exam: Visual inspection of the periwound reveals the skin to be moist and cool   Wound exam: see wound description below in procedure note      Assessment:     Problem List Items Addressed This Visit       Diabetic ulcer of right heel associated with type 2 diabetes mellitus, with necrosis of bone ([address] - Primary    Relevant Orders    Initiate Outpatient Wound Care Protocol    Acute pain of right foot    Relevant Orders    Initiate Outpatient Wound Care Protocol     Procedure Note    Indications:  Based on my examination of this patient's wound(s) today, sharp excision into necrotic bone is required to promote healing and evaluate the extent of previous healing. Performed by: Odessa Bowie MD    Consent obtained: Yes    Time out taken:  Yes    Pain Control:       Debridement:Excisional Debridement    Using #15 blade scalpel, scissors, forceps, and rongeur the wound(s) was/were sharply debrided down through and including the removal of bone.         Devitalized Tissue Debrided:  slough, necrotic/eschar, and exudate    Pre Debridement Measurements:  Are located in the Wound Documentation Flow Sheet    All active wounds listed below with today's date are evaluated  Wound(s)    debrided this date Odomzo Pregnancy And Lactation Text: This medication is Pregnancy Category X and is absolutely contraindicated during pregnancy. It is unknown if it is excreted in breast milk. Thalidomide Counseling: I discussed with the patient the risks of thalidomide including but not limited to birth defects, anxiety, weakness, chest pain, dizziness, cough and severe allergy. include # : 1     Post  Debridement Measurements:  Negative Pressure Wound Therapy Foot (Active)   Number of days: 3181       Negative Pressure Wound Therapy Foot Distal (Active)   Number of days: 7656       Negative Pressure Wound Therapy (Active)   Wound Type Diabetic foot ulcer 10/11/22 1145   Unit Type kci 10/11/22 1145   Dressing Type Black Foam 10/11/22 1145   Number of pieces used 1 10/11/22 1145   Number of pieces removed 2 10/11/22 1145   Cycle Continuous 10/11/22 1145   Target Pressure (mmHg) 125 10/11/22 1145   Dressing Status New dressing applied;Clean, dry & intact 09/27/22 1045   Dressing Changed Changed/New 09/27/22 1045   Number of days: 41       Incision 06/10/13 Wrist Right (Active)   Number of days: 6168       Incision 07/29/13 Wrist Left (Active)   Number of days: 3382       Incision 02/14/14 Toe (Comment  which one) (Active)   Number of days: 4820       Wound 09/13/22 #1 Right Foot Heel (Active)   Wound Image   11/01/22 0934   Dressing Status New dressing applied;Clean;Dry; Intact 11/01/22 1041   Wound Cleansed Soap and water; Wound cleanser;Cleansed with saline 11/01/22 0934   Dressing/Treatment Negative pressure wound therapy 09/27/22 1017   Offloading for Diabetic Foot Ulcers Back offloading shoe 11/01/22 0934   Wound Length (cm) 8 cm 11/01/22 0934   Wound Width (cm) 7.2 cm 11/01/22 0934   Wound Depth (cm) 2.7 cm 11/01/22 0934   Wound Surface Area (cm^2) 57.6 cm^2 11/01/22 0934   Change in Wound Size % (l*w) -52.79 11/01/22 0934   Wound Volume (cm^3) 155.52 cm^3 11/01/22 0934   Wound Healing % -1275 11/01/22 0934   Post-Procedure Length (cm) 8.9 cm 11/01/22 1004   Post-Procedure Width (cm) 6.4 cm 11/01/22 1004   Post-Procedure Depth (cm) 2.3 cm 11/01/22 1004   Post-Procedure Surface Area (cm^2) 56.96 cm^2 11/01/22 1004   Post-Procedure Volume (cm^3) 131.008 cm^3 11/01/22 1004   Distance Tunneling (cm) 0 cm 11/01/22 0934   Tunneling Position ___ O'Clock 0 11/01/22 0934   Undermining Starts ___ Dapsone Counseling: I discussed with the patient the risks of dapsone including but not limited to hemolytic anemia, agranulocytosis, rashes, methemoglobinemia, kidney failure, peripheral neuropathy, headaches, GI upset, and liver toxicity. Patients who start dapsone require monitoring including baseline LFTs and weekly CBCs for the first month, then every month thereafter. The patient verbalized understanding of the proper use and possible adverse effects of dapsone. All of the patient's questions and concerns were addressed. O'Clock 0 11/01/22 0934   Undermining Ends___ O'Clock 0 11/01/22 0934   Undermining Maxium Distance (cm) 0 11/01/22 0934   Wound Assessment Eschar dry;Devitalized tissue;Slough;Pink/red;Eschar moist;Exposed structure bone 11/01/22 0934   Drainage Amount Large 11/01/22 0934   Drainage Description Yellow 11/01/22 0934   Odor Mild 11/01/22 0934   Lora-wound Assessment Dry/flaky;Edematous; Blanchable erythema; Warm 11/01/22 0934   Margins Defined edges 11/01/22 0934   Wound Thickness Description not for Pressure Injury Full thickness 11/01/22 0934   Number of days: 49       Wound 11/01/22 #2 Sacrum (Active)   Wound Image   11/01/22 0942   Dressing Status New dressing applied;Clean;Dry; Intact 11/01/22 1042   Wound Cleansed Wound cleanser 11/01/22 0942   Offloading for Diabetic Foot Ulcers Offloading not required 11/01/22 0942   Wound Length (cm) 2.5 cm 11/01/22 0942   Wound Width (cm) 2 cm 11/01/22 0942   Wound Depth (cm) 0.1 cm 11/01/22 0942   Wound Surface Area (cm^2) 5 cm^2 11/01/22 0942   Wound Volume (cm^3) 0.5 cm^3 11/01/22 0942   Post-Procedure Length (cm) 2.5 cm 11/01/22 1004   Post-Procedure Width (cm) 2 cm 11/01/22 1004   Post-Procedure Depth (cm) 0.1 cm 11/01/22 1004   Post-Procedure Surface Area (cm^2) 5 cm^2 11/01/22 1004   Post-Procedure Volume (cm^3) 0.5 cm^3 11/01/22 1004   Distance Tunneling (cm) 0 cm 11/01/22 0942   Tunneling Position ___ O'Clock 0 11/01/22 0942   Undermining Starts ___ O'Clock 0 11/01/22 0942   Undermining Ends___ O'Clock 0 11/01/22 0942   Undermining Maxium Distance (cm) 0 11/01/22 [address]; Devitalized tissue 11/01/22 0942   Drainage Amount Moderate 11/01/22 0942   Drainage Description Serosanguinous; Yellow 11/01/22 0942   Odor None 11/01/22 0942   Lora-wound Assessment Excoriated 11/01/22 0942   Margins Defined edges 11/01/22 0942   Wound Thickness Description not for Pressure Injury Full thickness 11/01/22 0942   Number of days: 0       Percent of Wound(s) Debrided: approximately 100%    Total  Area  Debrided:  58 sq cm     Bleeding:  Minimal    Hemostasis Achieved:  by pressure    Procedural Pain:  0  / 10     Post Procedural Pain:  0 / 10     Response to treatment:  Well tolerated by patient. Status of wound progress and description from last visit:   Markedly worse. Patient will need a right below the knee amputation with immediate postop prosthesis. We will get this scheduled in the near future. I did discuss all the risk, benefits and alternatives of the procedures in detail to the patient and she does agree to proceed with surgical intervention. Plan:       Discharge Instructions         PHYSICIAN ORDERS AND DISCHARGE INSTRUCTIONS  NOTE: Upon discharge from the [address], you will receive a patient experience survey via E-mail. We would be grateful if you would take the time to fill this survey out. Wound care order history:              KATHLEEN's   Right       Left    Date               Vascular studies/Intervention: . Cultures: .9/13/22               Antibiotics: .Cipro and Doxy 9/13/22                         HbA1c:  .6/29/22 8.2               Grafts: Maru Culver: . Continuing wound care orders and information:              Residence: . Private              [facility] [place] [facility] [place]              Your [place]-care supplies will be provided by: . [facility]              Pharmacy: .[facility]  Wound cleansing:                           Do not scrub or use excessive force. Wash hands with soap and water before and after dressing changes. Prior to applying a clean dressing, cleanse wound with normal saline,                          wound cleanser, or mild soap and water. Ask your physician or nurse before getting the wound(s) wet in the shower. Daily Wound management:                          Keep weight off wounds and reposition every 2 hours. Prednisone Pregnancy And Lactation Text: This medication is Pregnancy Category C and it isn't know if it is safe during pregnancy. This medication is excreted in breast milk. Avoid standing for long periods of time. Evaluate legs to the level of the heart or above for 30 minutes 4-5 times a day and/or when sitting. When taking antibiotics take entire prescription as ordered by MD do not stop taking until medicine is all gone. Orders for this week (11/01/22) : Right Heel- Wash with mild soap and water, rinse with saline, pat dry with 4x4  Gently pack with 4x4 soaked betadine  Cover with ABD,  Wrap with conform and secure with tape  Change on Tuesday, Thursday and Saturday    Coccyx:  Apply santyl  Cover with saceral border  Change Tuesday, Thursday, Saturday     Discussed amputation, Dr. Dariel Joyner office will contact you     Follow up with Dr. Dariel Joyner In 1 weeks in the wound care center  Call [phone] for any questions or concerns.   Date__________   Time____________        Treatment Note Wound 09/13/22 #1 Right Foot Heel-Dressing/Treatment:  (Betadine, moist 4x4, ABD, Conform tape)  Wound 11/01/22 #2 Sacrum-Dressing/Treatment:  (Santyl, sacral border)    Written Patient Dismissal Instructions Given            Electronically signed by Ling Whitehead MD on 11/1/2022 at 6:40 PM Dapsone Pregnancy And Lactation Text: This medication is Pregnancy Category C and is not considered safe during pregnancy or breast feeding. Tremfya Pregnancy And Lactation Text: The risk during pregnancy and breastfeeding is uncertain with this medication. Fluconazole Counseling:  Patient counseled regarding adverse effects of fluconazole including but not limited to headache, diarrhea, nausea, upset stomach, liver function test abnormalities, taste disturbance, and stomach pain. There is a rare possibility of liver failure that can occur when taking fluconazole. The patient understands that monitoring of LFTs and kidney function test may be required, especially at baseline. The patient verbalized understanding of the proper use and possible adverse effects of fluconazole. All of the patient's questions and concerns were addressed. Stelara Pregnancy And Lactation Text: This medication is Pregnancy Category B and is considered safe during pregnancy. It is unknown if this medication is excreted in breast milk. 5-Fu Pregnancy And Lactation Text: This medication is Pregnancy Category X and contraindicated in pregnancy and in women who may become pregnant. It is unknown if this medication is excreted in breast milk. Cyclophosphamide Counseling:  I discussed with the patient the risks of cyclophosphamide including but not limited to hair loss, hormonal abnormalities, decreased fertility, abdominal pain, diarrhea, nausea and vomiting, bone marrow suppression and infection. The patient understands that monitoring is required while taking this medication. Use Enhanced Medication Counseling?: No Sski Pregnancy And Lactation Text: This medication is Pregnancy Category D and isn't considered safe during pregnancy. It is excreted in breast milk. Otezla Counseling: Faylene Pat Counseling: The side effects of Faylene Pat were discussed with the patient, including but not limited to worsening or new depression, weight loss, diarrhea, nausea, upper respiratory tract infection, and headache. Patient instructed to call the office should any adverse effect occur. The patient verbalized understanding of the proper use and possible adverse effects of Otezla. All the patient's questions and concerns were addressed. Fluconazole Pregnancy And Lactation Text: This medication is Pregnancy Category C and it isn't know if it is safe during pregnancy. It is also excreted in breast milk. Hydroquinone Pregnancy And Lactation Text: This medication has not been assigned a Pregnancy Risk Category but animal studies failed to show danger with the topical medication. It is unknown if the medication is excreted in breast milk. Zyclara Counseling:  I discussed with the patient the risks of imiquimod including but not limited to erythema, scaling, itching, weeping, crusting, and pain. Patient understands that the inflammatory response to imiquimod is variable from person to person and was educated regarded proper titration schedule. If flu-like symptoms develop, patient knows to discontinue the medication and contact us. Bexarotene Counseling:  I discussed with the patient the risks of bexarotene including but not limited to hair loss, dry lips/skin/eyes, liver abnormalities, hyperlipidemia, pancreatitis, depression/suicidal ideation, photosensitivity, drug rash/allergic reactions, hypothyroidism, anemia, leukopenia, infection, cataracts, and teratogenicity. Patient understands that they will need regular blood tests to check lipid profile, liver function tests, white blood cell count, thyroid function tests and pregnancy test if applicable. Griseofulvin Counseling:  I discussed with the patient the risks of griseofulvin including but not limited to photosensitivity, cytopenia, liver damage, nausea/vomiting and severe allergy. The patient understands that this medication is best absorbed when taken with a fatty meal (e.g., ice cream or french fries). Xolair Pregnancy And Lactation Text: This medication is Pregnancy Category B and is considered safe during pregnancy. This medication is excreted in breast milk. Topical Clindamycin Counseling: Patient counseled that this medication may cause skin irritation or allergic reactions. In the event of skin irritation, the patient was advised to reduce the amount of the drug applied or use it less frequently. The patient verbalized understanding of the proper use and possible adverse effects of clindamycin. All of the patient's questions and concerns were addressed. Elidel Pregnancy And Lactation Text: This medication is Pregnancy Category C. It is unknown if this medication is excreted in breast milk. Clofazimine Counseling:  I discussed with the patient the risks of clofazimine including but not limited to skin and eye pigmentation, liver damage, nausea/vomiting, gastrointestinal bleeding and allergy. Drysol Counseling:  I discussed with the patient the risks of drysol/aluminum chloride including but not limited to skin rash, itching, irritation, burning. Metronidazole Pregnancy And Lactation Text: This medication is Pregnancy Category B and considered safe during pregnancy. It is also excreted in breast milk. Cimetidine Counseling:  I discussed with the patient the risks of Cimetidine including but not limited to gynecomastia, headache, diarrhea, nausea, drowsiness, arrhythmias, pancreatitis, skin rashes, psychosis, bone marrow suppression and kidney toxicity. Minocycline Pregnancy And Lactation Text: This medication is Pregnancy Category D and not consider safe during pregnancy. It is also excreted in breast milk. Sarecycline Counseling: Patient advised regarding possible photosensitivity and discoloration of the teeth, skin, lips, tongue and gums. Patient instructed to avoid sunlight, if possible. When exposed to sunlight, patients should wear protective clothing, sunglasses, and sunscreen. The patient was instructed to call the office immediately if the following severe adverse effects occur:  hearing changes, easy bruising/bleeding, severe headache, or vision changes. The patient verbalized understanding of the proper use and possible adverse effects of sarecycline. All of the patient's questions and concerns were addressed. Xelbrookez Pregnancy And Lactation Text: This medication is Pregnancy Category D and is not considered safe during pregnancy. The risk during breast feeding is also uncertain. Colchicine Counseling:  Patient counseled regarding adverse effects including but not limited to stomach upset (nausea, vomiting, stomach pain, or diarrhea). Patient instructed to limit alcohol consumption while taking this medication. Colchicine may reduce blood counts especially with prolonged use. The patient understands that monitoring of kidney function and blood counts may be required, especially at baseline. The patient verbalized understanding of the proper use and possible adverse effects of colchicine. All of the patient's questions and concerns were addressed. Rituxan Pregnancy And Lactation Text: This medication is Pregnancy Category C and it isn't know if it is safe during pregnancy. It is unknown if this medication is excreted in breast milk but similar antibodies are known to be excreted. Doxycycline Pregnancy And Lactation Text: This medication is Pregnancy Category D and not consider safe during pregnancy. It is also excreted in breast milk but is considered safe for shorter treatment courses. Birth Control Pills Counseling: Birth Control Pill Counseling: I discussed with the patient the potential side effects of OCPs including but not limited to increased risk of stroke, heart attack, thrombophlebitis, deep venous thrombosis, hepatic adenomas, breast changes, GI upset, headaches, and depression. The patient verbalized understanding of the proper use and possible adverse effects of OCPs. All of the patient's questions and concerns were addressed. 5-Fu Counseling: 5-Fluorouracil Counseling:  I discussed with the patient the risks of 5-fluorouracil including but not limited to erythema, scaling, itching, weeping, crusting, and pain. Minoxidil Counseling: Minoxidil is a topical medication which can increase blood flow where it is applied. It is uncertain how this medication increases hair growth. Side effects are uncommon and include stinging and allergic reactions. Terbinafine Pregnancy And Lactation Text: This medication is Pregnancy Category B and is considered safe during pregnancy. It is also excreted in breast milk and breast feeding isn't recommended. Oxybutynin Pregnancy And Lactation Text: This medication is Pregnancy Category B and is considered safe during pregnancy. It is unknown if it is excreted in breast milk. Carac Counseling:  I discussed with the patient the risks of Carac including but not limited to erythema, scaling, itching, weeping, crusting, and pain. Spironolactone Pregnancy And Lactation Text: This medication can cause feminization of the male fetus and should be avoided during pregnancy. The active metabolite is also found in breast milk. Dupixent Counseling: I discussed with the patient the risks of dupilumab including but not limited to eye infection and irritation, cold sores, injection site reactions, worsening of asthma, allergic reactions and increased risk of parasitic infection. Live vaccines should be avoided while taking dupilumab. Dupilumab will also interact with certain medications such as warfarin and cyclosporine. The patient understands that monitoring is required and they must alert us or the primary physician if symptoms of infection or other concerning signs are noted. Cephalexin Counseling: I counseled the patient regarding use of cephalexin as an antibiotic for prophylactic and/or therapeutic purposes. Cephalexin (commonly prescribed under brand name Keflex) is a cephalosporin antibiotic which is active against numerous classes of bacteria, including most skin bacteria. Side effects may include nausea, diarrhea, gastrointestinal upset, rash, hives, yeast infections, and in rare cases, hepatitis, kidney disease, seizures, fever, confusion, neurologic symptoms, and others. Patients with severe allergies to penicillin medications are cautioned that there is about a 10% incidence of cross-reactivity with cephalosporins. When possible, patients with penicillin allergies should use alternatives to cephalosporins for antibiotic therapy. Picato Counseling:  I discussed with the patient the risks of Picato including but not limited to erythema, scaling, itching, weeping, crusting, and pain. Hydroxychloroquine Counseling:  I discussed with the patient that a baseline ophthalmologic exam is needed at the start of therapy and every year thereafter while on therapy. A CBC may also be warranted for monitoring. The side effects of this medication were discussed with the patient, including but not limited to agranulocytosis, aplastic anemia, seizures, rashes, retinopathy, and liver toxicity. Patient instructed to call the office should any adverse effect occur. The patient verbalized understanding of the proper use and possible adverse effects of Plaquenil. All the patient's questions and concerns were addressed. Eucrisa Counseling: Patient may experience a mild burning sensation during topical application. Cheral Coon is not approved in children less than 3years of age. Albendazole Counseling:  I discussed with the patient the risks of albendazole including but not limited to cytopenia, kidney damage, nausea/vomiting and severe allergy. The patient understands that this medication is being used in an off-label manner. Erythromycin Counseling:  I discussed with the patient the risks of erythromycin including but not limited to GI upset, allergic reaction, drug rash, diarrhea, increase in liver enzymes, and yeast infections. Terbinafine Counseling: Patient counseling regarding adverse effects of terbinafine including but not limited to headache, diarrhea, rash, upset stomach, liver function test abnormalities, itching, taste/smell disturbance, nausea, abdominal pain, and flatulence. There is a rare possibility of liver failure that can occur when taking terbinafine. The patient understands that a baseline LFT and kidney function test may be required. The patient verbalized understanding of the proper use and possible adverse effects of terbinafine. All of the patient's questions and concerns were addressed. Elidel Counseling: Patient may experience a mild burning sensation during topical application. Elidel is not approved in children less than 3years of age. There have been case reports of hematologic and skin malignancies in patients using topical calcineurin inhibitors although causality is questionable. Nsaids Pregnancy And Lactation Text: These medications are considered safe up to 30 weeks gestation. It is excreted in breast milk. Hydroxyzine Counseling: Patient advised that the medication is sedating and not to drive a car after taking this medication. Patient informed of potential adverse effects including but not limited to dry mouth, urinary retention, and blurry vision. The patient verbalized understanding of the proper use and possible adverse effects of hydroxyzine. All of the patient's questions and concerns were addressed. SSKI Counseling:  I discussed with the patient the risks of SSKI including but not limited to thyroid abnormalities, metallic taste, GI upset, fever, headache, acne, arthralgias, paraesthesias, lymphadenopathy, easy bleeding, arrhythmias, and allergic reaction. Simponi Counseling:  I discussed with the patient the risks of golimumab including but not limited to myelosuppression, immunosuppression, autoimmune hepatitis, demyelinating diseases, lymphoma, and serious infections. The patient understands that monitoring is required including a PPD at baseline and must alert us or the primary physician if symptoms of infection or other concerning signs are noted. Acitretin Pregnancy And Lactation Text: This medication is Pregnancy Category X and should not be given to women who are pregnant or may become pregnant in the future. This medication is excreted in breast milk. Ketoconazole Counseling:   Patient counseled regarding improving absorption with orange juice. Adverse effects include but are not limited to breast enlargement, headache, diarrhea, nausea, upset stomach, liver function test abnormalities, taste disturbance, and stomach pain. There is a rare possibility of liver failure that can occur when taking ketoconazole. The patient understands that monitoring of LFTs may be required, especially at baseline. The patient verbalized understanding of the proper use and possible adverse effects of ketoconazole. All of the patient's questions and concerns were addressed. Erivedge Counseling- I discussed with the patient the risks of Erivedge including but not limited to nausea, vomiting, diarrhea, constipation, weight loss, changes in the sense of taste, decreased appetite, muscle spasms, and hair loss. The patient verbalized understanding of the proper use and possible adverse effects of Erivedge. All of the patient's questions and concerns were addressed. Doxepin Counseling:  Patient advised that the medication is sedating and not to drive a car after taking this medication. Patient informed of potential adverse effects including but not limited to dry mouth, urinary retention, and blurry vision. The patient verbalized understanding of the proper use and possible adverse effects of doxepin. All of the patient's questions and concerns were addressed. Drysol Pregnancy And Lactation Text: This medication is considered safe during pregnancy and breast feeding. Oxybutynin Counseling:  I discussed with the patient the risks of oxybutynin including but not limited to skin rash, drowsiness, dry mouth, difficulty urinating, and blurred vision. Valtrex Pregnancy And Lactation Text: this medication is Pregnancy Category B and is considered safe during pregnancy. This medication is not directly found in breast milk but it's metabolite acyclovir is present. Azithromycin Counseling:  I discussed with the patient the risks of azithromycin including but not limited to GI upset, allergic reaction, drug rash, diarrhea, and yeast infections. Azathioprine Pregnancy And Lactation Text: This medication is Pregnancy Category D and isn't considered safe during pregnancy. It is unknown if this medication is excreted in breast milk. Detail Level: Detailed Rifampin Pregnancy And Lactation Text: This medication is Pregnancy Category C and it isn't know if it is safe during pregnancy. It is also excreted in breast milk and should not be used if you are breast feeding. Ivermectin Counseling:  Patient instructed to take medication on an empty stomach with a full glass of water. Patient informed of potential adverse effects including but not limited to nausea, diarrhea, dizziness, itching, and swelling of the extremities or lymph nodes. The patient verbalized understanding of the proper use and possible adverse effects of ivermectin. All of the patient's questions and concerns were addressed. Cosentyx Counseling:  I discussed with the patient the risks of Cosentyx including but not limited to worsening of Crohn's disease, immunosuppression, allergic reactions and infections. The patient understands that monitoring is required including a PPD at baseline and must alert us or the primary physician if symptoms of infection or other concerning signs are noted. Valtrex Counseling: I discussed with the patient the risks of valacyclovir including but not limited to kidney damage, nausea, vomiting and severe allergy. The patient understands that if the infection seems to be worsening or is not improving, they are to call. Cephalexin Pregnancy And Lactation Text: This medication is Pregnancy Category B and considered safe during pregnancy. It is also excreted in breast milk but can be used safely for shorter doses. Cyclophosphamide Pregnancy And Lactation Text: This medication is Pregnancy Category D and it isn't considered safe during pregnancy. This medication is excreted in breast milk. Hydroxyzine Pregnancy And Lactation Text: This medication is not safe during pregnancy and should not be taken. It is also excreted in breast milk and breast feeding isn't recommended. Clindamycin Counseling: I counseled the patient regarding use of clindamycin as an antibiotic for prophylactic and/or therapeutic purposes. Clindamycin is active against numerous classes of bacteria, including skin bacteria. Side effects may include nausea, diarrhea, gastrointestinal upset, rash, hives, yeast infections, and in rare cases, colitis. Spironolactone Counseling: Patient advised regarding risks of diarrhea, abdominal pain, hyperkalemia, birth defects (for female patients), liver toxicity and renal toxicity. The patient may need blood work to monitor liver and kidney function and potassium levels while on therapy. The patient verbalized understanding of the proper use and possible adverse effects of spironolactone. All of the patient's questions and concerns were addressed. Azathioprine Counseling:  I discussed with the patient the risks of azathioprine including but not limited to myelosuppression, immunosuppression, hepatotoxicity, lymphoma, and infections. The patient understands that monitoring is required including baseline LFTs, Creatinine, possible TPMP genotyping and weekly CBCs for the first month and then every 2 weeks thereafter. The patient verbalized understanding of the proper use and possible adverse effects of azathioprine. All of the patient's questions and concerns were addressed. Topical Sulfur Applications Counseling: Topical Sulfur Counseling: Patient counseled that this medication may cause skin irritation or allergic reactions. In the event of skin irritation, the patient was advised to reduce the amount of the drug applied or use it less frequently. The patient verbalized understanding of the proper use and possible adverse effects of topical sulfur application. All of the patient's questions and concerns were addressed. Tetracycline Counseling: Patient counseled regarding possible photosensitivity and increased risk for sunburn. Patient instructed to avoid sunlight, if possible. When exposed to sunlight, patients should wear protective clothing, sunglasses, and sunscreen. The patient was instructed to call the office immediately if the following severe adverse effects occur:  hearing changes, easy bruising/bleeding, severe headache, or vision changes. The patient verbalized understanding of the proper use and possible adverse effects of tetracycline. All of the patient's questions and concerns were addressed. Patient understands to avoid pregnancy while on therapy due to potential birth defects. Doxycycline Counseling:  Patient counseled regarding possible photosensitivity and increased risk for sunburn. Patient instructed to avoid sunlight, if possible. When exposed to sunlight, patients should wear protective clothing, sunglasses, and sunscreen. The patient was instructed to call the office immediately if the following severe adverse effects occur:  hearing changes, easy bruising/bleeding, severe headache, or vision changes. The patient verbalized understanding of the proper use and possible adverse effects of doxycycline. All of the patient's questions and concerns were addressed. Protopic Counseling: Patient may experience a mild burning sensation during topical application. Protopic is not approved in children less than 3years of age. There have been case reports of hematologic and skin malignancies in patients using topical calcineurin inhibitors although causality is questionable. Prednisone Counseling:  I discussed with the patient the risks of prolonged use of prednisone including but not limited to weight gain, insomnia, osteoporosis, mood changes, diabetes, susceptibility to infection, glaucoma and high blood pressure. In cases where prednisone use is prolonged, patients should be monitored with blood pressure checks, serum glucose levels and an eye exam.  Additionally, the patient may need to be placed on GI prophylaxis, PCP prophylaxis, and calcium and vitamin D supplementation and/or a bisphosphonate. The patient verbalized understanding of the proper use and the possible adverse effects of prednisone. All of the patient's questions and concerns were addressed. Rituxan Counseling:  I discussed with the patient the risks of Rituxan infusions. Side effects can include infusion reactions, severe drug rashes including mucocutaneous reactions, reactivation of latent hepatitis and other infections and rarely progressive multifocal leukoencephalopathy. All of the patient's questions and concerns were addressed. Gabapentin Counseling: I discussed with the patient the risks of gabapentin including but not limited to dizziness, somnolence, fatigue and ataxia. Stelara Counseling:  I discussed with the patient the risks of ustekinumab including but not limited to immunosuppression, malignancy, posterior leukoencephalopathy syndrome, and serious infections. The patient understands that monitoring is required including a PPD at baseline and must alert us or the primary physician if symptoms of infection or other concerning signs are noted. Doxepin Pregnancy And Lactation Text: This medication is Pregnancy Category C and it isn't known if it is safe during pregnancy. It is also excreted in breast milk and breast feeding isn't recommended. Methotrexate Pregnancy And Lactation Text: This medication is Pregnancy Category X and is known to cause fetal harm. This medication is excreted in breast milk. Isotretinoin Counseling: Patient should get monthly blood tests, not donate blood, not drive at night if vision affected, not share medication, and not undergo elective surgery for 6 months after tx completed. Side effects reviewed, pt to contact office should one occur. Xeljanz Counseling: Lexi Red Counseling: I discussed with the patient the risks of Lexi Red therapy including increased risk of infection, liver issues, headache, diarrhea, or cold symptoms. Live vaccines should be avoided. They were instructed to call if they have any problems. Rifampin Counseling: I discussed with the patient the risks of rifampin including but not limited to liver damage, kidney damage, red-orange body fluids, nausea/vomiting and severe allergy. Taltz Counseling: I discussed with the patient the risks of ixekizumab including but not limited to immunosuppression, serious infections, worsening of inflammatory bowel disease and drug reactions. The patient understands that monitoring is required including a PPD at baseline and must alert us or the primary physician if symptoms of infection or other concerning signs are noted. Tazorac Pregnancy And Lactation Text: This medication is not safe during pregnancy. It is unknown if this medication is excreted in breast milk. Cyclosporine Counseling:  I discussed with the patient the risks of cyclosporine including but not limited to hypertension, gingival hyperplasia,myelosuppression, immunosuppression, liver damage, kidney damage, neurotoxicity, lymphoma, and serious infections. The patient understands that monitoring is required including baseline blood pressure, CBC, CMP, lipid panel and uric acid, and then 1-2 times monthly CMP and blood pressure. Hydroxychloroquine Pregnancy And Lactation Text: This medication has been shown to cause fetal harm but it isn't assigned a Pregnancy Risk Category. There are small amounts excreted in breast milk. Clindamycin Pregnancy And Lactation Text: This medication can be used in pregnancy if certain situations. Clindamycin is also present in breast milk. Skyrizi Counseling: I discussed with the patient the risks of risankizumab-rzaa including but not limited to immunosuppression, and serious infections. The patient understands that monitoring is required including a PPD at baseline and must alert us or the primary physician if symptoms of infection or other concerning signs are noted. Metronidazole Counseling:  I discussed with the patient the risks of metronidazole including but not limited to seizures, nausea/vomiting, a metallic taste in the mouth, nausea/vomiting and severe allergy. Tremfya Counseling: I discussed with the patient the risks of guselkumab including but not limited to immunosuppression, serious infections, and drug reactions. The patient understands that monitoring is required including a PPD at baseline and must alert us or the primary physician if symptoms of infection or other concerning signs are noted. Topical Retinoid counseling:  Patient advised to apply a pea-sized amount only at bedtime and wait 30 minutes after washing their face before applying. If too drying, patient may add a non-comedogenic moisturizer. The patient verbalized understanding of the proper use and possible adverse effects of retinoids. All of the patient's questions and concerns were addressed. Humira Counseling:  I discussed with the patient the risks of adalimumab including but not limited to myelosuppression, immunosuppression, autoimmune hepatitis, demyelinating diseases, lymphoma, and serious infections. The patient understands that monitoring is required including a PPD at baseline and must alert us or the primary physician if symptoms of infection or other concerning signs are noted. Ilumya Counseling: I discussed with the patient the risks of tildrakizumab including but not limited to immunosuppression, malignancy, posterior leukoencephalopathy syndrome, and serious infections. The patient understands that monitoring is required including a PPD at baseline and must alert us or the primary physician if symptoms of infection or other concerning signs are noted. Xolair Counseling:  Patient informed of potential adverse effects including but not limited to fever, muscle aches, rash and allergic reactions. The patient verbalized understanding of the proper use and possible adverse effects of Xolair. All of the patient's questions and concerns were addressed. Nsaids Counseling: NSAID Counseling: I discussed with the patient that NSAIDs should be taken with food. Prolonged use of NSAIDs can result in the development of stomach ulcers. Patient advised to stop taking NSAIDs if abdominal pain occurs. The patient verbalized understanding of the proper use and possible adverse effects of NSAIDs. All of the patient's questions and concerns were addressed. Cellcept Counseling:  I discussed with the patient the risks of mycophenolate mofetil including but not limited to infection/immunosuppression, GI upset, hypokalemia, hypercholesterolemia, bone marrow suppression, lymphoproliferative disorders, malignancy, GI ulceration/bleed/perforation, colitis, interstitial lung disease, kidney failure, progressive multifocal leukoencephalopathy, and birth defects. The patient understands that monitoring is required including a baseline creatinine and regular CBC testing. In addition, patient must alert us immediately if symptoms of infection or other concerning signs are noted. Griseofulvin Pregnancy And Lactation Text: This medication is Pregnancy Category X and is known to cause serious birth defects. It is unknown if this medication is excreted in breast milk but breast feeding should be avoided. Erythromycin Pregnancy And Lactation Text: This medication is Pregnancy Category B and is considered safe during pregnancy. It is also excreted in breast milk. Topical Sulfur Applications Pregnancy And Lactation Text: This medication is Pregnancy Category C and has an unknown safety profile during pregnancy. It is unknown if this topical medication is excreted in breast milk. Solaraze Counseling:  I discussed with the patient the risks of Solaraze including but not limited to erythema, scaling, itching, weeping, crusting, and pain. Cimzia Counseling:  I discussed with the patient the risks of Cimzia including but not limited to immunosuppression, allergic reactions and infections. The patient understands that monitoring is required including a PPD at baseline and must alert us or the primary physician if symptoms of infection or other concerning signs are noted. Otezla Pregnancy And Lactation Text: This medication is Pregnancy Category C and it isn't known if it is safe during pregnancy. It is unknown if it is excreted in breast milk. Infliximab Counseling:  I discussed with the patient the risks of infliximab including but not limited to myelosuppression, immunosuppression, autoimmune hepatitis, demyelinating diseases, lymphoma, and serious infections. The patient understands that monitoring is required including a PPD at baseline and must alert us or the primary physician if symptoms of infection or other concerning signs are noted. Acitretin Counseling:  I discussed with the patient the risks of acitretin including but not limited to hair loss, dry lips/skin/eyes, liver damage, hyperlipidemia, depression/suicidal ideation, photosensitivity. Serious rare side effects can include but are not limited to pancreatitis, pseudotumor cerebri, bony changes, clot formation/stroke/heart attack. Patient understands that alcohol is contraindicated since it can result in liver toxicity and significantly prolong the elimination of the drug by many years. Imiquimod Counseling:  I discussed with the patient the risks of imiquimod including but not limited to erythema, scaling, itching, weeping, crusting, and pain. Patient understands that the inflammatory response to imiquimod is variable from person to person and was educated regarded proper titration schedule. If flu-like symptoms develop, patient knows to discontinue the medication and contact us. Isotretinoin Pregnancy And Lactation Text: This medication is Pregnancy Category X and is considered extremely dangerous during pregnancy. It is unknown if it is excreted in breast milk. Dupixent Pregnancy And Lactation Text: This medication likely crosses the placenta but the risk for the fetus is uncertain. This medication is excreted in breast milk. Methotrexate Counseling:  Patient counseled regarding adverse effects of methotrexate including but not limited to nausea, vomiting, abnormalities in liver function tests. Patients may develop mouth sores, rash, diarrhea, and abnormalities in blood counts. The patient understands that monitoring is required including LFT's and blood counts. There is a rare possibility of scarring of the liver and lung problems that can occur when taking methotrexate. Persistent nausea, loss of appetite, pale stools, dark urine, cough, and shortness of breath should be reported immediately. Patient advised to discontinue methotrexate treatment at least three months before attempting to become pregnant. I discussed the need for folate supplements while taking methotrexate. These supplements can decrease side effects during methotrexate treatment. The patient verbalized understanding of the proper use and possible adverse effects of methotrexate. All of the patient's questions and concerns were addressed. Hydroquinone Counseling:  Patient advised that medication may result in skin irritation, lightening (hypopigmentation), dryness, and burning. In the event of skin irritation, the patient was advised to reduce the amount of the drug applied or use it less frequently. Rarely, spots that are treated with hydroquinone can become darker (pseudoochronosis). Should this occur, patient instructed to stop medication and call the office. The patient verbalized understanding of the proper use and possible adverse effects of hydroquinone. All of the patient's questions and concerns were addressed. Bactrim Counseling:  I discussed with the patient the risks of sulfa antibiotics including but not limited to GI upset, allergic reaction, drug rash, diarrhea, dizziness, photosensitivity, and yeast infections. Rarely, more serious reactions can occur including but not limited to aplastic anemia, agranulocytosis, methemoglobinemia, blood dyscrasias, liver or kidney failure, lung infiltrates or desquamative/blistering drug rashes. Cimzia Pregnancy And Lactation Text: This medication crosses the placenta but can be considered safe in certain situations. Cimzia may be excreted in breast milk. Birth Control Pills Pregnancy And Lactation Text: This medication should be avoided if pregnant and for the first 30 days post-partum. Ketoconazole Pregnancy And Lactation Text: This medication is Pregnancy Category C and it isn't know if it is safe during pregnancy. It is also excreted in breast milk and breast feeding isn't recommended. Glycopyrrolate Pregnancy And Lactation Text: This medication is Pregnancy Category B and is considered safe during pregnancy. It is unknown if it is excreted breast milk. Azithromycin Pregnancy And Lactation Text: This medication is considered safe during pregnancy and is also secreted in breast milk. Tazorac Counseling:  Patient advised that medication is irritating and drying. Patient may need to apply sparingly and wash off after an hour before eventually leaving it on overnight. The patient verbalized understanding of the proper use and possible adverse effects of tazorac. All of the patient's questions and concerns were addressed. High Dose Vitamin A Counseling: Side effects reviewed, pt to contact office should one occur. Benzoyl Peroxide Counseling: Patient counseled that medicine may cause skin irritation and bleach clothing. In the event of skin irritation, the patient was advised to reduce the amount of the drug applied or use it less frequently. The patient verbalized understanding of the proper use and possible adverse effects of benzoyl peroxide. All of the patient's questions and concerns were addressed. Glycopyrrolate Counseling:  I discussed with the patient the risks of glycopyrrolate including but not limited to skin rash, drowsiness, dry mouth, difficulty urinating, and blurred vision. Quinolones Counseling:  I discussed with the patient the risks of fluoroquinolones including but not limited to GI upset, allergic reaction, drug rash, diarrhea, dizziness, photosensitivity, yeast infections, liver function test abnormalities, tendonitis/tendon rupture. Arava Counseling:  Patient counseled regarding adverse effects of Arava including but not limited to nausea, vomiting, abnormalities in liver function tests. Patients may develop mouth sores, rash, diarrhea, and abnormalities in blood counts. The patient understands that monitoring is required including LFTs and blood counts. There is a rare possibility of scarring of the liver and lung problems that can occur when taking methotrexate. Persistent nausea, loss of appetite, pale stools, dark urine, cough, and shortness of breath should be reported immediately. Patient advised to discontinue Arava treatment and consult with a physician prior to attempting conception. The patient will have to undergo a treatment to eliminate Arava from the body prior to conception. Bactrim Pregnancy And Lactation Text: This medication is Pregnancy Category D and is known to cause fetal risk. It is also excreted in breast milk. Siliq Counseling:  I discussed with the patient the risks of Siliq including but not limited to new or worsening depression, suicidal thoughts and behavior, immunosuppression, malignancy, posterior leukoencephalopathy syndrome, and serious infections. The patient understands that monitoring is required including a PPD at baseline and must alert us or the primary physician if symptoms of infection or other concerning signs are noted. There is also a special program designed to monitor depression which is required with Siliq. Itraconazole Counseling:  I discussed with the patient the risks of itraconazole including but not limited to liver damage, nausea/vomiting, neuropathy, and severe allergy. The patient understands that this medication is best absorbed when taken with acidic beverages such as non-diet cola or ginger ale. The patient understands that monitoring is required including baseline LFTs and repeat LFTs at intervals. The patient understands that they are to contact us or the primary physician if concerning signs are noted. Odomzo Counseling- I discussed with the patient the risks of Odomzo including but not limited to nausea, vomiting, diarrhea, constipation, weight loss, changes in the sense of taste, decreased appetite, muscle spasms, and hair loss. The patient verbalized understanding of the proper use and possible adverse effects of Odomzo. All of the patient's questions and concerns were addressed. Benzoyl Peroxide Pregnancy And Lactation Text: This medication is Pregnancy Category C. It is unknown if benzoyl peroxide is excreted in breast milk. Protopic Pregnancy And Lactation Text: This medication is Pregnancy Category C. It is unknown if this medication is excreted in breast milk when applied topically. Enbrel Counseling:  I discussed with the patient the risks of etanercept including but not limited to myelosuppression, immunosuppression, autoimmune hepatitis, demyelinating diseases, lymphoma, and infections. The patient understands that monitoring is required including a PPD at baseline and must alert us or the primary physician if symptoms of infection or other concerning signs are noted. Minocycline Counseling: Patient advised regarding possible photosensitivity and discoloration of the teeth, skin, lips, tongue and gums. Patient instructed to avoid sunlight, if possible. When exposed to sunlight, patients should wear protective clothing, sunglasses, and sunscreen. The patient was instructed to call the office immediately if the following severe adverse effects occur:  hearing changes, easy bruising/bleeding, severe headache, or vision changes. The patient verbalized understanding of the proper use and possible adverse effects of minocycline. All of the patient's questions and concerns were addressed. Solaraze Pregnancy And Lactation Text: This medication is Pregnancy Category B and is considered safe. There is some data to suggest avoiding during the third trimester. It is unknown if this medication is excreted in breast milk.

## 2022-11-01 NOTE — WOUND CARE
Nonselective enzymatic debridement performed with Santyl per physician order to wound(s) of the coccyx   Patient tolerated the procedure well.

## 2022-11-01 NOTE — DISCHARGE INSTRUCTIONS
PHYSICIAN ORDERS AND DISCHARGE INSTRUCTIONS  NOTE: Upon discharge from the [address], you will receive a patient experience survey via E-mail. We would be grateful if you would take the time to fill this survey out. Wound care order history:              KATHLEEN's   Right       Left    Date               Vascular studies/Intervention: . Cultures: .9/13/22               Antibiotics: .Cipro and Doxy 9/13/22                         HbA1c:  .6/29/22 8.2               Grafts: Richardson Blake: . Continuing wound care orders and information:              Residence: . Private              [facility] home health care with: [facility]              Your wound-care supplies will be provided by: . [facility]              Pharmacy: [facility] in [place]  Wound cleansing:                           Do not scrub or use excessive force. Wash hands with soap and water before and after dressing changes. Prior to applying a clean dressing, cleanse wound with normal saline,                          wound cleanser, or mild soap and water. Ask your physician or nurse before getting the wound(s) wet in the shower. Daily Wound management:                          Keep weight off wounds and reposition every 2 hours. Avoid standing for long periods of time. Evaluate legs to the level of the heart or above for 30 minutes 4-5 times a day and/or when sitting. When taking antibiotics take entire prescription as ordered by MD do not stop taking until medicine is all gone. Orders for this week (11/01/22) :   Right Heel- Wash with mild soap and water, rinse with saline, pat dry with 4x4  Gently pack with 4x4 soaked betadine  Cover with ABD,  Wrap with conform and secure with tape  Change on Tuesday, Thursday and Saturday    Coccyx:  Apply santyl  Cover with saceral border  Change Tuesday, Thursday, Saturday     Discussed amputation, Dr. Geary Kanner office will contact you     Follow up with Dr. Geary Kanner In 1 weeks in the wound care center  Call [phone] for any questions or concerns.   Date__________   Time____________

## 2022-11-07 PROBLEM — L97.414 ULCER OF RIGHT HEEL, WITH NECROSIS OF BONE (HCC): Status: ACTIVE | Noted: 2022-11-07

## 2022-11-08 ENCOUNTER — HOSPITAL ENCOUNTER (OUTPATIENT)
Dept: WOUND CARE | Age: 57
Discharge: HOME | End: 2022-11-08
Payer: MEDICARE

## 2022-11-08 VITALS
TEMPERATURE: 98.3 F | SYSTOLIC BLOOD PRESSURE: 140 MMHG | DIASTOLIC BLOOD PRESSURE: 62 MMHG | RESPIRATION RATE: 16 BRPM | HEART RATE: 90 BPM

## 2022-11-08 DIAGNOSIS — Z78.9 PRESENCE OF SURGICAL INCISION: ICD-10-CM

## 2022-11-08 DIAGNOSIS — S91.302S: ICD-10-CM

## 2022-11-08 DIAGNOSIS — M79.671 ACUTE PAIN OF RIGHT FOOT: ICD-10-CM

## 2022-11-08 DIAGNOSIS — L97.414 ULCER OF RIGHT HEEL, WITH NECROSIS OF BONE (HCC): ICD-10-CM

## 2022-11-08 DIAGNOSIS — E11.621 DIABETIC ULCER OF RIGHT HEEL ASSOCIATED WITH TYPE 2 DIABETES MELLITUS, WITH NECROSIS OF BONE (HCC): Primary | ICD-10-CM

## 2022-11-08 DIAGNOSIS — L97.414 DIABETIC ULCER OF RIGHT HEEL ASSOCIATED WITH TYPE 2 DIABETES MELLITUS, WITH NECROSIS OF BONE (HCC): Primary | ICD-10-CM

## 2022-11-08 PROCEDURE — 99213 OFFICE O/P EST LOW 20 MIN: CPT | Performed by: NURSE PRACTITIONER

## 2022-11-08 PROCEDURE — 99213 OFFICE O/P EST LOW 20 MIN: CPT

## 2022-11-08 RX ORDER — LIDOCAINE HYDROCHLORIDE 40 MG/ML
SOLUTION TOPICAL ONCE
OUTPATIENT
Start: 2022-11-08 | End: 2022-11-08

## 2022-11-08 RX ORDER — BACITRACIN, NEOMYCIN, POLYMYXIN B 400; 3.5; 5 [USP'U]/G; MG/G; [USP'U]/G
OINTMENT TOPICAL ONCE
OUTPATIENT
Start: 2022-11-08 | End: 2022-11-08

## 2022-11-08 RX ORDER — LIDOCAINE HYDROCHLORIDE 20 MG/ML
JELLY TOPICAL ONCE
OUTPATIENT
Start: 2022-11-08 | End: 2022-11-08

## 2022-11-08 RX ORDER — BETAMETHASONE DIPROPIONATE 0.05 %
OINTMENT (GRAM) TOPICAL ONCE
OUTPATIENT
Start: 2022-11-08 | End: 2022-11-08

## 2022-11-08 RX ORDER — LIDOCAINE 50 MG/G
OINTMENT TOPICAL ONCE
OUTPATIENT
Start: 2022-11-08 | End: 2022-11-08

## 2022-11-08 RX ORDER — LIDOCAINE 40 MG/G
CREAM TOPICAL ONCE
OUTPATIENT
Start: 2022-11-08 | End: 2022-11-08

## 2022-11-08 RX ORDER — CLOBETASOL PROPIONATE 0.5 MG/G
OINTMENT TOPICAL ONCE
OUTPATIENT
Start: 2022-11-08 | End: 2022-11-08

## 2022-11-08 RX ORDER — GENTAMICIN SULFATE 1 MG/G
OINTMENT TOPICAL ONCE
OUTPATIENT
Start: 2022-11-08 | End: 2022-11-08

## 2022-11-08 RX ORDER — BACITRACIN ZINC AND POLYMYXIN B SULFATE 500; 1000 [USP'U]/G; [USP'U]/G
OINTMENT TOPICAL ONCE
OUTPATIENT
Start: 2022-11-08 | End: 2022-11-08

## 2022-11-08 RX ORDER — GINSENG 100 MG
CAPSULE ORAL ONCE
OUTPATIENT
Start: 2022-11-08 | End: 2022-11-08

## 2022-11-08 ASSESSMENT — PAIN DESCRIPTION - FREQUENCY: FREQUENCY: CONTINUOUS

## 2022-11-08 ASSESSMENT — PAIN DESCRIPTION - ONSET: ONSET: ON-GOING

## 2022-11-08 ASSESSMENT — PAIN DESCRIPTION - DESCRIPTORS: DESCRIPTORS: ACHING

## 2022-11-08 ASSESSMENT — PAIN DESCRIPTION - PAIN TYPE: TYPE: CHRONIC PAIN

## 2022-11-08 ASSESSMENT — PAIN SCALES - GENERAL: PAINLEVEL_OUTOF10: 8

## 2022-11-08 ASSESSMENT — PAIN DESCRIPTION - LOCATION: LOCATION: FOOT

## 2022-11-08 NOTE — DISCHARGE INSTRUCTIONS
PHYSICIAN ORDERS AND DISCHARGE INSTRUCTIONS  NOTE: Upon discharge from the [address], you will receive a patient experience survey via E-mail. We would be grateful if you would take the time to fill this survey out. Wound care order history:              KATHLEEN's   Right       Left    Date               Vascular studies/Intervention: . Cultures: .9/13/22               Antibiotics: .Cipro and Doxy 9/13/22                         HbA1c:  .6/29/22 8.2               Grafts: Tiffani Kras: . Continuing wound care orders and information:              Residence: . Private              [facility] home health care with: [id number]              Your wound-care supplies will be provided by: . [facility]              Pharmacy: .[facility]  Wound cleansing:                           Do not scrub or use excessive force. Wash hands with soap and water before and after dressing changes. Prior to applying a clean dressing, cleanse wound with normal saline,                          wound cleanser, or mild soap and water. Ask your physician or nurse before getting the wound(s) wet in the shower. Daily Wound management:                          Keep weight off wounds and reposition every 2 hours. Avoid standing for long periods of time. Evaluate legs to the level of the heart or above for 30 minutes 4-5 times a day and/or when sitting. When taking antibiotics take entire prescription as ordered by MD do not stop taking until medicine is all gone. Orders for this week (11/01/22) :   Right Heel- Wash with mild soap and water, rinse with saline, pat dry with 4x4  Gently pack with 4x4 soaked betadine  Cover with ABD,  Wrap with conform and secure with tape  Change on Tuesday, Thursday and Saturday     Coccyx:  Apply santyl  Cover with saceral border  Change Tuesday, Thursday, Saturday     Discussed amputation, Dr. Graham Vivas office will contact you     Follow up with Dr. Graham Vivas In 1 weeks in the wound care center  Call [phone] for any questions or concerns.   Date__________   Time____________

## 2022-11-08 NOTE — PROGRESS NOTES
Wound Care Center Progress Note       Flavio Guillory  AGE: 62 y.o. GENDER: female  : 1965  TODAY'S DATE:  2022        Subjective:     Chief Complaint   Patient presents with    Wound Check     Right heel         HISTORY of PRESENT ILLNESS     Flavio Guillory is a 62 y.o. female who presents today for wound evaluation of Chronic arterial and diabetic ulcer(s) of the heel of the right foot. The ulcer is of marked severity. The underlying cause of the wound is diabetes and pressure. The wound is markedly worse and her whole calcaneus has been fragmented and it is fracturing. She was recently admitted to [place] about 2 weeks ago and she did have a surgery done on her foot with removal of portions of the calcaneus. She has missed a couple times at the wound care center and this has gotten markedly worse and I unfortunately told her that this is no longer salvageable and she needs a below the knee amputation. She also has a new stage II pressure ulceration in the sacral area. Patient is scheduled for an amputation on Friday with dr Clifford Limon. Wound is extremely necrotic and has worsened dramatically. Small spot of pressure to the coccyx is clean and stable and will still require santyl.     Wound Pain Timing/Severity: none    Quality of pain: N/A  Severity of pain:  0 / 10   Modifying Factors: diabetes, poor glucose control, poor hygiene, non-adherence, and ESRD on hemodialysis  Associated Signs/Symptoms: edema, erythema, drainage, and odor        PAST MEDICAL HISTORY        Diagnosis Date    Acute pain of right foot 2022    CAD (coronary artery disease)     s/p CABG x 4;  follows with Dr Morales Medico of foot 2018    Carpal tunnel syndrome on both sides     CHF (congestive heart failure) ([address] 10/2010    Chronic diastolic; EF 40%    Chronic kidney disease     stage 4 kidney disease    Chronic ulcer of left ankle with fat layer exposed ([address] 10/7/2015    Chronic ulcer of left foot with fat layer exposed ([address] 3/17/2016    Chronic ulcer of right ankle with fat layer exposed ([address] 3/17/2016    Chronic ulcer of right foot with fat layer exposed ([address] 3/17/2016    Depression     Diabetes mellitus ([address]     Diabetes mellitus with neurological manifestations ([address]     Diabetes mellitus with ulcer of ankle ([address]     Diabetic ulcer of left foot associated with type 2 diabetes mellitus, with fat layer exposed ([address] 8/6/2018    Diabetic ulcer of right heel associated with type 2 diabetes mellitus, with muscle involvement without evidence of necrosis ([address] 9/13/2022    Diabetic ulcer of right heel associated with type 2 diabetes mellitus, with necrosis of bone ([address] 9/13/2022    ESRD on hemodialysis ([address] 9/21/2022    Family history of cardiovascular disease     Mother    GERD (gastroesophageal reflux disease)     H/O cardiac catheterization 10/18/2010, 6/3/2010    10/18/2010-Four bypass grafts all widely patent. 6/3/2010- Moderate to severe triple vessel disease, preserved LV systolic function. H/O cardiovascular stress test 7/26/2012, 8/3/2011, 10/14/2010, 5/24/2010 7/26/2012-Lexiscan- Normal Myocardial Perfusion Study. Post stress myocardial perfusion images show a normal pattern of perfusion in all regions. Post stress LV normal size. Normal perfusion in the distribution of all coronaries. Normal LV size and function. Rest EF 70%    H/O chest x-ray 7/12/2012, 6/2/2010 7/12/2012-Perihilar peribronchial cuffing with mild basilar predominant interstital prominence, which may reflect interstitial edema or atypical pneumonia. H/O Doppler ultrasound 6/4/2010    CAROTID DOPPLER-6/4/2010-No Doppler evidence of hemodynamically significant Carotid Stenosis with antegrade flow in the vertebral arteries bilaterally. 6/4/2010 PERIPHERAL VENOUS DOPPLER_ LEFT Saphenous Vein mapping    H/O echocardiogram 7/26/2012, 8/3/2011, 10/2010, 7/23/2010, 6/8/2010 7/26/2012-LV normal size.  Normal LV wall thickness. LV systolic function normal. EF => 55%. LV wall motion normal. Mild MR. Mild TR. History of complete ECG 7/26/2012 [facility] Idabel), 7/13/2012 [facility]), 7/25/2011, 3/25/2011, 10/18/2010, 10/11/2010, 6/23/2010, 5/7/2010    Hx of cardiovascular stress test 9/3/2015    lexiscan-normal,EF66%    Hx of Doppler ultrasound 4/5/16    Arterial: There is a fluid collection in the left thigh, please refer to PCP for further monitoring, no vascular in etiology. Hx of echocardiogram     4/16 EF40% Mild MR/TR and mild Pulm HTN. 9/15 EF 45-50%, Mildly dilated L atrium, mildly dilated R ventricle. Mild MR & mild TR     Hyperlipidemia     Neuropathy of foot     Pt reports starting approx. 6-7 years ago    Neuropathy of hand     Pt reports starting approx.  6-7 years ago    Non compliance with medical treatment 7/2/2018    S/P CABG x 4 6/5/2010    LIMA-> LAD;  VG->CX;  VG->Diagonal; VG-> distal RCA  per  Dr Silvestre Dorsey    Type 2 diabetes mellitus with diabetic polyneuropathy, with long-term current use of insulin ([address] 4/5/2016    Type II or unspecified type diabetes mellitus with neurological manifestations, not stated as uncontrolled(250.60)     Type II or unspecified type diabetes mellitus with other specified manifestations, not stated as uncontrolled     Ulcer of left foot, with fat layer exposed ([address] 12/19/2013    Ulcer of other part of foot        PAST SURGICAL HISTORY    Past Surgical History:   Procedure Laterality Date    APPENDECTOMY      CARDIAC SURGERY      CARPAL TUNNEL RELEASE      L hand Nov. 2011, R hand Jan. 2012    CARPAL TUNNEL RELEASE Right 6/10/2013    recurrent    CARPAL TUNNEL RELEASE Left 7/29/2013    with ulnar nerve transpostion and L middle finger release    CHOLECYSTECTOMY      COLONOSCOPY      CORONARY ARTERY BYPASS GRAFT  6/5/2010 6/5/2010-LIMA->LAD;  VG-> Diagonal;  VG-> Obtuse marginal;  VG->Distal RCA-   Dr Dion Clay Right 6/10/2013    HYSTERECTOMY, TOTAL ABDOMINAL (CERVIX REMOVED)      TOE AMPUTATION Left  toe    TUBAL LIGATION      ULNAR TUNNEL RELEASE Right 6/10/2013       FAMILY HISTORY    Family History   Problem Relation Age of Onset    Heart Disease Mother     Kidney Disease Mother         dialysis    Cancer Father        SOCIAL HISTORY    Social History     Tobacco Use    Smoking status: Former     Packs/day: 0.25     Years: 30.00     Pack years: 7.50     Types: Cigarettes     Quit date: 2022     Years since quittin.1    Smokeless tobacco: Never    Tobacco comments:     quit smoking ciagarettes 16   Vaping Use    Vaping Use: Every day    Substances: Never   Substance Use Topics    Alcohol use: No     Alcohol/week: 0.0 standard drinks     Comment:                                    CAFFEINE: 2 sodas daily    Drug use: No       ALLERGIES    Allergies   Allergen Reactions    Latex     Codeine     Cortisone     Cortizone     Iodides     Phenobarbital Other (See Comments)     Hallucinations     Vancomycin Other (See Comments)     \"makes me very sick\"       MEDICATIONS    Current Outpatient Medications on File Prior to Encounter   Medication Sig Dispense Refill    collagenase (SANTYL) 250 UNIT/GM ointment Apply topically daily. 30 g 1    gabapentin (NEURONTIN) 400 MG capsule Take 800 mg by mouth in the morning and at bedtime. OXYGEN Inhale 2 L into the lungs daily as needed      carvedilol (COREG) 6.25 MG tablet 6.25 mg 2 times daily       VICTOZA 18 MG/3ML SOPN SC injection daily  (Patient not taking: No sig reported)      albuterol sulfate HFA (PROVENTIL;VENTOLIN;PROAIR) 108 (90 Base) MCG/ACT inhaler Inhale 2 puffs into the lungs every 6 hours as needed for Wheezing      esomeprazole Magnesium (NEXIUM) 40 MG PACK Take 20 mg by mouth 2 times daily 2 tabs      oxyCODONE-acetaminophen (PERCOCET)  MG per tablet Take 1 tablet by mouth every 6 hours as needed for Pain.       LORazepam (ATIVAN) 0.5 MG tablet Take 0.5 mg by mouth every 12 hours Indications: One Tablet twice daily       Insulin Aspart (NOVOLOG FLEXPEN SC) Inject 15 Units into the skin 3 times daily (before meals) On sliding scale (Patient not taking: No sig reported)      pravastatin (PRAVACHOL) 40 MG tablet 40 mg daily. lisinopril (PRINIVIL;ZESTRIL) 10 MG tablet Take 20 mg by mouth daily      insulin glargine (LANTUS) 100 UNIT/ML injection Inject 50 Units into the skin nightly. (Patient not taking: No sig reported)      furosemide (LASIX) 20 MG tablet Take 40 mg by mouth 2 times daily        No current facility-administered medications on file prior to encounter. REVIEW OF SYSTEMS    Pertinent items are noted in HPI. Constitutional: Negative for systemic symptoms including fever, chills and malaise. Objective:      BP (!) 140/62   Pulse 90   Temp 98.3 °F (36.8 °C) (Temporal)   Resp 16     PHYSICAL EXAM      General: The patient is in no acute distress. Mental status:  Patient is appropriate, is  oriented to place and plan of care. Dermatologic exam: Visual inspection of the periwound reveals the skin to be dry, clammy, coarse, and sclerotic.   Wound exam:  see wound description below     All active wounds listed below with today's date are evaluated      Negative Pressure Wound Therapy Foot (Active)   Number of days: 3188       Negative Pressure Wound Therapy Foot Distal (Active)   Number of days: 3184       Negative Pressure Wound Therapy (Active)   Wound Type Diabetic foot ulcer 10/11/22 1145   Unit Type kci 10/11/22 1145   Dressing Type Black Foam 10/11/22 1145   Number of pieces used 1 10/11/22 1145   Number of pieces removed 2 10/11/22 1145   Cycle Continuous 10/11/22 1145   Target Pressure (mmHg) 125 10/11/22 1145   Dressing Status New dressing applied;Clean, dry & intact 09/27/22 1045   Dressing Changed Changed/New 09/27/22 1045   Number of days: 48       Incision 06/10/13 Wrist Right (Active)   Number of days: 3438       Incision 07/29/13 Wrist Left (Active) Number of days: 3389       Incision 02/14/14 Toe (Comment  which one) (Active)   Number of days: 3188       Wound 09/13/22 #1 Right Foot Heel (Active)   Wound Image   11/01/22 0934   Dressing Status New dressing applied;Clean;Dry; Intact 11/01/22 1041   Wound Cleansed Soap and water; Wound cleanser 11/08/22 1546   Dressing/Treatment Negative pressure wound therapy 09/27/22 1017   Offloading for Diabetic Foot Ulcers Back offloading shoe 11/08/22 1546   Wound Length (cm) 10 cm 11/08/22 1546   Wound Width (cm) 6.5 cm 11/08/22 1546   Wound Depth (cm) 2.5 cm 11/08/22 1546   Wound Surface Area (cm^2) 65 cm^2 11/08/22 1546   Change in Wound Size % (l*w) -72.41 11/08/22 1546   Wound Volume (cm^3) 162.5 cm^3 11/08/22 1546   Wound Healing % -1337 11/08/22 1546   Post-Procedure Length (cm) 8.9 cm 11/01/22 1004   Post-Procedure Width (cm) 6.4 cm 11/01/22 1004   Post-Procedure Depth (cm) 2.3 cm 11/01/22 1004   Post-Procedure Surface Area (cm^2) 56.96 cm^2 11/01/22 1004   Post-Procedure Volume (cm^3) 131.008 cm^3 11/01/22 1004   Distance Tunneling (cm) 0 cm 11/08/22 1546   Tunneling Position ___ O'Clock 0 11/08/22 1546   Undermining Starts ___ O'Clock 0 11/08/22 1546   Undermining Ends___ O'Clock 0 11/08/22 1546   Undermining Maxium Distance (cm) 0 11/08/22 1546   Wound Assessment Devitalized tissue;Eschar dry;Slough; Exposed structure bone;Eschar moist 11/08/22 1546   Drainage Amount Large 11/08/22 1546   Drainage Description Serosanguinous 11/08/22 1546   Odor None 11/08/22 1546   Lora-wound Assessment Hyperpigmented;Fragile 11/08/22 1546   Margins Defined edges; Unattached edges 11/08/22 1546   Wound Thickness Description not for Pressure Injury Full thickness 11/08/22 1546   Number of days: 56       Wound 11/01/22 #2 Sacrum (Active)   Wound Image   11/01/22 0942   Dressing Status New dressing applied;Clean;Dry; Intact 11/01/22 1042   Wound Cleansed Soap and water; Wound cleanser 11/08/22 1546   Offloading for Diabetic Foot Ulcers Offloading not required 11/01/22 0942   Wound Length (cm) 3 cm 11/08/22 1546   Wound Width (cm) 2.5 cm 11/08/22 1546   Wound Depth (cm) 0.1 cm 11/08/22 1546   Wound Surface Area (cm^2) 7.5 cm^2 11/08/22 1546   Change in Wound Size % (l*w) -50 11/08/22 1546   Wound Volume (cm^3) 0.75 cm^3 11/08/22 1546   Wound Healing % -50 11/08/22 1546   Post-Procedure Length (cm) 2.5 cm 11/01/22 1004   Post-Procedure Width (cm) 2 cm 11/01/22 1004   Post-Procedure Depth (cm) 0.1 cm 11/01/22 1004   Post-Procedure Surface Area (cm^2) 5 cm^2 11/01/22 1004   Post-Procedure Volume (cm^3) 0.5 cm^3 11/01/22 1004   Distance Tunneling (cm) 0 cm 11/08/22 1546   Tunneling Position ___ O'Clock 0 11/08/22 1546   Undermining Starts ___ O'Clock 0 11/08/22 1546   Undermining Ends___ O'Clock 0 11/08/22 1546   Undermining Maxium Distance (cm) 0 11/08/22 1546   Wound Assessment Slough;Eschar moist;Pink/red 11/08/22 1546   Drainage Amount Moderate 11/08/22 1546   Drainage Description Serosanguinous 11/08/22 1546   Odor None 11/08/22 1546   Lora-wound Assessment Fragile;Dry/flaky 11/08/22 1546   Margins Defined edges; Unattached edges 11/08/22 1546   Wound Thickness Description not for Pressure Injury Full thickness 11/08/22 1546   Number of days: 7       Assessment:       Problem List Items Addressed This Visit          Endocrine    Diabetic ulcer of right heel associated with type 2 diabetes mellitus, with necrosis of bone ([address] - Primary    Relevant Orders    Initiate Outpatient Wound Care Protocol       Other    Acute pain of right foot    Relevant Orders    Initiate Outpatient Wound Care Protocol    Wound, open, foot with complication, left, sequela    WD-Presence of surgical incision    Ulcer of right heel, with necrosis of bone ([address]       Status of wound progress and description from last visit:   Patient schedule for BKA on friday.   Getting cardiac clearance tomorrow and angiogram arterial Thursday  Will follow up here in clinic eventually  Checking on patient supplies         Plan:     Discharge Instructions         PHYSICIAN ORDERS AND DISCHARGE INSTRUCTIONS  NOTE: Upon discharge from the [address], you will receive a patient experience survey via E-mail. We would be grateful if you would take the time to fill this survey out. Wound care order history:              KATHLEEN's   Right       Left    Date               Vascular studies/Intervention: . Cultures: .9/13/22               Antibiotics: .Cipro and Doxy 9/13/22                         HbA1c:  .6/29/22 8.2               Grafts: Maru Culver: . Continuing wound care orders and information:              Residence: . Private              [facility]              Your wound-care supplies will be provided by: [facility]              Pharmacy: [facility] in [place]  Wound cleansing:                           Do not scrub or use excessive force. Wash hands with soap and water before and after dressing changes. Prior to applying a clean dressing, cleanse wound with normal saline,                          wound cleanser, or mild soap and water. Ask your physician or nurse before getting the wound(s) wet in the shower. Daily Wound management:                          Keep weight off wounds and reposition every 2 hours. Avoid standing for long periods of time. Evaluate legs to the level of the heart or above for 30 minutes 4-5 times a day and/or when sitting. When taking antibiotics take entire prescription as ordered by MD do not stop taking until medicine is all gone. Orders for this week (11/01/22) :   Right Heel- Wash with mild soap and water, rinse with saline, pat dry with 4x4  Gently pack with 4x4 soaked betadine  Cover with ABD,  Wrap with conform and secure with tape  Change on Tuesday, Thursday and Saturday     Coccyx:  Apply santyl  Cover with saceral border  Change Tuesday, Thursday, Saturday     Discussed amputation, Dr. Buffy Traore office will contact you     Follow up with Dr. Buffy Traore In 1 weeks in the wound care center  Call [phone] for any questions or concerns.   Date__________   Time____________        Treatment Note      Written Patient Dismissal Instructions Given            Electronically signed by RYAN Holland CNP on 11/8/2022 at 4:00 PM

## 2022-11-08 NOTE — PROGRESS NOTES
.Surgery @ [facility] on 11/11/22 you will be called 11/10/22 with times               1. Do not eat or drink anything after midnight - unless instructed by your doctor prior to surgery. This includes                   no water, chewing gum or mints. 2. Follow your directions as prescribed by the doctor for your procedure and medications. Use inhaler, take Coreg,Nexium,Gabapentin with sips of water   3. Check with your Doctor regarding stopping vitamins, supplements, blood thinners and follow their instructions. Stop vitamins, supplements and NSAIDS: 11/04/22   4. Do not smoke, vape or use chewing tobacco morning of surgery. Do not drink any alcoholic beverages 24 hours prior to surgery. This includes NA Beer. No street drugs 7 days prior to surgery. 5. You may brush your teeth and gargle the morning of surgery. DO NOT SWALLOW WATER   6. You MUST make arrangements for a responsible adult to take you home after your surgery and be able to check on you every couple                   hours for the day. You will not be allowed to leave alone or drive yourself home. It is strongly suggested someone stay with you the first 24                   hrs. Your surgery will be cancelled if you do not have a ride home. 7. Please wear simple, loose fitting clothing to the hospital.  Starr Taylor not bring valuables (money, credit cards, checkbooks, etc.) Do not wear any                   makeup (including no eye makeup) or nail polish on your fingers or toes. 8. DO NOT wear any jewelry or piercings on day of surgery. All body piercing jewelry must be removed. 9. If you have dentures, they will be removed before going to the OR; we will provide you a container. If you wear contact lenses or glasses,                  they will be removed; please bring a case for them. 10. If you  have a Living Will and Durable Power of  for Healthcare, please bring in a copy.            11. Please bring picture ID,  insurance card, paperwork from the doctors office    (H & P, Consent, & card for implantable devices). 12. Take a shower the morning of your procedure with Hibiclens or an anti-bacterial soap. Do not apply any make-up, deodorant, lotion, oil or powder. 15.  Enter thru the main entrance on the day of surgery.

## 2022-11-08 NOTE — PLAN OF CARE
Problem: Wound:  Intervention: Assess pain status  Note: See flowsheet  Intervention: Assess wound size, appearance and drainage  Note: See flowsheet  Intervention: Assess pedal pulses bilaterally if patient has a foot or leg ulcer  Note: See flowsheet

## 2022-11-09 ENCOUNTER — ANESTHESIA EVENT (OUTPATIENT)
Dept: OPERATING ROOM | Age: 57
DRG: 239 | End: 2022-11-09
Payer: MEDICARE

## 2022-11-10 ASSESSMENT — LIFESTYLE VARIABLES: SMOKING_STATUS: 1

## 2022-11-10 ASSESSMENT — ENCOUNTER SYMPTOMS: SHORTNESS OF BREATH: 1

## 2022-11-10 NOTE — ANESTHESIA PRE PROCEDURE
Department of Anesthesiology  Preprocedure Note       Name:  Helene Huitron   Age:  62 y.o.  :  1965                                          MRN:  [id number]         Date:  11/10/2022      Surgeon: Gilles Weinberg):  Whitney Pollock MD    Procedure: Procedure(s):  RIGHT LEG AMPUTATION BELOW KNEE WITH IPOP    Medications prior to admission:   Prior to Admission medications    Medication Sig Start Date End Date Taking? Authorizing Provider   collagenase (SANTYL) 250 UNIT/GM ointment Apply topically daily. 22  Whitney Pollock MD   gabapentin (NEURONTIN) 400 MG capsule Take 800 mg by mouth in the morning and at bedtime. Historical Provider, MD   OXYGEN Inhale 2 L into the lungs daily as needed    Historical Provider, MD   carvedilol (COREG) 6.25 MG tablet 6.25 mg 2 times daily  17   Historical Provider, MD   VICTOZA 18 MG/3ML SOPN SC injection daily   Patient not taking: No sig reported 3/7/16   Historical Provider, MD   albuterol sulfate HFA (PROVENTIL;VENTOLIN;PROAIR) 108 (90 Base) MCG/ACT inhaler Inhale 2 puffs into the lungs every 6 hours as needed for Wheezing    Historical Provider, MD   esomeprazole Magnesium (NEXIUM) 40 MG PACK Take 20 mg by mouth 2 times daily 2 tabs    Historical Provider, MD   oxyCODONE-acetaminophen (PERCOCET)  MG per tablet Take 1 tablet by mouth every 6 hours as needed for Pain. Historical Provider, MD   LORazepam (ATIVAN) 0.5 MG tablet Take 0.5 mg by mouth every 12 hours Indications: One Tablet twice daily     Historical Provider, MD   Insulin Aspart (NOVOLOG FLEXPEN SC) Inject 15 Units into the skin 3 times daily (before meals) On sliding scale  Patient not taking: No sig reported    Historical Provider, MD   pravastatin (PRAVACHOL) 40 MG tablet 40 mg daily.  3/9/13   Historical Provider, MD   lisinopril (PRINIVIL;ZESTRIL) 10 MG tablet Take 20 mg by mouth daily 12   Historical Provider, MD   insulin glargine (LANTUS) 100 UNIT/ML injection Inject 50 Units into the skin nightly. Patient not taking: No sig reported    Historical Provider, MD   furosemide (LASIX) 20 MG tablet Take 40 mg by mouth 2 times daily     Historical Provider, MD       Current medications:    No current facility-administered medications for this encounter. Current Outpatient Medications   Medication Sig Dispense Refill    collagenase (SANTYL) 250 UNIT/GM ointment Apply topically daily. 30 g 1    gabapentin (NEURONTIN) 400 MG capsule Take 800 mg by mouth in the morning and at bedtime.  OXYGEN Inhale 2 L into the lungs daily as needed      carvedilol (COREG) 6.25 MG tablet 6.25 mg 2 times daily       VICTOZA 18 MG/3ML SOPN SC injection daily  (Patient not taking: No sig reported)      albuterol sulfate HFA (PROVENTIL;VENTOLIN;PROAIR) 108 (90 Base) MCG/ACT inhaler Inhale 2 puffs into the lungs every 6 hours as needed for Wheezing      esomeprazole Magnesium (NEXIUM) 40 MG PACK Take 20 mg by mouth 2 times daily 2 tabs      oxyCODONE-acetaminophen (PERCOCET)  MG per tablet Take 1 tablet by mouth every 6 hours as needed for Pain.  LORazepam (ATIVAN) 0.5 MG tablet Take 0.5 mg by mouth every 12 hours Indications: One Tablet twice daily       Insulin Aspart (NOVOLOG FLEXPEN SC) Inject 15 Units into the skin 3 times daily (before meals) On sliding scale (Patient not taking: No sig reported)      pravastatin (PRAVACHOL) 40 MG tablet 40 mg daily.  lisinopril (PRINIVIL;ZESTRIL) 10 MG tablet Take 20 mg by mouth daily      insulin glargine (LANTUS) 100 UNIT/ML injection Inject 50 Units into the skin nightly. (Patient not taking: No sig reported)      furosemide (LASIX) 20 MG tablet Take 40 mg by mouth 2 times daily          Allergies:     Allergies   Allergen Reactions    Latex     Codeine     Cortisone     Cortizone     Iodides     Phenobarbital Other (See Comments)     Hallucinations     Vancomycin Other (See Comments)     \"makes me very sick\"       Problem List:    Patient Active Problem List   Diagnosis Code    CAD (coronary artery disease) I25.10    Hyperlipidemia E78.5    Diabetes mellitus ([facility] E11.9    Chest pain R07.9    Hoarseness of voice R49.0    Fracture of metacarpal bone S62.309A    Wrist sprain S63.509A    History of tobacco abuse Z87.891    Wrist pain M25.539    Carpal tunnel syndrome G56.00    Cubital tunnel syndrome G56.20    Trigger finger M65.30    S/P CABG x 4 Z95.1    Diabetic foot ulcer ([address] E11.621, L97.509    Diabetic neuropathy ([facility] [facility] E11.40    Diabetes mellitus type 2 with complications ([facility] [facility] O12.6    Claudication of both lower extremities ([facility]) I73.9    SOB (shortness of breath) R06.02    Bilateral leg edema R60.0    SOB (shortness of breath) R06.02    Type 2 diabetes mellitus without complication ([facility]) R22.6    Coronary artery disease involving coronary bypass graft of native heart with unstable angina pectoris ([facility]) I25.700    S/P cardiac cath Z98.890    Chronic renal failure, stage 2 (mild) N18.2    Chronic kidney disease (CKD) stage G4/A3, severely decreased glomerular filtration rate (GFR) between 15-29 mL/min/1.73 square meter and albuminuria creatinine ratio greater than 300 mg/g ([facility]) N18.4    DM (diabetes mellitus) ([facility]) E11.9    Coronary atherosclerosis I25.10    HTN (hypertension) I10    Proteinuria R80.9    Callus of foot L84    Non compliance with medical treatment Z91.199    Diabetic ulcer of left foot associated with type 2 diabetes mellitus, with fat layer exposed ([address] E11.621, L97.522    Decubitus ulcer of sacral region, stage 1 L89.151    Chronic renal failure, stage 4 (severe) ([facility]) N18.4    Problem with dialysis access [facility]) D46.484G    WD-Presence of surgical incision Z78.9    Diabetic ulcer of right heel associated with type 2 diabetes mellitus, with necrosis of bone (HCC) E11.621, L97.414    Acute pain of right foot M79.671    Wound, open, foot with complication, left, sequela S91.302S    ESRD on hemodialysis (HCC) N18.6, Z99.2    Ulcer of right heel, with necrosis of bone (HCC) L97.414       Past Medical History:        Diagnosis Date    Acute pain of right foot 9/13/2022    CAD (coronary artery disease)     s/p CABG x 4;  follows with Dr Maricel Fox Callus of foot 2/5/2018    Carpal tunnel syndrome on both sides     CHF (congestive heart failure) ([address] 10/2010    Chronic diastolic; EF 94%    Chronic kidney disease     stage 4 kidney disease    Chronic ulcer of left ankle with fat layer exposed ([address] 10/7/2015    Chronic ulcer of left foot with fat layer exposed ([address] 3/17/2016    Chronic ulcer of right ankle with fat layer exposed ([address] 3/17/2016    Chronic ulcer of right foot with fat layer exposed ([address] 3/17/2016    Depression     Diabetes mellitus ([address]     Diabetes mellitus with neurological manifestations ([address]     Diabetes mellitus with ulcer of ankle ([address]     Diabetic ulcer of left foot associated with type 2 diabetes mellitus, with fat layer exposed ([address] 8/6/2018    Diabetic ulcer of right heel associated with type 2 diabetes mellitus, with muscle involvement without evidence of necrosis ([address] 9/13/2022    Diabetic ulcer of right heel associated with type 2 diabetes mellitus, with necrosis of bone ([address] 9/13/2022    ESRD on hemodialysis ([address] 9/21/2022    Family history of cardiovascular disease     Mother    GERD (gastroesophageal reflux disease)     H/O cardiac catheterization 10/18/2010, 6/3/2010    10/18/2010-Four bypass grafts all widely patent. 6/3/2010- Moderate to severe triple vessel disease, preserved LV systolic function.  H/O cardiovascular stress test 7/26/2012, 8/3/2011, 10/14/2010, 5/24/2010 7/26/2012-Lexiscan- Normal Myocardial Perfusion Study. Post stress myocardial perfusion images show a normal pattern of perfusion in all regions. Post stress LV normal size.  Normal perfusion in the distribution of all coronaries. Normal LV size and function. Rest EF 70%    H/O chest x-ray 7/12/2012, 6/2/2010 7/12/2012-Perihilar peribronchial cuffing with mild basilar predominant interstital prominence, which may reflect interstitial edema or atypical pneumonia.  H/O Doppler ultrasound 6/4/2010    CAROTID DOPPLER-6/4/2010-No Doppler evidence of hemodynamically significant Carotid Stenosis with antegrade flow in the vertebral arteries bilaterally. 6/4/2010 PERIPHERAL VENOUS DOPPLER_ LEFT Saphenous Vein mapping    H/O echocardiogram 7/26/2012, 8/3/2011, 10/2010, 7/23/2010, 6/8/2010 7/26/2012-LV normal size. Normal LV wall thickness. LV systolic function normal. EF => 55%. LV wall motion normal. Mild MR. Mild TR.  History of complete ECG 7/26/2012 [facility]), 7/13/2012 [facility]), 7/25/2011, 3/25/2011, 10/18/2010, 10/11/2010, 6/23/2010, 5/7/2010    Hx of cardiovascular stress test 9/3/2015    lexiscan-normal,EF66%    Hx of Doppler ultrasound 4/5/16    Arterial: There is a fluid collection in the left thigh, please refer to PCP for further monitoring, no vascular in etiology.  Hx of echocardiogram     4/16 EF40% Mild MR/TR and mild Pulm HTN. 9/15 EF 45-50%, Mildly dilated L atrium, mildly dilated R ventricle. Mild MR & mild TR     Hyperlipidemia     Neuropathy of foot     Pt reports starting approx. 6-7 years ago    Neuropathy of hand     Pt reports starting approx.  6-7 years ago    Non compliance with medical treatment 7/2/2018    S/P CABG x 4 6/5/2010    LIMA-> LAD;  VG->CX;  VG->Diagonal; VG-> distal RCA  per  Dr Gisselle Valverde    Type 2 diabetes mellitus with diabetic polyneuropathy, with long-term current use of insulin ([facility] 4/5/2016    Type II or unspecified type diabetes mellitus with neurological manifestations, not stated as uncontrolled(250.60)     Type II or unspecified type diabetes mellitus with other specified manifestations, not stated as uncontrolled     Ulcer of left foot, with fat layer exposed ([address] 2013    Ulcer of other part of foot        Past Surgical History:        Procedure Laterality Date    APPENDECTOMY      CARDIAC SURGERY      CARPAL TUNNEL RELEASE      L hand 2011, R hand 2012    CARPAL TUNNEL RELEASE Right 6/10/2013    recurrent    CARPAL TUNNEL RELEASE Left 2013    with ulnar nerve transpostion and L middle finger release    CHOLECYSTECTOMY      COLONOSCOPY      CORONARY ARTERY BYPASS GRAFT  2010-LIMA->LAD;  VG-> Diagonal;  VG-> Obtuse marginal;  VG->Distal RCA-   Dr Lucila Chapin Right 6/10/2013    HYSTERECTOMY, TOTAL ABDOMINAL (CERVIX REMOVED)      TOE AMPUTATION Left  toe    TUBAL LIGATION      ULNAR TUNNEL RELEASE Right 6/10/2013       Social History:    Social History     Tobacco Use    Smoking status: Former     Packs/day: 0.25     Years: 30.00     Pack years: 7.50     Types: Cigarettes     Quit date: 2022     Years since quittin.1    Smokeless tobacco: Never    Tobacco comments:     quit smoking ciagarettes 16   Substance Use Topics    Alcohol use: No     Alcohol/week: 0.0 standard drinks     Comment:                                    CAFFEINE: 2 sodas daily                                Counseling given: Not Answered  Tobacco comments: quit smoking ciagarettes 16      Vital Signs (Current):   Vitals:    22 1046   Weight: 142 lb (64.4 kg)   Height: 5' 3\" (1.6 m)                                              BP Readings from Last 3 Encounters:   22 (!) 140/62   22 123/63   10/25/22 123/70       NPO Status:                                                                                 BMI:   Wt Readings from Last 3 Encounters:   10/12/22 157 lb (71.2 kg)   22 157 lb (71.2 kg)   22 166 lb 14.4 oz (75.7 kg)     Body mass index is 25.15 kg/m².     CBC:   Lab Results   Component Value Date/Time    WBC 23.7 10/12/2022 02:45 PM    RBC 3.03 10/12/2022 02:45 PM    HGB 10.2 10/12/2022 02:45 PM    HCT 30.4 10/12/2022 02:45 PM    .3 10/12/2022 02:45 PM    RDW 12.5 10/12/2022 02:45 PM     10/12/2022 02:45 PM       CMP:   Lab Results   Component Value Date/Time     10/12/2022 02:45 PM    K 5.2 10/12/2022 02:45 PM    CL 95 10/12/2022 02:45 PM    CO2 25 10/12/2022 02:45 PM    BUN 37 10/12/2022 02:45 PM    CREATININE 5.6 10/12/2022 02:45 PM    GFRAA 9 10/12/2022 02:45 PM    LABGLOM 8 10/12/2022 02:45 PM    GLUCOSE 108 10/12/2022 02:45 PM    PROT 6.7 10/12/2022 02:45 PM    PROT 6.2 02/01/2013 07:55 AM    CALCIUM 8.9 10/12/2022 02:45 PM    BILITOT 0.6 10/12/2022 02:45 PM    ALKPHOS 234 10/12/2022 02:45 PM     10/12/2022 02:45 PM    ALT 69 10/12/2022 02:45 PM       POC Tests: No results for input(s): POCGLU, POCNA, POCK, POCCL, POCBUN, POCHEMO, POCHCT in the last 72 hours.     Coags:   Lab Results   Component Value Date/Time    PROTIME 14.3 09/21/2022 06:10 AM    PROTIME 10.6 10/18/2010 11:21 AM    INR 1.11 09/21/2022 06:10 AM    APTT 31.2 04/28/2016 11:30 AM       HCG (If Applicable):   Lab Results   Component Value Date    PREGTESTUR NEGATIVE 12/20/2013        ABGs: No results found for: PHART, PO2ART, PDL9RFA, XDS4QSD, BEART, F3DAIDYO     Type & Screen (If Applicable):  No results found for: LABABO, LABRH    Drug/Infectious Status (If Applicable):  No results found for: HIV, HEPCAB    COVID-19 Screening (If Applicable):   Lab Results   Component Value Date/Time    COVID19 NOT DETECTED 10/12/2022 02:45 PM           Anesthesia Evaluation  Patient summary reviewed no history of anesthetic complications:   Airway: Mallampati: II  TM distance: >3 FB   Neck ROM: full  Mouth opening: > = 3 FB   Dental: normal exam         Pulmonary: breath sounds clear to auscultation  (+) shortness of breath:  current smoker                           Cardiovascular:  Exercise tolerance: poor (<4 METS),   (+) hypertension:, angina:, CAD:, CABG/stent:, CHF:, hyperlipidemia      ECG reviewed  Rhythm: regular  Rate: normal  Echocardiogram reviewed  Stress test reviewed       Beta Blocker:  Dose within 24 Hrs      ROS comment:  Normal sinus rhythm   Possible Left atrial enlargement   Right axis deviation   Left bundle branch block   Abnormal ECG   No previous ECGs available   Confirmed by [facility] Jenifer HARDY ([id number]) on 1/7/2020 6:17:16 PM    Resulting Agency  [facility]    This result has an attachment that is not available.   Left Ventricle: Left ventricle size is normal. Mildly increased wall   thickness. Mildly reduced systolic function with a visually estimated EF   of 45 - 50%.   Left Atrium: Left atrium is mildly dilated.   Aortic Valve: Mildly thickened cusps indiciting aortic sclerosis. No   transvalvular regurgitation.   Mitral Valve: Mildly thickened leaflets. Mild mitral annular   calcification. Mild transvalvular regurgitation.   Tricuspid Valve: Mild transvalvular regurgitation.   Pericardium: No pericardial effusion. 10/4/22    Diagnosis: Normal sinus rhythm   Possible Left atrial enlargement   Rightward axis   Nonspecific intraventricular block   Cannot rule out Anteroseptal infarct , age undetermined   No previous ECGs available   CLINICAL CORRELATION RECOMMENDED      10/13/22    Overall study is negative for infarction and ischemia. Myocardial Perfusion Imaging   Stress myocardial perfusion scan is negative for ischemia. No evidence of ischemia. There is a small sized, mild intensity apical defect that is fixed with   stress and prone imaging and is likely artifact due to the LBBB.    Low normal LV systolic function, EF 71%.     Normal TID 1.12.   8/13/22     Neuro/Psych:   (+) neuromuscular disease:, depression/anxiety             GI/Hepatic/Renal:   (+) GERD:, renal disease: dialysis,           Endo/Other:    (+) DiabetesType I DM, poorly controlled, using insulin, blood dyscrasia: anemia:., .                 Abdominal: Vascular:   + PVD, aortic or cerebral, . Other Findings:           Anesthesia Plan      general     ASA 4       Induction: intravenous. MIPS: Postoperative opioids intended and Prophylactic antiemetics administered. Anesthetic plan and risks discussed with patient. Plan discussed with CRNA. Pre Anesthesia Evaluation complete. Anesthesia plan, risks, benefits, alternatives, and personal involved discussed with patient. Patients and/or legal guardian verbalized an understanding  and agreed to proceed.   Ortega Trevino DO  11/11/2022

## 2022-11-10 NOTE — PROGRESS NOTES
Left VM notifying patient of surgery time at [facility] 11/11/2022 1315 with arrival at 1115

## 2022-11-10 NOTE — PROGRESS NOTES
Physician office contacted re cardiac clearance, per staff pt visited Cardiology yesterday 11/9/2022 and when clearance letter is received it will be faxed

## 2022-11-11 ENCOUNTER — HOSPITAL ENCOUNTER (INPATIENT)
Age: 57
LOS: 8 days | Discharge: HOME | DRG: 239 | End: 2022-11-19
Attending: SURGERY | Admitting: SURGERY
Payer: MEDICARE

## 2022-11-11 ENCOUNTER — ANESTHESIA (OUTPATIENT)
Dept: OPERATING ROOM | Age: 57
DRG: 239 | End: 2022-11-11
Payer: MEDICARE

## 2022-11-11 DIAGNOSIS — L97.414 ULCER OF RIGHT HEEL, WITH NECROSIS OF BONE (HCC): ICD-10-CM

## 2022-11-11 DIAGNOSIS — G89.18 POST-OPERATIVE PAIN: Primary | ICD-10-CM

## 2022-11-11 PROBLEM — E11.621 DIABETIC ULCER OF RIGHT MIDFOOT ASSOCIATED WITH TYPE 2 DIABETES MELLITUS, WITH NECROSIS OF BONE (HCC): Status: ACTIVE | Noted: 2022-11-11

## 2022-11-11 LAB
ANION GAP SERPL CALCULATED.3IONS-SCNC: 17 MMOL/L (ref 4–16)
BASOPHILS ABSOLUTE: 0.1 K/CU MM
BASOPHILS RELATIVE PERCENT: 0.4 % (ref 0–1)
BUN BLDV-MCNC: 26 MG/DL (ref 6–23)
CALCIUM SERPL-MCNC: 8.8 MG/DL (ref 8.3–10.6)
CHLORIDE BLD-SCNC: 93 MMOL/L (ref 99–110)
CO2: 24 MMOL/L (ref 21–32)
CREAT SERPL-MCNC: 6.8 MG/DL (ref 0.6–1.1)
DIFFERENTIAL TYPE: ABNORMAL
EOSINOPHILS ABSOLUTE: 0.1 K/CU MM
EOSINOPHILS RELATIVE PERCENT: 0.7 % (ref 0–3)
GFR SERPL CREATININE-BSD FRML MDRD: 7 ML/MIN/1.73M2
GLUCOSE BLD-MCNC: 126 MG/DL (ref 70–99)
GLUCOSE BLD-MCNC: 127 MG/DL (ref 70–99)
GLUCOSE BLD-MCNC: 137 MG/DL (ref 70–99)
GLUCOSE BLD-MCNC: 223 MG/DL (ref 70–99)
GLUCOSE BLD-MCNC: 243 MG/DL (ref 70–99)
HCT VFR BLD CALC: 32.3 % (ref 37–47)
HEMOGLOBIN: 10.1 GM/DL (ref 12.5–16)
IMMATURE NEUTROPHIL %: 1.2 % (ref 0–0.43)
LYMPHOCYTES ABSOLUTE: 0.6 K/CU MM
LYMPHOCYTES RELATIVE PERCENT: 4.5 % (ref 24–44)
MCH RBC QN AUTO: 32.1 PG (ref 27–31)
MCHC RBC AUTO-ENTMCNC: 31.3 % (ref 32–36)
MCV RBC AUTO: 102.5 FL (ref 78–100)
MONOCYTES ABSOLUTE: 0.2 K/CU MM
MONOCYTES RELATIVE PERCENT: 1.5 % (ref 0–4)
NUCLEATED RBC %: 0 %
PDW BLD-RTO: 14.3 % (ref 11.7–14.9)
PLATELET # BLD: 365 K/CU MM (ref 140–440)
PMV BLD AUTO: 10.4 FL (ref 7.5–11.1)
POTASSIUM SERPL-SCNC: 4.3 MMOL/L (ref 3.5–5.1)
RBC # BLD: 3.15 M/CU MM (ref 4.2–5.4)
SEGMENTED NEUTROPHILS ABSOLUTE COUNT: 12.5 K/CU MM
SEGMENTED NEUTROPHILS RELATIVE PERCENT: 91.7 % (ref 36–66)
SODIUM BLD-SCNC: 134 MMOL/L (ref 135–145)
TOTAL IMMATURE NEUTOROPHIL: 0.16 K/CU MM
TOTAL NUCLEATED RBC: 0 K/CU MM
WBC # BLD: 13.7 K/CU MM (ref 4–10.5)

## 2022-11-11 PROCEDURE — 6360000002 HC RX W HCPCS

## 2022-11-11 PROCEDURE — 3600000003 HC SURGERY LEVEL 3 BASE: Performed by: SURGERY

## 2022-11-11 PROCEDURE — 2580000003 HC RX 258: Performed by: SURGERY

## 2022-11-11 PROCEDURE — 82962 GLUCOSE BLOOD TEST: CPT

## 2022-11-11 PROCEDURE — 6360000002 HC RX W HCPCS: Performed by: ANESTHESIOLOGY

## 2022-11-11 PROCEDURE — 3700000001 HC ADD 15 MINUTES (ANESTHESIA): Performed by: SURGERY

## 2022-11-11 PROCEDURE — 88311 DECALCIFY TISSUE: CPT | Performed by: PATHOLOGY

## 2022-11-11 PROCEDURE — 6360000002 HC RX W HCPCS: Performed by: SURGERY

## 2022-11-11 PROCEDURE — 2500000003 HC RX 250 WO HCPCS

## 2022-11-11 PROCEDURE — 1200000000 HC SEMI PRIVATE

## 2022-11-11 PROCEDURE — 0Y6H0Z1 DETACHMENT AT RIGHT LOWER LEG, HIGH, OPEN APPROACH: ICD-10-PCS | Performed by: SURGERY

## 2022-11-11 PROCEDURE — 6370000000 HC RX 637 (ALT 250 FOR IP): Performed by: SURGERY

## 2022-11-11 PROCEDURE — 80048 BASIC METABOLIC PNL TOTAL CA: CPT

## 2022-11-11 PROCEDURE — 3600000013 HC SURGERY LEVEL 3 ADDTL 15MIN: Performed by: SURGERY

## 2022-11-11 PROCEDURE — 3700000000 HC ANESTHESIA ATTENDED CARE: Performed by: SURGERY

## 2022-11-11 PROCEDURE — 7100000000 HC PACU RECOVERY - FIRST 15 MIN: Performed by: SURGERY

## 2022-11-11 PROCEDURE — 88307 TISSUE EXAM BY PATHOLOGIST: CPT | Performed by: PATHOLOGY

## 2022-11-11 PROCEDURE — 2580000003 HC RX 258: Performed by: ANESTHESIOLOGY

## 2022-11-11 PROCEDURE — 7100000001 HC PACU RECOVERY - ADDTL 15 MIN: Performed by: SURGERY

## 2022-11-11 PROCEDURE — 85025 COMPLETE CBC W/AUTO DIFF WBC: CPT

## 2022-11-11 PROCEDURE — 2709999900 HC NON-CHARGEABLE SUPPLY: Performed by: SURGERY

## 2022-11-11 RX ORDER — SODIUM CHLORIDE 9 MG/ML
INJECTION, SOLUTION INTRAVENOUS PRN
Status: DISCONTINUED | OUTPATIENT
Start: 2022-11-11 | End: 2022-11-19 | Stop reason: HOSPADM

## 2022-11-11 RX ORDER — GABAPENTIN 400 MG/1
800 CAPSULE ORAL 2 TIMES DAILY
Status: DISCONTINUED | OUTPATIENT
Start: 2022-11-11 | End: 2022-11-14

## 2022-11-11 RX ORDER — MIDAZOLAM HYDROCHLORIDE 2 MG/2ML
2 INJECTION, SOLUTION INTRAMUSCULAR; INTRAVENOUS
Status: DISCONTINUED | OUTPATIENT
Start: 2022-11-11 | End: 2022-11-11 | Stop reason: HOSPADM

## 2022-11-11 RX ORDER — FENTANYL CITRATE 50 UG/ML
INJECTION, SOLUTION INTRAMUSCULAR; INTRAVENOUS PRN
Status: DISCONTINUED | OUTPATIENT
Start: 2022-11-11 | End: 2022-11-11 | Stop reason: SDUPTHER

## 2022-11-11 RX ORDER — HYDRALAZINE HYDROCHLORIDE 20 MG/ML
10 INJECTION INTRAMUSCULAR; INTRAVENOUS
Status: DISCONTINUED | OUTPATIENT
Start: 2022-11-11 | End: 2022-11-11 | Stop reason: HOSPADM

## 2022-11-11 RX ORDER — ESOMEPRAZOLE MAGNESIUM 40 MG/1
20 FOR SUSPENSION ORAL 2 TIMES DAILY
Status: DISCONTINUED | OUTPATIENT
Start: 2022-11-11 | End: 2022-11-11 | Stop reason: CLARIF

## 2022-11-11 RX ORDER — SODIUM CHLORIDE, SODIUM LACTATE, POTASSIUM CHLORIDE, CALCIUM CHLORIDE 600; 310; 30; 20 MG/100ML; MG/100ML; MG/100ML; MG/100ML
INJECTION, SOLUTION INTRAVENOUS CONTINUOUS
Status: DISCONTINUED | OUTPATIENT
Start: 2022-11-11 | End: 2022-11-11 | Stop reason: HOSPADM

## 2022-11-11 RX ORDER — PANTOPRAZOLE SODIUM 40 MG/1
40 TABLET, DELAYED RELEASE ORAL
Status: DISCONTINUED | OUTPATIENT
Start: 2022-11-12 | End: 2022-11-19 | Stop reason: HOSPADM

## 2022-11-11 RX ORDER — OXYCODONE HYDROCHLORIDE 5 MG/1
5 TABLET ORAL
Status: DISCONTINUED | OUTPATIENT
Start: 2022-11-11 | End: 2022-11-11 | Stop reason: HOSPADM

## 2022-11-11 RX ORDER — ONDANSETRON 4 MG/1
4 TABLET, ORALLY DISINTEGRATING ORAL EVERY 8 HOURS PRN
Status: DISCONTINUED | OUTPATIENT
Start: 2022-11-11 | End: 2022-11-19 | Stop reason: HOSPADM

## 2022-11-11 RX ORDER — PROPOFOL 10 MG/ML
INJECTION, EMULSION INTRAVENOUS PRN
Status: DISCONTINUED | OUTPATIENT
Start: 2022-11-11 | End: 2022-11-11 | Stop reason: SDUPTHER

## 2022-11-11 RX ORDER — LISINOPRIL 20 MG/1
20 TABLET ORAL DAILY
Status: DISCONTINUED | OUTPATIENT
Start: 2022-11-11 | End: 2022-11-18

## 2022-11-11 RX ORDER — INSULIN LISPRO 100 [IU]/ML
15 INJECTION, SOLUTION INTRAVENOUS; SUBCUTANEOUS
Status: DISCONTINUED | OUTPATIENT
Start: 2022-11-11 | End: 2022-11-12

## 2022-11-11 RX ORDER — ONDANSETRON 2 MG/ML
INJECTION INTRAMUSCULAR; INTRAVENOUS PRN
Status: DISCONTINUED | OUTPATIENT
Start: 2022-11-11 | End: 2022-11-11 | Stop reason: SDUPTHER

## 2022-11-11 RX ORDER — ALBUTEROL SULFATE 90 UG/1
2 AEROSOL, METERED RESPIRATORY (INHALATION) EVERY 6 HOURS PRN
Status: DISCONTINUED | OUTPATIENT
Start: 2022-11-11 | End: 2022-11-19 | Stop reason: HOSPADM

## 2022-11-11 RX ORDER — ONDANSETRON 2 MG/ML
4 INJECTION INTRAMUSCULAR; INTRAVENOUS EVERY 6 HOURS PRN
Status: DISCONTINUED | OUTPATIENT
Start: 2022-11-11 | End: 2022-11-19 | Stop reason: HOSPADM

## 2022-11-11 RX ORDER — DEXAMETHASONE SODIUM PHOSPHATE 4 MG/ML
INJECTION, SOLUTION INTRA-ARTICULAR; INTRALESIONAL; INTRAMUSCULAR; INTRAVENOUS; SOFT TISSUE PRN
Status: DISCONTINUED | OUTPATIENT
Start: 2022-11-11 | End: 2022-11-11 | Stop reason: SDUPTHER

## 2022-11-11 RX ORDER — INSULIN GLARGINE 100 [IU]/ML
50 INJECTION, SOLUTION SUBCUTANEOUS NIGHTLY
Status: DISCONTINUED | OUTPATIENT
Start: 2022-11-11 | End: 2022-11-11 | Stop reason: ALTCHOICE

## 2022-11-11 RX ORDER — LORAZEPAM 0.5 MG/1
0.5 TABLET ORAL EVERY 12 HOURS
Status: DISCONTINUED | OUTPATIENT
Start: 2022-11-11 | End: 2022-11-14

## 2022-11-11 RX ORDER — FUROSEMIDE 40 MG/1
40 TABLET ORAL 2 TIMES DAILY
Status: DISCONTINUED | OUTPATIENT
Start: 2022-11-11 | End: 2022-11-17

## 2022-11-11 RX ORDER — SODIUM CHLORIDE 0.9 % (FLUSH) 0.9 %
5-40 SYRINGE (ML) INJECTION PRN
Status: DISCONTINUED | OUTPATIENT
Start: 2022-11-11 | End: 2022-11-19 | Stop reason: HOSPADM

## 2022-11-11 RX ORDER — PROCHLORPERAZINE EDISYLATE 5 MG/ML
5 INJECTION INTRAMUSCULAR; INTRAVENOUS
Status: DISCONTINUED | OUTPATIENT
Start: 2022-11-11 | End: 2022-11-11 | Stop reason: HOSPADM

## 2022-11-11 RX ORDER — ACETAMINOPHEN 325 MG/1
650 TABLET ORAL EVERY 4 HOURS PRN
Status: DISCONTINUED | OUTPATIENT
Start: 2022-11-11 | End: 2022-11-19 | Stop reason: HOSPADM

## 2022-11-11 RX ORDER — LIDOCAINE HYDROCHLORIDE 20 MG/ML
INJECTION, SOLUTION INTRAVENOUS PRN
Status: DISCONTINUED | OUTPATIENT
Start: 2022-11-11 | End: 2022-11-11 | Stop reason: SDUPTHER

## 2022-11-11 RX ORDER — PHENYLEPHRINE HCL IN 0.9% NACL 1 MG/10 ML
SYRINGE (ML) INTRAVENOUS PRN
Status: DISCONTINUED | OUTPATIENT
Start: 2022-11-11 | End: 2022-11-11 | Stop reason: SDUPTHER

## 2022-11-11 RX ORDER — HEPARIN SODIUM 5000 [USP'U]/ML
5000 INJECTION, SOLUTION INTRAVENOUS; SUBCUTANEOUS EVERY 8 HOURS SCHEDULED
Status: DISCONTINUED | OUTPATIENT
Start: 2022-11-12 | End: 2022-11-19 | Stop reason: HOSPADM

## 2022-11-11 RX ORDER — IPRATROPIUM BROMIDE AND ALBUTEROL SULFATE 2.5; .5 MG/3ML; MG/3ML
1 SOLUTION RESPIRATORY (INHALATION)
Status: DISCONTINUED | OUTPATIENT
Start: 2022-11-11 | End: 2022-11-11 | Stop reason: HOSPADM

## 2022-11-11 RX ORDER — PRAVASTATIN SODIUM 40 MG
40 TABLET ORAL DAILY
Status: DISCONTINUED | OUTPATIENT
Start: 2022-11-11 | End: 2022-11-11 | Stop reason: ALTCHOICE

## 2022-11-11 RX ORDER — DIPHENHYDRAMINE HYDROCHLORIDE 50 MG/ML
12.5 INJECTION INTRAMUSCULAR; INTRAVENOUS
Status: DISCONTINUED | OUTPATIENT
Start: 2022-11-11 | End: 2022-11-11 | Stop reason: HOSPADM

## 2022-11-11 RX ORDER — DEXTROSE MONOHYDRATE 100 MG/ML
INJECTION, SOLUTION INTRAVENOUS CONTINUOUS PRN
Status: DISCONTINUED | OUTPATIENT
Start: 2022-11-11 | End: 2022-11-19 | Stop reason: HOSPADM

## 2022-11-11 RX ORDER — LABETALOL HYDROCHLORIDE 5 MG/ML
10 INJECTION, SOLUTION INTRAVENOUS
Status: DISCONTINUED | OUTPATIENT
Start: 2022-11-11 | End: 2022-11-11 | Stop reason: HOSPADM

## 2022-11-11 RX ORDER — HALOPERIDOL 5 MG/ML
1 INJECTION INTRAMUSCULAR
Status: DISCONTINUED | OUTPATIENT
Start: 2022-11-11 | End: 2022-11-11 | Stop reason: HOSPADM

## 2022-11-11 RX ORDER — OXYCODONE HYDROCHLORIDE 5 MG/1
5 TABLET ORAL EVERY 4 HOURS PRN
Status: DISCONTINUED | OUTPATIENT
Start: 2022-11-11 | End: 2022-11-12

## 2022-11-11 RX ORDER — SODIUM CHLORIDE 9 MG/ML
INJECTION, SOLUTION INTRAVENOUS CONTINUOUS
Status: DISCONTINUED | OUTPATIENT
Start: 2022-11-11 | End: 2022-11-12

## 2022-11-11 RX ORDER — SODIUM CHLORIDE 0.9 % (FLUSH) 0.9 %
5-40 SYRINGE (ML) INJECTION EVERY 12 HOURS SCHEDULED
Status: DISCONTINUED | OUTPATIENT
Start: 2022-11-11 | End: 2022-11-19 | Stop reason: HOSPADM

## 2022-11-11 RX ORDER — CARVEDILOL 6.25 MG/1
6.25 TABLET ORAL 2 TIMES DAILY
Status: DISCONTINUED | OUTPATIENT
Start: 2022-11-11 | End: 2022-11-18

## 2022-11-11 RX ORDER — FENTANYL CITRATE 50 UG/ML
50 INJECTION, SOLUTION INTRAMUSCULAR; INTRAVENOUS EVERY 5 MIN PRN
Status: COMPLETED | OUTPATIENT
Start: 2022-11-11 | End: 2022-11-11

## 2022-11-11 RX ADMIN — SODIUM CHLORIDE: 9 INJECTION, SOLUTION INTRAVENOUS at 18:15

## 2022-11-11 RX ADMIN — FUROSEMIDE 40 MG: 40 TABLET ORAL at 19:54

## 2022-11-11 RX ADMIN — FENTANYL CITRATE 50 MCG: 50 INJECTION INTRAMUSCULAR; INTRAVENOUS at 14:45

## 2022-11-11 RX ADMIN — CARVEDILOL 6.25 MG: 6.25 TABLET, FILM COATED ORAL at 19:53

## 2022-11-11 RX ADMIN — OXYCODONE HYDROCHLORIDE 5 MG: 5 TABLET ORAL at 18:11

## 2022-11-11 RX ADMIN — HYDROMORPHONE HYDROCHLORIDE 0.5 MG: 1 INJECTION, SOLUTION INTRAMUSCULAR; INTRAVENOUS; SUBCUTANEOUS at 19:58

## 2022-11-11 RX ADMIN — OXYCODONE HYDROCHLORIDE 5 MG: 5 TABLET ORAL at 22:11

## 2022-11-11 RX ADMIN — LISINOPRIL 20 MG: 20 TABLET ORAL at 21:00

## 2022-11-11 RX ADMIN — FENTANYL CITRATE 25 MCG: 50 INJECTION, SOLUTION INTRAMUSCULAR; INTRAVENOUS at 13:45

## 2022-11-11 RX ADMIN — HYDROMORPHONE HYDROCHLORIDE 0.5 MG: 1 INJECTION, SOLUTION INTRAMUSCULAR; INTRAVENOUS; SUBCUTANEOUS at 16:04

## 2022-11-11 RX ADMIN — Medication 100 MCG: at 14:10

## 2022-11-11 RX ADMIN — LIDOCAINE HYDROCHLORIDE 100 MG: 20 INJECTION, SOLUTION INTRAVENOUS at 13:45

## 2022-11-11 RX ADMIN — PROPOFOL 80 MG: 10 INJECTION, EMULSION INTRAVENOUS at 13:45

## 2022-11-11 RX ADMIN — GABAPENTIN 800 MG: 400 CAPSULE ORAL at 19:53

## 2022-11-11 RX ADMIN — FENTANYL CITRATE 25 MCG: 50 INJECTION, SOLUTION INTRAMUSCULAR; INTRAVENOUS at 14:27

## 2022-11-11 RX ADMIN — SODIUM CHLORIDE, POTASSIUM CHLORIDE, SODIUM LACTATE AND CALCIUM CHLORIDE: 600; 310; 30; 20 INJECTION, SOLUTION INTRAVENOUS at 12:32

## 2022-11-11 RX ADMIN — HYDROMORPHONE HYDROCHLORIDE 0.5 MG: 1 INJECTION, SOLUTION INTRAMUSCULAR; INTRAVENOUS; SUBCUTANEOUS at 16:25

## 2022-11-11 RX ADMIN — SODIUM CHLORIDE, PRESERVATIVE FREE 10 ML: 5 INJECTION INTRAVENOUS at 20:02

## 2022-11-11 RX ADMIN — CEFAZOLIN 2000 MG: 2 INJECTION, POWDER, FOR SOLUTION INTRAMUSCULAR; INTRAVENOUS at 13:43

## 2022-11-11 RX ADMIN — Medication 50 MCG: at 13:55

## 2022-11-11 RX ADMIN — FENTANYL CITRATE 50 MCG: 50 INJECTION INTRAMUSCULAR; INTRAVENOUS at 15:52

## 2022-11-11 RX ADMIN — ONDANSETRON 4 MG: 2 INJECTION INTRAMUSCULAR; INTRAVENOUS at 14:30

## 2022-11-11 RX ADMIN — DEXAMETHASONE SODIUM PHOSPHATE 4 MG: 4 INJECTION, SOLUTION INTRAMUSCULAR; INTRAVENOUS at 14:30

## 2022-11-11 RX ADMIN — LORAZEPAM 0.5 MG: 0.5 TABLET ORAL at 21:00

## 2022-11-11 ASSESSMENT — PAIN - FUNCTIONAL ASSESSMENT
PAIN_FUNCTIONAL_ASSESSMENT: ACTIVITIES ARE NOT PREVENTED
PAIN_FUNCTIONAL_ASSESSMENT: PREVENTS OR INTERFERES WITH ALL ACTIVE AND SOME PASSIVE ACTIVITIES
PAIN_FUNCTIONAL_ASSESSMENT: 0-10
PAIN_FUNCTIONAL_ASSESSMENT: ACTIVITIES ARE NOT PREVENTED
PAIN_FUNCTIONAL_ASSESSMENT: PREVENTS OR INTERFERES SOME ACTIVE ACTIVITIES AND ADLS

## 2022-11-11 ASSESSMENT — PAIN DESCRIPTION - LOCATION
LOCATION: LEG
LOCATION: OTHER (COMMENT)
LOCATION: KNEE;LEG
LOCATION: LEG
LOCATION: OTHER (COMMENT)

## 2022-11-11 ASSESSMENT — PAIN DESCRIPTION - FREQUENCY
FREQUENCY: CONTINUOUS

## 2022-11-11 ASSESSMENT — PAIN DESCRIPTION - PAIN TYPE
TYPE: SURGICAL PAIN

## 2022-11-11 ASSESSMENT — PAIN DESCRIPTION - ORIENTATION
ORIENTATION: RIGHT

## 2022-11-11 ASSESSMENT — PAIN SCALES - GENERAL
PAINLEVEL_OUTOF10: 6
PAINLEVEL_OUTOF10: 6
PAINLEVEL_OUTOF10: 8
PAINLEVEL_OUTOF10: 8
PAINLEVEL_OUTOF10: 9
PAINLEVEL_OUTOF10: 5
PAINLEVEL_OUTOF10: 6
PAINLEVEL_OUTOF10: 3

## 2022-11-11 ASSESSMENT — PAIN DESCRIPTION - DESCRIPTORS
DESCRIPTORS: ACHING;DISCOMFORT
DESCRIPTORS: ACHING
DESCRIPTORS: STABBING
DESCRIPTORS: ACHING;DISCOMFORT
DESCRIPTORS: ACHING;DISCOMFORT
DESCRIPTORS: ACHING;THROBBING
DESCRIPTORS: ACHING;DISCOMFORT

## 2022-11-11 ASSESSMENT — PAIN DESCRIPTION - ONSET
ONSET: ON-GOING
ONSET: ON-GOING

## 2022-11-11 NOTE — OP NOTE
Operative Note      Patient: [place] Thomas  YOB: 1965  MRN: [id number]    Date of Procedure: 11/11/2022      Detailed Description of Procedure:   Dictated.      Electronically signed by Antelmo Moctezuma MD on 11/11/2022 at 3:11 PM

## 2022-11-11 NOTE — PROGRESS NOTES
Patient admitted to her room from the PACU. Upon assessment of patients skin from head to toe, multiple skin issues noted. Patient has a large open area on coccyx, tip of left great toe is deep purple/black. Also noted is scabbed area on left heal, inner ankle and outer ankle as well as the back of her ankle. Patient also has excoriation in groin and under bilateral breasts. This nurse was second witness on skin assessment.  Oralia Stephens LPN 62/89/59

## 2022-11-11 NOTE — CONSULTS
Patient:   [place] Thomas    Date:  22  :  1965, 62 y.o. Nephrologist: Leanna Hoffman MD  Provider: Jonathan Duval MD    Reason for Consult: End-stage kidney disease need maintenance dialysis    Consult requested by : Dr Ghazala Singh    Chief Complaint:   Right leg chronic will need amputation    HISTORY OF PRESENT ILLNESS/Brief hospital course  AND  brief background history    Ms Katie Wilson is an unfortunate 66-year-old female, who was electively admitted today for right below-knee amputation. She has that chronic wound, with underlying atherosclerotic cardiovascular disease, despite a bone therapy intervention it would not heal.  The decision was made to amputate today. She was well dialysis today prior to her amputation. I saw her in the holding area, she was alert awake and oriented. Going for amputation    Ms. Katie Wilson has end-stage kidney disease more than likely from significant diabetic kidney disease this is atherosclerotic cardiovascular disease. She started dialysis outside our [place] but recently started here in our unit. She dialyzed Monday, Wednesday and Friday and had a dialysis today. PMH :   End-stage kidney disease on maintenance hemodialysis  Longstanding diabetes mellitus insulin-dependent for almost 17 years with of course many organ involvement  Chronic diabetic heel ulcer  Coronary artery disease status post CABG  Dyslipidemia  COPD  Gastroesophageal reflux disorder  Diabetic neuropathy           PSH :  Left upper extremity brachiocephalic AV fistula  Coronary artery bypass surgery  Gallbladder surgery  Hysterectomy  Appendectomy        OB GYN Hx:   4, para 4, no history of eclampsia, preeclampsia, gestational diabetes or hypertension     Habits :   She still smokes 5 cigarettes a day, she has been smoking since age 15, no history of alcohol illicit drug abuse. Soc Hx: She was born in [place] but moved to [place] at a very young age.   She lives

## 2022-11-11 NOTE — PROGRESS NOTES
Pt seen and examined. No change with H&P. Consent signed and placed in chart for R BKA with IPOP. R/B/A's discussed with family, agree to proceed with surgery. HD earlier today and PTA to right SFA per Dr. Lakshmi Barnes yesterday. Discused with him. Ancef OCTOR. Warm/Dry

## 2022-11-11 NOTE — ANESTHESIA POSTPROCEDURE EVALUATION
Department of Anesthesiology  Postprocedure Note    Patient: Kg Fong  MRN: [id number]  YOB: 1965  Date of evaluation: 11/11/2022      Procedure Summary     Date: 11/11/22 Room / Location: [place] [address] / [facility]    Anesthesia Start: 9344 Anesthesia Stop: 7489    Procedure: RIGHT LEG AMPUTATION BELOW KNEE WITH IPOP (Right: Leg Lower) Diagnosis:       Ulcer of right heel, with necrosis of bone ([address]      (Ulcer of right heel, with necrosis of bone ([address] [T81.794])    Surgeons: Mylene Gonzalez MD Responsible Provider: Rudy Berger DO    Anesthesia Type: general ASA Status: 4          Anesthesia Type: No value filed.     Carmela Phase I:      Carmela Phase II:        Anesthesia Post Evaluation    Patient location during evaluation: PACU  Patient participation: complete - patient participated  Level of consciousness: awake and awake and alert  Pain score: 0  Airway patency: patent  Nausea & Vomiting: no nausea and no vomiting  Complications: no  Cardiovascular status: blood pressure returned to baseline and hemodynamically stable  Respiratory status: acceptable, spontaneous ventilation and face mask  Hydration status: euvolemic

## 2022-11-11 NOTE — BRIEF OP NOTE
Brief Postoperative Note      Patient: Petr Valdez  YOB: 1965  MRN: [id number]    Date of Procedure: 11/11/2022    Pre-Op Diagnosis: Ulcer of right heel, with necrosis of bone and gangrene    Post-Op Diagnosis: Same       Procedure(s):  RIGHT LEG AMPUTATION BELOW KNEE WITH IPOP    Surgeon(s):  Raina Umanzor MD    Assistant:  * No surgical staff found *    Anesthesia: General    Estimated Blood Loss (mL): Minimal    Complications: None    Specimens:   ID Type Source Tests Collected by Time Destination   A : right lower leg and foot Specimen Leg SURGICAL PATHOLOGY Raina Umanzor MD 11/11/2022 1437        Implants:  * No implants in log *      Drains:   Negative Pressure Wound Therapy Foot (Active)       Negative Pressure Wound Therapy Foot Distal (Active)       Negative Pressure Wound Therapy (Active)       Findings:     Electronically signed by Raina Umanzor MD on 11/11/2022 at 3:10 PM

## 2022-11-12 LAB
ALBUMIN SERPL-MCNC: 2.4 GM/DL (ref 3.4–5)
ALP BLD-CCNC: 145 IU/L (ref 40–128)
ALT SERPL-CCNC: 8 U/L (ref 10–40)
ANION GAP SERPL CALCULATED.3IONS-SCNC: 15 MMOL/L (ref 4–16)
AST SERPL-CCNC: 27 IU/L (ref 15–37)
BASOPHILS ABSOLUTE: 0 K/CU MM
BASOPHILS RELATIVE PERCENT: 0.2 % (ref 0–1)
BILIRUB SERPL-MCNC: 0.2 MG/DL (ref 0–1)
BUN BLDV-MCNC: 32 MG/DL (ref 6–23)
CALCIUM SERPL-MCNC: 7.5 MG/DL (ref 8.3–10.6)
CHLORIDE BLD-SCNC: 99 MMOL/L (ref 99–110)
CO2: 24 MMOL/L (ref 21–32)
CREAT SERPL-MCNC: 7.6 MG/DL (ref 0.6–1.1)
DIFFERENTIAL TYPE: ABNORMAL
EOSINOPHILS ABSOLUTE: 0 K/CU MM
EOSINOPHILS RELATIVE PERCENT: 0 % (ref 0–3)
GFR SERPL CREATININE-BSD FRML MDRD: 6 ML/MIN/1.73M2
GLUCOSE BLD-MCNC: 175 MG/DL (ref 70–99)
GLUCOSE BLD-MCNC: 200 MG/DL (ref 70–99)
GLUCOSE BLD-MCNC: 247 MG/DL (ref 70–99)
GLUCOSE BLD-MCNC: 79 MG/DL (ref 70–99)
GLUCOSE BLD-MCNC: 95 MG/DL (ref 70–99)
HCT VFR BLD CALC: 24.2 % (ref 37–47)
HEMOGLOBIN: 7.5 GM/DL (ref 12.5–16)
IMMATURE NEUTROPHIL %: 0.8 % (ref 0–0.43)
LYMPHOCYTES ABSOLUTE: 0.9 K/CU MM
LYMPHOCYTES RELATIVE PERCENT: 5.8 % (ref 24–44)
MAGNESIUM: 2.1 MG/DL (ref 1.8–2.4)
MCH RBC QN AUTO: 31.5 PG (ref 27–31)
MCHC RBC AUTO-ENTMCNC: 31 % (ref 32–36)
MCV RBC AUTO: 101.7 FL (ref 78–100)
MONOCYTES ABSOLUTE: 0.6 K/CU MM
MONOCYTES RELATIVE PERCENT: 4 % (ref 0–4)
NUCLEATED RBC %: 0 %
PDW BLD-RTO: 14.3 % (ref 11.7–14.9)
PHOSPHORUS: 7.6 MG/DL (ref 2.5–4.9)
PLATELET # BLD: 281 K/CU MM (ref 140–440)
PMV BLD AUTO: 10.4 FL (ref 7.5–11.1)
POTASSIUM SERPL-SCNC: 4.3 MMOL/L (ref 3.5–5.1)
RBC # BLD: 2.38 M/CU MM (ref 4.2–5.4)
SEGMENTED NEUTROPHILS ABSOLUTE COUNT: 14 K/CU MM
SEGMENTED NEUTROPHILS RELATIVE PERCENT: 89.2 % (ref 36–66)
SODIUM BLD-SCNC: 138 MMOL/L (ref 135–145)
TOTAL IMMATURE NEUTOROPHIL: 0.12 K/CU MM
TOTAL NUCLEATED RBC: 0 K/CU MM
TOTAL PROTEIN: 5.9 GM/DL (ref 6.4–8.2)
WBC # BLD: 15.6 K/CU MM (ref 4–10.5)

## 2022-11-12 PROCEDURE — 6370000000 HC RX 637 (ALT 250 FOR IP): Performed by: SURGERY

## 2022-11-12 PROCEDURE — 2700000000 HC OXYGEN THERAPY PER DAY

## 2022-11-12 PROCEDURE — 97530 THERAPEUTIC ACTIVITIES: CPT

## 2022-11-12 PROCEDURE — 6360000002 HC RX W HCPCS: Performed by: SURGERY

## 2022-11-12 PROCEDURE — 84100 ASSAY OF PHOSPHORUS: CPT

## 2022-11-12 PROCEDURE — 97116 GAIT TRAINING THERAPY: CPT

## 2022-11-12 PROCEDURE — 85025 COMPLETE CBC W/AUTO DIFF WBC: CPT

## 2022-11-12 PROCEDURE — 2580000003 HC RX 258: Performed by: SURGERY

## 2022-11-12 PROCEDURE — 1200000000 HC SEMI PRIVATE

## 2022-11-12 PROCEDURE — 97535 SELF CARE MNGMENT TRAINING: CPT

## 2022-11-12 PROCEDURE — 83735 ASSAY OF MAGNESIUM: CPT

## 2022-11-12 PROCEDURE — 36415 COLL VENOUS BLD VENIPUNCTURE: CPT

## 2022-11-12 PROCEDURE — 80053 COMPREHEN METABOLIC PANEL: CPT

## 2022-11-12 PROCEDURE — 82962 GLUCOSE BLOOD TEST: CPT

## 2022-11-12 PROCEDURE — 97163 PT EVAL HIGH COMPLEX 45 MIN: CPT

## 2022-11-12 PROCEDURE — 94761 N-INVAS EAR/PLS OXIMETRY MLT: CPT

## 2022-11-12 PROCEDURE — 85027 COMPLETE CBC AUTOMATED: CPT

## 2022-11-12 PROCEDURE — 97167 OT EVAL HIGH COMPLEX 60 MIN: CPT

## 2022-11-12 RX ORDER — INSULIN LISPRO 100 [IU]/ML
0-16 INJECTION, SOLUTION INTRAVENOUS; SUBCUTANEOUS
Status: DISCONTINUED | OUTPATIENT
Start: 2022-11-12 | End: 2022-11-19 | Stop reason: HOSPADM

## 2022-11-12 RX ORDER — INSULIN LISPRO 100 [IU]/ML
0-4 INJECTION, SOLUTION INTRAVENOUS; SUBCUTANEOUS NIGHTLY
Status: DISCONTINUED | OUTPATIENT
Start: 2022-11-12 | End: 2022-11-19 | Stop reason: HOSPADM

## 2022-11-12 RX ORDER — OXYCODONE HYDROCHLORIDE 10 MG/1
10 TABLET ORAL EVERY 4 HOURS PRN
Status: DISCONTINUED | OUTPATIENT
Start: 2022-11-12 | End: 2022-11-13

## 2022-11-12 RX ORDER — OXYCODONE HYDROCHLORIDE 10 MG/1
10 TABLET ORAL EVERY 4 HOURS PRN
Status: DISCONTINUED | OUTPATIENT
Start: 2022-11-12 | End: 2022-11-12

## 2022-11-12 RX ORDER — FLUCONAZOLE 100 MG/1
150 TABLET ORAL DAILY
Status: COMPLETED | OUTPATIENT
Start: 2022-11-12 | End: 2022-11-13

## 2022-11-12 RX ADMIN — HYDROMORPHONE HYDROCHLORIDE 1 MG: 1 INJECTION, SOLUTION INTRAMUSCULAR; INTRAVENOUS; SUBCUTANEOUS at 19:53

## 2022-11-12 RX ADMIN — SODIUM CHLORIDE, PRESERVATIVE FREE 10 ML: 5 INJECTION INTRAVENOUS at 08:01

## 2022-11-12 RX ADMIN — LISINOPRIL 20 MG: 20 TABLET ORAL at 07:59

## 2022-11-12 RX ADMIN — LORAZEPAM 0.5 MG: 0.5 TABLET ORAL at 07:59

## 2022-11-12 RX ADMIN — CARVEDILOL 6.25 MG: 6.25 TABLET, FILM COATED ORAL at 07:59

## 2022-11-12 RX ADMIN — HEPARIN SODIUM 5000 UNITS: 5000 INJECTION INTRAVENOUS; SUBCUTANEOUS at 12:34

## 2022-11-12 RX ADMIN — PANTOPRAZOLE SODIUM 40 MG: 40 TABLET, DELAYED RELEASE ORAL at 06:34

## 2022-11-12 RX ADMIN — HYDROMORPHONE HYDROCHLORIDE 1 MG: 1 INJECTION, SOLUTION INTRAMUSCULAR; INTRAVENOUS; SUBCUTANEOUS at 12:33

## 2022-11-12 RX ADMIN — FLUCONAZOLE 150 MG: 100 TABLET ORAL at 16:35

## 2022-11-12 RX ADMIN — GABAPENTIN 800 MG: 400 CAPSULE ORAL at 07:59

## 2022-11-12 RX ADMIN — SODIUM CHLORIDE, PRESERVATIVE FREE 10 ML: 5 INJECTION INTRAVENOUS at 04:55

## 2022-11-12 RX ADMIN — OXYCODONE HYDROCHLORIDE 5 MG: 5 TABLET ORAL at 02:26

## 2022-11-12 RX ADMIN — HYDROMORPHONE HYDROCHLORIDE 1 MG: 1 INJECTION, SOLUTION INTRAMUSCULAR; INTRAVENOUS; SUBCUTANEOUS at 08:07

## 2022-11-12 RX ADMIN — OXYCODONE HYDROCHLORIDE 10 MG: 10 TABLET ORAL at 20:52

## 2022-11-12 RX ADMIN — SODIUM CHLORIDE, PRESERVATIVE FREE 10 ML: 5 INJECTION INTRAVENOUS at 01:28

## 2022-11-12 RX ADMIN — HYDROMORPHONE HYDROCHLORIDE 0.5 MG: 1 INJECTION, SOLUTION INTRAMUSCULAR; INTRAVENOUS; SUBCUTANEOUS at 01:27

## 2022-11-12 RX ADMIN — SODIUM CHLORIDE, PRESERVATIVE FREE 10 ML: 5 INJECTION INTRAVENOUS at 20:54

## 2022-11-12 RX ADMIN — FUROSEMIDE 40 MG: 40 TABLET ORAL at 20:52

## 2022-11-12 RX ADMIN — OXYCODONE HYDROCHLORIDE 10 MG: 10 TABLET ORAL at 13:34

## 2022-11-12 RX ADMIN — SODIUM CHLORIDE, PRESERVATIVE FREE 10 ML: 5 INJECTION INTRAVENOUS at 19:54

## 2022-11-12 RX ADMIN — HYDROMORPHONE HYDROCHLORIDE 1 MG: 1 INJECTION, SOLUTION INTRAMUSCULAR; INTRAVENOUS; SUBCUTANEOUS at 15:15

## 2022-11-12 RX ADMIN — HYDROMORPHONE HYDROCHLORIDE 0.5 MG: 1 INJECTION, SOLUTION INTRAMUSCULAR; INTRAVENOUS; SUBCUTANEOUS at 04:55

## 2022-11-12 RX ADMIN — HYDROMORPHONE HYDROCHLORIDE 1 MG: 1 INJECTION, SOLUTION INTRAMUSCULAR; INTRAVENOUS; SUBCUTANEOUS at 10:10

## 2022-11-12 RX ADMIN — HEPARIN SODIUM 5000 UNITS: 5000 INJECTION INTRAVENOUS; SUBCUTANEOUS at 06:34

## 2022-11-12 RX ADMIN — GABAPENTIN 800 MG: 400 CAPSULE ORAL at 20:52

## 2022-11-12 RX ADMIN — LORAZEPAM 0.5 MG: 0.5 TABLET ORAL at 22:02

## 2022-11-12 RX ADMIN — HEPARIN SODIUM 5000 UNITS: 5000 INJECTION INTRAVENOUS; SUBCUTANEOUS at 22:02

## 2022-11-12 RX ADMIN — CARVEDILOL 6.25 MG: 6.25 TABLET, FILM COATED ORAL at 20:53

## 2022-11-12 RX ADMIN — FUROSEMIDE 40 MG: 40 TABLET ORAL at 07:59

## 2022-11-12 RX ADMIN — OXYCODONE HYDROCHLORIDE 5 MG: 5 TABLET ORAL at 06:34

## 2022-11-12 RX ADMIN — INSULIN LISPRO 15 UNITS: 100 INJECTION, SOLUTION INTRAVENOUS; SUBCUTANEOUS at 08:37

## 2022-11-12 ASSESSMENT — PAIN DESCRIPTION - PAIN TYPE
TYPE: SURGICAL PAIN

## 2022-11-12 ASSESSMENT — PAIN DESCRIPTION - DESCRIPTORS
DESCRIPTORS: ACHING;THROBBING
DESCRIPTORS: THROBBING
DESCRIPTORS: ACHING;DISCOMFORT
DESCRIPTORS: ACHING;THROBBING
DESCRIPTORS: ACHING
DESCRIPTORS: ACHING
DESCRIPTORS: STABBING
DESCRIPTORS: ACHING;SHARP

## 2022-11-12 ASSESSMENT — PAIN SCALES - GENERAL
PAINLEVEL_OUTOF10: 8
PAINLEVEL_OUTOF10: 9
PAINLEVEL_OUTOF10: 10
PAINLEVEL_OUTOF10: 7
PAINLEVEL_OUTOF10: 10
PAINLEVEL_OUTOF10: 7
PAINLEVEL_OUTOF10: 8
PAINLEVEL_OUTOF10: 9
PAINLEVEL_OUTOF10: 8
PAINLEVEL_OUTOF10: 8

## 2022-11-12 ASSESSMENT — PAIN - FUNCTIONAL ASSESSMENT
PAIN_FUNCTIONAL_ASSESSMENT: ACTIVITIES ARE NOT PREVENTED
PAIN_FUNCTIONAL_ASSESSMENT: PREVENTS OR INTERFERES SOME ACTIVE ACTIVITIES AND ADLS
PAIN_FUNCTIONAL_ASSESSMENT: PREVENTS OR INTERFERES WITH MANY ACTIVE NOT PASSIVE ACTIVITIES
PAIN_FUNCTIONAL_ASSESSMENT: PREVENTS OR INTERFERES SOME ACTIVE ACTIVITIES AND ADLS
PAIN_FUNCTIONAL_ASSESSMENT: ACTIVITIES ARE NOT PREVENTED
PAIN_FUNCTIONAL_ASSESSMENT: ACTIVITIES ARE NOT PREVENTED

## 2022-11-12 ASSESSMENT — PAIN DESCRIPTION - ORIENTATION
ORIENTATION: RIGHT
ORIENTATION: LEFT
ORIENTATION: RIGHT

## 2022-11-12 ASSESSMENT — PAIN DESCRIPTION - ONSET
ONSET: ON-GOING

## 2022-11-12 ASSESSMENT — PAIN DESCRIPTION - LOCATION
LOCATION: OTHER (COMMENT)
LOCATION: KNEE;INCISION
LOCATION: OTHER (COMMENT)
LOCATION: OTHER (COMMENT)
LOCATION: LEG;INCISION
LOCATION: LEG
LOCATION: KNEE
LOCATION: LEG
LOCATION: KNEE

## 2022-11-12 ASSESSMENT — PAIN DESCRIPTION - FREQUENCY
FREQUENCY: CONTINUOUS

## 2022-11-12 NOTE — CARE COORDINATION
Social Work consult noted. Pt is a S/P right BKA. Therapy orders are in. WB posted requesting PT / OT evals and recommendations. Pt from home, independent prior to admission.

## 2022-11-12 NOTE — PROGRESS NOTES
Physical Therapy  Facility/Department: [facility]N  Physical Therapy Initial Assessment    Name: William Mckeon  : 1965  MRN: [id number]  Date of Service: 2022    Discharge Recommendations:  IP Rehab              Assessment   Assessment: Pt is a 62 y.o. female with medical history, surgical history, co-morbidities, and personal factors including Acute pain of right foot, CAD (coronary artery disease), Callus of foot, Carpal tunnel syndrome on both sides, CHF (congestive heart failure) ([address] Chronic kidney disease, Chronic ulcer of left ankle with fat layer exposed ([address] Chronic ulcer of left foot with fat layer exposed ([address] Chronic ulcer of right ankle with fat layer exposed ([address] Chronic ulcer of right foot with fat layer exposed ([address] Depression, Diabetes mellitus ([address] Diabetes mellitus with neurological manifestations ([address] Diabetes mellitus with ulcer of ankle ([address] Diabetic ulcer of left foot associated with type 2 diabetes mellitus, with fat layer exposed ([address] Diabetic ulcer of right heel associated with type 2 diabetes mellitus, with muscle involvement without evidence of necrosis ([address] Diabetic ulcer of right heel associated with type 2 diabetes mellitus, with necrosis of bone ([address] ESRD on hemodialysis ([address] Family history of cardiovascular disease, GERD (gastroesophageal reflux disease), H/O cardiac catheterization, H/O cardiovascular stress test, H/O chest x-ray, H/O Doppler ultrasound, H/O echocardiogram, History of complete ECG, Hx of cardiovascular stress test, Hx of Doppler ultrasound, Hx of echocardiogram, Hyperlipidemia, Neuropathy of foot, Neuropathy of hand, Non compliance with medical treatment, S/P CABG x 4, Type 2 diabetes mellitus with diabetic polyneuropathy, with long-term current use of insulin ([address] Type II or unspecified type diabetes mellitus with neurological manifestations, not stated as uncontrolled(250.60), Type II or unspecified type diabetes mellitus with other specified manifestations, not stated as uncontrolled, Ulcer of left foot, with fat layer exposed ([facility] [address] Ulcer of other part of foot, Carpal tunnel release; Hysterectomy, total abdominal; Cholecystectomy; Appendectomy; Tubal ligation; Coronary artery bypass graft (6/5/2010); Finger trigger release (Right, 6/10/2013); Ulnar tunnel release (Right, 6/10/2013); Carpal tunnel release (Right, 6/10/2013); Carpal tunnel release (Left, 7/29/2013); Cardiac surgery; Toe amputation (Left, 02/14/144th toe); and Colonoscopy with admission for Diagnoses of Ulcer of right heel with necrosis of bone and s/p Right below-the-knee amputation with immediate postop prosthesis. Prior to admission, pt was modified independent with functional mobility and ADLs. Examination of body systems reveals decreased strength, decreased balance, decreased aerobic capacity, and decreased independence with functional mobility. Therapy Prognosis: Good  Decision Making: High Complexity  Clinical Presentation: unpredictable characteristics  Requires PT Follow-Up: Yes  Activity Tolerance  Activity Tolerance: Patient tolerated evaluation without incident     Plan   Physcial Therapy Plan  General Plan: 6-7 times per week  Current Treatment Recommendations: Strengthening, ROM, Balance training, Functional mobility training, Transfer training, ADL/Self-care training, IADL training, Cognitive/Perceptual training, Endurance training, Wheelchair mobility training, Safety education & training, Patient/Caregiver education & training, Therapeutic activities, Gait training, Stair training, Equipment evaluation, education, & procurement, Pain management, Neuromuscular re-education, Positioning, Home exercise program  Safety Devices  Type of Devices:  All fall risk precautions in place, Patient at risk for falls, Call light within reach, Left in chair, Chair alarm in place, Gait belt, Nurse notified Restrictions  Restrictions/Precautions  Restrictions/Precautions: Fall Risk, Weight Bearing, General Precautions  Lower Extremity Weight Bearing Restrictions  Right Lower Extremity Weight Bearing: Weight Bearing As Tolerated  Position Activity Restriction  Other position/activity restrictions: Ambulate patient at least 50 feet 3 times a day per Dr. Lynne Pleasure: Yes  Patient assessed for rehabilitation services?: Yes  Family / Caregiver Present: No  Follows Commands: Within Functional Limits  Subjective  Subjective: pain: 5/10 RLE         Social/Functional History  Social/Functional History  Lives With: Spouse (+ AMANDA)  Home Layout: One level  Home Access: Level entry  Bathroom Shower/Tub: Tub/Shower unit  Bathroom Equipment: Tub transfer bench  Home Equipment: [employer], rolling (recently ordered a motorizeed w/c)  ADL Assistance: Independent  Ambulation Assistance: Independent (recently with walker since WB restrcition leading up to this hospital [address] previously idep no AD)  Transfer Assistance: Independent  Additional Comments: Recently her  Nad AMANDA assisting with ADL and mobility as needed since foot wound and WB restrcitions. Historically, pt is fully independnet. Vision/Hearing  Vision  Vision: Within Functional Limits  Hearing  Hearing: Within functional limits      Cognition   Orientation  Overall Orientation Status: Within Functional Limits  Cognition  Overall Cognitive Status: Exceptions  Arousal/Alertness: Appropriate responses to stimuli  Following Commands:  Follows all commands without difficulty  Attention Span: Appears intact  Safety Judgement: Decreased awareness of need for safety;Decreased awareness of need for assistance  Problem Solving: Decreased awareness of errors  Insights: Decreased awareness of deficits  Initiation: Requires cues for some  Sequencing: Requires cues for some     Objective           Gross Assessment  Sensation: Impaired (BLEs) Strength RLE  Comment: not tested  Strength LLE  Comment: knee extension: 4/5, ankle DF: 4+/5           Bed mobility  Supine to Sit: Moderate assistance (with verbal and tactile cues for BUE and BLE placement throughout transfer; with HOB elevated; with increased time for task completion)  Transfers  Sit to Stand: Minimal Assistance; Moderate Assistance (with verbal cues to push through bed and avoid pulling on walker)  Stand to Sit: Minimal Assistance (with verbal cues to feel chair against back of legs, reach back, and sit slowly)  Ambulation  Surface: Level tile  Device: Rolling Walker  Assistance: Minimal assistance; Moderate assistance  Quality of Gait: decreased gait speed, decreased step length bilaterally, decreased stance time on RLE, antalgic, unsteady  Distance: 4 feet  Comments: with verbal and tactile cues for BLE placement, walker placement, and sequence throughout ambulation; with verbal and tactile cues to maintain upright posture in order to avoid COM shifting outside of FELIPA;     Balance  Posture: Fair  Sitting - Static: Good  Sitting - Dynamic: Good  Standing - Static: Poor;+  Standing - Dynamic: Poor         Gait Training:  Cues were given for safety, sequence, device management, balance, posture, awareness, path. Therapeutic Activity Training:   Therapeutic activity training was instructed today. Cues were given for safety, sequence, UE/LE placement, awareness, and balance. Activities performed today included bed mobility training, sup-sit, sit-stand. AM-PAC Score  AM-PAC Inpatient Mobility Raw Score : 11 (11/12/22 0944)  AM-PAC Inpatient T-Scale Score : 33.86 (11/12/22 0944)  Mobility Inpatient CMS 0-100% Score: 72.57 (11/12/22 0944)  Mobility Inpatient CMS G-Code Modifier : CL (11/12/22 0944)          Goals  Long Term Goals  Time Frame for Long Term Goals :  In one week:  Long Term Goal 1: Pt will complete all bed mobility with CGAx1  Long Term Goal 2: Pt will complete sit <> stand transfers with CGAx1  Long Term Goal 3: Pt will ambulate 40 feet with CGAx1 with LRAD  Long Term Goal 4: Pt will independently complete 3 sets of 10 reps of BLE AROM exercises in available and allowed ROM  Long Term Goal 5: Pt will propel manual w/c 50 feet with CGAx1       Education  Patient Education  Education Given To: Patient  Education Provided: Role of Therapy; Energy Conservation; Fall Prevention Strategies; Plan of Care;IADL Safety; ADL Adaptive Strategies; Equipment  Education Method: Demonstration;Verbal  Education Outcome: Verbalized understanding;Demonstrated understanding;Continued education needed      Time In: 0915  Time Out: 7932  Total Treatment Time: 35  Timed Code Treatment Minutes: [address] [place] Perfecto Goodrich, CHARLENE  License #: [id number]

## 2022-11-12 NOTE — PROGRESS NOTES
Occupational Therapy  [facility] OCCUPATIONAL THERAPY EVALUATION    History  [place]:  Diagnoses of Ulcer of right heel, with necrosis of bone ([facility] [address] and Ulcer of right heel, with necrosis of bone ([facility] [address] were pertinent to this visit. Restrictions:  Restrictions/Precautions  Restrictions/Precautions: Fall Risk, Weight Bearing, General Precautions    Position Activity Restriction  Other position/activity restrictions: Ambulate patient at least 50 feet 3 times a day per Dr. An Bertrand Weight Bearing Restrictions  Right Lower Extremity Weight Bearing: Weight Bearing As Tolerated            Communication with other providers: RN, PT    Subjective:  Patient states:  \"can I sit in the chair? \"  Pain:  not rated, but does identify managed pain in residual limb  Patient goal:  home with  and AMANDA, achieve modified INDEPENDENCE    Occupational profile (relevant social history and personal factors):    Social/Functional History  Lives With: Spouse (+ AMANDA)  Home Layout: One level  Home Access: Level entry  Bathroom Shower/Tub: Tub/Shower unit  Bathroom Equipment: Tub transfer bench  Home Equipment: [employer], rolling (recently ordered a motorizeed w/c)  ADL Assistance: Independent  Ambulation Assistance: Independent (recently with walker since WB restrcition leading up to this hospital admssion. previously idep no AD)  Transfer Assistance: Independent  Additional Comments: Recently her  Nad AMANDA assisting with ADL and mobility as needed since foot wound and WB restrcitions. Historically, pt is fully independnet. Examination of body systems (includes body structures/functions, activity/participation limitations):  Orientation: WFL   Cognition:  WFL   Observation:  Received pt in bed. Alert and cooperative.    Vision:  [place]   Hearing:  WFL   ROM:  WFL BUE  Strength: WFL BUE, generalized weakness/fatigue due to acute situation  Sensation: not tested formally, no overt impairments in BUE    ADLs  Feeding: setup    Grooming: setup in seated, pt performed facial hygiene and hair mngmnt in chair    Dressing: UB CGA in seated LB DEP    Bathing: UB CGA in seated LB DEP    Toileting: not tested formally, but based on standing and transfer ability pt could use a BSC but with DEP hygiene and pants mngmnt due to need for constant 2 UE support on walker while standing    *Some ADL determined per observation of actual ADL performance, functional mobility, balance, activity tolerance, and cognition. AM-PAC 6 click short form for inpatient daily activity:  Raw Score: 14  24/24 = unimpaired  23/24 = 1-20% impaired   20/24-22/24 = 21-40% impaired  15/24-19/24 = 41-59% impaired   10/24-14/24 = 60%-79% impaired  7/24-9/24 = 80%-99% impaired  6/24 = 100% impaired    Functional Mobility  Bed mobility:   Supine to sit: CGA, slow and effortful due to pain, HOB elevated    Sitting balance: WFL     Transfers:   Sit to stand: Sharda via pull up on walker from the EOB    Stand to sit: CGA to chair, cue for hand placement     Standing balance: CGA c RW static and low level dynamic, cannot stand with less than 2UE supported    Ambulation:  not tested, pt only performed pivoting on L foot from bed to chair ~2 feet. Activity tolerance  Well below baseline due to pain and fatigue    Assessment:  Assessment  Performance deficits / Impairments: Decreased ADL status, Decreased functional mobility , Decreased strength, Decreased high-level IADLs, Decreased posture, Decreased balance  Treatment Diagnosis: s/p R BKA 11/11/22  Prognosis: Good  Decision Making: High Complexity  Discharge Recommendations: Weslucian Ang    Pt is a 62year old F admitted from home for mngmnt of R foot wound. Ultimately had BKA on 11/11/22. PTA, pt was using a walker for NWB RLE gait/\"hopping\"/pivoting c assistance from her AMANDA. Prior to worsening wound and NWB restriction, pt was independent in ADL and mobility.  Today, she was able to pivot to a chair and perform some seated ADL. She will require continued OT to address modified ADL and mobility. Recommend ARU at d/c. Goals:  By d/c or goals met:     Pt will perform all bed mobility with CGA bed falt in prep for EOB/OOB activity. Pt will perform all functional transfers with CGA and appropriate use of LRD to bed, toilet, chair in prep for increased functional independence. Pt will perform UB ADLs with SBA to increase functional independence. Pt will perform LB ADLs with ModA to increase functional independence. Pt will perform all aspects of toileting with MaxA to increase functional independence. Pt will participate in therex/therax c emphasis on strength, activity tolerance,  safety, RAD tasks. Plan:  Occupational Therapy Plan  Times Per Week: 3x      Recommendation for activity with nursing staff:  Pivot with walker to chair, bed, BSC    Treatment today:    Self Care Training:   Self care training was performed today. Cues were given for safety, sequence, UE/LE placement, visual cues, and balance. Activities performed today included Dressing and seated hygiene at sink. Therapeutic Activity Training:   Therapeutic activity training was instructed today. Cues were given for safety, sequence, UE/LE placement, visual cues, and balance. Activities performed today included bed mobility training, sup-sit, sit-stand, SPT. Education: Role of OT, OT POC, d/c needs, home safety    Safety: Left in chair with all needs in reach. chair alarm applied. Gait belt used for transfer and mobility. Time in:  0915  Time out:  0953  Timed treatment minutes:  23  Total treatment time:  45    Electronically signed by:    410Jose Levin, OTR/L, [place]   [id number]   10:35 AM, 11/12/2022

## 2022-11-12 NOTE — OP NOTE
[address] [place] [address], [address] [facility]                                OPERATIVE REPORT    PATIENT NAME: Angela Hernandez                   :        1965  MED REC NO:   [id number]                          ROOM:       [id number]  ACCOUNT NO:   [de-identified]                           ADMIT DATE: 2022  PROVIDER:     Lelia Leigh MD    DATE OF PROCEDURE:  2022    PREOPERATIVE DIAGNOSES:  Diabetic right heel ulceration, diabetic heel  ulceration, with necrosis of bone and associated gangrenous changes. POSTOPERATIVE DIAGNOSES:  Diabetic right heel ulceration, diabetic heel  ulceration, with necrosis of bone and associated gangrenous changes. OPERATION PERFORMED:  Right below-the-knee amputation with immediate  postop prosthesis. SURGEON:  Lelia Leigh MD    ANESTHESIA:  General anesthesia combined with endotracheal tube  intubation. SPECIMENS:  Right lower extremity. COMPLICATIONS:  None. DRAINS:  None. ESTIMATED BLOOD LOSS:  Negligible. INTRAOPERATIVE FINDINGS:  As above. INDICATIONS:  The patient is a 49-year-old  female who has  multiple comorbidities including insulin-dependent diabetes mellitus and  also has end-stage renal artery disease as well as hemodialysis. I  initially saw this patient at the [facility] for a  necrotic right heel. I then proceeded to start serial excisional  debridements, but then it just continued to get worse and she had  necrosis of the calcaneus, which is why I was able to remove some bone  at the bedside, but then due to the size of the wound defect, necrosis  of the calcaneus along with some of the callus, I then recommended a  right below-the-knee amputation because I did not want her to get septic  from this large diabetic foot wound. She also had underlying arterial  disease.   I then sent her to see Dr. Woodford Boast, who then yesterday took her  to the operating room at [facility] and performed an angiogram  along with percutaneous transluminal angioplasty to her right  superficial femoral artery with good outflow into the lower extremity. He did discuss his findings and his intervention with me yesterday and  also informed me that she had all three vessels, which were patent  below-the-knee. The patient is in agreement to proceed with surgical  intervention. Her operative consent has been signed, placed upon the  chart, and preoperative antibiotics have been given. Additionally, she  also received hemodialysis earlier this morning prior to surgical  intervention for medical optimization. DESCRIPTION OF PROCEDURE:  The patient was brought to the operating room  and placed in supine position. Left lower extremity SCD was then  placed. General anesthesia combined with endotracheal tube intubation  was then performed. Universal time-out was then performed verifying the  patient, date of birth, site of surgery, and we were all in agreement to  proceed. The patient's right lower extremity was then sterilely prepped  and draped in standard surgical fashion. Nonocclusive dressing was  applied to the foot up to the level of the ankle. Anterior and  posterior skin incisions were then outlined with a marking pen. I then  elevated the right lower extremity, exsanguinated it, and then  compressed it with an Esmarch compression wrap and then applied the  tourniquet to a pressure of 250 mmHg pressure. Once this was completed,  the anterior skin incision was then made 10 cm below the tibial  tuberosity and extended medially and laterally toward the edge of the  gastrocnemius muscle. The skin incision was then extended distally on  either side parallel to the tibia for another 10 cm, creating a  posterior flap. The skin and subcutaneous tissues were incised down to  the fascia.   The greater and short saphenous veins on the medial and  posterior aspects of the leg, respectively were ligated and divided with  2-0 silk suture. The fascia and the muscles were then divided with  electrocautery at the same level of the anterior skin incision. The  muscles in the anterior and lateral compartment were divided, exposing  the anterior tibia vessels, which were ligated and divided. The  interosseous membrane was then incised. The periosteum was incised  circumferentially with electrocautery at same level of the skin and  muscle division. Using a periosteal elevator, the tibial periosteum was  stripped proximally for 2 cm. The tibia was then transected with an  electric saw 2 cm proximal to the skin incision with an anterior bevel. The fibula was then exposed, dissected circumferentially and also  transected with an electric saw 2 cm proximal to the tibial division. The amputation was then completed with an amputation knife, transecting  the soleus muscle obliquely and the gastrocnemius muscle at the same  level as the posterior flap. Bleeding soleus veins and posterior tibial  and perennial vessels were clamped and oversewn with 2-0 silk sutures. Sharp bony edges were then filed, eliminating any bony prominences over  the anterior aspect of the tibia. The fascia of the anterior and  posterior muscle flaps were then approximated with interrupted 0 Vicryl  sutures and the subdermal area was then reapproximated with subdermal  sutures of 3-0 Vicryl and the skin incision was reapproximated with  surgical staples. The incision was then cleaned, dried, along with  application of Xeroform, 4 x 8 fluffs, and a Kerlix wrap. At this time,  the representative from [place] [id number], [facility],  came into the operating room and applied the immediate postoperative  prosthesis. All sponge and needle counts were correct at the end the  case.   She was then awoken from anesthesia, extubated, and sent to the  recovery room in satisfactory stable condition. Shreyas Cisse MD    D: 11/11/2022 17:36:08       T: 11/11/2022 17:39:32     [id number]  Job#: [id number]     Doc#: [id number]    CC:   Kathrin Barnhart MD

## 2022-11-12 NOTE — H&P
[address] [place] [address], [address] [place] [address]                              HISTORY AND PHYSICAL    PATIENT NAME: Ryley Nagy                   :        1965  MED REC NO:   [id number]                          ROOM:       9813  ACCOUNT NO:   [de-identified]                           ADMIT DATE: 2022  PROVIDER:     Shena Guerra MD    REASON FOR ADMISSION:  Right foot diabetic ulceration with necrosis of  bone and associated gangrenous changes. CHIEF COMPLAINT AND HISTORY OF PRESENT ILLNESS:  The patient is a  72-year-old  female who I have got to know well because I  initially evaluated her at the [facility] for a  significant pressure and diabetic ulceration on her right heel. This  has required multiple excisional debridements, the bone has been  exposed, and in addition to her comorbidities, she also has peripheral  arterial disease. During our last visitation, there was a larger  portion of the calcaneus which was necrotic along with the talus and the  very large wound defect basically encompassing half of her plantar  surface. Based upon these findings, I did recommend that we proceed  with a below-the-knee amputation because I did not want her to get a  severe skin and soft tissue infection which would then lead to sepsis. I did have her seen by Dr. Michele Winters who yesterday did perform an angiogram  along with a PTA of the right SFA with good results. She does have good  patent three-vessel runoff into that lower extremity, which should heal  a below-the-knee amputation stump. I am now recommending a right  below-the-knee amputation with immediate postop prosthesis and she is  being admitted for the surgery.     PAST MEDICAL HISTORY:  End-stage renal artery disease, on hemodialysis;  atherosclerotic cardiovascular disease; insulin dependent diabetes  mellitus; hyperlipidemia; COPD; gastroesophageal reflux disease;  diabetic neuropathy. PAST SURGICAL HISTORY:  Left upper extremity brachiocephalic AV fistula,  CABG, cholecystectomy, total abdominal hysterectomy, and appendectomy. SOCIAL HISTORY:  Positive for tobacco abuse. Smoking since age of 15. Denies any alcohol or IV drug abuse. She is , has four children. FAMILY HISTORY:  Noncontributory. MEDICATIONS:  Please refer to medication reconciliation sheet. REVIEW OF SYSTEMS:  A 10-point review of systems is otherwise negative  unless stated as above in history of present illness. PHYSICAL EXAMINATION:  VITAL SIGNS:  Temperature is 98.5, respirations 17, pulse 103, blood  pressure _____, O2 sats 96% on room air. GENERAL:  Generally speaking, in no acute distress. Alert, awake, and  oriented x3. HEAD, EARS, EYES, NOSE, AND THROAT:  Normocephalic, atraumatic. Pupils  are equal, round, and reactive to light. Extraocular muscles are  intact. Trachea is midline. No lymphadenopathy. Anicteric sclerae. NECK:  No JVD or bruits. HEART:  Positive S1, S2.  Normal sinus rhythm. No murmurs or gallops. Point of maximal impulse, _____ intercostal space, midclavicular line. A well-healed median sternotomy incision. CHEST:  Clear to auscultation bilaterally. Air entry is bilaterally  equal.  No rales, rhonchi, wheezes or crackles. No use of accessory  muscles of respiration. ABDOMEN:  Soft. No rebound. No guarding. Nontender. No hernias,  pulsations or fluid shifts. No CVA tenderness. EXTREMITIES:  Palpable pulses in the upper and lower extremities, 2+. She does have a very large open wounds on the right plantar aspects and  to the heel with exposed calcaneus and talus. Some mild edematous  changes with some slight erythematous changes in the periwound area. She also has 1+ edema in that right lower extremity.     ASSESSMENT AND PLAN:  A 70-year-old  female who has got a very  severe diabetic ulceration to the right heel, with necrosis of the bone  and gangrenous changes. I am recommending that we proceed to the  operating room today for a right below-the-knee amputation with  immediate postop prosthesis. Earlier this morning, for end-stage renal  artery disease, she did receive hemodialysis for medical optimization. We have already addressed her underlying peripheral arterial disease and  she did have angiogram yesterday _____ at [address] [place] [address] Drive [address] by Dr. Cisco Tafoya,  who performed a percutaneous transluminal angioplasty of the right  superficial femoral artery with a good outcome. We will continue to  treat her diabetes with tight glucose control. I will also consult  Nephrology, Dr. Ira Dye, who I have already contacted, who manages her  hemodialysis as an outpatient. We will obviously also have fluid  restriction.         Hugh Alegre MD    D: 11/12/2022 9:29:51       T: 11/12/2022 9:33:05     [email or web address]  Job#: [id number]     Doc#: [id number]    CC:

## 2022-11-12 NOTE — PROGRESS NOTES
POD 1 Right BKA with IPOP. No acute issues overnight. AF VSS. Pain is mostly controlled, will make some minor adjustments. Anemia of chronic disease, Hgb did drop to 7.5, will monitor. CAD- on meds  ESRD on HD. Appreciate Nephrology input. Will do HD on Monday. IDDM- maintain tight control of BGL's. On heparin for DVT prophylaxis. PT/OT evals. Consult CM to see about ARU placement.

## 2022-11-13 LAB
GLUCOSE BLD-MCNC: 115 MG/DL (ref 70–99)
GLUCOSE BLD-MCNC: 121 MG/DL (ref 70–99)
GLUCOSE BLD-MCNC: 122 MG/DL (ref 70–99)
GLUCOSE BLD-MCNC: 125 MG/DL (ref 70–99)
GLUCOSE BLD-MCNC: 126 MG/DL (ref 70–99)
GLUCOSE BLD-MCNC: 130 MG/DL (ref 70–99)

## 2022-11-13 PROCEDURE — 6360000002 HC RX W HCPCS: Performed by: SURGERY

## 2022-11-13 PROCEDURE — 5A1D70Z PERFORMANCE OF URINARY FILTRATION, INTERMITTENT, LESS THAN 6 HOURS PER DAY: ICD-10-PCS | Performed by: INTERNAL MEDICINE

## 2022-11-13 PROCEDURE — 94761 N-INVAS EAR/PLS OXIMETRY MLT: CPT

## 2022-11-13 PROCEDURE — 2580000003 HC RX 258: Performed by: SURGERY

## 2022-11-13 PROCEDURE — 6370000000 HC RX 637 (ALT 250 FOR IP): Performed by: SURGERY

## 2022-11-13 PROCEDURE — 1200000000 HC SEMI PRIVATE

## 2022-11-13 PROCEDURE — 82962 GLUCOSE BLOOD TEST: CPT

## 2022-11-13 RX ORDER — OXYCODONE HYDROCHLORIDE 5 MG/1
5 TABLET ORAL EVERY 4 HOURS PRN
Status: DISCONTINUED | OUTPATIENT
Start: 2022-11-13 | End: 2022-11-14

## 2022-11-13 RX ADMIN — OXYCODONE HYDROCHLORIDE 10 MG: 10 TABLET ORAL at 02:13

## 2022-11-13 RX ADMIN — OXYCODONE HYDROCHLORIDE 10 MG: 10 TABLET ORAL at 08:12

## 2022-11-13 RX ADMIN — LORAZEPAM 0.5 MG: 0.5 TABLET ORAL at 08:12

## 2022-11-13 RX ADMIN — GABAPENTIN 800 MG: 400 CAPSULE ORAL at 08:12

## 2022-11-13 RX ADMIN — HYDROMORPHONE HYDROCHLORIDE 1 MG: 1 INJECTION, SOLUTION INTRAMUSCULAR; INTRAVENOUS; SUBCUTANEOUS at 11:17

## 2022-11-13 RX ADMIN — LISINOPRIL 20 MG: 20 TABLET ORAL at 08:12

## 2022-11-13 RX ADMIN — FLUCONAZOLE 150 MG: 100 TABLET ORAL at 08:12

## 2022-11-13 RX ADMIN — HEPARIN SODIUM 5000 UNITS: 5000 INJECTION INTRAVENOUS; SUBCUTANEOUS at 13:42

## 2022-11-13 RX ADMIN — SODIUM CHLORIDE, PRESERVATIVE FREE 10 ML: 5 INJECTION INTRAVENOUS at 08:13

## 2022-11-13 RX ADMIN — FUROSEMIDE 40 MG: 40 TABLET ORAL at 08:12

## 2022-11-13 RX ADMIN — OXYCODONE HYDROCHLORIDE 10 MG: 10 TABLET ORAL at 13:42

## 2022-11-13 RX ADMIN — HYDROMORPHONE HYDROCHLORIDE 1 MG: 1 INJECTION, SOLUTION INTRAMUSCULAR; INTRAVENOUS; SUBCUTANEOUS at 16:24

## 2022-11-13 RX ADMIN — HEPARIN SODIUM 5000 UNITS: 5000 INJECTION INTRAVENOUS; SUBCUTANEOUS at 05:39

## 2022-11-13 RX ADMIN — HEPARIN SODIUM 5000 UNITS: 5000 INJECTION INTRAVENOUS; SUBCUTANEOUS at 22:20

## 2022-11-13 RX ADMIN — PANTOPRAZOLE SODIUM 40 MG: 40 TABLET, DELAYED RELEASE ORAL at 05:39

## 2022-11-13 RX ADMIN — CARVEDILOL 6.25 MG: 6.25 TABLET, FILM COATED ORAL at 08:12

## 2022-11-13 ASSESSMENT — PAIN DESCRIPTION - DESCRIPTORS
DESCRIPTORS: DISCOMFORT;THROBBING
DESCRIPTORS: ACHING;DISCOMFORT
DESCRIPTORS: ACHING;SHARP

## 2022-11-13 ASSESSMENT — PAIN SCALES - GENERAL
PAINLEVEL_OUTOF10: 6
PAINLEVEL_OUTOF10: 9
PAINLEVEL_OUTOF10: 7
PAINLEVEL_OUTOF10: 9
PAINLEVEL_OUTOF10: 6
PAINLEVEL_OUTOF10: 5
PAINLEVEL_OUTOF10: 7
PAINLEVEL_OUTOF10: 9

## 2022-11-13 ASSESSMENT — PAIN DESCRIPTION - LOCATION
LOCATION: LEG
LOCATION: LEG
LOCATION: OTHER (COMMENT)
LOCATION: LEG
LOCATION: LEG

## 2022-11-13 ASSESSMENT — PAIN DESCRIPTION - ORIENTATION
ORIENTATION: RIGHT

## 2022-11-13 ASSESSMENT — PAIN - FUNCTIONAL ASSESSMENT
PAIN_FUNCTIONAL_ASSESSMENT: ACTIVITIES ARE NOT PREVENTED
PAIN_FUNCTIONAL_ASSESSMENT: PREVENTS OR INTERFERES SOME ACTIVE ACTIVITIES AND ADLS
PAIN_FUNCTIONAL_ASSESSMENT: PREVENTS OR INTERFERES WITH MANY ACTIVE NOT PASSIVE ACTIVITIES

## 2022-11-13 NOTE — PROGRESS NOTES
Nephrology Progress Note  11/13/2022 10:21 AM        Subjective:   Admit Date: 11/11/2022  PCP: Davi Arriola MD    Interval History: No major event    Diet: Reasonable    ROS: No shortness of breath  Acceptable blood pressure no fever    Data:     Current meds:    insulin lispro  0-16 Units SubCUTAneous TID WC    insulin lispro  0-4 Units SubCUTAneous Nightly    sodium chloride flush  5-40 mL IntraVENous 2 times per day    heparin (porcine)  5,000 Units SubCUTAneous 3 times per day    carvedilol  6.25 mg Oral BID    furosemide  40 mg Oral BID    gabapentin  800 mg Oral BID    lisinopril  20 mg Oral Daily    LORazepam  0.5 mg Oral Q12H    pantoprazole  40 mg Oral QAM AC      sodium chloride      dextrose           I/O last 3 completed shifts: In: 480 [P.O.:480]  Out: -     CBC:   Recent Labs     11/11/22  1229 11/12/22  0359   WBC 13.7* 15.6*   HGB 10.1* 7.5*    281          Recent Labs     11/11/22  1229 11/12/22  0359   * 138   K 4.3 4.3   CL 93* 99   CO2 24 24   BUN 26* 32*   CREATININE 6.8* 7.6*   GLUCOSE 126* 175*       Lab Results   Component Value Date    CALCIUM 7.5 (L) 11/12/2022    PHOS 7.6 (H) 11/12/2022       Objective:     Vitals: BP (!) 118/48   Pulse 80   Temp 98.1 °F (36.7 °C) (Oral)   Resp 16   Ht 5' 3\" (1.6 m)   Wt 140 lb 6.9 oz (63.7 kg)   SpO2 93%   BMI 24.88 kg/m² ,    General appearance: Alert.   Oriented  HEENT: Positive conjunctival pallor  Neck: Seems supple  Lungs: Coarse breath sounds with no crackles  Heart: Regular rate and rhythm, well-healed incision previous sternotomy  Abdomen: Soft, nontender  Extremities: Right below-knee amputation  Left upper extremity brachiocephalic fistula, good thrill on palpation good bruit auscultation      Problem List :         Impression :     End-stage kidney disease on maintenance dialysis  Atherosclerotic cardiovascular disease and gangrene of right leg status post below-knee amputation  Multiple comorbid condition, which include but not limited to diabetes, hyperparathyroidism anemia and coronary artery disease    Recommendation/Plan  :     Hemodialysis and ultrafiltration tomorrow  Good glycemic control  Watch for iatrogenic and nosocomial complication  Follow clinically      Tiago Christianson MD MD

## 2022-11-13 NOTE — PROGRESS NOTES
POD 2 Right BKA with IPOP. No acute issues overnight. AF VSS. Pain is mostly controlled, made adjustments yesterday and doing better. Anemia of chronic disease, Hgb did drop to 7.5, will monitor. CBC pending. CAD- on meds  ESRD on HD. Appreciate Nephrology input. Will do HD, add IV iron and MOSHE on Monday. IDDM- maintain tight control of BGL's. On heparin for DVT prophylaxis. Likely has a vaginal yeast infection, treated with diflucan. PT/OT evals. done and CM saw pt to start discharge planning process.

## 2022-11-14 ENCOUNTER — APPOINTMENT (OUTPATIENT)
Dept: CT IMAGING | Age: 57
DRG: 239 | End: 2022-11-14
Attending: SURGERY
Payer: MEDICARE

## 2022-11-14 LAB
ALBUMIN SERPL-MCNC: 2.9 GM/DL (ref 3.4–5)
ALP BLD-CCNC: 175 IU/L (ref 40–128)
ALT SERPL-CCNC: <5 U/L (ref 10–40)
ANION GAP SERPL CALCULATED.3IONS-SCNC: 20 MMOL/L (ref 4–16)
ANION GAP SERPL CALCULATED.3IONS-SCNC: 21 MMOL/L (ref 4–16)
AST SERPL-CCNC: 15 IU/L (ref 15–37)
BASOPHILS ABSOLUTE: 0.1 K/CU MM
BASOPHILS RELATIVE PERCENT: 0.2 % (ref 0–1)
BILIRUB SERPL-MCNC: 0.2 MG/DL (ref 0–1)
BUN BLDV-MCNC: 59 MG/DL (ref 6–23)
BUN BLDV-MCNC: 59 MG/DL (ref 6–23)
CALCIUM SERPL-MCNC: 7.8 MG/DL (ref 8.3–10.6)
CALCIUM SERPL-MCNC: 7.8 MG/DL (ref 8.3–10.6)
CHLORIDE BLD-SCNC: 100 MMOL/L (ref 99–110)
CHLORIDE BLD-SCNC: 99 MMOL/L (ref 99–110)
CO2: 22 MMOL/L (ref 21–32)
CO2: 24 MMOL/L (ref 21–32)
CREAT SERPL-MCNC: 10.8 MG/DL (ref 0.6–1.1)
CREAT SERPL-MCNC: 11.3 MG/DL (ref 0.6–1.1)
DIFFERENTIAL TYPE: ABNORMAL
EOSINOPHILS ABSOLUTE: 0.2 K/CU MM
EOSINOPHILS RELATIVE PERCENT: 0.8 % (ref 0–3)
GFR SERPL CREATININE-BSD FRML MDRD: 4 ML/MIN/1.73M2
GFR SERPL CREATININE-BSD FRML MDRD: 4 ML/MIN/1.73M2
GLUCOSE BLD-MCNC: 102 MG/DL (ref 70–99)
GLUCOSE BLD-MCNC: 102 MG/DL (ref 70–99)
GLUCOSE BLD-MCNC: 104 MG/DL (ref 70–99)
GLUCOSE BLD-MCNC: 106 MG/DL (ref 70–99)
GLUCOSE BLD-MCNC: 113 MG/DL (ref 70–99)
GLUCOSE BLD-MCNC: 113 MG/DL (ref 70–99)
GLUCOSE BLD-MCNC: 93 MG/DL (ref 70–99)
HBV SURFACE AB TITR SER: <3.5 {TITER}
HCT VFR BLD CALC: 26.3 % (ref 37–47)
HEMOGLOBIN: 8 GM/DL (ref 12.5–16)
HEPATITIS B SURFACE ANTIGEN: NON REACTIVE
IMMATURE NEUTROPHIL %: 1.2 % (ref 0–0.43)
LYMPHOCYTES ABSOLUTE: 1.3 K/CU MM
LYMPHOCYTES RELATIVE PERCENT: 5.8 % (ref 24–44)
MAGNESIUM: 2.5 MG/DL (ref 1.8–2.4)
MCH RBC QN AUTO: 31.5 PG (ref 27–31)
MCHC RBC AUTO-ENTMCNC: 30.4 % (ref 32–36)
MCV RBC AUTO: 103.5 FL (ref 78–100)
MONOCYTES ABSOLUTE: 1.1 K/CU MM
MONOCYTES RELATIVE PERCENT: 5 % (ref 0–4)
NUCLEATED RBC %: 0 %
PDW BLD-RTO: 14.6 % (ref 11.7–14.9)
PHOSPHORUS: 9 MG/DL (ref 2.5–4.9)
PLATELET # BLD: 304 K/CU MM (ref 140–440)
PMV BLD AUTO: 10.1 FL (ref 7.5–11.1)
POTASSIUM SERPL-SCNC: 4.5 MMOL/L (ref 3.5–5.1)
POTASSIUM SERPL-SCNC: 4.5 MMOL/L (ref 3.5–5.1)
RBC # BLD: 2.54 M/CU MM (ref 4.2–5.4)
SEGMENTED NEUTROPHILS ABSOLUTE COUNT: 19.1 K/CU MM
SEGMENTED NEUTROPHILS RELATIVE PERCENT: 87 % (ref 36–66)
SODIUM BLD-SCNC: 143 MMOL/L (ref 135–145)
SODIUM BLD-SCNC: 143 MMOL/L (ref 135–145)
TOTAL IMMATURE NEUTOROPHIL: 0.27 K/CU MM
TOTAL NUCLEATED RBC: 0 K/CU MM
TOTAL PROTEIN: 6.4 GM/DL (ref 6.4–8.2)
WBC # BLD: 21.9 K/CU MM (ref 4–10.5)

## 2022-11-14 PROCEDURE — 90935 HEMODIALYSIS ONE EVALUATION: CPT

## 2022-11-14 PROCEDURE — 83735 ASSAY OF MAGNESIUM: CPT

## 2022-11-14 PROCEDURE — 84100 ASSAY OF PHOSPHORUS: CPT

## 2022-11-14 PROCEDURE — 6360000002 HC RX W HCPCS: Performed by: SURGERY

## 2022-11-14 PROCEDURE — 82962 GLUCOSE BLOOD TEST: CPT

## 2022-11-14 PROCEDURE — 80048 BASIC METABOLIC PNL TOTAL CA: CPT

## 2022-11-14 PROCEDURE — 36415 COLL VENOUS BLD VENIPUNCTURE: CPT

## 2022-11-14 PROCEDURE — 86706 HEP B SURFACE ANTIBODY: CPT

## 2022-11-14 PROCEDURE — 1200000000 HC SEMI PRIVATE

## 2022-11-14 PROCEDURE — 85025 COMPLETE CBC W/AUTO DIFF WBC: CPT

## 2022-11-14 PROCEDURE — 6360000002 HC RX W HCPCS: Performed by: INTERNAL MEDICINE

## 2022-11-14 PROCEDURE — 70450 CT HEAD/BRAIN W/O DYE: CPT

## 2022-11-14 PROCEDURE — 94761 N-INVAS EAR/PLS OXIMETRY MLT: CPT

## 2022-11-14 PROCEDURE — 80053 COMPREHEN METABOLIC PANEL: CPT

## 2022-11-14 PROCEDURE — 6370000000 HC RX 637 (ALT 250 FOR IP): Performed by: SURGERY

## 2022-11-14 PROCEDURE — 2580000003 HC RX 258: Performed by: SURGERY

## 2022-11-14 PROCEDURE — 2700000000 HC OXYGEN THERAPY PER DAY

## 2022-11-14 PROCEDURE — 87340 HEPATITIS B SURFACE AG IA: CPT

## 2022-11-14 RX ORDER — NALOXONE HYDROCHLORIDE 0.4 MG/ML
0.4 INJECTION, SOLUTION INTRAMUSCULAR; INTRAVENOUS; SUBCUTANEOUS PRN
Status: DISCONTINUED | OUTPATIENT
Start: 2022-11-14 | End: 2022-11-19 | Stop reason: HOSPADM

## 2022-11-14 RX ADMIN — NALOXONE HYDROCHLORIDE 0.4 MG: 0.4 INJECTION, SOLUTION INTRAMUSCULAR; INTRAVENOUS; SUBCUTANEOUS at 07:36

## 2022-11-14 RX ADMIN — FUROSEMIDE 40 MG: 40 TABLET ORAL at 20:52

## 2022-11-14 RX ADMIN — EPOETIN ALFA-EPBX 2000 UNITS: 2000 INJECTION, SOLUTION INTRAVENOUS; SUBCUTANEOUS at 15:03

## 2022-11-14 RX ADMIN — PANTOPRAZOLE SODIUM 40 MG: 40 TABLET, DELAYED RELEASE ORAL at 06:08

## 2022-11-14 RX ADMIN — CARVEDILOL 6.25 MG: 6.25 TABLET, FILM COATED ORAL at 20:52

## 2022-11-14 RX ADMIN — SODIUM CHLORIDE, PRESERVATIVE FREE 10 ML: 5 INJECTION INTRAVENOUS at 00:02

## 2022-11-14 RX ADMIN — HEPARIN SODIUM 5000 UNITS: 5000 INJECTION INTRAVENOUS; SUBCUTANEOUS at 20:51

## 2022-11-14 RX ADMIN — EPOETIN ALFA-EPBX 3000 UNITS: 3000 INJECTION, SOLUTION INTRAVENOUS; SUBCUTANEOUS at 15:02

## 2022-11-14 RX ADMIN — HEPARIN SODIUM 5000 UNITS: 5000 INJECTION INTRAVENOUS; SUBCUTANEOUS at 06:09

## 2022-11-14 RX ADMIN — HEPARIN SODIUM 5000 UNITS: 5000 INJECTION INTRAVENOUS; SUBCUTANEOUS at 17:12

## 2022-11-14 RX ADMIN — IRON SUCROSE 100 MG: 20 INJECTION, SOLUTION INTRAVENOUS at 15:03

## 2022-11-14 RX ADMIN — SODIUM CHLORIDE, PRESERVATIVE FREE 10 ML: 5 INJECTION INTRAVENOUS at 20:51

## 2022-11-14 ASSESSMENT — PAIN SCALES - GENERAL: PAINLEVEL_OUTOF10: 0

## 2022-11-14 NOTE — PROGRESS NOTES
Pt lethargic overnight and at bedside report from prior days pain management. This nurse called Dr. Daily Patel to update and was advised to discontinue all opioids and gabapentin and to administer narcan.  Orders placed

## 2022-11-14 NOTE — PROGRESS NOTES
Patient Name: Dino Oquendo  Patient : 1965  MRN: [id number]     Acct: [de-identified]  Date of Admission: 2022  Room/Bed: 1121/1121-A  Code Status:  Full Code  Allergies:    Allergies   Allergen Reactions    Latex     Codeine     Cortisone     Cortizone     Iodides     Phenobarbital Other (See Comments)     Hallucinations     Vancomycin Other (See Comments)     \"makes me very sick\"     Diagnosis:    Patient Active Problem List   Diagnosis    CAD (coronary artery disease)    Hyperlipidemia    Diabetes mellitus ([facility])    Chest pain    Hoarseness of voice    Fracture of metacarpal bone    Wrist sprain    History of tobacco abuse    Wrist pain    Carpal tunnel syndrome    Cubital tunnel syndrome    Trigger finger    S/P CABG x 4    Diabetic foot ulcer ([facility])    Diabetic neuropathy ([address]    Diabetes mellitus type 2 with complications ([facility])    Claudication of both lower extremities ([facility])    SOB (shortness of breath)    Bilateral leg edema    SOB (shortness of breath)    Type 2 diabetes mellitus without complication (HCC)    Coronary artery disease involving coronary bypass graft of native heart with unstable angina pectoris ([facility])    S/P cardiac cath    Chronic renal failure, stage 2 (mild)    Chronic kidney disease (CKD) stage G4/A3, severely decreased glomerular filtration rate (GFR) between 15-29 mL/min/1.73 square meter and albuminuria creatinine ratio greater than 300 mg/g ([facility])    DM (diabetes mellitus) ([facility])    Coronary atherosclerosis    HTN (hypertension)    Proteinuria    Callus of foot    Non compliance with medical treatment    Diabetic ulcer of left foot associated with type 2 diabetes mellitus, with fat layer exposed ([address]    Decubitus ulcer of sacral region, stage 1    Chronic renal failure, stage 4 (severe) ([facility])    Problem with dialysis access [facility])    WD-Presence of surgical incision    Diabetic ulcer of right heel associated with type 2 diabetes mellitus, with necrosis of bone ([address]    Acute pain of right foot    Wound, open, foot with complication, left, sequela    ESRD on hemodialysis (HCC)    Ulcer of right heel, with necrosis of bone ([address]    Diabetic ulcer of right midfoot associated with type 2 diabetes mellitus, with necrosis of bone ([address]         Treatment:  Hemodilaysis 2:1  Priority: Routine  Location: Acute Room    Diabetic: Yes  NPO: No  Isolation Precautions: Dialysis     Consent for Treatment Verified: Yes  Blood Consent Verified: Not Applicable     Safety Verified: Identify (I), Consent (C), Equipment (E), HepB Status (B), Orders Complete (O), Access Verified (A), and Timeliness (T)  Time out performed prior to access at 1200 hours. Report Received from Primary RN at 1130 hours.   Primary RN (First Initial, Last Name, Title): Javier Leal     Incapacitated Nurse Education Completed: Not Applicable     HBsAg ONLY:  Date Drawn: October 31, 2022       Results: Negative  HBsAb:  Date Drawn:  November 14, 2022       Results: Unknown    Order  Dialysis Bath  K+ (Potassium): 2  Ca+ (Calcium): 2.5  Na+ (Sodium): 137  HCO3 (Bicarb): 30  Bicarbonate Concentrate Lot No.: [id number]  Acid Concentrate Lot No.: [id number]     Na+ Modeling: Not Applicable  Dialyzer: [id number]  Dialysate Temperature (C):  35  Blood Flow Rate (BFR):  350   Dialysate Flow Rate (DFR):   500        Access to be Utilized   Access: Non-tunneled Catheter  Location: Internal Jugular  Side: Right   Needle gauge:  16  + Bruit/Thrill: Yes    First Use X-ray Verified: Not Applicable  OK to use line order: Yes    Site Assessment:  Signs and Symptoms of Infection/Inflammation: None  If yes: Not Applicable    Flows: Good, Patent  If access problem, who was notified:     Pre and Post-Assessment  Patient Vitals for the past 8 hrs:   Level of Consciousness Respiratory Quality/Effort O2 Device   11/14/22 0730 -- -- Nasal cannula   11/14/22 0734 -- -- Nasal cannula   11/14/22 0822 1 Unlabored --     Labs  Recent Labs     11/12/22  0359 11/14/22  4325 WBC 15.6* 21.9*   HGB 7.5* 8.0*   HCT 24.2* 26.3*    304                                                                  Recent Labs     11/12/22  0359 11/14/22  1147    143  143   K 4.3 4.5  4.5   CL 99 100  99   CO2 24 22 24   BUN 32* 59*  59*   CREATININE 7.6* 11.3*  10.8*   GLUCOSE 175* 102*  102*     IV Drips and Rate/Dose   sodium chloride      dextrose        Safety - Before each treatment:   Dialysis Machine No.: [id number]  RO Machine Number: [id number]  Dialyzer Lot No.: [id number]  RO Machine Log Sheet Completed: Yes  Machine Alarm Self Test: Completed; Passed (11/14/22 1202)     Air Foam Detector: Tested, Proper Function  Extracorporeal Circuit Tested for Integrity: Yes  Machine Conductivity: 13.8  Manual Conductivity: 14     Bicarbonate Concentrate Lot No.: [id number]  Acid Concentrate Lot No.: [id number]  Manual Ph: 7.2  Bleach Test (Neg): No  Bath Temperature: 95 °F (35 °C)  Tubing Lot#:   Conductivity Meter Serial #: [id number]  All Connections Secure?: Yes  Venous Parameters Set?: Yes  Arterial Parameters Set?: Yes  Saline Line Double Clamped?: Yes  Air Foam Detector Engaged?: Yes  Machine Functioning Alarm Free?  Yes  Prime Given: 200ml    Chlorine Testing - Before each treatment and every 4 hours:   Treatment  Treatment Number: 1  Time On: 9078  Treatment Goal: 3 HOUR, 2.5L  Weight: 139 lb 1.8 oz (63.1 kg) (11/14/22 1207)  1st check: less than 0.1 ppm at: 1025 hours  2nd check: less than 0.1 ppm at: 1425 hours  3rd check: Not Applicable  (if greater than 0.1 ppm, then check every 30 minutes from secondary)    Access Flows and Pressures  Patient Vitals for the past 8 hrs:   Blood Flow Rate (ml/min) Ultrafiltration Rate (ml/hr) Arterial Pressure (mmHg) Venous Pressure (mmHg) TMP DFR Comments Access Visible   11/14/22 1207 350 ml/min 830 ml/hr -130 mmHg 200 70 500 TX STARTED, PRIME GIVEN, LINES SECURE AND CALL LIGHT WITHIN REACH Yes   11/14/22 1215 350 ml/min 830 ml/hr -130 mmHg 200 70 500 awake and alert Yes   11/14/22 1230 350 ml/min 830 ml/hr -130 mmHg 200 70 500 resting w eyes closed Yes   11/14/22 1245 350 ml/min 830 ml/hr -140 mmHg 240 70 500 eyes closed, resting --   11/14/22 1300 350 ml/min 830 ml/hr -130 mmHg 250 70 500 NO CHANGE, RESTING Yes   11/14/22 1315 350 ml/min 830 ml/hr -130 mmHg 250 70 500 no needs Yes   11/14/22 1330 350 ml/min 830 ml/hr -130 mmHg 250 70 500 resting Yes     Vital Signs  Patient Vitals for the past 8 hrs:   BP Temp Pulse Resp SpO2 Weight Weight Method Percent Weight Change   11/14/22 0553 (!) 102/56 98.2 °F (36.8 °C) 80 18 95 % -- -- --   11/14/22 0730 (!) 135/55 99.1 °F (37.3 °C) 84 18 92 % -- -- --   11/14/22 0734 -- -- -- -- 90 % -- -- --   11/14/22 1207 (!) 120/48 97.6 °F (36.4 °C) 79 15 94 % 139 lb 1.8 oz (63.1 kg) Bed scale -0.99   11/14/22 1215 (!) 117/47 -- 77 -- -- -- -- --   11/14/22 1230 (!) 110/47 -- 75 -- -- -- -- --   11/14/22 1245 (!) 98/46 -- 74 -- -- -- -- --   11/14/22 1300 (!) 105/45 -- 73 -- -- -- -- --   11/14/22 1315 (!) 104/41 -- 75 -- -- -- -- --   11/14/22 1330 (!) 103/46 -- 72 -- -- -- -- --     Post-Dialysis                                           Provider Notification        Handoff complete and report given to Primary RN at 1530 hours.   Primary RN (First Initial, Last Name, Title):  melodie Treviño  Person Educated: Patient   Knowledge Base: Minimal  Barriers to Learning?: Yes, including no  Preferred method of Learning: Oral  Topic(s): Procedural   Teaching Tools: Explanation   Response to Education: Requires Follow-up     Electronically signed by Luz Marina Wilkins RN on 11/14/2022 at 1:31 PM

## 2022-11-14 NOTE — PROGRESS NOTES
Comprehensive Nutrition Assessment    Type and Reason for Visit:  Initial, Wound    Nutrition Recommendations/Plan:   Begin wound healing oral nutrition supplement bid and renal oral nutrition supplement daily   Document all po intakes, weights, and fluids in I/O   Start low phosphorus diet restriction with CHO control diet     Malnutrition Assessment:  Malnutrition Status: At risk for malnutrition (Comment) (11/14/22 9389)    Context:  Acute Illness     Findings of the 6 clinical characteristics of malnutrition:  Energy Intake:  Unable to assess  Weight Loss:  Greater than 7.5% over 3 months (12% in 2 months, 5.9% from BKA noted)     Body Fat Loss:  Unable to assess     Muscle Mass Loss:  Unable to assess    Fluid Accumulation:  Unable to assess     Strength:  Not Performed    Nutrition Assessment:    Admitted with ulcer of rt heel with necrosis of bone. Pt underwent BKA with IPOP 3 days ago. Pt currently on carb controlled 4 diet. Last meal of 26-50% per flowsheet this morning. Undergoing dialysis at visit. Noted signficiant 12% weight loss x 2 months (5.9% from BKA). Significant wounds noted. Will offer oral nutrition supplements to aid with wound healing and monitor diet advancement/tolerance. Will monitor as high nutrition risk. Nutrition Related Findings:    GFR 4, Phos 9, Mg 2.5, Alk Phos 175 Wound Type: Multiple, Pressure Injury, Stage II, Unstageable, Surgical Incision, Wound Vac (post amputation)       Current Nutrition Intake & Therapies:    Average Meal Intake: Unable to assess  Average Supplements Intake: None Ordered  ADULT DIET; Regular; 4 carb choices (60 gm/meal); Low Phosphorus (Less than 1000 mg)    Anthropometric Measures:  Height: 5' 3\" (160 cm)  Ideal Body Weight (IBW): 115 lbs (52 kg)    Admission Body Weight: 144 lb 6.4 oz (65.5 kg) (11/12)  Current Body Weight: 139 lb 1.8 oz (63.1 kg), 121 % IBW.  Weight Source: Bed Scale  Current BMI (kg/m2): 24.6  Usual Body Weight: 157 lb 10 oz (71.5 kg) (9/20/22)  % Weight Change (Calculated): -11.7  Weight Adjustment For: Amputation  Total Adjusted Percentage (Calculated): 5.9  Adjusted Ideal Body Weight (lbs) (Calculated): 108.2 lbs  Adjusted Ideal Body Weight (kg) (Calculated): 49.18 kg  Adjusted % Ideal Body Weight (Calculated): 128.6  Adjusted BMI (kg/m2) (Calculated): 26.1  BMI Categories: Overweight (BMI 25.0-29. 9)    Estimated Daily Nutrient Needs:  Energy Requirements Based On: Kcal/kg  Weight Used for Energy Requirements: Current  Energy (kcal/day): 8561-6600 (30-35 kcals/kg)  Weight Used for Protein Requirements: Ideal  Protein (g/day): 63-78 (1.2-1.5 g/kg)  Method Used for Fluid Requirements: Standard Renal  Fluid (ml/day): fluids per nephrology    Nutrition Diagnosis:   Increased nutrient needs related to renal dysfunction, endocrine dysfuntion as evidenced by wounds, dialysis    Nutrition Interventions:   Food and/or Nutrient Delivery: Modify Current Diet, Start Oral Nutrition Supplement  Nutrition Education/Counseling: No recommendation at this time  Coordination of Nutrition Care: Continue to monitor while inpatient, Feeding Assistance/Environment Change, Coordination of Care  Plan of Care discussed with: pt    Goals:  Goals: PO intake 50% or greater       Nutrition Monitoring and Evaluation:   Behavioral-Environmental Outcomes: None Identified  Food/Nutrient Intake Outcomes: Food and Nutrient Intake, Supplement Intake, Diet Advancement/Tolerance  Physical Signs/Symptoms Outcomes: Biochemical Data, Nutrition Focused Physical Findings, Skin, Weight, Meal Time Behavior    Discharge Planning:     Too soon to determine     Aime Rowan RD, LD  Contact: [phone]

## 2022-11-14 NOTE — CARE COORDINATION
attempt to speak with patient regarding discharge planning but patient off the floor in dialysis at this time.  Case Management to follow

## 2022-11-14 NOTE — PROGRESS NOTES
in anterior exam  Heart:   regular rate and rhythm, well-healed incision from previous sternotomy  Abdomen:  soft, nontender  Extremities:   right below-knee amputation, left upper extremity brachiocephalic fistula   on examination has good bruit auscultation and good thrill on palpation      Problem List :         Impression :      confusion lethargy- since the blood sugar is normal likely from medication such as gabapentin/benzodiazepine and probably opioid- if does not improve then broaden the differential diagnosis   end-stage kidney disease she will get dialysis today   atherosclerotic cardiovascular disease with gangrene of the leg status post amputation   multiple comorbid disease    Recommendation/Plan  :      hemodialysis and ultrafiltration today   hold benzodiazepine/gabapentin/opioid- by the time I am finishing up the note she had a CT of the head without iodinated contrast which was unrevealing- we will see if your mentation come back to normal   follow clinically and keep normoglycemia      Reji Moreira MD MD

## 2022-11-14 NOTE — PROGRESS NOTES
POD 3 Right BKA with IPOP. The IPOP was changed yesterday and her stump looked really good and her flaps are viable. Incision is clean dry and intact and reapplied. Patient was hard to arouse by the nursing staff this a.m. and there was concerns that she may have had a stroke. I also feel she could have had a buildup of her narcotics over the weekend and we did give her Narcan. She had a minimal response. She did have a CT of the head and it did not show any acute abnormalities. I did review the patient after her imaging study and she is starting to become more alert. I also discussed the situation with Dr. Eligio Mcmullen and he will do her dialysis today. I have removed the oxycodone and the Dilaudid for breakthrough pain. Anemia of chronic disease, Hgb did drop to 7.5, will monitor. Labs are pending. CAD- on meds  ESRD on HD. Appreciate Nephrology input. Will do HD today, add IV iron and MOSHE. IDDM- maintain tight control of BGL's. On heparin for DVT prophylaxis. Likely has a vaginal yeast infection, treated with diflucan. This has improved. PT/OT evals. done and CM saw pt to start discharge planning process. Continue to monitor her mental status. Stage II sacral pressure ulceration. We will apply Santyl and a Mepitel sacral border.

## 2022-11-15 ENCOUNTER — HOSPITAL ENCOUNTER (OUTPATIENT)
Dept: WOUND CARE | Age: 57
Discharge: HOME | End: 2022-11-15

## 2022-11-15 LAB
GLUCOSE BLD-MCNC: 101 MG/DL (ref 70–99)
GLUCOSE BLD-MCNC: 166 MG/DL (ref 70–99)
GLUCOSE BLD-MCNC: 187 MG/DL (ref 70–99)
GLUCOSE BLD-MCNC: 218 MG/DL (ref 70–99)

## 2022-11-15 PROCEDURE — 1200000000 HC SEMI PRIVATE

## 2022-11-15 PROCEDURE — 94761 N-INVAS EAR/PLS OXIMETRY MLT: CPT

## 2022-11-15 PROCEDURE — 6370000000 HC RX 637 (ALT 250 FOR IP): Performed by: SURGERY

## 2022-11-15 PROCEDURE — 99213 OFFICE O/P EST LOW 20 MIN: CPT

## 2022-11-15 PROCEDURE — 6360000002 HC RX W HCPCS: Performed by: SURGERY

## 2022-11-15 PROCEDURE — 82962 GLUCOSE BLOOD TEST: CPT

## 2022-11-15 PROCEDURE — 2580000003 HC RX 258: Performed by: SURGERY

## 2022-11-15 PROCEDURE — 97110 THERAPEUTIC EXERCISES: CPT

## 2022-11-15 PROCEDURE — 97530 THERAPEUTIC ACTIVITIES: CPT

## 2022-11-15 PROCEDURE — 2700000000 HC OXYGEN THERAPY PER DAY

## 2022-11-15 RX ADMIN — HEPARIN SODIUM 5000 UNITS: 5000 INJECTION INTRAVENOUS; SUBCUTANEOUS at 20:53

## 2022-11-15 RX ADMIN — FUROSEMIDE 40 MG: 40 TABLET ORAL at 12:15

## 2022-11-15 RX ADMIN — PANTOPRAZOLE SODIUM 40 MG: 40 TABLET, DELAYED RELEASE ORAL at 05:37

## 2022-11-15 RX ADMIN — FUROSEMIDE 40 MG: 40 TABLET ORAL at 20:53

## 2022-11-15 RX ADMIN — HEPARIN SODIUM 5000 UNITS: 5000 INJECTION INTRAVENOUS; SUBCUTANEOUS at 05:37

## 2022-11-15 RX ADMIN — HEPARIN SODIUM 5000 UNITS: 5000 INJECTION INTRAVENOUS; SUBCUTANEOUS at 12:15

## 2022-11-15 RX ADMIN — CARVEDILOL 6.25 MG: 6.25 TABLET, FILM COATED ORAL at 12:15

## 2022-11-15 RX ADMIN — SODIUM CHLORIDE, PRESERVATIVE FREE 10 ML: 5 INJECTION INTRAVENOUS at 20:54

## 2022-11-15 RX ADMIN — SODIUM CHLORIDE, PRESERVATIVE FREE 10 ML: 5 INJECTION INTRAVENOUS at 12:15

## 2022-11-15 RX ADMIN — CARVEDILOL 6.25 MG: 6.25 TABLET, FILM COATED ORAL at 20:53

## 2022-11-15 NOTE — PROGRESS NOTES
Nephrology Progress Note  11/15/2022 6:28 AM        Subjective:   Admit Date: 11/11/2022  PCP: Sandra Rodas MD    Interval History: Still little lethargic  But alert awake and oriented  Tolerated dialysis okay yesterday-had 2.5 L ultrafiltration    Diet: Eating some    ROS: Still little lethargic, no overt shortness of breath  No fever and acceptable blood pressure    Data:     Current meds:    collagenase   Topical Every Other Day    insulin lispro  0-16 Units SubCUTAneous TID WC    insulin lispro  0-4 Units SubCUTAneous Nightly    sodium chloride flush  5-40 mL IntraVENous 2 times per day    heparin (porcine)  5,000 Units SubCUTAneous 3 times per day    carvedilol  6.25 mg Oral BID    furosemide  40 mg Oral BID    [Held by provider] lisinopril  20 mg Oral Daily    pantoprazole  40 mg Oral QAM AC      sodium chloride      dextrose           I/O last 3 completed shifts:   In: 500   Out: 2000     CBC:   Recent Labs     11/14/22  1147   WBC 21.9*   HGB 8.0*             Recent Labs     11/14/22  1147     143   K 4.5  4.5     99   CO2 22  24   BUN 59*  59*   CREATININE 11.3*  10.8*   GLUCOSE 102*  102*       Lab Results   Component Value Date    CALCIUM 7.8 (L) 11/14/2022    CALCIUM 7.8 (L) 11/14/2022    PHOS 9.0 (H) 11/14/2022       Objective:     Vitals: BP (!) 147/59   Pulse 81   Temp 97.8 °F (36.6 °C)   Resp 16   Ht 5' 3\" (1.6 m)   Wt 139 lb 1.8 oz (63.1 kg)   SpO2 96%   BMI 24.64 kg/m² ,    General appearance: She is alert, awake but frail looking  HEENT: Positive conjunctival pallor with head of the bed elevated about 60 degrees  Neck: Seems supple  Lungs: No gross crackles  Heart: Regular rate and rhythm, well-healed incision from previous sternotomy  Abdomen: Soft  Extremities: Right below-knee amputation  Left upper extremity brachiocephalic fistula with good bruit auscultation good thrill on palpation        Problem List :         Impression :     End-stage kidney disease had dialysis yesterday  Still little lethargic-likely aftermath of recent surgery, acute illness as well as multiple medication effect-hopefully all the washouts and  Recent below-knee amputation for gangrene with underlying atherosclerotic cardiovascular disease  Underlying diabetes and other comorbid disease    Recommendation/Plan  :     Avoid gabapentin, benzodiazepines and minimize opioid  Good glycemic control  She will probably go for rehab  Her dialysis is due for tomorrow  Follow clinically and watch for iatrogenic nosocomial complication      Gino Ramires MD MD

## 2022-11-15 NOTE — CARE COORDINATION
Spoke with patient and her  Italia Waddell at bedside regarding discharge planning. Patient is from home with her  and has a RW and motorized w/c for mobility . Patient was independent PTA. Patient has PCP and insurance to assist with RX coverage. CM discussed therapy recommending ARU and they are agreeable.  Referral called to [facility] in ARU and had to leave a VM

## 2022-11-15 NOTE — CONSULTS
[address] [id number] Continence Nurse  Consult Note       Petr Valdez  AGE: 62 y.o. GENDER: female  : 1965  TODAY'S DATE:  11/15/2022    Subjective:     Reason for  Evaluation and Assessment: wound care ronyalDeloris Valdez is a 62 y.o. female referred by:   [x] Physician  [] Nursing  [] Other:     Wound Identification:  Wound Type: arterial, diabetic, and pressure  Contributing Factors: diabetes, chronic pressure, decreased mobility, arterial insufficiency, and malnutrition        PAST MEDICAL HISTORY        Diagnosis Date    Acute pain of right foot 2022    CAD (coronary artery disease)     s/p CABG x 4;  follows with Dr Azeb Gomez of foot 2018    Carpal tunnel syndrome on both sides     CHF (congestive heart failure) ([address] 10/2010    Chronic diastolic; EF 54%    Chronic kidney disease     stage 4 kidney disease    Chronic ulcer of left ankle with fat layer exposed ([address] 10/7/2015    Chronic ulcer of left foot with fat layer exposed ([address] 3/17/2016    Chronic ulcer of right ankle with fat layer exposed ([address] 3/17/2016    Chronic ulcer of right foot with fat layer exposed ([address] 3/17/2016    Depression     Diabetes mellitus ([address]     Diabetes mellitus with neurological manifestations ([address]     Diabetes mellitus with ulcer of ankle ([address]     Diabetic ulcer of left foot associated with type 2 diabetes mellitus, with fat layer exposed ([address] 2018    Diabetic ulcer of right heel associated with type 2 diabetes mellitus, with muscle involvement without evidence of necrosis ([address] 2022    Diabetic ulcer of right heel associated with type 2 diabetes mellitus, with necrosis of bone ([address] 2022    ESRD on hemodialysis ([address] 2022    Family history of cardiovascular disease     Mother    GERD (gastroesophageal reflux disease)     H/O cardiac catheterization 10/18/2010, 6/3/2010    10/18/2010-Four bypass grafts all widely patent.  6/3/2010- Moderate to severe triple vessel disease, preserved LV systolic function. H/O cardiovascular stress test 7/26/2012, 8/3/2011, 10/14/2010, 5/24/2010 7/26/2012-Lexiscan- Normal Myocardial Perfusion Study. Post stress myocardial perfusion images show a normal pattern of perfusion in all regions. Post stress LV normal size. Normal perfusion in the distribution of all coronaries. Normal LV size and function. Rest EF 70%    H/O chest x-ray 7/12/2012, 6/2/2010 7/12/2012-Perihilar peribronchial cuffing with mild basilar predominant interstital prominence, which may reflect interstitial edema or atypical pneumonia. H/O Doppler ultrasound 6/4/2010    CAROTID DOPPLER-6/4/2010-No Doppler evidence of hemodynamically significant Carotid Stenosis with antegrade flow in the vertebral arteries bilaterally. 6/4/2010 PERIPHERAL VENOUS DOPPLER_ LEFT Saphenous Vein mapping    H/O echocardiogram 7/26/2012, 8/3/2011, 10/2010, 7/23/2010, 6/8/2010 7/26/2012-LV normal size. Normal LV wall thickness. LV systolic function normal. EF => 55%. LV wall motion normal. Mild MR. Mild TR. History of complete ECG 7/26/2012 David Pate), 7/13/2012 [facility]), 7/25/2011, 3/25/2011, 10/18/2010, 10/11/2010, 6/23/2010, 5/7/2010    Hx of cardiovascular stress test 9/3/2015    lexiscan-normal,EF66%    Hx of Doppler ultrasound 4/5/16    Arterial: There is a fluid collection in the left thigh, please refer to PCP for further monitoring, no vascular in etiology. Hx of echocardiogram     4/16 EF40% Mild MR/TR and mild Pulm HTN. 9/15 EF 45-50%, Mildly dilated L atrium, mildly dilated R ventricle. Mild MR & mild TR     Hyperlipidemia     Neuropathy of foot     Pt reports starting approx. 6-7 years ago    Neuropathy of hand     Pt reports starting approx.  6-7 years ago    Non compliance with medical treatment 7/2/2018    S/P CABG x 4 6/5/2010    LIMA-> LAD;  VG->CX;  VG->Diagonal; VG-> distal RCA  per  Dr Newton Mckenzie    Type 2 diabetes mellitus with diabetic polyneuropathy, with long-term current use of insulin ([facility] 2016    Type II or unspecified type diabetes mellitus with neurological manifestations, not stated as uncontrolled(250.60)     Type II or unspecified type diabetes mellitus with other specified manifestations, not stated as uncontrolled     Ulcer of left foot, with fat layer exposed ([facility] 2013    Ulcer of other part of foot        PAST SURGICAL HISTORY    Past Surgical History:   Procedure Laterality Date    APPENDECTOMY      CARDIAC SURGERY      CARPAL TUNNEL RELEASE      L hand 2011, R hand 2012    CARPAL TUNNEL RELEASE Right 6/10/2013    recurrent    CARPAL TUNNEL RELEASE Left 2013    with ulnar nerve transpostion and L middle finger release    CHOLECYSTECTOMY      COLONOSCOPY      CORONARY ARTERY BYPASS GRAFT  2010-LIMA->LAD;  VG-> Diagonal;  VG-> Obtuse marginal;  VG->Distal RCA-   Dr Raydell Koyanagi Right 6/10/2013    HYSTERECTOMY, TOTAL ABDOMINAL (CERVIX REMOVED)      LEG AMPUTATION BELOW KNEE Right 2022    RIGHT LEG AMPUTATION BELOW KNEE WITH IPOP performed by Arlet Lynne MD at [address] Left /144th toe    TUBAL LIGATION      ULNAR TUNNEL RELEASE Right 6/10/2013       FAMILY HISTORY    Family History   Problem Relation Age of Onset    Heart Disease Mother     Kidney Disease Mother         dialysis    Cancer Father        SOCIAL HISTORY    Social History     Tobacco Use    Smoking status: Former     Packs/day: 0.25     Years: 30.00     Pack years: 7.50     Types: Cigarettes     Quit date: 2022     Years since quittin.1    Smokeless tobacco: Never    Tobacco comments:     quit smoking mia 16   Vaping Use    Vaping Use: Every day    Substances: Never   Substance Use Topics    Alcohol use: No     Alcohol/week: 0.0 standard drinks     Comment:                                    CAFFEINE: 2 sodas daily    Drug use: No       ALLERGIES    Allergies   Allergen Reactions    Latex     Codeine     Cortisone     Cortizone     Iodides     Phenobarbital Other (See Comments)     Hallucinations     Vancomycin Other (See Comments)     \"makes me very sick\"       MEDICATIONS    No current facility-administered medications on file prior to encounter. Current Outpatient Medications on File Prior to Encounter   Medication Sig Dispense Refill    gabapentin (NEURONTIN) 400 MG capsule Take 800 mg by mouth in the morning and at bedtime. OXYGEN Inhale 2 L into the lungs daily as needed      carvedilol (COREG) 6.25 MG tablet 6.25 mg 2 times daily       VICTOZA 18 MG/3ML SOPN SC injection daily  (Patient not taking: No sig reported)      albuterol sulfate HFA (PROVENTIL;VENTOLIN;PROAIR) 108 (90 Base) MCG/ACT inhaler Inhale 2 puffs into the lungs every 6 hours as needed for Wheezing      esomeprazole Magnesium (NEXIUM) 40 MG PACK Take 20 mg by mouth 2 times daily 2 tabs      oxyCODONE-acetaminophen (PERCOCET)  MG per tablet Take 1 tablet by mouth every 6 hours as needed for Pain. LORazepam (ATIVAN) 0.5 MG tablet Take 0.5 mg by mouth every 12 hours Indications: One Tablet twice daily       Insulin Aspart (NOVOLOG FLEXPEN SC) Inject 15 Units into the skin 3 times daily (before meals) On sliding scale (Patient not taking: No sig reported)      pravastatin (PRAVACHOL) 40 MG tablet 40 mg daily. (Patient not taking: Reported on 11/11/2022)      lisinopril (PRINIVIL;ZESTRIL) 10 MG tablet Take 20 mg by mouth daily      insulin glargine (LANTUS) 100 UNIT/ML injection Inject 50 Units into the skin nightly.  (Patient not taking: No sig reported)      furosemide (LASIX) 20 MG tablet Take 40 mg by mouth 2 times daily            Objective:      /60   Pulse 77   Temp 97.7 °F (36.5 °C) (Oral)   Resp 16   Ht 5' 3\" (1.6 m)   Wt 139 lb 1.8 oz (63.1 kg)   SpO2 99%   BMI 24.64 kg/m²   Grady Risk Score: Grady Scale Score: 12    LABS    CBC:   Lab Results   Component Value Date/Time WBC 21.9 11/14/2022 11:47 AM    RBC 2.54 11/14/2022 11:47 AM    HGB 8.0 11/14/2022 11:47 AM    HCT 26.3 11/14/2022 11:47 AM    .5 11/14/2022 11:47 AM    MCH 31.5 11/14/2022 11:47 AM    MCHC 30.4 11/14/2022 11:47 AM    RDW 14.6 11/14/2022 11:47 AM     11/14/2022 11:47 AM    MPV 10.1 11/14/2022 11:47 AM     CMP:    Lab Results   Component Value Date/Time     11/14/2022 11:47 AM     11/14/2022 11:47 AM    K 4.5 11/14/2022 11:47 AM    K 4.5 11/14/2022 11:47 AM     11/14/2022 11:47 AM    CL 99 11/14/2022 11:47 AM    CO2 22 11/14/2022 11:47 AM    CO2 24 11/14/2022 11:47 AM    BUN 59 11/14/2022 11:47 AM    BUN 59 11/14/2022 11:47 AM    CREATININE 11.3 11/14/2022 11:47 AM    CREATININE 10.8 11/14/2022 11:47 AM    GFRAA 9 10/12/2022 02:45 PM    LABGLOM 4 11/14/2022 11:47 AM    LABGLOM 4 11/14/2022 11:47 AM    GLUCOSE 102 11/14/2022 11:47 AM    GLUCOSE 102 11/14/2022 11:47 AM    PROT 6.4 11/14/2022 11:47 AM    PROT 6.2 02/01/2013 07:55 AM    LABALBU 2.9 11/14/2022 11:47 AM    CALCIUM 7.8 11/14/2022 11:47 AM    CALCIUM 7.8 11/14/2022 11:47 AM    BILITOT 0.2 11/14/2022 11:47 AM    ALKPHOS 175 11/14/2022 11:47 AM    AST 15 11/14/2022 11:47 AM    ALT <5 11/14/2022 11:47 AM     Albumin:    Lab Results   Component Value Date/Time    LABALBU 2.9 11/14/2022 11:47 AM     PT/INR:    Lab Results   Component Value Date/Time    PROTIME 14.3 09/21/2022 06:10 AM    PROTIME 10.6 10/18/2010 11:21 AM    INR 1.11 09/21/2022 06:10 AM     HgBA1c:    Lab Results   Component Value Date/Time    LABA1C 13.6 10/15/2014 09:56 AM         Assessment:     Patient Active Problem List   Diagnosis    CAD (coronary artery disease)    Hyperlipidemia    Diabetes mellitus (HCC)    Chest pain    Hoarseness of voice    Fracture of metacarpal bone    Wrist sprain    History of tobacco abuse    Wrist pain    Carpal tunnel syndrome    Cubital tunnel syndrome    Trigger finger    S/P CABG x 4    Diabetic foot ulcer ([address]    Diabetic neuropathy ([address]    Diabetes mellitus type 2 with complications (HCC)    Claudication of both lower extremities (HCC)    SOB (shortness of breath)    Bilateral leg edema    SOB (shortness of breath)    Type 2 diabetes mellitus without complication (HCC)    Coronary artery disease involving coronary bypass graft of native heart with unstable angina pectoris (HCC)    S/P cardiac cath    Chronic renal failure, stage 2 (mild)    Chronic kidney disease (CKD) stage G4/A3, severely decreased glomerular filtration rate (GFR) between 15-29 mL/min/1.73 square meter and albuminuria creatinine ratio greater than 300 mg/g (HCC)    DM (diabetes mellitus) ([address]    Coronary atherosclerosis    HTN (hypertension)    Proteinuria    Callus of foot    Non compliance with medical treatment    Diabetic ulcer of left foot associated with type 2 diabetes mellitus, with fat layer exposed ([address]    Decubitus ulcer of sacral region, stage 1    Chronic renal failure, stage 4 (severe) (HCC)    Problem with dialysis access [facility])    WD-Presence of surgical incision    Diabetic ulcer of right heel associated with type 2 diabetes mellitus, with necrosis of bone (HCC)    Acute pain of right foot    Wound, open, foot with complication, left, sequela    ESRD on hemodialysis ([address]    Ulcer of right heel, with necrosis of bone ([address]    Diabetic ulcer of right midfoot associated with type 2 diabetes mellitus, with necrosis of bone (HCC)       Measurements:  Negative Pressure Wound Therapy Foot (Active)   Number of days: 3195       Negative Pressure Wound Therapy Foot Distal (Active)   Number of days: 3191       Negative Pressure Wound Therapy (Active)   Number of days: 54       Incision 06/10/13 Wrist Right (Active)   Number of days: 3445       Incision 07/29/13 Wrist Left (Active)   Number of days: 3396       Incision 02/14/14 Toe (Comment  which one) (Active)   Number of days: 0399       Wound 09/13/22 #1 Right Foot Heel (Active)   Wound Image   11/01/22 4822   Dressing Status Clean;Dry; Intact; New dressing applied 11/08/22 1618   Wound Cleansed Soap and water; Wound cleanser 11/08/22 1546   Dressing/Treatment Betadine swabs/povidone iodine;ABD 11/08/22 1618   Offloading for Diabetic Foot Ulcers Back offloading shoe 11/08/22 1546   Wound Length (cm) 10 cm 11/08/22 1546   Wound Width (cm) 6.5 cm 11/08/22 1546   Wound Depth (cm) 2.5 cm 11/08/22 1546   Wound Surface Area (cm^2) 65 cm^2 11/08/22 1546   Change in Wound Size % (l*w) -72.41 11/08/22 1546   Wound Volume (cm^3) 162.5 cm^3 11/08/22 1546   Wound Healing % -1337 11/08/22 1546   Post-Procedure Length (cm) 8.9 cm 11/01/22 1004   Post-Procedure Width (cm) 6.4 cm 11/01/22 1004   Post-Procedure Depth (cm) 2.3 cm 11/01/22 1004   Post-Procedure Surface Area (cm^2) 56.96 cm^2 11/01/22 1004   Post-Procedure Volume (cm^3) 131.008 cm^3 11/01/22 1004   Distance Tunneling (cm) 0 cm 11/08/22 1546   Tunneling Position ___ O'Clock 0 11/08/22 1546   Undermining Starts ___ O'Clock 0 11/08/22 1546   Undermining Ends___ O'Clock 0 11/08/22 1546   Undermining Maxium Distance (cm) 0 11/08/22 1546   Wound Assessment Devitalized tissue;Eschar dry;Slough; Exposed structure bone;Eschar moist 11/08/22 1546   Drainage Amount Large 11/08/22 1546   Drainage Description Serosanguinous 11/08/22 1546   Odor None 11/08/22 1546   Lora-wound Assessment Hyperpigmented;Fragile 11/08/22 1546   Margins Defined edges; Unattached edges 11/08/22 1546   Wound Thickness Description not for Pressure Injury Full thickness 11/08/22 1546   Number of days: 63       Wound 11/01/22 #2 Sacrum (Active)   Wound Image   11/15/22 0848   Wound Etiology Pressure Unstageable 11/15/22 0848   Dressing Status New dressing applied 11/15/22 0848   Wound Cleansed Cleansed with saline 11/15/22 0848   Dressing/Treatment Collagen;Silicone border 77/52/92 0848   Offloading for Diabetic Foot Ulcers Offloading not required 11/01/22 0942   Wound Length (cm) 7.5 cm 11/15/22 0848   Wound Width (cm) 5.5 cm 11/15/22 0848   Wound Depth (cm) 0.1 cm 11/15/22 0848   Wound Surface Area (cm^2) 41.25 cm^2 11/15/22 0848   Change in Wound Size % (l*w) -725 11/15/22 0848   Wound Volume (cm^3) 4.125 cm^3 11/15/22 0848   Wound Healing % -725 11/15/22 0848   Post-Procedure Length (cm) 2.5 cm 11/01/22 1004   Post-Procedure Width (cm) 2 cm 11/01/22 1004   Post-Procedure Depth (cm) 0.1 cm 11/01/22 1004   Post-Procedure Surface Area (cm^2) 5 cm^2 11/01/22 1004   Post-Procedure Volume (cm^3) 0.5 cm^3 11/01/22 1004   Distance Tunneling (cm) 0 cm 11/15/22 0848   Tunneling Position ___ O'Clock 0 11/15/22 0848   Undermining Starts ___ O'Clock 0 11/15/22 0848   Undermining Ends___ O'Clock 0 11/15/22 0848   Undermining Maxium Distance (cm) 0 11/15/22 0848   Wound Assessment Purple/maroon;Slough;Pink/red 11/15/22 0848   Drainage Amount Moderate 11/15/22 0848   Drainage Description Yellow;Serosanguinous 11/15/22 0848   Odor None 11/15/22 0848   Lora-wound Assessment Fragile 11/15/22 0848   Margins Defined edges 11/15/22 0848   Wound Thickness Description not for Pressure Injury Full thickness 11/15/22 0848   Number of days: 14       Wound 11/11/22 Toe (Comment  which one) Left great (Active)   Wound Image   11/15/22 0848   Wound Etiology Arterial 11/15/22 0848   Dressing Status Other (Comment);Dry 11/14/22 2126   Wound Cleansed Cleansed with saline 11/15/22 0848   Dressing/Treatment Betadine swabs/povidone iodine 11/15/22 0848   Wound Length (cm) 1.5 cm 11/15/22 0848   Wound Width (cm) 3 cm 11/15/22 0848   Wound Depth (cm) 0 cm 11/15/22 0848   Wound Surface Area (cm^2) 4.5 cm^2 11/15/22 0848   Wound Volume (cm^3) 0 cm^3 11/15/22 0848   Distance Tunneling (cm) 0 cm 11/15/22 0848   Tunneling Position ___ O'Clock 0 11/15/22 0848   Undermining Starts ___ O'Clock 0 11/15/22 0848   Undermining Ends___ O'Clock 0 11/15/22 0848   Undermining Maxium Distance (cm) 0 11/15/22 0848   Wound Assessment Eschar dry; Purple/maroon 11/15/22 0848 Drainage Amount None 11/15/22 0848   Odor None 11/15/22 0848   Lora-wound Assessment Intact 11/15/22 0848   Margins Attached edges 11/15/22 0848   Number of days: 3       Wound 11/15/22 Ankle Left;Posterior CLUSTER (Active)   Wound Image   11/15/22 0848   Wound Etiology Traumatic 11/15/22 0848   Wound Cleansed Cleansed with saline 11/15/22 0848   Dressing/Treatment Betadine swabs/povidone iodine 11/15/22 0848   Wound Length (cm) 2 cm 11/15/22 0848   Wound Width (cm) 1 cm 11/15/22 0848   Wound Depth (cm) 0.1 cm 11/15/22 0848   Wound Surface Area (cm^2) 2 cm^2 11/15/22 0848   Wound Volume (cm^3) 0.2 cm^3 11/15/22 0848   Distance Tunneling (cm) 0 cm 11/15/22 0848   Tunneling Position ___ O'Clock 0 11/15/22 0848   Undermining Starts ___ O'Clock 0 11/15/22 0848   Undermining Ends___ O'Clock 0 11/15/22 0848   Undermining Maxium Distance (cm) 0 11/15/22 0848   Wound Assessment Purple/maroon 11/15/22 0848   Drainage Amount None 11/15/22 0848   Odor None 11/15/22 0848   Lora-wound Assessment Intact 11/15/22 0848   Margins Attached edges 11/15/22 0848   Number of days: 0       Wound 11/15/22 Heel Left (Active)   Wound Image   11/15/22 0848   Wound Etiology Deep tissue/Injury 11/15/22 0848   Wound Cleansed Cleansed with saline 11/15/22 0848   Dressing/Treatment Betadine swabs/povidone iodine 11/15/22 0848   Wound Length (cm) 2.5 cm 11/15/22 0848   Wound Width (cm) 2 cm 11/15/22 0848   Wound Depth (cm) 0.1 cm 11/15/22 0848   Wound Surface Area (cm^2) 5 cm^2 11/15/22 0848   Wound Volume (cm^3) 0.5 cm^3 11/15/22 0848   Distance Tunneling (cm) 0 cm 11/15/22 0848   Tunneling Position ___ O'Clock 0 11/15/22 0848   Undermining Starts ___ O'Clock 0 11/15/22 0848   Undermining Ends___ O'Clock 0 11/15/22 0848   Undermining Maxium Distance (cm) 0 11/15/22 0848   Wound Assessment Purple/maroon;Pink/red 11/15/22 0848   Drainage Amount Small 11/15/22 0848   Drainage Description Serosanguinous 11/15/22 0848   Odor None 11/15/22 0848 Lora-wound Assessment Fragile 11/15/22 0848   Margins Attached edges 11/15/22 0848   Number of days: 0       Wound 11/15/22 Foot Anterior; Left (Active)   Wound Image   11/15/22 0848   Wound Etiology Deep tissue/Injury 11/15/22 0848   Wound Cleansed Cleansed with saline 11/15/22 0848   Dressing/Treatment Betadine swabs/povidone iodine 11/15/22 0848   Wound Length (cm) 2.5 cm 11/15/22 0848   Wound Width (cm) 1 cm 11/15/22 0848   Wound Depth (cm) 0.1 cm 11/15/22 0848   Wound Surface Area (cm^2) 2.5 cm^2 11/15/22 0848   Wound Volume (cm^3) 0.25 cm^3 11/15/22 0848   Tunneling Position ___ O'Clock 0 11/15/22 0848   Undermining Starts ___ O'Clock 0 11/15/22 0848   Undermining Ends___ O'Clock 0 11/15/22 0848   Undermining Maxium Distance (cm) 0 11/15/22 0848   Wound Assessment Purple/maroon 11/15/22 0848   Drainage Amount None 11/15/22 0848   Odor None 11/15/22 0848   Lora-wound Assessment Intact 11/15/22 0848   Margins Attached edges 11/15/22 0848   Number of days: 0       Wound 11/15/22 Foot Left;Medial (Active)   Wound Image   11/15/22 0848   Wound Etiology Arterial 11/15/22 0848   Wound Cleansed Cleansed with saline 11/15/22 0848   Dressing/Treatment Betadine swabs/povidone iodine 11/15/22 0848   Wound Length (cm) 0.5 cm 11/15/22 0848   Wound Width (cm) 1 cm 11/15/22 0848   Wound Depth (cm) 0 cm 11/15/22 0848   Wound Surface Area (cm^2) 0.5 cm^2 11/15/22 0848   Wound Volume (cm^3) 0 cm^3 11/15/22 0848   Distance Tunneling (cm) 0 cm 11/15/22 0848   Tunneling Position ___ O'Clock 0 11/15/22 0848   Undermining Starts ___ O'Clock 0 11/15/22 0848   Undermining Ends___ O'Clock 0 11/15/22 0848   Undermining Maxium Distance (cm) 0 11/15/22 0848   Wound Assessment Purple/maroon 11/15/22 0848   Drainage Amount None 11/15/22 0848   Odor None 11/15/22 0848   Lora-wound Assessment Intact 11/15/22 0848   Margins Attached edges 11/15/22 0848   Number of days: 0       Response to treatment:  With complaints of pain.      Pain Assessment:  Severity:  moderate  Quality of pain: sore  Wound Pain Timing/Severity: wound care/turning  Premedicated: no    Plan:     Plan of Care: Wound 11/15/22 Ankle Left;Posterior CLUSTER-Dressing/Treatment: Betadine swabs/povidone iodine  Wound 11/15/22 Heel Left-Dressing/Treatment: Betadine swabs/povidone iodine  Wound 11/15/22 Foot Anterior; Left-Dressing/Treatment: Betadine swabs/povidone iodine  Wound 11/15/22 Foot Left;Medial-Dressing/Treatment: Betadine swabs/povidone iodine  Wound 11/11/22 Toe (Comment  which one) Left great-Dressing/Treatment: Betadine swabs/povidone iodine  Wound 11/01/22 #2 Sacrum-Dressing/Treatment: Collagen, Silicone border    Patient in bed agreeable to wound care eval for left foot wounds/sacral wound. Pt has a pressure injury unstageable to sacrum. Cleansed with NS. Measured and pictured. Applied collagen and foam border. Pt turned to lt side with pillow support. Pt has left heel arterial/diabetic/pressure wound, lt medial foot, left posterior ankle, lt great toe. Cleansed with NS. Measured and pictured. Painted with betadine LUISITO. Rt leg s/p BKA with IPOP in place. Atmos air pump on the bed. Ordered a dolphin mattress. Pt Is at high risk for skin breakdown AEB guerda. Follow guerda orders. Specialty Bed Required : yes  [] Low Air Loss   [x] Pressure Redistribution  [] Fluid Immersion  [x] Bariatric  [] Total Pressure Relief  [] Other:     Discharge Plan:  Placement for patient upon discharge: tbd  Hospice Care: no  Patient appropriate for Outpatient [address] [place] [address] Road: yes    Patient/Caregiver Teaching:  Level of patient/caregiver understanding able to: pt voiced understanding. Electronically signed by Marion Arenas.  LORE Edmondson, on 11/15/2022 at 11:54 AM

## 2022-11-15 NOTE — PROGRESS NOTES
POD 4 Right BKA with IPOP. The IPOP was changed on Sunday and her stump looked really good and her flaps are viable. Incision is clean dry and intact and reapplied. Pt is a little sleepy, but back to her baseline. Off of all narcotics.  + BM. Anemia of chronic disease, Hgb at 8.0, continue to monitor. Labs noted. CAD- on meds  ESRD on HD. Appreciate Nephrology input. HD on MWF  IDDM- maintain tight control of BGL's. On heparin for DVT prophylaxis. PT/OT evaluations done and CM saw pt to start discharge planning process. Continue to monitor her mental status. Stage II sacral pressure ulceration. We will apply Santyl and a Mepitel sacral border.

## 2022-11-15 NOTE — PROGRESS NOTES
Physical Therapy    Physical Therapy Treatment Note  Name: Eduardo Long MRN: [id number] :   1965   Date:  11/15/2022   Admission Date: 2022 Room:  [address] [place] [address]   Restrictions/Precautions:  Restrictions/Precautions  Restrictions/Precautions: Fall Risk, Weight Bearing, General Precautions Position Activity Restriction  Other position/activity restrictions: Ambulate patient at least 50 feet 3 times a day per Dr. Selvin Brown Weight Bearing Restrictions  Right Lower Extremity Weight Bearing: Weight Bearing As Tolerated     Communication with other providers:  per nurse ok to tx and is aware of pt's pain  Subjective:  Patient states:  pt needs education and encouragement to participate in tx session. Pt refused OOB activity. Pain:   Location, Type, Intensity (0/10 to 10/10):  rates pain in left leg and bottom 8 but report her bottom hurst more then her leg  Objective:    Observation:  pt lethargic at start of tx but more awake once bed up in reji position. Pt has black area on right big toe    Treatment, including education/measures:  Bed slowly placed in chair position with pt needing to stop every 10-15 degrees. Pt was able to tolerate bed in chair position x 15 minutes. Att. To educate pt on pressure relief and pt did attempt to use UE to shift herself in bed. Attempt to engage pt in ex but needing max encouragement and education.  present and very supportive and also encouraging pt to participate in tx session. Pt declined trunk stretches and deep breathing ex but was agreeable to use spirometer x 2 reps at start of tx and again at end of tx session.  verbalized nurse said to use 10 times every hour. Pt educated on importance and benefit of deep breathing ex followed by leg ex to improve circulation in legs and feet.   4-5 toe wiggles left foot and 10 reps aps.  3 reps laqs on LLE  5 reps glut sets  Pt then returned to sup but encouraged to keep HOB up as high as it would go and pillow behind her back to promote tall sitting position for lunch and decrease pressure on bottom . Pt had pillow under right hip and left foot floating on towel roll. Safety  Patient left safely in the bed, with call light/phone in reach with alarm applied. Assessment / Impression:      Patient's tolerance of treatment:  poor   Adverse Reaction: na  Significant change in status and impact:  na  Barriers to improvement:  lethargy, pain, strength and safety  Plan for Next Session:    Cont. POC  Time in:  1135  Time out:  1200  Timed treatment minutes:  25  Total treatment time:  25    Previously filed items:  Social/Functional History  Lives With: Spouse (+ AMANDA)  Home Layout: One level  Home Access: Level entry  Bathroom Shower/Tub: Tub/Shower unit  Bathroom Equipment: Tub transfer bench  Home Equipment: Walker, rolling (recently ordered a motorizeed w/c)  ADL Assistance: Independent  Ambulation Assistance: Independent (recently with walker since WB restrcition leading up to this hospital admssion. previously idep no AD)  Transfer Assistance: Independent  Additional Comments: Recently her  Nad AMANDA assisting with ADL and mobility as needed since foot wound and WB restrcitions. Historically, pt is fully independnet. Long Term Goals  Time Frame for Long Term Goals :  In one week:  Long Term Goal 1: Pt will complete all bed mobility with CGAx1  Long Term Goal 2: Pt will complete sit <> stand transfers with CGAx1  Long Term Goal 3: Pt will ambulate 40 feet with CGAx1 with LRAD  Long Term Goal 4: Pt will independently complete 3 sets of 10 reps of BLE AROM exercises in available and allowed ROM  Long Term Goal 5: Pt will propel manual w/c 50 feet with CGAx1       Electronically signed by:    Negar Dias PTA  11/15/2022, 8:26 AM

## 2022-11-16 LAB
GLUCOSE BLD-MCNC: 158 MG/DL (ref 70–99)
GLUCOSE BLD-MCNC: 167 MG/DL (ref 70–99)
GLUCOSE BLD-MCNC: 184 MG/DL (ref 70–99)
GLUCOSE BLD-MCNC: 216 MG/DL (ref 70–99)
HCT VFR BLD CALC: 28.7 % (ref 37–47)
HEMOGLOBIN: 8.8 GM/DL (ref 12.5–16)
MCH RBC QN AUTO: 31 PG (ref 27–31)
MCHC RBC AUTO-ENTMCNC: 30.7 % (ref 32–36)
MCV RBC AUTO: 101.1 FL (ref 78–100)
PDW BLD-RTO: 14.5 % (ref 11.7–14.9)
PLATELET # BLD: 329 K/CU MM (ref 140–440)
PMV BLD AUTO: 9.8 FL (ref 7.5–11.1)
RBC # BLD: 2.84 M/CU MM (ref 4.2–5.4)
WBC # BLD: 15.2 K/CU MM (ref 4–10.5)

## 2022-11-16 PROCEDURE — 97530 THERAPEUTIC ACTIVITIES: CPT

## 2022-11-16 PROCEDURE — 6370000000 HC RX 637 (ALT 250 FOR IP): Performed by: SURGERY

## 2022-11-16 PROCEDURE — 6360000002 HC RX W HCPCS: Performed by: INTERNAL MEDICINE

## 2022-11-16 PROCEDURE — 90935 HEMODIALYSIS ONE EVALUATION: CPT

## 2022-11-16 PROCEDURE — 97535 SELF CARE MNGMENT TRAINING: CPT

## 2022-11-16 PROCEDURE — 1200000000 HC SEMI PRIVATE

## 2022-11-16 PROCEDURE — 6360000002 HC RX W HCPCS: Performed by: SURGERY

## 2022-11-16 PROCEDURE — 36415 COLL VENOUS BLD VENIPUNCTURE: CPT

## 2022-11-16 PROCEDURE — 85027 COMPLETE CBC AUTOMATED: CPT

## 2022-11-16 PROCEDURE — 82962 GLUCOSE BLOOD TEST: CPT

## 2022-11-16 PROCEDURE — 2700000000 HC OXYGEN THERAPY PER DAY

## 2022-11-16 PROCEDURE — 2580000003 HC RX 258: Performed by: SURGERY

## 2022-11-16 PROCEDURE — 94761 N-INVAS EAR/PLS OXIMETRY MLT: CPT

## 2022-11-16 RX ADMIN — SODIUM CHLORIDE, PRESERVATIVE FREE 10 ML: 5 INJECTION INTRAVENOUS at 08:21

## 2022-11-16 RX ADMIN — EPOETIN ALFA-EPBX 3000 UNITS: 3000 INJECTION, SOLUTION INTRAVENOUS; SUBCUTANEOUS at 13:24

## 2022-11-16 RX ADMIN — PANTOPRAZOLE SODIUM 40 MG: 40 TABLET, DELAYED RELEASE ORAL at 05:50

## 2022-11-16 RX ADMIN — FUROSEMIDE 40 MG: 40 TABLET ORAL at 22:55

## 2022-11-16 RX ADMIN — CARVEDILOL 6.25 MG: 6.25 TABLET, FILM COATED ORAL at 22:55

## 2022-11-16 RX ADMIN — SODIUM CHLORIDE, PRESERVATIVE FREE 10 ML: 5 INJECTION INTRAVENOUS at 22:56

## 2022-11-16 RX ADMIN — CARVEDILOL 6.25 MG: 6.25 TABLET, FILM COATED ORAL at 08:20

## 2022-11-16 RX ADMIN — HEPARIN SODIUM 5000 UNITS: 5000 INJECTION INTRAVENOUS; SUBCUTANEOUS at 22:55

## 2022-11-16 RX ADMIN — HEPARIN SODIUM 5000 UNITS: 5000 INJECTION INTRAVENOUS; SUBCUTANEOUS at 05:50

## 2022-11-16 RX ADMIN — INSULIN LISPRO 4 UNITS: 100 INJECTION, SOLUTION INTRAVENOUS; SUBCUTANEOUS at 11:52

## 2022-11-16 RX ADMIN — EPOETIN ALFA-EPBX 2000 UNITS: 2000 INJECTION, SOLUTION INTRAVENOUS; SUBCUTANEOUS at 13:25

## 2022-11-16 RX ADMIN — IRON SUCROSE 100 MG: 20 INJECTION, SOLUTION INTRAVENOUS at 13:24

## 2022-11-16 RX ADMIN — FUROSEMIDE 40 MG: 40 TABLET ORAL at 08:20

## 2022-11-16 ASSESSMENT — PAIN SCALES - GENERAL
PAINLEVEL_OUTOF10: 0
PAINLEVEL_OUTOF10: 0

## 2022-11-16 NOTE — DIALYSIS
Tolerated 2.5 hour dialysis treatment well, removed 1.5 liter with treatment. Left AVF cannulated with 16 gauge needles without complication. Needles removed and sites held and dressings intact,      Patient Name: Stefanie Romero  Patient : 1965  MRN: [id number]     Acct: [de-identified]  Date of Admission: 2022  Room/Bed: [address] [place] [address]  Code Status:  Full Code  Allergies:    Allergies   Allergen Reactions    Latex     Codeine     Cortisone     Cortizone     Iodides     Phenobarbital Other (See Comments)     Hallucinations     Vancomycin Other (See Comments)     \"makes me very sick\"     Diagnosis:    Patient Active Problem List   Diagnosis    CAD (coronary artery disease)    Hyperlipidemia    Diabetes mellitus (HCC)    Chest pain    Hoarseness of voice    Fracture of metacarpal bone    Wrist sprain    History of tobacco abuse    Wrist pain    Carpal tunnel syndrome    Cubital tunnel syndrome    Trigger finger    S/P CABG x 4    Diabetic foot ulcer ([address]    Diabetic neuropathy ([address]    Diabetes mellitus type 2 with complications (HCC)    Claudication of both lower extremities (HCC)    SOB (shortness of breath)    Bilateral leg edema    SOB (shortness of breath)    Type 2 diabetes mellitus without complication (HCC)    Coronary artery disease involving coronary bypass graft of native heart with unstable angina pectoris (HCC)    S/P cardiac cath    Chronic renal failure, stage 2 (mild)    Chronic kidney disease (CKD) stage G4/A3, severely decreased glomerular filtration rate (GFR) between 15-29 mL/min/1.73 square meter and albuminuria creatinine ratio greater than 300 mg/g (HCC)    DM (diabetes mellitus) (HCC)    Coronary atherosclerosis    HTN (hypertension)    Proteinuria    Callus of foot    Non compliance with medical treatment    Diabetic ulcer of left foot associated with type 2 diabetes mellitus, with fat layer exposed ([address]    Decubitus ulcer of sacral region, stage 1    Chronic renal failure, stage 4 (severe) ([address]    Problem with dialysis access [facility])    WD-Presence of surgical incision    Diabetic ulcer of right heel associated with type 2 diabetes mellitus, with necrosis of bone (HCC)    Acute pain of right foot    Wound, open, foot with complication, left, sequela    ESRD on hemodialysis (HCC)    Ulcer of right heel, with necrosis of bone ([address]    Diabetic ulcer of right midfoot associated with type 2 diabetes mellitus, with necrosis of bone ([address]         Treatment:  Hemodilaysis 2:1  Priority: Routine  Location: Acute Room    Diabetic: Yes  NPO: No  Isolation Precautions: Contact     Consent for Treatment Verified: Yes  Blood Consent Verified: Not Applicable     Safety Verified: Identify (I), Consent (C), Equipment (E), HepB Status (B), Orders Complete (O), Access Verified (A), and Timeliness (T)  Time out performed prior to access at 1255 hours. Report Received from Primary RN at 1230 hours. Primary RN (First Initial, Last Name, Title): JARAD Yang RN  Incapacitated Nurse Education Completed: Not Applicable     HBsAg ONLY:  Date Drawn: October 31, 2022       Results: Negative  HBsAb:  Date Drawn:  November 16, 2022       Results: Unknown    Order  Dialysis Bath  K+ (Potassium): 2  Ca+ (Calcium): 2.5  Na+ (Sodium): 138  HCO3 (Bicarb): 35  Bicarbonate Concentrate Lot No.: [id number]  Acid Concentrate Lot No.: [id number]     Na+ Modeling: Not Applicable  Dialyzer: [id number]  Dialysate Temperature (C):  35  Blood Flow Rate (BFR):  350   Dialysate Flow Rate (DFR):   700        Access to be Utilized   Access: AVF  Location: Upper Extremity  Side: Left   Needle gauge:  16  + Bruit/Thrill: Yes    First Use X-ray Verified: Not Applicable  OK to use line order: Not Applicable    Site Assessment:  Signs and Symptoms of Infection/Inflammation: None  If yes: Not Applicable  Dressing: Dry and Intact  Non Dialysis Use?: No  Comment:    Flows: Good, Patent  If access problem, who was notified:     Pre and Post-Assessment  Patient Vitals for the past 8 hrs:   Level of Consciousness Heart Rhythm Respiratory Quality/Effort O2 Device Bilateral Breath Sounds Skin Condition/Temp Abdomen Inspection Comments Pain Level   11/16/22 0859 0 -- Unlabored -- -- -- -- -- --   11/16/22 1159 -- -- -- Nasal cannula -- -- -- -- --   11/16/22 1300 0 Regular Unlabored None (Room air) Clear;Diminished Dry; Warm Soft timeout completed, VSS, rec'd report from RN, hep status verified, water alarm intact 0   11/16/22 1530 0 Regular Unlabored None (Room air) Clear;Diminished Dry; Warm Soft treatment completed --     Labs  Recent Labs     11/14/22  1147 11/16/22  1052   WBC 21.9* 15.2*   HGB 8.0* 8.8*   HCT 26.3* 28.7*    329                                                                  Recent Labs     11/14/22  1147     143   K 4.5  4.5     99   CO2 22  24   BUN 59*  59*   CREATININE 11.3*  10.8*   GLUCOSE 102*  102*     IV Drips and Rate/Dose   sodium chloride      dextrose        Safety - Before each treatment:   Dialysis Machine No.: [id number]  RO Machine Number: [id number]  Dialyzer Lot No.: [id number]  RO Machine Log Sheet Completed: Yes  Machine Alarm Self Test: Passed; Completed (11/16/22 1300)     Air Foam Detector: Tested, Proper Function  Extracorporeal Circuit Tested for Integrity: Yes  Machine Conductivity: 13.8  Manual Conductivity: 13.8     Bicarbonate Concentrate Lot No.: [id number]  Acid Concentrate Lot No.: [id number]  Manual Ph: 7.4  Bleach Test (Neg): Yes  Bath Temperature: 95 °F (35 °C)  Tubing Lot#: [id number]  Conductivity Meter Serial #: [id number]  All Connections Secure?: Yes  Venous Parameters Set?: Yes  Arterial Parameters Set?: Yes  Saline Line Double Clamped?: Yes  Air Foam Detector Engaged?: Yes  Machine Functioning Alarm Free?  Yes  Prime Given: 200ml    Chlorine Testing - Before each treatment and every 4 hours:   Treatment  Treatment Number: 1  Time On: 1300  Time Off: 1530  Treatment Goal: 2.5  Weight: 139 lb 1.8 oz (63.1 kg) (11/14/22 1207)  1st check: less than 0.1 ppm at: 0700 hours  2nd check: less than 0.1 ppm at: 1030 hours  3rd check: Not Applicable  (if greater than 0.1 ppm, then check every 30 minutes from secondary)    Access Flows and Pressures  Patient Vitals for the past 8 hrs:   Blood Flow Rate (ml/min) Ultrafiltration Rate (ml/hr) Arterial Pressure (mmHg) Venous Pressure (mmHg) TMP DFR Comments Access Visible   11/16/22 1300 350 ml/min 1000 ml/hr -130 mmHg 200 70 700 treatment started Yes   11/16/22 1315 350 ml/min 1000 ml/hr -150 mmHg 200 70 700 no distress Yes   11/16/22 1330 350 ml/min 770 ml/hr -150 mmHg 200 70 700 lines secure Yes   11/16/22 1345 350 ml/min 770 ml/hr -160 mmHg 210 60 700 no distress Yes   11/16/22 1415 350 ml/min 770 ml/hr -180 mmHg 230 60 700 moved over on right side Yes   11/16/22 1430 350 ml/min 770 ml/hr -190 mmHg 240 60 700 resting Yes   11/16/22 1445 350 ml/min 770 ml/hr -160 mmHg 260 60 700 no distress Yes   11/16/22 1500 350 ml/min 770 ml/hr -170 mmHg 250 60 700 no change Yes   11/16/22 1515 350 ml/min 770 ml/hr -170 mmHg 220 60 700 llines secure Yes   11/16/22 1530 200 ml/min -- -- -- -- -- treatment completed Yes     Vital Signs  Patient Vitals for the past 8 hrs:   BP Temp Pulse Resp SpO2   11/16/22 0820 (!) 125/56 -- 79 -- --   11/16/22 1159 -- -- -- -- 98 %   11/16/22 1300 (!) 116/45 97.9 °F (36.6 °C) 77 14 97 %   11/16/22 1315 (!) 100/43 -- 75 -- --   11/16/22 1330 (!) 101/49 -- 76 -- --   11/16/22 1345 (!) 104/46 -- 74 -- --   11/16/22 1415 (!) 111/48 -- 76 -- --   11/16/22 1430 (!) 105/52 -- 77 -- --   11/16/22 1445 (!) 120/50 -- 77 -- --   11/16/22 1500 (!) 110/49 -- 77 -- --   11/16/22 1515 (!) 114/55 -- 79 -- --   11/16/22 1530 (!) 126/57 97.9 °F (36.6 °C) 76 15 97 %     Post-Dialysis  Arterial Catheter Locking Solution: Not Applicable  Venous Catheter Locking Solution: Not Applicable  Post-Treatment Procedures: Blood returned, Access bleeding time < 10 minutes  Machine Disinfection Process: Acid/Vinegar Clean, Heat Disinfect, Exterior Machine Disinfection  Rinseback Volume (ml): 300 ml  Blood Volume Processed (Liters): 49.1 l/min  Dialyzer Clearance: Moderately streaked  Duration of Treatment (minutes): 150 minutes     Hemodialysis Intake (ml): 500 ml  Hemodialysis Output (ml): 2000 ml     Tolerated Treatment: Fair  Patient Response to Treatment: [email or web address]  Physician Notified: No       Provider Notification        Handoff complete and report given to Primary RN at 1550 hours. Primary RN (First Initial, Last Name, Title): JARAD Dietz RN     Education  Person Educated: Patient   Knowledge Base: Substantial  Barriers to Learning?: None  Preferred method of Learning: Oral  Topic(s): Access Care, Signs and Symptoms of Infection, Fluid Management, Procedural, Medications, and Diet   Teaching Tools: Demonstration and Explanation   Response to Education: Verbalized Understanding     Electronically signed by Heidi Smith RN on 11/16/2022 at 3:52 PM

## 2022-11-16 NOTE — PROGRESS NOTES
Occupational Therapy  . Occupational Therapy Treatment Note    Name: Marisa Sue MRN: [id number] :   1965   Date:  2022   Admission Date: 2022 Room:  [address] [place] [address]-A     Primary Problem:      Restrictions/Precautions:  Restrictions/Precautions  Restrictions/Precautions: Fall Risk, Weight Bearing, General Precautions Position Activity Restriction  Other position/activity restrictions: Ambulate patient at least 50 feet 3 times a day per Dr. Thomas Plane Weight Bearing Restrictions  Right Lower Extremity Weight Bearing: Weight Bearing As Tolerated       Communication with other providers: cotx with MIGUELANGEL Tran for assist and safety as well as patient tolerance with therapy. Subjective:  Patient states:  im in a lot of pain, can you get me some medication. Pain:   Location, Type, Intensity (0/10 to 10/10):  no numerical rating. But c/o of being uncomfortable and did cry out numerous times throughout. Objective:    Observation: patient in supine. Very soft spoken. C/o of pain. States she had to be narcan  recently. Family present  Objective Measures:  tele, ipop    Treatment, including education:    ADL activity training was instructed today. Cues were given for safety, sequence, UE/LE placement, visual cues, and balance. Activities performed today included dressing, hand hygiene, and grooming. Lb dressing- DEP to don socks and new briefs while EOB. Grooming- provided brush for patient , ultimately progressed to DEP. Patient never attempted to brush hair. Therapeutic activity training was instructed today. Cues were given for safety, sequence, UE/LE placement, awareness, and balance. Activities performed today included bed mobility training, sup-sit, sit-stand, SPT. High fowlers to EOB- Mod A With increased time and effort. A lot of cues needed for technique. Prompted patient to scoot hips forward, she put effort in but ultimately required Mod x2.    EOB balance- poor. Tolerated x10 min.    stand to FWW- 1st trial- Max x2- with a lot of cues for technique and HHA to pull back Ipop. Unable to fully stand upright despite cues and assist. Patient c/o dizziness and sat back to EOB. 2nd stand- max x2. Cues for upright posture, technique and safety. HHA to place Ipop in correct position. Minimal effort given for all cues. Standing balance- Max x2- d/t forward flexed posture and unable to use Bues for upright standing. Patient prompted to ambulate to recliner. Patient required Max x2 for safety, Ble movement and transfer. Patient attempting to sit too soon. Max x2 to sit to recliner. Max x2 to scoot back in recliner. Repositioned patient in recliner x4 trials. Patient unable to attempt further gait d/t safety and pain. Patient encouraged and educated to sit in chair x1 hour. Educated on Drs. Order to ambulate patient x3 times a day. Patient states she is unaware. Patient educated on role of OT , benefits of OT and rationale for therapeutic intervention. Benefit of OOB/EOB activities, benefit of movement. AE/AD, WS/EC,     All therapeutic intervention performed c emphasis on dynamic balance / standing tolerance to increase strength, endurance and activity tolerance for increased Canal[place] c ADL tasks and func transfers / mobility. Patient left safely in bedside chair at end of session, with call light in reach, alarm on and nursing aware. Gait belt was used for func transfers / mobility. Assessment / Impression:    Patient is painful this date. Family is present, unsure if that hinders performance d/t increased assist levels. This therapist does not believe patient will tolerate ARU and 3 hours of therapy daily. Unable to currently tolerate siting upright on recliner longer than 25 minutes. Patient's tolerance of treatment: poor  Adverse Reaction: None  Significant change in status and impact: increased assist levels needed.   Barriers to improvement: pain,  delayed processing       Plan for Next Session:    Continue with OT POC      Time in:  1100  Time out:  1138  Timed treatment minutes:  38  Total treatment time:  38      Electronically signed by:    LUISITO Pierce COTA/L [id number]    11/16/2022, 11:46 AM

## 2022-11-16 NOTE — PROGRESS NOTES
Physical Therapy  Name: Diana May MRN: [id number] :   1965   Date:  2022   Admission Date: 2022 Room:  [address] [place] [address]   Restrictions/Precautions:  Restrictions/Precautions  Restrictions/Precautions: Fall Risk, Weight Bearing, General Precautions Lower Extremity Weight Bearing Restrictions  Right Lower Extremity Weight Bearing: Weight Bearing As Tolerated       Communication with other providers:  Porfirio Lovell LPN notified that patient is up in chair and should attempt to be in chair for 1 hour    Subjective:  Patient states:  She is agreeable for rehab. Pain:   Location, Type, Intensity (0/10 to 10/10):  expresses mod-max pain in RLE    Objective:    Observation:  Patient is supine in bed, with rigid IPOP. Treatment, including education/measures:    Transfers with line management of Elaine, no tele monitor  Supine to sit :HOB slightly elevated Mod A x 2 patient given added time   Seated balance: Min-CGA for seated balance. She tolerates ~10' of static sitting with no LOB  Scooting :Seated scooting Mod x 2, she demo's effort but unable to complete scooting  Sit to stand :From EOB, Max A x 2. She is unable to add efficient effort for BUE into RW , shift forward hip translation and increase trunk extension. X2 RLE needs manual placement with Max A. Standing balance, Max A x 2 d/t poor weight shift over FELIPA   Stand to sit : Max A x 2 for safe hip guidance  Seated scooting: Patient needs repositioned Max A x 2 for comfort. Patient requests personal donut cushion. Patient repositioned x4 sets for comfort  SPT:Max A x2 stand pivot with RW from Eob to recliner. She required Max A x 1 for standing balance, Max A of another for for walker and BLE advancement    Gait:  Not safe today    Safety  Patient left safely in the recliner, with call light/phone in reach with alarm applied. Gait belt and mask were used for transfers and gait.     Assessment / Impression:   Patient has poor-fair tolerance with today's OOB activity. She has declined since initial Eval. Events since eval: Patient hospital acquired OD on opiods and narcan administered on 11/14. Patient's tolerance of treatment:  Poor-Fair   Adverse Reaction: Extreme lethargy, increase weakness  Significant change in status and impact:  increase weakness, decline in function mobility  Barriers to improvement:  medical status    Plan for Next Session:    Will cont to work towards pt's goals per patient tolerance  Time in:  1059  Time out:  1138  Timed treatment minutes:  39  Total treatment time:  44  Previously filed items:  Social/Functional History  Lives With: Spouse (+ AMANDA)  Home Layout: One level  Home Access: Level entry  Bathroom Shower/Tub: Tub/Shower unit  Bathroom Equipment: Tub transfer bench  Home Equipment: yomaira Kirkpatrick (recently ordered a motorizeed w/c)  ADL Assistance: Independent  Ambulation Assistance: Independent (recently with walker since WB restrcition leading up to this hospital [address] previously idep no AD)  Transfer Assistance: Independent  Additional Comments: Recently her  Nad AMANDA assisting with ADL and mobility as needed since foot wound and WB restrcitions. Historically, pt is fully independnet. Long Term Goals  Time Frame for Long Term Goals : In one week:  Long Term Goal 1: Pt will complete all bed mobility with CGAx1  Long Term Goal 2: Pt will complete sit <> stand transfers with CGAx1  Long Term Goal 3: Pt will ambulate 40 feet with CGAx1 with LRAD  Long Term Goal 4: Pt will independently complete 3 sets of 10 reps of BLE AROM exercises in available and allowed ROM  Long Term Goal 5: Pt will propel manual w/c 50 feet with CGAx1    Electronically signed by:     Adin Smith PTA  11/16/2022, 11:47 AM

## 2022-11-16 NOTE — CARE COORDINATION
Reviewed updated therapy notes. Patient does not look appropriate for ARU d/t poor activity tolerance. ARU can continue to follow, but patient will need to make progress with activity tolerance. D/w Paris Jenny CM and left VM for Keith CM (patients CM today).

## 2022-11-16 NOTE — PROGRESS NOTES
Nephrology Progress Note  11/16/2022 8:57 AM        Subjective:   Admit Date: 11/11/2022  PCP: Sandra Rodas MD    Interval History: Patient seen in early morning, this is a late entry  Complain of generalized pain    Diet: Some but not up to the par    ROS: Generalized pain, fatigue and tiredness  Her mentation back to normal  No fever and acceptable blood pressure    Data:     Current meds:    iron sucrose  100 mg IntraVENous Once in dialysis    epoetin francisco-epbx  2,000 Units IntraVENous Once in dialysis    And    epoetin francisco-epbx  3,000 Units IntraVENous Once in dialysis    collagenase   Topical Every Other Day    insulin lispro  0-16 Units SubCUTAneous TID WC    insulin lispro  0-4 Units SubCUTAneous Nightly    sodium chloride flush  5-40 mL IntraVENous 2 times per day    heparin (porcine)  5,000 Units SubCUTAneous 3 times per day    carvedilol  6.25 mg Oral BID    furosemide  40 mg Oral BID    [Held by provider] lisinopril  20 mg Oral Daily    pantoprazole  40 mg Oral QAM AC      sodium chloride      dextrose           No intake/output data recorded.     CBC:   Recent Labs     11/14/22  1147   WBC 21.9*   HGB 8.0*             Recent Labs     11/14/22  1147     143   K 4.5  4.5     99   CO2 22  24   BUN 59*  59*   CREATININE 11.3*  10.8*   GLUCOSE 102*  102*       Lab Results   Component Value Date    CALCIUM 7.8 (L) 11/14/2022    CALCIUM 7.8 (L) 11/14/2022    PHOS 9.0 (H) 11/14/2022       Objective:     Vitals: BP (!) 125/56   Pulse 79   Temp 98 °F (36.7 °C) (Oral)   Resp 20   Ht 5' 3\" (1.6 m)   Wt 139 lb 1.8 oz (63.1 kg)   SpO2 98%   BMI 24.64 kg/m² ,    General appearance: Thin, frail looking, fatigue and tired  HEENT: Positive conjunctival pallor-she seems edentulous  Neck: Supple, no neck stiffness  Lungs: No gross conjunctival pallor  Heart: Seems regular rate and rhythm with systolic ejection murmur, well-healed incision from previous sternotomy  Abdomen: No gross crackles  Extremities: Right below-knee amputation, left ankle edema  Left upper extremity brachiocephalic fistula with good thrill on palpation good bruit auscultation      Problem List :         Impression :     End-stage kidney disease on maintenance dialysis-we will get dialysis today  Gangrene s/p right below-knee amputation with underlying atherosclerotic cardiovascular disease  Anemia, frailty and debility mainly from end-stage kidney disease  Underlying diabetes  Resolved transient confusion    Recommendation/Plan  :     Plan for hemodialysis and ultrafiltration  ACE inhibitor's is on hold due to relatively low blood pressure  She is waiting for placement for rehab  Good glycemic control and follow clinically      Ofe Whitehead MD MD

## 2022-11-16 NOTE — CARE COORDINATION
CM received message from Sandip Snider with the ARU. ARU is currently unable to to accept the patient due to activity intolerance, if patient's tolerance to activity improves ARU is willing to reconsider acceptance.

## 2022-11-16 NOTE — PROGRESS NOTES
POD 5 Right BKA with IPOP. The IPOP was changed on Sunday and her stump looked really good and her flaps are viable. Incision is clean dry and intact and reapplied. Pt is back to her baseline. Off of all narcotics.  + BM. Anemia of chronic disease, Hgb at 8.0, continue to monitor. Recheck CBC. WBC was elevated which I feel is likely reactive. CAD- on meds  ESRD on HD. Appreciate Nephrology input. HD on MWF  IDDM- maintain tight control of BGL's. On heparin for DVT prophylaxis. PT/OT evaluations done and CM saw pt to start discharge planning process for ARU. Stage II sacral pressure ulceration. We will apply Santyl and a Mepitel sacral border.

## 2022-11-17 LAB
GLUCOSE BLD-MCNC: 160 MG/DL (ref 70–99)
GLUCOSE BLD-MCNC: 220 MG/DL (ref 70–99)
GLUCOSE BLD-MCNC: 247 MG/DL (ref 70–99)

## 2022-11-17 PROCEDURE — 1200000000 HC SEMI PRIVATE

## 2022-11-17 PROCEDURE — 97116 GAIT TRAINING THERAPY: CPT

## 2022-11-17 PROCEDURE — 97530 THERAPEUTIC ACTIVITIES: CPT

## 2022-11-17 PROCEDURE — 94761 N-INVAS EAR/PLS OXIMETRY MLT: CPT

## 2022-11-17 PROCEDURE — 6360000002 HC RX W HCPCS: Performed by: SURGERY

## 2022-11-17 PROCEDURE — 82962 GLUCOSE BLOOD TEST: CPT

## 2022-11-17 PROCEDURE — 97542 WHEELCHAIR MNGMENT TRAINING: CPT

## 2022-11-17 PROCEDURE — 2580000003 HC RX 258: Performed by: SURGERY

## 2022-11-17 PROCEDURE — 6370000000 HC RX 637 (ALT 250 FOR IP): Performed by: SURGERY

## 2022-11-17 RX ADMIN — ACETAMINOPHEN 650 MG: 325 TABLET ORAL at 08:51

## 2022-11-17 RX ADMIN — CARVEDILOL 6.25 MG: 6.25 TABLET, FILM COATED ORAL at 08:51

## 2022-11-17 RX ADMIN — PANTOPRAZOLE SODIUM 40 MG: 40 TABLET, DELAYED RELEASE ORAL at 05:30

## 2022-11-17 RX ADMIN — SODIUM CHLORIDE, PRESERVATIVE FREE 10 ML: 5 INJECTION INTRAVENOUS at 08:53

## 2022-11-17 RX ADMIN — HEPARIN SODIUM 5000 UNITS: 5000 INJECTION INTRAVENOUS; SUBCUTANEOUS at 16:13

## 2022-11-17 RX ADMIN — HEPARIN SODIUM 5000 UNITS: 5000 INJECTION INTRAVENOUS; SUBCUTANEOUS at 05:30

## 2022-11-17 RX ADMIN — ACETAMINOPHEN 650 MG: 325 TABLET ORAL at 15:17

## 2022-11-17 ASSESSMENT — PAIN SCALES - GENERAL: PAINLEVEL_OUTOF10: 9

## 2022-11-17 NOTE — PLAN OF CARE

## 2022-11-17 NOTE — PROGRESS NOTES
This nurse called Dr. Eliazar Catalan about the request of stronger medication for pain, per ross he is in the OR, this nurse left a message.

## 2022-11-17 NOTE — CARE COORDINATION
Received VM from [address] [place] [address] at [address] . She stated that she cannot accept referral due to they cannot do dialysis. Phoned patient's  to provide update and had to leave a VM. Spoke with patient also to provide update . She requested for CM to f/u tomorrow to discuss next SNF choice when her  is here .  Case Management to follow

## 2022-11-17 NOTE — PROGRESS NOTES
Nephrology Progress Note  11/17/2022 7:27 AM        Subjective:   Admit Date: 11/11/2022  PCP: Delvis Tabares MD    Interval History: Complain of right leg pain, little lethargic this morning    Diet: Eating some probably not up to the par    ROS: She is alert awake and oriented and had conversation with me  Acceptable blood pressure and no fever    Data:     Current meds:    collagenase   Topical Every Other Day    insulin lispro  0-16 Units SubCUTAneous TID WC    insulin lispro  0-4 Units SubCUTAneous Nightly    sodium chloride flush  5-40 mL IntraVENous 2 times per day    heparin (porcine)  5,000 Units SubCUTAneous 3 times per day    carvedilol  6.25 mg Oral BID    furosemide  40 mg Oral BID    [Held by provider] lisinopril  20 mg Oral Daily    pantoprazole  40 mg Oral QAM AC      sodium chloride      dextrose           I/O last 3 completed shifts: In: 500   Out: 2000     CBC:   Recent Labs     11/14/22  1147 11/16/22  1052   WBC 21.9* 15.2*   HGB 8.0* 8.8*    329          Recent Labs     11/14/22  1147     143   K 4.5  4.5     99   CO2 22  24   BUN 59*  59*   CREATININE 11.3*  10.8*   GLUCOSE 102*  102*       Lab Results   Component Value Date    CALCIUM 7.8 (L) 11/14/2022    CALCIUM 7.8 (L) 11/14/2022    PHOS 9.0 (H) 11/14/2022       Objective:     Vitals: BP (!) 138/58 Comment: rechecked  Pulse 75   Temp 98.6 °F (37 °C) (Oral)   Resp 16   Ht 5' 3\" (1.6 m)   Wt 139 lb 1.8 oz (63.1 kg)   SpO2 94%   BMI 24.64 kg/m² ,    General appearance: Fatigued, tired, but otherwise alert awake and oriented  HEENT: Positive conjunctival pallor  Neck: Supple  Lungs:  Few rhonchi no gross crackles  Heart: Regular rate and rhythm, well-healed incision from previous sternotomy  Abdomen: Soft  Extremities: Left upper extremity AV fistula with good thrill on palpation good bruit auscultation  Right below-knee amputation  No gross edema in the left side-left leg is warm no apparent lesion      Problem List :         Impression :     End-stage kidney disease on maintenance dialysis-she did tolerate dialysis yesterday  Right leg gangrene status post below-knee amputation  Underlying severe atherosclerotic cardiovascular disease, diabetes and other comorbid disease  Debility and frailty    Recommendation/Plan  :     She is waiting for ARU placement  Keep normoglycemia, watch for iatrogenic nosocomial complication  Dialysis is due tomorrow  Follow clinically otherwise      Ofe Whitehead MD MD

## 2022-11-17 NOTE — CARE COORDINATION
Spoke with patient and her  at bedside regarding discharge planning. CM let them know that patient didn't qualify for ARU and they voiced understanding. CM provided SNF list for them to choose from. They chose [facility] and Swing Bed at [facility]. CM let them know not sure if patient would qualify for Swing Bed and also let them know that [facility] has not been returning calls . The requested for CM to try to make these referrals and they will review SNF list for back up plan if [facility] and [facility] not an option . Phoned [facility] [address] -  answered the phone and laid phone down and never returned to call. CM ended call. Phoned referral to [facility] at [address] and had to leave a .

## 2022-11-17 NOTE — PROGRESS NOTES
Patient told this nurse that \" I am scared to die, they had to narcan me, to bring me back. \" This nurse asked patient if it was a mean overdose, patient looked away from this nurse and said \" I guess not, I don't want to die. \" And started to tear up.

## 2022-11-17 NOTE — PROGRESS NOTES
Comprehensive Nutrition Assessment    Type and Reason for Visit:  Reassess    Nutrition Recommendations/Plan:   Trial Diabetic oral nutrition supplement BID with frozen oral nutrition supplement daily   Continue current diet order  Recommend adequate protein intake at meals with CHO consistently   Encourage adequate hydration   Document all po intakes     Malnutrition Assessment:  Malnutrition Status: At risk for malnutrition (Comment) (11/14/22 1605)    Context:  Acute Illness       Nutrition Assessment:    S/p BKA with IPOP. Remains on carb control 4 diet with low phos. Underwent dialysis. Poor nutritional status at this time. Follow as high nutrition risk. Nutrition Related Findings:    POCT 220 Wound Type: Multiple, Pressure Injury, Unstageable, Surgical Incision, Deep Tissue Injury (multiple: Traumatic wounds, arterial wounds)       Current Nutrition Intake & Therapies:    Average Meal Intake: 26-50%  Average Supplements Intake: Unable to assess  ADULT DIET; Regular; 4 carb choices (60 gm/meal); Low Phosphorus (Less than 1000 mg)  ADULT ORAL NUTRITION SUPPLEMENT; AM Snack, Dinner; Wound Healing Oral Supplement  ADULT ORAL NUTRITION SUPPLEMENT; Dinner; Renal Oral Supplement  ADULT ORAL NUTRITION SUPPLEMENT; Breakfast, Lunch; Frozen Oral Supplement    Anthropometric Measures:  Height: 5' 3\" (160 cm)  Ideal Body Weight (IBW): 115 lbs (52 kg)    Admission Body Weight: 144 lb 6.4 oz (65.5 kg) (11/12)  Current Body Weight: 139 lb 1.8 oz (63.1 kg), 121 % IBW.  Weight Source: Bed Scale  Current BMI (kg/m2): 24.6  Usual Body Weight: 157 lb 10 oz (71.5 kg) (9/20/22)  % Weight Change (Calculated): -11.7  Weight Adjustment For: Amputation  Total Adjusted Percentage (Calculated): 5.9  Adjusted Ideal Body Weight (lbs) (Calculated): 108.2 lbs  Adjusted Ideal Body Weight (kg) (Calculated): 49.18 kg  Adjusted % Ideal Body Weight (Calculated): 128.6  Adjusted BMI (kg/m2) (Calculated): 26.1  BMI Categories: Overweight (BMI 25.0-29. 9)    Estimated Daily Nutrient Needs:  Energy Requirements Based On: Kcal/kg  Weight Used for Energy Requirements: Adjusted  Energy (kcal/day): 9625-0694 (30-35 kcals/kg IBW)  Weight Used for Protein Requirements: Adjusted  Protein (g/day): 59-74 (1.2-1.5 g/kg)  Method Used for Fluid Requirements: Standard Renal  Fluid (ml/day): fluids per nephrology    Nutrition Diagnosis:   Increased nutrient needs related to renal dysfunction, endocrine dysfuntion as evidenced by wounds, dialysis    Nutrition Interventions:   Food and/or Nutrient Delivery: Modify Current Diet, Modify Oral Nutrition Supplement  Nutrition Education/Counseling: No recommendation at this time  Coordination of Nutrition Care: Continue to monitor while inpatient, Feeding Assistance/Environment Change, Coordination of Care  Plan of Care discussed with: pt    Goals:  Previous Goal Met: No Progress toward Goal(s)  Goals: PO intake 50% or greater, by next RD assessment       Nutrition Monitoring and Evaluation:   Behavioral-Environmental Outcomes: None Identified  Food/Nutrient Intake Outcomes: Food and Nutrient Intake, Supplement Intake, Diet Advancement/Tolerance  Physical Signs/Symptoms Outcomes: Biochemical Data, Nutrition Focused Physical Findings, Skin, Weight, Meal Time Behavior    Discharge Planning:    Recommend pursue outpatient nutrition counseling, Continue Oral Nutrition Supplement     Josue Carreon RD, LD  Contact: [phone]

## 2022-11-17 NOTE — PROGRESS NOTES
Physical Therapy    Physical Therapy Treatment Note  Name: Georgette Caicedo MRN: [id number] :   1965   Date:  2022   Admission Date: 2022 Room:  [address] [place] [address]   Restrictions/Precautions:  Restrictions/Precautions  Restrictions/Precautions: Fall Risk, Weight Bearing, General Precautions Position Activity Restriction  Other position/activity restrictions: Ambulate patient at least 50 feet 3 times a day per Dr. Benji Vega Weight Bearing Restrictions  Right Lower Extremity Weight Bearing: Weight Bearing As Tolerated     Communication with other providers:  per nurse ok to tx and gave tylenol at start of tx. Nurse Barak Cortes notified pt agreeable to be up in wc for 1 hr if therapist will return and help her back to bed. Returned to room but nursing had all ready returned pt to bed. Subjective:  Patient states:  agreeable to tx. Pt states \" I don't want to die, I have a lot to live for\". Pt reports her parents   here at hospital.  Pain:   Location, Type, Intensity (0/10 to 10/10):  rates pain 10  Objective:    Observation:  alert and oriented    Treatment, including education/measures:  With bed deflated for transfers  sup to sit mod assist with bed rail needing increased time and effort   Pt transferred sit<=> stedy with min assist and cues with bed elevated to accommodate leg. Pt tranfers to wc and was able to propel wc 100' with sba but needing cues for safety and to manage brakes. Pt dependent for wc set up for transfers and to manage leg rest.  Pt was taken to // bars and was able to amb 3 time with min assist and cues. 4 ft x 2 and 3' x 1. Safety  Patient left safely in the wc, with call light/phone in reach with alarm applied. Gait belt was used for transfers and gait.      Assessment / Impression:      Patient's tolerance of treatment:  good   Adverse Reaction: na  Significant change in status and impact:  na  Barriers to improvement:  pain, strength and safety  Plan for Next Session:    Cont. POC  Time in:  0840  Time out:  0935  Timed treatment minutes:  55  Total treatment time:  54    Previously filed items:  Social/Functional History  Lives With: Spouse (+ AMANDA)  Home Layout: One level  Home Access: Level entry  Bathroom Shower/Tub: Tub/Shower unit  Bathroom Equipment: Tub transfer bench  Home Equipment: Taylor Felder, yomaira (recently ordered a motorizeed w/c)  ADL Assistance: Independent  Ambulation Assistance: Independent (recently with walker since WB restrcition leading up to this hospital admssion. previously idep no AD)  Transfer Assistance: Independent  Additional Comments: Recently her  Nad AMANDA assisting with ADL and mobility as needed since foot wound and WB restrcitions. Historically, pt is fully independnet. Long Term Goals  Time Frame for Long Term Goals :  In one week:  Long Term Goal 1: Pt will complete all bed mobility with CGAx1  Long Term Goal 2: Pt will complete sit <> stand transfers with CGAx1  Long Term Goal 3: Pt will ambulate 40 feet with CGAx1 with LRAD  Long Term Goal 4: Pt will independently complete 3 sets of 10 reps of BLE AROM exercises in available and allowed ROM  Long Term Goal 5: Pt will propel manual w/c 50 feet with CGAx1       Electronically signed by:    Julee Gusman PTA  11/17/2022, 8:30 AM

## 2022-11-17 NOTE — PROGRESS NOTES
POD 6 Right BKA with IPOP. The IPOP was changed on Sunday and her stump looked really good and her flaps are viable. Incision is clean dry and intact and reapplied. No acute issues overnight. Resting comfortably this am  Anemia of chronic disease, Hgb at 8.8, continue to monitor. CAD- on meds  ESRD on HD. Appreciate Nephrology input. HD on MWF  IDDM- maintain tight control of BGL's. On heparin for DVT prophylaxis. PT/OT evaluations done and CM saw pt to start discharge planning process for ARU. ARU can't accept pt due to activity intolerance. Need to seek other options. Stage II sacral pressure ulceration. We will apply Santyl and a Mepitel sacral border. I will be signing out this evening to my partner Dr. Berna Madsen because I am leaving for vacation.

## 2022-11-18 LAB
GLUCOSE BLD-MCNC: 141 MG/DL (ref 70–99)
GLUCOSE BLD-MCNC: 238 MG/DL (ref 70–99)
GLUCOSE BLD-MCNC: 270 MG/DL (ref 70–99)

## 2022-11-18 PROCEDURE — 82962 GLUCOSE BLOOD TEST: CPT

## 2022-11-18 PROCEDURE — 97112 NEUROMUSCULAR REEDUCATION: CPT

## 2022-11-18 PROCEDURE — 90935 HEMODIALYSIS ONE EVALUATION: CPT

## 2022-11-18 PROCEDURE — 97530 THERAPEUTIC ACTIVITIES: CPT

## 2022-11-18 PROCEDURE — 6360000002 HC RX W HCPCS: Performed by: SURGERY

## 2022-11-18 PROCEDURE — 94761 N-INVAS EAR/PLS OXIMETRY MLT: CPT

## 2022-11-18 PROCEDURE — 1200000000 HC SEMI PRIVATE

## 2022-11-18 PROCEDURE — 6360000002 HC RX W HCPCS: Performed by: INTERNAL MEDICINE

## 2022-11-18 PROCEDURE — 6370000000 HC RX 637 (ALT 250 FOR IP): Performed by: SURGERY

## 2022-11-18 PROCEDURE — 6370000000 HC RX 637 (ALT 250 FOR IP): Performed by: INTERNAL MEDICINE

## 2022-11-18 PROCEDURE — 2580000003 HC RX 258: Performed by: SURGERY

## 2022-11-18 RX ORDER — OXYCODONE HYDROCHLORIDE AND ACETAMINOPHEN 5; 325 MG/1; MG/1
1 TABLET ORAL EVERY 8 HOURS PRN
Status: DISCONTINUED | OUTPATIENT
Start: 2022-11-18 | End: 2022-11-18

## 2022-11-18 RX ORDER — OXYCODONE HYDROCHLORIDE AND ACETAMINOPHEN 5; 325 MG/1; MG/1
1 TABLET ORAL EVERY 8 HOURS PRN
Status: DISCONTINUED | OUTPATIENT
Start: 2022-11-18 | End: 2022-11-19 | Stop reason: HOSPADM

## 2022-11-18 RX ADMIN — PANTOPRAZOLE SODIUM 40 MG: 40 TABLET, DELAYED RELEASE ORAL at 06:12

## 2022-11-18 RX ADMIN — CARVEDILOL 6.25 MG: 6.25 TABLET, FILM COATED ORAL at 09:05

## 2022-11-18 RX ADMIN — CARVEDILOL 6.25 MG: 6.25 TABLET, FILM COATED ORAL at 00:08

## 2022-11-18 RX ADMIN — IRON SUCROSE 50 MG: 20 INJECTION, SOLUTION INTRAVENOUS at 15:03

## 2022-11-18 RX ADMIN — SODIUM CHLORIDE, PRESERVATIVE FREE 10 ML: 5 INJECTION INTRAVENOUS at 20:11

## 2022-11-18 RX ADMIN — HEPARIN SODIUM 5000 UNITS: 5000 INJECTION INTRAVENOUS; SUBCUTANEOUS at 06:12

## 2022-11-18 RX ADMIN — COLLAGENASE SANTYL: 250 OINTMENT TOPICAL at 18:27

## 2022-11-18 RX ADMIN — INSULIN LISPRO 8 UNITS: 100 INJECTION, SOLUTION INTRAVENOUS; SUBCUTANEOUS at 13:51

## 2022-11-18 RX ADMIN — SODIUM CHLORIDE, PRESERVATIVE FREE 5 ML: 5 INJECTION INTRAVENOUS at 00:12

## 2022-11-18 RX ADMIN — OXYCODONE AND ACETAMINOPHEN 1 TABLET: 5; 325 TABLET ORAL at 18:34

## 2022-11-18 RX ADMIN — EPOETIN ALFA-EPBX 3000 UNITS: 3000 INJECTION, SOLUTION INTRAVENOUS; SUBCUTANEOUS at 15:03

## 2022-11-18 RX ADMIN — OXYCODONE AND ACETAMINOPHEN 1 TABLET: 5; 325 TABLET ORAL at 09:05

## 2022-11-18 RX ADMIN — HEPARIN SODIUM 5000 UNITS: 5000 INJECTION INTRAVENOUS; SUBCUTANEOUS at 00:08

## 2022-11-18 RX ADMIN — HEPARIN SODIUM 5000 UNITS: 5000 INJECTION INTRAVENOUS; SUBCUTANEOUS at 13:52

## 2022-11-18 RX ADMIN — ACETAMINOPHEN 650 MG: 325 TABLET ORAL at 21:31

## 2022-11-18 RX ADMIN — EPOETIN ALFA-EPBX 2000 UNITS: 2000 INJECTION, SOLUTION INTRAVENOUS; SUBCUTANEOUS at 15:03

## 2022-11-18 RX ADMIN — HEPARIN SODIUM 5000 UNITS: 5000 INJECTION INTRAVENOUS; SUBCUTANEOUS at 21:34

## 2022-11-18 ASSESSMENT — PAIN DESCRIPTION - ONSET: ONSET: ON-GOING

## 2022-11-18 ASSESSMENT — PAIN DESCRIPTION - PAIN TYPE: TYPE: ACUTE PAIN

## 2022-11-18 ASSESSMENT — PAIN DESCRIPTION - ORIENTATION
ORIENTATION: RIGHT;LEFT
ORIENTATION: MID
ORIENTATION: LEFT

## 2022-11-18 ASSESSMENT — PAIN - FUNCTIONAL ASSESSMENT
PAIN_FUNCTIONAL_ASSESSMENT: PREVENTS OR INTERFERES SOME ACTIVE ACTIVITIES AND ADLS
PAIN_FUNCTIONAL_ASSESSMENT: ACTIVITIES ARE NOT PREVENTED

## 2022-11-18 ASSESSMENT — PAIN SCALES - GENERAL
PAINLEVEL_OUTOF10: 8
PAINLEVEL_OUTOF10: 3
PAINLEVEL_OUTOF10: 7
PAINLEVEL_OUTOF10: 7
PAINLEVEL_OUTOF10: 6
PAINLEVEL_OUTOF10: 5
PAINLEVEL_OUTOF10: 7

## 2022-11-18 ASSESSMENT — PAIN DESCRIPTION - LOCATION
LOCATION: COCCYX
LOCATION: LEG
LOCATION: LEG

## 2022-11-18 ASSESSMENT — PAIN DESCRIPTION - DESCRIPTORS
DESCRIPTORS: ACHING;SHARP;STABBING
DESCRIPTORS: THROBBING;ACHING
DESCRIPTORS: THROBBING

## 2022-11-18 ASSESSMENT — PAIN DESCRIPTION - FREQUENCY: FREQUENCY: CONTINUOUS

## 2022-11-18 NOTE — PROGRESS NOTES
Patient Name: Ki Dahl  Patient : 1965  MRN: [id number]     Acct: [de-identified]  Date of Admission: 2022  Room/Bed: 1121/1121-A  Code Status:  Full Code  Allergies:    Allergies   Allergen Reactions    Latex     Codeine     Cortisone     Cortizone     Iodides     Phenobarbital Other (See Comments)     Hallucinations     Vancomycin Other (See Comments)     \"makes me very sick\"     Diagnosis:    Patient Active Problem List   Diagnosis    CAD (coronary artery disease)    Hyperlipidemia    Diabetes mellitus ([facility])    Chest pain    Hoarseness of voice    Fracture of metacarpal bone    Wrist sprain    History of tobacco abuse    Wrist pain    Carpal tunnel syndrome    Cubital tunnel syndrome    Trigger finger    S/P CABG x 4    Diabetic foot ulcer ([facility])    Diabetic neuropathy ([address]    Diabetes mellitus type 2 with complications ([facility])    Claudication of both lower extremities ([facility])    SOB (shortness of breath)    Bilateral leg edema    SOB (shortness of breath)    Type 2 diabetes mellitus without complication (HCC)    Coronary artery disease involving coronary bypass graft of native heart with unstable angina pectoris ([facility])    S/P cardiac cath    Chronic renal failure, stage 2 (mild)    Chronic kidney disease (CKD) stage G4/A3, severely decreased glomerular filtration rate (GFR) between 15-29 mL/min/1.73 square meter and albuminuria creatinine ratio greater than 300 mg/g ([facility])    DM (diabetes mellitus) ([facility])    Coronary atherosclerosis    HTN (hypertension)    Proteinuria    Callus of foot    Non compliance with medical treatment    Diabetic ulcer of left foot associated with type 2 diabetes mellitus, with fat layer exposed ([address]    Decubitus ulcer of sacral region, stage 1    Chronic renal failure, stage 4 (severe) ([facility])    Problem with dialysis access [facility])    WD-Presence of surgical incision    Diabetic ulcer of right heel associated with type 2 diabetes mellitus, with necrosis of bone ([address]    Acute pain of right foot    Wound, open, foot with complication, left, sequela    ESRD on hemodialysis (HCC)    Ulcer of right heel, with necrosis of bone ([address]    Diabetic ulcer of right midfoot associated with type 2 diabetes mellitus, with necrosis of bone ([address]         Treatment:  Hemodilaysis 2:1  Priority: Routine  Location: Acute Room    Diabetic: Yes  NPO: No  Isolation Precautions: Contact     Consent for Treatment Verified: Yes  Blood Consent Verified: Not Applicable     Safety Verified: Identify (I), Consent (C), Equipment (E), HepB Status (B), Orders Complete (O), Access Verified (A), and Timeliness (T)  Time out performed prior to access at 1407 hours. Report Received from Primary RN at 1400 hours.   Primary RN (First Initial, Last Name, Title): Dee Min RN  Incapacitated Nurse Education Completed: Not Applicable     HBsAg ONLY:  Date Drawn: October 31, 2022       Results: Negative  HBsAb:  Date Drawn:  October 31, 2022       Results: Unknown    Order  Dialysis Bath  K+ (Potassium): 2  Ca+ (Calcium): 2.5  Na+ (Sodium): 138  HCO3 (Bicarb): 30  Bicarbonate Concentrate Lot No.: [id number]  Acid Concentrate Lot No.: [id number]     Na+ Modeling: Not Applicable  Dialyzer: [id number]  Dialysate Temperature (C):  35  Blood Flow Rate (BFR):  350   Dialysate Flow Rate (DFR):   700        Access to be Utilized   Access: AVF  Location: Upper Extremity  Side: Right   Needle gauge:  16  + Bruit/Thrill: Yes    First Use X-ray Verified: Not Applicable  OK to use line order: Not Applicable    Site Assessment:  Signs and Symptoms of Infection/Inflammation: None  If yes: Not Applicable  Dressing: Dry and Intact  Site Prep: Medical Aseptic Technique  Dressing Changed this Treatment: Yes    Comment:    Flows: Good, Patent  If access problem, who was notified:     Pre and Post-Assessment  Patient Vitals for the past 8 hrs:   Level of Consciousness Oriented X Heart Rhythm Respiratory Quality/Effort O2 Device Bilateral Breath Sounds Skin Color Skin Condition/Temp Abdomen Inspection Bowel Sounds (All Quadrants) Edema Pain Level   11/18/22 0945 0 -- -- -- None (Room air) -- -- -- -- -- -- --   11/18/22 1033 -- -- -- -- None (Room air) -- -- -- -- -- -- --   11/18/22 1343 -- -- -- -- None (Room air) -- -- -- -- -- -- --   11/18/22 1405 0 4 Regular Unlabored None (Room air) Clear;Diminished Pale Dry; Warm Soft Active Right lower extremity; Left lower extremity 6   11/18/22 1700 0 4 Regular Unlabored None (Room air) Clear;Diminished Pale Dry; Warm Soft Active Right lower extremity; Left lower extremity 7     Labs  Recent Labs     11/16/22  1052   WBC 15.2*   HGB 8.8*   HCT 28.7*                                                                     No results for input(s): NA, K, CL, CO2, BUN, CREATININE, GLUCOSE in the last 72 hours. Invalid input(s):  CA,  PHOS  IV Drips and Rate/Dose   sodium chloride      dextrose        Safety - Before each treatment:   Dialysis Machine No.: [id number]   Machine Number: [id number]  Dialyzer Lot No.: [id number]  RO Machine Log Sheet Completed: Yes  Machine Alarm Self Test: Completed; Passed (11/18/22 1405)     Air Foam Detector: Tested, Proper Function, pH Reading  Extracorporeal Circuit Tested for Integrity: Yes  Machine Conductivity: 13.8  Manual Conductivity: 13.8     Bicarbonate Concentrate Lot No.: [id number]  Acid Concentrate Lot No.: [id number]  Manual Ph: 7.4  Bleach Test (Neg): Yes  Bath Temperature: 95 °F (35 °C)  Tubing Lot#: [id number]  Conductivity Meter Serial #: [id number]  All Connections Secure?: Yes  Venous Parameters Set?: Yes  Arterial Parameters Set?: Yes  Saline Line Double Clamped?: Yes  Air Foam Detector Engaged?: Yes  Machine Functioning Alarm Free?  Yes  Prime Given: 200ml    Chlorine Testing - Before each treatment and every 4 hours:   Treatment  Treatment Number: 1  Time On: 1300  Time Off: 6781  Treatment Goal: 2.5  Weight: 127 lb 9.6 oz (57.9 kg) (11/18/22 1405)  1st check: less than 0.1 ppm at: 1350 hours  2nd check: less than 0.1 ppm at: NA  3rd check: Not Applicable  (if greater than 0.1 ppm, then check every 30 minutes from secondary)    Access Flows and Pressures  Patient Vitals for the past 8 hrs:   Blood Flow Rate (ml/min) Ultrafiltration Rate (ml/hr) Arterial Pressure (mmHg) Venous Pressure (mmHg) TMP DFR Comments Access Visible   11/18/22 1417 350 ml/min 800 ml/hr -160 mmHg 230 90 700 tx initiated Yes   11/18/22 1430 350 ml/min 800 ml/hr -170 mmHg 240 90 700 talking on phone Yes   11/18/22 1446 350 ml/min 800 ml/hr -170 mmHg 250 90 700 on phone Yes   11/18/22 1500 350 ml/min 800 ml/hr -190 mmHg 220 90 700 no distress Yes   11/18/22 1515 350 ml/min 800 ml/hr -190 mmHg 220 90 700 uf paused Yes   11/18/22 1530 300 ml/min 0 ml/hr -130 mmHg 200 90 700 pt repositiomed Yes   11/18/22 1545 300 ml/min 0 ml/hr -140 mmHg 200 80 700 no distress Yes   11/18/22 1600 300 ml/min 0 ml/hr -140 mmHg 200 80 700 resting with eyes closed Yes   11/18/22 1615 300 ml/min 0 ml/hr -140 mmHg 210 80 700 denies needs Yes   11/18/22 1630 300 ml/min 0 ml/hr -140 mmHg 200 80 700 no distress Yes   11/18/22 1647 -- -- -- -- -- -- tx ended Yes     Vital Signs  Patient Vitals for the past 8 hrs:   BP Temp Pulse Resp SpO2 Weight Percent Weight Change   11/18/22 0945 (!) 90/53 97.6 °F (36.4 °C) 83 16 99 % -- --   11/18/22 0956 (!) 88/48 -- -- -- -- -- --   11/18/22 1000 (!) 87/51 -- -- -- -- -- --   11/18/22 1015 (!) 98/45 -- -- -- -- -- --   11/18/22 1030 (!) 83/48 -- -- -- -- -- --   11/18/22 1033 -- -- -- -- 98 % -- --   11/18/22 1045 93/68 -- -- -- -- -- --   11/18/22 1100 (!) 85/58 -- -- -- -- -- --   11/18/22 1343 (!) 114/59 98 °F (36.7 °C) 78 18 98 % -- --   11/18/22 1405 118/66 -- 70 -- -- 127 lb 9.6 oz (57.9 kg) -8.27   11/18/22 1417 122/61 98 °F (36.7 °C) 73 18 98 % -- --   11/18/22 1430 (!) 98/56 -- 80 -- -- -- --   11/18/22 1446 109/65 -- 80 -- -- -- --   11/18/22 1500 (!) 86/51 -- 79 -- -- -- --   11/18/22 1515 (!) 83/49 -- 70 -- -- -- --   11/18/22 1530 (!) 78/50 -- 70 -- -- -- --   11/18/22 1545 (!) 84/50 -- 70 -- -- -- --   11/18/22 1600 (!) 74/55 -- 68 -- -- -- --   11/18/22 1615 (!) 90/49 -- 69 -- -- -- --   11/18/22 1630 (!) 87/59 -- 68 -- -- -- --   11/18/22 1647 (!) 83/48 -- 68 -- -- -- --   11/18/22 1700 133/72 97.9 °F (36.6 °C) 68 18 98 % -- --     Post-Dialysis  Arterial Catheter Locking Solution: Not Applicable  Venous Catheter Locking Solution: Not Applicable  Post-Treatment Procedures: Blood returned, Access bleeding time < 10 minutes  Machine Disinfection Process: Acid/Vinegar Clean, Heat Disinfect, Exterior Machine Disinfection  Rinseback Volume (ml): 300 ml  Blood Volume Processed (Liters): 45.6 l/min  Dialyzer Clearance: Moderately streaked  Duration of Treatment (minutes): 1150 minutes     Hemodialysis Intake (ml): 500 ml  Hemodialysis Output (ml): 800 ml     Tolerated Treatment: Poor  Patient Response to Treatment: well  Physician Notified: No       Provider Notification        Handoff complete and report given to Primary RN at 1710 hours.   Primary RN (First Initial, Last Name, Title):  Markell Estevez RN     Education  Person Educated: Patient   Knowledge Base: Substantial  Barriers to Learning?: None  Preferred method of Learning: Oral  Topic(s): Access Care, Signs and Symptoms of Infection, and Procedural   Teaching Tools: Explanation   Response to Education: Verbalized Understanding     Electronically signed by Alyx Melvin RN on 11/18/2022 at 5:11 PM

## 2022-11-18 NOTE — CARE COORDINATION
Received return call from patient's daughter Dot Lovett. CM let her know was trying to reach patient's  [facility] for SNF choice but unable to reach due to wrong phone # . Dot Lovett provided correct phone # for [facility] [phone]. Dot Lovett stated that when she spoke with patient this am patient stated SNF choice for one on [address] and another on [address] but was not sure of the names of the facilities. Dot Lovett stated probably better to f/u with patient regarding SNF choice since patient's  (her father) had a stroke a yr ago.   Case Management to follow with patient for SNF choice

## 2022-11-18 NOTE — PLAN OF CARE
Problem: Chronic Conditions and Co-morbidities  Goal: Patient's chronic conditions and co-morbidity symptoms are monitored and maintained or improved  Outcome: Progressing     Problem: Discharge Planning  Goal: Discharge to home or other facility with appropriate resources  Outcome: Progressing     Problem: Pain  Goal: Verbalizes/displays adequate comfort level or baseline comfort level  Outcome: Progressing     Problem: Safety - Adult  Goal: Free from fall injury  Outcome: Progressing     Problem: Skin/Tissue Integrity  Goal: Absence of new skin breakdown  Description: 1. Monitor for areas of redness and/or skin breakdown  2. Assess vascular access sites hourly  3. Every 4-6 hours minimum:  Change oxygen saturation probe site  4. Every 4-6 hours:  If on nasal continuous positive airway pressure, respiratory therapy assess nares and determine need for appliance change or resting period.   Outcome: Progressing     Problem: Nutrition Deficit:  Goal: Optimize nutritional status  Outcome: Progressing  Flowsheets (Taken 11/17/2022 1621 by Dana Degroot, KARSON, LD)  Nutrient intake appropriate for improving, restoring, or maintaining nutritional needs: Monitor oral intake, labs, and treatment plans

## 2022-11-18 NOTE — CARE COORDINATION
Spoke with Kamila from [facility]. She stated that she has been following patient for signs of improvement but added that patient had poor activity tolerance with therapy today and is recommending SNF level of rehab at this time. Patient currently off the floor. Phoned patient's  Verneice Payment to discuss SNF choice. Phoned was answered but was told wrong # .  Phoned daughter Dewayne Numbers and had to leave a VM

## 2022-11-18 NOTE — PROGRESS NOTES
Physical Therapy Treatment Note  Name: Thu Durbin MRN: [id number] :   1965   Date:  2022   Admission Date: 2022 Room:  [address] [place] [address]   Restrictions/Precautions:  Restrictions/Precautions  Restrictions/Precautions: Fall Risk, Weight Bearing, General Precautions Position Activity Restriction  Other position/activity restrictions: Ambulate patient at least 50 feet 3 times a day per Dr. Lucero Renteria Weight Bearing Restrictions  Right Lower Extremity Weight Bearing: Weight Bearing As Tolerated    Communication with other providers:  Communication with PTA about previous session, Pt okay to see for therapy per RN reports pt just had pn meds. Continual communication with RN about pt vitals, RN reports symptoms may be from pn meds. Subjective:  Patient states:  \"What are we going to be doing today? \", \"I don't think I can do this\". Pain:   Location, Type, Intensity (0/10 to 10/10): Pt reports pn with movement but does not give numerical value when questioned, seated on commode pn reports \"Right now I'm fine\" when questioned about pn.   Objective:    Observation:  Pt in bed with HOB elevated upon arrival   Objective Measures:  BP seated in w/c 90/52, in recliner 88/48, 5 min in recliner 87/51, 10 min in recliner 92/47, last BP taken 87/51 (58). RN aware and communicating with physician. Treatment, including education/measures:    Therapeutic Activity Training:   Therapeutic activity training was instructed today. Cues were given for safety, sequence, UE/LE placement, awareness, and balance. Activities performed today included bed mobility training, sup-sit, sit-stand with Stedy, squat-pivot transfer. Mobility:  Sup > sit: Mod HHA to assume siting position, Min A at R LE to advance to side of bed. Sit > stand Stedy: From bed CGA with cues for initiation and positioning. Squat-pivot: w/c <> commode, w/c > recliner Max A with cues for UE placement and initiation.      Balance: Sitting: Good, able to maintain without UE support. Standing: Poor, pt requires B UE support to maintain balance at [facility]. Pt transferred to w/c with stedy with intent to ambulate at parallel bars. Upon arrival to bars, pt reports she needs to use the commode. Pt returned to room and transferred to commode. Pt had bowel movement and then became symptomatic with c/o dizziness, sweating, and hot flash. Pt quickly transferred from commode > w/c > to recliner per pt request. Foot of recliner elevation and head of recliner lowered. RN notified and vitals monitored. Pt positioned for comfort in recliner to offload weight from sacrum. Encouraged pt to hydrate and continue to move LEs to improve BP. Pt left safely with call light in reach and RN aware and closely monitoring. Assessment / Impression:    Pt vitals unstable today limiting therapy progression. Patient's tolerance of treatment:  poor   Adverse Reaction: Hypotensive   Significant change in status and impact:  none  Barriers to improvement:  Strength, endurance, self limiting, vitals  Plan for Next Session:    Plan to continue per POC when vitals stabilize. Time in:  0912  Time out:  1010  Timed treatment minutes:  58  Total treatment time:  62    Previously filed items:  Social/Functional History  Lives With: Spouse (+ AMANDA)  Home Layout: One level  Home Access: Level entry  Bathroom Shower/Tub: Tub/Shower unit  Bathroom Equipment: Tub transfer bench  Home Equipment: Medicast, rolling (recently ordered a motorizeed w/c)  ADL Assistance: Independent  Ambulation Assistance: Independent (recently with walker since WB restrcition leading up to this hospital admssion. previously idep no AD)  Transfer Assistance: Independent  Additional Comments: Recently her  Nad AMANDA assisting with ADL and mobility as needed since foot wound and WB restrcitions. Historically, pt is fully independnet. Long Term Goals  Time Frame for Long Term Goals :  In one week:  Long Term Goal 1: Pt will complete all bed mobility with CGAx1  Long Term Goal 2: Pt will complete sit <> stand transfers with CGAx1  Long Term Goal 3: Pt will ambulate 40 feet with CGAx1 with LRAD  Long Term Goal 4: Pt will independently complete 3 sets of 10 reps of BLE AROM exercises in available and allowed ROM  Long Term Goal 5: Pt will propel manual w/c 50 feet with CGAx1       Electronically signed by:    Gi Malin, PTA  11/18/2022, 9:34 AM

## 2022-11-18 NOTE — PROGRESS NOTES
Nephrology Progress Note  11/18/2022 7:07 AM        Subjective:   Admit Date: 11/11/2022  PCP: Elvis Brantley MD    Interval History: Mental status back to normal  Complaining of stomach pain and asking for Percocet    Diet: Better    ROS: Right stump pain  No shortness of breath or confusion  No fever and acceptable blood pressure    Data:     Current meds:    iron sucrose  50 mg IntraVENous Once in dialysis    epoetin francisco-epbx  2,000 Units IntraVENous Once in dialysis    And    epoetin francisco-epbx  3,000 Units IntraVENous Once in dialysis    collagenase   Topical Every Other Day    insulin lispro  0-16 Units SubCUTAneous TID WC    insulin lispro  0-4 Units SubCUTAneous Nightly    sodium chloride flush  5-40 mL IntraVENous 2 times per day    heparin (porcine)  5,000 Units SubCUTAneous 3 times per day    carvedilol  6.25 mg Oral BID    [Held by provider] lisinopril  20 mg Oral Daily    pantoprazole  40 mg Oral QAM AC      sodium chloride      dextrose           No intake/output data recorded. CBC:   Recent Labs     11/16/22  1052   WBC 15.2*   HGB 8.8*           No results for input(s): NA, K, CL, CO2, BUN, CREATININE, GLUCOSE in the last 72 hours. Lab Results   Component Value Date    CALCIUM 7.8 (L) 11/14/2022    CALCIUM 7.8 (L) 11/14/2022    PHOS 9.0 (H) 11/14/2022       Objective:     Vitals: /78   Pulse 78   Temp 98.4 °F (36.9 °C) (Oral)   Resp 16   Ht 5' 3\" (1.6 m)   Wt 139 lb 1.8 oz (63.1 kg)   SpO2 95%   BMI 24.64 kg/m² ,    General appearance: Alert, awake and oriented  HEENT: Positive conjunctival pallor  Neck: Supple  Lungs:  Few rhonchi no crackles  Heart: Regular rate and rhythm  Abdomen: Soft, nontender  Extremities: Right below-knee amputation, no leg edema  Left upper extremity brachiocephalic fistula with good thrill on palpation good bruit auscultation      Problem List :         Impression :     End-stage kidney disease on maintenance dialysis-she will get dialysis now  Gangrene of right leg status post BKA some stump pain  Cardiovascular disease, diabetes, hypertension and other chronic disease    Recommendation/Plan  :     I will discuss with , reasonable to try small dose of pain medicine-while watching her respiratory status and other symptoms-she had been on pain medicine for a long time-likely withdrawing some  Also make sure there is no infection at this time  Dialysis treated ultrafiltration  And erythropoiesis stimulating agent and IV iron  Follow clinically and hopefully discharge soon      Maxx Sharma MD MD

## 2022-11-19 VITALS
TEMPERATURE: 99 F | HEIGHT: 63 IN | SYSTOLIC BLOOD PRESSURE: 120 MMHG | BODY MASS INDEX: 23.92 KG/M2 | OXYGEN SATURATION: 97 % | HEART RATE: 76 BPM | DIASTOLIC BLOOD PRESSURE: 59 MMHG | RESPIRATION RATE: 18 BRPM | WEIGHT: 135 LBS

## 2022-11-19 LAB — GLUCOSE BLD-MCNC: 169 MG/DL (ref 70–99)

## 2022-11-19 PROCEDURE — 6370000000 HC RX 637 (ALT 250 FOR IP): Performed by: INTERNAL MEDICINE

## 2022-11-19 PROCEDURE — 94761 N-INVAS EAR/PLS OXIMETRY MLT: CPT

## 2022-11-19 PROCEDURE — 6360000002 HC RX W HCPCS: Performed by: SURGERY

## 2022-11-19 PROCEDURE — 6370000000 HC RX 637 (ALT 250 FOR IP): Performed by: SURGERY

## 2022-11-19 PROCEDURE — 82962 GLUCOSE BLOOD TEST: CPT

## 2022-11-19 RX ORDER — OXYCODONE HYDROCHLORIDE AND ACETAMINOPHEN 5; 325 MG/1; MG/1
1 TABLET ORAL EVERY 4 HOURS PRN
Qty: 40 TABLET | Refills: 0 | Status: SHIPPED | OUTPATIENT
Start: 2022-11-19 | End: 2022-11-26

## 2022-11-19 RX ADMIN — ACETAMINOPHEN 650 MG: 325 TABLET ORAL at 04:16

## 2022-11-19 RX ADMIN — OXYCODONE AND ACETAMINOPHEN 1 TABLET: 5; 325 TABLET ORAL at 02:36

## 2022-11-19 RX ADMIN — HEPARIN SODIUM 5000 UNITS: 5000 INJECTION INTRAVENOUS; SUBCUTANEOUS at 05:33

## 2022-11-19 RX ADMIN — PANTOPRAZOLE SODIUM 40 MG: 40 TABLET, DELAYED RELEASE ORAL at 05:33

## 2022-11-19 RX ADMIN — OXYCODONE AND ACETAMINOPHEN 1 TABLET: 5; 325 TABLET ORAL at 10:15

## 2022-11-19 ASSESSMENT — PAIN DESCRIPTION - DESCRIPTORS
DESCRIPTORS: ACHING;THROBBING;STABBING
DESCRIPTORS: STABBING
DESCRIPTORS: ACHING;THROBBING;STABBING

## 2022-11-19 ASSESSMENT — PAIN SCALES - GENERAL
PAINLEVEL_OUTOF10: 8
PAINLEVEL_OUTOF10: 10
PAINLEVEL_OUTOF10: 8
PAINLEVEL_OUTOF10: 8
PAINLEVEL_OUTOF10: 5
PAINLEVEL_OUTOF10: 10

## 2022-11-19 ASSESSMENT — PAIN DESCRIPTION - ONSET
ONSET: ON-GOING

## 2022-11-19 ASSESSMENT — PAIN DESCRIPTION - ORIENTATION
ORIENTATION: LEFT

## 2022-11-19 ASSESSMENT — PAIN - FUNCTIONAL ASSESSMENT
PAIN_FUNCTIONAL_ASSESSMENT: ACTIVITIES ARE NOT PREVENTED

## 2022-11-19 ASSESSMENT — PAIN DESCRIPTION - FREQUENCY
FREQUENCY: CONTINUOUS

## 2022-11-19 ASSESSMENT — PAIN DESCRIPTION - PAIN TYPE
TYPE: ACUTE PAIN

## 2022-11-19 ASSESSMENT — PAIN DESCRIPTION - LOCATION
LOCATION: LEG

## 2022-11-19 NOTE — CARE COORDINATION
LSW noted pt has dc orders. LSW spoke to pt on the phone concerning dc orders. Pt reported she is aware of dc orders, and plans on going to her niece's home for recovery. Pt reported her niece used to work in a nursing home and is capable of helping care for her during recovery. Pt reported her niece will be providing transportation to outpatient PT/OT and HD. Pt reported she is borrowing a motorized wc from a friend, while she waits for her wc to be delivered. Pt already owns a bedside commode and denies further DME needs. LSW reviewed PT/OT recs for SNF. Pt declined need for SNF at this time. LSW advised once dc'd home, it can be a very difficult transition from community to SNF, likely warranting an ED visit. Pt voiced verbal understanding and denied questions. Pt  confirmed having transportation home.   Electronically signed by NABOR Cameron on 11/19/2022 at 10:15 AM

## 2022-11-19 NOTE — PROGRESS NOTES
Nephrology Progress Note  11/19/2022 7:47 AM        Subjective:   Admit Date: 11/11/2022  PCP: Nora Duran MD    Interval History: Complain of left leg pain, also wondering whether she can go to her niece's home who is an [facility]    Diet: Reasonable    ROS: Left leg pain along with stump pain  Acceptable blood pressure and no fever    Data:     Current meds:    collagenase   Topical Every Other Day    insulin lispro  0-16 Units SubCUTAneous TID WC    insulin lispro  0-4 Units SubCUTAneous Nightly    sodium chloride flush  5-40 mL IntraVENous 2 times per day    heparin (porcine)  5,000 Units SubCUTAneous 3 times per day    pantoprazole  40 mg Oral QAM AC      sodium chloride      dextrose           I/O last 3 completed shifts: In: 500   Out: 800     CBC:   Recent Labs     11/16/22  1052   WBC 15.2*   HGB 8.8*           No results for input(s): NA, K, CL, CO2, BUN, CREATININE, GLUCOSE in the last 72 hours.     Lab Results   Component Value Date    CALCIUM 7.8 (L) 11/14/2022    CALCIUM 7.8 (L) 11/14/2022    PHOS 9.0 (H) 11/14/2022       Objective:     Vitals: BP (!) 120/59   Pulse 76   Temp 99 °F (37.2 °C) (Oral)   Resp 18   Ht 5' 3\" (1.6 m)   Wt 135 lb (61.2 kg)   SpO2 97%   BMI 23.91 kg/m² ,    General appearance: Thin, alert, awake and oriented  HEENT: At least 2+ conjunctival pallor  Neck: Supple, she is edentulous  Lungs: Coarse breath sound on posterior auscultation  Heart: Seemed regular rate and rhythm, well-healed incision from previous sternotomy  Abdomen: Soft, nontender  Extremities: Right below-knee amputation-left great toe gangrene  Left upper extremity AV fistula good thrill on palpation good bruit auscultation      Problem List :         Impression :     End-stage kidney disease on maintenance dialysis-she was dialyzed yesterday well  Atherosclerotic cardiovascular disease particularly PAD status post right below-knee amputation but also had left leg arterial disease with peak toe

## 2022-11-19 NOTE — PROGRESS NOTES
Physical Therapy Treatment Note  Name: Georgette Caicedo MRN: [id number] :   1965   Date:  2022   Admission Date: 2022 Room:  [facility]     ATTEMPT   Attempt at bedside at 1048. Pt reports she is going home and does not want to participate in therapy this date. Pt asked if she has any further questions concerning therapy this date, pt denies.      Electronically signed by:    Nirav Brock, MIGUELANGEL  2022, 11:00 AM

## 2022-11-19 NOTE — PROGRESS NOTES
Progress Note    Subjective:      Ki Dahl  Patient wants to go home with her niece who is a healthcare provider and can get her to physical therapy and dialysis. She is also complaining of pain only getting 1 Percocet every 8 hours. Her prosthesis was changed yesterday and there were no reported wound complications.   Objective:     BP (!) 120/59   Pulse 76   Temp 99 °F (37.2 °C) (Oral)   Resp 18   Ht 5' 3\" (1.6 m)   Wt 135 lb (61.2 kg)   SpO2 97%   BMI 23.91 kg/m²     In: 500   Out: 800          General: Awake and alert and appropriate  Abdomen: Soft  Lungs: Clear  Other: Leg prosthesis intact    Labs:   CBC:   Lab Results   Component Value Date/Time    WBC 15.2 11/16/2022 10:52 AM    RBC 2.84 11/16/2022 10:52 AM    HGB 8.8 11/16/2022 10:52 AM    HCT 28.7 11/16/2022 10:52 AM    .1 11/16/2022 10:52 AM    MCH 31.0 11/16/2022 10:52 AM    MCHC 30.7 11/16/2022 10:52 AM    RDW 14.5 11/16/2022 10:52 AM     11/16/2022 10:52 AM    MPV 9.8 11/16/2022 10:52 AM        Assessment:     Status post right below-knee amputation     Plan:   Discharge home with nieces care per patient request  Percocet prescribed for pain  Resume other medications at home prior to admission  Patient will  from prosthetics for dressing change  Return to see Dr. Tish Cruz in 10 days

## 2022-11-19 NOTE — PROGRESS NOTES
Patient inquired if it is okay for her to be discharged to her niece's home who is a licensed STNA. She stated that her niece will bring her to dialysis and physical therapy sessions. This nurse instructed patient to discuss above plan with case management.

## 2022-11-28 NOTE — DISCHARGE SUMMARY
[address], [address] [place] [address]                               DISCHARGE SUMMARY    PATIENT NAME: Ant Michaels                   :        1965  MED REC NO:   [id number]                          ROOM:  ACCOUNT NO:   [de-identified]                           ADMIT DATE: 2022  PROVIDER:     Domenica Sicard, MD                  DISCHARGE DATE:  2022    DISCHARGE SUMMARY    PROCEDURES PERFORMED DURING THIS HOSPITALIZATION WHICH OCCURRED ON  2022:  Right below-the-knee amputation with an immediate postop  prosthesis. CONSULTATIONS DURING THIS HOSPITALIZATION:  Dr. Marisel Salter, Nephrology, to  manage her hemodialysis. ADMISSION DIAGNOSES:  1. 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 2.  End-stage renal artery disease, on hemodialysis. 3.  Coronary artery disease. 4.  Gastroesophageal reflux disease. 5.  COPD. 6.  Insulin-dependent diabetes mellitus. 7.  Hyperlipidemia. CONDITION ON DISCHARGE:  Surgically and medically stable. HOSPITAL COURSE:  The patient is a 80-year-old  female who I  know very well who has insulin-dependent diabetes mellitus and multiple  comorbidities. She then developed a very severe diabetic right heel  ulceration with necrosis to the muscle fascia and then eventually also  into the calcaneal and callus area. I did have to perform bedside  debridements on this poor lady and then unfortunately, her foot is no  longer salvageable. She had already been seen by Dr. Kenney Stockton from a  vascular standpoint with interventions. Prior to her amputation, I did  feel that we had optimized her arterial outflow to that right lower  extremity.   Due to worsening gangrenous changes and concerns that she  would eventually get septic, I then recommended a right below-the-knee  amputation with immediate postop prosthesis. That procedure was  performed on the day stated as above. She then was transferred down to  [facility] in satisfactory surgical and medical conditions. During her  first immediate postop prosthesis change, her flaps were viable. The  incision was intact with no bruising. During this hospitalization, she  was seen by Dr. Yvette Multani, who managed her hemodialysis which she got three  times a week. We then had her seen by our , but  unfortunately due to her lack of willingness to perform a physical  therapy and occupational therapy, she was not a candidate for the ARU. I then went away on Thanksgiving vacation and my partner Dr. Amauri Kemp,  then assumed her care during the hospitalization and when he saw her on  11/19, she was medically stable. Her pain was controlled with oral pain  meds. She had already resumed all of her other home medications. Her  H&H was also stable and she was going to be discharged home in the care  of her niece, which the patient did request.  The representative from  [address] [place] [address], is already aware where  she will be going and he will go to her home and continue to do her  immediate postop prosthesis cast changes and he will be sending me  pictures just to make sure we have no wound healing issues. Prior to  her discharge, her flaps were viable and the incision was clean, dry,  and intact. No bruising and she was progressing well.         Nicola Barbour MD    D: 11/28/2022 8:25:19       T: 11/28/2022 8:28:41     [id number]  Job#: [id number]     Doc#: [id number]    CC:

## 2022-11-29 ENCOUNTER — HOSPITAL ENCOUNTER (OUTPATIENT)
Dept: WOUND CARE | Age: 57
Discharge: HOME | End: 2022-11-29
Payer: MEDICARE

## 2022-11-29 VITALS — RESPIRATION RATE: 18 BRPM

## 2022-11-29 DIAGNOSIS — L97.414 DIABETIC ULCER OF RIGHT HEEL ASSOCIATED WITH TYPE 2 DIABETES MELLITUS, WITH NECROSIS OF BONE (HCC): ICD-10-CM

## 2022-11-29 DIAGNOSIS — L89.151 DECUBITUS ULCER OF SACRAL REGION, STAGE 1: ICD-10-CM

## 2022-11-29 DIAGNOSIS — L97.522 DIABETIC ULCER OF TOE OF LEFT FOOT ASSOCIATED WITH TYPE 2 DIABETES MELLITUS, WITH FAT LAYER EXPOSED (HCC): Primary | ICD-10-CM

## 2022-11-29 DIAGNOSIS — L89.152 PRESSURE ULCER OF SACRAL REGION, STAGE 2 (HCC): ICD-10-CM

## 2022-11-29 DIAGNOSIS — Z89.511 S/P BKA (BELOW KNEE AMPUTATION), RIGHT (HCC): ICD-10-CM

## 2022-11-29 DIAGNOSIS — M79.671 ACUTE PAIN OF RIGHT FOOT: ICD-10-CM

## 2022-11-29 DIAGNOSIS — E11.621 DIABETIC ULCER OF RIGHT HEEL ASSOCIATED WITH TYPE 2 DIABETES MELLITUS, WITH NECROSIS OF BONE (HCC): ICD-10-CM

## 2022-11-29 DIAGNOSIS — E11.621 DIABETIC ULCER OF TOE OF LEFT FOOT ASSOCIATED WITH TYPE 2 DIABETES MELLITUS, WITH FAT LAYER EXPOSED (HCC): Primary | ICD-10-CM

## 2022-11-29 PROBLEM — Z78.9 PRESENCE OF SURGICAL INCISION: Status: RESOLVED | Noted: 2020-01-13 | Resolved: 2022-11-29

## 2022-11-29 PROCEDURE — 11042 DBRDMT SUBQ TIS 1ST 20SQCM/<: CPT

## 2022-11-29 RX ORDER — BACITRACIN ZINC AND POLYMYXIN B SULFATE 500; 1000 [USP'U]/G; [USP'U]/G
OINTMENT TOPICAL ONCE
OUTPATIENT
Start: 2022-11-29 | End: 2022-11-29

## 2022-11-29 RX ORDER — LIDOCAINE HYDROCHLORIDE 20 MG/ML
JELLY TOPICAL ONCE
OUTPATIENT
Start: 2022-11-29 | End: 2022-11-29

## 2022-11-29 RX ORDER — CLOBETASOL PROPIONATE 0.5 MG/G
OINTMENT TOPICAL ONCE
OUTPATIENT
Start: 2022-11-29 | End: 2022-11-29

## 2022-11-29 RX ORDER — BETAMETHASONE DIPROPIONATE 0.05 %
OINTMENT (GRAM) TOPICAL ONCE
OUTPATIENT
Start: 2022-11-29 | End: 2022-11-29

## 2022-11-29 RX ORDER — GENTAMICIN SULFATE 1 MG/G
OINTMENT TOPICAL ONCE
OUTPATIENT
Start: 2022-11-29 | End: 2022-11-29

## 2022-11-29 RX ORDER — BACITRACIN, NEOMYCIN, POLYMYXIN B 400; 3.5; 5 [USP'U]/G; MG/G; [USP'U]/G
OINTMENT TOPICAL ONCE
OUTPATIENT
Start: 2022-11-29 | End: 2022-11-29

## 2022-11-29 RX ORDER — LIDOCAINE 40 MG/G
CREAM TOPICAL ONCE
OUTPATIENT
Start: 2022-11-29 | End: 2022-11-29

## 2022-11-29 RX ORDER — LIDOCAINE HYDROCHLORIDE 40 MG/ML
SOLUTION TOPICAL ONCE
OUTPATIENT
Start: 2022-11-29 | End: 2022-11-29

## 2022-11-29 RX ORDER — GINSENG 100 MG
CAPSULE ORAL ONCE
OUTPATIENT
Start: 2022-11-29 | End: 2022-11-29

## 2022-11-29 RX ORDER — LIDOCAINE 50 MG/G
OINTMENT TOPICAL ONCE
OUTPATIENT
Start: 2022-11-29 | End: 2022-11-29

## 2022-11-29 NOTE — PROGRESS NOTES
Nonselective enzymatic debridement performed with Santyl per physician order to wound(s) of the sacrum   Patient tolerated the procedure well.

## 2022-11-29 NOTE — DISCHARGE INSTRUCTIONS
PHYSICIAN ORDERS AND DISCHARGE INSTRUCTIONS  NOTE: Upon discharge from the [address], you will receive a patient experience survey via E-mail. We would be grateful if you would take the time to fill this survey out. Wound care order history:              KATHLEEN's   Right       Left    Date               Vascular studies/Intervention: . Cultures: .9/13/22               Antibiotics: .Cipro and Doxy 9/13/22                         HbA1c:  .6/29/22 8.2               Grafts: Gloria Edge: . Continuing wound care orders and information:              Residence: . Private              LT[facility], [facility] within [facility]              Your wound-care supplies will be provided by: . [facility]              Pharmacy: [facility] in [place]  Wound cleansing:                           Do not scrub or use excessive force. Wash hands with soap and water before and after dressing changes. Prior to applying a clean dressing, cleanse wound with normal saline,                          wound cleanser, or mild soap and water. Ask your physician or nurse before getting the wound(s) wet in the shower. Daily Wound management:                          Keep weight off wounds and reposition every 2 hours. Avoid standing for long periods of time. Evaluate legs to the level of the heart or above for 30 minutes 4-5 times a day and/or when sitting. When taking antibiotics take entire prescription as ordered by MD do not stop taking until medicine is all gone. Orders for this week (11/29/22) :   Left heel, foot, toe and ankle wounds -Wash with mild soap and water, rinse with saline, pat dry with 4x4  Paint with betadine  Daily     Coccyx:  Apply santyl  Cover with sacral border  Change Tuesday, Thursday, Saturday     Follow up with Dr. Aristeo Perales In 1 weeks in the wound care center  Call [phone] for any questions or concerns.   Date__________   Time____________

## 2022-11-29 NOTE — PROGRESS NOTES
Wound Care Center Progress Note With Procedure    Marion Foley  AGE: 62 y.o. GENDER: female  : 1965  EPISODE DATE:  2022     Subjective:     Chief Complaint   Patient presents with    Wound Check     Right lower extremity          HISTORY of PRESENT ILLNESS      Marion Foley is a 62 y.o. female who presents today for wound evaluation of Acute arterial, diabetic, and pressure ulcer(s) of the great toe, left heel and. The ulcer is of mild severity. The underlying cause of the wound is diabetes, pressure and PAD. He will be getting a left lower extremity angiogram on  by Dr. Asaf Watson. Left foot ulcerations are new. She started to develop the sacral pressure ulceration when she was in the hospital where I admitted her and performed a right below the knee amputation. Her stump is healing well and we will change her IPOP this Monday in my office.   Wound Pain Timing/Severity: mild  Quality of pain: dull  Severity of pain:  1 / 10   Modifying Factors: diabetes, decreased mobility, smoking, arterial insufficiency, and hemodialysis  Associated Signs/Symptoms: edema and drainage        PAST MEDICAL HISTORY        Diagnosis Date    Acute pain of right foot 2022    CAD (coronary artery disease)     s/p CABG x 4;  follows with Dr Marbella Turner of foot 2018    Carpal tunnel syndrome on both sides     CHF (congestive heart failure) ([address] 10/2010    Chronic diastolic; EF 18%    Chronic kidney disease     stage 4 kidney disease    Chronic ulcer of left ankle with fat layer exposed ([address] 10/7/2015    Chronic ulcer of left foot with fat layer exposed ([address] 3/17/2016    Chronic ulcer of right ankle with fat layer exposed ([address] 3/17/2016    Chronic ulcer of right foot with fat layer exposed ([address] 3/17/2016    Decubitus ulcer of sacral region, stage 1 2018    Depression     Diabetes mellitus ([address]     Diabetes mellitus with neurological manifestations ([address]     Diabetes mellitus with ulcer of ankle ([address]     Diabetic ulcer of left foot associated with type 2 diabetes mellitus, with fat layer exposed ([address] 8/6/2018    Diabetic ulcer of right heel associated with type 2 diabetes mellitus, with muscle involvement without evidence of necrosis ([address] 9/13/2022    Diabetic ulcer of right heel associated with type 2 diabetes mellitus, with necrosis of bone ([address] 9/13/2022    ESRD on hemodialysis ([address] 9/21/2022    Family history of cardiovascular disease     Mother    GERD (gastroesophageal reflux disease)     H/O cardiac catheterization 10/18/2010, 6/3/2010    10/18/2010-Four bypass grafts all widely patent. 6/3/2010- Moderate to severe triple vessel disease, preserved LV systolic function. H/O cardiovascular stress test 7/26/2012, 8/3/2011, 10/14/2010, 5/24/2010 7/26/2012-Lexiscan- Normal Myocardial Perfusion Study. Post stress myocardial perfusion images show a normal pattern of perfusion in all regions. Post stress LV normal size. Normal perfusion in the distribution of all coronaries. Normal LV size and function. Rest EF 70%    H/O chest x-ray 7/12/2012, 6/2/2010 7/12/2012-Perihilar peribronchial cuffing with mild basilar predominant interstital prominence, which may reflect interstitial edema or atypical pneumonia. H/O Doppler ultrasound 6/4/2010    CAROTID DOPPLER-6/4/2010-No Doppler evidence of hemodynamically significant Carotid Stenosis with antegrade flow in the vertebral arteries bilaterally. 6/4/2010 PERIPHERAL VENOUS DOPPLER_ LEFT Saphenous Vein mapping    H/O echocardiogram 7/26/2012, 8/3/2011, 10/2010, 7/23/2010, 6/8/2010 7/26/2012-LV normal size. Normal LV wall thickness. LV systolic function normal. EF => 55%. LV wall motion normal. Mild MR. Mild TR.      History of complete ECG 7/26/2012 [facility]), 7/13/2012 [facility]), 7/25/2011, 3/25/2011, 10/18/2010, 10/11/2010, 6/23/2010, 5/7/2010    Hx of cardiovascular stress test 9/3/2015    lexiscan-normal,EF66%    Hx of Doppler ultrasound 4/5/16    Arterial: There is a fluid collection in the left thigh, please refer to PCP for further monitoring, no vascular in etiology. Hx of echocardiogram     4/16 EF40% Mild MR/TR and mild Pulm HTN. 9/15 EF 45-50%, Mildly dilated L atrium, mildly dilated R ventricle. Mild MR & mild TR     Hyperlipidemia     Neuropathy of foot     Pt reports starting approx. 6-7 years ago    Neuropathy of hand     Pt reports starting approx.  6-7 years ago    Non compliance with medical treatment 7/2/2018    Pressure ulcer of sacral region, stage 2 ([address] 11/29/2022    S/P BKA (below knee amputation), right ([address] 11/29/2022    S/P CABG x 4 6/5/2010    LIMA-> LAD;  VG->CX;  VG->Diagonal; VG-> distal RCA  per  Dr Santos Ross    Type 2 diabetes mellitus with diabetic polyneuropathy, with long-term current use of insulin ([address] 4/5/2016    Type II or unspecified type diabetes mellitus with neurological manifestations, not stated as uncontrolled(250.60)     Type II or unspecified type diabetes mellitus with other specified manifestations, not stated as uncontrolled     Ulcer of left foot, with fat layer exposed ([address] 12/19/2013    Ulcer of other part of foot        PAST SURGICAL HISTORY    Past Surgical History:   Procedure Laterality Date    APPENDECTOMY      CARDIAC SURGERY      CARPAL TUNNEL RELEASE      L hand Nov. 2011, R hand Jan. 2012    CARPAL TUNNEL RELEASE Right 6/10/2013    recurrent    CARPAL TUNNEL RELEASE Left 7/29/2013    with ulnar nerve transpostion and L middle finger release    CHOLECYSTECTOMY      COLONOSCOPY      CORONARY ARTERY BYPASS GRAFT  6/5/2010 6/5/2010-LIMA->LAD;  VG-> Diagonal;  VG-> Obtuse marginal;  VG->Distal RCA-   Dr Seven Solis Right 6/10/2013    HYSTERECTOMY, TOTAL ABDOMINAL (CERVIX REMOVED)      LEG AMPUTATION BELOW KNEE Right 11/11/2022    RIGHT LEG AMPUTATION BELOW KNEE WITH IPOP performed by Shahram Barrientos MD at [place] [address] Left 02/14/144th toe TUBAL LIGATION      ULNAR TUNNEL RELEASE Right 6/10/2013       FAMILY HISTORY    Family History   Problem Relation Age of Onset    Heart Disease Mother     Kidney Disease Mother         dialysis    Cancer Father        SOCIAL HISTORY    Social History     Tobacco Use    Smoking status: Former     Packs/day: 0.25     Years: 30.00     Pack years: 7.50     Types: Cigarettes     Quit date: 2022     Years since quittin.1    Smokeless tobacco: Never    Tobacco comments:     quit smoking ciagarettes 16   Vaping Use    Vaping Use: Every day    Substances: Never   Substance Use Topics    Alcohol use: No     Alcohol/week: 0.0 standard drinks     Comment:                                    CAFFEINE: 2 sodas daily    Drug use: No       ALLERGIES    Allergies   Allergen Reactions    Latex     Codeine     Cortisone     Cortizone     Iodides     Phenobarbital Other (See Comments)     Hallucinations     Vancomycin Other (See Comments)     \"makes me very sick\"       MEDICATIONS    Current Outpatient Medications on File Prior to Encounter   Medication Sig Dispense Refill    calcium acetate (PHOSLO) 667 MG CAPS capsule Take 1 capsule with each meal and 1 tablet with each snack. 540 capsule 3    gabapentin (NEURONTIN) 400 MG capsule Take 800 mg by mouth in the morning and at bedtime.       OXYGEN Inhale 2 L into the lungs daily as needed      carvedilol (COREG) 6.25 MG tablet 6.25 mg 2 times daily       albuterol sulfate HFA (PROVENTIL;VENTOLIN;PROAIR) 108 (90 Base) MCG/ACT inhaler Inhale 2 puffs into the lungs every 6 hours as needed for Wheezing      esomeprazole Magnesium (NEXIUM) 40 MG PACK Take 20 mg by mouth 2 times daily 2 tabs      LORazepam (ATIVAN) 0.5 MG tablet Take 0.5 mg by mouth every 12 hours Indications: One Tablet twice daily       Insulin Aspart (NOVOLOG FLEXPEN SC) Inject 15 Units into the skin 3 times daily (before meals) On sliding scale (Patient not taking: No sig reported)      lisinopril (PRINIVIL;ZESTRIL) 10 MG tablet Take 20 mg by mouth daily      furosemide (LASIX) 20 MG tablet Take 40 mg by mouth 2 times daily        No current facility-administered medications on file prior to encounter. REVIEW OF SYSTEMS    Pertinent items are noted in HPI. Constitutional: Negative for systemic symptoms including fever, chills and malaise. Objective:      Resp 18     PHYSICAL EXAM      General: The patient is in no acute distress. Mental status:  Patient is appropriate, is  oriented to place and plan of care. Dermatologic exam: Visual inspection of the periwound reveals the skin to be moist and cool   Wound exam: see wound description below in procedure note      Assessment:     Problem List Items Addressed This Visit       Diabetic ulcer of right heel associated with type 2 diabetes mellitus, with necrosis of bone ([address]    Relevant Orders    Initiate Outpatient Wound Care Protocol    Acute pain of right foot    Relevant Orders    Initiate Outpatient Wound Care Protocol    Pressure ulcer of sacral region, stage 2 (HCC)    S/P BKA (below knee amputation), right (HCC)    Diabetic ulcer of left foot associated with type 2 diabetes mellitus, with fat layer exposed ([address] - Primary     Other Visit Diagnoses       Decubitus ulcer of sacral region, stage 1              Procedure Note    Indications:  Based on my examination of this patient's wound(s) today, sharp excision into necrotic subcutaneous tissue is required to promote healing and evaluate the extent of previous healing. Performed by: Jorje Vázquez MD    Consent obtained: Yes    Time out taken:  Yes    Pain Control:       Debridement:Excisional Debridement    Using #15 blade scalpel the wound(s) was/were sharply debrided down through and including the removal of subcutaneous tissue.         Devitalized Tissue Debrided:  slough, exudate, and callus    Pre Debridement Measurements:  Are located in the Wound Documentation Flow Sheet    All active wounds listed below with today's date are evaluated  Wound(s)    debrided this date include # : 3     Post  Debridement Measurements:  Negative Pressure Wound Therapy Foot (Active)   Number of days: 3209       Negative Pressure Wound Therapy Foot Distal (Active)   Number of days: 3205       Negative Pressure Wound Therapy (Active)   Wound Type Diabetic foot ulcer 10/11/22 1145   Unit Type kci 10/11/22 1145   Dressing Type Black Foam 10/11/22 1145   Number of pieces used 1 10/11/22 1145   Number of pieces removed 2 10/11/22 1145   Cycle Continuous 10/11/22 1145   Target Pressure (mmHg) 125 10/11/22 1145   Dressing Status New dressing applied;Clean, dry & intact 09/27/22 1045   Dressing Changed Changed/New 09/27/22 1045   Number of days: 68       Incision 06/10/13 Wrist Right (Active)   Number of days: 3458       Incision 07/29/13 Wrist Left (Active)   Number of days: 3410       Incision 02/14/14 Toe (Comment  which one) (Active)   Number of days: 1775       Wound 11/01/22 #2 Sacrum (Active)   Wound Image   11/29/22 0950   Wound Etiology Pressure Unstageable 11/19/22 0945   Dressing Status New drainage noted 11/17/22 1022   Wound Cleansed Wound cleanser 11/29/22 0950   Dressing/Treatment Silicone border 02/03/85 0945   Offloading for Diabetic Foot Ulcers Offloading not required 11/29/22 0950   Wound Length (cm) 5.8 cm 11/29/22 0950   Wound Width (cm) 6 cm 11/29/22 0950   Wound Depth (cm) 0.2 cm 11/29/22 0950   Wound Surface Area (cm^2) 34.8 cm^2 11/29/22 0950   Change in Wound Size % (l*w) -596 11/29/22 0950   Wound Volume (cm^3) 6.96 cm^3 11/29/22 0950   Wound Healing % -1292 11/29/22 0950   Post-Procedure Length (cm) 2.5 cm 11/01/22 1004   Post-Procedure Width (cm) 2 cm 11/01/22 1004   Post-Procedure Depth (cm) 0.1 cm 11/01/22 1004   Post-Procedure Surface Area (cm^2) 5 cm^2 11/01/22 1004   Post-Procedure Volume (cm^3) 0.5 cm^3 11/01/22 1004   Distance Tunneling (cm) 0 cm 11/29/22 0902 Tunneling Position ___ O'Clock 0 11/29/22 0950   Undermining Starts ___ O'Clock 0 11/29/22 0950   Undermining Ends___ O'Clock 0 11/29/22 0950   Undermining Maxium Distance (cm) 0 11/29/22 0950   Wound Assessment Eschar moist 11/29/22 0950   Drainage Amount Moderate 11/29/22 0950   Drainage Description Yellow 11/29/22 0950   Odor Mild 11/29/22 0950   Lora-wound Assessment Blanchable erythema; Intact 11/29/22 0950   Margins Defined edges 11/29/22 0950   Wound Thickness Description not for Pressure Injury Full thickness 11/29/22 0950   Number of days: 28       Wound 11/11/22 Toe (Comment  which one) Left great (Active)   Wound Image   11/29/22 0940   Wound Etiology Arterial 11/19/22 0945   Dressing Status Other (Comment) 11/19/22 0945   Wound Cleansed Betadine/povidone iodine 11/29/22 0940   Dressing/Treatment Betadine swabs/povidone iodine 11/29/22 0940   Offloading for Diabetic Foot Ulcers Offloading not required 11/29/22 0940   Wound Length (cm) 1.5 cm 11/29/22 0940   Wound Width (cm) 3 cm 11/29/22 0940   Wound Depth (cm) 0.1 cm 11/29/22 0940   Wound Surface Area (cm^2) 4.5 cm^2 11/29/22 0940   Change in Wound Size % (l*w) 0 11/29/22 0940   Wound Volume (cm^3) 0.45 cm^3 11/29/22 0940   Distance Tunneling (cm) 0 cm 11/29/22 0940   Tunneling Position ___ O'Clock 0 11/29/22 0940   Undermining Starts ___ O'Clock 0 11/29/22 0940   Undermining Ends___ O'Clock 0 11/29/22 0940   Undermining Maxium Distance (cm) 0 11/29/22 0940   Wound Assessment Eschar dry 11/29/22 0940   Drainage Amount None 11/29/22 0940   Odor None 11/29/22 0940   Lora-wound Assessment Hyperpigmented 11/29/22 0940   Margins Attached edges 11/29/22 0940   Number of days: 17       Wound 11/15/22 Ankle Left;Posterior CLUSTER (Active)   Wound Image   11/29/22 0940   Wound Etiology Traumatic 11/19/22 0945   Dressing Status Other (Comment) 11/19/22 0945   Wound Cleansed Betadine/povidone iodine 11/29/22 0940   Dressing/Treatment Betadine swabs/povidone iodine 11/29/22 0940   Offloading for Diabetic Foot Ulcers Offloading not required 11/29/22 0940   Wound Length (cm) 3 cm 11/29/22 0940   Wound Width (cm) 1.5 cm 11/29/22 0940   Wound Depth (cm) 0.1 cm 11/29/22 0940   Wound Surface Area (cm^2) 4.5 cm^2 11/29/22 0940   Change in Wound Size % (l*w) -125 11/29/22 0940   Wound Volume (cm^3) 0.45 cm^3 11/29/22 0940   Wound Healing % -125 11/29/22 0940   Distance Tunneling (cm) 0 cm 11/29/22 0940   Tunneling Position ___ O'Clock 0 11/29/22 0940   Undermining Starts ___ O'Clock 0 11/29/22 0940   Undermining Ends___ O'Clock 0 11/29/22 0940   Undermining Maxium Distance (cm) 0 11/29/22 0940   Wound Assessment Eschar dry 11/29/22 0940   Drainage Amount None 11/29/22 0940   Odor None 11/29/22 0940   Lora-wound Assessment Dry/flaky 11/29/22 0940   Margins Attached edges 11/29/22 0940   Number of days: 13       Wound 11/15/22 Heel Left (Active)   Wound Image   11/29/22 0940   Wound Etiology Deep tissue/Injury 11/19/22 0945   Dressing Status Other (Comment) 11/19/22 0945   Wound Cleansed Wound cleanser 11/29/22 0940   Dressing/Treatment Betadine swabs/povidone iodine 11/29/22 0940   Offloading for Diabetic Foot Ulcers Offloading not required 11/29/22 0940   Wound Length (cm) 1.3 cm 11/29/22 0940   Wound Width (cm) 1.3 cm 11/29/22 0940   Wound Depth (cm) 0.1 cm 11/29/22 0940   Wound Surface Area (cm^2) 1.69 cm^2 11/29/22 0940   Change in Wound Size % (l*w) 66.2 11/29/22 0940   Wound Volume (cm^3) 0.169 cm^3 11/29/22 0940   Wound Healing % 66 11/29/22 0940   Distance Tunneling (cm) 0 cm 11/29/22 0940   Tunneling Position ___ O'Clock 0 11/29/22 0940   Undermining Starts ___ O'Clock 0 11/29/22 0940   Undermining Ends___ O'Clock 0 11/29/22 0940   Undermining Maxium Distance (cm) 0 11/29/22 0940   Wound Assessment Eschar dry 11/29/22 0940   Drainage Amount None 11/29/22 0940   Drainage Description Yellow 11/29/22 0940   Odor None 11/29/22 0940   Lora-wound Assessment Dry/flaky 11/29/22 0940 Margins Attached edges 11/29/22 0940   Number of days: 13       Wound 11/15/22 Foot Anterior; Left (Active)   Wound Image   11/29/22 0940   Wound Etiology Deep tissue/Injury 11/19/22 0945   Dressing Status Other (Comment) 11/19/22 0945   Wound Cleansed Wound cleanser 11/29/22 0940   Dressing/Treatment Betadine swabs/povidone iodine 11/29/22 0940   Offloading for Diabetic Foot Ulcers Offloading not required 11/29/22 0940   Wound Length (cm) 0.7 cm 11/29/22 0940   Wound Width (cm) 0.4 cm 11/29/22 0940   Wound Depth (cm) 0.1 cm 11/29/22 0940   Wound Surface Area (cm^2) 0.28 cm^2 11/29/22 0940   Change in Wound Size % (l*w) 88.8 11/29/22 0940   Wound Volume (cm^3) 0.028 cm^3 11/29/22 0940   Wound Healing % 89 11/29/22 0940   Distance Tunneling (cm) 0 cm 11/29/22 0940   Tunneling Position ___ O'Clock 0 11/29/22 0940   Undermining Starts ___ O'Clock 0 11/29/22 0940   Undermining Ends___ O'Clock 0 11/29/22 0940   Undermining Maxium Distance (cm) 0 11/29/22 0940   Wound Assessment Eschar dry 11/29/22 0940   Drainage Amount None 11/29/22 0940   Odor None 11/29/22 0940   Lora-wound Assessment Intact 11/29/22 0940   Margins Attached edges 11/29/22 0940   Number of days: 13       Wound 11/15/22 Foot Left;Medial (Active)   Wound Image   11/29/22 0940   Wound Etiology Arterial 11/19/22 0945   Dressing Status Other (Comment) 11/19/22 0945   Wound Cleansed Wound cleanser 11/29/22 0940   Dressing/Treatment Betadine swabs/povidone iodine 11/29/22 0940   Offloading for Diabetic Foot Ulcers Offloading not required 11/29/22 0940   Wound Length (cm) 1 cm 11/29/22 0940   Wound Width (cm) 0.8 cm 11/29/22 0940   Wound Depth (cm) 0.1 cm 11/29/22 0940   Wound Surface Area (cm^2) 0.8 cm^2 11/29/22 0940   Change in Wound Size % (l*w) -60 11/29/22 0940   Wound Volume (cm^3) 0.08 cm^3 11/29/22 0940   Distance Tunneling (cm) 0 cm 11/29/22 0940   Tunneling Position ___ O'Clock 0 11/29/22 0940   Undermining Starts ___ O'Clock 0 11/29/22 0940 Undermining Ends___ O'Clock 0 11/29/22 0940   Undermining Maxium Distance (cm) 0 11/29/22 0940   Wound Assessment Slough;Dry 11/29/22 0940   Drainage Amount None 11/29/22 0940   Odor None 11/29/22 0940   Lora-wound Assessment Intact 11/29/22 0940   Margins Attached edges 11/29/22 0940   Number of days: 13       Percent of Wound(s) Debrided: approximately 100%    Total  Area  Debrided:  2 sq cm     Bleeding:  None    Hemostasis Achieved:  not needed    Procedural Pain:  0  / 10     Post Procedural Pain:  0 / 10     Response to treatment:  Well tolerated by patient. Status of wound progress and description from last visit:   Regarding her right BKA stump it is healing well and I will reassess the stump on Monday and possibly remove some of her staples. She has developed new wounds on the left great toe and the left heel. I will continue to use Santyl on the sacral pressure ulceration. .      Plan:       Discharge Instructions         PHYSICIAN ORDERS AND DISCHARGE INSTRUCTIONS  NOTE: Upon discharge from the [address], you will receive a patient experience survey via E-mail. We would be grateful if you would take the time to fill this survey out. Wound care order history:              KATHLEEN's   Right       Left    Date               Vascular studies/Intervention: . Cultures: .9/13/22               Antibiotics: .Cipro and Doxy 9/13/22                         HbA1c:  .6/29/22 8.2               Grafts: Kimmy Lovelace: . Continuing wound care orders and information:              Residence: . Private              Continue home health care with: [facility] [place]              Your wound-care supplies will be provided by: . [facility]              Pharmacy: [facility] in [place]  Wound cleansing:                           Do not scrub or use excessive force. Wash hands with soap and water before and after dressing changes.                           Prior to applying a clean dressing, cleanse wound with normal saline,                          wound cleanser, or mild soap and water. Ask your physician or nurse before getting the wound(s) wet in the shower. Daily Wound management:                          Keep weight off wounds and reposition every 2 hours. Avoid standing for long periods of time. Evaluate legs to the level of the heart or above for 30 minutes 4-5 times a day and/or when sitting. When taking antibiotics take entire prescription as ordered by MD do not stop taking until medicine is all gone. Orders for this week (11/29/22) : Right Heel- Wash with mild soap and water, rinse with saline, pat dry with 4x4  Gently pack with 4x4 soaked betadine  Cover with ABD,  Wrap with conform and secure with tape  Change on Tuesday, Thursday and Saturday     Coccyx:  Apply santyl  Cover with saceral border  Change Tuesday, Thursday, Saturday     Follow up with Dr. Dariel Joyner In 1 weeks in the wound care center  Call [phone] for any questions or concerns. Date__________   Time____________        Treatment Note Wound 11/11/22 Toe (Comment  which one) Left great-Dressing/Treatment: Betadine swabs/povidone iodine  Wound 11/15/22 Ankle Left;Posterior CLUSTER-Dressing/Treatment: Betadine swabs/povidone iodine  Wound 11/15/22 Heel Left-Dressing/Treatment: Betadine swabs/povidone iodine  Wound 11/15/22 Foot Anterior; Left-Dressing/Treatment: Betadine swabs/povidone iodine  Wound 11/15/22 Foot Left;Medial-Dressing/Treatment: Betadine swabs/povidone iodine  Wound 11/01/22 #2 Sacrum-Dressing/Treatment:  (santyl, sacral  border)    Written Patient Dismissal Instructions Given            Electronically signed by Ling Whitehead MD on 11/29/2022 at 10:00 AM

## 2022-12-13 ENCOUNTER — HOSPITAL ENCOUNTER (OUTPATIENT)
Dept: WOUND CARE | Age: 57
Discharge: HOME | End: 2022-12-13
Payer: MEDICARE

## 2022-12-13 VITALS
HEART RATE: 97 BPM | RESPIRATION RATE: 18 BRPM | TEMPERATURE: 97 F | SYSTOLIC BLOOD PRESSURE: 143 MMHG | DIASTOLIC BLOOD PRESSURE: 73 MMHG

## 2022-12-13 DIAGNOSIS — E11.621 DIABETIC ULCER OF RIGHT HEEL ASSOCIATED WITH TYPE 2 DIABETES MELLITUS, WITH NECROSIS OF BONE (HCC): Primary | ICD-10-CM

## 2022-12-13 DIAGNOSIS — L97.414 DIABETIC ULCER OF RIGHT HEEL ASSOCIATED WITH TYPE 2 DIABETES MELLITUS, WITH NECROSIS OF BONE (HCC): Primary | ICD-10-CM

## 2022-12-13 DIAGNOSIS — L89.153 PRESSURE ULCER OF SACRAL REGION, STAGE 3 (HCC): ICD-10-CM

## 2022-12-13 DIAGNOSIS — M79.671 ACUTE PAIN OF RIGHT FOOT: ICD-10-CM

## 2022-12-13 PROCEDURE — 11042 DBRDMT SUBQ TIS 1ST 20SQCM/<: CPT

## 2022-12-13 PROCEDURE — 11043 DBRDMT MUSC&/FSCA 1ST 20/<: CPT

## 2022-12-13 PROCEDURE — 11046 DBRDMT MUSC&/FSCA EA ADDL: CPT

## 2022-12-13 RX ORDER — LIDOCAINE HYDROCHLORIDE 20 MG/ML
JELLY TOPICAL ONCE
OUTPATIENT
Start: 2022-12-13 | End: 2022-12-13

## 2022-12-13 RX ORDER — GINSENG 100 MG
CAPSULE ORAL ONCE
OUTPATIENT
Start: 2022-12-13 | End: 2022-12-13

## 2022-12-13 RX ORDER — LIDOCAINE 50 MG/G
OINTMENT TOPICAL ONCE
OUTPATIENT
Start: 2022-12-13 | End: 2022-12-13

## 2022-12-13 RX ORDER — LIDOCAINE HYDROCHLORIDE 40 MG/ML
SOLUTION TOPICAL ONCE
OUTPATIENT
Start: 2022-12-13 | End: 2022-12-13

## 2022-12-13 RX ORDER — BETAMETHASONE DIPROPIONATE 0.05 %
OINTMENT (GRAM) TOPICAL ONCE
OUTPATIENT
Start: 2022-12-13 | End: 2022-12-13

## 2022-12-13 RX ORDER — BACITRACIN ZINC AND POLYMYXIN B SULFATE 500; 1000 [USP'U]/G; [USP'U]/G
OINTMENT TOPICAL ONCE
OUTPATIENT
Start: 2022-12-13 | End: 2022-12-13

## 2022-12-13 RX ORDER — GENTAMICIN SULFATE 1 MG/G
OINTMENT TOPICAL ONCE
OUTPATIENT
Start: 2022-12-13 | End: 2022-12-13

## 2022-12-13 RX ORDER — BACITRACIN, NEOMYCIN, POLYMYXIN B 400; 3.5; 5 [USP'U]/G; MG/G; [USP'U]/G
OINTMENT TOPICAL ONCE
OUTPATIENT
Start: 2022-12-13 | End: 2022-12-13

## 2022-12-13 RX ORDER — LIDOCAINE 40 MG/G
CREAM TOPICAL ONCE
OUTPATIENT
Start: 2022-12-13 | End: 2022-12-13

## 2022-12-13 RX ORDER — CLOBETASOL PROPIONATE 0.5 MG/G
OINTMENT TOPICAL ONCE
OUTPATIENT
Start: 2022-12-13 | End: 2022-12-13

## 2022-12-13 ASSESSMENT — PAIN DESCRIPTION - LOCATION: LOCATION: LEG

## 2022-12-13 ASSESSMENT — PAIN SCALES - GENERAL: PAINLEVEL_OUTOF10: 5

## 2022-12-13 ASSESSMENT — PAIN DESCRIPTION - ORIENTATION: ORIENTATION: LEFT

## 2022-12-13 ASSESSMENT — PAIN DESCRIPTION - FREQUENCY: FREQUENCY: CONTINUOUS

## 2022-12-13 ASSESSMENT — PAIN DESCRIPTION - ONSET: ONSET: ON-GOING

## 2022-12-13 NOTE — PROGRESS NOTES
Nonselective enzymatic debridement performed with Santyl per physician order to wound(s) of the sacrum  Patient tolerated the procedure well.      Electronically signed by Candance Spiro, RN on 12/13/2022 at 10:25 AM

## 2022-12-13 NOTE — PROGRESS NOTES
Wound Care Center Progress Note With Procedure    Florida Thomas  AGE: 62 y.o. GENDER: female  : 1965  EPISODE DATE:  2022     Subjective:     Chief Complaint   Patient presents with    Wound Check     Multiple          HISTORY of PRESENT ILLNESS      Florida Thomas is a 62 y.o. female who presents today for wound evaluation of Chronic arterial, diabetic, and pressure ulcer(s) of the left heel and left great toe and the sacral area. The ulcer is of moderate severity. The underlying cause of the wound is diabetes, pressure and PAD. She is still trying to arrange for her angiogram on the left lower extremity. Her left heel eschar is stable. She is doing very well following her right BKA and she will start to get fitted for her prosthesis.   Wound Pain Timing/Severity: mild  Quality of pain: dull, aching  Severity of pain:  3 / 10   Modifying Factors: diabetes, smoking, and arterial insufficiency  Associated Signs/Symptoms: edema, drainage, and pain        PAST MEDICAL HISTORY        Diagnosis Date    Acute pain of right foot 2022    CAD (coronary artery disease)     s/p CABG x 4;  follows with Dr Alba Hanks of foot 2018    Carpal tunnel syndrome on both sides     CHF (congestive heart failure) ([address] 10/2010    Chronic diastolic; EF 14%    Chronic kidney disease     stage 4 kidney disease    Chronic ulcer of left ankle with fat layer exposed ([address] 10/7/2015    Chronic ulcer of left foot with fat layer exposed ([address] 3/17/2016    Chronic ulcer of right ankle with fat layer exposed ([address] 3/17/2016    Chronic ulcer of right foot with fat layer exposed ([address] 3/17/2016    Decubitus ulcer of sacral region, stage 1 2018    Depression     Diabetes mellitus ([address]     Diabetes mellitus with neurological manifestations ([address]     Diabetes mellitus with ulcer of ankle ([address]     Diabetic ulcer of left foot associated with type 2 diabetes mellitus, with fat layer exposed ([address] 2018 Diabetic ulcer of right heel associated with type 2 diabetes mellitus, with muscle involvement without evidence of necrosis ([facility] 9/13/2022    Diabetic ulcer of right heel associated with type 2 diabetes mellitus, with necrosis of bone ([address] 9/13/2022    ESRD on hemodialysis ([facility] 9/21/2022    Family history of cardiovascular disease     Mother    GERD (gastroesophageal reflux disease)     H/O cardiac catheterization 10/18/2010, 6/3/2010    10/18/2010-Four bypass grafts all widely patent. 6/3/2010- Moderate to severe triple vessel disease, preserved LV systolic function. H/O cardiovascular stress test 7/26/2012, 8/3/2011, 10/14/2010, 5/24/2010 7/26/2012-Lexiscan- Normal Myocardial Perfusion Study. Post stress myocardial perfusion images show a normal pattern of perfusion in all regions. Post stress LV normal size. Normal perfusion in the distribution of all coronaries. Normal LV size and function. Rest EF 70%    H/O chest x-ray 7/12/2012, 6/2/2010 7/12/2012-Perihilar peribronchial cuffing with mild basilar predominant interstital prominence, which may reflect interstitial edema or atypical pneumonia. H/O Doppler ultrasound 6/4/2010    CAROTID DOPPLER-6/4/2010-No Doppler evidence of hemodynamically significant Carotid Stenosis with antegrade flow in the vertebral arteries bilaterally. 6/4/2010 PERIPHERAL VENOUS DOPPLER_ LEFT Saphenous Vein mapping    H/O echocardiogram 7/26/2012, 8/3/2011, 10/2010, 7/23/2010, 6/8/2010 7/26/2012-LV normal size. Normal LV wall thickness. LV systolic function normal. EF => 55%. LV wall motion normal. Mild MR. Mild TR.      History of complete ECG 7/26/2012 [facility]), 7/13/2012 [facility]), 7/25/2011, 3/25/2011, 10/18/2010, 10/11/2010, 6/23/2010, 5/7/2010    Hx of cardiovascular stress test 9/3/2015    lexiscan-normal,EF66%    Hx of Doppler ultrasound 4/5/16    Arterial: There is a fluid collection in the left thigh, please refer to PCP for further monitoring, no vascular in etiology. Hx of echocardiogram     4/16 EF40% Mild MR/TR and mild Pulm HTN. 9/15 EF 45-50%, Mildly dilated L atrium, mildly dilated R ventricle. Mild MR & mild TR     Hyperlipidemia     Neuropathy of foot     Pt reports starting approx. 6-7 years ago    Neuropathy of hand     Pt reports starting approx.  6-7 years ago    Non compliance with medical treatment 7/2/2018    Pressure ulcer of sacral region, stage 2 ([address] 11/29/2022    Pressure ulcer of sacral region, stage 3 ([address] 11/29/2022    S/P BKA (below knee amputation), right ([address] 11/29/2022    S/P CABG x 4 6/5/2010    LIMA-> LAD;  VG->CX;  VG->Diagonal; VG-> distal RCA  per  Dr Newton Mckenzie    Type 2 diabetes mellitus with diabetic polyneuropathy, with long-term current use of insulin ([address] 4/5/2016    Type II or unspecified type diabetes mellitus with neurological manifestations, not stated as uncontrolled(250.60)     Type II or unspecified type diabetes mellitus with other specified manifestations, not stated as uncontrolled     Ulcer of left foot, with fat layer exposed ([address] 12/19/2013    Ulcer of other part of foot        PAST SURGICAL HISTORY    Past Surgical History:   Procedure Laterality Date    APPENDECTOMY      CARDIAC SURGERY      CARPAL TUNNEL RELEASE      L hand Nov. 2011, R hand Jan. 2012    CARPAL TUNNEL RELEASE Right 6/10/2013    recurrent    CARPAL TUNNEL RELEASE Left 7/29/2013    with ulnar nerve transpostion and L middle finger release    CHOLECYSTECTOMY      COLONOSCOPY      CORONARY ARTERY BYPASS GRAFT  6/5/2010 6/5/2010-LIMA->LAD;  VG-> Diagonal;  VG-> Obtuse marginal;  VG->Distal RCA-   Dr Barrera [facility] Right 6/10/2013    HYSTERECTOMY, TOTAL ABDOMINAL (CERVIX REMOVED)      LEG AMPUTATION BELOW KNEE Right 11/11/2022    RIGHT LEG AMPUTATION BELOW KNEE WITH IPOP performed by John Jose MD at [place] [address] Left 02/14/144th toe    TUBAL LIGATION      ULNAR TUNNEL RELEASE Right 6/10/2013       FAMILY HISTORY    Family History   Problem Relation Age of Onset    Heart Disease Mother     Kidney Disease Mother         dialysis    Cancer Father        SOCIAL HISTORY    Social History     Tobacco Use    Smoking status: Former     Packs/day: 0.25     Years: 30.00     Pack years: 7.50     Types: Cigarettes     Quit date: 2022     Years since quittin.2    Smokeless tobacco: Never    Tobacco comments:     quit smoking mia 16   Vaping Use    Vaping Use: Every day    Substances: Never   Substance Use Topics    Alcohol use: No     Alcohol/week: 0.0 standard drinks     Comment:                                    CAFFEINE: 2 sodas daily    Drug use: No       ALLERGIES    Allergies   Allergen Reactions    Latex     Codeine     Cortisone     Cortizone     Iodides     Phenobarbital Other (See Comments)     Hallucinations     Vancomycin Other (See Comments)     \"makes me very sick\"       MEDICATIONS    Current Outpatient Medications on File Prior to Encounter   Medication Sig Dispense Refill    silver sulfADIAZINE (SILVADENE) 1 % cream Apply topically daily. 1000 g 1    calcium acetate (PHOSLO) 667 MG CAPS capsule Take 1 capsule with each meal and 1 tablet with each snack. 540 capsule 3    gabapentin (NEURONTIN) 400 MG capsule Take 800 mg by mouth in the morning and at bedtime.       OXYGEN Inhale 2 L into the lungs daily as needed      carvedilol (COREG) 6.25 MG tablet 6.25 mg 2 times daily       albuterol sulfate HFA (PROVENTIL;VENTOLIN;PROAIR) 108 (90 Base) MCG/ACT inhaler Inhale 2 puffs into the lungs every 6 hours as needed for Wheezing      esomeprazole Magnesium (NEXIUM) 40 MG PACK Take 20 mg by mouth 2 times daily 2 tabs      LORazepam (ATIVAN) 0.5 MG tablet Take 0.5 mg by mouth every 12 hours Indications: One Tablet twice daily       Insulin Aspart (NOVOLOG FLEXPEN SC) Inject 15 Units into the skin 3 times daily (before meals) On sliding scale      lisinopril (PRINIVIL;ZESTRIL) 10 MG tablet Take 20 mg by mouth daily      furosemide (LASIX) 20 MG tablet Take 40 mg by mouth 2 times daily        No current facility-administered medications on file prior to encounter. REVIEW OF SYSTEMS    Pertinent items are noted in HPI. Constitutional: Negative for systemic symptoms including fever, chills and malaise. Objective:      BP (!) 143/73   Pulse 97   Temp 97 °F (36.1 °C) (Temporal)   Resp 18     PHYSICAL EXAM      General: The patient is in no acute distress. Mental status:  Patient is appropriate, is  oriented to place and plan of care. Dermatologic exam: Visual inspection of the periwound reveals the skin to be moist and cool   Wound exam: see wound description below in procedure note      Assessment:     Problem List Items Addressed This Visit       Acute pain of right foot    Relevant Orders    Initiate Outpatient Wound Care Protocol    Diabetic ulcer of right heel associated with type 2 diabetes mellitus, with necrosis of bone ([address] - Primary    Relevant Orders    Initiate Outpatient Wound Care Protocol    Pressure ulcer of sacral region, stage 3 ([address]     Procedure Note    Indications:  Based on my examination of this patient's wound(s) today, sharp excision into necrotic muscle/fascia is required to promote healing and evaluate the extent of previous healing. Performed by: Abiel Ramirez MD    Consent obtained: Yes    Time out taken:  Yes    Pain Control:       Debridement:Excisional Debridement to    Using #15 blade scalpel, scissors, and forceps the wound(s) was/were sharply debrided down through and including the removal of subcutaneous tissue and muscle/fascia.         Devitalized Tissue Debrided:  slough, necrotic/eschar, and exudate    Pre Debridement Measurements:  Are located in the Wound Documentation Flow Sheet    All active wounds listed below with today's date are evaluated  Wound(s)    debrided this date include # : 2 and 3     Post  Debridement Measurements:  Negative Pressure Wound Therapy Foot (Active)   Number of days: 3223       Negative Pressure Wound Therapy Foot Distal (Active)   Number of days: 3219       Negative Pressure Wound Therapy (Active)   Wound Type Diabetic foot ulcer 10/11/22 1145   Unit Type kci 10/11/22 1145   Dressing Type Black Foam 10/11/22 1145   Number of pieces used 1 10/11/22 1145   Number of pieces removed 2 10/11/22 1145   Cycle Continuous 10/11/22 1145   Target Pressure (mmHg) 125 10/11/22 1145   Dressing Status New dressing applied;Clean, dry & intact 09/27/22 1045   Dressing Changed Changed/New 09/27/22 1045   Number of days: 82       Incision 06/10/13 Wrist Right (Active)   Number of days: 4972       Incision 07/29/13 Wrist Left (Active)   Number of days: 3424       Incision 02/14/14 Toe (Comment  which one) (Active)   Number of days: 1340       Wound 11/01/22 #2 Sacrum (Active)   Wound Image   12/13/22 0939   Wound Etiology Pressure Unstageable 12/13/22 0939   Dressing Status New drainage noted 11/17/22 1022   Wound Cleansed Wound cleanser 12/13/22 0939   Dressing/Treatment Silicone border 41/52/15 0945   Offloading for Diabetic Foot Ulcers Offloading not required 12/13/22 0939   Wound Length (cm) 5.5 cm 12/13/22 0939   Wound Width (cm) 3.8 cm 12/13/22 0939   Wound Depth (cm) 0.1 cm 12/13/22 0939   Wound Surface Area (cm^2) 20.9 cm^2 12/13/22 0939   Change in Wound Size % (l*w) -318 12/13/22 0939   Wound Volume (cm^3) 2.09 cm^3 12/13/22 0939   Wound Healing % -318 12/13/22 0939   Post-Procedure Length (cm) 5.5 cm 12/13/22 1000   Post-Procedure Width (cm) 3.8 cm 12/13/22 1000   Post-Procedure Depth (cm) 0.1 cm 12/13/22 1000   Post-Procedure Surface Area (cm^2) 20.9 cm^2 12/13/22 1000   Post-Procedure Volume (cm^3) 2.09 cm^3 12/13/22 1000   Distance Tunneling (cm) 0 cm 12/13/22 0939   Tunneling Position ___ O'Clock 0 12/13/22 0939   Undermining Starts ___ O'Clock 0 12/13/22 0939   Undermining Ends___ O'Clock 0 12/13/22 0939   Undermining Maxium Distance (cm) 0 12/13/22 0939   Wound Assessment Eschar moist 12/13/22 0939   Drainage Amount Small 12/13/22 0939   Drainage Description Yellow 12/13/22 0939   Odor Mild 11/29/22 0950   Lora-wound Assessment Blanchable erythema; Intact 12/13/22 0939   Margins Defined edges 12/13/22 0939   Wound Thickness Description not for Pressure Injury Full thickness 12/13/22 0939   Number of days: 42       Wound 11/11/22  #1 Left Great Toe (Active)   Wound Image   12/13/22 0939   Wound Etiology Arterial 12/13/22 0939   Dressing Status Other (Comment) 11/19/22 0945   Wound Cleansed Soap and water 12/13/22 0939   Dressing/Treatment Betadine swabs/povidone iodine 11/29/22 0940   Offloading for Diabetic Foot Ulcers Offloading not required 12/13/22 0939   Wound Length (cm) 1.8 cm 12/13/22 0939   Wound Width (cm) 3.2 cm 12/13/22 0939   Wound Depth (cm) 0.1 cm 12/13/22 0939   Wound Surface Area (cm^2) 5.76 cm^2 12/13/22 0939   Change in Wound Size % (l*w) -28 12/13/22 0939   Wound Volume (cm^3) 0.576 cm^3 12/13/22 0939   Post-Procedure Length (cm) 1.8 cm 12/13/22 1000   Post-Procedure Width (cm) 3.2 cm 12/13/22 1000   Post-Procedure Depth (cm) 0.1 cm 12/13/22 1000   Post-Procedure Surface Area (cm^2) 5.76 cm^2 12/13/22 1000   Post-Procedure Volume (cm^3) 0.576 cm^3 12/13/22 1000   Distance Tunneling (cm) 0 cm 12/13/22 0939   Tunneling Position ___ O'Clock 0 12/13/22 0939   Undermining Starts ___ O'Clock 0 12/13/22 0939   Undermining Ends___ O'Clock 0 12/13/22 0939   Undermining Maxium Distance (cm) 0 12/13/22 0939   Wound Assessment Eschar dry 12/13/22 0939   Drainage Amount None 12/13/22 0939   Odor None 12/13/22 0939   Lora-wound Assessment Hyperpigmented 12/13/22 0939   Margins Attached edges 12/13/22 0939   Wound Thickness Description not for Pressure Injury Full thickness 12/13/22 0939   Number of days: 31       Wound 11/15/22 #3 Left Posterior Ankle Cluster (Active)   Wound Image   12/13/22 0939   Wound Etiology Traumatic 12/13/22 0939   Dressing Status Other (Comment) 11/19/22 0945   Wound Cleansed Soap and water 12/13/22 0939   Dressing/Treatment Betadine swabs/povidone iodine 11/29/22 0940   Offloading for Diabetic Foot Ulcers Offloading not required 12/13/22 0939   Wound Length (cm) 2.7 cm 12/13/22 0939   Wound Width (cm) 2 cm 12/13/22 0939   Wound Depth (cm) 0.1 cm 12/13/22 0939   Wound Surface Area (cm^2) 5.4 cm^2 12/13/22 0939   Change in Wound Size % (l*w) -170 12/13/22 0939   Wound Volume (cm^3) 0.54 cm^3 12/13/22 0939   Wound Healing % -170 12/13/22 0939   Post-Procedure Length (cm) 2.7 cm 12/13/22 1000   Post-Procedure Width (cm) 2 cm 12/13/22 1000   Post-Procedure Depth (cm) 0.1 cm 12/13/22 1000   Post-Procedure Surface Area (cm^2) 5.4 cm^2 12/13/22 1000   Post-Procedure Volume (cm^3) 0.54 cm^3 12/13/22 1000   Distance Tunneling (cm) 0 cm 12/13/22 0939   Tunneling Position ___ O'Clock 0 12/13/22 0939   Undermining Starts ___ O'Clock 0 12/13/22 0939   Undermining Ends___ O'Clock 0 12/13/22 0939   Undermining Maxium Distance (cm) 0 12/13/22 0939   Wound Assessment Eschar dry 12/13/22 0939   Drainage Amount None 12/13/22 0939   Odor None 12/13/22 0939   Lora-wound Assessment Dry/flaky 12/13/22 0939   Margins Attached edges 12/13/22 0939   Wound Thickness Description not for Pressure Injury Full thickness 12/13/22 0939   Number of days: 27       Wound 11/15/22 #4 Left heel (Active)   Wound Image   12/13/22 0939   Wound Etiology Deep tissue/Injury 12/13/22 0939   Dressing Status Other (Comment) 11/19/22 0945   Wound Cleansed Soap and water 12/13/22 0939   Dressing/Treatment Betadine swabs/povidone iodine 11/29/22 0940   Offloading for Diabetic Foot Ulcers Offloading not required 12/13/22 0939   Wound Length (cm) 2.6 cm 12/13/22 0939   Wound Width (cm) 2 cm 12/13/22 0939   Wound Depth (cm) 0.1 cm 12/13/22 0939   Wound Surface Area (cm^2) 5.2 cm^2 12/13/22 0939   Change in Wound Size % (l*w) -4 12/13/22 0939   Wound Volume (cm^3) 0.52 cm^3 12/13/22 0939   Wound Healing % -4 12/13/22 0939   Post-Procedure Length (cm) 1.3 cm 11/29/22 0950   Post-Procedure Width (cm) 1.3 cm 11/29/22 0950   Post-Procedure Depth (cm) 0.1 cm 11/29/22 0950   Post-Procedure Surface Area (cm^2) 1.69 cm^2 11/29/22 0950   Post-Procedure Volume (cm^3) 0.169 cm^3 11/29/22 0950   Distance Tunneling (cm) 0 cm 12/13/22 0939   Tunneling Position ___ O'Clock 0 12/13/22 0939   Undermining Starts ___ O'Clock 0 12/13/22 0939   Undermining Ends___ O'Clock 0 12/13/22 0939   Undermining Maxium Distance (cm) 0 12/13/22 0939   Wound Assessment Eschar dry 12/13/22 0939   Drainage Amount None 12/13/22 0939   Drainage Description Yellow 12/13/22 0939   Odor None 12/13/22 0939   Lora-wound Assessment Dry/flaky 12/13/22 0939   Margins Attached edges 12/13/22 0939   Wound Thickness Description not for Pressure Injury Full thickness 12/13/22 0939   Number of days: 27       Wound 11/15/22 #5 left Anterior Foot (Active)   Wound Image   12/13/22 0939   Wound Etiology Deep tissue/Injury 12/13/22 0939   Dressing Status Other (Comment) 11/19/22 0945   Wound Cleansed Soap and water 12/13/22 0939   Dressing/Treatment Betadine swabs/povidone iodine 11/29/22 0940   Offloading for Diabetic Foot Ulcers Offloading not required 12/13/22 0939   Wound Length (cm) 0.7 cm 12/13/22 0939   Wound Width (cm) 0.4 cm 12/13/22 0939   Wound Depth (cm) 0.1 cm 12/13/22 0939   Wound Surface Area (cm^2) 0.28 cm^2 12/13/22 0939   Change in Wound Size % (l*w) 88.8 12/13/22 0939   Wound Volume (cm^3) 0.028 cm^3 12/13/22 0939   Wound Healing % 89 12/13/22 0939   Distance Tunneling (cm) 0 cm 12/13/22 0939   Tunneling Position ___ O'Clock 0 12/13/22 0939   Undermining Starts ___ O'Clock 0 12/13/22 0939   Undermining Ends___ O'Clock 0 12/13/22 0939   Undermining Maxium Distance (cm) 0 12/13/22 0939   Wound Assessment Eschar dry 12/13/22 0939   Drainage Amount None 12/13/22 0939   Odor None 12/13/22 0939   Lora-wound Assessment Intact 12/13/22 0939   Margins Attached edges 12/13/22 0939   Wound Thickness Description not for Pressure Injury Full thickness 12/13/22 0939   Number of days: 27       Wound 11/15/22 #6 left medial foot (Active)   Wound Image   12/13/22 0939   Wound Etiology Arterial 12/13/22 0939   Dressing Status Other (Comment) 11/19/22 0945   Wound Cleansed Soap and water 12/13/22 0939   Dressing/Treatment Betadine swabs/povidone iodine 11/29/22 0940   Offloading for Diabetic Foot Ulcers Offloading not required 12/13/22 0939   Wound Length (cm) 0.7 cm 12/13/22 0939   Wound Width (cm) 0.6 cm 12/13/22 0939   Wound Depth (cm) 0.1 cm 12/13/22 0939   Wound Surface Area (cm^2) 0.42 cm^2 12/13/22 0939   Change in Wound Size % (l*w) 16 12/13/22 0939   Wound Volume (cm^3) 0.042 cm^3 12/13/22 0939   Post-Procedure Length (cm) 0.7 cm 12/13/22 1000   Post-Procedure Width (cm) 0.6 cm 12/13/22 1000   Post-Procedure Depth (cm) 0.1 cm 12/13/22 1000   Post-Procedure Surface Area (cm^2) 0.42 cm^2 12/13/22 1000   Post-Procedure Volume (cm^3) 0.042 cm^3 12/13/22 1000   Distance Tunneling (cm) 0 cm 12/13/22 0939   Tunneling Position ___ O'Clock 0 12/13/22 0939   Undermining Starts ___ O'Clock 0 12/13/22 0939   Undermining Ends___ O'Clock 0 12/13/22 0939   Undermining Maxium Distance (cm) 0 12/13/22 0939   Wound Assessment Eschar dry 12/13/22 0939   Drainage Amount None 12/13/22 0939   Odor None 12/13/22 0939   Lora-wound Assessment Intact 12/13/22 0939   Margins Attached edges 12/13/22 0939   Wound Thickness Description not for Pressure Injury Full thickness 12/13/22 0939   Number of days: 27       Percent of Wound(s) Debrided: approximately 100%    Total  Area  Debrided:  22 sq cm     Bleeding:  Minimal    Hemostasis Achieved:  not needed    Procedural Pain:  4  / 10     Post Procedural Pain:  0 / 10     Response to treatment:  Well tolerated by patient.      Status of wound progress and description from last visit:   Slightly improving. Plan:       Discharge Instructions         PHYSICIAN ORDERS AND DISCHARGE INSTRUCTIONS  NOTE: Upon discharge from the [address], you will receive a patient experience survey via E-mail. We would be grateful if you would take the time to fill this survey out. Wound care order history:              KATHLEEN's   Right       Left    Date               Vascular studies/Intervention: . Cultures: .9/13/22               Antibiotics: .Cipro and Doxy 9/13/22                         HbA1c:  .6/29/22 8.2               Grafts: Jamie : . Continuing wound care orders and information:              Residence: . Private              [facility] home health care with: [address]              Your wound-care supplies will be provided by: . [facility]              Pharmacy: [facility] in [place]  Wound cleansing:                           Do not scrub or use excessive force. Wash hands with soap and water before and after dressing changes. Prior to applying a clean dressing, cleanse wound with normal saline,                          wound cleanser, or mild soap and water. Ask your physician or nurse before getting the wound(s) wet in the shower. Daily Wound management:                          Keep weight off wounds and reposition every 2 hours. Avoid standing for long periods of time. Evaluate legs to the level of the heart or above for 30 minutes 4-5 times a day and/or when sitting. When taking antibiotics take entire prescription as ordered by MD do not stop taking until medicine is all gone. Orders for this week (12/13/22) :   Left heel, foot, toe and ankle wounds -Wash with mild soap and water, rinse with saline, pat dry with 4x4  Paint with betadine  Daily     Coccyx- wash with mild soap and water  Apply santyl  Cover with sacral border  Change Tuesday, Thursday, Saturday    Dr. Brianna Chacon - [phone]      Follow up with Dr. Danna Eddy In 1 weeks in the wound care center  Call [phone] for any questions or concerns.   Date__________   Time____________        Treatment Note      Written Patient Dismissal Instructions Given            Electronically signed by Jaycee Webb MD on 12/13/2022 at 10:19 AM

## 2022-12-13 NOTE — DISCHARGE INSTRUCTIONS
PHYSICIAN ORDERS AND DISCHARGE INSTRUCTIONS  NOTE: Upon discharge from the [address], you will receive a patient experience survey via E-mail. We would be grateful if you would take the time to fill this survey out. Wound care order history:              KATHLEEN's   Right       Left    Date               Vascular studies/Intervention: . Cultures: .9/13/22               Antibiotics: .Cipro and Doxy 9/13/22                         HbA1c:  .6/29/22 8.2               Grafts: Blease [place]: . Continuing wound care orders and information:              Residence: . Private              [facility] [place] [facility] [place]              Your wound-care supplies will be provided by: . [facility]              Pharmacy: [facility] in [place]  Wound cleansing:                           Do not scrub or use excessive force. Wash hands with soap and water before and after dressing changes. Prior to applying a clean dressing, cleanse wound with normal saline,                          wound cleanser, or mild soap and water. Ask your physician or nurse before getting the wound(s) wet in the shower. Daily Wound management:                          Keep weight off wounds and reposition every 2 hours. Avoid standing for long periods of time. Evaluate legs to the level of the heart or above for 30 minutes 4-5 times a day and/or when sitting. When taking antibiotics take entire prescription as ordered by MD do not stop taking until medicine is all gone. Orders for this week (12/13/22) :   Left heel, foot, toe and ankle wounds -Wash with mild soap and water, rinse with saline, pat dry with 4x4  Paint with betadine  Daily     Coccyx- wash with mild soap and water  Apply santyl  Cover with sacral border  Change Tuesday, Thursday, Saturday    Dr. Natalia Clark - [phone]      Follow up with Dr. Kemal Daugherty In 1 weeks in the wound care center  Call [phone] for any questions or concerns.   Date__________   Time____________

## 2022-12-19 NOTE — DISCHARGE INSTRUCTIONS
PHYSICIAN ORDERS AND DISCHARGE INSTRUCTIONS  NOTE: Upon discharge from the [address], you will receive a patient experience survey via E-mail. We would be grateful if you would take the time to fill this survey out. Wound care order history:              KATHLEEN's   Right       Left    Date               Vascular studies/Intervention: . Cultures: .9/13/22, 12/20/22              Antibiotics: .Cipro and Doxy 9/13/22                         HbA1c:  .6/29/22 8.2               Grafts: Lizeth Mary: . Continuing wound care orders and information:              Residence: . Private              [facility] home health care with: [facility] [place]              Your wound-care supplies will be provided by: . [facility]              Pharmacy: .[facility] in [place]  Wound cleansing:                           Do not scrub or use excessive force. Wash hands with soap and water before and after dressing changes. Prior to applying a clean dressing, cleanse wound with normal saline,                          wound cleanser, or mild soap and water. Ask your physician or nurse before getting the wound(s) wet in the shower. Daily Wound management:                          Keep weight off wounds and reposition every 2 hours. Avoid standing for long periods of time. Evaluate legs to the level of the heart or above for 30 minutes 4-5 times a day and/or when sitting. When taking antibiotics take entire prescription as ordered by MD do not stop taking until medicine is all gone. Orders for this week (12/20/22) :   Left heel, foot, toe and ankle wounds -Wash with mild soap and water, rinse with saline, pat dry with 4x4  Paint with betadine  Daily     Coccyx- wash with mild soap and water  In Clinic only -   Gently pack with dakins dampened gauze  Cover with sacral border  Leave till Thursday  ON Thursday -   Apply santyl to wound bed  Gently pack 4x4  Cover with Sacral Border  Change on Saturday  Once Vac arrives -   Apply mastisol and duoderm to periwound for protection. Santyl to wound bed  Gently pack wound with black foam, be sure to tuck into any tunnels or undermining  Secure with vac drape. Set wound vac to 125 continuous suction. Change canister with each dressing change or as needed. Change on Tuesday, Thursday and Saturday     Dr. Pruett Divers - [phone]      Follow up with Dr. Daily Patel In 1 weeks in the wound care center  Call [phone] for any questions or concerns.   Date__________   Time____________

## 2022-12-20 ENCOUNTER — HOSPITAL ENCOUNTER (OUTPATIENT)
Dept: WOUND CARE | Age: 57
Discharge: HOME | End: 2022-12-20
Payer: MEDICARE

## 2022-12-20 VITALS
RESPIRATION RATE: 16 BRPM | TEMPERATURE: 97.1 F | HEART RATE: 98 BPM | DIASTOLIC BLOOD PRESSURE: 79 MMHG | SYSTOLIC BLOOD PRESSURE: 175 MMHG

## 2022-12-20 DIAGNOSIS — L89.153 PRESSURE ULCER OF SACRAL REGION, STAGE 3 (HCC): ICD-10-CM

## 2022-12-20 DIAGNOSIS — M79.671 ACUTE PAIN OF RIGHT FOOT: ICD-10-CM

## 2022-12-20 DIAGNOSIS — L97.422 DIABETIC ULCER OF LEFT HEEL ASSOCIATED WITH TYPE 2 DIABETES MELLITUS, WITH FAT LAYER EXPOSED (HCC): ICD-10-CM

## 2022-12-20 DIAGNOSIS — E11.621 DIABETIC ULCER OF LEFT HEEL ASSOCIATED WITH TYPE 2 DIABETES MELLITUS, WITH FAT LAYER EXPOSED (HCC): ICD-10-CM

## 2022-12-20 DIAGNOSIS — L97.414 DIABETIC ULCER OF RIGHT HEEL ASSOCIATED WITH TYPE 2 DIABETES MELLITUS, WITH NECROSIS OF BONE (HCC): Primary | ICD-10-CM

## 2022-12-20 DIAGNOSIS — E11.621 DIABETIC ULCER OF RIGHT HEEL ASSOCIATED WITH TYPE 2 DIABETES MELLITUS, WITH NECROSIS OF BONE (HCC): Primary | ICD-10-CM

## 2022-12-20 PROBLEM — L97.522 DIABETIC ULCER OF LEFT FOOT ASSOCIATED WITH TYPE 2 DIABETES MELLITUS, WITH FAT LAYER EXPOSED (HCC): Status: ACTIVE | Noted: 2022-12-20

## 2022-12-20 PROCEDURE — 11045 DBRDMT SUBQ TISS EACH ADDL: CPT

## 2022-12-20 PROCEDURE — 87077 CULTURE AEROBIC IDENTIFY: CPT

## 2022-12-20 PROCEDURE — 11042 DBRDMT SUBQ TIS 1ST 20SQCM/<: CPT

## 2022-12-20 PROCEDURE — 87075 CULTR BACTERIA EXCEPT BLOOD: CPT

## 2022-12-20 PROCEDURE — 87076 CULTURE ANAEROBE IDENT EACH: CPT

## 2022-12-20 PROCEDURE — 87070 CULTURE OTHR SPECIMN AEROBIC: CPT

## 2022-12-20 PROCEDURE — 87186 SC STD MICRODIL/AGAR DIL: CPT

## 2022-12-20 RX ORDER — LIDOCAINE 40 MG/G
CREAM TOPICAL ONCE
OUTPATIENT
Start: 2022-12-20 | End: 2022-12-20

## 2022-12-20 RX ORDER — LIDOCAINE HYDROCHLORIDE 20 MG/ML
JELLY TOPICAL ONCE
OUTPATIENT
Start: 2022-12-20 | End: 2022-12-20

## 2022-12-20 RX ORDER — GENTAMICIN SULFATE 1 MG/G
OINTMENT TOPICAL ONCE
OUTPATIENT
Start: 2022-12-20 | End: 2022-12-20

## 2022-12-20 RX ORDER — BACITRACIN, NEOMYCIN, POLYMYXIN B 400; 3.5; 5 [USP'U]/G; MG/G; [USP'U]/G
OINTMENT TOPICAL ONCE
OUTPATIENT
Start: 2022-12-20 | End: 2022-12-20

## 2022-12-20 RX ORDER — GINSENG 100 MG
CAPSULE ORAL ONCE
OUTPATIENT
Start: 2022-12-20 | End: 2022-12-20

## 2022-12-20 RX ORDER — CLOBETASOL PROPIONATE 0.5 MG/G
OINTMENT TOPICAL ONCE
OUTPATIENT
Start: 2022-12-20 | End: 2022-12-20

## 2022-12-20 RX ORDER — BETAMETHASONE DIPROPIONATE 0.05 %
OINTMENT (GRAM) TOPICAL ONCE
OUTPATIENT
Start: 2022-12-20 | End: 2022-12-20

## 2022-12-20 RX ORDER — BACITRACIN ZINC AND POLYMYXIN B SULFATE 500; 1000 [USP'U]/G; [USP'U]/G
OINTMENT TOPICAL ONCE
OUTPATIENT
Start: 2022-12-20 | End: 2022-12-20

## 2022-12-20 RX ORDER — LIDOCAINE HYDROCHLORIDE 40 MG/ML
SOLUTION TOPICAL ONCE
OUTPATIENT
Start: 2022-12-20 | End: 2022-12-20

## 2022-12-20 RX ORDER — LIDOCAINE 50 MG/G
OINTMENT TOPICAL ONCE
OUTPATIENT
Start: 2022-12-20 | End: 2022-12-20

## 2022-12-20 ASSESSMENT — PAIN DESCRIPTION - LOCATION: LOCATION: BUTTOCKS

## 2022-12-20 ASSESSMENT — PAIN DESCRIPTION - ONSET: ONSET: ON-GOING

## 2022-12-20 ASSESSMENT — PAIN DESCRIPTION - ORIENTATION: ORIENTATION: MID

## 2022-12-20 ASSESSMENT — PAIN DESCRIPTION - PAIN TYPE: TYPE: CHRONIC PAIN

## 2022-12-20 ASSESSMENT — PAIN DESCRIPTION - FREQUENCY: FREQUENCY: CONTINUOUS

## 2022-12-20 ASSESSMENT — PAIN - FUNCTIONAL ASSESSMENT: PAIN_FUNCTIONAL_ASSESSMENT: PREVENTS OR INTERFERES SOME ACTIVE ACTIVITIES AND ADLS

## 2022-12-20 ASSESSMENT — PAIN DESCRIPTION - DESCRIPTORS: DESCRIPTORS: ACHING;SORE
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
## 2022-12-20 NOTE — PLAN OF CARE
Problem: Chronic Conditions and Co-morbidities  Goal: Patient's chronic conditions and co-morbidity symptoms are monitored and maintained or improved  Outcome: Not Progressing     Problem: Pain  Goal: Verbalizes/displays adequate comfort level or baseline comfort level  Outcome: Not Progressing     Problem: Chronic Conditions and Co-morbidities  Goal: Patient's chronic conditions and co-morbidity symptoms are monitored and maintained or improved  Outcome: Not Progressing     Problem: Pain  Goal: Verbalizes/displays adequate comfort level or baseline comfort level  Outcome: Not Progressing

## 2022-12-23 LAB
CULTURE: ABNORMAL
Lab: ABNORMAL
SPECIMEN: ABNORMAL

## 2022-12-27 ENCOUNTER — HOSPITAL ENCOUNTER (OUTPATIENT)
Dept: WOUND CARE | Age: 57
Discharge: HOME | End: 2022-12-27
Payer: MEDICARE

## 2022-12-27 VITALS
DIASTOLIC BLOOD PRESSURE: 77 MMHG | TEMPERATURE: 97.6 F | RESPIRATION RATE: 18 BRPM | HEART RATE: 91 BPM | SYSTOLIC BLOOD PRESSURE: 165 MMHG

## 2022-12-27 DIAGNOSIS — L97.422 DIABETIC ULCER OF LEFT HEEL ASSOCIATED WITH TYPE 2 DIABETES MELLITUS, WITH FAT LAYER EXPOSED (HCC): ICD-10-CM

## 2022-12-27 DIAGNOSIS — E11.621 DIABETIC ULCER OF LEFT HEEL ASSOCIATED WITH TYPE 2 DIABETES MELLITUS, WITH FAT LAYER EXPOSED (HCC): ICD-10-CM

## 2022-12-27 DIAGNOSIS — L89.153 PRESSURE ULCER OF SACRAL REGION, STAGE 3 (HCC): Primary | ICD-10-CM

## 2022-12-27 DIAGNOSIS — Z89.511 S/P BKA (BELOW KNEE AMPUTATION), RIGHT (HCC): ICD-10-CM

## 2022-12-27 PROCEDURE — 97605 NEG PRS WND THER DME<=50SQCM: CPT

## 2022-12-27 PROCEDURE — 11042 DBRDMT SUBQ TIS 1ST 20SQCM/<: CPT | Performed by: NURSE PRACTITIONER

## 2022-12-27 PROCEDURE — 11042 DBRDMT SUBQ TIS 1ST 20SQCM/<: CPT

## 2022-12-27 RX ORDER — CIPROFLOXACIN 250 MG/1
250 TABLET, FILM COATED ORAL 2 TIMES DAILY
Qty: 20 TABLET | Refills: 0 | Status: SHIPPED | OUTPATIENT
Start: 2022-12-27 | End: 2023-01-06

## 2022-12-27 ASSESSMENT — PAIN DESCRIPTION - ONSET: ONSET: ON-GOING

## 2022-12-27 ASSESSMENT — PAIN DESCRIPTION - FREQUENCY: FREQUENCY: CONTINUOUS

## 2022-12-27 ASSESSMENT — PAIN DESCRIPTION - LOCATION: LOCATION: SACRUM

## 2022-12-27 ASSESSMENT — PAIN DESCRIPTION - DESCRIPTORS: DESCRIPTORS: ACHING;SORE

## 2022-12-27 ASSESSMENT — PAIN SCALES - GENERAL: PAINLEVEL_OUTOF10: 5
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
## 2022-12-27 NOTE — DISCHARGE INSTRUCTIONS
PHYSICIAN ORDERS AND DISCHARGE INSTRUCTIONS  NOTE: Upon discharge from the [address], you will receive a patient experience survey via E-mail. We would be grateful if you would take the time to fill this survey out. Wound care order history:              KATHLEEN's   Right       Left    Date               Vascular studies/Intervention: . Cultures: .9/13/22, 12/20/22              Antibiotics: .Cipro and Doxy 9/13/22                         HbA1c:  .6/29/22 8.2               Grafts: Merl Damme: . Continuing wound care orders and information:              Residence: . Private              [facility] home health care with: [facility]              Your wound-care supplies will be provided by: . [facility]              Pharmacy: .[facility] in [place]  Wound cleansing:                           Do not scrub or use excessive force. Wash hands with soap and water before and after dressing changes. Prior to applying a clean dressing, cleanse wound with normal saline,                          wound cleanser, or mild soap and water. Ask your physician or nurse before getting the wound(s) wet in the shower. Daily Wound management:                          Keep weight off wounds and reposition every 2 hours. Avoid standing for long periods of time. Evaluate legs to the level of the heart or above for 30 minutes 4-5 times a day and/or when sitting. When taking antibiotics take entire prescription as ordered by MD do not stop taking until medicine is all gone. Orders for this week (12/27/22) :   Left heel, foot, toe and ankle wounds -Wash with mild soap and water, rinse with saline, pat dry with 4x4  Paint with betadine  Daily     Coccyx- wash with mild soap and water  Apply mastisol and duoderm to periwound for protection. Santyl to wound bed  Gently pack wound with black foam, be sure to tuck into any tunnels or undermining  Secure with vac drape. Set wound vac to 125 continuous suction. Change canister with each dressing change or as needed. Change on Tuesday, Thursday and Saturday     Dr. Pauly Wells - [phone]      Follow up with Dr. Geary Kanner In 1 weeks in the wound care center  Call [phone] for any questions or concerns.   Date__________   Time____________

## 2022-12-27 NOTE — PROGRESS NOTES
Nonselective enzymatic debridement performed with Santyl per physician order to wound(s) of the sacral  Patient tolerated the procedure well.

## 2022-12-27 NOTE — PROGRESS NOTES
Negative Pressure Wound Therapy    NAME:  Kg Fong  YOB: 1965  MEDICAL RECORD NUMBER:  [id number]  DATE:  12/27/2022    Applied Negative Pressure to sacrum wound(s)/ulcer(s). [x] Applied skin barrier prep to kamla-wound. [x] Cut strips of plastic drape to picture frame wound so that kamla-wound is     covered with the drape. [x] If bridging dressing to less prominent site, cover any intact skin that will come in contact with the Negative Pressure Therapy sponge, gauze or channel drain with plastic drape. The sponge should never touch intact skin. [x] Cut sponge, gauze or channel drain to size which will fit into the wound/ulcer bed without being forced. [x] Be sure the sponge is large enough to hold the entire round plastic flange which is attached to the tubing. Never allow flange to be larger than the sponge or it will produce suction damaging intact skin. Total number of individual pieces of foam used within the wound bed: TWO    [x] If bridging the dressing away from the primary site, be sure the bridge leads to a piece of sponge large enough to hold the entire flange without allowing any of the flange to overlap onto intact skin. [x] Covered sponge, gauze or channel drain with plastic drape. [x] Cut a hole in this plastic drape directly over the sponge the same size as the plastic drain tubing. [x] Removed plastic liner from flange and apply it directly over the hole you cut. [x] Removed the plastic cover from the flange. [x] Attached the tubing to the wound/ulcer Negative Pressure Therapy and turn it on to be sure a vacuum is created and that there are no leaks. [x] If air leaks occur, use plastic drape to patch them. [x] Secured Negative Pressure Therapy dressing with ace wrap loosely if located on an extremity. Maintain tubing outside of ace wrap. Tubing must not exert pressure on intact skin.     Applied per  Guidelines      Electronically signed by Brandie Villa LPN on 36/13/6781 at 11:36 AM

## 2023-01-03 ENCOUNTER — HOSPITAL ENCOUNTER (OUTPATIENT)
Dept: WOUND CARE | Age: 58
Discharge: HOME | End: 2023-01-03

## 2023-01-03 NOTE — DISCHARGE INSTRUCTIONS
PHYSICIAN ORDERS AND DISCHARGE INSTRUCTIONS  NOTE: Upon discharge from the [address], you will receive a patient experience survey via E-mail. We would be grateful if you would take the time to fill this survey out. Wound care order history:              KATHLEEN's   Right       Left    Date               Vascular studies/Intervention: . Cultures: .9/13/22, 12/20/22              Antibiotics: .Cipro and Doxy 9/13/22                         HbA1c:  .6/29/22 8.2               Grafts: Guillermo Heart: . Continuing wound care orders and information:              Residence: . Private              [facility] health care with: [facility]              Your wound-care supplies will be provided by: . [facility]              Pharmacy: .[facility] in [facility]  Wound cleansing:                           Do not scrub or use excessive force. Wash hands with soap and water before and after dressing changes. Prior to applying a clean dressing, cleanse wound with normal saline,                          wound cleanser, or mild soap and water. Ask your physician or nurse before getting the wound(s) wet in the shower. Daily Wound management:                          Keep weight off wounds and reposition every 2 hours. Avoid standing for long periods of time. Evaluate legs to the level of the heart or above for 30 minutes 4-5 times a day and/or when sitting. When taking antibiotics take entire prescription as ordered by MD do not stop taking until medicine is all gone. Orders for this week (1/3/23) :   Left heel, foot, toe and ankle wounds -Wash with mild soap and water, rinse with saline, pat dry with 4x4  Paint with betadine  Daily     Coccyx- wash with mild soap and water  Apply mastisol and duoderm to periwound for protection. Santyl to wound bed  Gently pack wound with black foam, be sure to tuck into any tunnels or undermining  Secure with vac drape. Set wound vac to 125 continuous suction. Change canister with each dressing change or as needed. Change on Tuesday, Thursday and Saturday     Dr. Radha Webster - [phone]      Follow up with Dr. Erasto Hassan In 1 weeks in the wound care center  Call [phone] for any questions or concerns.   Date__________   Time____________

## 2023-01-10 ENCOUNTER — HOSPITAL ENCOUNTER (OUTPATIENT)
Dept: WOUND CARE | Age: 58
Discharge: HOME | End: 2023-01-10
Payer: MEDICARE

## 2023-01-10 VITALS
RESPIRATION RATE: 16 BRPM | HEART RATE: 92 BPM | SYSTOLIC BLOOD PRESSURE: 155 MMHG | DIASTOLIC BLOOD PRESSURE: 80 MMHG | TEMPERATURE: 97.7 F

## 2023-01-10 DIAGNOSIS — L97.414 DIABETIC ULCER OF RIGHT HEEL ASSOCIATED WITH TYPE 2 DIABETES MELLITUS, WITH NECROSIS OF BONE (HCC): Primary | ICD-10-CM

## 2023-01-10 DIAGNOSIS — M79.671 ACUTE PAIN OF RIGHT FOOT: ICD-10-CM

## 2023-01-10 DIAGNOSIS — E11.621 DIABETIC ULCER OF RIGHT HEEL ASSOCIATED WITH TYPE 2 DIABETES MELLITUS, WITH NECROSIS OF BONE (HCC): Primary | ICD-10-CM

## 2023-01-10 DIAGNOSIS — L89.153 PRESSURE ULCER OF SACRAL REGION, STAGE 3 (HCC): ICD-10-CM

## 2023-01-10 PROCEDURE — 11042 DBRDMT SUBQ TIS 1ST 20SQCM/<: CPT

## 2023-01-10 PROCEDURE — 97605 NEG PRS WND THER DME<=50SQCM: CPT

## 2023-01-10 PROCEDURE — 11043 DBRDMT MUSC&/FSCA 1ST 20/<: CPT

## 2023-01-10 RX ORDER — LIDOCAINE 40 MG/G
CREAM TOPICAL ONCE
OUTPATIENT
Start: 2023-01-10 | End: 2023-01-10

## 2023-01-10 RX ORDER — GENTAMICIN SULFATE 1 MG/G
OINTMENT TOPICAL ONCE
OUTPATIENT
Start: 2023-01-10 | End: 2023-01-10

## 2023-01-10 RX ORDER — LIDOCAINE HYDROCHLORIDE 40 MG/ML
SOLUTION TOPICAL ONCE
OUTPATIENT
Start: 2023-01-10 | End: 2023-01-10

## 2023-01-10 RX ORDER — BACITRACIN ZINC AND POLYMYXIN B SULFATE 500; 1000 [USP'U]/G; [USP'U]/G
OINTMENT TOPICAL ONCE
OUTPATIENT
Start: 2023-01-10 | End: 2023-01-10

## 2023-01-10 RX ORDER — CLOBETASOL PROPIONATE 0.5 MG/G
OINTMENT TOPICAL ONCE
OUTPATIENT
Start: 2023-01-10 | End: 2023-01-10

## 2023-01-10 RX ORDER — LIDOCAINE 50 MG/G
OINTMENT TOPICAL ONCE
OUTPATIENT
Start: 2023-01-10 | End: 2023-01-10

## 2023-01-10 RX ORDER — BACITRACIN, NEOMYCIN, POLYMYXIN B 400; 3.5; 5 [USP'U]/G; MG/G; [USP'U]/G
OINTMENT TOPICAL ONCE
OUTPATIENT
Start: 2023-01-10 | End: 2023-01-10

## 2023-01-10 RX ORDER — BETAMETHASONE DIPROPIONATE 0.05 %
OINTMENT (GRAM) TOPICAL ONCE
OUTPATIENT
Start: 2023-01-10 | End: 2023-01-10

## 2023-01-10 RX ORDER — LIDOCAINE HYDROCHLORIDE 20 MG/ML
JELLY TOPICAL ONCE
OUTPATIENT
Start: 2023-01-10 | End: 2023-01-10

## 2023-01-10 RX ORDER — GINSENG 100 MG
CAPSULE ORAL ONCE
OUTPATIENT
Start: 2023-01-10 | End: 2023-01-10

## 2023-01-10 NOTE — DISCHARGE INSTRUCTIONS
PHYSICIAN ORDERS AND DISCHARGE INSTRUCTIONS  NOTE: Upon discharge from the [address], you will receive a patient experience survey via E-mail. We would be grateful if you would take the time to fill this survey out. Wound care order history:              KATHLEEN's   Right       Left    Date               Vascular studies/Intervention: . Cultures: .9/13/22, 12/20/22              Antibiotics: .Cipro and Doxy 9/13/22                         HbA1c:  .6/29/22 8.2               Grafts: Tiffani Kras: . Continuing wound care orders and information:              Residence: . Private              [facility] home health care with: [facility]              Your wound-care supplies will be provided by: . [facility]              Pharmacy: .[facility] in [facility]  Wound cleansing:                           Do not scrub or use excessive force. Wash hands with soap and water before and after dressing changes. Prior to applying a clean dressing, cleanse wound with normal saline,                          wound cleanser, or mild soap and water. Ask your physician or nurse before getting the wound(s) wet in the shower. Daily Wound management:                          Keep weight off wounds and reposition every 2 hours. Avoid standing for long periods of time. Evaluate legs to the level of the heart or above for 30 minutes 4-5 times a day and/or when sitting. When taking antibiotics take entire prescription as ordered by MD do not stop taking until medicine is all gone. Orders for this week (1/10/22) :   Left finger- Apply silvadene to wound bed  Cover with Bandaid  Change Daily    Left Posterior Ankle- Wash with mild soap and water  Apply anasept gel and stimulen to wound bed  Secure with silicone border/zetuvit  Change Daily    Left heel, foot, and toe wounds -Wash with mild soap and water, rinse with saline, pat dry with 4x4  Paint with betadine  Daily     Coccyx- wash with mild soap and water  Apply mastisol and duoderm to periwound for protection. Santyl to wound bed  Gently pack wound with black foam, be sure to tuck into any tunnels or undermining  Secure with vac drape. Set wound vac to 125 continuous suction. Change canister with each dressing change or as needed. Change on Tuesday, Thursday and Saturday     Dr. Mariano Augustin - [phone]      Follow up with Dr. Leander Araujo In 1 weeks in the wound care center  Call [phone] for any questions or concerns.   Date__________   Time____________

## 2023-01-10 NOTE — PROGRESS NOTES
[facility] Center Progress Note With Procedure    Pauly Willis  AGE: 62 y.o. GENDER: female  : 1965  EPISODE DATE:  1/10/2023     Subjective:     Chief Complaint   Patient presents with    Wound Check     Armanodum, LLE,         HISTORY of PRESENT ILLNESS      Pauly Willis is a 62 y.o. female who presents today for wound evaluation of Chronic arterial, diabetic, and pressure ulcer(s) of the right heel and sacral area. The ulcer is of moderate severity. The underlying cause of the wound is diabetes, pressure and PAD. She also has end-stage renal artery disease and is on hemodialysis. I did tell her it is okay to start a walking with her prosthesis. I also called Dr. Richard Patel and he will get her rescheduled for her right lower extremity angiogram.  She had to cancel because she could not find transportation.   Wound Pain Timing/Severity: mild  Quality of pain: tender  Severity of pain:  3 / 10   Modifying Factors: diabetes, chronic pressure, shear force, smoking, arterial insufficiency, and ESRD on HD  Associated Signs/Symptoms: edema, drainage, and pain        PAST MEDICAL HISTORY        Diagnosis Date    Acute pain of right foot 2022    CAD (coronary artery disease)     s/p CABG x 4;  follows with Dr Eleanor Noonan of foot 2018    Carpal tunnel syndrome on both sides     CHF (congestive heart failure) ([address] 10/2010    Chronic diastolic; EF 75%    Chronic kidney disease     stage 4 kidney disease    Chronic ulcer of left ankle with fat layer exposed ([address] 10/7/2015    Chronic ulcer of left foot with fat layer exposed ([address] 3/17/2016    Chronic ulcer of right ankle with fat layer exposed ([address] 3/17/2016    Chronic ulcer of right foot with fat layer exposed ([address] 3/17/2016    Decubitus ulcer of sacral region, stage 1 2018    Depression     Diabetes mellitus ([address]     Diabetes mellitus with neurological manifestations ([address]     Diabetes mellitus with ulcer of ankle ([address]     Diabetic ulcer of left foot associated with type 2 diabetes mellitus, with fat layer exposed ([address] 8/6/2018    Diabetic ulcer of right heel associated with type 2 diabetes mellitus, with muscle involvement without evidence of necrosis ([address] 9/13/2022    Diabetic ulcer of right heel associated with type 2 diabetes mellitus, with necrosis of bone ([address] 9/13/2022    ESRD on hemodialysis ([address] 9/21/2022    Family history of cardiovascular disease     Mother    GERD (gastroesophageal reflux disease)     H/O cardiac catheterization 10/18/2010, 6/3/2010    10/18/2010-Four bypass grafts all widely patent. 6/3/2010- Moderate to severe triple vessel disease, preserved LV systolic function. H/O cardiovascular stress test 7/26/2012, 8/3/2011, 10/14/2010, 5/24/2010 7/26/2012-Lexiscan- Normal Myocardial Perfusion Study. Post stress myocardial perfusion images show a normal pattern of perfusion in all regions. Post stress LV normal size. Normal perfusion in the distribution of all coronaries. Normal LV size and function. Rest EF 70%    H/O chest x-ray 7/12/2012, 6/2/2010 7/12/2012-Perihilar peribronchial cuffing with mild basilar predominant interstital prominence, which may reflect interstitial edema or atypical pneumonia. H/O Doppler ultrasound 6/4/2010    CAROTID DOPPLER-6/4/2010-No Doppler evidence of hemodynamically significant Carotid Stenosis with antegrade flow in the vertebral arteries bilaterally. 6/4/2010 PERIPHERAL VENOUS DOPPLER_ LEFT Saphenous Vein mapping    H/O echocardiogram 7/26/2012, 8/3/2011, 10/2010, 7/23/2010, 6/8/2010 7/26/2012-LV normal size. Normal LV wall thickness. LV systolic function normal. EF => 55%. LV wall motion normal. Mild MR. Mild TR.      History of complete ECG 7/26/2012 Armen Cox), 7/13/2012 [facility]), 7/25/2011, 3/25/2011, 10/18/2010, 10/11/2010, 6/23/2010, 5/7/2010    Hx of cardiovascular stress test 9/3/2015    lexiscan-normal,EF66%    Hx of Doppler ultrasound 4/5/16    Arterial: There is a fluid collection in the left thigh, please refer to PCP for further monitoring, no vascular in etiology. Hx of echocardiogram     4/16 EF40% Mild MR/TR and mild Pulm HTN. 9/15 EF 45-50%, Mildly dilated L atrium, mildly dilated R ventricle. Mild MR & mild TR     Hyperlipidemia     Neuropathy of foot     Pt reports starting approx. 6-7 years ago    Neuropathy of hand     Pt reports starting approx.  6-7 years ago    Non compliance with medical treatment 7/2/2018    Pressure ulcer of sacral region, stage 2 ([address] 11/29/2022    Pressure ulcer of sacral region, stage 3 ([address] 11/29/2022    S/P BKA (below knee amputation), right ([address] 11/29/2022    S/P CABG x 4 6/5/2010    LIMA-> LAD;  VG->CX;  VG->Diagonal; VG-> distal RCA  per  Dr Carmen Rosen    Type 2 diabetes mellitus with diabetic polyneuropathy, with long-term current use of insulin ([address] 4/5/2016    Type II or unspecified type diabetes mellitus with neurological manifestations, not stated as uncontrolled(250.60)     Type II or unspecified type diabetes mellitus with other specified manifestations, not stated as uncontrolled     Ulcer of left foot, with fat layer exposed ([address] 12/19/2013    Ulcer of other part of foot        PAST SURGICAL HISTORY    Past Surgical History:   Procedure Laterality Date    APPENDECTOMY      CARDIAC SURGERY      CARPAL TUNNEL RELEASE      L hand Nov. 2011, R hand Jan. 2012    CARPAL TUNNEL RELEASE Right 6/10/2013    recurrent    CARPAL TUNNEL RELEASE Left 7/29/2013    with ulnar nerve transpostion and L middle finger release    CHOLECYSTECTOMY      COLONOSCOPY      CORONARY ARTERY BYPASS GRAFT  6/5/2010 6/5/2010-LIMA->LAD;  VG-> Diagonal;  VG-> Obtuse marginal;  VG->Distal RCA-   Dr Chelsey Roe Right 6/10/2013    HYSTERECTOMY, TOTAL ABDOMINAL (CERVIX REMOVED)      LEG AMPUTATION BELOW KNEE Right 11/11/2022    RIGHT LEG AMPUTATION BELOW KNEE WITH IPOP performed by Laureen Ny MD at [address] [place] [address] AMPUTATION Left  toe    TUBAL LIGATION      ULNAR TUNNEL RELEASE Right 6/10/2013       FAMILY HISTORY    Family History   Problem Relation Age of Onset    Heart Disease Mother     Kidney Disease Mother         dialysis    Cancer Father        SOCIAL HISTORY    Social History     Tobacco Use    Smoking status: Former     Packs/day: 0.25     Years: 30.00     Pack years: 7.50     Types: Cigarettes     Quit date: 2022     Years since quittin.3    Smokeless tobacco: Never    Tobacco comments:     quit smoking ciagarettes 16   Vaping Use    Vaping Use: Every day    Substances: Never   Substance Use Topics    Alcohol use: No     Alcohol/week: 0.0 standard drinks     Comment:                                    CAFFEINE: 2 sodas daily    Drug use: No       ALLERGIES    Allergies   Allergen Reactions    Latex     Codeine     Cortisone     Cortizone     Iodides     Phenobarbital Other (See Comments)     Hallucinations     Vancomycin Other (See Comments)     \"makes me very sick\"       MEDICATIONS    Current Outpatient Medications on File Prior to Encounter   Medication Sig Dispense Refill    silver sulfADIAZINE (SILVADENE) 1 % cream Apply topically daily. 1000 g 1    calcium acetate (PHOSLO) 667 MG CAPS capsule Take 1 capsule with each meal and 1 tablet with each snack. 540 capsule 3    gabapentin (NEURONTIN) 400 MG capsule Take 800 mg by mouth in the morning and at bedtime.       OXYGEN Inhale 2 L into the lungs daily as needed      carvedilol (COREG) 6.25 MG tablet 6.25 mg 2 times daily       albuterol sulfate HFA (PROVENTIL;VENTOLIN;PROAIR) 108 (90 Base) MCG/ACT inhaler Inhale 2 puffs into the lungs every 6 hours as needed for Wheezing      esomeprazole Magnesium (NEXIUM) 40 MG PACK Take 20 mg by mouth 2 times daily 2 tabs      LORazepam (ATIVAN) 0.5 MG tablet Take 0.5 mg by mouth every 12 hours Indications: One Tablet twice daily       Insulin Aspart (NOVOLOG FLEXPEN SC) Inject 15 Units into the skin 3 times daily (before meals) On sliding scale      lisinopril (PRINIVIL;ZESTRIL) 10 MG tablet Take 20 mg by mouth daily      furosemide (LASIX) 20 MG tablet Take 40 mg by mouth 2 times daily        No current facility-administered medications on file prior to encounter. REVIEW OF SYSTEMS    Pertinent items are noted in HPI. Constitutional: Negative for systemic symptoms including fever, chills and malaise. Objective:      BP (!) 155/80   Pulse 92   Temp 97.7 °F (36.5 °C) (Temporal)   Resp 16     PHYSICAL EXAM      General: The patient is in no acute distress. Mental status:  Patient is appropriate, is  oriented to place and plan of care. Dermatologic exam: Visual inspection of the periwound reveals the skin to be moist  Wound exam: see wound description below in procedure note      Assessment:     Problem List Items Addressed This Visit       Acute pain of right foot    Relevant Orders    Initiate Outpatient Wound Care Protocol    Diabetic ulcer of right heel associated with type 2 diabetes mellitus, with necrosis of bone ([address] - Primary    Relevant Orders    Initiate Outpatient Wound Care Protocol    Pressure ulcer of sacral region, stage 3 ([address]     Procedure Note    Indications:  Based on my examination of this patient's wound(s) today, sharp excision into necrotic muscle/fascia is required to promote healing and evaluate the extent of previous healing. Performed by: Norbert Fisher MD    Consent obtained: Yes    Time out taken:  Yes    Pain Control:       Debridement:Excisional Debridement    Using #15 blade scalpel, scissors, and forceps the wound(s) was/were sharply debrided down through and including the removal of muscle/fascia.         Devitalized Tissue Debrided:  slough, necrotic/eschar, and exudate    Pre Debridement Measurements:  Are located in the Wound Documentation Flow Sheet    All active wounds listed below with today's date are evaluated  Wound(s)    debrided this date include # : 2 and 3     Post  Debridement Measurements:  Negative Pressure Wound Therapy Foot (Active)   Number of days: 3251       Negative Pressure Wound Therapy Foot Distal (Active)   Number of days: 4333       Negative Pressure Wound Therapy (Active)   Wound Type Diabetic foot ulcer 10/11/22 1145   Unit Type [employer] 01/10/23 0949   Dressing Type Black Foam 01/10/23 0949   Number of pieces used 1 10/11/22 1145   Number of pieces removed 1 01/10/23 0949   Cycle Continuous 01/10/23 0949   Target Pressure (mmHg) 125 01/10/23 0949   Dressing Status New dressing applied;Clean, dry & intact 09/27/22 1045   Dressing Changed Changed/New 01/10/23 0949   Drainage Amount Moderate 01/10/23 0949   Drainage Description Serosanguinous 01/10/23 0949   Wound Assessment Slough 01/10/23 0949   Lora-wound Assessment Fragile 01/10/23 0949   Number of days: 110       Incision 06/10/13 Wrist Right (Active)   Number of days: 3500       Incision 07/29/13 Wrist Left (Active)   Number of days: 3938       Incision 02/14/14 Toe (Comment  which one) (Active)   Number of days: 4221       Wound 11/01/22 #2 Sacrum (Active)   Wound Image   01/10/23 0949   Wound Etiology Pressure Unstageable 12/13/22 0939   Dressing Status Clean;Dry;New dressing applied 12/27/22 1131   Wound Cleansed Wound cleanser;Irrigated with saline 01/10/23 0949   Dressing/Treatment Other (comment) 12/13/22 1019   Offloading for Diabetic Foot Ulcers Offloading not required; Other (comment) 01/10/23 0949   Wound Length (cm) 3.9 cm 01/10/23 0949   Wound Width (cm) 1.4 cm 01/10/23 0949   Wound Depth (cm) 1 cm 01/10/23 0949   Wound Surface Area (cm^2) 5.46 cm^2 01/10/23 0949   Change in Wound Size % (l*w) -9.2 01/10/23 0949   Wound Volume (cm^3) 5.46 cm^3 01/10/23 0949   Wound Healing % -992 01/10/23 0949   Post-Procedure Length (cm) 3.9 cm 01/10/23 1026   Post-Procedure Width (cm) 1.4 cm 01/10/23 1026   Post-Procedure Depth (cm) 1 cm 01/10/23 1026 Post-Procedure Surface Area (cm^2) 5.46 cm^2 01/10/23 1026   Post-Procedure Volume (cm^3) 5.46 cm^3 01/10/23 1026   Distance Tunneling (cm) 0 cm 01/10/23 0949   Tunneling Position ___ O'Clock 0 01/10/23 0949   Undermining Starts ___ O'Clock 0900 01/10/23 0949   Undermining Ends___ O'Clock 0300 01/10/23 0949   Undermining Maxium Distance (cm) 2.7 01/10/23 0949   Wound Assessment Slough;Pink/red 01/10/23 0949   Drainage Amount Moderate 01/10/23 0949   Drainage Description Serosanguinous 01/10/23 0949   Odor None 01/10/23 0949   Lora-wound Assessment Blanchable erythema;Fragile 01/10/23 0949   Margins Defined edges 01/10/23 0949   Wound Thickness Description not for Pressure Injury Full thickness 01/10/23 0949   Number of days: 70       Wound 11/11/22  #1 Left Great Toe (Active)   Wound Image   01/10/23 0949   Wound Etiology Arterial 12/20/22 0952   Dressing Status Clean;Dry;New dressing applied 12/27/22 1131   Wound Cleansed Soap and water 01/10/23 0949   Dressing/Treatment Other (comment) 12/13/22 1019   Offloading for Diabetic Foot Ulcers Other (comment) 01/10/23 0949   Wound Length (cm) 1.5 cm 01/10/23 0949   Wound Width (cm) 3.5 cm 01/10/23 0949   Wound Depth (cm) 0.1 cm 01/10/23 0949   Wound Surface Area (cm^2) 5.25 cm^2 01/10/23 0949   Change in Wound Size % (l*w) -16.67 01/10/23 0949   Wound Volume (cm^3) 0.525 cm^3 01/10/23 0949   Post-Procedure Length (cm) 1.8 cm 12/13/22 1000   Post-Procedure Width (cm) 3.2 cm 12/13/22 1000   Post-Procedure Depth (cm) 0.1 cm 12/13/22 1000   Post-Procedure Surface Area (cm^2) 5.76 cm^2 12/13/22 1000   Post-Procedure Volume (cm^3) 0.576 cm^3 12/13/22 1000   Distance Tunneling (cm) 0 cm 01/10/23 0949   Tunneling Position ___ O'Clock 0 01/10/23 0949   Undermining Starts ___ O'Clock 0 01/10/23 0949   Undermining Ends___ O'Clock 0 01/10/23 0949   Undermining Maxium Distance (cm) 0 01/10/23 0949   Wound Assessment Eschar dry 01/10/23 0949   Drainage Amount None 01/10/23 0949 Odor None 01/10/23 0949   Lora-wound Assessment Dry/flaky; Hyperkeratosis (callous) 01/10/23 0949   Margins Defined edges 01/10/23 0949   Wound Thickness Description not for Pressure Injury Full thickness 01/10/23 0949   Number of days: 59       Wound 11/15/22 #3 Left Posterior Ankle Cluster (Active)   Wound Image   01/10/23 0949   Wound Etiology Traumatic 12/13/22 0939   Dressing Status Clean;Dry;New dressing applied 12/27/22 1131   Wound Cleansed Soap and water 01/10/23 0949   Dressing/Treatment Other (comment) 12/13/22 1019   Offloading for Diabetic Foot Ulcers Other (comment) 01/10/23 0949   Wound Length (cm) 3.3 cm 01/10/23 0949   Wound Width (cm) 2 cm 01/10/23 0949   Wound Depth (cm) 0.3 cm 01/10/23 0949   Wound Surface Area (cm^2) 6.6 cm^2 01/10/23 0949   Change in Wound Size % (l*w) -230 01/10/23 0949   Wound Volume (cm^3) 1.98 cm^3 01/10/23 0949   Wound Healing % -890 01/10/23 0949   Post-Procedure Length (cm) 3.3 cm 01/10/23 1026   Post-Procedure Width (cm) 2 cm 01/10/23 1026   Post-Procedure Depth (cm) 0.3 cm 01/10/23 1026   Post-Procedure Surface Area (cm^2) 6.6 cm^2 01/10/23 1026   Post-Procedure Volume (cm^3) 1.98 cm^3 01/10/23 1026   Distance Tunneling (cm) 0 cm 01/10/23 0949   Tunneling Position ___ O'Clock 0 01/10/23 0949   Undermining Starts ___ O'Clock 0 01/10/23 0949   Undermining Ends___ O'Clock 0 01/10/23 0949   Undermining Maxium Distance (cm) 0 01/10/23 0949   Wound Assessment Eschar dry;Slough 01/10/23 0949   Drainage Amount None 01/10/23 0949   Odor None 01/10/23 0949   Lora-wound Assessment Dry/flaky; Intact 01/10/23 0949   Margins Attached edges 01/10/23 0949   Wound Thickness Description not for Pressure Injury Full thickness 01/10/23 0949   Number of days: 55       Wound 11/15/22 #4 Left heel (Active)   Wound Image   01/10/23 0949   Wound Etiology Deep tissue/Injury 12/20/22 0952   Dressing Status Clean;Dry;New dressing applied 12/27/22 1131   Wound Cleansed Soap and water 01/10/23 0905 Dressing/Treatment Other (comment) 12/13/22 1019   Offloading for Diabetic Foot Ulcers Other (comment) 01/10/23 0949   Wound Length (cm) 3.1 cm 01/10/23 0949   Wound Width (cm) 2.8 cm 01/10/23 0949   Wound Depth (cm) 0.1 cm 01/10/23 0949   Wound Surface Area (cm^2) 8.68 cm^2 01/10/23 0949   Change in Wound Size % (l*w) -73.6 01/10/23 0949   Wound Volume (cm^3) 0.868 cm^3 01/10/23 0949   Wound Healing % -74 01/10/23 0949   Post-Procedure Length (cm) 3.1 cm 01/10/23 1026   Post-Procedure Width (cm) 2.8 cm 01/10/23 1026   Post-Procedure Depth (cm) 0.1 cm 01/10/23 1026   Post-Procedure Surface Area (cm^2) 8.68 cm^2 01/10/23 1026   Post-Procedure Volume (cm^3) 0.868 cm^3 01/10/23 1026   Distance Tunneling (cm) 0 cm 01/10/23 0949   Tunneling Position ___ O'Clock 0 01/10/23 0949   Undermining Starts ___ O'Clock 0 01/10/23 0949   Undermining Ends___ O'Clock 0 01/10/23 0949   Undermining Maxium Distance (cm) 0 01/10/23 0949   Wound Assessment Eschar dry 01/10/23 0949   Drainage Amount None 01/10/23 0949   Drainage Description Yellow 12/13/22 0939   Odor None 01/10/23 0949   Lora-wound Assessment Hyperkeratosis (callous) 01/10/23 0949   Margins Attached edges 01/10/23 0949   Wound Thickness Description not for Pressure Injury Full thickness 01/10/23 0949   Number of days: 55       Wound 11/15/22 #5 left Anterior Foot (Active)   Wound Image   01/10/23 0949   Wound Etiology Deep tissue/Injury 12/13/22 0939   Dressing Status Clean;Dry;Reinforced dressing;New dressing applied 12/27/22 1131   Wound Cleansed Soap and water 01/10/23 0949   Dressing/Treatment Other (comment) 12/13/22 1019   Offloading for Diabetic Foot Ulcers Other (comment) 01/10/23 0949   Wound Length (cm) 0 cm 01/10/23 0949   Wound Width (cm) 0 cm 01/10/23 0949   Wound Depth (cm) 0 cm 01/10/23 0949   Wound Surface Area (cm^2) 0 cm^2 01/10/23 0949   Change in Wound Size % (l*w) 100 01/10/23 0949   Wound Volume (cm^3) 0 cm^3 01/10/23 0949   Wound Healing % 100 01/10/23 0949   Distance Tunneling (cm) 0 cm 01/10/23 0949   Tunneling Position ___ O'Clock 0 01/10/23 0949   Undermining Starts ___ O'Clock 0 01/10/23 0949   Undermining Ends___ O'Clock 0 01/10/23 0949   Undermining Maxium Distance (cm) 0 01/10/23 0949   Wound Assessment Devitalized tissue;Dry 01/10/23 0949   Drainage Amount None 01/10/23 0949   Odor None 01/10/23 0949   Lora-wound Assessment Dry/flaky 01/10/23 0949   Margins Attached edges 01/10/23 0949   Wound Thickness Description not for Pressure Injury Partial thickness 01/10/23 0949   Number of days: 55       Wound 11/15/22 #6 left medial foot (Active)   Wound Image   01/10/23 0949   Wound Etiology Arterial 12/20/22 0952   Dressing Status Other (Comment) 12/13/22 1019   Wound Cleansed Soap and water 01/10/23 0949   Dressing/Treatment Other (comment) 12/13/22 1019   Offloading for Diabetic Foot Ulcers Other (comment) 01/10/23 0949   Wound Length (cm) 0 cm 01/10/23 0949   Wound Width (cm) 0 cm 01/10/23 0949   Wound Depth (cm) 0 cm 01/10/23 0949   Wound Surface Area (cm^2) 0 cm^2 01/10/23 0949   Change in Wound Size % (l*w) 100 01/10/23 0949   Wound Volume (cm^3) 0 cm^3 01/10/23 0949   Post-Procedure Length (cm) 0.7 cm 12/13/22 1000   Post-Procedure Width (cm) 0.6 cm 12/13/22 1000   Post-Procedure Depth (cm) 0.1 cm 12/13/22 1000   Post-Procedure Surface Area (cm^2) 0.42 cm^2 12/13/22 1000   Post-Procedure Volume (cm^3) 0.042 cm^3 12/13/22 1000   Distance Tunneling (cm) 0 cm 01/10/23 0949   Tunneling Position ___ O'Clock 0 01/10/23 0949   Undermining Starts ___ O'Clock 0 01/10/23 0949   Undermining Ends___ O'Clock 0 01/10/23 0949   Undermining Maxium Distance (cm) 0 01/10/23 0949   Wound Assessment Dry 01/10/23 0949   Drainage Amount None 01/10/23 0949   Odor None 01/10/23 0949   Lora-wound Assessment Dry/flaky 01/10/23 0949   Margins Attached edges 01/10/23 0949   Wound Thickness Description not for Pressure Injury Partial thickness 01/10/23 0949   Number of days: 55       Percent of Wound(s) Debrided: approximately 10%    Total  Area  Debrided:  4 sq cm     Bleeding:  Minimal    Hemostasis Achieved:  not needed    Procedural Pain:  3  / 10     Post Procedural Pain:  0 / 10     Response to treatment:  Well tolerated by patient. Status of wound progress and description from last visit:   The right heel is stable and the sacral area is moderately improved and we will continue with negative pressure wound therapy. Plan:       Discharge Instructions         PHYSICIAN ORDERS AND DISCHARGE INSTRUCTIONS  NOTE: Upon discharge from the [address], you will receive a patient experience survey via E-mail. We would be grateful if you would take the time to fill this survey out. Wound care order history:              KATHLEEN's   Right       Left    Date               Vascular studies/Intervention: . Cultures: .9/13/22, 12/20/22              Antibiotics: .Cipro and Doxy 9/13/22                         HbA1c:  .6/29/22 8.2               Grafts: [place] Fang: . Continuing wound care orders and information:              Residence: . [facility] home health care with: [facility]              Your wound-care supplies will be provided by: . [facility]              Pharmacy: [facility] in [place]  Wound cleansing:                           Do not scrub or use excessive force. Wash hands with soap and water before and after dressing changes. Prior to applying a clean dressing, cleanse wound with normal saline,                          wound cleanser, or mild soap and water. Ask your physician or nurse before getting the wound(s) wet in the shower. Daily Wound management:                          Keep weight off wounds and reposition every 2 hours. Avoid standing for long periods of time.                           Evaluate legs to the level of the heart or above for 30 minutes 4-5 times a day and/or when sitting. When taking antibiotics take entire prescription as ordered by MD do not stop taking until medicine is all gone. Orders for this week (1/10/22) : Left finger- Apply silvadene to wound bed  Cover with Bandaid  Change Daily    Left Posterior Ankle- Wash with mild soap and water  Apply anasept gel and stimulen to wound bed  Secure with silicone border/zetuvit  Change Daily    Left heel, foot, and toe wounds -Wash with mild soap and water, rinse with saline, pat dry with 4x4  Paint with betadine  Daily     Coccyx- wash with mild soap and water  Apply mastisol and duoderm to periwound for protection. Santyl to wound bed  Gently pack wound with black foam, be sure to tuck into any tunnels or undermining  Secure with vac drape. Set wound vac to 125 continuous suction. Change canister with each dressing change or as needed. Change on Tuesday, Thursday and Saturday     Dr. Seble Wahl - [phone]      Follow up with Dr. Daily Patel In 1 weeks in the wound care center  Call [phone] for any questions or concerns.   Date__________   Time____________        Treatment Note      Written Patient Dismissal Instructions Given            Electronically signed by Monica Mckenzie MD on 1/10/2023 at 10:35 AM

## 2023-01-10 NOTE — PROGRESS NOTES
Negative Pressure Wound Therapy    NAME:  Thu Durbin  YOB: 1965  MEDICAL RECORD NUMBER:  [id number]  DATE:  1/10/2023    Applied Negative Pressure to sacrum wound(s)/ulcer(s). [] Applied skin barrier prep to kamla-wound. [x] Cut strips of plastic drape to picture frame wound so that kamla-wound is     covered with the drape. [x] If bridging dressing to less prominent site, cover any intact skin that will come in contact with the Negative Pressure Therapy sponge, gauze or channel drain with plastic drape. The sponge should never touch intact skin. [x] Cut sponge, gauze or channel drain to size which will fit into the wound/ulcer bed without being forced. [x] Be sure the sponge is large enough to hold the entire round plastic flange which is attached to the tubing. Never allow flange to be larger than the sponge or it will produce suction damaging intact skin. Total number of individual pieces of foam used within the wound bed: 1    [x] If bridging the dressing away from the primary site, be sure the bridge leads to a piece of sponge large enough to hold the entire flange without allowing any of the flange to overlap onto intact skin. [x] Covered sponge, gauze or channel drain with plastic drape. [x] Cut a hole in this plastic drape directly over the sponge the same size as the plastic drain tubing. [x] Removed plastic liner from flange and apply it directly over the hole you cut. [x] Removed the plastic cover from the flange. [x] Attached the tubing to the wound/ulcer Negative Pressure Therapy and turn it on to be sure a vacuum is created and that there are no leaks. [x] If air leaks occur, use plastic drape to patch them. [x] Secured Negative Pressure Therapy dressing with ace wrap loosely if located on an extremity. Maintain tubing outside of ace wrap. Tubing must not exert pressure on intact skin.     Applied per  Guidelines  Patient tolerated treatment well.      Electronically signed by Dorota Pope RN on 1/10/2023 at 11:02 AM

## 2023-01-17 ENCOUNTER — HOSPITAL ENCOUNTER (OUTPATIENT)
Dept: WOUND CARE | Age: 58
Discharge: HOME | End: 2023-01-17
Payer: MEDICARE

## 2023-01-17 VITALS
TEMPERATURE: 97.4 F | SYSTOLIC BLOOD PRESSURE: 180 MMHG | DIASTOLIC BLOOD PRESSURE: 72 MMHG | HEART RATE: 93 BPM | RESPIRATION RATE: 16 BRPM

## 2023-01-17 DIAGNOSIS — E11.621 DIABETIC ULCER OF RIGHT HEEL ASSOCIATED WITH TYPE 2 DIABETES MELLITUS, WITH NECROSIS OF BONE (HCC): Primary | ICD-10-CM

## 2023-01-17 DIAGNOSIS — M79.671 ACUTE PAIN OF RIGHT FOOT: ICD-10-CM

## 2023-01-17 DIAGNOSIS — L97.414 DIABETIC ULCER OF RIGHT HEEL ASSOCIATED WITH TYPE 2 DIABETES MELLITUS, WITH NECROSIS OF BONE (HCC): Primary | ICD-10-CM

## 2023-01-17 PROCEDURE — 97605 NEG PRS WND THER DME<=50SQCM: CPT

## 2023-01-17 PROCEDURE — 6370000000 HC RX 637 (ALT 250 FOR IP): Performed by: SURGERY

## 2023-01-17 PROCEDURE — 11042 DBRDMT SUBQ TIS 1ST 20SQCM/<: CPT

## 2023-01-17 RX ORDER — GENTAMICIN SULFATE 1 MG/G
OINTMENT TOPICAL ONCE
OUTPATIENT
Start: 2023-01-17 | End: 2023-01-17

## 2023-01-17 RX ORDER — GINSENG 100 MG
CAPSULE ORAL ONCE
OUTPATIENT
Start: 2023-01-17 | End: 2023-01-17

## 2023-01-17 RX ORDER — LIDOCAINE 40 MG/G
CREAM TOPICAL ONCE
OUTPATIENT
Start: 2023-01-17 | End: 2023-01-17

## 2023-01-17 RX ORDER — LIDOCAINE HYDROCHLORIDE 40 MG/ML
SOLUTION TOPICAL ONCE
OUTPATIENT
Start: 2023-01-17 | End: 2023-01-17

## 2023-01-17 RX ORDER — BETAMETHASONE DIPROPIONATE 0.05 %
OINTMENT (GRAM) TOPICAL ONCE
OUTPATIENT
Start: 2023-01-17 | End: 2023-01-17

## 2023-01-17 RX ORDER — LIDOCAINE HYDROCHLORIDE 40 MG/ML
SOLUTION TOPICAL ONCE
Status: COMPLETED | OUTPATIENT
Start: 2023-01-17 | End: 2023-01-17

## 2023-01-17 RX ORDER — LIDOCAINE 50 MG/G
OINTMENT TOPICAL ONCE
OUTPATIENT
Start: 2023-01-17 | End: 2023-01-17

## 2023-01-17 RX ORDER — BACITRACIN ZINC AND POLYMYXIN B SULFATE 500; 1000 [USP'U]/G; [USP'U]/G
OINTMENT TOPICAL ONCE
OUTPATIENT
Start: 2023-01-17 | End: 2023-01-17

## 2023-01-17 RX ORDER — CLOBETASOL PROPIONATE 0.5 MG/G
OINTMENT TOPICAL ONCE
OUTPATIENT
Start: 2023-01-17 | End: 2023-01-17

## 2023-01-17 RX ORDER — BACITRACIN, NEOMYCIN, POLYMYXIN B 400; 3.5; 5 [USP'U]/G; MG/G; [USP'U]/G
OINTMENT TOPICAL ONCE
OUTPATIENT
Start: 2023-01-17 | End: 2023-01-17

## 2023-01-17 RX ORDER — LIDOCAINE HYDROCHLORIDE 20 MG/ML
JELLY TOPICAL ONCE
OUTPATIENT
Start: 2023-01-17 | End: 2023-01-17

## 2023-01-17 RX ADMIN — LIDOCAINE HYDROCHLORIDE: 40 SOLUTION TOPICAL at 10:04

## 2023-01-17 NOTE — PROGRESS NOTES
Apply mastisol and duoderm to periwound for protection. Gently pack wound with black foam, be sure to tuck into any tunnels or undermining  Secure with vac drape. Set wound vac to 125 continuous suction. Change canister with each dressing change or as needed.     Change on Thursday

## 2023-01-17 NOTE — PLAN OF CARE
Problem: Chronic Conditions and Co-morbidities  Goal: Patient's chronic conditions and co-morbidity symptoms are monitored and maintained or improved  Outcome: Progressing     Problem: Wound:  Goal: Will show signs of wound healing; wound closure and no evidence of infection  Description: Will show signs of wound healing; wound closure and no evidence of infection  Outcome: Progressing

## 2023-01-17 NOTE — DISCHARGE INSTRUCTIONS
PHYSICIAN ORDERS AND DISCHARGE INSTRUCTIONS  NOTE: Upon discharge from the [address], you will receive a patient experience survey via E-mail. We would be grateful if you would take the time to fill this survey out. Wound care order history:              KATHLEEN's   Right       Left    Date               Vascular studies/Intervention: . Cultures: .9/13/22, 12/20/22              Antibiotics: .Cipro and Doxy 9/13/22                         HbA1c:  .6/29/22 8.2               Grafts: Amina Shade: . Continuing wound care orders and information:              Residence: . Private              [facility] [place] [facility] [place]              Your [place]-care supplies will be provided by: . [facility]              Pharmacy: [facility]  Wound cleansing:                           Do not scrub or use excessive force. Wash hands with soap and water before and after dressing changes. Prior to applying a clean dressing, cleanse wound with normal saline,                          wound cleanser, or mild soap and water. Ask your physician or nurse before getting the wound(s) wet in the shower. Daily Wound management:                          Keep weight off wounds and reposition every 2 hours. Avoid standing for long periods of time. Evaluate legs to the level of the heart or above for 30 minutes 4-5 times a day and/or when sitting. When taking antibiotics take entire prescription as ordered by MD do not stop taking until medicine is all gone. Orders for this week (1/17/22) :   Left finger- Apply silvadene to wound bed  Cover with Bandaid  Change Daily     Left foot and toe wounds -Wash with mild soap and water, rinse with saline, pat dry with 4x4  Paint with betadine  Silicone border (Zetuvit)   Daily     Coccyx- wash with mild soap and water  Apply mastisol and duoderm to periwound for protection. Kimberly to wound bed  Gently pack wound with black foam, be sure to tuck into any tunnels or undermining  Secure with vac drape. Set wound vac to 125 continuous suction. Change canister with each dressing change or as needed. Change on Tuesday, Thursday and Saturday     Dr. Heather Mejía - [phone]      Follow up with Dr. Daniela Apodaca In 1 weeks in the wound care center  Call [phone] for any questions or concerns.   Date__________   Time____________

## 2023-01-17 NOTE — PROGRESS NOTES
Wound Care Center Progress Note With Procedure    Dena Vaughan  AGE: 62 y.o. GENDER: female  : 1965  EPISODE DATE:  2023     Subjective:     No chief complaint on file. HISTORY of PRESENT ILLNESS      Dena Vaughan is a 62 y.o. female who presents today for wound evaluation of Chronic arterial, diabetic, and pressure ulcer(s) of the left great toe, left heel. Lower leg near her Achilles tendon, sacral area. The ulcer is of moderate severity. The underlying cause of the wound is diabetes, pressure and PAD. All 3 wounds have a dry eschar. Her sacral wound is healing well with negative pressure wound therapy. Patient did inform me that she will be having her angiogram this Thursday by Dr. Rip Arriola.   Wound Pain Timing/Severity: intermittent, mild  Quality of pain: tender, pressure  Severity of pain:  3 / 10   Modifying Factors: diabetes, decreased mobility, arterial insufficiency, and stage renal artery disease on hemodialysis  Associated Signs/Symptoms: edema, drainage, and pain        PAST MEDICAL HISTORY        Diagnosis Date    Acute pain of right foot 2022    CAD (coronary artery disease)     s/p CABG x 4;  follows with Dr Mavis Wilkins of foot 2018    Carpal tunnel syndrome on both sides     CHF (congestive heart failure) ([address] 10/2010    Chronic diastolic; EF 74%    Chronic kidney disease     stage 4 kidney disease    Chronic ulcer of left ankle with fat layer exposed ([address] 10/7/2015    Chronic ulcer of left foot with fat layer exposed ([address] 3/17/2016    Chronic ulcer of right ankle with fat layer exposed ([address] 3/17/2016    Chronic ulcer of right foot with fat layer exposed ([address] 3/17/2016    Decubitus ulcer of sacral region, stage 1 2018    Depression     Diabetes mellitus ([address]     Diabetes mellitus with neurological manifestations ([address]     Diabetes mellitus with ulcer of ankle ([address]     Diabetic ulcer of left foot associated with type 2 diabetes mellitus, with fat layer exposed ([facility] 8/6/2018    Diabetic ulcer of right heel associated with type 2 diabetes mellitus, with muscle involvement without evidence of necrosis ([address] 9/13/2022    Diabetic ulcer of right heel associated with type 2 diabetes mellitus, with necrosis of bone ([address] 9/13/2022    ESRD on hemodialysis ([address] 9/21/2022    Family history of cardiovascular disease     Mother    GERD (gastroesophageal reflux disease)     H/O cardiac catheterization 10/18/2010, 6/3/2010    10/18/2010-Four bypass grafts all widely patent. 6/3/2010- Moderate to severe triple vessel disease, preserved LV systolic function. H/O cardiovascular stress test 7/26/2012, 8/3/2011, 10/14/2010, 5/24/2010 7/26/2012-Lexiscan- Normal Myocardial Perfusion Study. Post stress myocardial perfusion images show a normal pattern of perfusion in all regions. Post stress LV normal size. Normal perfusion in the distribution of all coronaries. Normal LV size and function. Rest EF 70%    H/O chest x-ray 7/12/2012, 6/2/2010 7/12/2012-Perihilar peribronchial cuffing with mild basilar predominant interstital prominence, which may reflect interstitial edema or atypical pneumonia. H/O Doppler ultrasound 6/4/2010    CAROTID DOPPLER-6/4/2010-No Doppler evidence of hemodynamically significant Carotid Stenosis with antegrade flow in the vertebral arteries bilaterally. 6/4/2010 PERIPHERAL VENOUS DOPPLER_ LEFT Saphenous Vein mapping    H/O echocardiogram 7/26/2012, 8/3/2011, 10/2010, 7/23/2010, 6/8/2010 7/26/2012-LV normal size. Normal LV wall thickness. LV systolic function normal. EF => 55%. LV wall motion normal. Mild MR. Mild TR.      History of complete ECG 7/26/2012 Darrell Wayne), 7/13/2012 [facility]), 7/25/2011, 3/25/2011, 10/18/2010, 10/11/2010, 6/23/2010, 5/7/2010    Hx of cardiovascular stress test 9/3/2015    lexiscan-normal,EF66%    Hx of Doppler ultrasound 4/5/16    Arterial: There is a fluid collection in the left thigh, please refer to PCP for further monitoring, no vascular in etiology. Hx of echocardiogram     4/16 EF40% Mild MR/TR and mild Pulm HTN. 9/15 EF 45-50%, Mildly dilated L atrium, mildly dilated R ventricle. Mild MR & mild TR     Hyperlipidemia     Neuropathy of foot     Pt reports starting approx. 6-7 years ago    Neuropathy of hand     Pt reports starting approx.  6-7 years ago    Non compliance with medical treatment 7/2/2018    Pressure ulcer of sacral region, stage 2 ([address] 11/29/2022    Pressure ulcer of sacral region, stage 3 ([address] 11/29/2022    S/P BKA (below knee amputation), right ([address] 11/29/2022    S/P CABG x 4 6/5/2010    LIMA-> LAD;  VG->CX;  VG->Diagonal; VG-> distal RCA  per  Dr Hali Cortes    Type 2 diabetes mellitus with diabetic polyneuropathy, with long-term current use of insulin ([address] 4/5/2016    Type II or unspecified type diabetes mellitus with neurological manifestations, not stated as uncontrolled(250.60)     Type II or unspecified type diabetes mellitus with other specified manifestations, not stated as uncontrolled     Ulcer of left foot, with fat layer exposed ([address] 12/19/2013    Ulcer of other part of foot        PAST SURGICAL HISTORY    Past Surgical History:   Procedure Laterality Date    APPENDECTOMY      CARDIAC SURGERY      CARPAL TUNNEL RELEASE      L hand Nov. 2011, R hand Jan. 2012    CARPAL TUNNEL RELEASE Right 6/10/2013    recurrent    CARPAL TUNNEL RELEASE Left 7/29/2013    with ulnar nerve transpostion and L middle finger release    CHOLECYSTECTOMY      COLONOSCOPY      CORONARY ARTERY BYPASS GRAFT  6/5/2010 6/5/2010-LIMA->LAD;  VG-> Diagonal;  VG-> Obtuse marginal;  VG->Distal RCA-   Dr Cristina Ewing Right 6/10/2013    HYSTERECTOMY, TOTAL ABDOMINAL (CERVIX REMOVED)      LEG AMPUTATION BELOW KNEE Right 11/11/2022    RIGHT LEG AMPUTATION BELOW KNEE WITH IPOP performed by Ahmet Wilkerson MD at [address] Left 02/14/144th toe    TUBAL LIGATION      ULNAR TUNNEL RELEASE Right 6/10/2013       FAMILY HISTORY    Family History   Problem Relation Age of Onset    Heart Disease Mother     Kidney Disease Mother         dialysis    Cancer Father        SOCIAL HISTORY    Social History     Tobacco Use    Smoking status: Former     Packs/day: 0.25     Years: 30.00     Pack years: 7.50     Types: Cigarettes     Quit date: 2022     Years since quittin.3    Smokeless tobacco: Never    Tobacco comments:     quit smoking ciagarettes 16   Vaping Use    Vaping Use: Every day    Substances: Never   Substance Use Topics    Alcohol use: No     Alcohol/week: 0.0 standard drinks     Comment:                                    CAFFEINE: 2 sodas daily    Drug use: No       ALLERGIES    Allergies   Allergen Reactions    Latex     Codeine     Cortisone     Cortizone     Iodides     Phenobarbital Other (See Comments)     Hallucinations     Vancomycin Other (See Comments)     \"makes me very sick\"       MEDICATIONS    Current Outpatient Medications on File Prior to Encounter   Medication Sig Dispense Refill    silver sulfADIAZINE (SILVADENE) 1 % cream Apply topically daily. 1000 g 1    calcium acetate (PHOSLO) 667 MG CAPS capsule Take 1 capsule with each meal and 1 tablet with each snack. 540 capsule 3    gabapentin (NEURONTIN) 400 MG capsule Take 800 mg by mouth in the morning and at bedtime.       OXYGEN Inhale 2 L into the lungs daily as needed      carvedilol (COREG) 6.25 MG tablet 6.25 mg 2 times daily       albuterol sulfate HFA (PROVENTIL;VENTOLIN;PROAIR) 108 (90 Base) MCG/ACT inhaler Inhale 2 puffs into the lungs every 6 hours as needed for Wheezing      esomeprazole Magnesium (NEXIUM) 40 MG PACK Take 20 mg by mouth 2 times daily 2 tabs      LORazepam (ATIVAN) 0.5 MG tablet Take 0.5 mg by mouth every 12 hours Indications: One Tablet twice daily       Insulin Aspart (NOVOLOG FLEXPEN SC) Inject 15 Units into the skin 3 times daily (before meals) On sliding scale      lisinopril (PRINIVIL;ZESTRIL) 10 MG tablet Take 20 mg by mouth daily      furosemide (LASIX) 20 MG tablet Take 40 mg by mouth 2 times daily        No current facility-administered medications on file prior to encounter. REVIEW OF SYSTEMS    Pertinent items are noted in HPI. Constitutional: Negative for systemic symptoms including fever, chills and malaise. Objective:      BP (!) 180/72   Pulse 93   Temp 97.4 °F (36.3 °C) (Temporal)   Resp 16     PHYSICAL EXAM      General: The patient is in no acute distress. Mental status:  Patient is appropriate, is  oriented to place and plan of care. Dermatologic exam: Visual inspection of the periwound reveals the skin to be cool  and scaly  Wound exam: see wound description below in procedure note      Assessment:     Problem List Items Addressed This Visit       Acute pain of right foot    Relevant Orders    Initiate Outpatient Wound Care Protocol    Diabetic ulcer of right heel associated with type 2 diabetes mellitus, with necrosis of bone ([facility] - Primary    Relevant Orders    Initiate Outpatient Wound Care Protocol     Procedure Note    Indications:  Based on my examination of this patient's wound(s) today, sharp excision into necrotic subcutaneous tissue is required to promote healing and evaluate the extent of previous healing. Performed by: Ramila Still MD    Consent obtained: Yes    Time out taken:  Yes    Pain Control:       Debridement:Excisional Debridement    Using scissors and forceps the wound(s) was/were sharply debrided down through and including the removal of subcutaneous tissue.         Devitalized Tissue Debrided:  slough and exudate    Pre Debridement Measurements:  Are located in the Wound Documentation Flow Sheet    All active wounds listed below with today's date are evaluated  Wound(s)    debrided this date include # : 2     Post  Debridement Measurements:  Negative Pressure Wound Therapy Foot (Active)   Number of days: 7380 Negative Pressure Wound Therapy Foot Distal (Active)   Number of days: 3254       Negative Pressure Wound Therapy (Active)   Wound Type Diabetic foot ulcer 10/11/22 1145   Unit Type [employer] 01/17/23 1007   Dressing Type Black Foam 01/17/23 1007   Number of pieces used 1 10/11/22 1145   Number of pieces removed 1 01/17/23 1007   Cycle Continuous 01/17/23 1007   Target Pressure (mmHg) 125 01/17/23 1007   Dressing Status New dressing applied;Clean, dry & intact 09/27/22 1045   Dressing Changed Changed/New 01/10/23 1057   Drainage Amount Moderate 01/10/23 1057   Drainage Description Serosanguinous 01/10/23 1057   Wound Assessment Granulation tissue 01/10/23 1057   Lora-wound Assessment Fragile; Intact 01/10/23 1057   Odor None 01/10/23 1057   Number of days: 117       Incision 06/10/13 Wrist Right (Active)   Number of days: 3507       Incision 07/29/13 Wrist Left (Active)   Number of days: 3459       Incision 02/14/14 Toe (Comment  which one) (Active)   Number of days: 5232       Wound 11/01/22 #2 Sacrum (Active)   Wound Image   01/10/23 0949   Wound Etiology Pressure Unstageable 12/13/22 0939   Dressing Status Clean;Dry; Intact; New dressing applied 01/10/23 1057   Wound Cleansed Soap and water; Wound cleanser 01/17/23 0941   Dressing/Treatment Foam;Negative pressure wound therapy 01/10/23 1057   Offloading for Diabetic Foot Ulcers Offloading not required 01/17/23 0941   Wound Length (cm) 3.5 cm 01/17/23 0941   Wound Width (cm) 1.2 cm 01/17/23 0941   Wound Depth (cm) 2 cm 01/17/23 0941   Wound Surface Area (cm^2) 4.2 cm^2 01/17/23 0941   Change in Wound Size % (l*w) 16 01/17/23 0941   Wound Volume (cm^3) 8.4 cm^3 01/17/23 0941   Wound Healing % -1580 01/17/23 0941   Post-Procedure Length (cm) 3.9 cm 01/10/23 1026   Post-Procedure Width (cm) 1.4 cm 01/10/23 1026   Post-Procedure Depth (cm) 1 cm 01/10/23 1026   Post-Procedure Surface Area (cm^2) 5.46 cm^2 01/10/23 1026   Post-Procedure Volume (cm^3) 5.46 cm^3 01/10/23 1026   Distance Tunneling (cm) 0 cm 01/17/23 0941   Tunneling Position ___ O'Clock 0 01/17/23 0941   Undermining Starts ___ O'Clock 1000 01/17/23 0941   Undermining Ends___ O'Clock 0200 01/17/23 0941   Undermining Maxium Distance (cm) 2.2 01/17/23 0941   Wound Assessment Granulation tissue;Pink/red 01/17/23 0941   Drainage Amount Moderate 01/17/23 0941   Drainage Description Brunetta Stanislawtorius 01/17/23 0941   Odor None 01/17/23 0941   Lora-wound Assessment Blanchable erythema;Fragile 01/17/23 0941   Margins Defined edges; Unattached edges 01/17/23 0941   Wound Thickness Description not for Pressure Injury Full thickness 01/17/23 0941   Number of days: 77       Wound 11/11/22  #1 Left Great Toe (Active)   Wound Image   01/10/23 0949   Wound Etiology Arterial 12/20/22 0952   Dressing Status Clean;Dry; Intact; New dressing applied 01/10/23 1057   Wound Cleansed Soap and water; Wound cleanser 01/17/23 0941   Dressing/Treatment Betadine swabs/povidone iodine 01/10/23 1057   Offloading for Diabetic Foot Ulcers Other (comment) 01/17/23 0941   Wound Length (cm) 2 cm 01/17/23 0941   Wound Width (cm) 3.8 cm 01/17/23 0941   Wound Depth (cm) 0.1 cm 01/17/23 0941   Wound Surface Area (cm^2) 7.6 cm^2 01/17/23 0941   Change in Wound Size % (l*w) -68.89 01/17/23 0941   Wound Volume (cm^3) 0.76 cm^3 01/17/23 0941   Post-Procedure Length (cm) 1.8 cm 12/13/22 1000   Post-Procedure Width (cm) 3.2 cm 12/13/22 1000   Post-Procedure Depth (cm) 0.1 cm 12/13/22 1000   Post-Procedure Surface Area (cm^2) 5.76 cm^2 12/13/22 1000   Post-Procedure Volume (cm^3) 0.576 cm^3 12/13/22 1000   Distance Tunneling (cm) 0 cm 01/17/23 0941   Tunneling Position ___ O'Clock 0 01/17/23 0941   Undermining Starts ___ O'Clock 0 01/17/23 0941   Undermining Ends___ O'Clock 0 01/17/23 0941   Undermining Maxium Distance (cm) 0 01/17/23 0941   Wound Assessment Eschar dry 01/17/23 0941   Drainage Amount None 01/17/23 0941   Drainage Description Serosanguinous 01/10/23 0949 Odor None 01/17/23 0941   Lora-wound Assessment Dry/flaky;Fragile 01/17/23 0941   Margins Attached edges 01/17/23 0941   Wound Thickness Description not for Pressure Injury Full thickness 01/17/23 0941   Number of days: 66       Wound 11/15/22 #3 Left Posterior Ankle Cluster (Active)   Wound Image   01/10/23 0949   Wound Etiology Traumatic 12/13/22 0939   Dressing Status Clean;Dry; Intact; New dressing applied 01/10/23 1057   Wound Cleansed Soap and water; Wound cleanser 01/17/23 0941   Dressing/Treatment Collagen;Silicone border 94/80/44 1057   Offloading for Diabetic Foot Ulcers Other (comment) 01/17/23 0941   Wound Length (cm) 3 cm 01/17/23 0941   Wound Width (cm) 2.3 cm 01/17/23 0941   Wound Depth (cm) 0.3 cm 01/17/23 0941   Wound Surface Area (cm^2) 6.9 cm^2 01/17/23 0941   Change in Wound Size % (l*w) -245 01/17/23 0941   Wound Volume (cm^3) 2.07 cm^3 01/17/23 0941   Wound Healing % -935 01/17/23 0941   Post-Procedure Length (cm) 3.3 cm 01/10/23 1026   Post-Procedure Width (cm) 2 cm 01/10/23 1026   Post-Procedure Depth (cm) 0.3 cm 01/10/23 1026   Post-Procedure Surface Area (cm^2) 6.6 cm^2 01/10/23 1026   Post-Procedure Volume (cm^3) 1.98 cm^3 01/10/23 1026   Distance Tunneling (cm) 0 cm 01/17/23 0941   Tunneling Position ___ O'Clock 0 01/17/23 0941   Undermining Starts ___ O'Clock 0 01/17/23 0941   Undermining Ends___ O'Clock 0 01/17/23 0941   Undermining Maxium Distance (cm) 0 01/17/23 0941   Wound Assessment Eschar dry 01/17/23 0941   Drainage Amount None 01/17/23 0941   Drainage Description Serosanguinous 01/10/23 0949   Odor None 01/17/23 0941   Lora-wound Assessment Blanchable erythema;Fragile 01/17/23 0941   Margins Attached edges 01/17/23 0941   Wound Thickness Description not for Pressure Injury Partial thickness 01/17/23 0941   Number of days: 62       Wound 11/15/22 #4 Left heel (Active)   Wound Image   01/10/23 0949   Wound Etiology Deep tissue/Injury 12/20/22 1000   Dressing Status Clean;Dry; Intact; New dressing applied 01/10/23 1057   Wound Cleansed Soap and water; Wound cleanser 01/17/23 0941   Dressing/Treatment Betadine swabs/povidone iodine 01/10/23 1057   Offloading for Diabetic Foot Ulcers Other (comment) 01/17/23 0941   Wound Length (cm) 3.5 cm 01/17/23 0941   Wound Width (cm) 3 cm 01/17/23 0941   Wound Depth (cm) 0.1 cm 01/17/23 0941   Wound Surface Area (cm^2) 10.5 cm^2 01/17/23 0941   Change in Wound Size % (l*w) -110 01/17/23 0941   Wound Volume (cm^3) 1.05 cm^3 01/17/23 0941   Wound Healing % -110 01/17/23 0941   Post-Procedure Length (cm) 3.1 cm 01/10/23 1026   Post-Procedure Width (cm) 2.8 cm 01/10/23 1026   Post-Procedure Depth (cm) 0.1 cm 01/10/23 1026   Post-Procedure Surface Area (cm^2) 8.68 cm^2 01/10/23 1026   Post-Procedure Volume (cm^3) 0.868 cm^3 01/10/23 1026   Distance Tunneling (cm) 0 cm 01/17/23 0941   Tunneling Position ___ O'Clock 0 01/17/23 0941   Undermining Starts ___ O'Clock 0 01/17/23 0941   Undermining Ends___ O'Clock 0 01/17/23 0941   Undermining Maxium Distance (cm) 0 01/17/23 0941   Wound Assessment Eschar dry 01/17/23 0941   Drainage Amount None 01/17/23 0941   Drainage Description Serosanguinous 01/10/23 0949   Odor None 01/17/23 0941   Lora-wound Assessment Dry/flaky 01/17/23 0941   Margins Attached edges 01/17/23 0941   Wound Thickness Description not for Pressure Injury Partial thickness 01/17/23 0941   Number of days: 62       Wound 11/15/22 #6 left medial foot (Active)   Wound Image   01/10/23 0949   Wound Etiology Arterial 12/20/22 0952   Dressing Status Other (Comment) 12/13/22 1019   Wound Cleansed Soap and water; Wound cleanser 01/17/23 0941   Dressing/Treatment Open to air 01/10/23 1057   Offloading for Diabetic Foot Ulcers Other (comment) 01/10/23 0949   Wound Length (cm) 0.2 cm 01/17/23 0941   Wound Width (cm) 0.2 cm 01/17/23 0941   Wound Depth (cm) 0.1 cm 01/17/23 0941   Wound Surface Area (cm^2) 0.04 cm^2 01/17/23 0941   Change in Wound Size % (l*w) 92 01/17/23 0941   Wound Volume (cm^3) 0.004 cm^3 01/17/23 0941   Post-Procedure Length (cm) 0.7 cm 12/13/22 1000   Post-Procedure Width (cm) 0.6 cm 12/13/22 1000   Post-Procedure Depth (cm) 0.1 cm 12/13/22 1000   Post-Procedure Surface Area (cm^2) 0.42 cm^2 12/13/22 1000   Post-Procedure Volume (cm^3) 0.042 cm^3 12/13/22 1000   Distance Tunneling (cm) 0 cm 01/17/23 0941   Tunneling Position ___ O'Clock 0 01/17/23 0941   Undermining Starts ___ O'Clock 0 01/17/23 0941   Undermining Ends___ O'Clock 0 01/17/23 0941   Undermining Maxium Distance (cm) 0 01/17/23 0941   Wound Assessment Dry 01/17/23 0941   Drainage Amount None 01/17/23 0941   Drainage Description Serosanguinous 01/10/23 0949   Odor None 01/17/23 0941   Lora-wound Assessment Hyperkeratosis (callous) 01/17/23 0941   Margins Attached edges 01/17/23 0941   Wound Thickness Description not for Pressure Injury Partial thickness 01/17/23 0941   Number of days: 62       Percent of Wound(s) Debrided: approximately 100%    Total  Area  Debrided:  4.2 sq cm     Bleeding:  Minimal    Hemostasis Achieved:  not needed    Procedural Pain:  3  / 10     Post Procedural Pain:  0 / 10     Response to treatment:  Well tolerated by patient. Status of wound progress and description from last visit:   Stable and the sacral wound is improving weekly. .      Plan:       Discharge Instructions         PHYSICIAN ORDERS AND DISCHARGE INSTRUCTIONS  NOTE: Upon discharge from the [address], you will receive a patient experience survey via E-mail. We would be grateful if you would take the time to fill this survey out. Wound care order history:              KATHLEEN's   Right       Left    Date               Vascular studies/Intervention: . Cultures: .9/13/22, 12/20/22              Antibiotics: .Cipro and Doxy 9/13/22                         HbA1c:  .6/29/22 8.2               Grafts: [place] Premium: .   Continuing wound care orders and information: Residence: . Private              [facility] care with: [facility]              Your wound-care supplies will be provided by: . [facility]              Pharmacy: [facility] in [place]  Wound cleansing:                           Do not scrub or use excessive force. Wash hands with soap and water before and after dressing changes. Prior to applying a clean dressing, cleanse wound with normal saline,                          wound cleanser, or mild soap and water. Ask your physician or nurse before getting the wound(s) wet in the shower. Daily Wound management:                          Keep weight off wounds and reposition every 2 hours. Avoid standing for long periods of time. Evaluate legs to the level of the heart or above for 30 minutes 4-5 times a day and/or when sitting. When taking antibiotics take entire prescription as ordered by MD do not stop taking until medicine is all gone. Orders for this week (1/17/22) : Left finger- Apply silvadene to wound bed  Cover with Bandaid  Change Daily     Left foot and toe wounds -Wash with mild soap and water, rinse with saline, pat dry with 4x4  Paint with betadine  Daily     Coccyx- wash with mild soap and water  Apply mastisol and duoderm to periwound for protection. Kimberly to wound bed  Gently pack wound with black foam, be sure to tuck into any tunnels or undermining  Secure with vac drape. Set wound vac to 125 continuous suction. Change canister with each dressing change or as needed. Change on Tuesday, Thursday and Saturday     Dr. Charmaine King - [phone]      Follow up with Dr. Miley Hawkins In 1 weeks in the wound care center  Call [phone] for any questions or concerns.   Date__________   Time____________        Treatment Note Written Patient Dismissal Instructions Given            Electronically signed by Anai Peralta MD on 1/17/2023 at 10:27 AM

## 2023-01-24 ENCOUNTER — HOSPITAL ENCOUNTER (OUTPATIENT)
Dept: WOUND CARE | Age: 58
Discharge: HOME | End: 2023-01-24
Payer: MEDICARE

## 2023-01-24 VITALS
HEART RATE: 94 BPM | SYSTOLIC BLOOD PRESSURE: 173 MMHG | TEMPERATURE: 97.1 F | DIASTOLIC BLOOD PRESSURE: 78 MMHG | RESPIRATION RATE: 16 BRPM

## 2023-01-24 DIAGNOSIS — L97.414 DIABETIC ULCER OF RIGHT HEEL ASSOCIATED WITH TYPE 2 DIABETES MELLITUS, WITH NECROSIS OF BONE (HCC): Primary | ICD-10-CM

## 2023-01-24 DIAGNOSIS — M79.671 ACUTE PAIN OF RIGHT FOOT: ICD-10-CM

## 2023-01-24 DIAGNOSIS — E11.621 DIABETIC ULCER OF RIGHT HEEL ASSOCIATED WITH TYPE 2 DIABETES MELLITUS, WITH NECROSIS OF BONE (HCC): Primary | ICD-10-CM

## 2023-01-24 DIAGNOSIS — L89.153 PRESSURE ULCER OF SACRAL REGION, STAGE 3 (HCC): ICD-10-CM

## 2023-01-24 PROCEDURE — 6370000000 HC RX 637 (ALT 250 FOR IP): Performed by: SURGERY

## 2023-01-24 PROCEDURE — 97605 NEG PRS WND THER DME<=50SQCM: CPT

## 2023-01-24 PROCEDURE — 11043 DBRDMT MUSC&/FSCA 1ST 20/<: CPT

## 2023-01-24 RX ORDER — BACITRACIN ZINC AND POLYMYXIN B SULFATE 500; 1000 [USP'U]/G; [USP'U]/G
OINTMENT TOPICAL ONCE
OUTPATIENT
Start: 2023-01-24 | End: 2023-01-24

## 2023-01-24 RX ORDER — GINSENG 100 MG
CAPSULE ORAL ONCE
OUTPATIENT
Start: 2023-01-24 | End: 2023-01-24

## 2023-01-24 RX ORDER — LIDOCAINE HYDROCHLORIDE 40 MG/ML
SOLUTION TOPICAL ONCE
OUTPATIENT
Start: 2023-01-24 | End: 2023-01-24

## 2023-01-24 RX ORDER — LIDOCAINE 40 MG/G
CREAM TOPICAL ONCE
OUTPATIENT
Start: 2023-01-24 | End: 2023-01-24

## 2023-01-24 RX ORDER — BETAMETHASONE DIPROPIONATE 0.05 %
OINTMENT (GRAM) TOPICAL ONCE
OUTPATIENT
Start: 2023-01-24 | End: 2023-01-24

## 2023-01-24 RX ORDER — LIDOCAINE 50 MG/G
OINTMENT TOPICAL ONCE
OUTPATIENT
Start: 2023-01-24 | End: 2023-01-24

## 2023-01-24 RX ORDER — GENTAMICIN SULFATE 1 MG/G
OINTMENT TOPICAL ONCE
OUTPATIENT
Start: 2023-01-24 | End: 2023-01-24

## 2023-01-24 RX ORDER — LIDOCAINE HYDROCHLORIDE 20 MG/ML
JELLY TOPICAL ONCE
OUTPATIENT
Start: 2023-01-24 | End: 2023-01-24

## 2023-01-24 RX ORDER — CLOBETASOL PROPIONATE 0.5 MG/G
OINTMENT TOPICAL ONCE
OUTPATIENT
Start: 2023-01-24 | End: 2023-01-24

## 2023-01-24 RX ORDER — BACITRACIN, NEOMYCIN, POLYMYXIN B 400; 3.5; 5 [USP'U]/G; MG/G; [USP'U]/G
OINTMENT TOPICAL ONCE
OUTPATIENT
Start: 2023-01-24 | End: 2023-01-24

## 2023-01-24 RX ORDER — LIDOCAINE HYDROCHLORIDE 40 MG/ML
SOLUTION TOPICAL ONCE
Status: COMPLETED | OUTPATIENT
Start: 2023-01-24 | End: 2023-01-24

## 2023-01-24 RX ADMIN — LIDOCAINE HYDROCHLORIDE: 40 SOLUTION TOPICAL at 10:39

## 2023-01-24 NOTE — DISCHARGE INSTRUCTIONS
PHYSICIAN ORDERS AND DISCHARGE INSTRUCTIONS  NOTE: Upon discharge from the [address], you will receive a patient experience survey via E-mail. We would be grateful if you would take the time to fill this survey out. Wound care order history:              KATHLEEN's   Right       Left    Date               Vascular studies/Intervention: . Cultures: .9/13/22, 12/20/22              Antibiotics: .Cipro and Doxy 9/13/22                         HbA1c:  .6/29/22 8.2               Grafts: Nadege Alvarez: . Continuing wound care orders and information:              Residence: . Private              [facility] home health care with: [facility] [place]              Your wound-care supplies will be provided by: . [facility]              Pharmacy: .[facility] in [place]  Wound cleansing:                           Do not scrub or use excessive force. Wash hands with soap and water before and after dressing changes. Prior to applying a clean dressing, cleanse wound with normal saline,                          wound cleanser, or mild soap and water. Ask your physician or nurse before getting the wound(s) wet in the shower. Daily Wound management:                          Keep weight off wounds and reposition every 2 hours. Avoid standing for long periods of time. Evaluate legs to the level of the heart or above for 30 minutes 4-5 times a day and/or when sitting. When taking antibiotics take entire prescription as ordered by MD do not stop taking until medicine is all gone. Orders for this week (1/24/22) :   Left finger- Apply silvadene to wound bed  Cover with Bandaid  Change Daily     Left foot and toe wounds -Wash with mild soap and water, rinse with saline, pat dry with 4x4  Paint with betadine  Silicone border gauze  Daily     Coccyx- wash with mild soap and water  Apply mastisol and duoderm to periwound for protection. Anasept and Stimulen to wound bed  Gently pack wound with black foam, be sure to tuck into any tunnels or undermining  Secure with vac drape. Set wound vac to 125 continuous suction. Change canister with each dressing change or as needed. Change on Tuesday, Thursday and Saturday    Call Emil Vazquez and Martha to order more canisters       Follow up with Dr. Mallory Alaniz In 1 weeks in the wound care center  Call [phone] for any questions or concerns.   Date__________   Time____________

## 2023-01-24 NOTE — PROGRESS NOTES
Negative Pressure Wound Therapy    NAME:  Ottoniel Vincent  YOB: 1965  MEDICAL RECORD NUMBER:  [id number]  DATE:  1/24/2023    Applied Negative Pressure to sacral wound(s)/ulcer(s). [x] Applied skin barrier prep to kamla-wound. [x] Cut strips of plastic drape to picture frame wound so that kamla-wound is     covered with the drape. [x] If bridging dressing to less prominent site, cover any intact skin that will come in contact with the Negative Pressure Therapy sponge, gauze or channel drain with plastic drape. The sponge should never touch intact skin. [x] Cut sponge, gauze or channel drain to size which will fit into the wound/ulcer bed without being forced. [x] Be sure the sponge is large enough to hold the entire round plastic flange which is attached to the tubing. Never allow flange to be larger than the sponge or it will produce suction damaging intact skin. Total number of individual pieces of foam used within the wound bed: ONE     [x] If bridging the dressing away from the primary site, be sure the bridge leads to a piece of sponge large enough to hold the entire flange without allowing any of the flange to overlap onto intact skin. [x] Covered sponge, gauze or channel drain with plastic drape. [x] Cut a hole in this plastic drape directly over the sponge the same size as the plastic drain tubing. [x] Removed plastic liner from flange and apply it directly over the hole you cut. [x] Removed the plastic cover from the flange. [x] Attached the tubing to the wound/ulcer Negative Pressure Therapy and turn it on to be sure a vacuum is created and that there are no leaks. [x] If air leaks occur, use plastic drape to patch them. [x] Secured Negative Pressure Therapy dressing with ace wrap loosely if located on an extremity. Maintain tubing outside of ace wrap. Tubing must not exert pressure on intact skin.     Applied per  Guidelines      Electronically signed by Arti Bartholomew LPN on 9/58/0195 at 11:78 AM

## 2023-01-24 NOTE — PROGRESS NOTES
Wound Care Center Progress Note With Procedure    Cecelia Stephens  AGE: 62 y.o. GENDER: female  : 1965  EPISODE DATE:  2023     Subjective:     Chief Complaint   Patient presents with    Wound Check     LLE         HISTORY of PRESENT ILLNESS      Cecelia Stephens is a 62 y.o. female who presents today for wound evaluation of Chronic arterial, diabetic, and pressure ulcer(s) of the left heel, left great toe and sacral area. The ulcer is of moderate severity. The underlying cause of the wound is diabetes, pressure and PAD. She did have her left SFA and posterior tibial arteries opened up last week by Dr. Katie Goldman.   Wound Pain Timing/Severity: waxing and waning, mild  Quality of pain: aching, shooting  Severity of pain:  3 / 10   Modifying Factors: diabetes, smoking, and arterial insufficiency  Associated Signs/Symptoms: edema, drainage, odor, and pain        PAST MEDICAL HISTORY        Diagnosis Date    Acute pain of right foot 2022    CAD (coronary artery disease)     s/p CABG x 4;  follows with Dr Jazmín Perdomo of foot 2018    Carpal tunnel syndrome on both sides     CHF (congestive heart failure) ([address] 10/2010    Chronic diastolic; EF 54%    Chronic kidney disease     stage 4 kidney disease    Chronic ulcer of left ankle with fat layer exposed ([address] 10/7/2015    Chronic ulcer of left foot with fat layer exposed ([address] 3/17/2016    Chronic ulcer of right ankle with fat layer exposed ([address] 3/17/2016    Chronic ulcer of right foot with fat layer exposed ([address] 3/17/2016    Decubitus ulcer of sacral region, stage 1 2018    Depression     Diabetes mellitus ([address]     Diabetes mellitus with neurological manifestations ([address]     Diabetes mellitus with ulcer of ankle ([address]     Diabetic ulcer of left foot associated with type 2 diabetes mellitus, with fat layer exposed ([address] 2018    Diabetic ulcer of right heel associated with type 2 diabetes mellitus, with muscle involvement without evidence of necrosis ([facility] 9/13/2022    Diabetic ulcer of right heel associated with type 2 diabetes mellitus, with necrosis of bone ([address] 9/13/2022    ESRD on hemodialysis ([facility] 9/21/2022    Family history of cardiovascular disease     Mother    GERD (gastroesophageal reflux disease)     H/O cardiac catheterization 10/18/2010, 6/3/2010    10/18/2010-Four bypass grafts all widely patent. 6/3/2010- Moderate to severe triple vessel disease, preserved LV systolic function. H/O cardiovascular stress test 7/26/2012, 8/3/2011, 10/14/2010, 5/24/2010 7/26/2012-Lexiscan- Normal Myocardial Perfusion Study. Post stress myocardial perfusion images show a normal pattern of perfusion in all regions. Post stress LV normal size. Normal perfusion in the distribution of all coronaries. Normal LV size and function. Rest EF 70%    H/O chest x-ray 7/12/2012, 6/2/2010 7/12/2012-Perihilar peribronchial cuffing with mild basilar predominant interstital prominence, which may reflect interstitial edema or atypical pneumonia. H/O Doppler ultrasound 6/4/2010    CAROTID DOPPLER-6/4/2010-No Doppler evidence of hemodynamically significant Carotid Stenosis with antegrade flow in the vertebral arteries bilaterally. 6/4/2010 PERIPHERAL VENOUS DOPPLER_ LEFT Saphenous Vein mapping    H/O echocardiogram 7/26/2012, 8/3/2011, 10/2010, 7/23/2010, 6/8/2010 7/26/2012-LV normal size. Normal LV wall thickness. LV systolic function normal. EF => 55%. LV wall motion normal. Mild MR. Mild TR. History of complete ECG 7/26/2012 Tiffanie Augustin), 7/13/2012 [facility]), 7/25/2011, 3/25/2011, 10/18/2010, 10/11/2010, 6/23/2010, 5/7/2010    Hx of cardiovascular stress test 9/3/2015    lexiscan-normal,EF66%    Hx of Doppler ultrasound 4/5/16    Arterial: There is a fluid collection in the left thigh, please refer to PCP for further monitoring, no vascular in etiology.      Hx of echocardiogram     4/16 EF40% Mild MR/TR and mild Pulm HTN. 9/15 EF 45-50%, Mildly dilated L atrium, mildly dilated R ventricle. Mild MR & mild TR     Hyperlipidemia     Neuropathy of foot     Pt reports starting approx. 6-7 years ago    Neuropathy of hand     Pt reports starting approx.  6-7 years ago    Non compliance with medical treatment 7/2/2018    Pressure ulcer of sacral region, stage 2 ([address] 11/29/2022    Pressure ulcer of sacral region, stage 3 ([address] 11/29/2022    S/P BKA (below knee amputation), right ([address] 11/29/2022    S/P CABG x 4 6/5/2010    LIMA-> LAD;  VG->CX;  VG->Diagonal; VG-> distal RCA  per  Dr Malone Proffer    Type 2 diabetes mellitus with diabetic polyneuropathy, with long-term current use of insulin ([address] 4/5/2016    Type II or unspecified type diabetes mellitus with neurological manifestations, not stated as uncontrolled(250.60)     Type II or unspecified type diabetes mellitus with other specified manifestations, not stated as uncontrolled     Ulcer of left foot, with fat layer exposed ([address] 12/19/2013    Ulcer of other part of foot        PAST SURGICAL HISTORY    Past Surgical History:   Procedure Laterality Date    APPENDECTOMY      CARDIAC SURGERY      CARPAL TUNNEL RELEASE      L hand Nov. 2011, R hand Jan. 2012    CARPAL TUNNEL RELEASE Right 6/10/2013    recurrent    CARPAL TUNNEL RELEASE Left 7/29/2013    with ulnar nerve transpostion and L middle finger release    CHOLECYSTECTOMY      COLONOSCOPY      CORONARY ARTERY BYPASS GRAFT  6/5/2010 6/5/2010-LIMA->LAD;  VG-> Diagonal;  VG-> Obtuse marginal;  VG->Distal RCA-   Dr Josefina Gonzalez Right 6/10/2013    HYSTERECTOMY, TOTAL ABDOMINAL (CERVIX REMOVED)      LEG AMPUTATION BELOW KNEE Right 11/11/2022    RIGHT LEG AMPUTATION BELOW KNEE WITH IPOP performed by Terrence Bueno MD at [address] Left 02/14/144th toe    TUBAL LIGATION      ULNAR TUNNEL RELEASE Right 6/10/2013       FAMILY HISTORY    Family History   Problem Relation Age of Onset    Heart Disease Mother     Kidney Disease Mother         dialysis    Cancer Father        SOCIAL HISTORY    Social History     Tobacco Use    Smoking status: Former     Packs/day: 0.25     Years: 30.00     Pack years: 7.50     Types: Cigarettes     Quit date: 2022     Years since quittin.3    Smokeless tobacco: Never    Tobacco comments:     quit smoking mia 16   Vaping Use    Vaping Use: Every day    Substances: Never   Substance Use Topics    Alcohol use: No     Alcohol/week: 0.0 standard drinks     Comment:                                    CAFFEINE: 2 sodas daily    Drug use: No       ALLERGIES    Allergies   Allergen Reactions    Latex     Codeine     Cortisone     Cortizone     Iodides     Phenobarbital Other (See Comments)     Hallucinations     Vancomycin Other (See Comments)     \"makes me very sick\"       MEDICATIONS    Current Outpatient Medications on File Prior to Encounter   Medication Sig Dispense Refill    silver sulfADIAZINE (SILVADENE) 1 % cream Apply topically daily. 1000 g 1    calcium acetate (PHOSLO) 667 MG CAPS capsule Take 1 capsule with each meal and 1 tablet with each snack. 540 capsule 3    gabapentin (NEURONTIN) 400 MG capsule Take 800 mg by mouth in the morning and at bedtime.       OXYGEN Inhale 2 L into the lungs daily as needed      carvedilol (COREG) 6.25 MG tablet 6.25 mg 2 times daily       albuterol sulfate HFA (PROVENTIL;VENTOLIN;PROAIR) 108 (90 Base) MCG/ACT inhaler Inhale 2 puffs into the lungs every 6 hours as needed for Wheezing      esomeprazole Magnesium (NEXIUM) 40 MG PACK Take 20 mg by mouth 2 times daily 2 tabs      LORazepam (ATIVAN) 0.5 MG tablet Take 0.5 mg by mouth every 12 hours Indications: One Tablet twice daily       Insulin Aspart (NOVOLOG FLEXPEN SC) Inject 15 Units into the skin 3 times daily (before meals) On sliding scale      lisinopril (PRINIVIL;ZESTRIL) 10 MG tablet Take 20 mg by mouth daily      furosemide (LASIX) 20 MG tablet Take 40 mg by mouth 2 times daily        No current facility-administered medications on file prior to encounter. REVIEW OF SYSTEMS    Pertinent items are noted in HPI. Constitutional: Negative for systemic symptoms including fever, chills and malaise. Objective:      BP (!) 173/78   Pulse 94   Temp 97.1 °F (36.2 °C) (Temporal)   Resp 16     PHYSICAL EXAM      General: The patient is in no acute distress. Mental status:  Patient is appropriate, is  oriented to place and plan of care. Dermatologic exam: Visual inspection of the periwound reveals the skin to be dry and cool   Wound exam: see wound description below in procedure note      Assessment:     Problem List Items Addressed This Visit       Acute pain of right foot    Relevant Orders    Initiate Outpatient Wound Care Protocol    Diabetic ulcer of right heel associated with type 2 diabetes mellitus, with necrosis of bone ([address] - Primary    Relevant Orders    Initiate Outpatient Wound Care Protocol    Pressure ulcer of sacral region, stage 3 ([address]     Procedure Note    Indications:  Based on my examination of this patient's wound(s) today, sharp excision into necrotic muscle/fascia is required to promote healing and evaluate the extent of previous healing. Performed by: Leisa Maldonado MD    Consent obtained: Yes    Time out taken:  Yes    Pain Control: Anesthetic  Anesthetic: 4% Lidocaine Liquid Topical     Debridement:Excisional Debridement    Using #15 blade scalpel, scissors, and forceps the wound(s) was/were sharply debrided down through and including the removal of muscle/fascia.         Devitalized Tissue Debrided:  slough and exudate    Pre Debridement Measurements:  Are located in the Wound Documentation Flow Sheet    All active wounds listed below with today's date are evaluated  Wound(s)    debrided this date include # : 2     Post  Debridement Measurements:  Negative Pressure Wound Therapy Foot (Active)   Number of days: 3265       Negative Pressure Wound Therapy Foot Distal (Active)   Number of days: 3261       Negative Pressure Wound Therapy (Active)   Wound Type Diabetic foot ulcer 10/11/22 1145   Unit Type [employer] 01/17/23 1041   Dressing Type Black Foam 01/17/23 1041   Number of pieces used 2 01/17/23 1041   Number of pieces removed 2 01/24/23 1030   Cycle Continuous 01/17/23 1041   Target Pressure (mmHg) 125 01/17/23 1041   Dressing Status New dressing applied;Clean, dry & intact 01/17/23 1041   Dressing Changed Changed/New 01/17/23 1041   Drainage Amount Moderate 01/17/23 1041   Drainage Description Brown 01/17/23 1041   Wound Assessment Granulation tissue 01/10/23 1057   Lora-wound Assessment Fragile; Intact 01/10/23 1057   Odor None 01/10/23 1057   Number of days: 124       Incision 06/10/13 Wrist Right (Active)   Number of days: 3515       Incision 07/29/13 Wrist Left (Active)   Number of days: 3466       Incision 02/14/14 Toe (Comment  which one) (Active)   Number of days: 9221       Wound 11/01/22 #2 Sacrum (Active)   Wound Image   01/24/23 1030   Wound Etiology Pressure Unstageable 12/13/22 0939   Dressing Status New dressing applied 01/17/23 1041   Wound Cleansed Soap and water; Wound cleanser 01/24/23 1030   Dressing/Treatment Other (comment); Negative pressure wound therapy 01/17/23 1041   Offloading for Diabetic Foot Ulcers Other (comment) 01/24/23 1030   Wound Length (cm) 3.5 cm 01/24/23 1030   Wound Width (cm) 1.2 cm 01/24/23 1030   Wound Depth (cm) 1.9 cm 01/24/23 1030   Wound Surface Area (cm^2) 4.2 cm^2 01/24/23 1030   Change in Wound Size % (l*w) 16 01/24/23 1030   Wound Volume (cm^3) 7.98 cm^3 01/24/23 1030   Wound Healing % -1496 01/24/23 1030   Post-Procedure Length (cm) 3.5 cm 01/24/23 1048   Post-Procedure Width (cm) 1.2 cm 01/24/23 1048   Post-Procedure Depth (cm) 1.9 cm 01/24/23 1048   Post-Procedure Surface Area (cm^2) 4.2 cm^2 01/24/23 1048   Post-Procedure Volume (cm^3) 7.98 cm^3 01/24/23 1048   Distance Tunneling (cm) 0 cm 01/24/23 1030 Tunneling Position ___ O'Clock 0 01/24/23 1030   Undermining Starts ___ O'Clock 0900 01/24/23 1030   Undermining Ends___ O'Clock 0200 01/24/23 1030   Undermining Maxium Distance (cm) 2.2 01/24/23 1030   Wound Assessment Granulation tissue 01/24/23 1030   Drainage Amount Moderate 01/24/23 1030   Drainage Description Serosanguinous 01/24/23 1030   Odor None 01/24/23 1030   Lora-wound Assessment Blanchable erythema;Fragile 01/24/23 1030   Margins Defined edges; Unattached edges 01/24/23 1030   Wound Thickness Description not for Pressure Injury Full thickness 01/24/23 1030   Number of days: 84       Wound 11/11/22  #1 Left Great Toe (Active)   Wound Image   01/24/23 1030   Wound Etiology Arterial 12/20/22 0952   Dressing Status Other (Comment) 01/17/23 1041   Wound Cleansed Soap and water; Wound cleanser 01/24/23 1030   Dressing/Treatment Betadine swabs/povidone iodine;Open to air 01/17/23 1041   Offloading for Diabetic Foot Ulcers Other (comment) 01/24/23 1030   Wound Length (cm) 1.6 cm 01/24/23 1030   Wound Width (cm) 3.5 cm 01/24/23 1030   Wound Depth (cm) 0.1 cm 01/24/23 1030   Wound Surface Area (cm^2) 5.6 cm^2 01/24/23 1030   Change in Wound Size % (l*w) -24.44 01/24/23 1030   Wound Volume (cm^3) 0.56 cm^3 01/24/23 1030   Post-Procedure Length (cm) 1.8 cm 12/13/22 1000   Post-Procedure Width (cm) 3.2 cm 12/13/22 1000   Post-Procedure Depth (cm) 0.1 cm 12/13/22 1000   Post-Procedure Surface Area (cm^2) 5.76 cm^2 12/13/22 1000   Post-Procedure Volume (cm^3) 0.576 cm^3 12/13/22 1000   Distance Tunneling (cm) 0 cm 01/24/23 1030   Tunneling Position ___ O'Clock 0 01/24/23 1030   Undermining Starts ___ O'Clock 0 01/24/23 1030   Undermining Ends___ O'Clock 0 01/24/23 1030   Undermining Maxium Distance (cm) 0 01/24/23 1030   Wound Assessment Eschar dry 01/24/23 1030   Drainage Amount None 01/24/23 1030   Drainage Description Serosanguinous 01/10/23 0949   Odor None 01/24/23 1030   Lora-wound Assessment Dry/flaky;Fragile 01/24/23 1030   Margins Attached edges 01/24/23 1030   Wound Thickness Description not for Pressure Injury Partial thickness 01/24/23 1030   Number of days: 73       Wound 11/15/22 #3 Left Posterior Ankle Cluster (Active)   Wound Image   01/24/23 1030   Wound Etiology Traumatic 12/13/22 0939   Dressing Status Other (Comment) 01/17/23 1041   Wound Cleansed Soap and water; Wound cleanser 01/24/23 1030   Dressing/Treatment Betadine swabs/povidone iodine;Open to air 01/17/23 1041   Offloading for Diabetic Foot Ulcers Other (comment) 01/24/23 1030   Wound Length (cm) 1.7 cm 01/24/23 1030   Wound Width (cm) 2.2 cm 01/24/23 1030   Wound Depth (cm) 0.3 cm 01/24/23 1030   Wound Surface Area (cm^2) 3.74 cm^2 01/24/23 1030   Change in Wound Size % (l*w) -87 01/24/23 1030   Wound Volume (cm^3) 1.122 cm^3 01/24/23 1030   Wound Healing % -461 01/24/23 1030   Post-Procedure Length (cm) 3.3 cm 01/10/23 1026   Post-Procedure Width (cm) 2 cm 01/10/23 1026   Post-Procedure Depth (cm) 0.3 cm 01/10/23 1026   Post-Procedure Surface Area (cm^2) 6.6 cm^2 01/10/23 1026   Post-Procedure Volume (cm^3) 1.98 cm^3 01/10/23 1026   Distance Tunneling (cm) 0 cm 01/24/23 1030   Tunneling Position ___ O'Clock 0 01/24/23 1030   Undermining Starts ___ O'Clock 0 01/24/23 1030   Undermining Ends___ O'Clock 0 01/24/23 1030   Undermining Maxium Distance (cm) 0 01/24/23 1030   Wound Assessment Eschar dry;Slough 01/24/23 1030   Drainage Amount Small 01/24/23 1030   Drainage Description Serosanguinous 01/24/23 1030   Odor None 01/24/23 1030   Lora-wound Assessment Blanchable erythema;Fragile 01/24/23 1030   Margins Attached edges 01/24/23 1030   Wound Thickness Description not for Pressure Injury Partial thickness 01/24/23 1030   Number of days: 70       Wound 11/15/22 #4 Left heel (Active)   Wound Image   01/24/23 1030   Wound Etiology Deep tissue/Injury 12/20/22 0952   Dressing Status Other (Comment) 01/17/23 1041   Wound Cleansed Soap and water; Wound cleanser 01/24/23 1030   Dressing/Treatment Betadine swabs/povidone iodine;Open to air 01/17/23 1041   Offloading for Diabetic Foot Ulcers Other (comment) 01/24/23 1030   Wound Length (cm) 3.6 cm 01/24/23 1030   Wound Width (cm) 3.3 cm 01/24/23 1030   Wound Depth (cm) 0.1 cm 01/24/23 1030   Wound Surface Area (cm^2) 11.88 cm^2 01/24/23 1030   Change in Wound Size % (l*w) -137.6 01/24/23 1030   Wound Volume (cm^3) 1.188 cm^3 01/24/23 1030   Wound Healing % -138 01/24/23 1030   Post-Procedure Length (cm) 3.1 cm 01/10/23 1026   Post-Procedure Width (cm) 2.8 cm 01/10/23 1026   Post-Procedure Depth (cm) 0.1 cm 01/10/23 1026   Post-Procedure Surface Area (cm^2) 8.68 cm^2 01/10/23 1026   Post-Procedure Volume (cm^3) 0.868 cm^3 01/10/23 1026   Distance Tunneling (cm) 0 cm 01/24/23 1030   Tunneling Position ___ O'Clock 0 01/24/23 1030   Undermining Starts ___ O'Clock 0 01/24/23 1030   Undermining Ends___ O'Clock 0 01/24/23 1030   Undermining Maxium Distance (cm) 0 01/24/23 1030   Wound Assessment Eschar dry;Slough 01/24/23 1030   Drainage Amount Small 01/24/23 1030   Drainage Description Serosanguinous 01/24/23 1030   Odor None 01/24/23 1030   Lora-wound Assessment Dry/flaky 01/24/23 1030   Margins Attached edges 01/24/23 1030   Wound Thickness Description not for Pressure Injury Partial thickness 01/24/23 1030   Number of days: 70       Wound 11/15/22 #6 left medial foot (Active)   Wound Image   01/24/23 1030   Wound Etiology Arterial 12/20/22 0952   Dressing Status Other (Comment) 01/17/23 1041   Wound Cleansed Soap and water; Wound cleanser 01/24/23 1030   Dressing/Treatment Betadine swabs/povidone iodine;Open to air 01/17/23 1041   Offloading for Diabetic Foot Ulcers Other (comment) 01/24/23 1030   Wound Length (cm) 0.2 cm 01/24/23 1030   Wound Width (cm) 0.2 cm 01/24/23 1030   Wound Depth (cm) 0.1 cm 01/24/23 1030   Wound Surface Area (cm^2) 0.04 cm^2 01/24/23 1030   Change in Wound Size % (l*w) 92 01/24/23 1030   Wound Volume (cm^3) 0.004 cm^3 01/24/23 1030   Post-Procedure Length (cm) 0.7 cm 12/13/22 1000   Post-Procedure Width (cm) 0.6 cm 12/13/22 1000   Post-Procedure Depth (cm) 0.1 cm 12/13/22 1000   Post-Procedure Surface Area (cm^2) 0.42 cm^2 12/13/22 1000   Post-Procedure Volume (cm^3) 0.042 cm^3 12/13/22 1000   Distance Tunneling (cm) 0 cm 01/24/23 1030   Tunneling Position ___ O'Clock 0 01/24/23 1030   Undermining Starts ___ O'Clock 0 01/24/23 1030   Undermining Ends___ O'Clock 0 01/24/23 1030   Undermining Maxium Distance (cm) 0 01/24/23 1030   Wound Assessment Dry;Slough 01/24/23 1030   Drainage Amount Small 01/24/23 1030   Drainage Description Serosanguinous 01/24/23 1030   Odor None 01/24/23 1030   Lora-wound Assessment Hyperkeratosis (callous) 01/24/23 1030   Margins Attached edges 01/24/23 1030   Wound Thickness Description not for Pressure Injury Partial thickness 01/24/23 1030   Number of days: 70       Percent of Wound(s) Debrided: approximately 100%    Total  Area  Debrided:  4 sq cm     Bleeding:  Minimal    Hemostasis Achieved:  not needed    Procedural Pain:  4  / 10     Post Procedural Pain:  2 / 10     Response to treatment:  Well tolerated by patient. Status of wound progress and description from last visit:   Stable. Plan:       Discharge Instructions         PHYSICIAN ORDERS AND DISCHARGE INSTRUCTIONS  NOTE: Upon discharge from the [address], you will receive a patient experience survey via E-mail. We would be grateful if you would take the time to fill this survey out. Wound care order history:              KATHLEEN's   Right       Left    Date               Vascular studies/Intervention: . Cultures: .9/13/22, 12/20/22              Antibiotics: .Cipro and Doxy 9/13/22                         HbA1c:  .6/29/22 8.2               Grafts: Vivian Faith: . Continuing wound care orders and information:              Residence: . Private              Continue home health care with: [id number] [facility]              Your wound-care supplies will be provided by: . [facility]              Pharmacy: [facility] in [place]  Wound cleansing:                           Do not scrub or use excessive force. Wash hands with soap and water before and after dressing changes. Prior to applying a clean dressing, cleanse wound with normal saline,                          wound cleanser, or mild soap and water. Ask your physician or nurse before getting the wound(s) wet in the shower. Daily Wound management:                          Keep weight off wounds and reposition every 2 hours. Avoid standing for long periods of time. Evaluate legs to the level of the heart or above for 30 minutes 4-5 times a day and/or when sitting. When taking antibiotics take entire prescription as ordered by MD do not stop taking until medicine is all gone. Orders for this week (1/24/22) : Left finger- Apply silvadene to wound bed  Cover with Bandaid  Change Daily     Left foot and toe wounds -Wash with mild soap and water, rinse with saline, pat dry with 4x4  Paint with betadine  Silicone border gauze  Daily     Coccyx- wash with mild soap and water  Apply mastisol and duoderm to periwound for protection. Anasept and Stimulen to wound bed  Gently pack wound with black foam, be sure to tuck into any tunnels or undermining  Secure with vac drape. Set wound vac to 125 continuous suction. Change canister with each dressing change or as needed. Change on Tuesday, Thursday and Saturday    Call Lou Nguyen to order more canisters       Follow up with Dr. America Morris In 1 weeks in the wound care center  Call [phone] for any questions or concerns.   Date__________   Time____________        Treatment Note      Written Patient Dismissal Instructions Given            Electronically signed by Terrence Bueno MD on 1/24/2023 at 10:54 AM

## 2023-01-31 ENCOUNTER — HOSPITAL ENCOUNTER (OUTPATIENT)
Dept: WOUND CARE | Age: 58
Discharge: HOME | End: 2023-01-31
Payer: MEDICARE

## 2023-01-31 VITALS
DIASTOLIC BLOOD PRESSURE: 89 MMHG | RESPIRATION RATE: 16 BRPM | SYSTOLIC BLOOD PRESSURE: 123 MMHG | HEART RATE: 84 BPM | TEMPERATURE: 97 F

## 2023-01-31 DIAGNOSIS — L97.423 DIABETIC ULCER OF LEFT HEEL ASSOCIATED WITH TYPE 2 DIABETES MELLITUS, WITH NECROSIS OF MUSCLE (HCC): ICD-10-CM

## 2023-01-31 DIAGNOSIS — E11.621 DIABETIC ULCER OF RIGHT HEEL ASSOCIATED WITH TYPE 2 DIABETES MELLITUS, WITH NECROSIS OF BONE (HCC): Primary | ICD-10-CM

## 2023-01-31 DIAGNOSIS — M79.671 ACUTE PAIN OF RIGHT FOOT: ICD-10-CM

## 2023-01-31 DIAGNOSIS — L97.414 DIABETIC ULCER OF RIGHT HEEL ASSOCIATED WITH TYPE 2 DIABETES MELLITUS, WITH NECROSIS OF BONE (HCC): Primary | ICD-10-CM

## 2023-01-31 DIAGNOSIS — L89.153 PRESSURE ULCER OF SACRAL REGION, STAGE 3 (HCC): ICD-10-CM

## 2023-01-31 DIAGNOSIS — E11.621 DIABETIC ULCER OF LEFT HEEL ASSOCIATED WITH TYPE 2 DIABETES MELLITUS, WITH NECROSIS OF MUSCLE (HCC): ICD-10-CM

## 2023-01-31 PROBLEM — T82.898A PROBLEM WITH DIALYSIS ACCESS (HCC): Status: RESOLVED | Noted: 2018-10-25 | Resolved: 2023-01-31

## 2023-01-31 PROBLEM — N18.4 CHRONIC RENAL FAILURE, STAGE 4 (SEVERE) (HCC): Status: RESOLVED | Noted: 2018-03-22 | Resolved: 2023-01-31

## 2023-01-31 PROBLEM — L97.522 DIABETIC ULCER OF TOE OF LEFT FOOT ASSOCIATED WITH TYPE 2 DIABETES MELLITUS, WITH FAT LAYER EXPOSED (HCC): Status: RESOLVED | Noted: 2018-08-06 | Resolved: 2023-01-31

## 2023-01-31 PROBLEM — L97.523 DIABETIC ULCER OF LEFT FOOT ASSOCIATED WITH TYPE 2 DIABETES MELLITUS, WITH NECROSIS OF MUSCLE (HCC): Status: ACTIVE | Noted: 2022-12-20

## 2023-01-31 PROCEDURE — 11043 DBRDMT MUSC&/FSCA 1ST 20/<: CPT

## 2023-01-31 PROCEDURE — 11042 DBRDMT SUBQ TIS 1ST 20SQCM/<: CPT

## 2023-01-31 PROCEDURE — 6370000000 HC RX 637 (ALT 250 FOR IP): Performed by: SURGERY

## 2023-01-31 RX ORDER — BETAMETHASONE DIPROPIONATE 0.05 %
OINTMENT (GRAM) TOPICAL ONCE
OUTPATIENT
Start: 2023-01-31 | End: 2023-01-31

## 2023-01-31 RX ORDER — LIDOCAINE HYDROCHLORIDE 20 MG/ML
JELLY TOPICAL ONCE
OUTPATIENT
Start: 2023-01-31 | End: 2023-01-31

## 2023-01-31 RX ORDER — LIDOCAINE 40 MG/G
CREAM TOPICAL ONCE
OUTPATIENT
Start: 2023-01-31 | End: 2023-01-31

## 2023-01-31 RX ORDER — GENTAMICIN SULFATE 1 MG/G
OINTMENT TOPICAL ONCE
OUTPATIENT
Start: 2023-01-31 | End: 2023-01-31

## 2023-01-31 RX ORDER — LIDOCAINE HYDROCHLORIDE 40 MG/ML
SOLUTION TOPICAL ONCE
OUTPATIENT
Start: 2023-01-31 | End: 2023-01-31

## 2023-01-31 RX ORDER — LIDOCAINE 50 MG/G
OINTMENT TOPICAL ONCE
Status: COMPLETED | OUTPATIENT
Start: 2023-01-31 | End: 2023-01-31

## 2023-01-31 RX ORDER — CLOBETASOL PROPIONATE 0.5 MG/G
OINTMENT TOPICAL ONCE
OUTPATIENT
Start: 2023-01-31 | End: 2023-01-31

## 2023-01-31 RX ORDER — BACITRACIN ZINC AND POLYMYXIN B SULFATE 500; 1000 [USP'U]/G; [USP'U]/G
OINTMENT TOPICAL ONCE
OUTPATIENT
Start: 2023-01-31 | End: 2023-01-31

## 2023-01-31 RX ORDER — CLOBETASOL PROPIONATE 0.5 MG/G
OINTMENT TOPICAL
Qty: 45 G | Refills: 1 | Status: SHIPPED | OUTPATIENT
Start: 2023-01-31

## 2023-01-31 RX ORDER — CLOBETASOL PROPIONATE 0.5 MG/G
OINTMENT TOPICAL ONCE
Status: COMPLETED | OUTPATIENT
Start: 2023-01-31 | End: 2023-01-31

## 2023-01-31 RX ORDER — BACITRACIN, NEOMYCIN, POLYMYXIN B 400; 3.5; 5 [USP'U]/G; MG/G; [USP'U]/G
OINTMENT TOPICAL ONCE
OUTPATIENT
Start: 2023-01-31 | End: 2023-01-31

## 2023-01-31 RX ORDER — GINSENG 100 MG
CAPSULE ORAL ONCE
OUTPATIENT
Start: 2023-01-31 | End: 2023-01-31

## 2023-01-31 RX ORDER — LIDOCAINE 50 MG/G
OINTMENT TOPICAL ONCE
OUTPATIENT
Start: 2023-01-31 | End: 2023-01-31

## 2023-01-31 RX ADMIN — COLLAGENASE SANTYL: 250 OINTMENT TOPICAL at 12:11

## 2023-01-31 RX ADMIN — LIDOCAINE: 50 OINTMENT TOPICAL at 10:31

## 2023-01-31 RX ADMIN — CLOBETASOL PROPIONATE: 0.5 OINTMENT TOPICAL at 12:12

## 2023-01-31 ASSESSMENT — PAIN DESCRIPTION - LOCATION: LOCATION: BUTTOCKS

## 2023-01-31 ASSESSMENT — PAIN SCALES - GENERAL: PAINLEVEL_OUTOF10: 2

## 2023-01-31 NOTE — PROGRESS NOTES
Wound Care Center Progress Note With Procedure    Petr Valdez  AGE: 62 y.o. GENDER: female  : 1965  EPISODE DATE:  2023     Subjective:     Chief Complaint   Patient presents with    Wound Check     Sacrum, LLE          HISTORY of PRESENT ILLNESS      Petr Valdez is a 62 y.o. female who presents today for wound evaluation of Chronic arterial, diabetic, and pressure ulcer(s) of the left great toe, left heel AVMs Achilles, sacral area. The ulcer is of moderate severity. The underlying cause of the wound is diabetes, pressure and end-stage renal artery disease on hemodialysis. Stable. I will remove the wound VAC for the next week on her sacral area.   Wound Pain Timing/Severity: mild  Quality of pain: tender  Severity of pain:  3 / 10   Modifying Factors: diabetes, decreased mobility, smoking, and arterial insufficiency  Associated Signs/Symptoms: drainage and pain        PAST MEDICAL HISTORY        Diagnosis Date    Acute pain of right foot 2022    CAD (coronary artery disease)     s/p CABG x 4;  follows with Dr Azeb Gomez of foot 2018    Carpal tunnel syndrome on both sides     CHF (congestive heart failure) ([address] 10/2010    Chronic diastolic; EF 27%    Chronic kidney disease     stage 4 kidney disease    Chronic ulcer of left ankle with fat layer exposed ([address] 10/7/2015    Chronic ulcer of left foot with fat layer exposed ([address] 3/17/2016    Chronic ulcer of right ankle with fat layer exposed ([address] 3/17/2016    Chronic ulcer of right foot with fat layer exposed ([address] 3/17/2016    Decubitus ulcer of sacral region, stage 1 2018    Depression     Diabetes mellitus ([address]     Diabetes mellitus with neurological manifestations ([address]     Diabetes mellitus with ulcer of ankle ([address]     Diabetic ulcer of left foot associated with type 2 diabetes mellitus, with fat layer exposed ([address] 2018    Diabetic ulcer of right heel associated with type 2 diabetes mellitus, with muscle involvement without evidence of necrosis ([facility] 9/13/2022    Diabetic ulcer of right heel associated with type 2 diabetes mellitus, with necrosis of bone ([address] 9/13/2022    ESRD on hemodialysis ([facility] 9/21/2022    Family history of cardiovascular disease     Mother    GERD (gastroesophageal reflux disease)     H/O cardiac catheterization 10/18/2010, 6/3/2010    10/18/2010-Four bypass grafts all widely patent. 6/3/2010- Moderate to severe triple vessel disease, preserved LV systolic function. H/O cardiovascular stress test 7/26/2012, 8/3/2011, 10/14/2010, 5/24/2010 7/26/2012-Lexiscan- Normal Myocardial Perfusion Study. Post stress myocardial perfusion images show a normal pattern of perfusion in all regions. Post stress LV normal size. Normal perfusion in the distribution of all coronaries. Normal LV size and function. Rest EF 70%    H/O chest x-ray 7/12/2012, 6/2/2010 7/12/2012-Perihilar peribronchial cuffing with mild basilar predominant interstital prominence, which may reflect interstitial edema or atypical pneumonia. H/O Doppler ultrasound 6/4/2010    CAROTID DOPPLER-6/4/2010-No Doppler evidence of hemodynamically significant Carotid Stenosis with antegrade flow in the vertebral arteries bilaterally. 6/4/2010 PERIPHERAL VENOUS DOPPLER_ LEFT Saphenous Vein mapping    H/O echocardiogram 7/26/2012, 8/3/2011, 10/2010, 7/23/2010, 6/8/2010 7/26/2012-LV normal size. Normal LV wall thickness. LV systolic function normal. EF => 55%. LV wall motion normal. Mild MR. Mild TR. History of complete ECG 7/26/2012 Margaretta Coil), 7/13/2012 [facility]), 7/25/2011, 3/25/2011, 10/18/2010, 10/11/2010, 6/23/2010, 5/7/2010    Hx of cardiovascular stress test 9/3/2015    lexiscan-normal,EF66%    Hx of Doppler ultrasound 4/5/16    Arterial: There is a fluid collection in the left thigh, please refer to PCP for further monitoring, no vascular in etiology.      Hx of echocardiogram     4/16 EF40% Mild MR/TR and mild Pulm HTN. 9/15 EF 45-50%, Mildly dilated L atrium, mildly dilated R ventricle. Mild MR & mild TR     Hyperlipidemia     Neuropathy of foot     Pt reports starting approx. 6-7 years ago    Neuropathy of hand     Pt reports starting approx.  6-7 years ago    Non compliance with medical treatment 7/2/2018    Pressure ulcer of sacral region, stage 2 ([address] 11/29/2022    Pressure ulcer of sacral region, stage 3 ([address] 11/29/2022    S/P BKA (below knee amputation), right ([address] 11/29/2022    S/P CABG x 4 6/5/2010    LIMA-> LAD;  VG->CX;  VG->Diagonal; VG-> distal RCA  per  Dr Jan Alvarado    Type 2 diabetes mellitus with diabetic polyneuropathy, with long-term current use of insulin ([address] 4/5/2016    Type II or unspecified type diabetes mellitus with neurological manifestations, not stated as uncontrolled(250.60)     Type II or unspecified type diabetes mellitus with other specified manifestations, not stated as uncontrolled     Ulcer of left foot, with fat layer exposed ([address] 12/19/2013    Ulcer of other part of foot        PAST SURGICAL HISTORY    Past Surgical History:   Procedure Laterality Date    APPENDECTOMY      CARDIAC SURGERY      CARPAL TUNNEL RELEASE      L hand Nov. 2011, R hand Jan. 2012    CARPAL TUNNEL RELEASE Right 6/10/2013    recurrent    CARPAL TUNNEL RELEASE Left 7/29/2013    with ulnar nerve transpostion and L middle finger release    CHOLECYSTECTOMY      COLONOSCOPY      CORONARY ARTERY BYPASS GRAFT  6/5/2010 6/5/2010-LIMA->LAD;  VG-> Diagonal;  VG-> Obtuse marginal;  VG->Distal RCA-   Dr Deana Lizarraga Right 6/10/2013    HYSTERECTOMY, TOTAL ABDOMINAL (CERVIX REMOVED)      LEG AMPUTATION BELOW KNEE Right 11/11/2022    RIGHT LEG AMPUTATION BELOW KNEE WITH IPOP performed by Augusta Hollis MD at [address] Left 02/14/144th toe    TUBAL LIGATION      ULNAR TUNNEL RELEASE Right 6/10/2013       FAMILY HISTORY    Family History   Problem Relation Age of Onset    Heart Disease Mother Kidney Disease Mother         dialysis    Cancer Father        SOCIAL HISTORY    Social History     Tobacco Use    Smoking status: Former     Packs/day: 0.25     Years: 30.00     Pack years: 7.50     Types: Cigarettes     Quit date: 2022     Years since quittin.3    Smokeless tobacco: Never    Tobacco comments:     quit smoking mia 16   Vaping Use    Vaping Use: Every day    Substances: Never   Substance Use Topics    Alcohol use: No     Alcohol/week: 0.0 standard drinks     Comment:                                    CAFFEINE: 2 sodas daily    Drug use: No       ALLERGIES    Allergies   Allergen Reactions    Latex     Codeine     Cortisone     Cortizone     Iodides     Phenobarbital Other (See Comments)     Hallucinations     Vancomycin Other (See Comments)     \"makes me very sick\"       MEDICATIONS    Current Outpatient Medications on File Prior to Encounter   Medication Sig Dispense Refill    silver sulfADIAZINE (SILVADENE) 1 % cream Apply topically daily. 1000 g 1    calcium acetate (PHOSLO) 667 MG CAPS capsule Take 1 capsule with each meal and 1 tablet with each snack. 540 capsule 3    gabapentin (NEURONTIN) 400 MG capsule Take 800 mg by mouth in the morning and at bedtime.       OXYGEN Inhale 2 L into the lungs daily as needed      carvedilol (COREG) 6.25 MG tablet 6.25 mg 2 times daily       albuterol sulfate HFA (PROVENTIL;VENTOLIN;PROAIR) 108 (90 Base) MCG/ACT inhaler Inhale 2 puffs into the lungs every 6 hours as needed for Wheezing      esomeprazole Magnesium (NEXIUM) 40 MG PACK Take 20 mg by mouth 2 times daily 2 tabs      LORazepam (ATIVAN) 0.5 MG tablet Take 0.5 mg by mouth every 12 hours Indications: One Tablet twice daily       Insulin Aspart (NOVOLOG FLEXPEN SC) Inject 15 Units into the skin 3 times daily (before meals) On sliding scale      lisinopril (PRINIVIL;ZESTRIL) 10 MG tablet Take 20 mg by mouth daily      furosemide (LASIX) 20 MG tablet Take 40 mg by mouth 2 times daily        No current facility-administered medications on file prior to encounter. REVIEW OF SYSTEMS    Pertinent items are noted in HPI. Constitutional: Negative for systemic symptoms including fever, chills and malaise. Objective:      /89   Pulse 84   Temp 97 °F (36.1 °C) (Temporal)   Resp 16     PHYSICAL EXAM      General: The patient is in no acute distress. Mental status:  Patient is appropriate, is  oriented to place and plan of care. Dermatologic exam: Visual inspection of the periwound reveals the skin to be dry and cool   Wound exam: see wound description below in procedure note      Assessment:     Problem List Items Addressed This Visit       Acute pain of right foot    Relevant Medications    clobetasol (TEMOVATE) 0.05 % ointment (Start on 1/31/2023 11:30 AM)    collagenase ointment (Start on 1/31/2023 11:30 AM)    Other Relevant Orders    Initiate Outpatient Wound Care Protocol    Diabetic ulcer of left foot associated with type 2 diabetes mellitus, with necrosis of muscle ([address]    Diabetic ulcer of right heel associated with type 2 diabetes mellitus, with necrosis of bone ([address] - Primary    Relevant Medications    clobetasol (TEMOVATE) 0.05 % ointment (Start on 1/31/2023 11:30 AM)    collagenase ointment (Start on 1/31/2023 11:30 AM)    Other Relevant Orders    Initiate Outpatient Wound Care Protocol    Pressure ulcer of sacral region, stage 3 ([address]     Procedure Note    Indications:  Based on my examination of this patient's wound(s) today, sharp excision into necrotic muscle/fascia is required to promote healing and evaluate the extent of previous healing. Performed by: John Jose MD    Consent obtained: Yes    Time out taken:  Yes    Pain Control:       Debridement:Excisional Debridement    Using #15 blade scalpel, scissors, and forceps the wound(s) was/were sharply debrided down through and including the removal of muscle/fascia.         Devitalized Tissue Debrided:  necrotic/eschar    Pre Debridement Measurements:  Are located in the Wound Documentation Flow Sheet    All active wounds listed below with today's date are evaluated  Wound(s)    debrided this date include # : 3     Post  Debridement Measurements:  Negative Pressure Wound Therapy Foot (Active)   Number of days: 3272       Negative Pressure Wound Therapy Foot Distal (Active)   Number of days: 3268       Negative Pressure Wound Therapy (Active)   Wound Type Diabetic foot ulcer 10/11/22 1145   Unit Type [employer] 01/31/23 1013   Dressing Type Black Foam 01/31/23 1013   Number of pieces used 1 01/24/23 1103   Number of pieces removed 1 01/31/23 1013   Cycle Continuous 01/31/23 1013   Target Pressure (mmHg) 125 01/31/23 1013   Dressing Status New dressing applied;Clean, dry & intact 01/17/23 1041   Dressing Changed Changed/New 01/17/23 1041   Drainage Amount Moderate 01/17/23 1041   Drainage Description Brown 01/17/23 1041   Wound Assessment Granulation tissue 01/10/23 1057   Lora-wound Assessment Fragile; Intact 01/10/23 1057   Odor None 01/10/23 1057   Number of days: 131       Incision 06/10/13 Wrist Right (Active)   Number of days: 3522       Incision 07/29/13 Wrist Left (Active)   Number of days: 3473       Incision 02/14/14 Toe (Comment  which one) (Active)   Number of days: 0557       Wound 11/01/22 #2 Sacrum (Active)   Wound Image   01/24/23 1030   Wound Etiology Pressure Unstageable 12/13/22 0939   Dressing Status Clean;Dry;New dressing applied 01/24/23 1103   Wound Cleansed Soap and water; Wound cleanser;Cleansed with saline 01/31/23 1013   Dressing/Treatment Other (comment); Negative pressure wound therapy 01/17/23 1041   Offloading for Diabetic Foot Ulcers Other (comment) 01/31/23 1013   Wound Length (cm) 3.5 cm 01/31/23 1013   Wound Width (cm) 1.4 cm 01/31/23 1013   Wound Depth (cm) 2 cm 01/31/23 1013   Wound Surface Area (cm^2) 4.9 cm^2 01/31/23 1013   Change in Wound Size % (l*w) 2 01/31/23 1013   Wound Volume (cm^3) 9.8 cm^3 01/31/23 1013   Wound Healing % -1860 01/31/23 1013   Post-Procedure Length (cm) 3.5 cm 01/31/23 1110   Post-Procedure Width (cm) 1.4 cm 01/31/23 1110   Post-Procedure Depth (cm) 2 cm 01/31/23 1110   Post-Procedure Surface Area (cm^2) 4.9 cm^2 01/31/23 1110   Post-Procedure Volume (cm^3) 9.8 cm^3 01/31/23 1110   Distance Tunneling (cm) 0 cm 01/31/23 1013   Tunneling Position ___ O'Clock 0 01/31/23 1013   Undermining Starts ___ O'Clock 6 01/31/23 1013   Undermining Ends___ O'Clock 2 01/31/23 1013   Undermining Maxium Distance (cm) 4.3 01/31/23 1013   Wound Assessment Slough;Granulation tissue 01/31/23 1013   Drainage Amount Large 01/31/23 1013   Drainage Description Yellow;Green;Serosanguinous 01/31/23 1013   Odor Moderate 01/31/23 1013   Lora-wound Assessment Excoriated; Intact 01/31/23 1013   Margins Defined edges; Unattached edges 01/31/23 1013   Wound Thickness Description not for Pressure Injury Full thickness 01/31/23 1013   Number of days: 91       Wound 11/11/22  #1 Left Great Toe (Active)   Wound Image   01/24/23 1030   Wound Etiology Arterial 12/20/22 0952   Dressing Status Clean;Dry;New dressing applied 01/24/23 1103   Wound Cleansed Soap and water; Wound cleanser;Cleansed with saline 01/31/23 1013   Dressing/Treatment Betadine swabs/povidone iodine;Open to air 01/17/23 1041   Offloading for Diabetic Foot Ulcers Other (comment) 01/31/23 1013   Wound Length (cm) 2.1 cm 01/31/23 1013   Wound Width (cm) 3.4 cm 01/31/23 1013   Wound Depth (cm) 0.1 cm 01/31/23 1013   Wound Surface Area (cm^2) 7.14 cm^2 01/31/23 1013   Change in Wound Size % (l*w) -58.67 01/31/23 1013   Wound Volume (cm^3) 0.714 cm^3 01/31/23 1013   Post-Procedure Length (cm) 2.1 cm 01/31/23 1110   Post-Procedure Width (cm) 3.4 cm 01/31/23 1110   Post-Procedure Depth (cm) 0.1 cm 01/31/23 1110   Post-Procedure Surface Area (cm^2) 7.14 cm^2 01/31/23 1110   Post-Procedure Volume (cm^3) 0.714 cm^3 01/31/23 1110   Distance Tunneling (cm) 0 cm 01/31/23 1013   Tunneling Position ___ O'Clock 0 01/31/23 1013   Undermining Starts ___ O'Clock 0 01/31/23 1013   Undermining Ends___ O'Clock 0 01/31/23 1013   Undermining Maxium Distance (cm) 0 01/31/23 1013   Wound Assessment Eschar dry 01/31/23 1013   Drainage Amount None 01/31/23 1013   Drainage Description Serosanguinous 01/10/23 0949   Odor None 01/31/23 1013   Lora-wound Assessment Dry/flaky;Fragile 01/31/23 1013   Margins Defined edges 01/31/23 1013   Wound Thickness Description not for Pressure Injury Full thickness 01/31/23 1013   Number of days: 80       Wound 11/15/22 #3 Left Posterior Ankle Cluster (Active)   Wound Image   01/24/23 1030   Wound Etiology Traumatic 12/13/22 0939   Dressing Status Clean;Dry;New dressing applied 01/24/23 1103   Wound Cleansed Soap and water; Wound cleanser;Cleansed with saline 01/31/23 1013   Dressing/Treatment Betadine swabs/povidone iodine;Open to air 01/17/23 1041   Offloading for Diabetic Foot Ulcers Other (comment) 01/31/23 1013   Wound Length (cm) 1.7 cm 01/31/23 1013   Wound Width (cm) 2.4 cm 01/31/23 1013   Wound Depth (cm) 0.2 cm 01/31/23 1013   Wound Surface Area (cm^2) 4.08 cm^2 01/31/23 1013   Change in Wound Size % (l*w) -104 01/31/23 1013   Wound Volume (cm^3) 0.816 cm^3 01/31/23 1013   Wound Healing % -308 01/31/23 1013   Post-Procedure Length (cm) 1.7 cm 01/31/23 1110   Post-Procedure Width (cm) 2.4 cm 01/31/23 1110   Post-Procedure Depth (cm) 0.2 cm 01/31/23 1110   Post-Procedure Surface Area (cm^2) 4.08 cm^2 01/31/23 1110   Post-Procedure Volume (cm^3) 0.816 cm^3 01/31/23 1110   Distance Tunneling (cm) 0 cm 01/31/23 1013   Tunneling Position ___ O'Clock 0 01/31/23 1013   Undermining Starts ___ O'Clock 0 01/31/23 1013   Undermining Ends___ O'Clock 0 01/31/23 1013   Undermining Maxium Distance (cm) 0 01/31/23 1013   Wound Assessment Dry;Slough;Eschar dry 01/31/23 1013   Drainage Amount Scant 01/31/23 1013   Drainage Description Yellow 01/31/23 1013   Odor None 01/31/23 1013   Lora-wound Assessment Dry/flaky 01/31/23 1013   Margins Defined edges 01/31/23 1013   Wound Thickness Description not for Pressure Injury Full thickness 01/31/23 1013   Number of days: 77       Wound 11/15/22 #4 Left heel (Active)   Wound Image   01/24/23 1030   Wound Etiology Deep tissue/Injury 12/20/22 0952   Dressing Status Clean;Dry;New dressing applied 01/24/23 1103   Wound Cleansed Soap and water; Wound cleanser;Cleansed with saline 01/31/23 1013   Dressing/Treatment Betadine swabs/povidone iodine;Open to air 01/17/23 1041   Offloading for Diabetic Foot Ulcers Other (comment) 01/31/23 1013   Wound Length (cm) 3.6 cm 01/31/23 1013   Wound Width (cm) 3.5 cm 01/31/23 1013   Wound Depth (cm) 0.1 cm 01/31/23 1013   Wound Surface Area (cm^2) 12.6 cm^2 01/31/23 1013   Change in Wound Size % (l*w) -152 01/31/23 1013   Wound Volume (cm^3) 1.26 cm^3 01/31/23 1013   Wound Healing % -152 01/31/23 1013   Post-Procedure Length (cm) 3.6 cm 01/31/23 1110   Post-Procedure Width (cm) 3.5 cm 01/31/23 1110   Post-Procedure Depth (cm) 0.1 cm 01/31/23 1110   Post-Procedure Surface Area (cm^2) 12.6 cm^2 01/31/23 1110   Post-Procedure Volume (cm^3) 1.26 cm^3 01/31/23 1110   Distance Tunneling (cm) 0 cm 01/31/23 1013   Tunneling Position ___ O'Clock 0 01/31/23 1013   Undermining Starts ___ O'Clock 0 01/31/23 1013   Undermining Ends___ O'Clock 0 01/31/23 1013   Undermining Maxium Distance (cm) 0 01/31/23 1013   Wound Assessment Eschar dry 01/31/23 1013   Drainage Amount None 01/31/23 1013   Drainage Description Serosanguinous 01/24/23 1030   Odor None 01/31/23 1013   Lora-wound Assessment Dry/flaky 01/31/23 1013   Margins Defined edges 01/31/23 1013   Wound Thickness Description not for Pressure Injury Full thickness 01/31/23 1013   Number of days: 77       Percent of Wound(s) Debrided: approximately 100%    Total  Area  Debrided:  4 sq cm     Bleeding:  Minimal    Hemostasis Achieved:  not needed    Procedural Pain:  0  / 10     Post Procedural Pain:  0 / 10     Response to treatment:  Well tolerated by patient. Status of wound progress and description from last visit:   Stable. Plan:       Discharge Instructions         PHYSICIAN ORDERS AND DISCHARGE INSTRUCTIONS  NOTE: Upon discharge from the [address], you will receive a patient experience survey via E-mail. We would be grateful if you would take the time to fill this survey out. Wound care order history:              KATHLEEN's   Right       Left    Date               Vascular studies/Intervention: . Cultures: .9/13/22, 12/20/22              Antibiotics: .Cipro and Doxy 9/13/22                         HbA1c:  .6/29/22 8.2               Grafts: Hannah [place]: . Continuing wound care orders and information:              Residence: . Private              [facility] care with: [facility]              Your wound-care supplies will be provided by: . [facility]              Pharmacy: [facility] in [place]  Wound cleansing:                           Do not scrub or use excessive force. Wash hands with soap and water before and after dressing changes. Prior to applying a clean dressing, cleanse wound with normal saline,                          wound cleanser, or mild soap and water. Ask your physician or nurse before getting the wound(s) wet in the shower. Daily Wound management:                          Keep weight off wounds and reposition every 2 hours. Avoid standing for long periods of time. Evaluate legs to the level of the heart or above for 30 minutes 4-5 times a day and/or when sitting. When taking antibiotics take entire prescription as ordered by MD do not stop taking until medicine is all gone. Orders for this week (1/31/22) : Left finger- Apply silvadene to wound bed  Cover with Bandaid  Change Daily     Left Posterior Ankle- wash with mild soap and water  Paint with betadine   Cover with silicone border gauze (please send 4x4)  Change daily     Left foot and toe wounds -Wash with mild soap and water, rinse with saline, pat dry with 4x4  Paint with betadine  Daily     Coccyx- wash with mild soap and water  Clobetasol to periwound  Apply santyl to wound   Gently pack with saline dampened 4x4  Cover with Mepilex Sacral Border  Change Tuesday, Thursday and Saturday    Hold vac x 1 week  Apply mastisol and duoderm to periwound for protection. Anasept and Stimulen to wound bed  Gently pack wound with black foam, be sure to tuck into any tunnels or undermining  Secure with vac drape. Set wound vac to 125 continuous suction. Change canister with each dressing change or as needed. Change on Tuesday, Thursday and Saturday        Follow up with Dr. Graham Vivas In 1 weeks in the wound care center  Call [phone] for any questions or concerns.   Date__________   Time____________        Treatment Note      Written Patient Dismissal Instructions Given            Electronically signed by Oz Vora MD on 1/31/2023 at 11:15 AM

## 2023-01-31 NOTE — DISCHARGE INSTRUCTIONS
PHYSICIAN ORDERS AND DISCHARGE INSTRUCTIONS  NOTE: Upon discharge from the [address], you will receive a patient experience survey via E-mail. We would be grateful if you would take the time to fill this survey out. Wound care order history:              KATHLEEN's   Right       Left    Date               Vascular studies/Intervention: . Cultures: .9/13/22, 12/20/22              Antibiotics: .Cipro and Doxy 9/13/22                         HbA1c:  .6/29/22 8.2               Grafts: Anastasia Pae: . Continuing wound care orders and information:              Residence: . Private              [facility] home health care with: [facility]              Your wound-care supplies will be provided by: . [facility]              Pharmacy: .[facility]  Wound cleansing:                           Do not scrub or use excessive force. Wash hands with soap and water before and after dressing changes. Prior to applying a clean dressing, cleanse wound with normal saline,                          wound cleanser, or mild soap and water. Ask your physician or nurse before getting the wound(s) wet in the shower. Daily Wound management:                          Keep weight off wounds and reposition every 2 hours. Avoid standing for long periods of time. Evaluate legs to the level of the heart or above for 30 minutes 4-5 times a day and/or when sitting. When taking antibiotics take entire prescription as ordered by MD do not stop taking until medicine is all gone. Orders for this week (1/31/22) :   Left finger- Apply silvadene to wound bed  Cover with Bandaid  Change Daily     Left Posterior Ankle- wash with mild soap and water  Paint with betadine   Cover with silicone border gauze (please send 4x4)  Change daily     Left foot and toe wounds -Wash with mild soap and water, rinse with saline, pat dry with 4x4  Paint with betadine  Daily     Coccyx- wash with mild soap and water  Clobetasol to periwound  Apply santyl to wound   Gently pack with saline dampened 4x4  Cover with Mepilex Sacral Border  Change Tuesday, Thursday and Saturday    Hold vac x 1 week  Apply mastisol and duoderm to periwound for protection. Anasept and Stimulen to wound bed  Gently pack wound with black foam, be sure to tuck into any tunnels or undermining  Secure with vac drape. Set wound vac to 125 continuous suction. Change canister with each dressing change or as needed. Change on Tuesday, Thursday and Saturday        Follow up with Dr. Leander Araujo In 1 weeks in the wound care center  Call [phone] for any questions or concerns.   Date__________   Time____________

## 2023-02-07 ENCOUNTER — HOSPITAL ENCOUNTER (OUTPATIENT)
Dept: WOUND CARE | Age: 58
Discharge: HOME | End: 2023-02-07
Payer: MEDICARE

## 2023-02-07 VITALS
DIASTOLIC BLOOD PRESSURE: 91 MMHG | RESPIRATION RATE: 16 BRPM | HEART RATE: 94 BPM | TEMPERATURE: 96.7 F | SYSTOLIC BLOOD PRESSURE: 165 MMHG

## 2023-02-07 DIAGNOSIS — M79.671 ACUTE PAIN OF RIGHT FOOT: ICD-10-CM

## 2023-02-07 DIAGNOSIS — E11.621 DIABETIC ULCER OF RIGHT HEEL ASSOCIATED WITH TYPE 2 DIABETES MELLITUS, WITH NECROSIS OF BONE (HCC): Primary | ICD-10-CM

## 2023-02-07 DIAGNOSIS — L98.492 ULCER OF FINGER, WITH FAT LAYER EXPOSED (HCC): ICD-10-CM

## 2023-02-07 DIAGNOSIS — L97.414 DIABETIC ULCER OF RIGHT HEEL ASSOCIATED WITH TYPE 2 DIABETES MELLITUS, WITH NECROSIS OF BONE (HCC): Primary | ICD-10-CM

## 2023-02-07 DIAGNOSIS — L89.153 PRESSURE ULCER OF SACRAL REGION, STAGE 3 (HCC): ICD-10-CM

## 2023-02-07 PROCEDURE — 6370000000 HC RX 637 (ALT 250 FOR IP): Performed by: SURGERY

## 2023-02-07 PROCEDURE — 97605 NEG PRS WND THER DME<=50SQCM: CPT

## 2023-02-07 PROCEDURE — 11043 DBRDMT MUSC&/FSCA 1ST 20/<: CPT

## 2023-02-07 PROCEDURE — 2500000003 HC RX 250 WO HCPCS: Performed by: SURGERY

## 2023-02-07 RX ORDER — LIDOCAINE HYDROCHLORIDE 40 MG/ML
SOLUTION TOPICAL ONCE
OUTPATIENT
Start: 2023-02-07 | End: 2023-02-07

## 2023-02-07 RX ORDER — GINSENG 100 MG
CAPSULE ORAL ONCE
OUTPATIENT
Start: 2023-02-07 | End: 2023-02-07

## 2023-02-07 RX ORDER — BACITRACIN ZINC AND POLYMYXIN B SULFATE 500; 1000 [USP'U]/G; [USP'U]/G
OINTMENT TOPICAL ONCE
OUTPATIENT
Start: 2023-02-07 | End: 2023-02-07

## 2023-02-07 RX ORDER — LIDOCAINE HYDROCHLORIDE 20 MG/ML
JELLY TOPICAL ONCE
OUTPATIENT
Start: 2023-02-07 | End: 2023-02-07

## 2023-02-07 RX ORDER — BACITRACIN, NEOMYCIN, POLYMYXIN B 400; 3.5; 5 [USP'U]/G; MG/G; [USP'U]/G
OINTMENT TOPICAL ONCE
OUTPATIENT
Start: 2023-02-07 | End: 2023-02-07

## 2023-02-07 RX ORDER — CLOBETASOL PROPIONATE 0.5 MG/G
OINTMENT TOPICAL ONCE
Status: COMPLETED | OUTPATIENT
Start: 2023-02-07 | End: 2023-02-07

## 2023-02-07 RX ORDER — GENTAMICIN SULFATE 1 MG/G
OINTMENT TOPICAL ONCE
OUTPATIENT
Start: 2023-02-07 | End: 2023-02-07

## 2023-02-07 RX ORDER — LIDOCAINE 40 MG/G
CREAM TOPICAL ONCE
OUTPATIENT
Start: 2023-02-07 | End: 2023-02-07

## 2023-02-07 RX ORDER — BETAMETHASONE DIPROPIONATE 0.05 %
OINTMENT (GRAM) TOPICAL ONCE
OUTPATIENT
Start: 2023-02-07 | End: 2023-02-07

## 2023-02-07 RX ORDER — CLOBETASOL PROPIONATE 0.5 MG/G
OINTMENT TOPICAL ONCE
OUTPATIENT
Start: 2023-02-07 | End: 2023-02-07

## 2023-02-07 RX ORDER — LIDOCAINE 50 MG/G
OINTMENT TOPICAL ONCE
OUTPATIENT
Start: 2023-02-07 | End: 2023-02-07

## 2023-02-07 RX ADMIN — SILVER SULFADIAZINE: 10 CREAM TOPICAL at 11:22

## 2023-02-07 RX ADMIN — COLLAGENASE SANTYL: 250 OINTMENT TOPICAL at 11:22

## 2023-02-07 RX ADMIN — CLOBETASOL PROPIONATE: 0.5 OINTMENT TOPICAL at 11:24

## 2023-02-07 ASSESSMENT — PAIN DESCRIPTION - LOCATION: LOCATION: SACRUM

## 2023-02-07 ASSESSMENT — PAIN SCALES - GENERAL: PAINLEVEL_OUTOF10: 2

## 2023-02-07 NOTE — DISCHARGE INSTRUCTIONS
PHYSICIAN ORDERS AND DISCHARGE INSTRUCTIONS  NOTE: Upon discharge from the [address], you will receive a patient experience survey via E-mail. We would be grateful if you would take the time to fill this survey out. Wound care order history:              KATHLEEN's   Right       Left    Date               Vascular studies/Intervention: . Cultures: .9/13/22, 12/20/22              Antibiotics: .Cipro and Doxy 9/13/22                         HbA1c:  .6/29/22 8.2               Grafts: Denzel Orantes: . Continuing wound care orders and information:              Residence: . Private              [facility] home health care with: [facility] [place]              Your wound-care supplies will be provided by: . [facility]              Pharmacy: .[facility] in [place]  Wound cleansing:                           Do not scrub or use excessive force. Wash hands with soap and water before and after dressing changes. Prior to applying a clean dressing, cleanse wound with normal saline,                          wound cleanser, or mild soap and water. Ask your physician or nurse before getting the wound(s) wet in the shower. Daily Wound management:                          Keep weight off wounds and reposition every 2 hours. Avoid standing for long periods of time. Evaluate legs to the level of the heart or above for 30 minutes 4-5 times a day and/or when sitting. When taking antibiotics take entire prescription as ordered by MD do not stop taking until medicine is all gone. Orders for this week (2/7/23) :   Left finger- Apply silvadene and stimulen to wound bed  Cover with Bandaid  Change Daily     Left Posterior Ankle- wash with mild soap and water  Paint with betadine   Cover with silicone border gauze (please send 4x4)  Change daily      Left foot and toe wounds -Wash with mild soap and water, rinse with saline, pat dry with 4x4  Paint with betadine  Daily     Coccyx- wash with mild soap and water  Clobetasol to periwound  Apply santyl to wound   Gently pack with saline dampened 4x4  Cover with Mepilex Sacral Border  Change Tuesday, Thursday and Saturday    Apply mastisol and duoderm to periwound for protection. Santyl to wound bed  Gently pack wound with black foam, be sure to tuck into any tunnels or undermining  Secure with vac drape. Set wound vac to 125 continuous suction. Change canister with each dressing change or as needed. Change on Tuesday, Thursday and Saturday     Follow up with Dr. Irene Barros In 1 weeks in the wound care center  Call [phone] for any questions or concerns.   Date__________   Time____________

## 2023-02-07 NOTE — PLAN OF CARE
Problem: Wound:  Goal: Will show signs of wound healing; wound closure and no evidence of infection  Description: Will show signs of wound healing; wound closure and no evidence of infection  Outcome: Progressing  Intervention: Assess pain status  Note: See flowsheet  Intervention: Assess wound size, appearance and drainage  Note: See flowsheet  Intervention: Assess pedal pulses bilaterally if patient has a foot or leg ulcer  Note: See flowsheet

## 2023-02-07 NOTE — PROGRESS NOTES
Wound Care Center Progress Note With Procedure    Amanda Gonzalez  AGE: 62 y.o. GENDER: female  : 1965  EPISODE DATE:  2023     Subjective:     Chief Complaint   Patient presents with    Wound Check     BLE, sacrum, fingers          HISTORY of PRESENT ILLNESS      Amanda Gnozalez is a 62 y.o. female who presents today for wound evaluation of Chronic arterial, diabetic, and pressure ulcer(s) of the left great toe, heel and Achilles tendon area, left third finger and sacral region. The ulcer is of moderate severity. The underlying cause of the wound is diabetes, pressure, PAD and being on dialysis. Slightly improved.   Wound Pain Timing/Severity: waxing and waning, mild  Quality of pain: aching, tender, pressure  Severity of pain:  4 / 10   Modifying Factors: diabetes, smoking, and arterial insufficiency  Associated Signs/Symptoms: drainage and pain        PAST MEDICAL HISTORY        Diagnosis Date    Acute pain of right foot 2022    CAD (coronary artery disease)     s/p CABG x 4;  follows with Dr Melissa Denson of foot 2018    Carpal tunnel syndrome on both sides     CHF (congestive heart failure) ([address] 10/2010    Chronic diastolic; EF 83%    Chronic kidney disease     stage 4 kidney disease    Chronic ulcer of left ankle with fat layer exposed ([address] 10/07/2015    Chronic ulcer of left foot with fat layer exposed ([address] 2016    Chronic ulcer of right ankle with fat layer exposed ([address] 2016    Chronic ulcer of right foot with fat layer exposed ([address] 2016    Decubitus ulcer of sacral region, stage 1 2018    Depression     Diabetes mellitus ([address]     Diabetes mellitus with neurological manifestations ([address]     Diabetes mellitus with ulcer of ankle ([address]     Diabetic ulcer of left foot associated with type 2 diabetes mellitus, with fat layer exposed ([address] 2018    Diabetic ulcer of right heel associated with type 2 diabetes mellitus, with muscle involvement without evidence of necrosis ([facility] 09/13/2022    Diabetic ulcer of right heel associated with type 2 diabetes mellitus, with necrosis of bone ([facility] 09/13/2022    ESRD on hemodialysis ([facility] 09/21/2022    Family history of cardiovascular disease     Mother    GERD (gastroesophageal reflux disease)     H/O cardiac catheterization 10/18/2010, 6/3/2010    10/18/2010-Four bypass grafts all widely patent. 6/3/2010- Moderate to severe triple vessel disease, preserved LV systolic function. H/O cardiovascular stress test 7/26/2012, 8/3/2011, 10/14/2010, 5/24/2010 7/26/2012-Lexiscan- Normal Myocardial Perfusion Study. Post stress myocardial perfusion images show a normal pattern of perfusion in all regions. Post stress LV normal size. Normal perfusion in the distribution of all coronaries. Normal LV size and function. Rest EF 70%    H/O chest x-ray 7/12/2012, 6/2/2010 7/12/2012-Perihilar peribronchial cuffing with mild basilar predominant interstital prominence, which may reflect interstitial edema or atypical pneumonia. H/O Doppler ultrasound 06/04/2010    CAROTID DOPPLER-6/4/2010-No Doppler evidence of hemodynamically significant Carotid Stenosis with antegrade flow in the vertebral arteries bilaterally. 6/4/2010 PERIPHERAL VENOUS DOPPLER_ LEFT Saphenous Vein mapping    H/O echocardiogram 7/26/2012, 8/3/2011, 10/2010, 7/23/2010, 6/8/2010 7/26/2012-LV normal size. Normal LV wall thickness. LV systolic function normal. EF => 55%. LV wall motion normal. Mild MR. Mild TR. History of blood transfusion     History of complete ECG 7/26/2012 Mervin Almonte), 7/13/2012 [facility]), 7/25/2011, 3/25/2011, 10/18/2010, 10/11/2010, 6/23/2010, 5/7/2010    Hx of cardiovascular stress test 09/03/2015    lexiscan-normal,EF66%    Hx of Doppler ultrasound 04/05/2016    Arterial: There is a fluid collection in the left thigh, please refer to PCP for further monitoring, no vascular in etiology.      Hx of echocardiogram     4/16 EF40% Mild MR/TR and mild Pulm HTN. 9/15 EF 45-50%, Mildly dilated L atrium, mildly dilated R ventricle. Mild MR & mild TR     Hyperlipidemia     Neuropathy of foot     Pt reports starting approx. 6-7 years ago    Neuropathy of hand     Pt reports starting approx.  6-7 years ago    Non compliance with medical treatment 07/02/2018    Pressure ulcer of sacral region, stage 2 ([address] 11/29/2022    Pressure ulcer of sacral region, stage 3 ([address] 11/29/2022    S/P BKA (below knee amputation), right ([address] 11/29/2022    S/P CABG x 4 06/05/2010    LIMA-> LAD;  VG->CX;  VG->Diagonal; VG-> distal RCA  per  Dr Sujata Morales    Type 2 diabetes mellitus with diabetic polyneuropathy, with long-term current use of insulin ([address] 04/05/2016    Type II or unspecified type diabetes mellitus with neurological manifestations, not stated as uncontrolled(250.60)     Type II or unspecified type diabetes mellitus with other specified manifestations, not stated as uncontrolled     Ulcer of finger, with fat layer exposed ([address] 2/7/2023    Ulcer of left foot, with fat layer exposed ([address] 12/19/2013    Ulcer of other part of foot        PAST SURGICAL HISTORY    Past Surgical History:   Procedure Laterality Date    APPENDECTOMY      CARDIAC SURGERY      CARPAL TUNNEL RELEASE      L hand Nov. 2011, R hand Jan. 2012    CARPAL TUNNEL RELEASE Right 6/10/2013    recurrent    CARPAL TUNNEL RELEASE Left 7/29/2013    with ulnar nerve transpostion and L middle finger release    CHOLECYSTECTOMY      COLONOSCOPY      CORONARY ARTERY BYPASS GRAFT  6/5/2010 6/5/2010-LIMA->LAD;  VG-> Diagonal;  VG-> Obtuse marginal;  VG->Distal RCA-   Dr Caesar Coleman Right 6/10/2013    HYSTERECTOMY, TOTAL ABDOMINAL (CERVIX REMOVED)      LEG AMPUTATION BELOW KNEE Right 11/11/2022    RIGHT LEG AMPUTATION BELOW KNEE WITH IPOP performed by Ramon Bowser MD at [address] [place] [address] Left 02/14/144th toe    TUBAL LIGATION      ULNAR TUNNEL RELEASE Right 6/10/2013 FAMILY HISTORY    Family History   Problem Relation Age of Onset    Heart Disease Mother     Kidney Disease Mother         dialysis    Cancer Father        SOCIAL HISTORY    Social History     Tobacco Use    Smoking status: Former     Packs/day: 0.25     Years: 30.00     Pack years: 7.50     Types: Cigarettes     Quit date: 2022     Years since quittin.3    Smokeless tobacco: Never    Tobacco comments:     quit smoking mia 16   Vaping Use    Vaping Use: Every day    Substances: Never   Substance Use Topics    Alcohol use: No     Alcohol/week: 0.0 standard drinks     Comment:                                    CAFFEINE: 2 sodas daily    Drug use: No       ALLERGIES    Allergies   Allergen Reactions    Latex     Codeine     Cortisone     Cortizone     Iodides     Phenobarbital Other (See Comments)     Hallucinations     Vancomycin Other (See Comments)     \"makes me very sick\"       MEDICATIONS    Current Outpatient Medications on File Prior to Encounter   Medication Sig Dispense Refill    clobetasol (TEMOVATE) 0.05 % ointment Apply topically 2 times daily. 45 g 1    silver sulfADIAZINE (SILVADENE) 1 % cream Apply topically daily. 1000 g 1    calcium acetate (PHOSLO) 667 MG CAPS capsule Take 1 capsule with each meal and 1 tablet with each snack. 540 capsule 3    gabapentin (NEURONTIN) 400 MG capsule Take 800 mg by mouth in the morning and at bedtime.       OXYGEN Inhale 2 L into the lungs daily as needed      carvedilol (COREG) 6.25 MG tablet 6.25 mg 2 times daily       albuterol sulfate HFA (PROVENTIL;VENTOLIN;PROAIR) 108 (90 Base) MCG/ACT inhaler Inhale 2 puffs into the lungs every 6 hours as needed for Wheezing      esomeprazole Magnesium (NEXIUM) 40 MG PACK Take 20 mg by mouth 2 times daily 2 tabs      LORazepam (ATIVAN) 0.5 MG tablet Take 0.5 mg by mouth every 12 hours Indications: One Tablet twice daily       Insulin Aspart (NOVOLOG FLEXPEN SC) Inject 15 Units into the skin 3 times daily (before meals) On sliding scale      lisinopril (PRINIVIL;ZESTRIL) 10 MG tablet Take 20 mg by mouth daily      furosemide (LASIX) 20 MG tablet Take 40 mg by mouth 2 times daily        No current facility-administered medications on file prior to encounter. REVIEW OF SYSTEMS    Pertinent items are noted in HPI. Constitutional: Negative for systemic symptoms including fever, chills and malaise. Objective:      BP (!) 165/91   Pulse 94   Temp (!) 96.7 °F (35.9 °C) (Temporal)   Resp 16     PHYSICAL EXAM      General: The patient is in no acute distress. Mental status:  Patient is appropriate, is  oriented to place and plan of care. Dermatologic exam: Visual inspection of the periwound reveals the skin to be moist and cool   Wound exam: see wound description below in procedure note      Assessment:     Problem List Items Addressed This Visit       Acute pain of right foot    Relevant Orders    Initiate Outpatient Wound Care Protocol    Diabetic ulcer of right heel associated with type 2 diabetes mellitus, with necrosis of bone ([address] - Primary    Relevant Orders    Initiate Outpatient Wound Care Protocol    Pressure ulcer of sacral region, stage 3 (HCC)    Ulcer of finger, with fat layer exposed ([address]     Procedure Note    Indications:  Based on my examination of this patient's wound(s) today, sharp excision into necrotic muscle/fascia is required to promote healing and evaluate the extent of previous healing. Performed by: Barb Muhammad MD    Consent obtained: Yes    Time out taken:  Yes    Pain Control: lidocaine used      Debridement:Excisional Debridement    Using #15 blade scalpel, scissors, and forceps the wound(s) was/were sharply debrided down through and including the removal of muscle/fascia.         Devitalized Tissue Debrided:  slough and exudate    Pre Debridement Measurements:  Are located in the Wound Documentation Flow Sheet    All active wounds listed below with today's date are evaluated  Wound(s)    debrided this date include # : 3     Post  Debridement Measurements:  Negative Pressure Wound Therapy Foot (Active)   Number of days: 3279       Negative Pressure Wound Therapy Foot Distal (Active)   Number of days: 3275       Negative Pressure Wound Therapy (Active)   Wound Type Diabetic foot ulcer 10/11/22 1145   Unit Type [employer] 01/31/23 1013   Dressing Type Black Foam 01/31/23 1013   Number of pieces used 1 01/24/23 1103   Number of pieces removed 1 01/31/23 1013   Cycle Continuous 01/31/23 1013   Target Pressure (mmHg) 125 01/31/23 1013   Dressing Status New dressing applied;Clean, dry & intact 01/17/23 1041   Dressing Changed Changed/New 01/17/23 1041   Drainage Amount Moderate 01/17/23 1041   Drainage Description Brown 01/17/23 1041   Wound Assessment Granulation tissue 01/10/23 1057   Lora-wound Assessment Fragile; Intact 01/10/23 1057   Odor None 01/10/23 1057   Number of days: 138       Incision 06/10/13 Wrist Right (Active)   Number of days: 3529       Incision 07/29/13 Wrist Left (Active)   Number of days: 3480       Incision 02/14/14 Toe (Comment  which one) (Active)   Number of days: 2878       Wound 11/01/22 #2 Sacrum (Active)   Wound Image   02/07/23 1009   Wound Etiology Pressure Unstageable 12/13/22 0939   Dressing Status Clean;Dry; Intact; New dressing applied 01/31/23 1204   Wound Cleansed Soap and water; Wound cleanser;Cleansed with saline 02/07/23 1009   Dressing/Treatment Silicone border 75/21/35 1204   Offloading for Diabetic Foot Ulcers Other (comment) 02/07/23 1009   Wound Length (cm) 3.5 cm 02/07/23 1009   Wound Width (cm) 1.3 cm 02/07/23 1009   Wound Depth (cm) 2.3 cm 02/07/23 1009   Wound Surface Area (cm^2) 4.55 cm^2 02/07/23 1009   Change in Wound Size % (l*w) 9 02/07/23 1009   Wound Volume (cm^3) 10.465 cm^3 02/07/23 1009   Wound Healing % -1993 02/07/23 1009   Post-Procedure Length (cm) 3.5 cm 02/07/23 1041   Post-Procedure Width (cm) 1.3 cm 02/07/23 1041   Post-Procedure Depth (cm) 2.3 cm 02/07/23 1041   Post-Procedure Surface Area (cm^2) 4.55 cm^2 02/07/23 1041   Post-Procedure Volume (cm^3) 10.465 cm^3 02/07/23 1041   Distance Tunneling (cm) 0 cm 02/07/23 1009   Tunneling Position ___ O'Clock 0 02/07/23 1009   Undermining Starts ___ O'Clock 6 02/07/23 1009   Undermining Ends___ O'Clock 2 02/07/23 1009   Undermining Maxium Distance (cm) 4.2 02/07/23 1009   Wound Assessment Slough;Granulation tissue 02/07/23 1009   Drainage Amount Moderate 02/07/23 1009   Drainage Description Yellow; Thick 02/07/23 1009   Odor None 02/07/23 1009   Lora-wound Assessment Maceration;Blanchable erythema 02/07/23 1009   Margins Defined edges; Unattached edges 02/07/23 1009   Wound Thickness Description not for Pressure Injury Full thickness 02/07/23 1009   Number of days: 98       Wound 11/11/22  #1 Left Great Toe (Active)   Wound Image   02/07/23 1009   Wound Etiology Arterial 12/20/22 0952   Dressing Status Clean;Dry; Intact; New dressing applied 01/31/23 1204   Wound Cleansed Soap and water; Wound cleanser;Cleansed with saline 02/07/23 1009   Dressing/Treatment Betadine swabs/povidone iodine 01/31/23 1204   Offloading for Diabetic Foot Ulcers Other (comment) 02/07/23 1009   Wound Length (cm) 1.8 cm 02/07/23 1009   Wound Width (cm) 3.4 cm 02/07/23 1009   Wound Depth (cm) 0.1 cm 02/07/23 1009   Wound Surface Area (cm^2) 6.12 cm^2 02/07/23 1009   Change in Wound Size % (l*w) -36 02/07/23 1009   Wound Volume (cm^3) 0.612 cm^3 02/07/23 1009   Post-Procedure Length (cm) 1.8 cm 02/07/23 1041   Post-Procedure Width (cm) 3.4 cm 02/07/23 1041   Post-Procedure Depth (cm) 0.1 cm 02/07/23 1041   Post-Procedure Surface Area (cm^2) 6.12 cm^2 02/07/23 1041   Post-Procedure Volume (cm^3) 0.612 cm^3 02/07/23 1041   Distance Tunneling (cm) 0 cm 02/07/23 1009   Tunneling Position ___ O'Clock 0 02/07/23 1009   Undermining Starts ___ O'Clock 0 02/07/23 1009   Undermining Ends___ O'Clock 0 02/07/23 1009   Undermining Maxium Distance (cm) 0 02/07/23 1009   Wound Assessment Eschar dry 02/07/23 1009   Drainage Amount None 02/07/23 1009   Drainage Description Serosanguinous 01/10/23 0949   Odor None 02/07/23 1009   Lora-wound Assessment Dry/flaky 02/07/23 1009   Margins Defined edges 02/07/23 1009   Wound Thickness Description not for Pressure Injury Full thickness 02/07/23 1009   Number of days: 87       Wound 11/15/22 #3 Left Posterior Ankle Cluster (Active)   Wound Image   02/07/23 1009   Wound Etiology Traumatic 12/13/22 0939   Dressing Status Clean;Dry; Intact; New dressing applied 01/31/23 1204   Wound Cleansed Soap and water; Wound cleanser;Cleansed with saline 02/07/23 1009   Dressing/Treatment Betadine swabs/povidone iodine;Silicone border 80/26/99 1204   Offloading for Diabetic Foot Ulcers Other (comment) 02/07/23 1009   Wound Length (cm) 1.5 cm 02/07/23 1009   Wound Width (cm) 2.5 cm 02/07/23 1009   Wound Depth (cm) 0.3 cm 02/07/23 1009   Wound Surface Area (cm^2) 3.75 cm^2 02/07/23 1009   Change in Wound Size % (l*w) -87.5 02/07/23 1009   Wound Volume (cm^3) 1.125 cm^3 02/07/23 1009   Wound Healing % -463 02/07/23 1009   Post-Procedure Length (cm) 1.5 cm 02/07/23 1041   Post-Procedure Width (cm) 2.5 cm 02/07/23 1041   Post-Procedure Depth (cm) 0.3 cm 02/07/23 1041   Post-Procedure Surface Area (cm^2) 3.75 cm^2 02/07/23 1041   Post-Procedure Volume (cm^3) 1.125 cm^3 02/07/23 1041   Distance Tunneling (cm) 0 cm 02/07/23 1009   Tunneling Position ___ O'Clock 0 02/07/23 1009   Undermining Starts ___ O'Clock 0 02/07/23 1009   Undermining Ends___ O'Clock 0 02/07/23 1009   Undermining Maxium Distance (cm) 0 02/07/23 1009   Wound Assessment Slough 02/07/23 1009   Drainage Amount Moderate 02/07/23 1009   Drainage Description Thick; Yellow 02/07/23 1009   Odor None 02/07/23 1009   Lora-wound Assessment Maceration;Blanchable erythema 02/07/23 1009   Margins Defined edges 02/07/23 1009   Wound Thickness Description not for Pressure Injury Full thickness 02/07/23 1009   Number of days: 84       Wound 11/15/22 #4 Left Heel (Active)   Wound Image   02/07/23 1009   Wound Etiology Deep tissue/Injury 12/20/22 3358   Dressing Status Clean;Dry; Intact; New dressing applied 01/31/23 1204   Wound Cleansed Soap and water; Wound cleanser;Cleansed with saline 02/07/23 1009   Dressing/Treatment Betadine swabs/povidone iodine 01/31/23 1204   Offloading for Diabetic Foot Ulcers Other (comment) 02/07/23 1009   Wound Length (cm) 3.7 cm 02/07/23 1009   Wound Width (cm) 3.5 cm 02/07/23 1009   Wound Depth (cm) 0.1 cm 02/07/23 1009   Wound Surface Area (cm^2) 12.95 cm^2 02/07/23 1009   Change in Wound Size % (l*w) -159 02/07/23 1009   Wound Volume (cm^3) 1.295 cm^3 02/07/23 1009   Wound Healing % -159 02/07/23 1009   Post-Procedure Length (cm) 3.7 cm 02/07/23 1041   Post-Procedure Width (cm) 3.5 cm 02/07/23 1041   Post-Procedure Depth (cm) 0.1 cm 02/07/23 1041   Post-Procedure Surface Area (cm^2) 12.95 cm^2 02/07/23 1041   Post-Procedure Volume (cm^3) 1.295 cm^3 02/07/23 1041   Distance Tunneling (cm) 0 cm 02/07/23 1009   Tunneling Position ___ O'Clock 0 02/07/23 1009   Undermining Starts ___ O'Clock 0 02/07/23 1009   Undermining Ends___ O'Clock 0 02/07/23 1009   Undermining Maxium Distance (cm) 0 02/07/23 1009   Wound Assessment Eschar dry 02/07/23 1009   Drainage Amount None 02/07/23 1009   Drainage Description Serosanguinous 01/24/23 1030   Odor None 02/07/23 1009   Lora-wound Assessment Dry/flaky 02/07/23 1009   Margins Defined edges 02/07/23 1009   Wound Thickness Description not for Pressure Injury Full thickness 02/07/23 1009   Number of days: 84       Wound 02/07/23 #5 Left 3rd Finger (Active)   Wound Cleansed Wound cleanser 02/07/23 1041   Number of days: 0       Percent of Wound(s) Debrided: approximately 100%    Total  Area  Debrided:  3 sq cm     Bleeding:  Minimal    Hemostasis Achieved:  not needed    Procedural Pain:  4  / 10     Post Procedural Pain:  0 / 10     Response to treatment:  Well tolerated by patient. Status of wound progress and description from last visit:   Slightly improved. Plan:       Discharge Instructions         PHYSICIAN ORDERS AND DISCHARGE INSTRUCTIONS  NOTE: Upon discharge from the [address], you will receive a patient experience survey via E-mail. We would be grateful if you would take the time to fill this survey out. Wound care order history:              KATHLEEN's   Right       Left    Date               Vascular studies/Intervention: . Cultures: .9/13/22, 12/20/22              Antibiotics: .Cipro and Doxy 9/13/22                         HbA1c:  .6/29/22 8.2               Grafts: Link Marcel: . Continuing wound care orders and information:              Residence: . Private              Continue home health care with: [facility]              Your wound-care supplies will be provided by: . [facility]              Pharmacy: [facility] in [place]  Wound cleansing:                           Do not scrub or use excessive force. Wash hands with soap and water before and after dressing changes. Prior to applying a clean dressing, cleanse wound with normal saline,                          wound cleanser, or mild soap and water. Ask your physician or nurse before getting the wound(s) wet in the shower. Daily Wound management:                          Keep weight off wounds and reposition every 2 hours. Avoid standing for long periods of time. Evaluate legs to the level of the heart or above for 30 minutes 4-5 times a day and/or when sitting. When taking antibiotics take entire prescription as ordered by MD do not stop taking until medicine is all gone.                                                                  Orders for this week (2/7/23) : Left finger- Apply silvadene and stimulen to wound bed  Cover with Bandaid  Change Daily     Left Posterior Ankle- wash with mild soap and water  Paint with betadine   Cover with silicone border gauze (please send 4x4)  Change daily      Left foot and toe wounds -Wash with mild soap and water, rinse with saline, pat dry with 4x4  Paint with betadine  Daily     Coccyx- wash with mild soap and water  Apply mastisol and duoderm to periwound for protection. Santyl to wound bed  Gently pack wound with black foam, be sure to tuck into any tunnels or undermining  Secure with vac drape. Set wound vac to 125 continuous suction. Change canister with each dressing change or as needed. Change on Tuesday, Thursday and Saturday     Follow up with Dr. Enrrique Almazan In 1 weeks in the wound care center  Call [phone] for any questions or concerns.   Date__________   Time____________        Treatment Note      Written Patient Dismissal Instructions Given            Electronically signed by Vivi Curran MD on 2/7/2023 at 10:58 AM

## 2023-02-07 NOTE — LETTER
[address] [place] [address]  [address] [facility]  [phone]  Dept: [phone]                 Dear Husam Gastelum MD:     Bebe Levine RECORD NUMBER: [id number]   AGE: 62 y.o. GENDER: female : 1965   TODAYS DATE: 2023        We would like to inform you that your patient Ms. Geoff Hendrix has referred her self to our [address] [facility] and [address] [facility]. We appreciate their confidence in us and will strive to provide the best care possible for your patient. You can follow their plan of care in [facility] as needed. Please call us if you have any questions and we look forward to assisting your patient.     Sincerely,      [address] [place] [address] [facility]kw[facility]

## 2023-02-14 ENCOUNTER — HOSPITAL ENCOUNTER (OUTPATIENT)
Dept: WOUND CARE | Age: 58
Discharge: HOME | End: 2023-02-14

## 2023-02-14 NOTE — DISCHARGE INSTRUCTIONS
PHYSICIAN ORDERS AND DISCHARGE INSTRUCTIONS  NOTE: Upon discharge from the [address], you will receive a patient experience survey via E-mail. We would be grateful if you would take the time to fill this survey out. Wound care order history:              KATHLEEN's   Right       Left    Date               Vascular studies/Intervention: . Cultures: .9/13/22, 12/20/22              Antibiotics: .Cipro and Doxy 9/13/22                         HbA1c:  .6/29/22 8.2               Grafts: Becka Pipos: . Continuing wound care orders and information:              Residence: . Private              [facility] health care with: [facility]              Your wound-care supplies will be provided by: . [facility]              Pharmacy: .[facility] in [facility]  Wound cleansing:                           Do not scrub or use excessive force. Wash hands with soap and water before and after dressing changes. Prior to applying a clean dressing, cleanse wound with normal saline,                          wound cleanser, or mild soap and water. Ask your physician or nurse before getting the wound(s) wet in the shower. Daily Wound management:                          Keep weight off wounds and reposition every 2 hours. Avoid standing for long periods of time. Evaluate legs to the level of the heart or above for 30 minutes 4-5 times a day and/or when sitting. When taking antibiotics take entire prescription as ordered by MD do not stop taking until medicine is all gone. Orders for this week (2/14/23) :   Left finger- Apply silvadene and stimulen to wound bed  Cover with Bandaid  Change Daily     Left Posterior Ankle- wash with mild soap and water  Paint with betadine   Cover with silicone border gauze (please send 4x4)  Change daily      Left foot and toe wounds -Wash with mild soap and water, rinse with saline, pat dry with 4x4  Paint with betadine  Daily     Coccyx- wash with mild soap and water  Clobetasol to periwound  Apply santyl to wound   Gently pack with saline dampened 4x4  Cover with Mepilex Sacral Border  Change Tuesday, Thursday and Saturday     Apply mastisol and duoderm to periwound for protection. Santyl to wound bed  Gently pack wound with black foam, be sure to tuck into any tunnels or undermining  Secure with vac drape. Set wound vac to 125 continuous suction. Change canister with each dressing change or as needed. Change on Tuesday, Thursday and Saturday     Follow up with Dr. Cristal Trinidad In 1 weeks in the wound care center  Call [phone] for any questions or concerns.   Date__________   Time____________

## 2023-02-21 ENCOUNTER — HOSPITAL ENCOUNTER (OUTPATIENT)
Dept: WOUND CARE | Age: 58
Discharge: HOME | End: 2023-02-21
Payer: MEDICARE

## 2023-02-21 VITALS — RESPIRATION RATE: 16 BRPM | TEMPERATURE: 97.2 F

## 2023-02-21 DIAGNOSIS — E11.621 DIABETIC ULCER OF RIGHT HEEL ASSOCIATED WITH TYPE 2 DIABETES MELLITUS, WITH NECROSIS OF BONE (HCC): Primary | ICD-10-CM

## 2023-02-21 DIAGNOSIS — L97.414 DIABETIC ULCER OF RIGHT HEEL ASSOCIATED WITH TYPE 2 DIABETES MELLITUS, WITH NECROSIS OF BONE (HCC): Primary | ICD-10-CM

## 2023-02-21 DIAGNOSIS — M79.671 ACUTE PAIN OF RIGHT FOOT: ICD-10-CM

## 2023-02-21 PROCEDURE — 11042 DBRDMT SUBQ TIS 1ST 20SQCM/<: CPT

## 2023-02-21 PROCEDURE — 6370000000 HC RX 637 (ALT 250 FOR IP): Performed by: SURGERY

## 2023-02-21 RX ORDER — LIDOCAINE HYDROCHLORIDE 40 MG/ML
SOLUTION TOPICAL ONCE
OUTPATIENT
Start: 2023-02-21 | End: 2023-02-21

## 2023-02-21 RX ORDER — LIDOCAINE HYDROCHLORIDE 20 MG/ML
JELLY TOPICAL ONCE
OUTPATIENT
Start: 2023-02-21 | End: 2023-02-21

## 2023-02-21 RX ORDER — GINSENG 100 MG
CAPSULE ORAL ONCE
OUTPATIENT
Start: 2023-02-21 | End: 2023-02-21

## 2023-02-21 RX ORDER — CLOBETASOL PROPIONATE 0.5 MG/G
OINTMENT TOPICAL ONCE
OUTPATIENT
Start: 2023-02-21 | End: 2023-02-21

## 2023-02-21 RX ORDER — GENTAMICIN SULFATE 1 MG/G
OINTMENT TOPICAL ONCE
OUTPATIENT
Start: 2023-02-21 | End: 2023-02-21

## 2023-02-21 RX ORDER — LIDOCAINE 40 MG/G
CREAM TOPICAL ONCE
OUTPATIENT
Start: 2023-02-21 | End: 2023-02-21

## 2023-02-21 RX ORDER — CLOBETASOL PROPIONATE 0.5 MG/G
OINTMENT TOPICAL ONCE
Status: COMPLETED | OUTPATIENT
Start: 2023-02-21 | End: 2023-02-21

## 2023-02-21 RX ORDER — BACITRACIN, NEOMYCIN, POLYMYXIN B 400; 3.5; 5 [USP'U]/G; MG/G; [USP'U]/G
OINTMENT TOPICAL ONCE
OUTPATIENT
Start: 2023-02-21 | End: 2023-02-21

## 2023-02-21 RX ORDER — BACITRACIN ZINC AND POLYMYXIN B SULFATE 500; 1000 [USP'U]/G; [USP'U]/G
OINTMENT TOPICAL ONCE
OUTPATIENT
Start: 2023-02-21 | End: 2023-02-21

## 2023-02-21 RX ORDER — LIDOCAINE 50 MG/G
OINTMENT TOPICAL ONCE
OUTPATIENT
Start: 2023-02-21 | End: 2023-02-21

## 2023-02-21 RX ORDER — BETAMETHASONE DIPROPIONATE 0.05 %
OINTMENT (GRAM) TOPICAL ONCE
OUTPATIENT
Start: 2023-02-21 | End: 2023-02-21

## 2023-02-21 RX ADMIN — CLOBETASOL PROPIONATE: 0.5 OINTMENT TOPICAL at 11:01

## 2023-02-21 RX ADMIN — COLLAGENASE SANTYL: 250 OINTMENT TOPICAL at 11:00

## 2023-02-21 NOTE — DISCHARGE INSTRUCTIONS
PHYSICIAN ORDERS AND DISCHARGE INSTRUCTIONS  NOTE: Upon discharge from the [address], you will receive a patient experience survey via E-mail. We would be grateful if you would take the time to fill this survey out. Wound care order history:              KATHLEEN's   Right       Left    Date               Vascular studies/Intervention: . Cultures: .9/13/22, 12/20/22              Antibiotics: .Cipro and Doxy 9/13/22                         HbA1c:  .6/29/22 8.2               Grafts: [place] Baker: . Continuing wound care orders and information:              Residence: . Private              [facility]: [facility] [place] [facility]              Your wound-care supplies will be provided by: . [facility]              Pharmacy: .[facility] [place]  Wound cleansing:                           Do not scrub or use excessive force. Wash hands with soap and water before and after dressing changes. Prior to applying a clean dressing, cleanse wound with normal saline,                          wound cleanser, or mild soap and water. Ask your physician or nurse before getting the wound(s) wet in the shower. Daily Wound management:                          Keep weight off wounds and reposition every 2 hours. Avoid standing for long periods of time. Evaluate legs to the level of the heart or above for 30 minutes 4-5 times a day and/or when sitting. When taking antibiotics take entire prescription as ordered by MD do not stop taking until medicine is all gone. Orders for this week (2/21/23) :   Left finger- Paint with betadine  Cover with Bandaid  Change Daily     Left Posterior Ankle- wash with mild soap and water  Paint with betadine   Cover with silicone border gauze (please send 4x4)  Change daily      Left foot and toe wounds -Wash with mild soap and water, rinse with saline, pat dry with 4x4  Paint with betadine  Daily     Coccyx- wash with mild soap and water  Clobetasol to periwound  Apply santyl to wound   Gently pack with saline dampened 4x4  Cover with Mepilex Sacral Border  Change Tuesday, Thursday and Saturday     Ok to send vac back    Follow up with Dr. Corey Stoll In 1 weeks in the wound care center  Call [phone] for any questions or concerns.   Date__________   Time____________

## 2023-02-21 NOTE — PROGRESS NOTES
Wound Care Center Progress Note With Procedure    Ki Dahl  AGE: 62 y.o. GENDER: female  : 1965  EPISODE DATE:  2023     Subjective:     Chief Complaint   Patient presents with    Wound Check     BLE, SACRUM          HISTORY of PRESENT ILLNESS      Ki Dahl is a 62 y.o. female who presents today for wound evaluation of Chronic diabetic and pressure ulcer(s) of the left heel , left great toe and sacral area. The ulcer is of moderate severity. The underlying cause of the wound is diabetes, pressure and ESRD on HD. Improving.   Wound Pain Timing/Severity: waxing and waning, mild  Quality of pain: aching, tender  Severity of pain:  3 / 10   Modifying Factors: edema, diabetes, chronic pressure, smoking, and ESRD on HD  Associated Signs/Symptoms: edema, drainage, and pain        PAST MEDICAL HISTORY        Diagnosis Date    Acute pain of right foot 2022    CAD (coronary artery disease)     s/p CABG x 4;  follows with Dr Duke Ruiz of foot 2018    Carpal tunnel syndrome on both sides     CHF (congestive heart failure) ([facility]) 10/2010    Chronic diastolic; EF 77%    Chronic kidney disease     stage 4 kidney disease    Chronic ulcer of left ankle with fat layer exposed ([facility]) 10/07/2015    Chronic ulcer of left foot with fat layer exposed ([facility]) 2016    Chronic ulcer of right ankle with fat layer exposed ([facility]) 2016    Chronic ulcer of right foot with fat layer exposed ([facility]) 2016    Decubitus ulcer of sacral region, stage 1 2018    Depression     Diabetes mellitus ([facility])     Diabetes mellitus with neurological manifestations ([facility])     Diabetes mellitus with ulcer of ankle ([facility])     Diabetic ulcer of left foot associated with type 2 diabetes mellitus, with fat layer exposed ([facility]) 2018    Diabetic ulcer of right heel associated with type 2 diabetes mellitus, with muscle involvement without evidence of necrosis ([facility]) 2022    Diabetic ulcer of right heel associated with type 2 diabetes mellitus, with necrosis of bone ([facility] 09/13/2022    ESRD on hemodialysis ([facility] 09/21/2022    Family history of cardiovascular disease     Mother    GERD (gastroesophageal reflux disease)     H/O cardiac catheterization 10/18/2010, 6/3/2010    10/18/2010-Four bypass grafts all widely patent. 6/3/2010- Moderate to severe triple vessel disease, preserved LV systolic function. H/O cardiovascular stress test 7/26/2012, 8/3/2011, 10/14/2010, 5/24/2010 7/26/2012-Lexiscan- Normal Myocardial Perfusion Study. Post stress myocardial perfusion images show a normal pattern of perfusion in all regions. Post stress LV normal size. Normal perfusion in the distribution of all coronaries. Normal LV size and function. Rest EF 70%    H/O chest x-ray 7/12/2012, 6/2/2010 7/12/2012-Perihilar peribronchial cuffing with mild basilar predominant interstital prominence, which may reflect interstitial edema or atypical pneumonia. H/O Doppler ultrasound 06/04/2010    CAROTID DOPPLER-6/4/2010-No Doppler evidence of hemodynamically significant Carotid Stenosis with antegrade flow in the vertebral arteries bilaterally. 6/4/2010 PERIPHERAL VENOUS DOPPLER_ LEFT Saphenous Vein mapping    H/O echocardiogram 7/26/2012, 8/3/2011, 10/2010, 7/23/2010, 6/8/2010 7/26/2012-LV normal size. Normal LV wall thickness. LV systolic function normal. EF => 55%. LV wall motion normal. Mild MR. Mild TR. History of blood transfusion     History of complete ECG 7/26/2012 [facility]), 7/13/2012 [facility]), 7/25/2011, 3/25/2011, 10/18/2010, 10/11/2010, 6/23/2010, 5/7/2010    Hx of cardiovascular stress test 09/03/2015    lexiscan-normal,EF66%    Hx of Doppler ultrasound 04/05/2016    Arterial: There is a fluid collection in the left thigh, please refer to PCP for further monitoring, no vascular in etiology.      Hx of echocardiogram     4/16 EF40% Mild MR/TR and mild Pulm HTN. 9/15 EF 45-50%, Mildly dilated L atrium, mildly dilated R ventricle. Mild MR & mild TR     Hyperlipidemia     Neuropathy of foot     Pt reports starting approx. 6-7 years ago    Neuropathy of hand     Pt reports starting approx.  6-7 years ago    Non compliance with medical treatment 07/02/2018    Pressure ulcer of sacral region, stage 2 ([address] 11/29/2022    Pressure ulcer of sacral region, stage 3 ([address] 11/29/2022    S/P BKA (below knee amputation), right ([address] 11/29/2022    S/P CABG x 4 06/05/2010    LIMA-> LAD;  VG->CX;  VG->Diagonal; VG-> distal RCA  per  Dr Micki Essex    Type 2 diabetes mellitus with diabetic polyneuropathy, with long-term current use of insulin ([address] 04/05/2016    Type II or unspecified type diabetes mellitus with neurological manifestations, not stated as uncontrolled(250.60)     Type II or unspecified type diabetes mellitus with other specified manifestations, not stated as uncontrolled     Ulcer of finger, with fat layer exposed ([address] 2/7/2023    Ulcer of left foot, with fat layer exposed ([address] 12/19/2013    Ulcer of other part of foot        PAST SURGICAL HISTORY    Past Surgical History:   Procedure Laterality Date    APPENDECTOMY      CARDIAC SURGERY      CARPAL TUNNEL RELEASE      L hand Nov. 2011, R hand Jan. 2012    CARPAL TUNNEL RELEASE Right 6/10/2013    recurrent    CARPAL TUNNEL RELEASE Left 7/29/2013    with ulnar nerve transpostion and L middle finger release    CHOLECYSTECTOMY      COLONOSCOPY      CORONARY ARTERY BYPASS GRAFT  6/5/2010 6/5/2010-LIMA->LAD;  VG-> Diagonal;  VG-> Obtuse marginal;  VG->Distal RCA-   Dr Suman Cox Right 6/10/2013    HYSTERECTOMY, TOTAL ABDOMINAL (CERVIX REMOVED)      LEG AMPUTATION BELOW KNEE Right 11/11/2022    RIGHT LEG AMPUTATION BELOW KNEE WITH IPOP performed by Betty Anderson MD at [address] Left 02/14/144th toe    TUBAL LIGATION      ULNAR TUNNEL RELEASE Right 6/10/2013       FAMILY HISTORY    Family History   Problem Relation Age of Onset Heart Disease Mother     Kidney Disease Mother         dialysis    Cancer Father        SOCIAL HISTORY    Social History     Tobacco Use    Smoking status: Former     Packs/day: 0.25     Years: 30.00     Pack years: 7.50     Types: Cigarettes     Quit date: 2022     Years since quittin.4    Smokeless tobacco: Never    Tobacco comments:     quit smoking mia 16   Vaping Use    Vaping Use: Every day    Substances: Never   Substance Use Topics    Alcohol use: No     Alcohol/week: 0.0 standard drinks     Comment:                                    CAFFEINE: 2 sodas daily    Drug use: No       ALLERGIES    Allergies   Allergen Reactions    Latex     Codeine     Cortisone     Cortizone     Iodides     Phenobarbital Other (See Comments)     Hallucinations     Vancomycin Other (See Comments)     \"makes me very sick\"       MEDICATIONS    Current Outpatient Medications on File Prior to Encounter   Medication Sig Dispense Refill    clobetasol (TEMOVATE) 0.05 % ointment Apply topically 2 times daily. 45 g 1    silver sulfADIAZINE (SILVADENE) 1 % cream Apply topically daily. 1000 g 1    calcium acetate (PHOSLO) 667 MG CAPS capsule Take 1 capsule with each meal and 1 tablet with each snack. 540 capsule 3    gabapentin (NEURONTIN) 400 MG capsule Take 800 mg by mouth in the morning and at bedtime.       OXYGEN Inhale 2 L into the lungs daily as needed      carvedilol (COREG) 6.25 MG tablet 6.25 mg 2 times daily       albuterol sulfate HFA (PROVENTIL;VENTOLIN;PROAIR) 108 (90 Base) MCG/ACT inhaler Inhale 2 puffs into the lungs every 6 hours as needed for Wheezing      esomeprazole Magnesium (NEXIUM) 40 MG PACK Take 20 mg by mouth 2 times daily 2 tabs      LORazepam (ATIVAN) 0.5 MG tablet Take 0.5 mg by mouth every 12 hours Indications: One Tablet twice daily       Insulin Aspart (NOVOLOG FLEXPEN SC) Inject 15 Units into the skin 3 times daily (before meals) On sliding scale      lisinopril (PRINIVIL;ZESTRIL) 10 MG tablet Take 20 mg by mouth daily      furosemide (LASIX) 20 MG tablet Take 40 mg by mouth 2 times daily        No current facility-administered medications on file prior to encounter. REVIEW OF SYSTEMS    Pertinent items are noted in HPI. Constitutional: Negative for systemic symptoms including fever, chills and malaise. Objective:      Temp 97.2 °F (36.2 °C) (Temporal)   Resp 16     PHYSICAL EXAM      General: The patient is in no acute distress. Mental status:  Patient is appropriate, is  oriented to place and plan of care. Dermatologic exam: Visual inspection of the periwound reveals the skin to be moist and cool   Wound exam: see wound description below in procedure note      Assessment:     Problem List Items Addressed This Visit       Acute pain of right foot    Relevant Orders    Initiate Outpatient Wound Care Protocol    Diabetic ulcer of right heel associated with type 2 diabetes mellitus, with necrosis of bone ([facility] - Primary    Relevant Orders    Initiate Outpatient Wound Care Protocol     Procedure Note    Indications:  Based on my examination of this patient's wound(s) today, sharp excision into necrotic subcutaneous tissue is required to promote healing and evaluate the extent of previous healing. Performed by: Jaycee Webb MD    Consent obtained: Yes    Time out taken:  Yes    Pain Control:       Debridement:Excisional Debridement    Using scissors and forceps the wound(s) was/were sharply debrided down through and including the removal of subcutaneous tissue.         Devitalized Tissue Debrided:  slough, exudate, and callus    Pre Debridement Measurements:  Are located in the Wound Documentation Flow Sheet    All active wounds listed below with today's date are evaluated  Wound(s)    debrided this date include # : 1 and 3     Post  Debridement Measurements:  Negative Pressure Wound Therapy Foot (Active)   Number of days: 3293       Negative Pressure Wound Therapy Foot Distal (Active)   Number of days: 3289       Negative Pressure Wound Therapy (Active)   Wound Type Diabetic foot ulcer 10/11/22 1145   Unit Type [employer] 01/31/23 1013   Dressing Type Black Foam 01/31/23 1013   Number of pieces used 1 01/24/23 1103   Number of pieces removed 1 01/31/23 1013   Cycle Continuous 01/31/23 1013   Target Pressure (mmHg) 125 01/31/23 1013   Dressing Status New dressing applied;Clean, dry & intact 01/17/23 1041   Dressing Changed Changed/New 01/17/23 1041   Drainage Amount Moderate 01/17/23 1041   Drainage Description Brown 01/17/23 1041   Wound Assessment Granulation tissue 01/10/23 1057   Lora-wound Assessment Fragile; Intact 01/10/23 1057   Odor None 01/10/23 1057   Number of days: 152       Incision 06/10/13 Wrist Right (Active)   Number of days: 3542       Incision 07/29/13 Wrist Left (Active)   Number of days: 3494       Incision 02/14/14 Toe (Comment  which one) (Active)   Number of days: 6662       Wound 11/01/22 #2 Sacrum (Active)   Wound Image   02/21/23 0945   Wound Etiology Pressure Unstageable 12/13/22 0939   Dressing Status Clean;Dry; Intact; New dressing applied 02/07/23 1125   Wound Cleansed Wound cleanser;Cleansed with saline 02/21/23 0945   Dressing/Treatment Packing;Silicone border 59/21/27 1125   Offloading for Diabetic Foot Ulcers Other (comment) 02/21/23 0945   Wound Length (cm) 3 cm 02/21/23 0945   Wound Width (cm) 1.1 cm 02/21/23 0945   Wound Depth (cm) 1.5 cm 02/21/23 0945   Wound Surface Area (cm^2) 3.3 cm^2 02/21/23 0945   Change in Wound Size % (l*w) 34 02/21/23 0945   Wound Volume (cm^3) 4.95 cm^3 02/21/23 0945   Wound Healing % -890 02/21/23 0945   Post-Procedure Length (cm) 3 cm 02/21/23 1004   Post-Procedure Width (cm) 1.1 cm 02/21/23 1004   Post-Procedure Depth (cm) 1.5 cm 02/21/23 1004   Post-Procedure Surface Area (cm^2) 3.3 cm^2 02/21/23 1004   Post-Procedure Volume (cm^3) 4.95 cm^3 02/21/23 1004   Distance Tunneling (cm) 0 cm 02/21/23 0985 Tunneling Position ___ O'Clock 0 02/21/23 0945   Undermining Starts ___ O'Clock 8 02/21/23 0945   Undermining Ends___ O'Clock 1 02/21/23 0945   Undermining Maxium Distance (cm) 4.5 02/21/23 0945   Wound Assessment Slough;Granulation tissue 02/21/23 0945   Drainage Amount Moderate 02/21/23 0945   Drainage Description Yellow; Thick 02/21/23 0945   Odor None 02/21/23 0945   Lora-wound Assessment Intact; Blanchable erythema; Maceration 02/21/23 0945   Margins Defined edges; Unattached edges 02/21/23 0945   Wound Thickness Description not for Pressure Injury Full thickness 02/21/23 0945   Number of days: 112       Wound 11/11/22  #1 Left Great Toe (Active)   Wound Image   02/21/23 0945   Wound Etiology Arterial 12/20/22 0952   Dressing Status Clean;Dry 02/07/23 1125   Wound Cleansed Wound cleanser 02/21/23 0945   Dressing/Treatment Betadine swabs/povidone iodine;Open to air 02/07/23 1125   Offloading for Diabetic Foot Ulcers Other (comment) 02/21/23 0945   Wound Length (cm) 1.8 cm 02/21/23 0945   Wound Width (cm) 3.6 cm 02/21/23 0945   Wound Depth (cm) 0.1 cm 02/21/23 0945   Wound Surface Area (cm^2) 6.48 cm^2 02/21/23 0945   Change in Wound Size % (l*w) -44 02/21/23 0945   Wound Volume (cm^3) 0.648 cm^3 02/21/23 0945   Post-Procedure Length (cm) 1.8 cm 02/21/23 1004   Post-Procedure Width (cm) 3.6 cm 02/21/23 1004   Post-Procedure Depth (cm) 0.1 cm 02/21/23 1004   Post-Procedure Surface Area (cm^2) 6.48 cm^2 02/21/23 1004   Post-Procedure Volume (cm^3) 0.648 cm^3 02/21/23 1004   Distance Tunneling (cm) 0 cm 02/21/23 0945   Tunneling Position ___ O'Clock 0 02/21/23 0945   Undermining Starts ___ O'Clock 0 02/21/23 0945   Undermining Ends___ O'Clock 0 02/21/23 0945   Undermining Maxium Distance (cm) 0 02/21/23 0945   Wound Assessment Eschar dry 02/21/23 0945   Drainage Amount None 02/21/23 0945   Drainage Description Serosanguinous 01/10/23 0949   Odor None 02/21/23 0945   Lora-wound Assessment Dry/flaky 02/21/23 0945 Margins Defined edges 02/21/23 0945   Wound Thickness Description not for Pressure Injury Full thickness 02/21/23 0945   Number of days: 101       Wound 11/15/22 #3 Left Posterior Ankle Cluster (Active)   Wound Image   02/21/23 0945   Wound Etiology Traumatic 12/13/22 0939   Dressing Status Clean;Dry; Intact; New dressing applied 02/07/23 1125   Wound Cleansed Wound cleanser;Cleansed with saline 02/21/23 0945   Dressing/Treatment Betadine swabs/povidone iodine;Silicone border 00/38/11 1125   Offloading for Diabetic Foot Ulcers Other (comment) 02/21/23 0945   Wound Length (cm) 1.9 cm 02/21/23 0945   Wound Width (cm) 2.5 cm 02/21/23 0945   Wound Depth (cm) 0.3 cm 02/21/23 0945   Wound Surface Area (cm^2) 4.75 cm^2 02/21/23 0945   Change in Wound Size % (l*w) -137.5 02/21/23 0945   Wound Volume (cm^3) 1.425 cm^3 02/21/23 0945   Wound Healing % -613 02/21/23 0945   Post-Procedure Length (cm) 1.9 cm 02/21/23 1004   Post-Procedure Width (cm) 2.5 cm 02/21/23 1004   Post-Procedure Depth (cm) 0.3 cm 02/21/23 1004   Post-Procedure Surface Area (cm^2) 4.75 cm^2 02/21/23 1004   Post-Procedure Volume (cm^3) 1.425 cm^3 02/21/23 1004   Distance Tunneling (cm) 0 cm 02/21/23 0945   Tunneling Position ___ O'Clock 0 02/21/23 0945   Undermining Starts ___ O'Clock 4 02/21/23 0945   Undermining Ends___ O'Clock 7 02/21/23 0945   Undermining Maxium Distance (cm) 0.5 02/21/23 0945   Wound Assessment Slough;Dry 02/21/23 0945   Drainage Amount Moderate 02/21/23 0945   Drainage Description Thick; Yellow 02/21/23 0945   Odor None 02/21/23 0945   Lora-wound Assessment Blanchable erythema 02/21/23 0945   Margins Defined edges 02/21/23 0945   Wound Thickness Description not for Pressure Injury Full thickness 02/21/23 0945   Number of days: 98       Wound 11/15/22 #4 Left Heel (Active)   Wound Image   02/21/23 0945   Wound Etiology Deep tissue/Injury 12/20/22 0952   Dressing Status Clean;Dry 02/07/23 1125   Wound Cleansed Wound cleanser 02/21/23 0945   Dressing/Treatment Betadine swabs/povidone iodine 02/07/23 1125   Offloading for Diabetic Foot Ulcers Other (comment) 02/21/23 0945   Wound Length (cm) 4 cm 02/21/23 0945   Wound Width (cm) 3.5 cm 02/21/23 0945   Wound Depth (cm) 0.1 cm 02/21/23 0945   Wound Surface Area (cm^2) 14 cm^2 02/21/23 0945   Change in Wound Size % (l*w) -180 02/21/23 0945   Wound Volume (cm^3) 1.4 cm^3 02/21/23 0945   Wound Healing % -180 02/21/23 0945   Post-Procedure Length (cm) 4 cm 02/21/23 1004   Post-Procedure Width (cm) 3.5 cm 02/21/23 1004   Post-Procedure Depth (cm) 0.1 cm 02/21/23 1004   Post-Procedure Surface Area (cm^2) 14 cm^2 02/21/23 1004   Post-Procedure Volume (cm^3) 1.4 cm^3 02/21/23 1004   Distance Tunneling (cm) 0 cm 02/21/23 0945   Tunneling Position ___ O'Clock 0 02/21/23 0945   Undermining Starts ___ O'Clock 0 02/21/23 0945   Undermining Ends___ O'Clock 0 02/21/23 0945   Undermining Maxium Distance (cm) 0 02/21/23 0945   Wound Assessment Eschar dry 02/21/23 0945   Drainage Amount None 02/21/23 0945   Drainage Description Serosanguinous 01/24/23 1030   Odor None 02/21/23 0945   Lora-wound Assessment Dry/flaky 02/21/23 0945   Margins Defined edges 02/21/23 0945   Wound Thickness Description not for Pressure Injury Full thickness 02/21/23 0945   Number of days: 98       Wound 02/07/23 #5 Left 3rd Finger (Active)   Wound Image   02/21/23 0945   Dressing Status Clean;Dry; Intact; New dressing applied 02/07/23 1125   Wound Cleansed Wound cleanser 02/21/23 0945   Dressing/Treatment Silver dressing 02/07/23 1125   Wound Length (cm) 1.2 cm 02/21/23 0945   Wound Width (cm) 1.2 cm 02/21/23 0945   Wound Depth (cm) 0.2 cm 02/21/23 0945   Wound Surface Area (cm^2) 1.44 cm^2 02/21/23 0945   Wound Volume (cm^3) 0.288 cm^3 02/21/23 0945   Distance Tunneling (cm) 0 cm 02/21/23 0945   Tunneling Position ___ O'Clock 0 02/21/23 0945   Undermining Starts ___ O'Clock 0 02/21/23 0945   Undermining Ends___ O'Clock 0 02/21/23 0945 Undermining Maxium Distance (cm) 0 02/21/23 0945   Wound Assessment Dry;Slough 02/21/23 0945   Drainage Amount None 02/21/23 0945   Lora-wound Assessment Dry/flaky 02/21/23 0945   Margins Defined edges 02/21/23 0945   Wound Thickness Description not for Pressure Injury Full thickness 02/21/23 0945   Number of days: 13       Percent of Wound(s) Debrided: approximately 50%    Total  Area  Debrided:  3 sq cm     Bleeding:  Minimal    Hemostasis Achieved:  not needed    Procedural Pain:  3  / 10     Post Procedural Pain:  0 / 10     Response to treatment:  Well tolerated by patient. Status of wound progress and description from last visit:   Slightly improved. I will remove the NPWT to the sacral area. Plan:       Discharge Instructions         PHYSICIAN ORDERS AND DISCHARGE INSTRUCTIONS  NOTE: Upon discharge from the [address], you will receive a patient experience survey via E-mail. We would be grateful if you would take the time to fill this survey out. Wound care order history:              KATHLEEN's   Right       Left    Date               Vascular studies/Intervention: . Cultures: .9/13/22, 12/20/22              Antibiotics: .Cipro and Doxy 9/13/22                         HbA1c:  .6/29/22 8.2               Grafts: Richardson Blake: . Continuing wound care orders and information:              Residence: . Private              [facility] [place] [facility] [place]              Your [place]-care supplies will be provided by: . [facility]              Pharmacy: [facility]  Wound cleansing:                           Do not scrub or use excessive force. Wash hands with soap and water before and after dressing changes. Prior to applying a clean dressing, cleanse wound with normal saline,                          wound cleanser, or mild soap and water.                            Ask your physician or nurse before getting the wound(s) wet in the shower. Daily Wound management:                          Keep weight off wounds and reposition every 2 hours. Avoid standing for long periods of time. Evaluate legs to the level of the heart or above for 30 minutes 4-5 times a day and/or when sitting. When taking antibiotics take entire prescription as ordered by MD do not stop taking until medicine is all gone. Orders for this week (2/21/23) : Left finger- Paint with betadine  Cover with Bandaid  Change Daily     Left Posterior Ankle- wash with mild soap and water  Paint with betadine   Cover with silicone border gauze (please send 4x4)  Change daily      Left foot and toe wounds -Wash with mild soap and water, rinse with saline, pat dry with 4x4  Paint with betadine  Daily     Coccyx- wash with mild soap and water  Clobetasol to periwound  Apply santyl to wound   Gently pack with saline dampened 4x4  Cover with Mepilex Sacral Border  Change Tuesday, Thursday and Saturday     Ok to send vac back    Follow up with Dr. Leander Araujo In 1 weeks in the wound care center  Call [phone] for any questions or concerns.   Date__________   Time____________        Treatment Note      Written Patient Dismissal Instructions Given            Electronically signed by Arleen Henriquez MD on 2/21/2023 at 10:40 AM

## 2023-02-21 NOTE — PROGRESS NOTES
Nonselective enzymatic debridement performed with Santyl per physician order to wound(s) of the coccyx  Patient tolerated the procedure well.      Electronically signed by Yonny Glasgow RN on 2/21/2023 at 11:02 AM

## 2023-02-28 ENCOUNTER — HOSPITAL ENCOUNTER (OUTPATIENT)
Dept: WOUND CARE | Age: 58
Discharge: HOME | End: 2023-02-28

## 2023-03-08 ENCOUNTER — APPOINTMENT (OUTPATIENT)
Dept: GENERAL RADIOLOGY | Age: 58
End: 2023-03-08
Payer: MEDICARE

## 2023-03-08 ENCOUNTER — HOSPITAL ENCOUNTER (EMERGENCY)
Age: 58
Discharge: ANOTHER HOSPITAL | End: 2023-03-08
Attending: EMERGENCY MEDICINE
Payer: MEDICARE

## 2023-03-08 VITALS
RESPIRATION RATE: 18 BRPM | BODY MASS INDEX: 20.2 KG/M2 | SYSTOLIC BLOOD PRESSURE: 121 MMHG | HEIGHT: 63 IN | TEMPERATURE: 97.1 F | DIASTOLIC BLOOD PRESSURE: 48 MMHG | HEART RATE: 87 BPM | OXYGEN SATURATION: 96 % | WEIGHT: 114 LBS

## 2023-03-08 DIAGNOSIS — R11.2 NAUSEA AND VOMITING, UNSPECIFIED VOMITING TYPE: Primary | ICD-10-CM

## 2023-03-08 DIAGNOSIS — L08.9 WOUND INFECTION: ICD-10-CM

## 2023-03-08 DIAGNOSIS — R53.1 GENERALIZED WEAKNESS: ICD-10-CM

## 2023-03-08 DIAGNOSIS — L89.603 PRESSURE INJURY OF HEEL, STAGE 3, UNSPECIFIED LATERALITY (HCC): ICD-10-CM

## 2023-03-08 DIAGNOSIS — T14.8XXA WOUND INFECTION: ICD-10-CM

## 2023-03-08 DIAGNOSIS — L89.154 PRESSURE INJURY OF SACRAL REGION, STAGE 4 (HCC): ICD-10-CM

## 2023-03-08 LAB
ALBUMIN SERPL-MCNC: 2.1 GM/DL (ref 3.4–5)
ALP BLD-CCNC: 357 IU/L (ref 40–129)
ALT SERPL-CCNC: <5 U/L (ref 10–40)
ANION GAP SERPL CALCULATED.3IONS-SCNC: 14 MMOL/L (ref 4–16)
ANISOCYTOSIS: ABNORMAL
AST SERPL-CCNC: 14 IU/L (ref 15–37)
BANDED NEUTROPHILS ABSOLUTE COUNT: 5.55 K/CU MM
BANDED NEUTROPHILS RELATIVE PERCENT: 19 % (ref 5–11)
BILIRUB SERPL-MCNC: 0.9 MG/DL (ref 0–1)
BUN SERPL-MCNC: 42 MG/DL (ref 6–23)
CALCIUM SERPL-MCNC: 8.7 MG/DL (ref 8.3–10.6)
CHLORIDE BLD-SCNC: 95 MMOL/L (ref 99–110)
CO2: 26 MMOL/L (ref 21–32)
CREAT SERPL-MCNC: 3.6 MG/DL (ref 0.6–1.1)
DIFFERENTIAL TYPE: ABNORMAL
GFR SERPL CREATININE-BSD FRML MDRD: 14 ML/MIN/1.73M2
GLUCOSE BLD-MCNC: 59 MG/DL (ref 70–99)
GLUCOSE BLD-MCNC: 65 MG/DL (ref 70–99)
GLUCOSE SERPL-MCNC: 75 MG/DL (ref 70–99)
HCT VFR BLD CALC: 40.5 % (ref 37–47)
HEMOGLOBIN: 12.6 GM/DL (ref 12.5–16)
HYPOCHROMIA: ABNORMAL
LACTIC ACID, SEPSIS: 2.3 MMOL/L (ref 0.5–1.9)
LYMPHOCYTES ABSOLUTE: 1.2 K/CU MM
LYMPHOCYTES RELATIVE PERCENT: 4 % (ref 24–44)
MAGNESIUM: 2.1 MG/DL (ref 1.8–2.4)
MCH RBC QN AUTO: 31.3 PG (ref 27–31)
MCHC RBC AUTO-ENTMCNC: 31.1 % (ref 32–36)
MCV RBC AUTO: 100.5 FL (ref 78–100)
METAMYELOCYTES ABSOLUTE COUNT: 0.58 K/CU MM
METAMYELOCYTES PERCENT: 2 %
MICROCYTES: ABNORMAL
MONOCYTES ABSOLUTE: 1.2 K/CU MM
MONOCYTES RELATIVE PERCENT: 4 % (ref 0–4)
MYELOCYTE PERCENT: 2 %
MYELOCYTES ABSOLUTE COUNT: 0.58 K/CU MM
PDW BLD-RTO: 14.1 % (ref 11.7–14.9)
PLATELET # BLD: 180 K/CU MM (ref 140–440)
PMV BLD AUTO: 10.8 FL (ref 7.5–11.1)
POTASSIUM SERPL-SCNC: 4.5 MMOL/L (ref 3.5–5.1)
RBC # BLD: 4.03 M/CU MM (ref 4.2–5.4)
SEGMENTED NEUTROPHILS ABSOLUTE COUNT: 20.1 K/CU MM
SEGMENTED NEUTROPHILS RELATIVE PERCENT: 69 % (ref 36–66)
SODIUM BLD-SCNC: 135 MMOL/L (ref 135–145)
TARGET CELLS: ABNORMAL
TOTAL PROTEIN: 6.7 GM/DL (ref 6.4–8.2)
TROPONIN T: 0.06 NG/ML
WBC # BLD: 29.2 K/CU MM (ref 4–10.5)

## 2023-03-08 PROCEDURE — 85027 COMPLETE CBC AUTOMATED: CPT

## 2023-03-08 PROCEDURE — 96365 THER/PROPH/DIAG IV INF INIT: CPT

## 2023-03-08 PROCEDURE — 99285 EMERGENCY DEPT VISIT HI MDM: CPT

## 2023-03-08 PROCEDURE — 93005 ELECTROCARDIOGRAM TRACING: CPT | Performed by: EMERGENCY MEDICINE

## 2023-03-08 PROCEDURE — 85007 BL SMEAR W/DIFF WBC COUNT: CPT

## 2023-03-08 PROCEDURE — 6360000002 HC RX W HCPCS: Performed by: EMERGENCY MEDICINE

## 2023-03-08 PROCEDURE — 80053 COMPREHEN METABOLIC PANEL: CPT

## 2023-03-08 PROCEDURE — 96366 THER/PROPH/DIAG IV INF ADDON: CPT

## 2023-03-08 PROCEDURE — 2580000003 HC RX 258: Performed by: EMERGENCY MEDICINE

## 2023-03-08 PROCEDURE — 87040 BLOOD CULTURE FOR BACTERIA: CPT

## 2023-03-08 PROCEDURE — 83735 ASSAY OF MAGNESIUM: CPT

## 2023-03-08 PROCEDURE — 84484 ASSAY OF TROPONIN QUANT: CPT

## 2023-03-08 PROCEDURE — 83605 ASSAY OF LACTIC ACID: CPT

## 2023-03-08 PROCEDURE — 71045 X-RAY EXAM CHEST 1 VIEW: CPT

## 2023-03-08 PROCEDURE — 82962 GLUCOSE BLOOD TEST: CPT

## 2023-03-08 RX ORDER — MORPHINE SULFATE 4 MG/ML
4 INJECTION, SOLUTION INTRAMUSCULAR; INTRAVENOUS EVERY 30 MIN PRN
Status: DISCONTINUED | OUTPATIENT
Start: 2023-03-08 | End: 2023-03-09 | Stop reason: HOSPADM

## 2023-03-08 RX ORDER — ONDANSETRON 2 MG/ML
4 INJECTION INTRAMUSCULAR; INTRAVENOUS EVERY 30 MIN PRN
Status: DISCONTINUED | OUTPATIENT
Start: 2023-03-08 | End: 2023-03-09 | Stop reason: HOSPADM

## 2023-03-08 RX ADMIN — PIPERACILLIN AND TAZOBACTAM 4500 MG: 4; .5 INJECTION, POWDER, FOR SOLUTION INTRAVENOUS at 19:30

## 2023-03-08 ASSESSMENT — PAIN DESCRIPTION - FREQUENCY: FREQUENCY: CONTINUOUS

## 2023-03-08 ASSESSMENT — PAIN DESCRIPTION - DESCRIPTORS: DESCRIPTORS: ACHING;THROBBING

## 2023-03-08 ASSESSMENT — PAIN - FUNCTIONAL ASSESSMENT
PAIN_FUNCTIONAL_ASSESSMENT: INTOLERABLE, UNABLE TO DO ANY ACTIVE OR PASSIVE ACTIVITIES
PAIN_FUNCTIONAL_ASSESSMENT: 0-10

## 2023-03-08 ASSESSMENT — PAIN DESCRIPTION - PAIN TYPE: TYPE: ACUTE PAIN

## 2023-03-08 ASSESSMENT — LIFESTYLE VARIABLES
HOW MANY STANDARD DRINKS CONTAINING ALCOHOL DO YOU HAVE ON A TYPICAL DAY: PATIENT DOES NOT DRINK
HOW OFTEN DO YOU HAVE A DRINK CONTAINING ALCOHOL: NEVER

## 2023-03-08 ASSESSMENT — PAIN SCALES - GENERAL: PAINLEVEL_OUTOF10: 10

## 2023-03-08 ASSESSMENT — PAIN DESCRIPTION - ORIENTATION: ORIENTATION: LOWER

## 2023-03-08 ASSESSMENT — PAIN DESCRIPTION - LOCATION: LOCATION: BACK
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
## 2023-03-08 NOTE — ED TRIAGE NOTES
Arrived per [facility] to room 5 for triage. Tolerated without difficulty. Bed in lowest position. Call light given. Gowned for exam. Monitor applied.

## 2023-03-09 ENCOUNTER — HOSPITAL ENCOUNTER (INPATIENT)
Age: 58
LOS: 8 days | Discharge: NURSING, REHAB OR LONG-TERM CARE | End: 2023-03-17
Attending: INTERNAL MEDICINE | Admitting: STUDENT IN AN ORGANIZED HEALTH CARE EDUCATION/TRAINING PROGRAM
Payer: MEDICARE

## 2023-03-09 DIAGNOSIS — M54.50 CHRONIC BILATERAL LOW BACK PAIN WITHOUT SCIATICA: Primary | ICD-10-CM

## 2023-03-09 DIAGNOSIS — G89.29 CHRONIC BILATERAL LOW BACK PAIN WITHOUT SCIATICA: Primary | ICD-10-CM

## 2023-03-09 PROBLEM — E43 SEVERE MALNUTRITION (HCC): Status: ACTIVE | Noted: 2023-03-09

## 2023-03-09 PROBLEM — A41.9 SEPSIS (HCC): Status: ACTIVE | Noted: 2023-03-09

## 2023-03-09 LAB
ANION GAP SERPL CALCULATED.3IONS-SCNC: 14 MMOL/L (ref 4–16)
BUN SERPL-MCNC: 48 MG/DL (ref 6–23)
CALCIUM SERPL-MCNC: 7.9 MG/DL (ref 8.3–10.6)
CHLORIDE BLD-SCNC: 97 MMOL/L (ref 99–110)
CO2: 25 MMOL/L (ref 21–32)
CREAT SERPL-MCNC: 4.2 MG/DL (ref 0.6–1.1)
CRP SERPL HS-MCNC: 303.5 MG/L
EKG ATRIAL RATE: 88 BPM
EKG DIAGNOSIS: NORMAL
EKG P AXIS: 38 DEGREES
EKG P-R INTERVAL: 132 MS
EKG Q-T INTERVAL: 430 MS
EKG QRS DURATION: 138 MS
EKG QTC CALCULATION (BAZETT): 520 MS
EKG R AXIS: 194 DEGREES
EKG T AXIS: 47 DEGREES
EKG VENTRICULAR RATE: 88 BPM
GFR SERPL CREATININE-BSD FRML MDRD: 12 ML/MIN/1.73M2
GLUCOSE BLD-MCNC: 106 MG/DL (ref 70–99)
GLUCOSE BLD-MCNC: 149 MG/DL (ref 70–99)
GLUCOSE BLD-MCNC: 73 MG/DL (ref 70–99)
GLUCOSE BLD-MCNC: 80 MG/DL (ref 70–99)
GLUCOSE BLD-MCNC: 83 MG/DL (ref 70–99)
GLUCOSE BLD-MCNC: 91 MG/DL (ref 70–99)
GLUCOSE SERPL-MCNC: 114 MG/DL (ref 70–99)
LACTATE: 2 MMOL/L (ref 0.5–1.9)
LACTIC ACID, SEPSIS: 3.2 MMOL/L (ref 0.5–1.9)
LACTIC ACID, SEPSIS: 4.3 MMOL/L (ref 0.5–1.9)
LV EF: 55 %
LVEF MODALITY: NORMAL
POTASSIUM SERPL-SCNC: 5 MMOL/L (ref 3.5–5.1)
PRO-BNP: ABNORMAL PG/ML
PROCALCITONIN SERPL-MCNC: 2.3 NG/ML
SODIUM BLD-SCNC: 136 MMOL/L (ref 135–145)
TROPONIN T: 0.06 NG/ML
TROPONIN T: 0.07 NG/ML

## 2023-03-09 PROCEDURE — 2580000003 HC RX 258: Performed by: INTERNAL MEDICINE

## 2023-03-09 PROCEDURE — 2500000003 HC RX 250 WO HCPCS: Performed by: STUDENT IN AN ORGANIZED HEALTH CARE EDUCATION/TRAINING PROGRAM

## 2023-03-09 PROCEDURE — 97162 PT EVAL MOD COMPLEX 30 MIN: CPT

## 2023-03-09 PROCEDURE — 84484 ASSAY OF TROPONIN QUANT: CPT

## 2023-03-09 PROCEDURE — 2140000000 HC CCU INTERMEDIATE R&B

## 2023-03-09 PROCEDURE — 84145 PROCALCITONIN (PCT): CPT

## 2023-03-09 PROCEDURE — 87081 CULTURE SCREEN ONLY: CPT

## 2023-03-09 PROCEDURE — 5A1D70Z PERFORMANCE OF URINARY FILTRATION, INTERMITTENT, LESS THAN 6 HOURS PER DAY: ICD-10-PCS | Performed by: INTERNAL MEDICINE

## 2023-03-09 PROCEDURE — 86140 C-REACTIVE PROTEIN: CPT

## 2023-03-09 PROCEDURE — APPSS60 APP SPLIT SHARED TIME 46-60 MINUTES: Performed by: NURSE PRACTITIONER

## 2023-03-09 PROCEDURE — 82962 GLUCOSE BLOOD TEST: CPT

## 2023-03-09 PROCEDURE — 99213 OFFICE O/P EST LOW 20 MIN: CPT

## 2023-03-09 PROCEDURE — 93306 TTE W/DOPPLER COMPLETE: CPT

## 2023-03-09 PROCEDURE — 83880 ASSAY OF NATRIURETIC PEPTIDE: CPT

## 2023-03-09 PROCEDURE — 80048 BASIC METABOLIC PNL TOTAL CA: CPT

## 2023-03-09 PROCEDURE — 93010 ELECTROCARDIOGRAM REPORT: CPT | Performed by: INTERNAL MEDICINE

## 2023-03-09 PROCEDURE — 94761 N-INVAS EAR/PLS OXIMETRY MLT: CPT

## 2023-03-09 PROCEDURE — 6370000000 HC RX 637 (ALT 250 FOR IP): Performed by: STUDENT IN AN ORGANIZED HEALTH CARE EDUCATION/TRAINING PROGRAM

## 2023-03-09 PROCEDURE — 97166 OT EVAL MOD COMPLEX 45 MIN: CPT

## 2023-03-09 PROCEDURE — 83605 ASSAY OF LACTIC ACID: CPT

## 2023-03-09 PROCEDURE — 99223 1ST HOSP IP/OBS HIGH 75: CPT | Performed by: INTERNAL MEDICINE

## 2023-03-09 PROCEDURE — 36415 COLL VENOUS BLD VENIPUNCTURE: CPT

## 2023-03-09 PROCEDURE — 6360000002 HC RX W HCPCS: Performed by: INTERNAL MEDICINE

## 2023-03-09 RX ORDER — PANTOPRAZOLE SODIUM 40 MG/1
40 TABLET, DELAYED RELEASE ORAL
Status: DISCONTINUED | OUTPATIENT
Start: 2023-03-09 | End: 2023-03-12

## 2023-03-09 RX ORDER — HEPARIN SODIUM 5000 [USP'U]/ML
5000 INJECTION, SOLUTION INTRAVENOUS; SUBCUTANEOUS EVERY 8 HOURS SCHEDULED
Status: DISCONTINUED | OUTPATIENT
Start: 2023-03-09 | End: 2023-03-10

## 2023-03-09 RX ORDER — SODIUM CHLORIDE 0.9 % (FLUSH) 0.9 %
5-40 SYRINGE (ML) INJECTION PRN
Status: DISCONTINUED | OUTPATIENT
Start: 2023-03-09 | End: 2023-03-17 | Stop reason: HOSPADM

## 2023-03-09 RX ORDER — DIPHENHYDRAMINE HYDROCHLORIDE 50 MG/ML
25 INJECTION INTRAMUSCULAR; INTRAVENOUS ONCE
Status: DISCONTINUED | OUTPATIENT
Start: 2023-03-09 | End: 2023-03-09

## 2023-03-09 RX ORDER — ESOMEPRAZOLE MAGNESIUM 40 MG/1
20 FOR SUSPENSION ORAL 2 TIMES DAILY
Status: DISCONTINUED | OUTPATIENT
Start: 2023-03-09 | End: 2023-03-09 | Stop reason: CLARIF

## 2023-03-09 RX ORDER — ACETAMINOPHEN 325 MG/1
650 TABLET ORAL EVERY 6 HOURS PRN
Status: DISCONTINUED | OUTPATIENT
Start: 2023-03-09 | End: 2023-03-17 | Stop reason: HOSPADM

## 2023-03-09 RX ORDER — ALBUTEROL SULFATE 90 UG/1
2 AEROSOL, METERED RESPIRATORY (INHALATION) EVERY 6 HOURS PRN
Status: DISCONTINUED | OUTPATIENT
Start: 2023-03-09 | End: 2023-03-17 | Stop reason: HOSPADM

## 2023-03-09 RX ORDER — SODIUM HYPOCHLORITE 1.25 MG/ML
SOLUTION TOPICAL 2 TIMES DAILY
Status: DISCONTINUED | OUTPATIENT
Start: 2023-03-09 | End: 2023-03-17 | Stop reason: HOSPADM

## 2023-03-09 RX ORDER — DEXTROSE MONOHYDRATE 100 MG/ML
INJECTION, SOLUTION INTRAVENOUS CONTINUOUS PRN
Status: DISCONTINUED | OUTPATIENT
Start: 2023-03-09 | End: 2023-03-17 | Stop reason: HOSPADM

## 2023-03-09 RX ORDER — SODIUM CHLORIDE 9 MG/ML
INJECTION, SOLUTION INTRAVENOUS PRN
Status: DISCONTINUED | OUTPATIENT
Start: 2023-03-09 | End: 2023-03-17 | Stop reason: HOSPADM

## 2023-03-09 RX ORDER — DEXTROSE, SODIUM CHLORIDE, AND POTASSIUM CHLORIDE 5; .9; .15 G/100ML; G/100ML; G/100ML
INJECTION INTRAVENOUS CONTINUOUS
Status: DISCONTINUED | OUTPATIENT
Start: 2023-03-09 | End: 2023-03-10

## 2023-03-09 RX ORDER — ACETAMINOPHEN 650 MG/1
650 SUPPOSITORY RECTAL EVERY 6 HOURS PRN
Status: DISCONTINUED | OUTPATIENT
Start: 2023-03-09 | End: 2023-03-17 | Stop reason: HOSPADM

## 2023-03-09 RX ORDER — SODIUM CHLORIDE 0.9 % (FLUSH) 0.9 %
5-40 SYRINGE (ML) INJECTION EVERY 12 HOURS SCHEDULED
Status: DISCONTINUED | OUTPATIENT
Start: 2023-03-09 | End: 2023-03-17 | Stop reason: HOSPADM

## 2023-03-09 RX ORDER — SODIUM CHLORIDE, SODIUM LACTATE, POTASSIUM CHLORIDE, AND CALCIUM CHLORIDE .6; .31; .03; .02 G/100ML; G/100ML; G/100ML; G/100ML
1000 INJECTION, SOLUTION INTRAVENOUS ONCE
Status: COMPLETED | OUTPATIENT
Start: 2023-03-09 | End: 2023-03-09

## 2023-03-09 RX ADMIN — PIPERACILLIN AND TAZOBACTAM 3375 MG: 3; .375 INJECTION, POWDER, LYOPHILIZED, FOR SOLUTION INTRAVENOUS at 18:54

## 2023-03-09 RX ADMIN — SODIUM CHLORIDE, POTASSIUM CHLORIDE, SODIUM LACTATE AND CALCIUM CHLORIDE 999 ML: 600; 310; 30; 20 INJECTION, SOLUTION INTRAVENOUS at 02:09

## 2023-03-09 RX ADMIN — SODIUM HYPOCHLORITE: 1.25 SOLUTION TOPICAL at 22:45

## 2023-03-09 RX ADMIN — POTASSIUM CHLORIDE, DEXTROSE MONOHYDRATE AND SODIUM CHLORIDE: 150; 5; 900 INJECTION, SOLUTION INTRAVENOUS at 15:12

## 2023-03-09 RX ADMIN — HEPARIN SODIUM 5000 UNITS: 5000 INJECTION INTRAVENOUS; SUBCUTANEOUS at 21:03

## 2023-03-09 RX ADMIN — HEPARIN SODIUM 5000 UNITS: 5000 INJECTION INTRAVENOUS; SUBCUTANEOUS at 06:25

## 2023-03-09 RX ADMIN — HEPARIN SODIUM 5000 UNITS: 5000 INJECTION INTRAVENOUS; SUBCUTANEOUS at 14:11

## 2023-03-09 RX ADMIN — VANCOMYCIN HYDROCHLORIDE 1000 MG: 1 INJECTION, POWDER, LYOPHILIZED, FOR SOLUTION INTRAVENOUS at 04:40

## 2023-03-09 RX ADMIN — PIPERACILLIN AND TAZOBACTAM 3375 MG: 3; .375 INJECTION, POWDER, LYOPHILIZED, FOR SOLUTION INTRAVENOUS at 06:26

## 2023-03-09 ASSESSMENT — ENCOUNTER SYMPTOMS
CONSTIPATION: 0
STRIDOR: 0
ABDOMINAL DISTENTION: 0
BLOOD IN STOOL: 0
SHORTNESS OF BREATH: 0
WHEEZING: 0
BACK PAIN: 1
DIARRHEA: 0
ABDOMINAL PAIN: 0
CHEST TIGHTNESS: 0
VOMITING: 0
NAUSEA: 0
COUGH: 0
EYE PAIN: 0
CHOKING: 0

## 2023-03-09 ASSESSMENT — PAIN SCALES - GENERAL: PAINLEVEL_OUTOF10: 0

## 2023-03-09 NOTE — H&P
History and Physical Exam    Patient:  Ashok Gomez 62 y.o. female MRN: [id number]     Date of Service: 3/9/2023    Hospital Day: 1      Chief complaint: nausea and vomiting X 1 day along with pain around sacral wound and back with fatigue X 1 week. Assessment and Plan   Ashok Gomez, a 62 y.o. female, with a history of ESRD on HD, COPD, CAD, Type II DM, HTN, HLD, Anxiety, Depression, HFmrEF was admitted on 3/9/2023. They presented with nausea and vomiting X 1 day along with pain around sacral wound and back with fatigue X 1 week. Assessment  Severe Sepsis with ? Impending Septic shock - Present on admission- 2/2 Likely Infected Sacral Wound   Presented with WBC 29.2, SBP 94, LA 2.3  Nausea and vomiting prior to coming in with fatigue and pain around sacral wound X 1 week. Foul smelling sacral wound   Blood cultures sent from the ER  Was given zosyn 4.5gm in the ER  She follows with [facility] as outpatient for her wounds     Type II NSTEMI 2/2 likely Demand Ischemia 2/2 above   Denies chest pain   EKG without acute ischemic changes   Troponin 0.085    Hypoglycemia 2/2 Severe Sepsis   Sugars 59 prior to transfer to our center. Lactic Acidosis  LA 2.3 on presentation - I do not see a repeat level     Mildly Acute Decompensated on Chronic HFmrEF   Pt. With mild edema LLE with CXR suggestive of vascular congestion   Rales on my exam with some conducted sounds     NIDDM   On oral meds at home     Prolonged Qtc  Qtc 520    Wide Pulse pressure, ?AS   BP with wide pulse pressure   Last Echo with EF 45-50% (10/2022) with mild VHD    ESRD on HD   On mondays and wednesdays - twice a week only per patient     H/O HTN, HLD    H/O Anxiety/Depression   H/O PAD s/p Rt. BKA      Plan  Continue zosyn 3.375gm Q12H IV  Pharmacy to dose vanc   F/U Blood cultures   Send sacral wound culture and send procal   Consult GS for possible debridement   Consult ID for help with abx - pt.  Has a H/O MDRO and MRSA  Consult nephro due to ESRD on HD   Repeat LA  Administer 1L LR bolus - did not do 30cc/kg due to mild edema, congestion on CXR and conducted lung sounds with some rales. Obtain Echo  Low threshold to transfer to the ICU for pressors   POCT glucose Q4H, NPO and hypoglycemia protocol   Repeat troponin   CODE STATUS discussion done- She wants to be FULL CODE. # Peptic ulcer prophylaxis: Pantoprazole   # DVT Prophylaxis: Heparin TID  #CODE STATUS: FULL      Current living situation: Home  Expected Disposition: TBD  Estimated discharge date: 3-4 days. History of Present Illness   Kelly Aguilar, a 62 y.o. female, who presented on 3/9/2023 with complaints of nausea and vomiting X 1 day along with pain around sacral wound and back with fatigue X 1 week. According to the patient, she has not been feeling too well for the past week. Apparently she developed insidious onset of pain around her sacral wound and back along with fatigue approximately 1 week ago. She said she has just been feeling weaker and weaker. She developed sudden onset of nausea and vomiting approximately 1 day prior to admission which prompted her to visit the emergency room. The patient does state that she went to hemodialysis prior to coming to the emergency room. Apparently, patient is on transplant list for renal transplant at [facility]. ED Course: She was found to have a blood pressure of 121/41, afebrile, normal heart rate and normal respiratory rate. Her BMP showed a chloride of 95, BUN 42 with a creatinine of 3.6. Her lactic acid was elevated at 2.3. Her sugars dropped 59 and subsequently came up slightly to 65. Her troponin T was positive at 0.058. Her LFTs showed an albumin of 2.1 with an ALT of 357. Her ALT and AST were normal.  Her WBC was 29.2. Her EKG did not show any acute signs of ischemia but did show prolonged QTc at 520. She underwent a chest x-ray that showed pulmonary vascular congestion. UA with culture was ordered but it does not look like it was actually collected. ED Meds: Patient was given morphine 4mg IV X 1, zofran 4mg IV X 2, zosyn 4.5gm IV X 1  ED Fluids: Patient was given no fluids. Past Medical History:      Diagnosis Date    Acute pain of right foot 09/13/2022    CAD (coronary artery disease)     s/p CABG x 4;  follows with Dr Ling Pendleton of foot 02/05/2018    Carpal tunnel syndrome on both sides     CHF (congestive heart failure) ([address] 10/2010    Chronic diastolic; EF 14%    Chronic kidney disease     stage 4 kidney disease    Chronic ulcer of left ankle with fat layer exposed ([address] 10/07/2015    Chronic ulcer of left foot with fat layer exposed ([address] 03/17/2016    Chronic ulcer of right ankle with fat layer exposed ([address] 03/17/2016    Chronic ulcer of right foot with fat layer exposed ([address] 03/17/2016    Decubitus ulcer of sacral region, stage 1 09/17/2018    Depression     Diabetes mellitus ([address]     Diabetes mellitus with neurological manifestations ([address]     Diabetes mellitus with ulcer of ankle ([address]     Diabetic ulcer of left foot associated with type 2 diabetes mellitus, with fat layer exposed ([address] 08/06/2018    Diabetic ulcer of right heel associated with type 2 diabetes mellitus, with muscle involvement without evidence of necrosis ([address] 09/13/2022    Diabetic ulcer of right heel associated with type 2 diabetes mellitus, with necrosis of bone ([address] 09/13/2022    ESRD on hemodialysis ([address] 09/21/2022    Family history of cardiovascular disease     Mother    GERD (gastroesophageal reflux disease)     H/O cardiac catheterization 10/18/2010, 6/3/2010    10/18/2010-Four bypass grafts all widely patent. 6/3/2010- Moderate to severe triple vessel disease, preserved LV systolic function. H/O cardiovascular stress test 7/26/2012, 8/3/2011, 10/14/2010, 5/24/2010 7/26/2012-Lexiscan- Normal Myocardial Perfusion Study. Post stress myocardial perfusion images show a normal pattern of perfusion in all regions. Post stress LV normal size. Normal perfusion in the distribution of all coronaries. Normal LV size and function. Rest EF 70%    H/O chest x-ray 7/12/2012, 6/2/2010 7/12/2012-Perihilar peribronchial cuffing with mild basilar predominant interstital prominence, which may reflect interstitial edema or atypical pneumonia. H/O Doppler ultrasound 06/04/2010    CAROTID DOPPLER-6/4/2010-No Doppler evidence of hemodynamically significant Carotid Stenosis with antegrade flow in the vertebral arteries bilaterally. 6/4/2010 PERIPHERAL VENOUS DOPPLER_ LEFT Saphenous Vein mapping    H/O echocardiogram 7/26/2012, 8/3/2011, 10/2010, 7/23/2010, 6/8/2010 7/26/2012-LV normal size. Normal LV wall thickness. LV systolic function normal. EF => 55%. LV wall motion normal. Mild MR. Mild TR. History of blood transfusion     History of complete ECG 7/26/2012 [facility]), 7/13/2012 [facility]), 7/25/2011, 3/25/2011, 10/18/2010, 10/11/2010, 6/23/2010, 5/7/2010    Hx of cardiovascular stress test 09/03/2015    lexiscan-normal,EF66%    Hx of Doppler ultrasound 04/05/2016    Arterial: There is a fluid collection in the left thigh, please refer to PCP for further monitoring, no vascular in etiology. Hx of echocardiogram     4/16 EF40% Mild MR/TR and mild Pulm HTN. 9/15 EF 45-50%, Mildly dilated L atrium, mildly dilated R ventricle. Mild MR & mild TR     Hyperlipidemia     Neuropathy of foot     Pt reports starting approx. 6-7 years ago    Neuropathy of hand     Pt reports starting approx.  6-7 years ago    Non compliance with medical treatment 07/02/2018    Pressure ulcer of sacral region, stage 2 ([address] 11/29/2022    Pressure ulcer of sacral region, stage 3 ([address] 11/29/2022    S/P BKA (below knee amputation), right ([address] 11/29/2022    S/P CABG x 4 06/05/2010    LIMA-> LAD;  VG->CX;  VG->Diagonal; VG-> distal RCA  per  Dr Jan Alvarado    Type 2 diabetes mellitus with diabetic polyneuropathy, with long-term current use of insulin ([facility] 04/05/2016    Type II or unspecified type diabetes mellitus with neurological manifestations, not stated as uncontrolled(250.60)     Type II or unspecified type diabetes mellitus with other specified manifestations, not stated as uncontrolled     Ulcer of finger, with fat layer exposed ([facility] 2/7/2023    Ulcer of left foot, with fat layer exposed ([facility] 12/19/2013    Ulcer of other part of foot        Past Surgical History:        Procedure Laterality Date    APPENDECTOMY      CARDIAC SURGERY      CARPAL TUNNEL RELEASE      L hand Nov. 2011, R hand Jan. 2012    CARPAL TUNNEL RELEASE Right 6/10/2013    recurrent    CARPAL TUNNEL RELEASE Left 7/29/2013    with ulnar nerve transpostion and L middle finger release    CHOLECYSTECTOMY      COLONOSCOPY      CORONARY ARTERY BYPASS GRAFT  6/5/2010 6/5/2010-LIMA->LAD;  VG-> Diagonal;  VG-> Obtuse marginal;  VG->Distal RCA-   Dr Roger Linares Right 6/10/2013    HYSTERECTOMY, TOTAL ABDOMINAL (CERVIX REMOVED)      LEG AMPUTATION BELOW KNEE Right 11/11/2022    RIGHT LEG AMPUTATION BELOW KNEE WITH IPOP performed by Hiram Marinelli MD at [address] Left 02/14/144th toe    TUBAL LIGATION      ULNAR TUNNEL RELEASE Right 6/10/2013       Medications Prior to Admission:    Prior to Admission medications    Medication Sig Start Date End Date Taking? Authorizing Provider   clobetasol (TEMOVATE) 0.05 % ointment Apply topically 2 times daily. 1/31/23   Hiram Marinelli MD   silver sulfADIAZINE (SILVADENE) 1 % cream Apply topically daily. 12/9/22   RYAN Miguel - CNP   calcium acetate (PHOSLO) 667 MG CAPS capsule Take 1 capsule with each meal and 1 tablet with each snack. 11/18/22   Deann Helm MD   gabapentin (NEURONTIN) 400 MG capsule Take 800 mg by mouth in the morning and at bedtime.     Historical Provider, MD   OXYGEN Inhale 2 L into the lungs daily as needed    Historical Provider, MD carvedilol (COREG) 6.25 MG tablet 6.25 mg 2 times daily  5/30/17   Historical Provider, MD   albuterol sulfate HFA (PROVENTIL;VENTOLIN;PROAIR) 108 (90 Base) MCG/ACT inhaler Inhale 2 puffs into the lungs every 6 hours as needed for Wheezing    Historical Provider, MD   esomeprazole Magnesium (NEXIUM) 40 MG PACK Take 20 mg by mouth 2 times daily 2 tabs    Historical Provider, MD   LORazepam (ATIVAN) 0.5 MG tablet Take 0.5 mg by mouth every 12 hours Indications: One Tablet twice daily     Historical Provider, MD   lisinopril (PRINIVIL;ZESTRIL) 10 MG tablet Take 20 mg by mouth daily 12/18/12   Historical Provider, MD   furosemide (LASIX) 20 MG tablet Take 40 mg by mouth 2 times daily     Historical Provider, MD       Allergies:  Latex, Codeine, Cortisone, Cortizone, Dilaudid [hydromorphone], Iodides, Phenobarbital, and Vancomycin    Social History:   TOBACCO:   reports that she has been smoking cigarettes. She has a 7.50 pack-year smoking history. She has never used smokeless tobacco.  ETOH:   reports no history of alcohol use. Family History:       Problem Relation Age of Onset    Heart Disease Mother     Kidney Disease Mother         dialysis    Cancer Father          Review of System     Review of Systems   Constitutional:  Positive for fatigue. Negative for chills, fever and unexpected weight change. Eyes:  Negative for pain and visual disturbance. Respiratory:  Negative for cough, choking, chest tightness, shortness of breath, wheezing and stridor. Cardiovascular:  Negative for chest pain, palpitations and leg swelling. Gastrointestinal:  Negative for abdominal distention, abdominal pain, blood in stool, constipation, diarrhea, nausea (Improved) and vomiting (Improved). Genitourinary:  Negative for decreased urine volume, dysuria, frequency, hematuria and urgency. Musculoskeletal:  Positive for back pain. Negative for arthralgias and myalgias. Skin:  Positive for wound.  Negative for pallor and rash.   Neurological:  Negative for dizziness, tremors, syncope, speech difficulty, weakness, light-headedness, numbness and headaches. Psychiatric/Behavioral:  Negative for agitation. I have reviewed all pertinent PMHx, PSHx, FamHx, SocialHx, medications, and allergies and updated history as appropriate. Physical Exam   VITAL SIGNS:  BP (!) 94/36   Tmax over 24 hours:  Temp (24hrs), Av.7 °F (36.5 °C), Min:97.1 °F (36.2 °C), Max:98.2 °F (36.8 °C)      Patient Vitals for the past 6 hrs:   BP   23 0051 (!) 94/36       No intake or output data in the 24 hours ending 23 0117  Wt Readings from Last 2 Encounters:   23 114 lb (51.7 kg)   22 128 lb (58.1 kg)     There is no height or weight on file to calculate BMI. Physical Exam  Vitals and nursing note reviewed. Constitutional:       Appearance: She is ill-appearing. HENT:      Right Ear: External ear normal.      Left Ear: External ear normal.      Nose: Nose normal.      Mouth/Throat:      Pharynx: Oropharynx is clear. Eyes:      General: No scleral icterus. Right eye: No discharge. Left eye: No discharge. Conjunctiva/sclera: Conjunctivae normal.   Cardiovascular:      Rate and Rhythm: Normal rate and regular rhythm. Pulses: Normal pulses. Heart sounds: Normal heart sounds. No murmur heard. No friction rub. No gallop. Pulmonary:      Effort: Pulmonary effort is normal.      Breath sounds: Decreased air movement and transmitted upper airway sounds present. Rales present. No decreased breath sounds, wheezing or rhonchi. Abdominal:      General: Bowel sounds are normal. There is no distension. Palpations: Abdomen is soft. There is no mass. Tenderness: There is no abdominal tenderness. There is no guarding. Hernia: No hernia is present. Musculoskeletal:         General: Deformity present. No tenderness. Left lower leg: Edema present.       Comments: RtDeloris JACKSON   Skin: Capillary Refill: Capillary refill takes 2 to 3 seconds. Neurological:      Mental Status: She is oriented to person, place, and time and easily aroused. She is lethargic. Psychiatric:         Behavior: Behavior is cooperative. Labs and Imaging Studies   Laboratory findings:  Complete Blood Count:   Recent Labs     03/08/23  1807   WBC 29.2*   HGB 12.6   HCT 40.5           Last 3 Blood Glucose:   Recent Labs     03/08/23  1807   GLUCOSE 75        PT/INR:    Lab Results   Component Value Date/Time    PROTIME 14.3 09/21/2022 06:10 AM    PROTIME 10.6 10/18/2010 11:21 AM    INR 1.11 09/21/2022 06:10 AM     PTT:    Lab Results   Component Value Date/Time    APTT 31.2 04/28/2016 11:30 AM       Comprehensive Metabolic Profile:   Recent Labs     03/08/23  1807      K 4.5   CL 95*   CO2 26   BUN 42*   CREATININE 3.6*   GLUCOSE 75   CALCIUM 8.7   PROT 6.7   LABALBU 2.1*   BILITOT 0.9   ALKPHOS 357*   AST 14*   ALT <5*      Magnesium:   Lab Results   Component Value Date/Time    MG 2.1 03/08/2023 06:07 PM     Phosphorus:   Lab Results   Component Value Date/Time    PHOS 9.0 11/14/2022 11:47 AM     Ionized Calcium: No results found for: CAION   Troponin: No results for input(s): TROPONINI in the last 72 hours. Lactic Acid: No results for input(s): LACTA in the last 72 hours. BNP: No results for input(s): PROBNP in the last 72 hours.   Lipids:   Lab Results   Component Value Date/Time    CHOL 248 10/15/2014 09:56 AM    HDL 31 10/15/2014 09:56 AM    TRIG 953 10/15/2014 09:56 AM     Hemoglobin A1C:   Lab Results   Component Value Date/Time    LABA1C 13.6 10/15/2014 09:56 AM     Urinalysis: Urine dipstick pending  Urine Cultures: No results found for: LABURIN  Blood Cultures: No results found for: BC  No results found for: BLOODCULT2  Organism:   Lab Results   Component Value Date/Time    ORG ENC 06/05/2017 09:12 AM     ABG: No results for input(s): PH, PCO2, PO2, HCO3, BE, O2SAT in the last 72 hours.      Imaging Studies:    XR CHEST PORTABLE    Result Date: 3/8/2023  EXAMINATION: ONE XRAY VIEW OF THE CHEST 3/8/2023 6:24 pm COMPARISON: 10/12/2022 HISTORY: ORDERING SYSTEM PROVIDED HISTORY: Shortness of breath, fatigue and weakness TECHNOLOGIST PROVIDED HISTORY: Reason for exam:->Shortness of breath, fatigue and weakness Reason for Exam: Shortness of breath, fatigue and weakness Additional signs and symptoms: Shortness of breath, fatigue and weakness Relevant Medical/Surgical History: Shortness of breath, fatigue and weakness FINDINGS: Cardiac and mediastinal contours normal.  Sternotomy wires unchanged. Pulmonary vascular congestion with bilateral perihilar ground-glass opacity and pulmonary vasculature indistinctness. Chronic calcified lateral left mid lung granuloma unchanged. No pneumothorax or pleural effusion. No acute osseous abnormality. Aortic and carotid atherosclerotic calcifications present. Pulmonary vascular congestion with mild pulmonary edema. EKG: NSR.  Prolonged QTc        Electronically signed by Uzma Smith MD on 3/9/2023 at 1:17 AM

## 2023-03-09 NOTE — PROGRESS NOTES
Pt BG 73, NPO pending a general surgery consult. Notified hospitalist and D5 in NS w Kcl to run at 75mL/hr was ordered.

## 2023-03-09 NOTE — CONSULTS
[address], 1965, 3120/3120-A, 3/9/2023    History  [place]:  There were no encounter diagnoses.   Patient  has a past medical history of Acute pain of right foot, CAD (coronary artery disease), Callus of foot, Carpal tunnel syndrome on both sides, CHF (congestive heart failure) ([address] Chronic kidney disease, Chronic ulcer of left ankle with fat layer exposed ([address] Chronic ulcer of left foot with fat layer exposed ([address] Chronic ulcer of right ankle with fat layer exposed ([address] Chronic ulcer of right foot with fat layer exposed ([address] Decubitus ulcer of sacral region, stage 1, Depression, Diabetes mellitus ([address] Diabetes mellitus with neurological manifestations ([address] Diabetes mellitus with ulcer of ankle ([address] Diabetic ulcer of left foot associated with type 2 diabetes mellitus, with fat layer exposed ([address] Diabetic ulcer of right heel associated with type 2 diabetes mellitus, with muscle involvement without evidence of necrosis ([address] Diabetic ulcer of right heel associated with type 2 diabetes mellitus, with necrosis of bone ([address] ESRD on hemodialysis ([address] Family history of cardiovascular disease, GERD (gastroesophageal reflux disease), H/O cardiac catheterization, H/O cardiovascular stress test, H/O chest x-ray, H/O Doppler ultrasound, H/O echocardiogram, History of blood transfusion, History of complete ECG, Hx of cardiovascular stress test, Hx of Doppler ultrasound, Hx of echocardiogram, Hyperlipidemia, Neuropathy of foot, Neuropathy of hand, Non compliance with medical treatment, Pressure ulcer of sacral region, stage 2 ([address] Pressure ulcer of sacral region, stage 3 ([address] S/P BKA (below knee amputation), right ([address] S/P CABG x 4, Type 2 diabetes mellitus with diabetic polyneuropathy, with long-term current use of insulin ([address] Type II or unspecified type diabetes mellitus with neurological manifestations, not stated as uncontrolled(250.60), Type II or unspecified type diabetes mellitus with other specified manifestations, not stated as uncontrolled, Ulcer of finger, with fat layer exposed ([address] Ulcer of left foot, with fat layer exposed ([address] and Ulcer of other part of foot. Patient  has a past surgical history that includes Carpal tunnel release; Hysterectomy, total abdominal; Cholecystectomy; Appendectomy; Tubal ligation; Coronary artery bypass graft (6/5/2010); Finger trigger release (Right, 6/10/2013); Ulnar tunnel release (Right, 6/10/2013); Carpal tunnel release (Right, 6/10/2013); Carpal tunnel release (Left, 7/29/2013); Cardiac surgery; Toe amputation (Left, 02/14/144th toe); Colonoscopy; and Leg amputation below knee (Right, 11/11/2022). Subjective:  Patient states:  \"I just woke up and was like this. \"    Pain:  5/10 pain in buttock at wound.     Communication with other providers:  Handoff to RN, OT  Restrictions: general precautions, fall risk, contact isolation, hx R BKA    Home Setup/Prior level of function  Social/Functional History  Lives With: Spouse  Type of Home: House  Home Layout: One level  Home Access: Level entry  Bathroom Shower/Tub: Tub/Shower unit  Bathroom Toilet: Bedside commode  Bathroom Equipment: Tub transfer bench  Bathroom Accessibility: Accessible  Home Equipment: Tonye Wray, rolling, Wheelchair-manual  Has the patient had two or more falls in the past year or any fall with injury in the past year?: No  Receives Help From: Family  ADL Assistance: Needs assistance  Homemaking Assistance: Needs assistance  Ambulation Assistance: Non-ambulatory  Transfer Assistance: Needs assistance  Active : No  Patient's  Info: family assists    Examination of body systems (includes body structures/functions, activity/participation limitations):  Observation:  Pt supine in bed upon arrival and agreeable to therapy  Vision:  WFL  Hearing:  [facility]  Cardiopulmonary:  no O2 needs  Cognition: impaired, see OT/SLP note for further evaluation. Musculoskeletal  ROM R/L:  WFL. Strength R/L:  4-/5, significant impairment in function and endurance. Neuro:  WFL      Mobility:  Rolling L/R:  mod A with cues for sequencing  Supine to sit:  max A with assist at bilateral LEs, hips, and trunk. Cues provided for sequencing  Sit to supine: SBA with cues for sequencing. Pt scooted higher in bed dependent x2  Transfers: pt deferred  Sitting balance:  fair+, pt sat EOB ~5 minutes before requesting to return to supine CGA progressing to SBA with bilateral UE support. Pt with frequent UE buckling at wrists but no true LOB throughout. Standing balance:  NT.    Gait: NT, pt does not ambulate at baseline    [facility] 6 Clicks Inpatient Mobility:  AM-PAC Inpatient Mobility Raw Score : 8    Safety: patient left supine in bed with alarm on, call light within reach, RN notified, gait belt used. Assessment:  Pt is a 62 y.o. female admitted to the hospital for sepsis. Pt is typically requiring assist for stand pivot transfers to/from [facility]. Pt is currently performing bed mobility max A, sitting balance CGA, and unable to transfer this date. Pt is presenting with decreased endurance, impaired transfers, impaired gait, decreased strength, impaired balance. Pt would benefit from continued acute care PT as well as SNF placement upon discharge to continue to address impairments. Complexity: moderate    Prognosis: Good, no significant barriers to participation at this time.      General Plan: 3-5 times per week/week     Equipment: TBD at next level of care    Goals:  Short Term Goals  Time Frame for Short Term Goals: 1 week  Short Term Goal 1: pt to complete all bed mobility min A  Short Term Goal 2: pt to sit EOB 10 minutes SBA with no LOB  Short Term Goal 3: pt to complete stand pivot transfer with LRAD mod A  Short Term Goal 4: Pt to propel manual WC 50' min A       Treatment plan:  Bed mobility, transfers, balance, gait, TA, TX    Recommendations for NURSING mobility: bed mobility max A    Time:   Time in: 1107  Time out: 1118  Timed treatment minutes: 0  Total time: 11    Electronically signed by:    Ajit Lee, PT  3/9/2023, 12:03 PM

## 2023-03-09 NOTE — PROGRESS NOTES
[facility]  consulted by Dr. Tasha Garcia for monitoring and adjustment. Indication for treatment: Vancomycin indication: SSTI with risk factors   Goal pre-HD level: 15-20 mcg/mL  Goal trough: Trough Goal: 10-15 mcg/mL      Risk Factors for MRSA Identified:   Patient on hemodialysis, Purulent and/or complicated SSTI    Pertinent Laboratory Values:   Temp Readings from Last 3 Encounters:   03/08/23 97.1 °F (36.2 °C) (Temporal)   02/21/23 97.2 °F (36.2 °C) (Temporal)   02/07/23 (!) 96.7 °F (35.9 °C) (Temporal)     Recent Labs     03/08/23  1807   WBC 29.2*     Recent Labs     03/08/23  1807   BUN 42*   CREATININE 3.6*     Estimated Creatinine Clearance: 14 mL/min (A) (based on SCr of 3.6 mg/dL (H)). No intake or output data in the 24 hours ending 03/09/23 0421    Pertinent Cultures:   Date    Source    Results  3/09              MRSA Nasal   In process  3/09              Wound    Pending    Vancomycin level:   TROUGH:  No results for input(s): VANCOTROUGH in the last 72 hours. RANDOM:  No results for input(s): VANCORANDOM in the last 72 hours. Assessment:  HPI: 62 y.o female with pmh of ESRD on HD, hypertension, GERD, anxiety, diabetes, depression who presents with nausea and vomiting for few days along with pain around the sacral wound and back with fatigue for one week. On presentation, patient 's WBC was 29.2, lactic acid 2.3 and SBP 94. Dialyzes on Mondays and Wednesdays  SCr, BUN, and urine output: ESRD on HD  Day(s) of therapy: 1 of 7  Vancomycin concentration: Pending    Plan:  Vancomycin 1000 mg x 1  Intermittent dosing based on levels due to ESRD/HD  Pharmacy will continue to monitor patient and adjust therapy as indicated    VANCOMYCIN CONCENTRATION SCHEDULED FOR 3/13 @0600    Thank you for the consult.   Alondra Gatica, [address] [place] [address]  3/9/2023 4:21 AM

## 2023-03-09 NOTE — CONSULTS
[address] [facility],[address] ACUTE CARE OCCUPATIONAL THERAPY EVALUATION    Mo Narvaez, 1965, 3120/3120-A, 3/9/2023      Discharge Recommendation: SNF     History:  [place]:  There were no encounter diagnoses. Past Medical History:   Diagnosis Date    Acute pain of right foot 09/13/2022    CAD (coronary artery disease)     s/p CABG x 4;  follows with Dr Basilio Garcia of foot 02/05/2018    Carpal tunnel syndrome on both sides     CHF (congestive heart failure) ([address] 10/2010    Chronic diastolic; EF 24%    Chronic kidney disease     stage 4 kidney disease    Chronic ulcer of left ankle with fat layer exposed ([address] 10/07/2015    Chronic ulcer of left foot with fat layer exposed ([address] 03/17/2016    Chronic ulcer of right ankle with fat layer exposed ([address] 03/17/2016    Chronic ulcer of right foot with fat layer exposed ([address] 03/17/2016    Decubitus ulcer of sacral region, stage 1 09/17/2018    Depression     Diabetes mellitus ([address]     Diabetes mellitus with neurological manifestations ([address]     Diabetes mellitus with ulcer of ankle ([address]     Diabetic ulcer of left foot associated with type 2 diabetes mellitus, with fat layer exposed ([address] 08/06/2018    Diabetic ulcer of right heel associated with type 2 diabetes mellitus, with muscle involvement without evidence of necrosis ([address] 09/13/2022    Diabetic ulcer of right heel associated with type 2 diabetes mellitus, with necrosis of bone ([address] 09/13/2022    ESRD on hemodialysis ([address] 09/21/2022    Family history of cardiovascular disease     Mother    GERD (gastroesophageal reflux disease)     H/O cardiac catheterization 10/18/2010, 6/3/2010    10/18/2010-Four bypass grafts all widely patent. 6/3/2010- Moderate to severe triple vessel disease, preserved LV systolic function. H/O cardiovascular stress test 7/26/2012, 8/3/2011, 10/14/2010, 5/24/2010 7/26/2012-Lexiscan- Normal Myocardial Perfusion Study. Post stress myocardial perfusion images show a normal pattern of perfusion in all regions. Post stress LV normal size. Normal perfusion in the distribution of all coronaries. Normal LV size and function. Rest EF 70%    H/O chest x-ray 7/12/2012, 6/2/2010 7/12/2012-Perihilar peribronchial cuffing with mild basilar predominant interstital prominence, which may reflect interstitial edema or atypical pneumonia. H/O Doppler ultrasound 06/04/2010    CAROTID DOPPLER-6/4/2010-No Doppler evidence of hemodynamically significant Carotid Stenosis with antegrade flow in the vertebral arteries bilaterally. 6/4/2010 PERIPHERAL VENOUS DOPPLER_ LEFT Saphenous Vein mapping    H/O echocardiogram 7/26/2012, 8/3/2011, 10/2010, 7/23/2010, 6/8/2010 7/26/2012-LV normal size. Normal LV wall thickness. LV systolic function normal. EF => 55%. LV wall motion normal. Mild MR. Mild TR. History of blood transfusion     History of complete ECG [facility]2012 [facility] 7/13/2012 [facility]), 7/25/2011, 3/25/2011, 10/18/2010, 10/11/2010, 6/23/2010, 5/7/2010    Hx of cardiovascular stress test 09/03/2015    lexiscan-normal,EF66%    Hx of Doppler ultrasound 04/05/2016    Arterial: There is a fluid collection in the left thigh, please refer to PCP for further monitoring, no vascular in etiology. Hx of echocardiogram     4/16 EF40% Mild MR/TR and mild Pulm HTN. 9/15 EF 45-50%, Mildly dilated L atrium, mildly dilated R ventricle. Mild MR & mild TR     Hyperlipidemia     Neuropathy of foot     Pt reports starting approx. 6-7 years ago    Neuropathy of hand     Pt reports starting approx.  6-7 years ago    Non compliance with medical treatment 07/02/2018    Pressure ulcer of sacral region, stage 2 ([address] 11/29/2022    Pressure ulcer of sacral region, stage 3 ([address] 11/29/2022    S/P BKA (below knee amputation), right ([address] 11/29/2022    S/P CABG x 4 06/05/2010    LIMA-> LAD;  VG->CX;  VG->Diagonal; VG-> distal RCA  per  Dr Julian Clark    Type 2 diabetes mellitus with diabetic polyneuropathy, with long-term current use of insulin ([facility] 04/05/2016    Type II or unspecified type diabetes mellitus with neurological manifestations, not stated as uncontrolled(250.60)     Type II or unspecified type diabetes mellitus with other specified manifestations, not stated as uncontrolled     Ulcer of finger, with fat layer exposed ([facility] 2/7/2023    Ulcer of left foot, with fat layer exposed ([facility] 12/19/2013    Ulcer of other part of foot          Subjective:  Patient states: \"I can try\"  Pain: 5/10 (sacral wound)  Communication with other providers: RN ok'd charmaine, Coeval with PT for pt safety and tolerance, RN handoff   Restrictions: general precautions, fall risk, contact ISO, sacral wound, hx of R BKA   No one at bedside    Home Setup/Prior level of function:  Social/Functional History  Lives With: Spouse  Type of Home: House  Home Layout: One level  Home Access: Level entry  Bathroom Shower/Tub: Tub/Shower unit  Bathroom Toilet: Bedside commode  Bathroom Equipment: Tub transfer bench  Bathroom Accessibility: Accessible  Home Equipment: Eleonore Fail, rolling, Wheelchair-manual  Has the patient had two or more falls in the past year or any fall with injury in the past year?: No  Receives Help From: Family  ADL Assistance: Needs assistance  Homemaking Assistance: Needs assistance  Ambulation Assistance: Non-ambulatory  Transfer Assistance: Needs assistance  Active : No  Patient's  Info: family assists    Examination:  Observation: Pt was in bed upon arrival, agreeable to session  Vision: WFL  Hearing: WFL  Objective Measures: stable vitals on RA     Body Systems and functions:  ROM: WFL in BUE  Strength: 3+/5 in BUE  Sensation: WFL  Tone: Intentional tremors in BUE  Coordination: movements fluid and coordinated  Posture: normal posture  Activity Tolerance: Poor     Activities of Daily Living (ADLs):  Feeding: Sharda   Grooming: Sharda   Toileting: DEP   UB dressing: MaxA   LB dressing: DEP   UB bathing: MaxA   LB bathing: MaxA       *pt ADL function inferred from gross functional assessment of mobility, balance, posture, safety awareness, activity tolerance (unless otherwise indicated)    Cognitive and Psychosocial Functioning:  Overall cognitive status:    03/09/23 1207   Orientation   Orientation Level Oriented to person;Oriented to place   Cognition   Overall Cognitive Status Exceptions   Arousal/Alertness Delayed responses to stimuli   Following Commands Follows one step commands with increased time   Attention Span Attends with cues to redirect; Difficulty attending to directions; Difficulty dividing attention   Memory Decreased recall of recent events;Decreased short term memory   Safety Judgement Good awareness of safety precautions   Problem Solving Decreased awareness of errors;Assistance required to correct errors made   Insights Decreased awareness of deficits   Initiation Requires cues for all   Sequencing Requires cues for all   Cognition Comment Pt demo delayed processing throughout. Affect: flat     Balance:   Sitting: fair   Standing: NT     Functional Mobility:  Rolling Laterally in Bed: ModA   Supine to Sit: MaxA   Sit to Stand: NT d/t pain and fatigue   Ambulation: NT, does not ambulate at baseline       AM-PAC 6 click short form for inpatient daily activity:   How much help from another person does the patient currently need. .. Unable  Dep A Lot  Max A A Lot   Mod A A Little  Min A A Little   CGA  SBA None   Mod I  Indep  Sup   1. Putting on and taking off regular lower body clothing? [x] 1    [] 2   [] 2   [] 3   [] 3   [] 4      2. Bathing (including washing, rinsing, drying)? [] 1   [x] 2   [] 2 [] 3 [] 3 [] 4   3. Toileting, which includes using toilet, bedpan, or urinal? [x] 1    [] 2   [] 2   [] 3   [] 3   [] 4     4. Putting on and taking off regular upper body clothing? [] 1   [x] 2   [] 2   [] 3   [] 3    [] 4      5. Taking care of personal grooming such as brushing teeth?  [] 1   [] 2    [] 2 [x] 3    [] 3   [] 4 6. Eating meals? [] 1   [] 2   [] 2   [x] 3   [] 3   [] 4        Raw Score:  12      24/24 = unimpaired  23/24 = 1-20% impaired   20/24-22/24 = 21-40% impaired  15/24-19/24 = 41-59% impaired   10/24-14/24 = 60%-79% impaired  7/24-9/24 = 80%-99% impaired  6/24 = 100% impaired     Treatment:  Therapeutic Activity Training:   Therapeutic activity training was instructed today. Cues were given for safety, sequence, UE/LE placement, visual cues, and balance. Activities performed today included:    Bed mobility:  Pt completed sup to sit with MaxA of 1 and hands on of another. Pt benefited from assist at BLE, hips, and trunk with cues for sequencing throughout. Pt benefited from inc time to complete. Pt returned to supine with SBA onto L side. Scooting:  Pt scooted to [facility] with DEPx2. Seated balance:  Pt tolerated EOB fairly ~5 mins with heavy BUE support and demo of intentional tremors. Pt requesting to return to supine d/t pain and fatigue. Education: Role of OT, OT POC, discharge needs, safety, benefits of EOB/OOB activity, AE needs    Safety Measures: Gait belt used for safety of pt and therapist, Left in bed, Alarm in place, call light and phone within reach, lines managed, needs met     Assessment:  Pt is a 62 yr old female from home who presents with Sepsis d/t stage IV ulcer on sacral region. Prior to admission, pt was having inc difficulty at home with assist for ADLs/IADLs and assist with transfers to [facility]. From [facility] in 11/2023, pt was reportedly independent prior to R BKA. Pt currently performed bed mobility MaxA, seated balance fair, unable to transfer d/t pain and fatigue, and subsequently impaired occupational performance. Pt would benefit from continued IP OT services during their stay, and discharge to SNF to address impairments, fall risk, and dec burden of care.     Complexity: Moderate   Prognosis: fair   Barriers: strength, endurance, cognition, impaired transfers     Plan:  Plan: 3+/week    Treatment to include: Strengthening, ROM, Balance Training, Functional Mobility Training, Endurance Training, Gait Training, Pain Management, Safety Education and Training, Patient+Caregiver Education and Training, Equipment Evaluation Education and Procurement, Positioning, Self Care Training, Home Management Training, Coordination Training    Pt would benefit from continued edu on: falls prevention   Adaptive Equipment Recommendations: defer to next LOC     Goals:  Time frame for goals: 2 weeks    Pt will complete grooming tasks with SetupA at EOB  Pt will complete toileting tasks with MaxA using BSC  Pt will complete UB dressing tasks with ModA seated EOB   Pt will complete LB dressing tasks with MaxA seated EOB   Pt will complete UB bathing tasks with ModA seated and AE PRN   Pt will complete LB bathing tasks with ModA seated and AE PRN  Pt will complete therapeutic exercise/activity to increase independence in ADL/IADL function  Pt will practice functional transfers and mobility with AD for increased safety and independence    Time:   Time in: 1108  Time out: 1118  Treatment Minutes: 0  Evaluation Minutes: 10  Total time: 10    Electronically signed by:    MANAN Arana/L   License: [id number]  8/0/8937, 11:57 AM

## 2023-03-09 NOTE — DISCHARGE INSTR - COC
Continuity of Care Form    Patient Name: Ki Dahl   :  1965  MRN:  [id number]    Admit date:  3/9/2023  Discharge date:  ***    Code Status Order: Full Code   Advance Directives:     Admitting Physician:  Benji Michael MD  PCP: Reena Riddle MD    Discharging Nurse: [facility] Unit/Room#: 3120/3120-A  Discharging Unit Phone Number: ***    Emergency Contact:   Extended Emergency Contact Information  Primary Emergency Contact: Alexandro Castellano  Address: [address] [facility], [address] [place] [address] Phone: [phone]  Relation: Spouse  Secondary Emergency Contact: denise valdez  Mobile Phone: [phone]  Relation: Child  Preferred language: English   needed?  No    Past Surgical History:  Past Surgical History:   Procedure Laterality Date    APPENDECTOMY      CARDIAC SURGERY      CARPAL TUNNEL RELEASE      L hand 2011, R hand 2012    CARPAL TUNNEL RELEASE Right 6/10/2013    recurrent    CARPAL TUNNEL RELEASE Left 2013    with ulnar nerve transpostion and L middle finger release    CHOLECYSTECTOMY      COLONOSCOPY      CORONARY ARTERY BYPASS GRAFT  2010-LIMA->LAD;  VG-> Diagonal;  VG-> Obtuse marginal;  VG->Distal RCA-   Dr Alisha Hernández Right 6/10/2013    HYSTERECTOMY, TOTAL ABDOMINAL (CERVIX REMOVED)      LEG AMPUTATION BELOW KNEE Right 2022    RIGHT LEG AMPUTATION BELOW KNEE WITH IPOP performed by Amalia Jessica MD at [address] Left 144 toe    TUBAL LIGATION      ULNAR TUNNEL RELEASE Right 6/10/2013       Immunization History:   Immunization History   Administered Date(s) Administered    COVID-19, PFIZER PURPLE top, DILUTE for use, (age 15 y+), 30mcg/0.3mL 2021, 10/26/2021       Active Problems:  Patient Active Problem List   Diagnosis Code    CAD (coronary artery disease) I25.10    Hyperlipidemia E78.5    Diabetes mellitus ([facility] [address] E11.9    Chest pain R07.9    Hoarseness of voice R49.0    Fracture of metacarpal bone S62.309A    Wrist sprain S63.509A    History of tobacco abuse Z87.891    Wrist pain M25.539    Carpal tunnel syndrome G56.00    Cubital tunnel syndrome G56.20    Trigger finger M65.30    S/P CABG x 4 Z95.1    Diabetic foot ulcer (HCC) E11.621, L97.509    Diabetic neuropathy ([facility]) E11.40    Diabetes mellitus type 2 with complications ([facility]) Z52.1    Claudication of both lower extremities ([facility]) I73.9    SOB (shortness of breath) R06.02    Bilateral leg edema R60.0    SOB (shortness of breath) R06.02    Type 2 diabetes mellitus without complication ([facility]) P83.2    Coronary artery disease involving coronary bypass graft of native heart with unstable angina pectoris ([facility]) I25.700    S/P cardiac cath Z98.890    Chronic renal failure, stage 2 (mild) N18.2    Chronic kidney disease (CKD) stage G4/A3, severely decreased glomerular filtration rate (GFR) between 15-29 mL/min/1.73 square meter and albuminuria creatinine ratio greater than 300 mg/g ([facility]) N18.4    DM (diabetes mellitus) ([facility]) E11.9    Coronary atherosclerosis I25.10    HTN (hypertension) I10    Proteinuria R80.9    Callus of foot L84    Non compliance with medical treatment Z91.199    Diabetic ulcer of right heel associated with type 2 diabetes mellitus, with necrosis of bone ([facility]) E11.621, L97.414    Acute pain of right foot M79.671    Wound, open, foot with complication, left, sequela S91.302S    ESRD on hemodialysis ([facility]) N18.6, Z99.2    Diabetic ulcer of right midfoot associated with type 2 diabetes mellitus, with necrosis of bone (HCC) E11.621, L97.414    Pressure ulcer of sacral region, stage 3 ([facility]) L89.153    S/P BKA (below knee amputation), right ([address] Z89.511    Diabetic ulcer of left foot associated with type 2 diabetes mellitus, with necrosis of muscle (HCC) E11.621, L97.523    Ulcer of finger, with fat layer exposed ([address] L98.492    Sepsis ([address] A41.9 Isolation/Infection:   Isolation            Contact          Patient Infection Status       Infection Onset Added Last Indicated Last Indicated By Review Planned Expiration Resolved Resolved By    MDRO (multi-drug resistant organism) 12/20/22 12/23/22 12/23/22 Kaden Bowens RN  Never      ENTEROBACTER HORMAECHEI  - wound culture    MRSA 10/12/22 10/13/22 10/12/22 Culture, Blood 2        Resolved    COVID-19 (Rule Out) 10/12/22 10/12/22 10/12/22 COVID-19, Rapid (Ordered)   10/12/22 Rule-Out Test Resulted            Nurse Assessment:  Last Vital Signs: BP (!) 121/45   Pulse 85   Temp 98.6 °F (37 °C) (Oral)   Resp (!) 8   Ht 5' 3\" (1.6 m)   Wt 116 lb 13.5 oz (53 kg)   SpO2 93%   BMI 20.70 kg/m²     Last documented pain score (0-10 scale): Pain Level: 0  Last Weight:   Wt Readings from Last 1 Encounters:   03/09/23 116 lb 13.5 oz (53 kg)     Mental Status:  oriented and alert    IV Access:  - PICC - site  R Cephalic, insertion date: 03/15/2023    Nursing Mobility/ADLs:  Walking   Assisted  Transfer  Assisted  Bathing  Assisted  Dressing  Assisted  Toileting  Assisted  Feeding  Independent  Med Admin  Assisted  Med Delivery   whole    Wound Care Documentation and Therapy:  Negative Pressure Wound Therapy Foot (Active)   Number of days: 3309       Negative Pressure Wound Therapy Foot Distal (Active)   Number of days: 3305       Negative Pressure Wound Therapy (Active)   Number of days: 169       Incision 06/10/13 Wrist Right (Active)   Number of days: 4048       Incision 07/29/13 Wrist Left (Active)   Number of days: 3510       Incision 02/14/14 Toe (Comment  which one) (Active)   Number of days: 5703       Wound 11/01/22 #2 Sacrum (Active)   Wound Image   03/09/23 1013   Wound Etiology Pressure Stage 4 03/09/23 1013   Dressing Status New dressing applied;Clean;Dry; Intact 03/09/23 1013   Wound Cleansed Cleansed with saline 03/09/23 1013   Dressing/Treatment Alginate with Ag;Silicone border 76/68/37 1013 Offloading for Diabetic Foot Ulcers Other (comment) 03/09/23 0450   Dressing Change Due 03/09/23 03/09/23 1013   Wound Length (cm) 4 cm 03/09/23 1013   Wound Width (cm) 3.9 cm 03/09/23 1013   Wound Depth (cm) 1.2 cm 03/09/23 1013   Wound Surface Area (cm^2) 15.6 cm^2 03/09/23 1013   Change in Wound Size % (l*w) -212 03/09/23 1013   Wound Volume (cm^3) 18.72 cm^3 03/09/23 1013   Wound Healing % -3644 03/09/23 1013   Post-Procedure Length (cm) 3 cm 02/21/23 1004   Post-Procedure Width (cm) 1.1 cm 02/21/23 1004   Post-Procedure Depth (cm) 1.5 cm 02/21/23 1004   Post-Procedure Surface Area (cm^2) 3.3 cm^2 02/21/23 1004   Post-Procedure Volume (cm^3) 4.95 cm^3 02/21/23 1004   Distance Tunneling (cm) 0 cm 03/09/23 1013   Tunneling Position ___ O'Clock 0 03/09/23 1013   Undermining Starts ___ O'Clock 12 03/09/23 1013   Undermining Ends___ O'Clock 12 03/09/23 1013   Undermining Maxium Distance (cm) 4.5 03/09/23 1013   Wound Assessment Pink/red;Slough;Eschar moist 03/09/23 1013   Drainage Amount Copious 03/09/23 1013   Drainage Description Purulent 03/09/23 1013   Odor Malodorous/putrid 03/09/23 1013   Lora-wound Assessment Excoriated;Fragile; Maceration 03/09/23 1013   Margins Undefined edges 03/09/23 1013   Wound Thickness Description not for Pressure Injury Full thickness 03/09/23 1013   Number of days: 128       Wound 11/11/22  #1 Left Great Toe (Active)   Wound Image   03/09/23 1013   Wound Etiology Arterial 03/09/23 1013   Dressing Status New dressing applied;Clean;Dry; Intact 03/09/23 1013   Wound Cleansed Cleansed with saline 03/09/23 1013   Dressing/Treatment Betadine swabs/povidone iodine 03/09/23 1013   Offloading for Diabetic Foot Ulcers Other (comment) 03/09/23 0450   Dressing Change Due 03/10/23 03/09/23 1013   Wound Length (cm) 2 cm 03/09/23 1013   Wound Width (cm) 3.4 cm 03/09/23 1013   Wound Depth (cm) 0.1 cm 03/09/23 1013   Wound Surface Area (cm^2) 6.8 cm^2 03/09/23 1013   Change in Wound Size % (l*w) -51.11 03/09/23 1013   Wound Volume (cm^3) 0.68 cm^3 03/09/23 1013   Post-Procedure Length (cm) 1.8 cm 02/21/23 1004   Post-Procedure Width (cm) 3.6 cm 02/21/23 1004   Post-Procedure Depth (cm) 0.1 cm 02/21/23 1004   Post-Procedure Surface Area (cm^2) 6.48 cm^2 02/21/23 1004   Post-Procedure Volume (cm^3) 0.648 cm^3 02/21/23 1004   Distance Tunneling (cm) 0 cm 03/09/23 1013   Tunneling Position ___ O'Clock 0 03/09/23 1013   Undermining Starts ___ O'Clock 0 03/09/23 1013   Undermining Ends___ O'Clock 0 03/09/23 1013   Undermining Maxium Distance (cm) 0 03/09/23 1013   Wound Assessment Eschar dry 03/09/23 1013   Drainage Amount None 03/09/23 1013   Odor None 03/09/23 1013   Lora-wound Assessment Dry/flaky 03/09/23 1013   Margins Defined edges 03/09/23 1013   Wound Thickness Description not for Pressure Injury Full thickness 03/09/23 1013   Number of days: 117       Wound 11/15/22 #3 Left Posterior Ankle Cluster (Active)   Wound Image   03/09/23 1013   Wound Etiology Arterial 03/09/23 1013   Dressing Status New dressing applied;Clean;Dry; Intact 03/09/23 1013   Wound Cleansed Cleansed with saline 03/09/23 1013   Dressing/Treatment Betadine swabs/povidone iodine 03/09/23 1013   Offloading for Diabetic Foot Ulcers Other (comment) 03/09/23 0450   Dressing Change Due 03/10/23 03/09/23 1013   Wound Length (cm) 6.5 cm 03/09/23 1013   Wound Width (cm) 4.5 cm 03/09/23 1013   Wound Depth (cm) 0.1 cm 03/09/23 1013   Wound Surface Area (cm^2) 29.25 cm^2 03/09/23 1013   Change in Wound Size % (l*w) -1362.5 03/09/23 1013   Wound Volume (cm^3) 2.925 cm^3 03/09/23 1013   Wound Healing % -1363 03/09/23 1013   Post-Procedure Length (cm) 1.9 cm 02/21/23 1004   Post-Procedure Width (cm) 2.5 cm 02/21/23 1004   Post-Procedure Depth (cm) 0.3 cm 02/21/23 1004   Post-Procedure Surface Area (cm^2) 4.75 cm^2 02/21/23 1004   Post-Procedure Volume (cm^3) 1.425 cm^3 02/21/23 1004   Distance Tunneling (cm) 0 cm 03/09/23 1013   Tunneling Position ___ O'Clock 0 03/09/23 1013   Undermining Starts ___ O'Clock 0 03/09/23 1013   Undermining Ends___ O'Clock 0 03/09/23 1013   Undermining Maxium Distance (cm) 0 03/09/23 1013   Wound Assessment Dry 03/09/23 1013   Drainage Amount None 03/09/23 1013   Drainage Description Thick;Yellow 03/09/23 0450   Odor None 03/09/23 1013   Lora-wound Assessment Fragile;Ecchymosis;Dry/flaky 03/09/23 1013   Margins Attached edges 03/09/23 1013   Wound Thickness Description not for Pressure Injury Full thickness 03/09/23 0450   Number of days: 114       Wound 11/15/22 #4 Left Heel (Active)   Wound Image   03/09/23 1013   Dressing Status New dressing applied;Clean;Dry;Intact 03/09/23 1013   Wound Cleansed Cleansed with saline 03/09/23 1013   Dressing/Treatment Betadine swabs/povidone iodine 03/09/23 1013   Offloading for Diabetic Foot Ulcers Other (comment) 03/09/23 0450   Dressing Change Due 03/10/23 03/09/23 1013   Wound Length (cm) 5.5 cm 03/09/23 1013   Wound Width (cm) 3.5 cm 03/09/23 1013   Wound Depth (cm) 0.1 cm 03/09/23 1013   Wound Surface Area (cm^2) 19.25 cm^2 03/09/23 1013   Change in Wound Size % (l*w) -285 03/09/23 1013   Wound Volume (cm^3) 1.925 cm^3 03/09/23 1013   Wound Healing % -285 03/09/23 1013   Post-Procedure Length (cm) 4 cm 02/21/23 1004   Post-Procedure Width (cm) 3.5 cm 02/21/23 1004   Post-Procedure Depth (cm) 0.1 cm 02/21/23 1004   Post-Procedure Surface Area (cm^2) 14 cm^2 02/21/23 1004   Post-Procedure Volume (cm^3) 1.4 cm^3 02/21/23 1004   Distance Tunneling (cm) 0 cm 03/09/23 1013   Tunneling Position ___ O'Clock 0 03/09/23 1013   Undermining Starts ___ O'Clock 0 03/09/23 1013   Undermining Ends___ O'Clock 0 03/09/23 1013   Undermining Maxium Distance (cm) 0 03/09/23 1013   Wound Assessment Eschar dry 03/09/23 1013   Drainage Amount None 03/09/23 1013   Odor None 03/09/23 1013   Lora-wound Assessment Dry/flaky 03/09/23 1013   Margins Defined edges 03/09/23 1013   Wound Thickness Description not for  Pressure Injury Full thickness 03/09/23 1013   Number of days: 114       Wound 02/07/23 #5 Left 3rd Finger (Active)   Wound Image   03/09/23 1013   Wound Etiology Other 03/09/23 1013   Dressing Status New dressing applied 03/09/23 1013   Wound Cleansed Cleansed with saline 03/09/23 1013   Dressing/Treatment Betadine swabs/povidone iodine;Silicone border 42/37/02 1013   Dressing Change Due 03/10/23 03/09/23 1013   Wound Length (cm) 1.4 cm 03/09/23 1013   Wound Width (cm) 1.4 cm 03/09/23 1013   Wound Depth (cm) 0.2 cm 03/09/23 1013   Wound Surface Area (cm^2) 1.96 cm^2 03/09/23 1013   Change in Wound Size % (l*w) -36.11 03/09/23 1013   Wound Volume (cm^3) 0.392 cm^3 03/09/23 1013   Wound Healing % -36 03/09/23 1013   Distance Tunneling (cm) 0 cm 03/09/23 1013   Tunneling Position ___ O'Clock 0 03/09/23 1013   Undermining Starts ___ O'Clock 0 03/09/23 1013   Undermining Ends___ O'Clock 0 03/09/23 1013   Undermining Maxium Distance (cm) 0 03/09/23 1013   Wound Assessment Dry;Devitalized tissue 03/09/23 1013   Drainage Amount None 03/09/23 1013   Odor None 03/09/23 1013   Lora-wound Assessment Dry/flaky 03/09/23 1013   Margins Defined edges 03/09/23 1013   Wound Thickness Description not for Pressure Injury Full thickness 03/09/23 1013   Number of days: 30       Wound 03/09/23 Left Medial Foot (Active)   Wound Image   03/09/23 1013   Wound Etiology Pressure Stage 3 03/09/23 1013   Dressing Status New dressing applied;Clean;Dry; Intact 03/09/23 1013   Wound Cleansed Cleansed with saline 03/09/23 1013   Dressing/Treatment Betadine swabs/povidone iodine;Silicone border 66/13/50 1013   Dressing Change Due 03/10/23 03/09/23 1013   Wound Length (cm) 0.5 cm 03/09/23 1013   Wound Width (cm) 0.5 cm 03/09/23 1013   Wound Depth (cm) 0.2 cm 03/09/23 1013   Wound Surface Area (cm^2) 0.25 cm^2 03/09/23 1013   Wound Volume (cm^3) 0.05 cm^3 03/09/23 1013   Distance Tunneling (cm) 0 cm 03/09/23 1013   Tunneling Position ___ O'Clock 0 03/09/23 1013   Undermining Starts ___ O'Clock 0 03/09/23 1013   Undermining Ends___ O'Clock 0 03/09/23 1013   Undermining Maxium Distance (cm) 0 03/09/23 1013   Wound Assessment Slough 03/09/23 1013   Drainage Amount Scant 03/09/23 1013   Drainage Description Yellow 03/09/23 1013   Odor None 03/09/23 1013   Lora-wound Assessment Dry/flaky; Hyperkeratosis (callous) 03/09/23 1013   Margins Defined edges 03/09/23 1013   Wound Thickness Description not for Pressure Injury Full thickness 03/09/23 1013   Number of days: 0        Elimination:  Continence: Bowel: Yes   Bladder: Yes  Urinary Catheter: None   Colostomy/Ileostomy/Ileal Conduit: No       Date of Last BM: 03/14/2023  No intake or output data in the 24 hours ending 03/09/23 1446  No intake/output data recorded. Safety Concerns: At Risk for Falls    Impairments/Disabilities:      Amputation - RBKA    Patient's personal belongings (please select all that are sent with patient):  Marcelina Kam    RN SIGNATURE:  Electronically signed by Ilana Morton LPN on 0/18/55 at 9:47 PM EDT    CASE MANAGEMENT/SOCIAL WORK SECTION    Inpatient Status Date: ***    Readmission Risk Assessment Score:  Readmission Risk              Risk of Unplanned Readmission:  22           Discharging to Facility/ Agency   Name:   Address:  Phone:  Fax:    Dialysis Facility (if applicable)   Name:  Address:  Dialysis Schedule:  Phone:  Fax:      PHYSICIAN SECTION    Nutrition Therapy:  Current Nutrition Therapy:   - Oral Diet:  General  - Oral Nutrition Supplement:  Renal  twice a day and Wound Healing  daily    Routes of Feeding: Oral  Liquids: No Restrictions  Daily Fluid Restriction: no  Last Modified Barium Swallow with Video (Video Swallowing Test): not done    Treatments at the Time of Hospital Discharge:   Respiratory Treatments: None  Oxygen Therapy:  is not on home oxygen therapy.   Ventilator:    - No ventilator support    Rehab Therapies: Physical Therapy and Occupational Therapy  Weight Bearing Status/Restrictions: No weight bearing restrictions  Other Medical Equipment (for information only, NOT a DME order):  wheelchair  Other Treatments:   Continue IV Zosyn 3,375 mg q12h x 14 cumulative days to cover Strep C and diptheroids (end date 3/23/2023)  Remove PICC line once antibiotics are completed  CBC every 5 days for 2 weeks  EKG every other day, stop fluconazole if QTc above 500ms    Prognosis: Fair    Condition at Discharge: Stable    Rehab Potential (if transferring to Rehab): Guarded    Recommended Labs or Other Treatments After Discharge: As mentioned above    Physician Certification: I certify the above information and transfer of Georgia Gonzalez  is necessary for the continuing treatment of the diagnosis listed and that she requires [facility] for greater 30 days.      Update Admission H&P: No change in H&P, except mentioned in discharge summary    PHYSICIAN SIGNATURE:  Electronically signed by Nya Layton MD on 3/17/23 at 1:26 PM EDT

## 2023-03-09 NOTE — CONSULTS
Patient:   Shorty Echeverria    Date:  23  :  1965, 62 y.o. Nephrologist: Marta Velazquez MD  Provider: Kvng Espinoza MD    Reason for Consult: End-stage kidney disease on maintenance dialysis    Consult requested by : Dr Sayra Gonzalez MD    Chief Complaint:   Extreme fatigue and not feeling well    HISTORY OF PRESENT ILLNESS/Brief hospital course  AND  brief background history    Ms. Marlee Charles, is an unfortunate 63-year-old female on maintenance dialysis was brought to the emergency department in [place] via squad with above-mentioned symptoms. The emergency department, she was afebrile and hemodynamic stable and underwent chest x-ray which showed mild vascular congestion. Biochemical testing showed slightly elevated lactate of 2.3, significant leukocytosis 29.2 thousand, acceptable electrolyte. She was given 1 dose of piperacillin tazobactam, and subsequently transferred here for further evaluation    When I saw her this morning she is rather lethargic she is arousable but frequently goes back to sleep. Does not look like she got any opioid therapy and her blood sugar was 91 mg/dL 6:25 AM    I am familiar with Ms. Marlee Charles. I first met her in in 2016 with progressive kidney disease. Unfortunately she lost to follow-up in my next encounter was last year in outpatient dialysis unit when she was transferred for maintenance dialysis. I been following her since then. Unfortunately overall her general condition was declining, also has had nonhealing wound. Yesterday when I saw her in the dialysis unit, she was complaining of back pain and fatigue and tiredness, wound clinic appointment were done, and a family meeting was arranged for next Wednesday, as she has significant sarcopenia, and overall declining condition. Ms. Marlee Charles has end-stage kidney disease more than likely from significant diabetic kidney disease this is atherosclerotic cardiovascular disease.   She started dialysis outside our [facility] but recently started here in our unit. She dialyzed Monday, Wednesday and Friday and had a dialysis today. PMH :   End-stage kidney disease on maintenance hemodialysis  Longstanding diabetes mellitus insulin-dependent for almost 17 years with of course many organ involvement  Chronic diabetic heel ulcer  Coronary artery disease status post CABG  Dyslipidemia  COPD  Gastroesophageal reflux disorder  Diabetic neuropathy           PSH :  Left upper extremity brachiocephalic AV fistula  Coronary artery bypass surgery  Gallbladder surgery  Hysterectomy  Appendectomy        OB GYN Hx:   4, para 4, no history of eclampsia, preeclampsia, gestational diabetes or hypertension     Habits :   She still smokes 5 cigarettes a day, she has been smoking since age 15, no history of alcohol illicit drug abuse. Soc Hx: She was born in [place] but moved to [place] at a very young age. She lives in [facility] with her . She has been  for 42 years and has 4 children.   Used to work as a  but of course retired now     [facility]   Her mom had end-stage kidney disease was on dialysis for 7 years after dying from complication in 661, dad had some kind of malignancy               REVIEW OF SYSTEMS:     All pertinent ROS neg except   Extreme fatigue, low back pain, poor appetite    Medication :     Reviewed, see in epic    BP (!) 117/40   Ht 5' 3\" (1.6 m)   Wt 116 lb 13.5 oz (53 kg)   BMI 20.70 kg/m²     PHYSICAL EXAM:  General appearance: Cachectic, lethargic, frequently goes back to sleep  HEENT: Positive conjunctival pallor, she is edentulous  Neck: Supple  Heart: Seemed regular rate and rhythm-well-healed incision from previous sternotomy  LUNGS: No gross crackles on auscultation  Abdomen: Soft, nontender on palpation  Extremities: Trace leg edema, right below-knee amputation, left toe gangrene and heel ulcer  Left upper extremity brachiocephalic fistula good thrill on palpation good bruit and auscultation    LABS:  Reviewed, see in epic            IMPRESSION:  End-stage kidney disease on maintenance dialysis-I will plan to dialyze her tomorrow  Lethargy/leukocytosis-she does have skin soft tissue and perhaps bone infection-her clinical scenario suggestive of sepsis-we will have to make sure she does not have any additional source of infection  Underlying atherosclerotic cardiovascular disease, diabetes, decubitus ulcer likely-overall declining condition    PLAN:    Empirical antibiotic for now but look for the precise source of infection and tailor antibiotic, plan for hemodialysis tomorrow, will also arrange for a family meeting, she needs some wound care good glycemic control, also rule out any other occult acute process.   Follow clinically and biochemically

## 2023-03-09 NOTE — CARE COORDINATION
Chart reviewed. Noted that pt is very weak per ED Dr notes. PT/OT ordered and requested via WB to help determine a safe d/c plan for pt.  TE

## 2023-03-09 NOTE — ED NOTES
Report given to [facility].      Luz Marina Cazares RN  03/08/23 9997

## 2023-03-09 NOTE — ED PROVIDER NOTES
ADDENDUM:    Care of the patient was assumed  from Dr. Nesha Edwards. I have reviewed the notes, assessments, and/or procedures performed, I concur with her/his documentation on [facility]. I reviewed the medical record and evaluated the patient with the previous physician. See above physician note for HPI,  physical exam and other details. This was a checked out patient to me due to end of shift by above physican    ED COURSE/MDM:  Laboratory and imaging data were reviewed and care plan was arranged and discussed with the patient(see separate lab/imaging reports). RADIOLOGY:  Already resulted studies have been reviewed. XR CHEST PORTABLE   Final Result   Pulmonary vascular congestion with mild pulmonary edema.              Labs Reviewed   LACTATE, SEPSIS - Abnormal; Notable for the following components:       Result Value    Lactic Acid, Sepsis 2.3 (*)     All other components within normal limits   CBC WITH AUTO DIFFERENTIAL - Abnormal; Notable for the following components:    WBC 29.2 (*)     RBC 4.03 (*)     .5 (*)     MCH 31.3 (*)     MCHC 31.1 (*)     Myelocyte Percent 2 (*)     Metamyelocytes Relative 2 (*)     Bands Relative 19 (*)     Segs Relative 69.0 (*)     Lymphocytes % 4.0 (*)     All other components within normal limits   COMPREHENSIVE METABOLIC PANEL - Abnormal; Notable for the following components:    Chloride 95 (*)     BUN 42 (*)     Creatinine 3.6 (*)     Est, Glom Filt Rate 14 (*)     Albumin 2.1 (*)     ALT <5 (*)     AST 14 (*)     Alkaline Phosphatase 357 (*)     All other components within normal limits   TROPONIN - Abnormal; Notable for the following components:    Troponin T 0.058 (*)     All other components within normal limits   CULTURE, BLOOD 1   CULTURE, BLOOD 2   MAGNESIUM   LACTATE, SEPSIS   URINALYSIS WITH REFLEX TO CULTURE       Medications   morphine sulfate (PF) injection 4 mg (has no administration in time range)   ondansetron (ZOFRAN) injection 4 mg (has no administration in time range)   piperacillin-tazobactam (ZOSYN) 4,500 mg in sodium chloride 0.9 % 100 mL IVPB (mini-bag) (4,500 mg IntraVENous New Bag 3/8/23 1930)       Vitals:    03/08/23 1744 03/08/23 2045   BP: (!) 121/41 (!) 110/37   Pulse: 86 88   Resp: 18 11   Temp: 98.2 °F (36.8 °C)    TempSrc: Oral    Weight: 114 lb (51.7 kg)    Height: 5' 3\" (1.6 m)        Patient case discussed with hospitalist antibiotics given patient to be transferred [address]:  1. Nausea and vomiting, unspecified vomiting type    2. Generalized weakness    3. Pressure injury of sacral region, stage 4 ([address]    4. Pressure injury of heel, stage 3, unspecified laterality ([address]    5.  Wound infection        New Prescriptions    No medications on file                 [address], DO  03/08/23 7055

## 2023-03-09 NOTE — CONSULTS
[facility] Continence Nurse  Consult Note       Sterling Churchill  AGE: 62 y.o.    GENDER: female  : 1965  TODAY'S DATE:  3/9/2023    Subjective:     Reason for Evaluation and Assessment: Wound care charmaine Churchill is a 62 y.o. female referred by:   [x] Physician  [] Nursing  [] Other:     Wound Identification:  Wound Type: arterial and pressure  Contributing Factors: diabetes, poor glucose control, chronic pressure, decreased mobility, shear force, arterial insufficiency, decreased tissue oxygenation, and malnutrition        PAST MEDICAL HISTORY        Diagnosis Date    Acute pain of right foot 2022    CAD (coronary artery disease)     s/p CABG x 4;  follows with Dr Abimael Pacheco of foot 2018    Carpal tunnel syndrome on both sides     CHF (congestive heart failure) ([address] 10/2010    Chronic diastolic; EF 50%    Chronic kidney disease     stage 4 kidney disease    Chronic ulcer of left ankle with fat layer exposed ([address] 10/07/2015    Chronic ulcer of left foot with fat layer exposed ([address] 2016    Chronic ulcer of right ankle with fat layer exposed ([address] 2016    Chronic ulcer of right foot with fat layer exposed ([address] 2016    Decubitus ulcer of sacral region, stage 1 2018    Depression     Diabetes mellitus ([address]     Diabetes mellitus with neurological manifestations ([address]     Diabetes mellitus with ulcer of ankle ([address]     Diabetic ulcer of left foot associated with type 2 diabetes mellitus, with fat layer exposed ([address] 2018    Diabetic ulcer of right heel associated with type 2 diabetes mellitus, with muscle involvement without evidence of necrosis ([address] 2022    Diabetic ulcer of right heel associated with type 2 diabetes mellitus, with necrosis of bone ([address] 2022    ESRD on hemodialysis ([address] 2022    Family history of cardiovascular disease     Mother    GERD (gastroesophageal reflux disease)     H/O cardiac catheterization 10/18/2010, 6/3/2010    10/18/2010-Four bypass grafts all widely patent. 6/3/2010- Moderate to severe triple vessel disease, preserved LV systolic function. H/O cardiovascular stress test 7/26/2012, 8/3/2011, 10/14/2010, 5/24/2010 7/26/2012-Lexiscan- Normal Myocardial Perfusion Study. Post stress myocardial perfusion images show a normal pattern of perfusion in all regions. Post stress LV normal size. Normal perfusion in the distribution of all coronaries. Normal LV size and function. Rest EF 70%    H/O chest x-ray 7/12/2012, 6/2/2010 7/12/2012-Perihilar peribronchial cuffing with mild basilar predominant interstital prominence, which may reflect interstitial edema or atypical pneumonia. H/O Doppler ultrasound 06/04/2010    CAROTID DOPPLER-6/4/2010-No Doppler evidence of hemodynamically significant Carotid Stenosis with antegrade flow in the vertebral arteries bilaterally. 6/4/2010 PERIPHERAL VENOUS DOPPLER_ LEFT Saphenous Vein mapping    H/O echocardiogram 7/26/2012, 8/3/2011, 10/2010, 7/23/2010, 6/8/2010 7/26/2012-LV normal size. Normal LV wall thickness. LV systolic function normal. EF => 55%. LV wall motion normal. Mild MR. Mild TR. History of blood transfusion     History of complete ECG 7/26/2012 Kaitlin Kaiser), 7/13/2012 [facility]), 7/25/2011, 3/25/2011, 10/18/2010, 10/11/2010, 6/23/2010, 5/7/2010    Hx of cardiovascular stress test 09/03/2015    lexiscan-normal,EF66%    Hx of Doppler ultrasound 04/05/2016    Arterial: There is a fluid collection in the left thigh, please refer to PCP for further monitoring, no vascular in etiology. Hx of echocardiogram     4/16 EF40% Mild MR/TR and mild Pulm HTN. 9/15 EF 45-50%, Mildly dilated L atrium, mildly dilated R ventricle. Mild MR & mild TR     Hyperlipidemia     Neuropathy of foot     Pt reports starting approx. 6-7 years ago    Neuropathy of hand     Pt reports starting approx.  6-7 years ago    Non compliance with medical treatment 07/02/2018 Pressure ulcer of sacral region, stage 2 ([facility]) 11/29/2022    Pressure ulcer of sacral region, stage 3 ([facility]) 11/29/2022    S/P BKA (below knee amputation), right ([facility]) 11/29/2022    S/P CABG x 4 06/05/2010    LIMA-> LAD;  VG->CX;  VG->Diagonal; VG-> distal RCA  per  Dr Gilbert    Type 2 diabetes mellitus with diabetic polyneuropathy, with long-term current use of insulin ([facility]) 04/05/2016    Type II or unspecified type diabetes mellitus with neurological manifestations, not stated as uncontrolled(250.60)     Type II or unspecified type diabetes mellitus with other specified manifestations, not stated as uncontrolled     Ulcer of finger, with fat layer exposed ([facility]) 2/7/2023    Ulcer of left foot, with fat layer exposed ([facility]) 12/19/2013    Ulcer of other part of foot        PAST SURGICAL HISTORY    Past Surgical History:   Procedure Laterality Date    APPENDECTOMY      CARDIAC SURGERY      CARPAL TUNNEL RELEASE      L hand Nov. 2011, R hand Jan. 2012    CARPAL TUNNEL RELEASE Right 6/10/2013    recurrent    CARPAL TUNNEL RELEASE Left 7/29/2013    with ulnar nerve transpostion and L middle finger release    CHOLECYSTECTOMY      COLONOSCOPY      CORONARY ARTERY BYPASS GRAFT  6/5/2010 6/5/2010-LIMA->LAD;  VG-> Diagonal;  VG-> Obtuse marginal;  VG->Distal RCA-   Dr Gilbert    FINGER TRIGGER RELEASE Right 6/10/2013    HYSTERECTOMY, TOTAL ABDOMINAL (CERVIX REMOVED)      LEG AMPUTATION BELOW KNEE Right 11/11/2022    RIGHT LEG AMPUTATION BELOW KNEE WITH IPOP performed by Lenny Lopez MD at [facility] OR    TOE AMPUTATION Left 02/14/144th toe    TUBAL LIGATION      ULNAR TUNNEL RELEASE Right 6/10/2013       FAMILY HISTORY    Family History   Problem Relation Age of Onset    Heart Disease Mother     Kidney Disease Mother         dialysis    Cancer Father        SOCIAL HISTORY    Social History     Tobacco Use    Smoking status: Every Day     Packs/day: 0.25     Years: 30.00     Pack years: 7.50     Types: Cigarettes     Last  attempt to quit: 2022     Years since quittin.4    Smokeless tobacco: Never    Tobacco comments:     quit smoking ciagarettes 16   Vaping Use    Vaping Use: Every day    Substances: Never   Substance Use Topics    Alcohol use: No     Alcohol/week: 0.0 standard drinks     Comment:                                    CAFFEINE: 2 sodas daily    Drug use: No       ALLERGIES    Allergies   Allergen Reactions    Latex     Codeine     Cortisone     Cortizone     Dilaudid [Hydromorphone]      \"They OD'd me\"    Iodides     Phenobarbital Other (See Comments)     Hallucinations     Vancomycin Other (See Comments)     \"makes me very sick\"       MEDICATIONS    No current facility-administered medications on file prior to encounter. Current Outpatient Medications on File Prior to Encounter   Medication Sig Dispense Refill    clobetasol (TEMOVATE) 0.05 % ointment Apply topically 2 times daily. 45 g 1    silver sulfADIAZINE (SILVADENE) 1 % cream Apply topically daily. 1000 g 1    calcium acetate (PHOSLO) 667 MG CAPS capsule Take 1 capsule with each meal and 1 tablet with each snack. 540 capsule 3    gabapentin (NEURONTIN) 400 MG capsule Take 800 mg by mouth in the morning and at bedtime.       OXYGEN Inhale 2 L into the lungs daily as needed      carvedilol (COREG) 6.25 MG tablet 6.25 mg 2 times daily       albuterol sulfate HFA (PROVENTIL;VENTOLIN;PROAIR) 108 (90 Base) MCG/ACT inhaler Inhale 2 puffs into the lungs every 6 hours as needed for Wheezing      esomeprazole Magnesium (NEXIUM) 40 MG PACK Take 20 mg by mouth 2 times daily 2 tabs      LORazepam (ATIVAN) 0.5 MG tablet Take 0.5 mg by mouth every 12 hours Indications: One Tablet twice daily       lisinopril (PRINIVIL;ZESTRIL) 10 MG tablet Take 20 mg by mouth daily      furosemide (LASIX) 20 MG tablet Take 40 mg by mouth 2 times daily            Objective:      BP (!) 125/46   Pulse 88   Temp 98.4 °F (36.9 °C) (Oral)   Resp 10   Ht 5' 3\" (1.6 m)   Wt 116 lb 13.5 oz (53 kg)   SpO2 93%   BMI 20.70 kg/m²   Grady Risk Score: Grady Scale Score: 13    LABS    CBC:   Lab Results   Component Value Date/Time    WBC 29.2 03/08/2023 06:07 PM    RBC 4.03 03/08/2023 06:07 PM    HGB 12.6 03/08/2023 06:07 PM    HCT 40.5 03/08/2023 06:07 PM    .5 03/08/2023 06:07 PM    MCH 31.3 03/08/2023 06:07 PM    MCHC 31.1 03/08/2023 06:07 PM    RDW 14.1 03/08/2023 06:07 PM     03/08/2023 06:07 PM    MPV 10.8 03/08/2023 06:07 PM     CMP:    Lab Results   Component Value Date/Time     03/09/2023 04:33 AM    K 5.0 03/09/2023 04:33 AM    CL 97 03/09/2023 04:33 AM    CO2 25 03/09/2023 04:33 AM    BUN 48 03/09/2023 04:33 AM    CREATININE 4.2 03/09/2023 04:33 AM    GFRAA 9 10/12/2022 02:45 PM    LABGLOM 12 03/09/2023 04:33 AM    GLUCOSE 114 03/09/2023 04:33 AM    PROT 6.7 03/08/2023 06:07 PM    PROT 6.2 02/01/2013 07:55 AM    LABALBU 2.1 03/08/2023 06:07 PM    CALCIUM 7.9 03/09/2023 04:33 AM    BILITOT 0.9 03/08/2023 06:07 PM    ALKPHOS 357 03/08/2023 06:07 PM    AST 14 03/08/2023 06:07 PM    ALT <5 03/08/2023 06:07 PM     Albumin:    Lab Results   Component Value Date/Time    LABALBU 2.1 03/08/2023 06:07 PM     PT/INR:    Lab Results   Component Value Date/Time    PROTIME 14.3 09/21/2022 06:10 AM    PROTIME 10.6 10/18/2010 11:21 AM    INR 1.11 09/21/2022 06:10 AM     HgBA1c:    Lab Results   Component Value Date/Time    LABA1C 13.6 10/15/2014 09:56 AM         Assessment:     Patient Active Problem List   Diagnosis    CAD (coronary artery disease)    Hyperlipidemia    Diabetes mellitus (HCC)    Chest pain    Hoarseness of voice    Fracture of metacarpal bone    Wrist sprain    History of tobacco abuse    Wrist pain    Carpal tunnel syndrome    Cubital tunnel syndrome    Trigger finger    S/P CABG x 4    Diabetic foot ulcer (HCC)    Diabetic neuropathy ([facility]    Diabetes mellitus type 2 with complications ([address]    Claudication of both lower extremities (HCC)    SOB (shortness of breath)    Bilateral leg edema    SOB (shortness of breath)    Type 2 diabetes mellitus without complication (HCC)    Coronary artery disease involving coronary bypass graft of native heart with unstable angina pectoris (HCC)    S/P cardiac cath    Chronic renal failure, stage 2 (mild)    Chronic kidney disease (CKD) stage G4/A3, severely decreased glomerular filtration rate (GFR) between 15-29 mL/min/1.73 square meter and albuminuria creatinine ratio greater than 300 mg/g (HCC)    DM (diabetes mellitus) ([facility]    Coronary atherosclerosis    HTN (hypertension)    Proteinuria    Callus of foot    Non compliance with medical treatment    Diabetic ulcer of right heel associated with type 2 diabetes mellitus, with necrosis of bone (HCC)    Acute pain of right foot    Wound, open, foot with complication, left, sequela    ESRD on hemodialysis ([facility]    Diabetic ulcer of right midfoot associated with type 2 diabetes mellitus, with necrosis of bone (HCC)    Pressure ulcer of sacral region, stage 3 (HCC)    S/P BKA (below knee amputation), right (HCC)    Diabetic ulcer of left foot associated with type 2 diabetes mellitus, with necrosis of muscle (HCC)    Ulcer of finger, with fat layer exposed ([facility]    Sepsis ([facility]       Measurements:  Negative Pressure Wound Therapy Foot (Active)   Number of days: 3309       Negative Pressure Wound Therapy Foot Distal (Active)   Number of days: 3305       Negative Pressure Wound Therapy (Active)   Number of days: 168       Incision 06/10/13 Wrist Right (Active)   Number of days: 3558       Incision 07/29/13 Wrist Left (Active)   Number of days: 3510       Incision 02/14/14 Toe (Comment  which one) (Active)   Number of days: 3309       Wound 11/01/22 #2 Sacrum (Active)   Wound Image   03/09/23 1013   Wound Etiology Pressure Stage 4 03/09/23 1013   Dressing Status New dressing applied;Clean;Dry; Intact 03/09/23 1013   Wound Cleansed Cleansed with saline 03/09/23 1013   Dressing/Treatment Alginate with Ag;Silicone border 50/68/51 1013   Offloading for Diabetic Foot Ulcers Other (comment) 03/09/23 0450   Dressing Change Due 03/09/23 03/09/23 1013   Wound Length (cm) 4 cm 03/09/23 1013   Wound Width (cm) 3.9 cm 03/09/23 1013   Wound Depth (cm) 1.2 cm 03/09/23 1013   Wound Surface Area (cm^2) 15.6 cm^2 03/09/23 1013   Change in Wound Size % (l*w) -212 03/09/23 1013   Wound Volume (cm^3) 18.72 cm^3 03/09/23 1013   Wound Healing % -3644 03/09/23 1013   Post-Procedure Length (cm) 3 cm 02/21/23 1004   Post-Procedure Width (cm) 1.1 cm 02/21/23 1004   Post-Procedure Depth (cm) 1.5 cm 02/21/23 1004   Post-Procedure Surface Area (cm^2) 3.3 cm^2 02/21/23 1004   Post-Procedure Volume (cm^3) 4.95 cm^3 02/21/23 1004   Distance Tunneling (cm) 0 cm 03/09/23 1013   Tunneling Position ___ O'Clock 0 03/09/23 1013   Undermining Starts ___ O'Clock 12 03/09/23 1013   Undermining Ends___ O'Clock 12 03/09/23 1013   Undermining Maxium Distance (cm) 4.5 03/09/23 1013   Wound Assessment Pink/red;Slough;Eschar moist 03/09/23 1013   Drainage Amount Copious 03/09/23 1013   Drainage Description Purulent 03/09/23 1013   Odor Malodorous/putrid 03/09/23 1013   Lora-wound Assessment Excoriated;Fragile; Maceration 03/09/23 1013   Margins Undefined edges 03/09/23 1013   Wound Thickness Description not for Pressure Injury Full thickness 03/09/23 1013   Number of days: 128       Wound 11/11/22  #1 Left Great Toe (Active)   Wound Image   03/09/23 1013   Wound Etiology Arterial 03/09/23 1013   Dressing Status New dressing applied;Clean;Dry; Intact 03/09/23 1013   Wound Cleansed Cleansed with saline 03/09/23 1013   Dressing/Treatment Betadine swabs/povidone iodine 03/09/23 1013   Offloading for Diabetic Foot Ulcers Other (comment) 03/09/23 0450   Dressing Change Due 03/10/23 03/09/23 1013   Wound Length (cm) 2 cm 03/09/23 1013   Wound Width (cm) 3.4 cm 03/09/23 1013   Wound Depth (cm) 0.1 cm 03/09/23 1013   Wound Surface Area (cm^2) 6.8 cm^2 03/09/23 1013   Change in Wound Size % (l*w) -51.11 03/09/23 1013   Wound Volume (cm^3) 0.68 cm^3 03/09/23 1013   Post-Procedure Length (cm) 1.8 cm 02/21/23 1004   Post-Procedure Width (cm) 3.6 cm 02/21/23 1004   Post-Procedure Depth (cm) 0.1 cm 02/21/23 1004   Post-Procedure Surface Area (cm^2) 6.48 cm^2 02/21/23 1004   Post-Procedure Volume (cm^3) 0.648 cm^3 02/21/23 1004   Distance Tunneling (cm) 0 cm 03/09/23 1013   Tunneling Position ___ O'Clock 0 03/09/23 1013   Undermining Starts ___ O'Clock 0 03/09/23 1013   Undermining Ends___ O'Clock 0 03/09/23 1013   Undermining Maxium Distance (cm) 0 03/09/23 1013   Wound Assessment Eschar dry 03/09/23 1013   Drainage Amount None 03/09/23 1013   Odor None 03/09/23 1013   Lora-wound Assessment Dry/flaky 03/09/23 1013   Margins Defined edges 03/09/23 1013   Wound Thickness Description not for Pressure Injury Full thickness 03/09/23 1013   Number of days: 117       Wound 11/15/22 #3 Left Posterior Ankle Cluster (Active)   Wound Image   03/09/23 1013   Wound Etiology Arterial 03/09/23 1013   Dressing Status New dressing applied;Clean;Dry; Intact 03/09/23 1013   Wound Cleansed Cleansed with saline 03/09/23 1013   Dressing/Treatment Betadine swabs/povidone iodine 03/09/23 1013   Offloading for Diabetic Foot Ulcers Other (comment) 03/09/23 0450   Dressing Change Due 03/10/23 03/09/23 1013   Wound Length (cm) 6.5 cm 03/09/23 1013   Wound Width (cm) 4.5 cm 03/09/23 1013   Wound Depth (cm) 0.1 cm 03/09/23 1013   Wound Surface Area (cm^2) 29.25 cm^2 03/09/23 1013   Change in Wound Size % (l*w) -1362.5 03/09/23 1013   Wound Volume (cm^3) 2.925 cm^3 03/09/23 1013   Wound Healing % -1363 03/09/23 1013   Post-Procedure Length (cm) 1.9 cm 02/21/23 1004   Post-Procedure Width (cm) 2.5 cm 02/21/23 1004   Post-Procedure Depth (cm) 0.3 cm 02/21/23 1004   Post-Procedure Surface Area (cm^2) 4.75 cm^2 02/21/23 1004   Post-Procedure Volume (cm^3) 1.425 cm^3 02/21/23 1004   Distance Tunneling (cm) 0 cm 03/09/23 1013   Tunneling Position ___ O'Clock 0 03/09/23 1013   Undermining Starts ___ O'Clock 0 03/09/23 1013   Undermining Ends___ O'Clock 0 03/09/23 1013   Undermining Maxium Distance (cm) 0 03/09/23 1013   Wound Assessment Dry 03/09/23 1013   Drainage Amount None 03/09/23 1013   Drainage Description Thick; Yellow 03/09/23 0450   Odor None 03/09/23 1013   Lora-wound Assessment Fragile;Ecchymosis;Dry/flaky 03/09/23 1013   Margins Attached edges 03/09/23 1013   Wound Thickness Description not for Pressure Injury Full thickness 03/09/23 0450   Number of days: 113       Wound 11/15/22 #4 Left Heel (Active)   Wound Image   03/09/23 1013   Dressing Status New dressing applied;Clean;Dry; Intact 03/09/23 1013   Wound Cleansed Cleansed with saline 03/09/23 1013   Dressing/Treatment Betadine swabs/povidone iodine 03/09/23 1013   Offloading for Diabetic Foot Ulcers Other (comment) 03/09/23 0450   Dressing Change Due 03/10/23 03/09/23 1013   Wound Length (cm) 5.5 cm 03/09/23 1013   Wound Width (cm) 3.5 cm 03/09/23 1013   Wound Depth (cm) 0.1 cm 03/09/23 1013   Wound Surface Area (cm^2) 19.25 cm^2 03/09/23 1013   Change in Wound Size % (l*w) -285 03/09/23 1013   Wound Volume (cm^3) 1.925 cm^3 03/09/23 1013   Wound Healing % -285 03/09/23 1013   Post-Procedure Length (cm) 4 cm 02/21/23 1004   Post-Procedure Width (cm) 3.5 cm 02/21/23 1004   Post-Procedure Depth (cm) 0.1 cm 02/21/23 1004   Post-Procedure Surface Area (cm^2) 14 cm^2 02/21/23 1004   Post-Procedure Volume (cm^3) 1.4 cm^3 02/21/23 1004   Distance Tunneling (cm) 0 cm 03/09/23 1013   Tunneling Position ___ O'Clock 0 03/09/23 1013   Undermining Starts ___ O'Clock 0 03/09/23 1013   Undermining Ends___ O'Clock 0 03/09/23 1013   Undermining Maxium Distance (cm) 0 03/09/23 1013   Wound Assessment Eschar dry 03/09/23 1013   Drainage Amount None 03/09/23 1013   Odor None 03/09/23 1013   Lora-wound Assessment Dry/flaky 03/09/23 1013   Margins Defined edges 03/09/23 1013   Wound Thickness Description not for Pressure Injury Full thickness 03/09/23 1013   Number of days: 113       Wound 02/07/23 #5 Left 3rd Finger (Active)   Wound Image   03/09/23 1013   Wound Etiology Other 03/09/23 1013   Dressing Status New dressing applied 03/09/23 1013   Wound Cleansed Cleansed with saline 03/09/23 1013   Dressing/Treatment Betadine swabs/povidone iodine;Silicone border 81/35/29 1013   Dressing Change Due 03/10/23 03/09/23 1013   Wound Length (cm) 1.4 cm 03/09/23 1013   Wound Width (cm) 1.4 cm 03/09/23 1013   Wound Depth (cm) 0.2 cm 03/09/23 1013   Wound Surface Area (cm^2) 1.96 cm^2 03/09/23 1013   Change in Wound Size % (l*w) -36.11 03/09/23 1013   Wound Volume (cm^3) 0.392 cm^3 03/09/23 1013   Wound Healing % -36 03/09/23 1013   Distance Tunneling (cm) 0 cm 03/09/23 1013   Tunneling Position ___ O'Clock 0 03/09/23 1013   Undermining Starts ___ O'Clock 0 03/09/23 1013   Undermining Ends___ O'Clock 0 03/09/23 1013   Undermining Maxium Distance (cm) 0 03/09/23 1013   Wound Assessment Dry;Devitalized tissue 03/09/23 1013   Drainage Amount None 03/09/23 1013   Odor None 03/09/23 1013   Lora-wound Assessment Dry/flaky 03/09/23 1013   Margins Defined edges 03/09/23 1013   Wound Thickness Description not for Pressure Injury Full thickness 03/09/23 1013   Number of days: 29       Wound 03/09/23 Left Medial Foot (Active)   Wound Image   03/09/23 1013   Wound Etiology Pressure Stage 3 03/09/23 1013   Dressing Status New dressing applied;Clean;Dry; Intact 03/09/23 1013   Wound Cleansed Cleansed with saline 03/09/23 1013   Dressing/Treatment Betadine swabs/povidone iodine;Silicone border 51/73/68 1013   Dressing Change Due 03/10/23 03/09/23 1013   Wound Length (cm) 0.5 cm 03/09/23 1013   Wound Width (cm) 0.5 cm 03/09/23 1013   Wound Depth (cm) 0.2 cm 03/09/23 1013   Wound Surface Area (cm^2) 0.25 cm^2 03/09/23 1013   Wound Volume (cm^3) 0.05 cm^3 03/09/23 1013   Distance Tunneling (cm) 0 cm 03/09/23 1013   Tunneling Position ___ O'Clock 0 03/09/23 1013   Undermining Starts ___ O'Clock 0 03/09/23 1013   Undermining Ends___ O'Clock 0 03/09/23 1013   Undermining Maxium Distance (cm) 0 03/09/23 1013   Wound Assessment Slough 03/09/23 1013   Drainage Amount Scant 03/09/23 1013   Drainage Description Yellow 03/09/23 1013   Odor None 03/09/23 1013   Lora-wound Assessment Dry/flaky; Hyperkeratosis (callous) 03/09/23 1013   Margins Defined edges 03/09/23 1013   Wound Thickness Description not for Pressure Injury Full thickness 03/09/23 1013   Number of days: 0       Response to treatment: Tolerated Poorly with Complaints of pain. Pain Assessment:  Severity:  moderate  Wound Pain Timing/Severity: Wound Care  Premedicated: No     Plan:     Plan of Care: Wound 02/07/23 #5 Left 3rd Finger-Dressing/Treatment: Betadine swabs/povidone iodine, Silicone border  Wound 11/11/22  #1 Left Great Toe-Dressing/Treatment: Betadine swabs/povidone iodine  Wound 11/15/22 #3 Left Posterior Ankle Cluster-Dressing/Treatment: Betadine swabs/povidone iodine  Wound 11/15/22 #4 Left Heel-Dressing/Treatment: Betadine swabs/povidone iodine  Wound 11/01/22 #2 Sacrum-Dressing/Treatment: Alginate with Ag, Silicone border  Wound 03/09/23 Left Medial Foot-Dressing/Treatment: Betadine swabs/povidone iodine, Silicone border    Patient in bed, agreeable to wound care assessment and treatment. Wounds measured, photographed and treated as described above. Arterial wounds to left foot. A moderate amount of pain when positioning. Dr Prince Tovar for possible surgical debridement of sacral wound. Wound was foul smelling with copious purulent drainage. Patient groggy but able to understand direction. Unable to assist in repositioning due to pain. Carpio Boros in place under depends. Depends and bed linen dry. Pt is a moderate risk for skin breakdown AEB Grady. Follow Grady orders. Atmos Air applied to [employer] and functioning. Dolphin mattress ordered. Communication from [place] [id number] permission granted for dakins wet to dry on sacrum. Dakins order placed. Patient Left side lying with pillow support. Left Heel elevated with pillow support. Pillow between knees. Bed in lowest position, Call light and bedside table within reach. Nurse Updated.      Specialty Bed Required : Yes  [x] Low Air Loss   [x] Pressure Redistribution  [] Fluid Immersion  [] Bariatric  [] Total Pressure Relief  [] Other:     Discharge Plan:  Placement for patient upon discharge: TBD  Hospice Care: TBD  Patient appropriate for Outpatient [address]: Existing pt     Patient/Caregiver Teaching:  Level of patient/caregiver understanding able to:  [ ] Indicates understanding        [ Kyree Trent reinforcement  [ ] Unsuccessful       [ ] Verbal Understanding  [ ] Demonstrated understanding        [ ] No evidence of learning  [ ] Refused teaching          [ ] N/A       Electronically signed by Joseph Peterson RN,on 3/9/2023 at 10:38 AM

## 2023-03-09 NOTE — CONSULTS
Infectious Disease Consult Note  3/9/2023   Patient Name: Korey Poole : 1965   Impression  Sepsis Secondary to Probable Infected Acute on Chronic Decubitus Sacral Wound:  Chronic Necrotic Wounds: Left 3rd Finger, Left Hallux, Left Ankle and Heel:  Allergy to vancomycin (\"makes me very sick\"): Tolerating vancomycin this admit  Afebrile, Leukocytosis 29.2  CrCl 12 on HD  LA 2.3, 4.3, 3.2  3/8-CXR: Pulmonary vascular congestion with mild pulmonary edema. 3/9-Complete Echo: Pending  , general surgery, has been consulted  DMII:  ESRD on HD:  [place] onboard  CAD s/p CABG/ HFrEF:  PAD s/p RBKA :  Depression:  Multi-morbidity: per PMHx  Plan:  Continue IV Zosyn  Continue IV vancomycin per pharmacy dosing  Trend CRP and Pct, ordered  Await wound cultures   Await BC, MRSA screen and urine culture    Thank you for allowing me to consult in the care of this patient.  ------------------------  REASON FOR CONSULT: Infective syndrome \"Severe Sepsis. Infected sacral wound. \"  Requested by: Dr. Nabil Barnes is a 62 y.o.  female with a history of ESRD on HD, tobacco abuse of cigarette smoking, CAD s/p CABG, HFrEF, chronic decubitus ulcers, depression, DMII, PAD s/p RBKA  and GERD who was admitted 3/9/2023 for further evaluation and management of nausea and vomiting x 1 day with accompanying pain around her chronic sacral wound and back with fatigue x 1 week. She was brought to the ED in [place] per EMS. She underwent a CXR revealing pulmonary vascular congestion with mild pulmonary edema. BC were obtained and are pending. A wound culture has been ordered. She was started on IV empiric ABX therapy of Zosyn and vancomycin. She was then transferred to [facility] for evaluation. The HPI is obtained from the EMR as the patient is lethargic. Past sacral wound cultures from 2022 grew Enterobacter hormaechei and Finegoldia magna.    ?  Infectious diseases service was consulted to evaluate the pt, and recommend further investigative and therapeutic measures. ROS: Other systems reviewed Including eyes, ENT, respiratory, cardiovascular, GI, , dermatologic, neurologic, psych, hem/lymphatic, musculoskeletal and endocrine were negative other than what is mentioned above.      Patient Active Problem List    Diagnosis Date Noted    Diabetic foot ulcer ([address] 12/19/2013    Sepsis ([address] 03/09/2023    Ulcer of finger, with fat layer exposed ([address] 02/07/2023    Diabetic ulcer of left foot associated with type 2 diabetes mellitus, with necrosis of muscle ([address] 12/20/2022    Pressure ulcer of sacral region, stage 3 ([address] 11/29/2022    S/P BKA (below knee amputation), right ([address] 11/29/2022    Diabetic ulcer of right midfoot associated with type 2 diabetes mellitus, with necrosis of bone ([address] 11/11/2022    ESRD on hemodialysis ([address] 09/21/2022    Wound, open, foot with complication, left, sequela 09/20/2022    Diabetic ulcer of right heel associated with type 2 diabetes mellitus, with necrosis of bone ([address] 09/13/2022    Acute pain of right foot 09/13/2022    Non compliance with medical treatment 07/02/2018    Callus of foot 02/05/2018    Chronic kidney disease (CKD) stage G4/A3, severely decreased glomerular filtration rate (GFR) between 15-29 mL/min/1.73 square meter and albuminuria creatinine ratio greater than 300 mg/g ([address] 06/22/2016    DM (diabetes mellitus) ([address] 06/22/2016    Coronary atherosclerosis 06/22/2016    HTN (hypertension) 06/22/2016    Proteinuria 06/22/2016    S/P cardiac cath 05/16/2016    Chronic renal failure, stage 2 (mild) 05/16/2016    Coronary artery disease involving coronary bypass graft of native heart with unstable angina pectoris (HCC)     SOB (shortness of breath) 04/20/2016    Type 2 diabetes mellitus without complication ([address] 36/11/8377    Diabetes mellitus type 2 with complications ([address] 55/83/0383    Claudication of both lower extremities ([facility] 04/05/2016    SOB (shortness of breath) 04/05/2016    Bilateral leg edema 04/05/2016    Diabetic neuropathy (HCC)     S/P CABG x 4 09/30/2013    Carpal tunnel syndrome 05/30/2013    Cubital tunnel syndrome 05/30/2013    Trigger finger 05/30/2013    Wrist pain 05/02/2013    History of tobacco abuse 03/27/2013    Fracture of metacarpal bone 02/14/2013    Wrist sprain 02/14/2013    Hoarseness of voice 12/10/2012    Chest pain 11/19/2012    CAD (coronary artery disease)     Hyperlipidemia     Diabetes mellitus ([address]      Past Medical History:   Diagnosis Date    Acute pain of right foot 09/13/2022    CAD (coronary artery disease)     s/p CABG x 4;  follows with Dr Marbella Turner of foot 02/05/2018    Carpal tunnel syndrome on both sides     CHF (congestive heart failure) ([address] 10/2010    Chronic diastolic; EF 11%    Chronic kidney disease     stage 4 kidney disease    Chronic ulcer of left ankle with fat layer exposed ([address] 10/07/2015    Chronic ulcer of left foot with fat layer exposed ([address] 03/17/2016    Chronic ulcer of right ankle with fat layer exposed ([address] 03/17/2016    Chronic ulcer of right foot with fat layer exposed ([address] 03/17/2016    Decubitus ulcer of sacral region, stage 1 09/17/2018    Depression     Diabetes mellitus ([address]     Diabetes mellitus with neurological manifestations ([address]     Diabetes mellitus with ulcer of ankle ([address]     Diabetic ulcer of left foot associated with type 2 diabetes mellitus, with fat layer exposed ([address] 08/06/2018    Diabetic ulcer of right heel associated with type 2 diabetes mellitus, with muscle involvement without evidence of necrosis ([address] 09/13/2022    Diabetic ulcer of right heel associated with type 2 diabetes mellitus, with necrosis of bone ([address] 09/13/2022    ESRD on hemodialysis ([address] 09/21/2022    Family history of cardiovascular disease     Mother    GERD (gastroesophageal reflux disease)     H/O cardiac catheterization 10/18/2010, 6/3/2010    10/18/2010-Four bypass grafts all widely patent. 6/3/2010- Moderate to severe triple vessel disease, preserved LV systolic function. H/O cardiovascular stress test 7/26/2012, 8/3/2011, 10/14/2010, 5/24/2010 7/26/2012-Lexiscan- Normal Myocardial Perfusion Study. Post stress myocardial perfusion images show a normal pattern of perfusion in all regions. Post stress LV normal size. Normal perfusion in the distribution of all coronaries. Normal LV size and function. Rest EF 70%    H/O chest x-ray 7/12/2012, 6/2/2010 7/12/2012-Perihilar peribronchial cuffing with mild basilar predominant interstital prominence, which may reflect interstitial edema or atypical pneumonia. H/O Doppler ultrasound 06/04/2010    CAROTID DOPPLER-6/4/2010-No Doppler evidence of hemodynamically significant Carotid Stenosis with antegrade flow in the vertebral arteries bilaterally. 6/4/2010 PERIPHERAL VENOUS DOPPLER_ LEFT Saphenous Vein mapping    H/O echocardiogram 7/26/2012, 8/3/2011, 10/2010, 7/23/2010, 6/8/2010 7/26/2012-LV normal size. Normal LV wall thickness. LV systolic function normal. EF => 55%. LV wall motion normal. Mild MR. Mild TR. History of blood transfusion     History of complete ECG 7/26/2012 [facility]), 7/13/2012 [facility]), 7/25/2011, 3/25/2011, 10/18/2010, 10/11/2010, 6/23/2010, 5/7/2010    Hx of cardiovascular stress test 09/03/2015    lexiscan-normal,EF66%    Hx of Doppler ultrasound 04/05/2016    Arterial: There is a fluid collection in the left thigh, please refer to PCP for further monitoring, no vascular in etiology. Hx of echocardiogram     4/16 EF40% Mild MR/TR and mild Pulm HTN. 9/15 EF 45-50%, Mildly dilated L atrium, mildly dilated R ventricle. Mild MR & mild TR     Hyperlipidemia     Neuropathy of foot     Pt reports starting approx. 6-7 years ago    Neuropathy of hand     Pt reports starting approx.  6-7 years ago    Non compliance with medical treatment 07/02/2018    Pressure ulcer of sacral region, stage 2 ([facility] 11/29/2022    Pressure ulcer of sacral region, stage 3 ([address] 11/29/2022    S/P BKA (below knee amputation), right ([address] 11/29/2022    S/P CABG x 4 06/05/2010    LIMA-> LAD;  VG->CX;  VG->Diagonal; VG-> distal RCA  per  Dr Carlito Boudreaux    Type 2 diabetes mellitus with diabetic polyneuropathy, with long-term current use of insulin ([address] 04/05/2016    Type II or unspecified type diabetes mellitus with neurological manifestations, not stated as uncontrolled(250.60)     Type II or unspecified type diabetes mellitus with other specified manifestations, not stated as uncontrolled     Ulcer of finger, with fat layer exposed ([address] 2/7/2023    Ulcer of left foot, with fat layer exposed ([address] 12/19/2013    Ulcer of other part of foot       Past Surgical History:   Procedure Laterality Date    APPENDECTOMY      CARDIAC SURGERY      CARPAL TUNNEL RELEASE      L hand Nov. 2011, R hand Jan. 2012    CARPAL TUNNEL RELEASE Right 6/10/2013    recurrent    CARPAL TUNNEL RELEASE Left 7/29/2013    with ulnar nerve transpostion and L middle finger release    CHOLECYSTECTOMY      COLONOSCOPY      CORONARY ARTERY BYPASS GRAFT  6/5/2010 6/5/2010-LIMA->LAD;  VG-> Diagonal;  VG-> Obtuse marginal;  VG->Distal RCA-   Dr Alisha Hernández Right 6/10/2013    HYSTERECTOMY, TOTAL ABDOMINAL (CERVIX REMOVED)      LEG AMPUTATION BELOW KNEE Right 11/11/2022    RIGHT LEG AMPUTATION BELOW KNEE WITH IPOP performed by Amalia Jessica MD at [address] Left 02/14/144th toe    TUBAL LIGATION      ULNAR TUNNEL RELEASE Right 6/10/2013      Family History   Problem Relation Age of Onset    Heart Disease Mother     Kidney Disease Mother         dialysis    Cancer Father       Infectious disease related family history - not contibutory.    SOCIAL HISTORY  Social History     Tobacco Use    Smoking status: Every Day     Packs/day: 0.25     Years: 30.00     Pack years: 7.50     Types: Cigarettes     Last attempt to quit: 9/19/2022 Years since quittin.4    Smokeless tobacco: Never    Tobacco comments:     quit smoking ciagarettes 16   Substance Use Topics    Alcohol use: No     Alcohol/week: 0.0 standard drinks     Comment:                                    CAFFEINE: 2 sodas daily      Born:[place]  Lives: [place] with  of 43 years  Occupation: Retired   No recent travel of significance. No recent unusual exposures. NO pets    ? ALLERGIES  Allergies   Allergen Reactions    Latex     Codeine     Cortisone     Cortizone     Dilaudid [Hydromorphone]      \"They OD'd me\"    Iodides     Phenobarbital Other (See Comments)     Hallucinations     Vancomycin Other (See Comments)     \"makes me very sick\"      MEDICATIONS  Reviewed and are per the chart/EMR. ? Antibiotics:   Present:  Zosyn 3/9-  Vancomycin 3/9-  Past:  ?  -------------------------------------------------------------------------------------------------------------------    Vital Signs:  Vitals:    23 0916   BP: (!) 125/46   Pulse: 88   Resp: 10   Temp: 98.4 °F (36.9 °C)   SpO2: 93%         Exam:    VS: noted; wt 116 lb (53 kg) Height 5'3\"  Gen: lethargic, no distress  Skin: no stigmata of endocarditis  Wounds: C/D/I multiple extremity necrotic wounds. Sacral/coccyx wound. HEMT: AT/NC Oropharynx pink, moist, and without lesions or exudates; edentulous  Eyes: PERRLA, EOMI, conjunctiva pink, sclera anicteric. Neck: Supple. Trachea midline. No LAD. Chest: no distress and CTA. Good air movement. Room air. Heart: NSR and no MRG. Abd: soft, non-distended, no tenderness, no hepatomegaly. Normoactive bowel sounds. Ext: no clubbing, cyanosis, or edema  Neuro: Mental status intact. CN 2-12 intact and no focal sensory or motor deficits    ? Diagnostic Studies: reviewed  3/8/2023 XR Chest Portable:  Impression   Pulmonary vascular congestion with mild pulmonary edema.    ??  I have examined this patient and available medical records on this date and have made the above observations, conclusions and recommendations. Electronically signed by: Electronically signed by Kait Martini.  RYAN Godfrey CNP on 3/9/2023 at 10:21 AM

## 2023-03-09 NOTE — PLAN OF CARE
Problem: Discharge Planning  Goal: Discharge to home or other facility with appropriate resources  Outcome: Progressing  Flowsheets (Taken 3/9/2023 0916)  Discharge to home or other facility with appropriate resources:   Identify barriers to discharge with patient and caregiver   Arrange for needed discharge resources and transportation as appropriate   Identify discharge learning needs (meds, wound care, etc)   Arrange for interpreters to assist at discharge as needed   Refer to discharge planning if patient needs post-hospital services based on physician order or complex needs related to functional status, cognitive ability or social support system     Problem: Skin/Tissue Integrity  Goal: Absence of new skin breakdown  Description: 1. Monitor for areas of redness and/or skin breakdown  2. Assess vascular access sites hourly  3. Every 4-6 hours minimum:  Change oxygen saturation probe site  4. Every 4-6 hours:  If on nasal continuous positive airway pressure, respiratory therapy assess nares and determine need for appliance change or resting period.   Outcome: Progressing     Problem: ABCDS Injury Assessment  Goal: Absence of physical injury  3/9/2023 1207 by Ashlee Palafox RN  Outcome: Progressing  3/9/2023 0458 by Kaushik Celeste RN  Outcome: Progressing     Problem: Safety - Adult  Goal: Free from fall injury  Outcome: Progressing     Problem: Chronic Conditions and Co-morbidities  Goal: Patient's chronic conditions and co-morbidity symptoms are monitored and maintained or improved  Outcome: Progressing  Flowsheets (Taken 3/9/2023 0916)  Care Plan - Patient's Chronic Conditions and Co-Morbidity Symptoms are Monitored and Maintained or Improved:   Monitor and assess patient's chronic conditions and comorbid symptoms for stability, deterioration, or improvement   Collaborate with multidisciplinary team to address chronic and comorbid conditions and prevent exacerbation or deterioration   Update acute care plan with appropriate goals if chronic or comorbid symptoms are exacerbated and prevent overall improvement and discharge

## 2023-03-09 NOTE — ED NOTES
Checked patients sugar due to being sweaty and came back at 59, notified physician and gave patient 2 cups of orange juice. Will re check in 5 minutes to re evaluate.      Diya Goldberg RN  03/08/23 [id number]

## 2023-03-09 NOTE — ED NOTES
Checked pts sugar again, sugar is going up @65, Physician states Ems ok to take patient.  I sent a apple juice to go with patient and medics, asked the medics to have the nurse check again upon arrival.     Antonia Cummings, [address] [place] [address]  03/08/23 6202

## 2023-03-09 NOTE — CARE COORDINATION
.Case Management Assessment  Initial Evaluation    Date/Time of Evaluation: 3/9/2023 2:40 PM  Assessment Completed by: Zulema Ricardo RN    If patient is discharged prior to next notation, then this note serves as note for discharge by case management. Patient Name: Marisa Sue                   YOB: 1965  Diagnosis: Sepsis [facility]) [A41.9]                   Date / Time: 3/9/2023 12:26 AM    Patient Admission Status: Inpatient   Readmission Risk (Low < 19, Mod (19-27), High > 27): Readmission Risk Score: 20.9    Current PCP: Nora Duran MD  PCP verified by CM? Yes    Chart Reviewed: Yes      History Provided by: Patient  Patient Orientation: Alert and Oriented    Patient Cognition: Alert    Hospitalization in the last 30 days (Readmission):  No    If yes, Readmission Assessment in CM Navigator will be completed. Advance Directives:      Code Status: Full Code   Patient's Primary Decision Maker is:  (SELF)    Primary Decision Maker: Dieter Fielderrol Spouse - [phone]    Discharge Planning:    Patient lives with: Spouse/Significant Other Type of Home: House  Primary Care Giver: Self  Patient Support Systems include: Spouse/Significant Other, Family Members   Current Financial resources: Medicaid, Medicare  Current community resources: ECF/Home Care  Current services prior to admission: Home Care            Current DME:              Type of Home Care services:  Nursing Services (per pt)    ADLS  Prior functional level: Independent in ADLs/IADLs  Current functional level: Assistance with the following:, Bathing, Toileting    PT AM-PAC:   OT AM-PAC:       Family can provide assistance at DC: Yes  Would you like Case Management to discuss the discharge plan with any other family members/significant others, and if so, who?  No  Plans to Return to Present Housing: No  Other Identified Issues/Barriers to RETURNING to current housin [facility]  Potential Assistance needed at discharge: N/A            Potential DME:    Patient expects to discharge to: [facility] for transportation at discharge:      Financial    Payor: Bonnie Verma / Plan: Yesica Cooper / Product Type: *No Product type* /     Does insurance require precert for SNF: Yes    Potential assistance Purchasing Medications:    [id number] [address] [place]  Phone: [phone] Fax: [phone]      Notes:    Factors facilitating achievement of predicted outcomes: Family support, Cooperative, and Pleasant    Barriers to discharge: Medical complications    Additional Case Management Notes: . CM met with pt for d/c planning. Introduced self and updated white board. Pt lives with spouse and was independent with ADL's PTA per pt. AMANDA provides her transportation. She has a PCP, has insurance, and is able to afford her medication. Pt uses a RW and a electric wc for mobility. Discussed the PT/OT recommendations for SNF. Pt is agreeable. SNF list provided. Pt has requested [facility]. Referral made to [facility]. He will review clinicals and will notify CM of decision to accept or not. TE    The Plan for Transition of Care is related to the following treatment goals of Sepsis ([facility] [Q21.2]    IF APPLICABLE: The Patient and/or patient representative Celestina Dakins and her family were provided with a choice of provider and agrees with the discharge plan. Freedom of choice list with basic dialogue that supports the patient's individualized plan of care/goals and shares the quality data associated with the providers was provided to:     Patient Representative Name:       The Patient and/or Patient Representative Agree with the Discharge Plan?       Roderick Prieto RN  Case Management Department

## 2023-03-09 NOTE — CONSULTS
Comprehensive Nutrition Assessment    Type and Reason for Visit:  Initial, Wound, Consult, Positive Nutrition Screen (Weight loss, poor appetite, Grady Score)    Nutrition Recommendations/Plan:   Diet advancement per general surgery, Recommend Carb Control 4  If pt is unable to advance, will recommend NGT placement with slow initiation of EN. Please consult dietitian if within plan of care. Encourage proper hydration/fluids intake     Malnutrition Assessment:  Malnutrition Status:  Severe malnutrition (03/09/23 1527)    Context:  Acute Illness     Findings of the 6 clinical characteristics of malnutrition:  Energy Intake:  50% or less of estimated energy requirements for 5 or more days  Weight Loss:  Greater than 7.5% over 3 months     Body Fat Loss: Moderate body fat loss (Severe body fat) Triceps, Buccal region, Orbital   Muscle Mass Loss:   (Severe muscle mass loss) Clavicles (pectoralis & deltoids), Thigh (quadraceps), Calf (gastrocnemius)  Fluid Accumulation:  No significant fluid accumulation     Strength:  Not Performed    Nutrition Assessment:    Admitted with sepsis. PMH: BKA with IPOOP, DMII, CAD, HLD, ESRD on HD. Currently NPO, awaiting plans for general surgery. Provided pt with comfort over wound debridement, discussed importance of nutrition for wound healing. Sister Nona Chen at bedside reports pt will not drink or eat for \"you\", she has not been drinking or eating any oral nutrition supplements at home that the daughter buys, because Kelvin Walters wanted a new kidney,\" so she didn't want to gain weight and be worse for dialysis. Pt has declined in self care. Reviewed alterative nutrition options as potential benefit for consistent source of nutrition. PS MD over pt's concerns for surgery and nutrition support dicussion. Spoke with RN, plans for D5 IV initiation. Severe wasting noted, pt ill appearing, meets criteria for severe malnutrition. High nutrtion risk.     Nutrition Related Findings:    Lactate 2, .5, GFR 12, POCT 73 Wound Type: Multiple, Pressure Injury, Stage III, Stage IV (Arterial x 3 wounds on left foot)       Current Nutrition Intake & Therapies:    Average Meal Intake: NPO  Average Supplements Intake: NPO  Diet NPO    Anthropometric Measures:  Height: 5' 3\" (160 cm)  Ideal Body Weight (IBW): 115 lbs (52 kg)    Admission Body Weight: 116 lb 13.5 oz (53 kg)  Current Body Weight: 116 lb 13.5 oz (53 kg), 101.6 % IBW.  Weight Source: Bed Scale  Current BMI (kg/m2): 20.7  Usual Body Weight: 128 lb (58.1 kg) (12/5/22 post op, 166 lb 4 oz 9/8/22)  % Weight Change (Calculated): -8.7  Weight Adjustment For: Amputation  Total Adjusted Percentage (Calculated): 5.9  Adjusted Ideal Body Weight (lbs) (Calculated): 108.2 lbs  Adjusted Ideal Body Weight (kg) (Calculated): 49.18 kg  Adjusted % Ideal Body Weight (Calculated): 108  Adjusted BMI (kg/m2) (Calculated): 21.9  BMI Categories: Normal Weight (BMI 18.5-24.9) (adjusted)    Estimated Daily Nutrient Needs:  Energy Requirements Based On: Kcal/kg  Weight Used for Energy Requirements: Current  Energy (kcal/day): 2997-5013 (30-35 kcals/kg)  Weight Used for Protein Requirements: Adjusted  Protein (g/day): 59-74 (1.2-1.5 g/kg)  Method Used for Fluid Requirements: Standard Renal  Fluid (ml/day): per nephrology    Nutrition Diagnosis:   Severe malnutrition, In context of acute illness or injury related to psychological cause or life stress, inadequate protein-energy intake, catabolic illness as evidenced by poor intake prior to admission, severe muscle loss, weight loss 7.5% in 3 months, moderate loss of subcutaneous fat  Increased nutrient needs related to renal dysfunction, endocrine dysfuntion (wound healing) as evidenced by wounds    Nutrition Interventions:   Food and/or Nutrient Delivery: Continue NPO, Start Tube Feeding (if within plan of care)  Nutrition Education/Counseling: Education initiated  Coordination of Nutrition Care: Continue to monitor while inpatient, Coordination of Care  Plan of Care discussed with: pt, sister Trupti Griffith RN, PS MD over malnutrition dx    Goals:     Goals: PO intake 50% or greater       Nutrition Monitoring and Evaluation:   Behavioral-Environmental Outcomes: None Identified  Food/Nutrient Intake Outcomes: Diet Advancement/Tolerance  Physical Signs/Symptoms Outcomes: Biochemical Data, Meal Time Behavior, Nutrition Focused Physical Findings, Nausea or Vomiting, GI Status, Skin, Weight    Discharge Planning:     Too soon to determine     Starlet Situ, RD, LD  Contact: [phone]

## 2023-03-09 NOTE — PROGRESS NOTES
4 Eyes Skin Assessment     NAME:  Bhakti Schwartz  YOB: 1965  MEDICAL RECORD NUMBER:  [id number]    The patient is being assessed for  Admission    I agree that One RN has performed a thorough Head to Toe Skin Assessment on the patient. ALL assessment sites listed below have been assessed. Areas assessed by both nurses:    Head, Face, Ears, Shoulders, Back, Chest, Arms, Elbows, Hands, Sacrum. Buttock, Coccyx, Ischium, and Legs. Feet and Heels        Does the Patient have a Wound? Yes wound(s) were present on assessment. LDA wound assessment was Initiated and completed by RN  Wounds have been noted in the past and Dr. Cate Cervantes already following case. Pictures of wound assessments documented and present on chart. Wound care ordered at this time.        Grady Prevention initiated by RN: Yes   Wound Care Orders initiated by RN: Yes    Pressure Injury (Stage 3,4, Unstageable, DTI, NWPT, and Complex wounds) if present, place referral order by RN under : Yes    New and Established Ostomies, if present place, referral order under : No      Nurse 1 eSignature: Electronically signed by Carlo Allen RN on 3/9/23 at 1:24 AM EST    **SHARE this note so that the co-signing nurse can place an eSignature**    Nurse 2 eSignature: Electronically signed by Balaji Tang RN on 3/9/23 at 1:36 AM EST

## 2023-03-09 NOTE — PROGRESS NOTES
Medardo Rodas, a 62 y.o. female, with a history of ESRD on HD, COPD, CAD, Type II DM, HTN, HLD, Anxiety, Depression, HFmrEF was  presented with nausea and vomiting X 1 day along with pain around sacral wound and back with fatigue X 1 week. Patient is currently being managed for severe sepsis with IV vancomycin and Zosyn. We will continue to trend lactic acid and troponin. Nephrology, ID, surgery following. Patient was seen earlier by my colleague. Please defer to his note for details and assessment and plan.

## 2023-03-10 PROBLEM — L08.9 INFECTED DECUBITUS ULCER, STAGE IV (HCC): Status: ACTIVE | Noted: 2023-03-10

## 2023-03-10 PROBLEM — N18.6 ESRD (END STAGE RENAL DISEASE) (HCC): Status: ACTIVE | Noted: 2023-03-10

## 2023-03-10 PROBLEM — L89.94 INFECTED DECUBITUS ULCER, STAGE IV (HCC): Status: ACTIVE | Noted: 2023-03-10

## 2023-03-10 LAB
ANION GAP SERPL CALCULATED.3IONS-SCNC: 15 MMOL/L (ref 4–16)
APTT: 33.5 SECONDS (ref 25.1–37.1)
APTT: 33.8 SECONDS (ref 25.1–37.1)
BANDED NEUTROPHILS ABSOLUTE COUNT: 8.31 K/CU MM
BANDED NEUTROPHILS RELATIVE PERCENT: 30 % (ref 5–11)
BUN SERPL-MCNC: 62 MG/DL (ref 6–23)
CALCIUM SERPL-MCNC: 7.9 MG/DL (ref 8.3–10.6)
CHLORIDE BLD-SCNC: 94 MMOL/L (ref 99–110)
CO2: 22 MMOL/L (ref 21–32)
CREAT SERPL-MCNC: 4.6 MG/DL (ref 0.6–1.1)
CRP SERPL HS-MCNC: 259.1 MG/L
CULTURE: NORMAL
DIFFERENTIAL TYPE: ABNORMAL
GFR SERPL CREATININE-BSD FRML MDRD: 11 ML/MIN/1.73M2
GLUCOSE BLD-MCNC: 124 MG/DL (ref 70–99)
GLUCOSE BLD-MCNC: 134 MG/DL (ref 70–99)
GLUCOSE BLD-MCNC: 88 MG/DL (ref 70–99)
GLUCOSE SERPL-MCNC: 115 MG/DL (ref 70–99)
HCT VFR BLD CALC: 36.6 % (ref 37–47)
HEMOGLOBIN: 11.4 GM/DL (ref 12.5–16)
LACTATE: 1.9 MMOL/L (ref 0.5–1.9)
LYMPHOCYTES ABSOLUTE: 0.6 K/CU MM
LYMPHOCYTES RELATIVE PERCENT: 2 % (ref 24–44)
Lab: NORMAL
MCH RBC QN AUTO: 31.5 PG (ref 27–31)
MCHC RBC AUTO-ENTMCNC: 31.1 % (ref 32–36)
MCV RBC AUTO: 101.1 FL (ref 78–100)
PDW BLD-RTO: 14.1 % (ref 11.7–14.9)
PLATELET # BLD: 132 K/CU MM (ref 140–440)
PMV BLD AUTO: 10.8 FL (ref 7.5–11.1)
POTASSIUM SERPL-SCNC: 5.3 MMOL/L (ref 3.5–5.1)
PROCALCITONIN SERPL-MCNC: 1.86 NG/ML
RBC # BLD: 3.62 M/CU MM (ref 4.2–5.4)
SEGMENTED NEUTROPHILS ABSOLUTE COUNT: 18.8 K/CU MM
SEGMENTED NEUTROPHILS RELATIVE PERCENT: 68 % (ref 36–66)
SODIUM BLD-SCNC: 131 MMOL/L (ref 135–145)
SPECIMEN: NORMAL
TROPONIN T: 0.08 NG/ML
TROPONIN T: 0.08 NG/ML
WBC # BLD: 27.7 K/CU MM (ref 4–10.5)

## 2023-03-10 PROCEDURE — 87075 CULTR BACTERIA EXCEPT BLOOD: CPT

## 2023-03-10 PROCEDURE — 82962 GLUCOSE BLOOD TEST: CPT

## 2023-03-10 PROCEDURE — 87077 CULTURE AEROBIC IDENTIFY: CPT

## 2023-03-10 PROCEDURE — 36415 COLL VENOUS BLD VENIPUNCTURE: CPT

## 2023-03-10 PROCEDURE — 90935 HEMODIALYSIS ONE EVALUATION: CPT

## 2023-03-10 PROCEDURE — 85730 THROMBOPLASTIN TIME PARTIAL: CPT

## 2023-03-10 PROCEDURE — 94761 N-INVAS EAR/PLS OXIMETRY MLT: CPT

## 2023-03-10 PROCEDURE — 76937 US GUIDE VASCULAR ACCESS: CPT

## 2023-03-10 PROCEDURE — 2140000000 HC CCU INTERMEDIATE R&B

## 2023-03-10 PROCEDURE — 97605 NEG PRS WND THER DME<=50SQCM: CPT

## 2023-03-10 PROCEDURE — 6370000000 HC RX 637 (ALT 250 FOR IP): Performed by: STUDENT IN AN ORGANIZED HEALTH CARE EDUCATION/TRAINING PROGRAM

## 2023-03-10 PROCEDURE — 85027 COMPLETE CBC AUTOMATED: CPT

## 2023-03-10 PROCEDURE — 2580000003 HC RX 258: Performed by: INTERNAL MEDICINE

## 2023-03-10 PROCEDURE — 85007 BL SMEAR W/DIFF WBC COUNT: CPT

## 2023-03-10 PROCEDURE — 6360000002 HC RX W HCPCS: Performed by: INTERNAL MEDICINE

## 2023-03-10 PROCEDURE — 83605 ASSAY OF LACTIC ACID: CPT

## 2023-03-10 PROCEDURE — 84484 ASSAY OF TROPONIN QUANT: CPT

## 2023-03-10 PROCEDURE — 87070 CULTURE OTHR SPECIMN AEROBIC: CPT

## 2023-03-10 PROCEDURE — 2500000003 HC RX 250 WO HCPCS: Performed by: STUDENT IN AN ORGANIZED HEALTH CARE EDUCATION/TRAINING PROGRAM

## 2023-03-10 PROCEDURE — 86140 C-REACTIVE PROTEIN: CPT

## 2023-03-10 PROCEDURE — 80048 BASIC METABOLIC PNL TOTAL CA: CPT

## 2023-03-10 PROCEDURE — 99232 SBSQ HOSP IP/OBS MODERATE 35: CPT | Performed by: NURSE PRACTITIONER

## 2023-03-10 PROCEDURE — 84145 PROCALCITONIN (PCT): CPT

## 2023-03-10 PROCEDURE — 6370000000 HC RX 637 (ALT 250 FOR IP): Performed by: INTERNAL MEDICINE

## 2023-03-10 PROCEDURE — 6360000002 HC RX W HCPCS: Performed by: STUDENT IN AN ORGANIZED HEALTH CARE EDUCATION/TRAINING PROGRAM

## 2023-03-10 RX ORDER — HEPARIN SODIUM 10000 [USP'U]/100ML
5-30 INJECTION, SOLUTION INTRAVENOUS CONTINUOUS
Status: DISCONTINUED | OUTPATIENT
Start: 2023-03-10 | End: 2023-03-11

## 2023-03-10 RX ORDER — ATORVASTATIN CALCIUM 40 MG/1
40 TABLET, FILM COATED ORAL NIGHTLY
Status: DISCONTINUED | OUTPATIENT
Start: 2023-03-10 | End: 2023-03-17 | Stop reason: HOSPADM

## 2023-03-10 RX ORDER — SODIUM HYPOCHLORITE 1.25 MG/ML
480 SOLUTION TOPICAL CONTINUOUS PRN
Status: DISCONTINUED | OUTPATIENT
Start: 2023-03-10 | End: 2023-03-16

## 2023-03-10 RX ORDER — CARVEDILOL 6.25 MG/1
6.25 TABLET ORAL 2 TIMES DAILY
Status: DISCONTINUED | OUTPATIENT
Start: 2023-03-10 | End: 2023-03-17 | Stop reason: HOSPADM

## 2023-03-10 RX ORDER — GABAPENTIN 400 MG/1
800 CAPSULE ORAL 2 TIMES DAILY
Status: DISCONTINUED | OUTPATIENT
Start: 2023-03-10 | End: 2023-03-10

## 2023-03-10 RX ORDER — GABAPENTIN 100 MG/1
100 CAPSULE ORAL 3 TIMES DAILY
Status: DISCONTINUED | OUTPATIENT
Start: 2023-03-10 | End: 2023-03-17 | Stop reason: HOSPADM

## 2023-03-10 RX ORDER — HEPARIN SODIUM 1000 [USP'U]/ML
60 INJECTION, SOLUTION INTRAVENOUS; SUBCUTANEOUS ONCE
Status: DISCONTINUED | OUTPATIENT
Start: 2023-03-10 | End: 2023-03-12

## 2023-03-10 RX ORDER — HEPARIN SODIUM 1000 [USP'U]/ML
30 INJECTION, SOLUTION INTRAVENOUS; SUBCUTANEOUS PRN
Status: DISCONTINUED | OUTPATIENT
Start: 2023-03-10 | End: 2023-03-12

## 2023-03-10 RX ORDER — ASPIRIN 81 MG/1
81 TABLET, CHEWABLE ORAL DAILY
Status: DISCONTINUED | OUTPATIENT
Start: 2023-03-11 | End: 2023-03-17 | Stop reason: HOSPADM

## 2023-03-10 RX ORDER — HEPARIN SODIUM 1000 [USP'U]/ML
60 INJECTION, SOLUTION INTRAVENOUS; SUBCUTANEOUS PRN
Status: DISCONTINUED | OUTPATIENT
Start: 2023-03-10 | End: 2023-03-12

## 2023-03-10 RX ADMIN — HYDROMORPHONE HYDROCHLORIDE 0.5 MG: 1 INJECTION, SOLUTION INTRAMUSCULAR; INTRAVENOUS; SUBCUTANEOUS at 13:00

## 2023-03-10 RX ADMIN — HEPARIN SODIUM 12 UNITS/KG/HR: 10000 INJECTION, SOLUTION INTRAVENOUS at 21:52

## 2023-03-10 RX ADMIN — PIPERACILLIN AND TAZOBACTAM 3375 MG: 3; .375 INJECTION, POWDER, LYOPHILIZED, FOR SOLUTION INTRAVENOUS at 06:46

## 2023-03-10 RX ADMIN — ATORVASTATIN CALCIUM 40 MG: 40 TABLET, FILM COATED ORAL at 21:06

## 2023-03-10 RX ADMIN — HEPARIN SODIUM 5000 UNITS: 5000 INJECTION INTRAVENOUS; SUBCUTANEOUS at 06:45

## 2023-03-10 RX ADMIN — ASPIRIN 325 MG: 325 TABLET, COATED ORAL at 17:35

## 2023-03-10 RX ADMIN — SODIUM HYPOCHLORITE: 1.25 SOLUTION TOPICAL at 13:53

## 2023-03-10 RX ADMIN — PANTOPRAZOLE SODIUM 40 MG: 40 TABLET, DELAYED RELEASE ORAL at 06:46

## 2023-03-10 RX ADMIN — ACETAMINOPHEN 650 MG: 325 TABLET ORAL at 17:35

## 2023-03-10 RX ADMIN — VANCOMYCIN HYDROCHLORIDE 750 MG: 750 INJECTION, POWDER, LYOPHILIZED, FOR SOLUTION INTRAVENOUS at 17:36

## 2023-03-10 RX ADMIN — HYDROMORPHONE HYDROCHLORIDE 0.5 MG: 1 INJECTION, SOLUTION INTRAMUSCULAR; INTRAVENOUS; SUBCUTANEOUS at 19:05

## 2023-03-10 RX ADMIN — HEPARIN SODIUM 5000 UNITS: 5000 INJECTION INTRAVENOUS; SUBCUTANEOUS at 13:50

## 2023-03-10 RX ADMIN — CARVEDILOL 6.25 MG: 6.25 TABLET, FILM COATED ORAL at 21:06

## 2023-03-10 RX ADMIN — PIPERACILLIN AND TAZOBACTAM 3375 MG: 3; .375 INJECTION, POWDER, LYOPHILIZED, FOR SOLUTION INTRAVENOUS at 19:05

## 2023-03-10 RX ADMIN — SODIUM CHLORIDE, PRESERVATIVE FREE 10 ML: 5 INJECTION INTRAVENOUS at 13:03

## 2023-03-10 RX ADMIN — GABAPENTIN 100 MG: 100 CAPSULE ORAL at 21:06

## 2023-03-10 ASSESSMENT — PAIN DESCRIPTION - LOCATION
LOCATION: COCCYX
LOCATION: COCCYX

## 2023-03-10 ASSESSMENT — PAIN DESCRIPTION - ORIENTATION: ORIENTATION: MID

## 2023-03-10 ASSESSMENT — PAIN DESCRIPTION - DESCRIPTORS
DESCRIPTORS: ACHING
DESCRIPTORS: ACHING

## 2023-03-10 ASSESSMENT — PAIN SCALES - GENERAL: PAINLEVEL_OUTOF10: 8

## 2023-03-10 NOTE — PROGRESS NOTES
Infectious Disease Progress Note  3/10/2023   Patient Name: Darlyn Aparicio : 1965   Impression  Sepsis Secondary to Probable Infected Acute on Chronic Decubitus Sacral Wound:  Chronic Necrotic Wounds: Left 3rd Finger, Left Hallux, Left Ankle and Heel:  Allergy to vancomycin (\"makes me very sick\"): Tolerating vancomycin this admit  Afebrile, Leukocytosis 27.7  CrCl 11 on HD  CRP and Pct on DWT  3/10-Reordered Sacral Wound Culture: Pending  3/8-BC 0/2 NGTD  3/8-CXR: Pulmonary vascular congestion with mild pulmonary edema. 3/9-Complete Echo: EF 55%, Grade I DD, septal wall is hypokinetic. Sclerotic, but non-stenotic AV. MAC with mild MR. Mild TR. No evidence of pericardial effusion. , general surgery, rec Irrigating wound VAC, placement 3/10  DMII:  ESRD on HD:  [place] onboard  CAD s/p CABG/ HFrEF:  PAD s/p RBKA :  Depression:  Multi-morbidity: per PMHx    Plan:  Continue IV Zosyn  Continue IV vancomycin per pharmacy dosing  Trend CRP and Pct, ordered  Await wound cultures, re-ordered 3/10 as was not collected  Await MRSA screen and urine culture    Ongoing Antimicrobial Therapy  Zosyn 3/9-  Vancomycin 3/9-? Completed Antimicrobial Therapy  ? History:? Interval history noted. Chief complaint: sepsis secondary to probable infected acute on chronic decubitus sacral wound. Chronic necrotic wounds of left 3rd finger, left hallux, left ankle and heel. Denies n/v/d/f or untoward effects of antibiotics  Physical Exam:  Vital Signs: BP 97/78   Pulse 80   Temp 98.3 °F (36.8 °C)   Resp 13   Ht 5' 3\" (1.6 m)   Wt 117 lb (53.1 kg)   SpO2 94%   BMI 20.73 kg/m²     Gen: drowsy, no distress  Wounds: C/D/I multiple extremity necrotic wounds. Irrigating wound VAC intact Sacral/coccyx wound. Remote wound from RBKA well healed. Chest: no distress and CTA. Good air movement. Room air. Heart: NSR and no MRG. Abd: soft, non-distended, no tenderness, no hepatomegaly.  Normoactive bowel sounds. Ext: no clubbing, cyanosis, or edema. S/p RBKA. Neuro: Mental status intact. CN 2-12 intact and no focal sensory or motor deficits     Radiologic / Imaging / TESTING  3/8/2023 XR Chest Portable:  Impression   Pulmonary vascular congestion with mild pulmonary edema. 3/9/2023 Echo Complete:    Summary   Ejection fraction is visually estimated at 55%. Grade I diastolic dysfunction. Septal wall is hypokinetic. Sclerotic, but non-stenotic aortic valve. MAC with mild mitral regurgitation. Mild tricuspid regurgitation; RVSP: 22 mmHg. No evidence of any pericardial effusion.     Labs:    Recent Results (from the past 24 hour(s))   POCT Glucose    Collection Time: 03/09/23  2:07 PM   Result Value Ref Range    POC Glucose 73 70 - 99 MG/DL   POCT Glucose    Collection Time: 03/09/23  4:06 PM   Result Value Ref Range    POC Glucose 80 70 - 99 MG/DL   POCT Glucose    Collection Time: 03/09/23  7:52 PM   Result Value Ref Range    POC Glucose 149 (H) 70 - 99 MG/DL   POCT Glucose    Collection Time: 03/10/23  1:41 AM   Result Value Ref Range    POC Glucose 134 (H) 70 - 99 MG/DL   Procalcitonin    Collection Time: 03/10/23  7:45 AM   Result Value Ref Range    Procalcitonin 4.51    Basic Metabolic Panel w/ Reflex to MG    Collection Time: 03/10/23  7:45 AM   Result Value Ref Range    Sodium 131 (L) 135 - 145 MMOL/L    Potassium 5.3 (H) 3.5 - 5.1 MMOL/L    Chloride 94 (L) 99 - 110 mMol/L    CO2 22 21 - 32 MMOL/L    Anion Gap 15 4 - 16    BUN 62 (H) 6 - 23 MG/DL    Creatinine 4.6 (H) 0.6 - 1.1 MG/DL    Est, Glom Filt Rate 11 (L) >60 mL/min/1.73m2    Glucose 115 (H) 70 - 99 MG/DL    Calcium 7.9 (L) 8.3 - 10.6 MG/DL   CBC with Auto Differential    Collection Time: 03/10/23  7:45 AM   Result Value Ref Range    WBC 27.7 (H) 4.0 - 10.5 K/CU MM    RBC 3.62 (L) 4.2 - 5.4 M/CU MM    Hemoglobin 11.4 (L) 12.5 - 16.0 GM/DL    Hematocrit 36.6 (L) 37 - 47 %    .1 (H) 78 - 100 FL    MCH 31.5 (H) 27 - 31 PG    MCHC 31.1 (L) 32.0 - 36.0 %    RDW 14.1 11.7 - 14.9 %    Platelets 161 (L) 883 - 440 K/CU MM    MPV 10.8 7.5 - 11.1 FL    Bands Relative 30 (H) 5 - 11 %    Segs Relative 68.0 (H) 36 - 66 %    Lymphocytes % 2.0 (L) 24 - 44 %    Bands Absolute 8.31 K/CU MM    Segs Absolute 18.8 K/CU MM    Lymphocytes Absolute 0.6 K/CU MM    Differential Type MANUAL DIFFERENTIAL    C-Reactive Protein    Collection Time: 03/10/23  7:45 AM   Result Value Ref Range    CRP High Sensitivity 259.1 (H) <5.0 mg/L   Lactic Acid    Collection Time: 03/10/23  7:45 AM   Result Value Ref Range    Lactate 1.9 0.5 - 1.9 mMOL/L   Troponin    Collection Time: 03/10/23  7:45 AM   Result Value Ref Range    Troponin T 0.076 (H) <0.01 NG/ML     CULTURE results: Invalid input(s): BLOOD CULTURE,  URINE CULTURE, SURGICAL CULTURE    Diagnosis:  Patient Active Problem List   Diagnosis    CAD (coronary artery disease)    Hyperlipidemia    Diabetes mellitus ([facility])    Chest pain    Hoarseness of voice    Fracture of metacarpal bone    Wrist sprain    History of tobacco abuse    Wrist pain    Carpal tunnel syndrome    Cubital tunnel syndrome    Trigger finger    S/P CABG x 4    Diabetic foot ulcer ([facility])    Diabetic neuropathy ([facility]    Diabetes mellitus type 2 with complications ([facility])    Claudication of both lower extremities ([facility])    SOB (shortness of breath)    Bilateral leg edema    SOB (shortness of breath)    Type 2 diabetes mellitus without complication ([facility])    Coronary artery disease involving coronary bypass graft of native heart with unstable angina pectoris ([facility])    S/P cardiac cath    Chronic renal failure, stage 2 (mild)    Chronic kidney disease (CKD) stage G4/A3, severely decreased glomerular filtration rate (GFR) between 15-29 mL/min/1.73 square meter and albuminuria creatinine ratio greater than 300 mg/g ([facility])    DM (diabetes mellitus) ([facility]    Coronary atherosclerosis    HTN (hypertension)    Proteinuria    Callus of foot    Non compliance with medical treatment    Diabetic ulcer of right heel associated with type 2 diabetes mellitus, with necrosis of bone (HCC)    Acute pain of right foot    Wound, open, foot with complication, left, sequela    ESRD on hemodialysis ([address]    Diabetic ulcer of right midfoot associated with type 2 diabetes mellitus, with necrosis of bone (HCC)    Pressure ulcer of sacral region, stage 3 (HCC)    S/P BKA (below knee amputation), right (HCC)    Diabetic ulcer of left foot associated with type 2 diabetes mellitus, with necrosis of muscle (HCC)    Ulcer of finger, with fat layer exposed ([address]    Sepsis ([address]    Severe malnutrition ([address]       Active Problems  Principal Problem:    Sepsis ([address]  Active Problems:    Severe malnutrition ([address]  Resolved Problems:    * No resolved hospital problems. *    Electronically signed by: Electronically signed by Dacia Castro.  RYAN oGdfrey CNP on 3/10/2023 at 12:04 PM

## 2023-03-10 NOTE — PLAN OF CARE
Problem: Discharge Planning  Goal: Discharge to home or other facility with appropriate resources  Outcome: Not Progressing  Flowsheets (Taken 3/10/2023 1827)  Discharge to home or other facility with appropriate resources:   Identify barriers to discharge with patient and caregiver   Arrange for needed discharge resources and transportation as appropriate   Identify discharge learning needs (meds, wound care, etc)  Note: Pt family at bedside, not sure if pt will be able to return home or need to go to facility for wound vac management     Problem: Skin/Tissue Integrity  Goal: Absence of new skin breakdown  Description: 1. Monitor for areas of redness and/or skin breakdown  2. Assess vascular access sites hourly  3. Every 4-6 hours minimum:  Change oxygen saturation probe site  4. Every 4-6 hours:  If on nasal continuous positive airway pressure, respiratory therapy assess nares and determine need for appliance change or resting period.   Outcome: Not Progressing  Note: Wound vac in place     Problem: ABCDS Injury Assessment  Goal: Absence of physical injury  Outcome: Progressing  Flowsheets (Taken 3/10/2023 1827)  Absence of Physical Injury: Implement safety measures based on patient assessment     Problem: Safety - Adult  Goal: Free from fall injury  Outcome: Progressing  Flowsheets (Taken 3/10/2023 1827)  Free From Fall Injury: Instruct family/caregiver on patient safety  Note: Pt is bedbound     Problem: Chronic Conditions and Co-morbidities  Goal: Patient's chronic conditions and co-morbidity symptoms are monitored and maintained or improved  Outcome: Progressing  Flowsheets (Taken 3/10/2023 1827)  Care Plan - Patient's Chronic Conditions and Co-Morbidity Symptoms are Monitored and Maintained or Improved:   Monitor and assess patient's chronic conditions and comorbid symptoms for stability, deterioration, or improvement   Collaborate with multidisciplinary team to address chronic and comorbid conditions and prevent exacerbation or deterioration     Problem: Nutrition Deficit:  Goal: Optimize nutritional status  Outcome: Progressing  Flowsheets (Taken 3/10/2023 1827)  Nutrient intake appropriate for improving, restoring, or maintaining nutritional needs:   Assess nutritional status and recommend course of action   Monitor oral intake, labs, and treatment plans   Recommend appropriate diets, oral nutritional supplements, and vitamin/mineral supplements  Note: Pt eating less than 50% of meals     Problem: Discharge Planning  Goal: Discharge to home or other facility with appropriate resources  Outcome: Not Progressing  Flowsheets (Taken 3/10/2023 1827)  Discharge to home or other facility with appropriate resources:   Identify barriers to discharge with patient and caregiver   Arrange for needed discharge resources and transportation as appropriate   Identify discharge learning needs (meds, wound care, etc)  Note: Pt family at bedside, not sure if pt will be able to return home or need to go to facility for wound vac management     Problem: Skin/Tissue Integrity  Goal: Absence of new skin breakdown  Description: 1. Monitor for areas of redness and/or skin breakdown  2. Assess vascular access sites hourly  3. Every 4-6 hours minimum:  Change oxygen saturation probe site  4. Every 4-6 hours:  If on nasal continuous positive airway pressure, respiratory therapy assess nares and determine need for appliance change or resting period.   Outcome: Not Progressing  Note: Wound vac in place

## 2023-03-10 NOTE — CONSULTS
.Negative Pressure Wound Therapy    NAME:  Jovanny Valente  YOB: 1965  MEDICAL RECORD NUMBER:  [id number]  DATE:  3/8/2023    Applied Negative Pressure to sacral wound(s)/ulcer(s). [x] Applied skin barrier prep to kamla-wound. [x] Cut strips of plastic drape to picture frame wound so that kamla-wound is     covered with the drape. [x] If bridging dressing to less prominent site, cover any intact skin that will come in contact with the Negative Pressure Therapy sponge, gauze or channel drain with plastic drape. The sponge should never touch intact skin. [x] Cut sponge, gauze or channel drain to size which will fit into the wound/ulcer bed without being forced. [x] Be sure the sponge is large enough to hold the entire round plastic flange which is attached to the tubing. Never allow flange to be larger than the sponge or it will produce suction damaging intact skin. Total number of individual pieces of foam used within the wound bed: 2    [x] If bridging the dressing away from the primary site, be sure the bridge leads to a piece of sponge large enough to hold the entire flange without allowing any of the flange to overlap onto intact skin. [x] Covered sponge, gauze or channel drain with plastic drape. [x] Cut a hole in this plastic drape directly over the sponge the same size as the plastic drain tubing. [x] Removed plastic liner from flange and apply it directly over the hole you cut. [x] Removed the plastic cover from the flange. [x] Attached the tubing to the wound/ulcer Negative Pressure Therapy and turn it on to be sure a vacuum is created and that there are no leaks. [x] If air leaks occur, use plastic drape to patch them. [x] Secured Negative Pressure Therapy dressing with ace wrap loosely if located on an extremity. Maintain tubing outside of ace wrap. Tubing must not exert pressure on intact skin.     Applied per  Guidelines      Electronically signed by Scott Terrence Ramirez RN on 3/10/2023 at 3:19 PM

## 2023-03-10 NOTE — PROGRESS NOTES
Comprehensive Nutrition Assessment    Type and Reason for Visit:  Reassess    Nutrition Recommendations/Plan:   Continue current diet  Trial wound healing oral nutrition supplement bid  Begin renal oral nutrition supplement daily     Malnutrition Assessment:  Malnutrition Status:  Severe malnutrition (03/09/23 1527)    Context:  Acute Illness     Findings of the 6 clinical characteristics of malnutrition:  Energy Intake:  50% or less of estimated energy requirements for 5 or more days  Weight Loss:  Greater than 7.5% over 3 months     Body Fat Loss: Moderate body fat loss (Severe body fat) Triceps, Buccal region, Orbital   Muscle Mass Loss:   (Severe muscle mass loss) Clavicles (pectoralis & deltoids), Thigh (quadraceps), Calf (gastrocnemius)  Fluid Accumulation:  No significant fluid accumulation     Strength:  Not Performed    Nutrition Assessment:    Weak and deconditioned noted per surgery, placing vac today. Diet advanced yesterday to Low Potassium/1800 ml fluids. Pt reportedly eating some, no po intakes recorded yet. Will trial supplements for her and continue to follow as high nutrition risk. Nutrition Related Findings:    K 5.3 Wound Type: Multiple, Pressure Injury, Stage III, Stage IV (Arterial x 3 wounds on left foot)       Current Nutrition Intake & Therapies:    Average Meal Intake: Unable to assess  Average Supplements Intake: None Ordered  ADULT DIET; Regular; Low Potassium (Less than 3000 mg/day); 1800 ml    Anthropometric Measures:  Height: 5' 3\" (160 cm)  Ideal Body Weight (IBW): 115 lbs (52 kg)    Admission Body Weight: 116 lb 13.5 oz (53 kg)  Current Body Weight: 117 lb (53.1 kg), 101.6 % IBW.  Weight Source: Bed Scale  Current BMI (kg/m2): 20.7  Usual Body Weight: 128 lb (58.1 kg) (12/5/22 post op, 166 lb 4 oz 9/8/22)  % Weight Change (Calculated): -8.7  Weight Adjustment For: Amputation  Total Adjusted Percentage (Calculated): 5.9  Adjusted Ideal Body Weight (lbs) (Calculated): 108.2 lbs  Adjusted Ideal Body Weight (kg) (Calculated): 49.18 kg  Adjusted % Ideal Body Weight (Calculated): 108  Adjusted BMI (kg/m2) (Calculated): 21.9  BMI Categories: Normal Weight (BMI 18.5-24.9) (adjusted)    Estimated Daily Nutrient Needs:  Energy Requirements Based On: Kcal/kg  Weight Used for Energy Requirements: Current  Energy (kcal/day): 0124-7641 (30-35 kcals/kg)  Weight Used for Protein Requirements: Adjusted  Protein (g/day): 59-74 (1.2-1.5 g/kg)  Method Used for Fluid Requirements: Standard Renal  Fluid (ml/day): 1800 ml    Nutrition Diagnosis:   Severe malnutrition, In context of acute illness or injury related to psychological cause or life stress, inadequate protein-energy intake, catabolic illness as evidenced by poor intake prior to admission, severe muscle loss, weight loss 7.5% in 3 months, moderate loss of subcutaneous fat    Nutrition Interventions:   Food and/or Nutrient Delivery: Continue Current Diet, Start Oral Nutrition Supplement  Nutrition Education/Counseling: Education initiated  Coordination of Nutrition Care: Continue to monitor while inpatient, Coordination of Care  Plan of Care discussed with: pt, dtr Manav Matthew, RN, PS MD over malnutrition dx    Goals:  Previous Goal Met: Progressing toward Goal(s)  Goals: Meet at least 75% of estimated needs       Nutrition Monitoring and Evaluation:   Behavioral-Environmental Outcomes: None Identified  Food/Nutrient Intake Outcomes: Food and Nutrient Intake, Supplement Intake  Physical Signs/Symptoms Outcomes: Biochemical Data, Meal Time Behavior, Nutrition Focused Physical Findings, Nausea or Vomiting, GI Status, Skin, Weight    Discharge Planning:    Continue Oral Nutrition Supplement     Berry Peralta, [address] [place] [address],   Contact: [phone]

## 2023-03-10 NOTE — CONSULTS
[facility] Continence Nurse  Consult Note       Jackelin Gomez  AGE: 62 y.o.    GENDER: female  : 1965  TODAY'S DATE:  3/10/2023    Subjective:     Reason for Evaluation and Assessment: Wound care vac application       Jackelin Gomez is a 62 y.o. female referred by:   [x] Physician  [] Nursing  [] Other:     Wound Identification:  Wound Type: arterial and pressure  Contributing Factors: diabetes, poor glucose control, chronic pressure, decreased mobility, shear force, arterial insufficiency, decreased tissue oxygenation, and malnutrition        PAST MEDICAL HISTORY        Diagnosis Date    Acute pain of right foot 2022    CAD (coronary artery disease)     s/p CABG x 4;  follows with Dr Kristi Menchaca of foot 2018    Carpal tunnel syndrome on both sides     CHF (congestive heart failure) ([address] 10/2010    Chronic diastolic; EF 60%    Chronic kidney disease     stage 4 kidney disease    Chronic ulcer of left ankle with fat layer exposed ([address] 10/07/2015    Chronic ulcer of left foot with fat layer exposed ([address] 2016    Chronic ulcer of right ankle with fat layer exposed ([address] 2016    Chronic ulcer of right foot with fat layer exposed ([address] 2016    Decubitus ulcer of sacral region, stage 1 2018    Depression     Diabetes mellitus ([address]     Diabetes mellitus with neurological manifestations ([address]     Diabetes mellitus with ulcer of ankle ([address]     Diabetic ulcer of left foot associated with type 2 diabetes mellitus, with fat layer exposed ([address] 2018    Diabetic ulcer of right heel associated with type 2 diabetes mellitus, with muscle involvement without evidence of necrosis ([address] 2022    Diabetic ulcer of right heel associated with type 2 diabetes mellitus, with necrosis of bone ([address] 2022    ESRD on hemodialysis ([address] 2022    Family history of cardiovascular disease     Mother    GERD (gastroesophageal reflux disease)     H/O cardiac catheterization 10/18/2010, 6/3/2010    10/18/2010-Four bypass grafts all widely patent. 6/3/2010- Moderate to severe triple vessel disease, preserved LV systolic function. H/O cardiovascular stress test 7/26/2012, 8/3/2011, 10/14/2010, 5/24/2010 7/26/2012-Lexiscan- Normal Myocardial Perfusion Study. Post stress myocardial perfusion images show a normal pattern of perfusion in all regions. Post stress LV normal size. Normal perfusion in the distribution of all coronaries. Normal LV size and function. Rest EF 70%    H/O chest x-ray 7/12/2012, 6/2/2010 7/12/2012-Perihilar peribronchial cuffing with mild basilar predominant interstital prominence, which may reflect interstitial edema or atypical pneumonia. H/O Doppler ultrasound 06/04/2010    CAROTID DOPPLER-6/4/2010-No Doppler evidence of hemodynamically significant Carotid Stenosis with antegrade flow in the vertebral arteries bilaterally. 6/4/2010 PERIPHERAL VENOUS DOPPLER_ LEFT Saphenous Vein mapping    H/O echocardiogram 7/26/2012, 8/3/2011, 10/2010, 7/23/2010, 6/8/2010 7/26/2012-LV normal size. Normal LV wall thickness. LV systolic function normal. EF => 55%. LV wall motion normal. Mild MR. Mild TR. History of blood transfusion     History of complete ECG 7/26/2012 [facility]), 7/13/2012 [facility]), 7/25/2011, 3/25/2011, 10/18/2010, 10/11/2010, 6/23/2010, 5/7/2010    Hx of cardiovascular stress test 09/03/2015    lexiscan-normal,EF66%    Hx of Doppler ultrasound 04/05/2016    Arterial: There is a fluid collection in the left thigh, please refer to PCP for further monitoring, no vascular in etiology. Hx of echocardiogram     4/16 EF40% Mild MR/TR and mild Pulm HTN. 9/15 EF 45-50%, Mildly dilated L atrium, mildly dilated R ventricle. Mild MR & mild TR     Hyperlipidemia     Neuropathy of foot     Pt reports starting approx. 6-7 years ago    Neuropathy of hand     Pt reports starting approx.  6-7 years ago    Non compliance with medical treatment 07/02/2018    Pressure ulcer of sacral region, stage 2 ([address] 11/29/2022    Pressure ulcer of sacral region, stage 3 ([address] 11/29/2022    S/P BKA (below knee amputation), right ([address] 11/29/2022    S/P CABG x 4 06/05/2010    LIMA-> LAD;  VG->CX;  VG->Diagonal; VG-> distal RCA  per  Dr Randall Jean    Type 2 diabetes mellitus with diabetic polyneuropathy, with long-term current use of insulin ([address] 04/05/2016    Type II or unspecified type diabetes mellitus with neurological manifestations, not stated as uncontrolled(250.60)     Type II or unspecified type diabetes mellitus with other specified manifestations, not stated as uncontrolled     Ulcer of finger, with fat layer exposed ([address] 2/7/2023    Ulcer of left foot, with fat layer exposed ([address] 12/19/2013    Ulcer of other part of foot        PAST SURGICAL HISTORY    Past Surgical History:   Procedure Laterality Date    APPENDECTOMY      CARDIAC SURGERY      CARPAL TUNNEL RELEASE      L hand Nov. 2011, R hand Jan. 2012    CARPAL TUNNEL RELEASE Right 6/10/2013    recurrent    CARPAL TUNNEL RELEASE Left 7/29/2013    with ulnar nerve transpostion and L middle finger release    CHOLECYSTECTOMY      COLONOSCOPY      CORONARY ARTERY BYPASS GRAFT  6/5/2010 6/5/2010-LIMA->LAD;  VG-> Diagonal;  VG-> Obtuse marginal;  VG->Distal RCA-   Dr Vin James Right 6/10/2013    HYSTERECTOMY, TOTAL ABDOMINAL (CERVIX REMOVED)      LEG AMPUTATION BELOW KNEE Right 11/11/2022    RIGHT LEG AMPUTATION BELOW KNEE WITH IPOP performed by Abiel Ramirez MD at [place] [id number] Left 02/14/144th toe    TUBAL LIGATION      ULNAR TUNNEL RELEASE Right 6/10/2013       FAMILY HISTORY    Family History   Problem Relation Age of Onset    Heart Disease Mother     Kidney Disease Mother         dialysis    Cancer Father        SOCIAL HISTORY    Social History     Tobacco Use    Smoking status: Every Day     Packs/day: 0.25     Years: 30.00     Pack years: 7.50     Types: Cigarettes     Last attempt to quit: 2022     Years since quittin.4    Smokeless tobacco: Never    Tobacco comments:     quit smoking ciagarettes 16   Vaping Use    Vaping Use: Every day    Substances: Never   Substance Use Topics    Alcohol use: No     Alcohol/week: 0.0 standard drinks     Comment:                                    CAFFEINE: 2 sodas daily    Drug use: No       ALLERGIES    Allergies   Allergen Reactions    Latex     Codeine     Cortisone     Cortizone     Dilaudid [Hydromorphone]      \"They OD'd me\"    Iodides     Phenobarbital Other (See Comments)     Hallucinations     Vancomycin Other (See Comments)     \"makes me very sick\"       MEDICATIONS    No current facility-administered medications on file prior to encounter.     Current Outpatient Medications on File Prior to Encounter   Medication Sig Dispense Refill    clobetasol (TEMOVATE) 0.05 % ointment Apply topically 2 times daily. 45 g 1    silver sulfADIAZINE (SILVADENE) 1 % cream Apply topically daily. 1000 g 1    calcium acetate (PHOSLO) 667 MG CAPS capsule Take 1 capsule with each meal and 1 tablet with each snack. 540 capsule 3    gabapentin (NEURONTIN) 400 MG capsule Take 800 mg by mouth in the morning and at bedtime.      OXYGEN Inhale 2 L into the lungs daily as needed      carvedilol (COREG) 6.25 MG tablet 6.25 mg 2 times daily       albuterol sulfate HFA (PROVENTIL;VENTOLIN;PROAIR) 108 (90 Base) MCG/ACT inhaler Inhale 2 puffs into the lungs every 6 hours as needed for Wheezing      esomeprazole Magnesium (NEXIUM) 40 MG PACK Take 20 mg by mouth 2 times daily 2 tabs      LORazepam (ATIVAN) 0.5 MG tablet Take 0.5 mg by mouth every 12 hours Indications: One Tablet twice daily       lisinopril (PRINIVIL;ZESTRIL) 10 MG tablet Take 20 mg by mouth daily      furosemide (LASIX) 20 MG tablet Take 40 mg by mouth 2 times daily            Objective:      /66   Pulse 81   Temp 98.3 °F (36.8 °C)   Resp 18   Ht 5' 3\" (1.6 m)   Wt 117 lb (53.1 kg)   SpO2 95%   BMI 20.73 kg/m²   Grady Risk Score: Grady Scale Score: 13    LABS    CBC:   Lab Results   Component Value Date/Time    WBC 27.7 03/10/2023 07:45 AM    RBC 3.62 03/10/2023 07:45 AM    HGB 11.4 03/10/2023 07:45 AM    HCT 36.6 03/10/2023 07:45 AM    .1 03/10/2023 07:45 AM    MCH 31.5 03/10/2023 07:45 AM    MCHC 31.1 03/10/2023 07:45 AM    RDW 14.1 03/10/2023 07:45 AM     03/10/2023 07:45 AM    MPV 10.8 03/10/2023 07:45 AM     CMP:    Lab Results   Component Value Date/Time     03/10/2023 07:45 AM    K 5.3 03/10/2023 07:45 AM    CL 94 03/10/2023 07:45 AM    CO2 22 03/10/2023 07:45 AM    BUN 62 03/10/2023 07:45 AM    CREATININE 4.6 03/10/2023 07:45 AM    GFRAA 9 10/12/2022 02:45 PM    LABGLOM 11 03/10/2023 07:45 AM    GLUCOSE 115 03/10/2023 07:45 AM    PROT 6.7 03/08/2023 06:07 PM    PROT 6.2 02/01/2013 07:55 AM    LABALBU 2.1 03/08/2023 06:07 PM    CALCIUM 7.9 03/10/2023 07:45 AM    BILITOT 0.9 03/08/2023 06:07 PM    ALKPHOS 983 03/08/2023 06:07 PM    AST 14 03/08/2023 06:07 PM    ALT <5 03/08/2023 06:07 PM     Albumin:    Lab Results   Component Value Date/Time    LABALBU 2.1 03/08/2023 06:07 PM     PT/INR:    Lab Results   Component Value Date/Time    PROTIME 14.3 09/21/2022 06:10 AM    PROTIME 10.6 10/18/2010 11:21 AM    INR 1.11 09/21/2022 06:10 AM     HgBA1c:    Lab Results   Component Value Date/Time    LABA1C 13.6 10/15/2014 09:56 AM         Assessment:     Patient Active Problem List   Diagnosis    CAD (coronary artery disease)    Hyperlipidemia    Diabetes mellitus (HCC)    Chest pain    Hoarseness of voice    Fracture of metacarpal bone    Wrist sprain    History of tobacco abuse    Wrist pain    Carpal tunnel syndrome    Cubital tunnel syndrome    Trigger finger    S/P CABG x 4    Diabetic foot ulcer (HCC)    Diabetic neuropathy ([address]    Diabetes mellitus type 2 with complications ([address]    Claudication of both lower extremities (HCC)    SOB (shortness of breath)    Bilateral leg edema    SOB (shortness of breath)    Type 2 diabetes mellitus without complication (HCC)    Coronary artery disease involving coronary bypass graft of native heart with unstable angina pectoris (HCC)    S/P cardiac cath    Chronic renal failure, stage 2 (mild)    Chronic kidney disease (CKD) stage G4/A3, severely decreased glomerular filtration rate (GFR) between 15-29 mL/min/1.73 square meter and albuminuria creatinine ratio greater than 300 mg/g (HCC)    DM (diabetes mellitus) (HCC)    Coronary atherosclerosis    HTN (hypertension)    Proteinuria    Callus of foot    Non compliance with medical treatment    Diabetic ulcer of right heel associated with type 2 diabetes mellitus, with necrosis of bone (HCC)    Acute pain of right foot    Wound, open, foot with complication, left, sequela    ESRD on hemodialysis (HCC)    Diabetic ulcer of right midfoot associated with type 2 diabetes mellitus, with necrosis of bone (HCC)    Pressure ulcer of sacral region, stage 3 (HCC)    S/P BKA (below knee amputation), right (HCC)    Diabetic ulcer of left foot associated with type 2 diabetes mellitus, with necrosis of muscle (HCC)    Ulcer of finger, with fat layer exposed (HCC)    Sepsis (HCC)    Severe malnutrition ([facility])       Measurements:  Negative Pressure Wound Therapy Foot (Active)   Number of days: 3310       Negative Pressure Wound Therapy Foot Distal (Active)   Number of days: 3306       Negative Pressure Wound Therapy (Active)   Number of days: 170       Incision 06/10/13 Wrist Right (Active)   Number of days: 3560       Incision 07/29/13 Wrist Left (Active)   Number of days: 3511       Incision 02/14/14 Toe (Comment  which one) (Active)   Number of days: 3310       Wound 11/01/22 #2 Sacrum (Active)   Wound Image   03/09/23 1013   Wound Etiology Pressure Stage 4 03/10/23 0200   Dressing Status Clean;Dry;Intact 03/10/23 0200   Wound Cleansed Cleansed with saline 03/09/23 1013  Dressing/Treatment Alginate with Ag;Silicone border 95/38/23 1013   Offloading for Diabetic Foot Ulcers Other (comment) 03/09/23 0450   Dressing Change Due 03/09/23 03/09/23 1013   Wound Length (cm) 4 cm 03/09/23 1013   Wound Width (cm) 3.9 cm 03/09/23 1013   Wound Depth (cm) 1.2 cm 03/09/23 1013   Wound Surface Area (cm^2) 15.6 cm^2 03/09/23 1013   Change in Wound Size % (l*w) -212 03/09/23 1013   Wound Volume (cm^3) 18.72 cm^3 03/09/23 1013   Wound Healing % -3644 03/09/23 1013   Post-Procedure Length (cm) 3 cm 02/21/23 1004   Post-Procedure Width (cm) 1.1 cm 02/21/23 1004   Post-Procedure Depth (cm) 1.5 cm 02/21/23 1004   Post-Procedure Surface Area (cm^2) 3.3 cm^2 02/21/23 1004   Post-Procedure Volume (cm^3) 4.95 cm^3 02/21/23 1004   Distance Tunneling (cm) 0 cm 03/09/23 1013   Tunneling Position ___ O'Clock 0 03/09/23 1013   Undermining Starts ___ O'Clock 12 03/09/23 1013   Undermining Ends___ O'Clock 12 03/09/23 1013   Undermining Maxium Distance (cm) 4.5 03/09/23 1013   Wound Assessment Pink/red;Slough;Eschar moist 03/09/23 1013   Drainage Amount Copious 03/09/23 1013   Drainage Description Purulent 03/09/23 1013   Odor Malodorous/putrid 03/09/23 1013   Lora-wound Assessment Excoriated;Fragile; Maceration 03/09/23 1013   Margins Undefined edges 03/09/23 1013   Wound Thickness Description not for Pressure Injury Full thickness 03/09/23 1013   Number of days: 129       Wound 11/11/22  #1 Left Great Toe (Active)   Wound Image   03/09/23 1013   Wound Etiology Arterial 03/10/23 0200   Dressing Status Clean;Dry; Intact 03/10/23 0200   Wound Cleansed Cleansed with saline 03/09/23 1013   Dressing/Treatment Betadine swabs/povidone iodine 03/09/23 1013   Offloading for Diabetic Foot Ulcers Other (comment) 03/09/23 0450   Dressing Change Due 03/10/23 03/09/23 1013   Wound Length (cm) 2 cm 03/09/23 1013   Wound Width (cm) 3.4 cm 03/09/23 1013   Wound Depth (cm) 0.1 cm 03/09/23 1013   Wound Surface Area (cm^2) 6.8 cm^2 03/09/23 1013   Change in Wound Size % (l*w) -51.11 03/09/23 1013   Wound Volume (cm^3) 0.68 cm^3 03/09/23 1013   Post-Procedure Length (cm) 1.8 cm 02/21/23 1004   Post-Procedure Width (cm) 3.6 cm 02/21/23 1004   Post-Procedure Depth (cm) 0.1 cm 02/21/23 1004   Post-Procedure Surface Area (cm^2) 6.48 cm^2 02/21/23 1004   Post-Procedure Volume (cm^3) 0.648 cm^3 02/21/23 1004   Distance Tunneling (cm) 0 cm 03/09/23 1013   Tunneling Position ___ O'Clock 0 03/09/23 1013   Undermining Starts ___ O'Clock 0 03/09/23 1013   Undermining Ends___ O'Clock 0 03/09/23 1013   Undermining Maxium Distance (cm) 0 03/09/23 1013   Wound Assessment Eschar dry 03/09/23 1013   Drainage Amount None 03/09/23 1013   Odor None 03/09/23 1013   Lora-wound Assessment Dry/flaky 03/09/23 1013   Margins Defined edges 03/09/23 1013   Wound Thickness Description not for Pressure Injury Full thickness 03/09/23 1013   Number of days: 118       Wound 11/15/22 #3 Left Posterior Ankle Cluster (Active)   Wound Image   03/09/23 1013   Wound Etiology Arterial 03/10/23 0200   Dressing Status New dressing applied;Clean;Dry; Intact 03/09/23 1013   Wound Cleansed Cleansed with saline 03/09/23 1013   Dressing/Treatment Betadine swabs/povidone iodine 03/09/23 1013   Offloading for Diabetic Foot Ulcers Other (comment) 03/09/23 0450   Dressing Change Due 03/10/23 03/09/23 1013   Wound Length (cm) 6.5 cm 03/09/23 1013   Wound Width (cm) 4.5 cm 03/09/23 1013   Wound Depth (cm) 0.1 cm 03/09/23 1013   Wound Surface Area (cm^2) 29.25 cm^2 03/09/23 1013   Change in Wound Size % (l*w) -1362.5 03/09/23 1013   Wound Volume (cm^3) 2.925 cm^3 03/09/23 1013   Wound Healing % -1363 03/09/23 1013   Post-Procedure Length (cm) 1.9 cm 02/21/23 1004   Post-Procedure Width (cm) 2.5 cm 02/21/23 1004   Post-Procedure Depth (cm) 0.3 cm 02/21/23 1004   Post-Procedure Surface Area (cm^2) 4.75 cm^2 02/21/23 1004   Post-Procedure Volume (cm^3) 1.425 cm^3 02/21/23 1004   Distance Tunneling (cm) 0 cm 03/09/23 1013   Tunneling Position ___ O'Clock 0 03/09/23 1013   Undermining Starts ___ O'Clock 0 03/09/23 1013   Undermining Ends___ O'Clock 0 03/09/23 1013   Undermining Maxium Distance (cm) 0 03/09/23 1013   Wound Assessment Dry 03/09/23 1013   Drainage Amount None 03/09/23 1013   Drainage Description Thick; Yellow 03/09/23 0450   Odor None 03/09/23 1013   Lora-wound Assessment Fragile;Ecchymosis;Dry/flaky 03/09/23 1013   Margins Attached edges 03/09/23 1013   Wound Thickness Description not for Pressure Injury Full thickness 03/09/23 0450   Number of days: 115       Wound 11/15/22 #4 Left Heel (Active)   Wound Image   03/09/23 1013   Dressing Status New dressing applied;Clean;Dry; Intact 03/09/23 1013   Wound Cleansed Cleansed with saline 03/09/23 1013   Dressing/Treatment Betadine swabs/povidone iodine 03/09/23 1013   Offloading for Diabetic Foot Ulcers Other (comment) 03/09/23 0450   Dressing Change Due 03/10/23 03/09/23 1013   Wound Length (cm) 5.5 cm 03/09/23 1013   Wound Width (cm) 3.5 cm 03/09/23 1013   Wound Depth (cm) 0.1 cm 03/09/23 1013   Wound Surface Area (cm^2) 19.25 cm^2 03/09/23 1013   Change in Wound Size % (l*w) -285 03/09/23 1013   Wound Volume (cm^3) 1.925 cm^3 03/09/23 1013   Wound Healing % -285 03/09/23 1013   Post-Procedure Length (cm) 4 cm 02/21/23 1004   Post-Procedure Width (cm) 3.5 cm 02/21/23 1004   Post-Procedure Depth (cm) 0.1 cm 02/21/23 1004   Post-Procedure Surface Area (cm^2) 14 cm^2 02/21/23 1004   Post-Procedure Volume (cm^3) 1.4 cm^3 02/21/23 1004   Distance Tunneling (cm) 0 cm 03/09/23 1013   Tunneling Position ___ O'Clock 0 03/09/23 1013   Undermining Starts ___ O'Clock 0 03/09/23 1013   Undermining Ends___ O'Clock 0 03/09/23 1013   Undermining Maxium Distance (cm) 0 03/09/23 1013   Wound Assessment Eschar dry 03/09/23 1013   Drainage Amount None 03/09/23 1013   Odor None 03/09/23 1013   Lora-wound Assessment Dry/flaky 03/09/23 1013   Margins Defined edges 03/09/23 1013   Wound Thickness Description not for Pressure Injury Full thickness 03/09/23 1013   Number of days: 115       Wound 02/07/23 #5 Left 3rd Finger (Active)   Wound Image   03/09/23 1013   Wound Etiology Other 03/09/23 1013   Dressing Status Clean;Dry; Intact 03/10/23 0200   Wound Cleansed Cleansed with saline 03/09/23 1013   Dressing/Treatment Betadine swabs/povidone iodine;Silicone border 48/82/96 1013   Dressing Change Due 03/10/23 03/09/23 1013   Wound Length (cm) 1.4 cm 03/09/23 1013   Wound Width (cm) 1.4 cm 03/09/23 1013   Wound Depth (cm) 0.2 cm 03/09/23 1013   Wound Surface Area (cm^2) 1.96 cm^2 03/09/23 1013   Change in Wound Size % (l*w) -36.11 03/09/23 1013   Wound Volume (cm^3) 0.392 cm^3 03/09/23 1013   Wound Healing % -36 03/09/23 1013   Distance Tunneling (cm) 0 cm 03/09/23 1013   Tunneling Position ___ O'Clock 0 03/09/23 1013   Undermining Starts ___ O'Clock 0 03/09/23 1013   Undermining Ends___ O'Clock 0 03/09/23 1013   Undermining Maxium Distance (cm) 0 03/09/23 1013   Wound Assessment Dry;Devitalized tissue 03/09/23 1013   Drainage Amount None 03/09/23 1013   Odor None 03/09/23 1013   Lora-wound Assessment Dry/flaky 03/09/23 1013   Margins Defined edges 03/09/23 1013   Wound Thickness Description not for Pressure Injury Full thickness 03/09/23 1013   Number of days: 31       Wound 03/09/23 Left Medial Foot (Active)   Wound Image   03/09/23 1013   Wound Etiology Pressure Stage 3 03/10/23 0200   Dressing Status Clean;Dry; Intact 03/10/23 0200   Wound Cleansed Cleansed with saline 03/09/23 1013   Dressing/Treatment Betadine swabs/povidone iodine;Silicone border 93/16/89 1013   Dressing Change Due 03/10/23 03/09/23 1013   Wound Length (cm) 0.5 cm 03/09/23 1013   Wound Width (cm) 0.5 cm 03/09/23 1013   Wound Depth (cm) 0.2 cm 03/09/23 1013   Wound Surface Area (cm^2) 0.25 cm^2 03/09/23 1013   Wound Volume (cm^3) 0.05 cm^3 03/09/23 1013   Distance Tunneling (cm) 0 cm 03/09/23 1013   Tunneling Position ___ O'Clock 0 03/09/23 1013   Undermining Starts ___ O'Clock 0 03/09/23 1013   Undermining Ends___ O'Clock 0 03/09/23 1013   Undermining Maxium Distance (cm) 0 03/09/23 1013   Wound Assessment Slough 03/09/23 1013   Drainage Amount Scant 03/09/23 1013   Drainage Description Yellow 03/09/23 1013   Odor None 03/09/23 1013   Lora-wound Assessment Dry/flaky; Hyperkeratosis (callous) 03/09/23 1013   Margins Defined edges 03/09/23 1013   Wound Thickness Description not for Pressure Injury Full thickness 03/09/23 1013   Number of days: 1       Wound 03/10/23 Right Hip (Active)   Wound Image   03/10/23 1304   Wound Etiology Deep tissue/Injury 03/10/23 1304   Dressing Status New dressing applied 03/10/23 1304   Dressing/Treatment Silicone border 65/12/49 1304   Dressing Change Due 03/13/23 03/10/23 1304   Wound Length (cm) 2.5 cm 03/10/23 1304   Wound Width (cm) 2 cm 03/10/23 1304   Wound Depth (cm) 0 cm 03/10/23 1304   Wound Surface Area (cm^2) 5 cm^2 03/10/23 1304   Wound Volume (cm^3) 0 cm^3 03/10/23 1304   Distance Tunneling (cm) 0 cm 03/10/23 1304   Tunneling Position ___ O'Clock 0 03/10/23 1304   Undermining Starts ___ O'Clock 0 03/10/23 1304   Undermining Ends___ O'Clock 0 03/10/23 1304   Undermining Maxium Distance (cm) 0 03/10/23 1304   Wound Assessment Purple/maroon 03/10/23 1304   Drainage Amount None 03/10/23 1304   Odor None 03/10/23 1304   Lora-wound Assessment Non-blanchable erythema 03/10/23 1304   Margins Undefined edges 03/10/23 1304   Number of days: 0       Response to treatment:  With complaints of pain.      Pain Assessment:  Severity:  moderate  Quality of pain: sharp   Wound Pain Timing/Severity: wound care   Premedicated: no     Plan:     Plan of Care: Wound 02/07/23 #5 Left 3rd Finger-Dressing/Treatment: Betadine swabs/povidone iodine, Silicone border  Wound 11/11/22  #1 Left Great Toe-Dressing/Treatment: Betadine swabs/povidone iodine  Wound 11/15/22 #3 Left Posterior Ankle Cluster-Dressing/Treatment: Betadine swabs/povidone iodine  Wound 11/15/22 #4 Left Heel-Dressing/Treatment: Betadine swabs/povidone iodine  Wound 11/01/22 #2 Sacrum-Dressing/Treatment: Alginate with Ag, Silicone border  Wound 03/09/23 Left Medial Foot-Dressing/Treatment: Betadine swabs/povidone iodine, Silicone border  Wound 03/10/23 Right Hip-Dressing/Treatment: Silicone border    Patient in bed, agreeable to irrigating wound vac placement ordered by Dr Janki To. Irrigated vac with 10 ml dakins every 2 hours for 2 minutes. 2 pieces of veraflo cleanse choice foam in wound cavity. Bridged to right hip. Irrigating vac functioning without leaks. Arterial wounds to left foot. A moderate amount of pain when positioning. Wound was foul smelling with copious purulent drainage. Pt is a moderate risk for skin breakdown AEB Grady. Follow Grady orders. Dolphin mattress ordered. Patient Left side lying with pillow support. Left Heel elevated with pillow support. Pillow between knees. Bed in lowest position, Call light and bedside table within reach. Nurse Updated. Specialty Bed Required : Yes   [x] Low Air Loss   [x] Pressure Redistribution  [] Fluid Immersion  [] Bariatric  [] Total Pressure Relief  [] Other:     Discharge Plan:  Placement for patient upon discharge: TBD  Hospice Care: No   Patient appropriate for [address].  Yes     Patient/Caregiver Teaching:  Level of patient/caregiver understanding able to:  [  ] Indicates understanding        [ Y ] Needs reinforcement  [ ] Unsuccessful       [ ] Verbal Understanding  [ ] Demonstrated understanding        [ ] No evidence of learning  [ ] Refused teaching          [ ] N/A       Electronically signed by María Teixeira RN,on 3/10/2023 at 3:20 PM

## 2023-03-10 NOTE — CARE COORDINATION
[facility] unable to accept pt per [facility]. Notified pt and requested a new SNF choice. Pt is a dialysis pt. CM informed her that it will be difficult to find a SNF that will accept her d/t transportation to and from dialysis. CM informed her that [address] [place] [address] and [facility] provide dialysis in there facilities. Pt is agreeable for referral to be made to [address] [place] [address]. Referral made to [facility] via confidential VM.   Pt will require a pre-cert on day of d/c.  Placed on CM weekend f/u list.  TE

## 2023-03-10 NOTE — PROGRESS NOTES
V2.0  [facility] [place]      Name:  Marlena Pyle /Age/Sex: 1965  (62 y.o. female)   MRN & CSN:  [id number] & [id number] Encounter Date/Time: 3/10/2023 2:38 PM EST    Location:  3120/3120-A PCP: Nat Medina MD       Hospital Day: 2    Assessment and Plan:   Marlena Pyle is a 62 y.o. female with pmh of  ESRD on HD, COPD, CAD, Type II DM, HTN, HLD, Anxiety, Depression, HFmrEF  who presents with Sepsis ([facility] [address]      Plan:    Severe Sepsis with ? Impending Septic shock - Present on admission- 2/2 Likely Infected Sacral Wound   -Presented with WBC 29.2, SBP 94, LA 2.3. Nausea and vomiting prior to coming in with fatigue and pain around sacral wound X 1 week. -Foul smelling sacral wound   Bx NG x 48  Continue Vanc and zosyn. ID and surgery following. No surgical intervention planned for now. Will continue wound care. LA trended down. CRP / PCT trend. Pending Ux and MRSA  Dilaudid for pain in sacral region from wound     Type II NSTEMI 2/2 likely Demand Ischemia 2/2 above   Denies chest pain   EKG without acute ischemic changes   Troponin trending up and Echo from yesterday showed septal hypokinesia. Will start Asprin, heparin drip and consult cardiology. Hypoglycemia 2/2 Severe Sepsis - Now resolved. Was briefly on IV dextrose. Mildly Acute Decompensated on Chronic HFmrEF   Pt. With mild edema LLE with CXR suggestive of vascular congestion   Rales on my exam with some conducted sounds  As per document EF was 45-50. But repeat yesterday was 55%. Grade I diastolic function      NIDDM   On oral meds at home      Prolonged Qtc  Qtc 520. Will repeat EKG     Wide Pulse pressure, ?AS   BP with wide pulse pressure   Last Echo with EF 45-50% (10/2022) with mild VHD     ESRD on HD   On  and  - twice a week only per patient      H/O HTN, HLD   Continue home meds    Debility - Due to her multiple co morbidities and worsen decubitus ulcer. PT/OT.  Dietary consult     H/O Anxiety/Depression     H/O PAD s/p Rt. BKA       Diet ADULT DIET; Regular; Low Potassium (Less than 3000 mg/day); 1800 ml   DVT Prophylaxis [] Lovenox, [x]  Heparin, [] SCDs, [] Ambulation,  [] Eliquis, [] Xarelto  [] Coumadin   Code Status Full Code   Disposition From: home  Expected Disposition: TBD  Estimated Date of Discharge: 3 Days  Patient requires continued admission due to Needing IV abx and monitoring for response   Surrogate Decision Maker/ POA      Subjective:     Chief Complaint: No chief complaint on file. Zuhair Liriano is a 62 y.o. female who presents with nausea and vomiting X 1 day along with pain around sacral wound and back with fatigue X 1 week     Patient was seen in HD. Reports her sacral region in sore and overall is very tired. Denied any chest pain or SOB    Review of Systems:    Review of Systems    Denies chest pain, SOB, dizziness, abd pain, n/v. Bowel and bladder habits normal    Objective: Intake/Output Summary (Last 24 hours) at 3/10/2023 1438  Last data filed at 3/10/2023 1152  Gross per 24 hour   Intake 500 ml   Output 1380 ml   Net -880 ml        Vitals:   Vitals:    03/10/23 1300   BP:    Pulse:    Resp: 18   Temp:    SpO2:        Physical Exam:     General: NAD  Eyes: EOMI  ENT: neck supple  Cardiovascular: Regular rate. Respiratory: Clear to auscultation  Gastrointestinal: Soft, non tender  Genitourinary: no suprapubic tenderness  Musculoskeletal: RT BKA  Skin: Sacral WOund  Neuro: Alert. Psych: Mood appropriate.      Medications:   Medications:    sodium chloride flush  5-40 mL IntraVENous 2 times per day    heparin (porcine)  5,000 Units SubCUTAneous 3 times per day    pantoprazole  40 mg Oral QAM AC    piperacillin-tazobactam  3,375 mg IntraVENous Q12H    vancomycin (VANCOCIN) intermittent dosing (placeholder)   Other RX Placeholder    sodium hypochlorite   Irrigation BID      Infusions:    sodium chloride      dextrose       PRN Meds: HYDROmorphone, 0.5 mg, Q6H PRN  sodium chloride flush, 5-40 mL, PRN  sodium chloride, , PRN  acetaminophen, 650 mg, Q6H PRN   Or  acetaminophen, 650 mg, Q6H PRN  albuterol sulfate HFA, 2 puff, Q6H PRN  glucose, 4 tablet, PRN  dextrose bolus, 125 mL, PRN   Or  dextrose bolus, 250 mL, PRN  glucagon (rDNA), 1 mg, PRN  dextrose, , Continuous PRN        Labs      Recent Results (from the past 24 hour(s))   POCT Glucose    Collection Time: 03/09/23  4:06 PM   Result Value Ref Range    POC Glucose 80 70 - 99 MG/DL   POCT Glucose    Collection Time: 03/09/23  7:52 PM   Result Value Ref Range    POC Glucose 149 (H) 70 - 99 MG/DL   POCT Glucose    Collection Time: 03/10/23  1:41 AM   Result Value Ref Range    POC Glucose 134 (H) 70 - 99 MG/DL   Procalcitonin    Collection Time: 03/10/23  7:45 AM   Result Value Ref Range    Procalcitonin 5.53    Basic Metabolic Panel w/ Reflex to MG    Collection Time: 03/10/23  7:45 AM   Result Value Ref Range    Sodium 131 (L) 135 - 145 MMOL/L    Potassium 5.3 (H) 3.5 - 5.1 MMOL/L    Chloride 94 (L) 99 - 110 mMol/L    CO2 22 21 - 32 MMOL/L    Anion Gap 15 4 - 16    BUN 62 (H) 6 - 23 MG/DL    Creatinine 4.6 (H) 0.6 - 1.1 MG/DL    Est, Glom Filt Rate 11 (L) >60 mL/min/1.73m2    Glucose 115 (H) 70 - 99 MG/DL    Calcium 7.9 (L) 8.3 - 10.6 MG/DL   CBC with Auto Differential    Collection Time: 03/10/23  7:45 AM   Result Value Ref Range    WBC 27.7 (H) 4.0 - 10.5 K/CU MM    RBC 3.62 (L) 4.2 - 5.4 M/CU MM    Hemoglobin 11.4 (L) 12.5 - 16.0 GM/DL    Hematocrit 36.6 (L) 37 - 47 %    .1 (H) 78 - 100 FL    MCH 31.5 (H) 27 - 31 PG    MCHC 31.1 (L) 32.0 - 36.0 %    RDW 14.1 11.7 - 14.9 %    Platelets 204 (L) 690 - 440 K/CU MM    MPV 10.8 7.5 - 11.1 FL    Bands Relative 30 (H) 5 - 11 %    Segs Relative 68.0 (H) 36 - 66 %    Lymphocytes % 2.0 (L) 24 - 44 %    Bands Absolute 8.31 K/CU MM    Segs Absolute 18.8 K/CU MM    Lymphocytes Absolute 0.6 K/CU MM    Differential Type MANUAL DIFFERENTIAL    C-Reactive Protein Collection Time: 03/10/23  7:45 AM   Result Value Ref Range    CRP High Sensitivity 259.1 (H) <5.0 mg/L   Lactic Acid    Collection Time: 03/10/23  7:45 AM   Result Value Ref Range    Lactate 1.9 0.5 - 1.9 mMOL/L   Troponin    Collection Time: 03/10/23  7:45 AM   Result Value Ref Range    Troponin T 0.076 (H) <0.01 NG/ML        Imaging/Diagnostics Last 24 Hours   XR CHEST PORTABLE    Result Date: 3/8/2023  EXAMINATION: ONE XRAY VIEW OF THE CHEST 3/8/2023 6:24 pm COMPARISON: 10/12/2022 HISTORY: ORDERING SYSTEM PROVIDED HISTORY: Shortness of breath, fatigue and weakness TECHNOLOGIST PROVIDED HISTORY: Reason for exam:->Shortness of breath, fatigue and weakness Reason for Exam: Shortness of breath, fatigue and weakness Additional signs and symptoms: Shortness of breath, fatigue and weakness Relevant Medical/Surgical History: Shortness of breath, fatigue and weakness FINDINGS: Cardiac and mediastinal contours normal.  Sternotomy wires unchanged. Pulmonary vascular congestion with bilateral perihilar ground-glass opacity and pulmonary vasculature indistinctness. Chronic calcified lateral left mid lung granuloma unchanged. No pneumothorax or pleural effusion. No acute osseous abnormality. Aortic and carotid atherosclerotic calcifications present. Pulmonary vascular congestion with mild pulmonary edema.        Electronically signed by Ludwig Landau, MD on 3/10/2023 at 2:38 PM

## 2023-03-10 NOTE — PROGRESS NOTES
Patient seen and examined. AF Hemodynamically stable. No acute issues overnight. PE:  Vitals:    03/09/23 1900 03/09/23 2000 03/10/23 0008 03/10/23 0400   BP: 98/78  (!) 116/42 (!) 128/50   Pulse: 84 79 75 74   Resp: 15  13 10   Temp: 97.8 °F (36.6 °C)  98.2 °F (36.8 °C) 98.4 °F (36.9 °C)   TempSrc: Oral  Oral Oral   SpO2: 96%  96% 95%   Weight:    117 lb (53.1 kg)   Height:         Labs:  CBC:    Lab Results   Component Value Date/Time    WBC 29.2 03/08/2023 06:07 PM    HGB 12.6 03/08/2023 06:07 PM    HCT 40.5 03/08/2023 06:07 PM     03/08/2023 06:07 PM     BMP:    Lab Results   Component Value Date/Time     03/09/2023 04:33 AM    K 5.0 03/09/2023 04:33 AM    CL 97 03/09/2023 04:33 AM    CO2 25 03/09/2023 04:33 AM    BUN 48 03/09/2023 04:33 AM    CREATININE 4.2 03/09/2023 04:33 AM    CALCIUM 7.9 03/09/2023 04:33 AM     PT/INR:    Lab Results   Component Value Date/Time    PROTIME 14.3 09/21/2022 06:10 AM    PROTIME 10.6 10/18/2010 11:21 AM    INR 1.11 09/21/2022 06:10 AM     PTT:    Lab Results   Component Value Date/Time    APTT 31.2 04/28/2016 11:30 AM     LFT:    Lab Results   Component Value Date/Time    LABALBU 2.1 03/08/2023 06:07 PM    BILITOT 0.9 03/08/2023 06:07 PM    AST 14 03/08/2023 06:07 PM    ALT <5 03/08/2023 06:07 PM    ALKPHOS 357 03/08/2023 06:07 PM       Imaging:  ECHO yesterday     Conclusions      Summary   Ejection fraction is visually estimated at 55%. Grade I diastolic dysfunction. Septal wall is hypokinetic. Sclerotic, but non-stenotic aortic valve. MAC with mild mitral regurgitation. Mild tricuspid regurgitation; RVSP: 22 mmHg. No evidence of any pericardial effusion.       Signature      ------------------------------------------------------------------   Electronically signed by Charmayne Borg MD   (Interpreting physician) on 03/09/2023 at 04:07 PM    Assessment/Plan:  Pressure and diabetic ulcers to left great toe and left heel and left third finger. Black dry eschars. Stage IV sacral ulceration. Place irrigating VAC today. On zosyn and vanco. Workup for infection. ESRD on HD via LUE AVF. COPD, CAD, IDDM- CPM for comorbiditis  Weak and deconditioned. Overall, her health status continues to decline. I did her right BKA secondary to DM and PAD. Prior interventions.

## 2023-03-10 NOTE — PROGRESS NOTES
Removed 880 ml      Patient Name: Kirk Pascal  Patient : 1965  MRN: [id number]     Acct: [de-identified]  Date of Admission: 3/9/2023  Room/Bed: 3120/3120-A  Code Status:  Full Code  Allergies:    Allergies   Allergen Reactions    Latex     Codeine     Cortisone     Cortizone     Dilaudid [Hydromorphone]      \"They OD'd me\"    Iodides     Phenobarbital Other (See Comments)     Hallucinations     Vancomycin Other (See Comments)     \"makes me very sick\"     Diagnosis:    Patient Active Problem List   Diagnosis    CAD (coronary artery disease)    Hyperlipidemia    Diabetes mellitus (HCC)    Chest pain    Hoarseness of voice    Fracture of metacarpal bone    Wrist sprain    History of tobacco abuse    Wrist pain    Carpal tunnel syndrome    Cubital tunnel syndrome    Trigger finger    S/P CABG x 4    Diabetic foot ulcer ([facility])    Diabetic neuropathy ([facility] [address]    Diabetes mellitus type 2 with complications (HCC)    Claudication of both lower extremities (HCC)    SOB (shortness of breath)    Bilateral leg edema    SOB (shortness of breath)    Type 2 diabetes mellitus without complication (HCC)    Coronary artery disease involving coronary bypass graft of native heart with unstable angina pectoris ([facility])    S/P cardiac cath    Chronic renal failure, stage 2 (mild)    Chronic kidney disease (CKD) stage G4/A3, severely decreased glomerular filtration rate (GFR) between 15-29 mL/min/1.73 square meter and albuminuria creatinine ratio greater than 300 mg/g (HCC)    DM (diabetes mellitus) (HCC)    Coronary atherosclerosis    HTN (hypertension)    Proteinuria    Callus of foot    Non compliance with medical treatment    Diabetic ulcer of right heel associated with type 2 diabetes mellitus, with necrosis of bone (HCC)    Acute pain of right foot    Wound, open, foot with complication, left, sequela    ESRD on hemodialysis ([facility] [address]    Diabetic ulcer of right midfoot associated with type 2 diabetes mellitus, with necrosis of bone (HCC)    Pressure ulcer of sacral region, stage 3 (HCC)    S/P BKA (below knee amputation), right (HCC)    Diabetic ulcer of left foot associated with type 2 diabetes mellitus, with necrosis of muscle (HCC)    Ulcer of finger, with fat layer exposed (HCC)    Sepsis (HCC)    Severe malnutrition (HCC)         Treatment:  Hemodilaysis 2:1  Priority: Routine  Location: Acute Room    Diabetic: Yes  NPO: No  Isolation Precautions: Contact     Consent for Treatment Verified: Yes  Blood Consent Verified: Not Applicable     Safety Verified: Identify (I), Consent (C), Equipment (E), HepB Status (B), Orders Complete (O), Access Verified (A), and Timeliness (T)  Time out performed prior to access at 0850 hours.    Report Received from Primary RN at 0700 hours.  Primary RN (First Initial, Last Name, Title): JUAN LUIS Montgomery RN  Incapacitated Nurse Education Completed: Not Applicable     HBsAg ONLY:  Date Drawn: February 22, 2023       Results: Negative  HBsAb:  Date Drawn:  March 10, 2023       Results: Unknown    Order  Dialysis Bath  K+ (Potassium): 2  Ca+ (Calcium): 2.5  Na+ (Sodium): 138  HCO3 (Bicarb): 35  Bicarbonate Concentrate Lot No.: [id number]  Acid Concentrate Lot No.: [id number]     Na+ Modeling: Not Applicable  Dialyzer: F180  Dialysate Temperature (C):  35  Blood Flow Rate (BFR):  350   Dialysate Flow Rate (DFR):   700        Access to be Utilized   Access: AVG  Location: Upper Extremity  Side: Left   Needle gauge:  15  + Bruit/Thrill: Yes    First Use X-ray Verified: Not Applicable  OK to use line order: Not Applicable    Site Assessment:  Signs and Symptoms of Infection/Inflammation: None  If yes: Not Applicable  Dressing: Dry and Intact  Site Prep: Medical Aseptic Technique  Dressing Changed this Treatment: Yes  Comment:    Flows: Good, Patent  If access problem, who was notified:     Pre and Post-Assessment  Patient Vitals for the past 8 hrs:   Level of Consciousness Heart Rhythm Respiratory Quality/Effort O2 Device  Bilateral Breath Sounds Skin Condition/Temp Abdomen Inspection Bowel Sounds (All Quadrants) Edema   03/10/23 0850 0 Regular Unlabored None (Room air) Diminished Dry; Warm Soft Active None   03/10/23 1200 0 Regular Unlabored None (Room air) Diminished Dry; Warm Soft Active None     Labs  Recent Labs     03/08/23  1807 03/10/23  0745   WBC 29.2* 27.7*   HGB 12.6 11.4*   HCT 40.5 36.6*    132*                                                                  Recent Labs     03/08/23  1807 03/09/23  0433 03/10/23  0745    136 131*   K 4.5 5.0 5.3*   CL 95* 97* 94*   CO2 26 25 22   BUN 42* 48* 62*   CREATININE 3.6* 4.2* 4.6*   GLUCOSE 75 114* 115*     IV Drips and Rate/Dose   sodium chloride      dextrose        Safety - Before each treatment:   Dialysis Machine No.: [id number]   Machine Number: [id number]  Dialyzer Lot No.: [id number]  RO Machine Log Sheet Completed: Yes  Machine Alarm Self Test: Completed; Passed (03/10/23 0850)     Air Foam Detector: Tested, Proper Function, pH Reading  Extracorporeal Circuit Tested for Integrity: Yes  Machine Conductivity: 13.8  Manual Conductivity: 13.8     Bicarbonate Concentrate Lot No.: [id number]  Acid Concentrate Lot No.: [id number]  Manual Ph: 7.4  Bleach Test (Neg): Yes  Bath Temperature: 95 °F (35 °C)  Tubing Lot#: [id number]  Conductivity Meter Serial #: [id number]  All Connections Secure?: Yes  Venous Parameters Set?: Yes  Arterial Parameters Set?: Yes  Saline Line Double Clamped?: Yes  Air Foam Detector Engaged?: Yes  Machine Functioning Alarm Free?  Yes  Prime Given: 200ml    Chlorine Testing - Before each treatment and every 4 hours:   Treatment  Time On: 0900  Time Off: 1152  Weight: 117 lb (53.1 kg) (03/10/23 0400)  1st check: less than 0.1 ppm at: 0710 hours  2nd check: less than 0.1 ppm at: 1040 hours  3rd check: Not Applicable  (if greater than 0.1 ppm, then check every 30 minutes from secondary)    Access Flows and Pressures  Patient Vitals for the past 8 hrs:   Blood Flow Rate (ml/min) Ultrafiltration Rate (ml/hr) Arterial Pressure (mmHg) Venous Pressure (mmHg) TMP DFR Comments Access Visible   03/10/23 0900 350 ml/min 830 ml/hr -80 mmHg 150 60 700 tx initiated Yes   03/10/23 0915 350 ml/min 830 ml/hr -80 mmHg 150 80 700 line secure Yes   03/10/23 0930 350 ml/min 0 ml/hr -80 mmHg 140 70 700 uf paused Yes   03/10/23 0945 350 ml/min 0 ml/hr -100 mmHg 150 70 700 resting Yes   03/10/23 1000 350 ml/min 0 ml/hr -100 mmHg 150 60 700 no distress Yes   03/10/23 1015 350 ml/min 630 ml/hr -100 mmHg 150 80 700 uf on Yes   03/10/23 1030 350 ml/min 630 ml/hr -100 mmHg 150 80 700 resting Yes   03/10/23 1045 350 ml/min 630 ml/hr -100 mmHg 150 80 700 repositioned pt Yes   03/10/23 1100 350 ml/min 630 ml/hr -110 mmHg 160 80 700 resting Yes   03/10/23 1115 350 ml/min 630 ml/hr -110 mmHg 160 80 700 no changes Yes   03/10/23 1130 350 ml/min 630 ml/hr -110 mmHg 160 80 700 denies needs Yes   03/10/23 1145 350 ml/min 630 ml/hr -110 mmHg 160 80 700 no complaints Yes   03/10/23 1152 -- -- -- -- -- -- tx rm Yes     Vital Signs  Patient Vitals for the past 8 hrs:   BP Temp Pulse Resp SpO2   03/10/23 0843 -- -- 75 16 92 %   03/10/23 0850 (!) 121/55 98.3 °F (36.8 °C) 76 12 93 %   03/10/23 0900 (!) 121/46 -- 78 14 94 %   03/10/23 0901 (!) 121/46 -- 77 14 94 %   03/10/23 0915 (!) 111/47 -- 73 12 94 %   03/10/23 0930 (!) 88/46 -- 73 18 96 %   03/10/23 0945 (!) 116/49 -- 71 10 96 %   03/10/23 1000 (!) 114/51 -- 72 10 95 %   03/10/23 1015 (!) 120/53 -- 75 12 100 %   03/10/23 1030 (!) 128/58 -- 74 12 97 %   03/10/23 1045 135/62 -- 76 12 98 %   03/10/23 1100 (!) 115/54 -- 77 11 97 %   03/10/23 1115 (!) 104/51 -- 78 11 94 %   03/10/23 1130 (!) 107/50 -- 78 14 100 %   03/10/23 1145 97/78 -- 80 13 94 %   03/10/23 1152 115/66 -- 81 13 94 %   03/10/23 1200 115/66 98.3 °F (36.8 °C) 81 14 95 %   03/10/23 1300 -- -- -- 18 --     Post-Dialysis  Arterial Catheter Locking Solution: Not Applicable  Venous Catheter Locking Solution: Not Applicable  Post-Treatment Procedures: Blood returned, Access bleeding time < 10 minutes  Machine Disinfection Process: Acid/Vinegar Clean, Exterior Machine Disinfection, Heat Disinfect  Rinseback Volume (ml): 300 ml  Blood Volume Processed (Liters): 56.9 l/min  Dialyzer Clearance: Lightly streaked  Duration of Treatment (minutes): 180 minutes     Hemodialysis Intake (ml): 500 ml  Hemodialysis Output (ml): 1380 ml     Tolerated Treatment: Fair  Patient Response to Treatment: well          Provider Notification        Handoff complete and report given to Primary RN at 1215 hours.  Primary RN (First Initial, Last Name, Title):  JUAN LUIS Montgomery RN     Education  Person Educated: Patient   Knowledge Base: Substantial  Barriers to Learning?: None  Preferred method of Learning: Oral  Topic(s): Access Care, Signs and Symptoms of Infection, and Procedural   Teaching Tools: Explanation   Response to Education: Verbalized Understanding     Electronically signed by Sivan Rod RN on 3/10/2023 at 1:23 PM

## 2023-03-10 NOTE — PROGRESS NOTES
Nephrology Progress Note  3/10/2023 10:55 AM        Subjective:   Admit Date: 3/9/2023  PCP: Nora Duran MD    Interval History:  patient seen in early morning, this is a late entry   she is much more alert awake and oriented this    Diet:  reported eating some    ROS:   no overt confusion or shortness of breath   acceptable blood pressure    Data:     Current meds:    sodium chloride flush  5-40 mL IntraVENous 2 times per day    heparin (porcine)  5,000 Units SubCUTAneous 3 times per day    pantoprazole  40 mg Oral QAM AC    piperacillin-tazobactam  3,375 mg IntraVENous Q12H    vancomycin (VANCOCIN) intermittent dosing (placeholder)   Other RX Placeholder    sodium hypochlorite   Irrigation BID      sodium chloride      dextrose      dextrose 5% and 0.9% NaCl with KCl 20 mEq 50 mL/hr at 03/09/23 1821         No intake/output data recorded.     CBC:   Recent Labs     03/08/23  1807 03/10/23  0745   WBC 29.2* 27.7*   HGB 12.6 11.4*    132*          Recent Labs     03/08/23  1807 03/09/23  0433 03/10/23  0745    136 131*   K 4.5 5.0 5.3*   CL 95* 97* 94*   CO2 26 25 22   BUN 42* 48* 62*   CREATININE 3.6* 4.2* 4.6*   GLUCOSE 75 114* 115*       Lab Results   Component Value Date    CALCIUM 7.9 (L) 03/10/2023    PHOS 9.0 (H) 11/14/2022       Objective:     Vitals: /62   Pulse 76   Temp 98.3 °F (36.8 °C)   Resp 12   Ht 5' 3\" (1.6 m)   Wt 117 lb (53.1 kg)   SpO2 98%   BMI 20.73 kg/m² ,    General appearance:   thin, alert, awake and oriented  HEENT:   cachectic, at least 2+ conjunctival pallor  Neck:   seems supple  Lungs:   crackles on auscultation  Heart:   regular rate and rhythm  Abdomen:  soft, nontender  Extremities:   left toe gangrene and trace edema as well as heel ulcer, right below-knee amputation   left upper extremity brachiocephalic fistula good thrill on palpation good bruit auscultation      Problem List :         Impression :      end-stage kidney disease with mild fluid overload- hemodialysis and ultrafiltration today   skin soft tissue and potential bone infection with leukocytosis and sepsis   underlying atherosclerotic cardiovascular disease and multiple other condition   overall declining condition    Recommendation/Plan  :      hemodialysis and ultrafiltration today- she is on broad-spectrum empirical antimicrobial agent- wound care- hopefully will have a chance to discuss with the family- as her overall condition is surely but slowly declining- I did briefly discuss with her- watch for iatrogenic and nosocomial complication and follow clinically- discontinue IV fluids with fluid overload and anuria- if she needs treatment for hypoglycemia do D10 water- at lower rate      Bakari Daily MD MD

## 2023-03-10 NOTE — PROGRESS NOTES
[id number] [facility]  consulted by Dr. John Carmichael for monitoring and adjustment. Indication for treatment: Vancomycin indication: SSTI with risk factors   Goal pre-HD level: 15-20 mcg/mL  Goal trough: Trough Goal: 10-15 mcg/mL      Risk Factors for MRSA Identified:   Patient on hemodialysis, Purulent and/or complicated SSTI    Pertinent Laboratory Values:   Temp Readings from Last 3 Encounters:   03/10/23 98.3 °F (36.8 °C)   03/08/23 97.1 °F (36.2 °C) (Temporal)   02/21/23 97.2 °F (36.2 °C) (Temporal)     Recent Labs     03/08/23  1807 03/09/23  1030 03/10/23  0745   WBC 29.2*  --  27.7*   LACTATE  --  2.0* 1.9       Recent Labs     03/08/23  1807 03/09/23  0433 03/10/23  0745   BUN 42* 48* 62*   CREATININE 3.6* 4.2* 4.6*       Estimated Creatinine Clearance: 11 mL/min (A) (based on SCr of 4.6 mg/dL (H)). Intake/Output Summary (Last 24 hours) at 3/10/2023 1454  Last data filed at 3/10/2023 1152  Gross per 24 hour   Intake 500 ml   Output 1380 ml   Net -880 ml       Pertinent Cultures:   Date    Source    Results  3/09              MRSA Nasal   In process  3/09              Wound    Pending    Vancomycin level:   TROUGH:  No results for input(s): VANCOTROUGH in the last 72 hours. RANDOM:  No results for input(s): VANCORANDOM in the last 72 hours. Assessment:  HPI: 62 y.o female with pmh of ESRD on HD, hypertension, GERD, anxiety, diabetes, depression who presents with nausea and vomiting for few days along with pain around the sacral wound and back with fatigue for one week. On presentation, patient 's WBC was 29.2, lactic acid 2.3 and SBP 94.   Dialyzes on Mondays and Wednesdays  Noted patient stated sensitivity to vancomycin (makes her very sick)  SCr, BUN, and urine output: ESRD on HD usually on Monday and Wednesday only, Pt did receive dialysis today  Day(s) of therapy: 2 of 7 (ID following, vanco to continue)  Vancomycin concentration:  3/11 - random @06:00    Plan:  Continue intermittent dosing based on levels due to ESRD/HD  Vancomycin 1000mg initial dose given last night. Since the patient did receive dialysis this am, will give vancomycin 750mg ivpb x1 dose today after dialysis. Recheck the vanco level tomorrow am (just in case HD is needed again) and check the vanco level again pre-HD on Monday. Pharmacy will continue to monitor patient and adjust therapy as indicated    VANCOMYCIN CONCENTRATION SCHEDULED FOR 3/11 @06:00    Thank you for the consult. Shun García, [facility]  3/10/2023 2:54 PM

## 2023-03-11 ENCOUNTER — APPOINTMENT (OUTPATIENT)
Dept: GENERAL RADIOLOGY | Age: 58
End: 2023-03-11
Attending: INTERNAL MEDICINE
Payer: MEDICARE

## 2023-03-11 PROBLEM — R77.8 ELEVATED TROPONIN: Status: ACTIVE | Noted: 2023-03-11

## 2023-03-11 LAB
ANION GAP SERPL CALCULATED.3IONS-SCNC: 11 MMOL/L (ref 4–16)
APTT: 39.4 SECONDS (ref 25.1–37.1)
ASTROVIRUS PCR: NOT DETECTED
BANDED NEUTROPHILS ABSOLUTE COUNT: 5.36 K/CU MM
BANDED NEUTROPHILS RELATIVE PERCENT: 16 % (ref 5–11)
BUN SERPL-MCNC: 33 MG/DL (ref 6–23)
C CAYETANENSIS DNA SPEC QL NAA+PROBE: NOT DETECTED
C DIFF AG + TOXIN: ABNORMAL
CALCIUM SERPL-MCNC: 7.9 MG/DL (ref 8.3–10.6)
CAMPY SP DNA.DIARRHEA STL QL NAA+PROBE: NOT DETECTED
CHLORIDE BLD-SCNC: 100 MMOL/L (ref 99–110)
CHOLEST SERPL-MCNC: 80 MG/DL
CLOSTRIDIUM DIFFICILE, PCR: ABNORMAL
CO2: 26 MMOL/L (ref 21–32)
CREAT SERPL-MCNC: 3 MG/DL (ref 0.6–1.1)
CRP SERPL HS-MCNC: 181.6 MG/L
CRYPTOSP DNA SPEC QL NAA+PROBE: ABNORMAL
DIFFERENTIAL TYPE: ABNORMAL
DOSE AMOUNT: NORMAL
DOSE TIME: NORMAL
E COLI DNA SPEC QL NAA+PROBE: NOT DETECTED
E COLI ENTEROAGGREGATIVE PCR: NOT DETECTED
E COLI ENTEROPATHOGENIC PCR: NOT DETECTED
E COLI ENTEROTOXIGENIC PCR: NOT DETECTED
E COLI O157H7 DNA SPEC QL NAA+PROBE: NOT DETECTED
E HISTOLYT DNA SPEC QL NAA+PROBE: NOT DETECTED
EC STX1+STX2 + H7 FLIC SPEC NAA+PROBE: NOT DETECTED
EOSINOPHILS ABSOLUTE: 0.7 K/CU MM
EOSINOPHILS RELATIVE PERCENT: 2 % (ref 0–3)
ESTIMATED AVERAGE GLUCOSE: 103 MG/DL
G LAMBLIA DNA SPEC QL NAA+PROBE: NOT DETECTED
GFR SERPL CREATININE-BSD FRML MDRD: 18 ML/MIN/1.73M2
GLUCOSE BLD-MCNC: 193 MG/DL (ref 70–99)
GLUCOSE BLD-MCNC: 70 MG/DL (ref 70–99)
GLUCOSE SERPL-MCNC: 68 MG/DL (ref 70–99)
HADV DNA SPEC QL NAA+PROBE: NOT DETECTED
HBA1C MFR BLD: 5.2 % (ref 4.2–6.3)
HCT VFR BLD CALC: 37.4 % (ref 37–47)
HDLC SERPL-MCNC: 12 MG/DL
HEMOGLOBIN: 11.2 GM/DL (ref 12.5–16)
LDLC SERPL CALC-MCNC: 16 MG/DL
LYMPHOCYTES ABSOLUTE: 1.7 K/CU MM
LYMPHOCYTES RELATIVE PERCENT: 5 % (ref 24–44)
MCH RBC QN AUTO: 30.9 PG (ref 27–31)
MCHC RBC AUTO-ENTMCNC: 29.9 % (ref 32–36)
MCV RBC AUTO: 103.3 FL (ref 78–100)
METAMYELOCYTES ABSOLUTE COUNT: 0.34 K/CU MM
METAMYELOCYTES PERCENT: 1 %
MONOCYTES ABSOLUTE: 1 K/CU MM
MONOCYTES RELATIVE PERCENT: 3 % (ref 0–4)
NOROVIRUS RNA SPEC QL NAA+PROBE: NOT DETECTED
P SHIGELLOIDES DNA STL QL NAA+PROBE: NOT DETECTED
PDW BLD-RTO: 14.2 % (ref 11.7–14.9)
PLATELET # BLD: 117 K/CU MM (ref 140–440)
PMV BLD AUTO: 11.4 FL (ref 7.5–11.1)
POTASSIUM SERPL-SCNC: 4 MMOL/L (ref 3.5–5.1)
RBC # BLD: 3.62 M/CU MM (ref 4.2–5.4)
RV RNA SPEC QL NAA+PROBE: NOT DETECTED
SALMONELLA DNA SPEC QL NAA+PROBE: NOT DETECTED
SAPOVIRUS PCR: NOT DETECTED
SEGMENTED NEUTROPHILS ABSOLUTE COUNT: 24.4 K/CU MM
SEGMENTED NEUTROPHILS RELATIVE PERCENT: 73 % (ref 36–66)
SODIUM BLD-SCNC: 137 MMOL/L (ref 135–145)
SOURCE: ABNORMAL
TRIGL SERPL-MCNC: 261 MG/DL
TROPONIN T: 0.07 NG/ML
TROPONIN T: 0.07 NG/ML
TROPONIN T: 0.08 NG/ML
TROPONIN T: 0.08 NG/ML
V CHOLERAE DNA SPEC QL NAA+PROBE: NOT DETECTED
VANCOMYCIN RANDOM: 18.1 UG/ML
VIBRIO DNA SPEC NAA+PROBE: NOT DETECTED
WBC # BLD: 33.5 K/CU MM (ref 4–10.5)
YERSINIA DNA SPEC NAA+PROBE: NOT DETECTED

## 2023-03-11 PROCEDURE — 80202 ASSAY OF VANCOMYCIN: CPT

## 2023-03-11 PROCEDURE — 85730 THROMBOPLASTIN TIME PARTIAL: CPT

## 2023-03-11 PROCEDURE — 84484 ASSAY OF TROPONIN QUANT: CPT

## 2023-03-11 PROCEDURE — 2140000000 HC CCU INTERMEDIATE R&B

## 2023-03-11 PROCEDURE — 85027 COMPLETE CBC AUTOMATED: CPT

## 2023-03-11 PROCEDURE — 2580000003 HC RX 258: Performed by: INTERNAL MEDICINE

## 2023-03-11 PROCEDURE — 6370000000 HC RX 637 (ALT 250 FOR IP): Performed by: STUDENT IN AN ORGANIZED HEALTH CARE EDUCATION/TRAINING PROGRAM

## 2023-03-11 PROCEDURE — 82962 GLUCOSE BLOOD TEST: CPT

## 2023-03-11 PROCEDURE — APPSS45 APP SPLIT SHARED TIME 31-45 MINUTES

## 2023-03-11 PROCEDURE — 87324 CLOSTRIDIUM AG IA: CPT

## 2023-03-11 PROCEDURE — 36415 COLL VENOUS BLD VENIPUNCTURE: CPT

## 2023-03-11 PROCEDURE — 87449 NOS EACH ORGANISM AG IA: CPT

## 2023-03-11 PROCEDURE — 83036 HEMOGLOBIN GLYCOSYLATED A1C: CPT

## 2023-03-11 PROCEDURE — 87507 IADNA-DNA/RNA PROBE TQ 12-25: CPT

## 2023-03-11 PROCEDURE — 94761 N-INVAS EAR/PLS OXIMETRY MLT: CPT

## 2023-03-11 PROCEDURE — 85007 BL SMEAR W/DIFF WBC COUNT: CPT

## 2023-03-11 PROCEDURE — 6370000000 HC RX 637 (ALT 250 FOR IP): Performed by: INTERNAL MEDICINE

## 2023-03-11 PROCEDURE — 86140 C-REACTIVE PROTEIN: CPT

## 2023-03-11 PROCEDURE — 80048 BASIC METABOLIC PNL TOTAL CA: CPT

## 2023-03-11 PROCEDURE — 80061 LIPID PANEL: CPT

## 2023-03-11 PROCEDURE — 6370000000 HC RX 637 (ALT 250 FOR IP)

## 2023-03-11 PROCEDURE — 6360000002 HC RX W HCPCS: Performed by: STUDENT IN AN ORGANIZED HEALTH CARE EDUCATION/TRAINING PROGRAM

## 2023-03-11 PROCEDURE — 71045 X-RAY EXAM CHEST 1 VIEW: CPT

## 2023-03-11 PROCEDURE — 6360000002 HC RX W HCPCS: Performed by: INTERNAL MEDICINE

## 2023-03-11 PROCEDURE — 99222 1ST HOSP IP/OBS MODERATE 55: CPT | Performed by: INTERNAL MEDICINE

## 2023-03-11 RX ADMIN — PANTOPRAZOLE SODIUM 40 MG: 40 TABLET, DELAYED RELEASE ORAL at 05:41

## 2023-03-11 RX ADMIN — PIPERACILLIN AND TAZOBACTAM 3375 MG: 3; .375 INJECTION, POWDER, LYOPHILIZED, FOR SOLUTION INTRAVENOUS at 06:19

## 2023-03-11 RX ADMIN — SODIUM HYPOCHLORITE: 1.25 SOLUTION TOPICAL at 22:00

## 2023-03-11 RX ADMIN — GABAPENTIN 100 MG: 100 CAPSULE ORAL at 09:33

## 2023-03-11 RX ADMIN — ASPIRIN 81 MG CHEWABLE TABLET 81 MG: 81 TABLET CHEWABLE at 09:33

## 2023-03-11 RX ADMIN — ATORVASTATIN CALCIUM 40 MG: 40 TABLET, FILM COATED ORAL at 21:13

## 2023-03-11 RX ADMIN — SODIUM CHLORIDE, PRESERVATIVE FREE 10 ML: 5 INJECTION INTRAVENOUS at 21:13

## 2023-03-11 RX ADMIN — GABAPENTIN 100 MG: 100 CAPSULE ORAL at 21:13

## 2023-03-11 RX ADMIN — CARVEDILOL 6.25 MG: 6.25 TABLET, FILM COATED ORAL at 21:13

## 2023-03-11 RX ADMIN — HYDROMORPHONE HYDROCHLORIDE 0.5 MG: 1 INJECTION, SOLUTION INTRAMUSCULAR; INTRAVENOUS; SUBCUTANEOUS at 07:36

## 2023-03-11 RX ADMIN — FIDAXOMICIN 200 MG: 200 TABLET, FILM COATED ORAL at 21:13

## 2023-03-11 RX ADMIN — PIPERACILLIN AND TAZOBACTAM 3375 MG: 3; .375 INJECTION, POWDER, LYOPHILIZED, FOR SOLUTION INTRAVENOUS at 21:17

## 2023-03-11 RX ADMIN — HYDROMORPHONE HYDROCHLORIDE 0.5 MG: 1 INJECTION, SOLUTION INTRAMUSCULAR; INTRAVENOUS; SUBCUTANEOUS at 17:01

## 2023-03-11 RX ADMIN — CARVEDILOL 6.25 MG: 6.25 TABLET, FILM COATED ORAL at 09:31

## 2023-03-11 RX ADMIN — GABAPENTIN 100 MG: 100 CAPSULE ORAL at 15:45

## 2023-03-11 RX ADMIN — HEPARIN SODIUM 1590 UNITS: 1000 INJECTION INTRAVENOUS; SUBCUTANEOUS at 06:02

## 2023-03-11 ASSESSMENT — PAIN DESCRIPTION - LOCATION
LOCATION: GENERALIZED
LOCATION: BACK

## 2023-03-11 ASSESSMENT — PAIN DESCRIPTION - DESCRIPTORS
DESCRIPTORS: ACHING;BURNING
DESCRIPTORS: SHOOTING;SHARP

## 2023-03-11 ASSESSMENT — ENCOUNTER SYMPTOMS
ABDOMINAL PAIN: 0
DIARRHEA: 0
SHORTNESS OF BREATH: 0
BACK PAIN: 1
CHEST TIGHTNESS: 0
VOMITING: 0

## 2023-03-11 ASSESSMENT — PAIN SCALES - GENERAL
PAINLEVEL_OUTOF10: 8

## 2023-03-11 ASSESSMENT — PAIN DESCRIPTION - ORIENTATION: ORIENTATION: LEFT

## 2023-03-11 NOTE — CONSULTS
[facility] [place]  [place]  Phone: [phone]    Fax [phone]                  Marissa Rick MD, Sophie Drake MD, Sandi Ruff MD, MD Peter Lux MD Jakie Clines, MD Debarah Sequin, MD Maudine Dent, RYAN Julio, RYAN Rutherford, RYAN Keller, RYAN Bowles PA-C    CARDIOLOGY CONSULT NOTE     Reason for consultation:  Elevated troponin    Primary care physician: Steve Hart MD      Thank you for the consult. Chief Complaints :Back pain     History of present illness:Chata is a 62 y. o.year old   female with a past medical history of HFmrEF, PAD s/p R BKA, ESRD on HD, T2DM,. Patient presents with sacral wound and back pain. Patient admitting to the hospital with sepsis secondary to infected sacral wound. Cardiology consulted for elevated troponin. Patient is denying chest pain/pressure, shortness of breath, lower extremity edema, lightheadedness. Her main complaint at this time is back/sacral pain along with some fatigue. Patient has history of peripheral arterial disease requiring a R BKA, but no prior history of CAD.     Cr: 3.0, , Trop 0.078 + 0.080,WBC 33, Hgb 11.2      Past medical history:    has a past medical history of Acute pain of right foot, CAD (coronary artery disease), Callus of foot, Carpal tunnel syndrome on both sides, CHF (congestive heart failure) ([address] Chronic kidney disease, Chronic ulcer of left ankle with fat layer exposed ([address] Chronic ulcer of left foot with fat layer exposed ([address] Chronic ulcer of right ankle with fat layer exposed ([address] Chronic ulcer of right foot with fat layer exposed ([address] Decubitus ulcer of sacral region, stage 1, Depression, Diabetes mellitus ([address] Diabetes mellitus with neurological manifestations ([address] Diabetes mellitus with ulcer of ankle ([address] Diabetic ulcer of left foot associated with type 2 diabetes mellitus, with fat layer exposed ([address] Diabetic ulcer of right heel associated with type 2 diabetes mellitus, with muscle involvement without evidence of necrosis ([address] Diabetic ulcer of right heel associated with type 2 diabetes mellitus, with necrosis of bone ([address] ESRD on hemodialysis ([address] Family history of cardiovascular disease, GERD (gastroesophageal reflux disease), H/O cardiac catheterization, H/O cardiovascular stress test, H/O chest x-ray, H/O Doppler ultrasound, H/O echocardiogram, History of blood transfusion, History of complete ECG, Hx of cardiovascular stress test, Hx of Doppler ultrasound, Hx of echocardiogram, Hyperlipidemia, Neuropathy of foot, Neuropathy of hand, Non compliance with medical treatment, Pressure ulcer of sacral region, stage 2 ([address] Pressure ulcer of sacral region, stage 3 ([address] S/P BKA (below knee amputation), right ([address] S/P CABG x 4, Type 2 diabetes mellitus with diabetic polyneuropathy, with long-term current use of insulin ([address] Type II or unspecified type diabetes mellitus with neurological manifestations, not stated as uncontrolled(250.60), Type II or unspecified type diabetes mellitus with other specified manifestations, not stated as uncontrolled, Ulcer of finger, with fat layer exposed ([address] Ulcer of left foot, with fat layer exposed ([address] and Ulcer of other part of foot. Past surgical history:   has a past surgical history that includes Carpal tunnel release; Hysterectomy, total abdominal; Cholecystectomy; Appendectomy; Tubal ligation; Coronary artery bypass graft (6/5/2010); Finger trigger release (Right, 6/10/2013); Ulnar tunnel release (Right, 6/10/2013); Carpal tunnel release (Right, 6/10/2013); Carpal tunnel release (Left, 7/29/2013); Cardiac surgery; Toe amputation (Left, 02/14/144th toe); Colonoscopy; and Leg amputation below knee (Right, 11/11/2022). Social History:   reports that she has been smoking cigarettes.  She has a 7.50 pack-year smoking history. She has never used smokeless tobacco. She reports that she does not drink alcohol and does not use drugs.   Family history:   \no family history of CAD, STROKE of DM at early age    Allergies   Allergen Reactions    Latex     Codeine     Cortisone     Cortizone     Dilaudid [Hydromorphone]      \"They OD'd me\"    Iodides     Phenobarbital Other (See Comments)     Hallucinations        HYDROmorphone (DILAUDID) injection 0.5 mg, Q6H PRN  heparin (porcine) injection 3,190 Units, Once  heparin (porcine) injection 3,190 Units, PRN  heparin (porcine) injection 1,590 Units, PRN  carvedilol (COREG) tablet 6.25 mg, BID  aspirin chewable tablet 81 mg, Daily  atorvastatin (LIPITOR) tablet 40 mg, Nightly  sodium hypochlorite (DAKINS) 0.125 % external solution 480 mL, Continuous PRN  gabapentin (NEURONTIN) capsule 100 mg, TID  sodium chloride flush 0.9 % injection 5-40 mL, 2 times per day  sodium chloride flush 0.9 % injection 5-40 mL, PRN  0.9 % sodium chloride infusion, PRN  acetaminophen (TYLENOL) tablet 650 mg, Q6H PRN   Or  acetaminophen (TYLENOL) suppository 650 mg, Q6H PRN  albuterol sulfate HFA (PROVENTIL;VENTOLIN;PROAIR) 108 (90 Base) MCG/ACT inhaler 2 puff, Q6H PRN  glucose chewable tablet 16 g, PRN  dextrose bolus 10% 125 mL, PRN   Or  dextrose bolus 10% 250 mL, PRN  glucagon (rDNA) injection 1 mg, PRN  dextrose 10 % infusion, Continuous PRN  pantoprazole (PROTONIX) tablet 40 mg, QAM AC  piperacillin-tazobactam (ZOSYN) 3,375 mg in sodium chloride 0.9 % 50 mL IVPB (mini-bag), Q12H  vancomycin (VANCOCIN) intermittent dosing (placeholder), RX Placeholder  sodium hypochlorite (DAKINS) 0.125 % external solution, BID      Current Facility-Administered Medications   Medication Dose Route Frequency Provider Last Rate Last Admin    HYDROmorphone (DILAUDID) injection 0.5 mg  0.5 mg IntraVENous Q6H PRN Amador Pierre MD   0.5 mg at 03/11/23 0736    heparin (porcine) injection 3190 Units  60 Units/kg IntraVENous Once Bee Lopez MD        heparin (porcine) injection 3,190 Units  60 Units/kg IntraVENous PRN Bee Lopez MD        heparin (porcine) injection 1,590 Units  30 Units/kg IntraVENous PRN Bee Lopez MD   1,590 Units at 03/11/23 0602    carvedilol (COREG) tablet 6.25 mg  6.25 mg Oral BID Dallas Goodell, PA-C   6.25 mg at 03/11/23 3870    aspirin chewable tablet 81 mg  81 mg Oral Daily Mellyug JEREMI Morrison MD   81 mg at 03/11/23 0933    atorvastatin (LIPITOR) tablet 40 mg  40 mg Oral Nightly Triyug B Yelitza Morrison MD   40 mg at 03/10/23 2106    sodium hypochlorite (DAKINS) 0.125 % external solution 480 mL  480 mL Irrigation Continuous PRN Genie Rooney MD        gabapentin (NEURONTIN) capsule 100 mg  100 mg Oral TID Bee Lopez MD   100 mg at 03/11/23 0933    sodium chloride flush 0.9 % injection 5-40 mL  5-40 mL IntraVENous 2 times per day Perla Mendez MD   10 mL at 03/10/23 1303    sodium chloride flush 0.9 % injection 5-40 mL  5-40 mL IntraVENous PRN Perla Mendez MD        0.9 % sodium chloride infusion   IntraVENous PRN Perla Mendez MD        acetaminophen (TYLENOL) tablet 650 mg  650 mg Oral Q6H PRN Parvez Ocasio MD   650 mg at 03/10/23 1735    Or    acetaminophen (TYLENOL) suppository 650 mg  650 mg Rectal Q6H PRN Parvez Ocasio MD        albuterol sulfate HFA (PROVENTIL;VENTOLIN;PROAIR) 108 (90 Base) MCG/ACT inhaler 2 puff  2 puff Inhalation Q6H PRN Perla Mendez MD        glucose chewable tablet 16 g  4 tablet Oral PRN Perla Mendez MD        dextrose bolus 10% 125 mL  125 mL IntraVENous PRN Perla Mendez MD        Or    dextrose bolus 10% 250 mL  250 mL IntraVENous PRN Perla Mendez MD        glucagon (rDNA) injection 1 mg  1 mg SubCUTAneous PRN Perla Mendez MD        dextrose 10 % infusion   IntraVENous Continuous PRN Perla Mendez MD pantoprazole (PROTONIX) tablet 40 mg  40 mg Oral QAM AC Parvez Langford MD   40 mg at 03/11/23 0541    piperacillin-tazobactam (ZOSYN) 3,375 mg in sodium chloride 0.9 % 50 mL IVPB (mini-bag)  3,375 mg IntraVENous Q12H Parvez Langford MD   Stopped at 03/11/23 1005    vancomycin (VANCOCIN) intermittent dosing (placeholder)   Other RX Placeholder Parvez Langford MD        sodium hypochlorite (DAKINS) 0.125 % external solution   Irrigation BID Nedra Espino MD   Given at 03/10/23 1353       Review of Systems   Constitutional:  Positive for fatigue. Respiratory:  Negative for chest tightness and shortness of breath. Cardiovascular:  Negative for chest pain, palpitations and leg swelling. Gastrointestinal:  Negative for abdominal pain, diarrhea and vomiting. Musculoskeletal:  Positive for arthralgias and back pain. Skin:  Negative for pallor and rash. Neurological:  Negative for dizziness, syncope, light-headedness and headaches. Psychiatric/Behavioral:  Negative for dysphoric mood. The patient is not nervous/anxious. Physical Examination:    Vitals:    03/11/23 0400 03/11/23 0736 03/11/23 0754 03/11/23 1200   BP: (!) 131/49  (!) 135/51 (!) 113/45   Pulse: 70  75    Resp: 10 13 18    Temp: 98.3 °F (36.8 °C)  98 °F (36.7 °C) 98.2 °F (36.8 °C)   TempSrc: Oral  Oral Oral   SpO2: 95%      Weight: 130 lb (59 kg)      Height:           Physical Exam  Constitutional:       General: She is not in acute distress. Appearance: She is not diaphoretic. HENT:      Head: Normocephalic and atraumatic. Right Ear: External ear normal.      Left Ear: External ear normal.      Nose: Nose normal.      Mouth/Throat:      Mouth: Mucous membranes are moist.   Eyes:      Extraocular Movements: Extraocular movements intact. Comments: Pupils equal and round   Neck:      Vascular: No carotid bruit. Cardiovascular:      Rate and Rhythm: Normal rate and regular rhythm.       Heart sounds: S1 normal and S2 normal. No murmur heard. No friction rub. No gallop. Pulmonary:      Effort: Pulmonary effort is normal. No respiratory distress. Breath sounds: Normal breath sounds. No rales. Chest:      Chest wall: No tenderness. Abdominal:      Palpations: Abdomen is soft. Tenderness: There is no abdominal tenderness. Musculoskeletal:      Right lower leg: No edema. Left lower leg: No edema. Comments: Wound vac in place  Dry eschar noted of left great toe  Right BKA noted   Skin:     General: Skin is warm and dry. Capillary Refill: Capillary refill takes less than 2 seconds. Findings: No rash. Comments: Skin turgor brisk   Neurological:      Mental Status: She is alert and oriented to person, place, and time. Mental status is at baseline. Psychiatric:         Behavior: Behavior is cooperative. Thought Content: Thought content normal.         Judgment: Judgment normal.          Medical decision making and Data review:    Lab Review   Recent Labs     03/11/23  0421   WBC 33.5*   HGB 11.2*   HCT 37.4   *      Recent Labs     03/11/23  0421      K 4.0      CO2 26   BUN 33*   CREATININE 3.0*     Recent Labs     03/08/23  1807   AST 14*   ALT <5*   BILITOT 0.9   ALKPHOS 357*     Recent Labs     03/10/23  0745 03/10/23  2005 03/11/23  0421   TROPONINT 0.076* 0.078* 0.080*       Recent Labs     03/09/23  0433   PROBNP 31,034*     Lab Results   Component Value Date    INR 1.11 09/21/2022    PROTIME 14.3 09/21/2022       EKG:Reviewed by me  Normal sinus rhythm without any obvious signs of ischemic changes. Mildly prolonged Qtc. ECHO:03/09/23   Ejection fraction is visually estimated at 55%. Grade I diastolic dysfunction. Septal wall is hypokinetic. Sclerotic, but non-stenotic aortic valve. MAC with mild mitral regurgitation. Mild tricuspid regurgitation; RVSP: 22 mmHg. No evidence of any pericardial effusion.     Chest Xray:  XR CHEST PORTABLE    Result Date: 3/11/2023  EXAMINATION: ONE XRAY VIEW OF THE CHEST 3/11/2023 9:29 am COMPARISON: 03/08/2023 HISTORY: ORDERING SYSTEM PROVIDED HISTORY: CHF TECHNOLOGIST PROVIDED HISTORY: Reason for exam:->CHF Reason for Exam: CHF FINDINGS: Cardiomegaly, diffuse bronchovascular marking prominence. Findings are consistent with congestive heart failure and/or diffuse bilateral atypical pneumonia/pneumonitis. Very small left pleural effusion may be present. No new or large areas of pulmonary consolidation. No pneumothorax or mediastinal widening. Findings consistent with congestive heart failure and/or diffuse pneumonitis/atypical pneumonia with very small left basilar pleural effusion. Findings may be accentuated by underlying chronic lung disease. XR CHEST PORTABLE: 3/8/2023    Pulmonary vascular congestion with mild pulmonary edema. All labs, medications and tests reviewed by myself including data  from outside source , patient and available family . Continue all other medications of all above medical condition listed as is. Impression:  Principal Problem:    Sepsis ([facility] [address]  Active Problems:    Severe malnutrition ([facility] [address]    Infected decubitus ulcer, stage IV ([facility] [address]    ESRD (end stage renal disease) ([facility] [address]  Resolved Problems:    * No resolved hospital problems. *      Assessment/Plan: 62 y. o.year old with   Elevated troponin  -Denying chest pain shortness of breath at this time  -EKG nonischemic, troponin non-ACS. Most likely demand ischemia in setting of sepsis, end-stage renal disease on hemodialysis  Discontinue heparin drip.  -Continue aspirin  -Echo per above. Preserved EF  -No further ischemic work-up planned at this time  History of heart failure with mild reduced ejection fraction: EF now 55%  Valvular heart disease  -Appears well compensated at this time.   -Mild mitral and tricuspid regurgitation noted  -Continue Coreg 6.25 mg twice daily  History of peripheral arterial disease s/p right below-knee amputation  -Stable at this time. continue aspirin and Lipitor 40  End-stage renal disease on hemodialysis  -Nephrology on board  Sepsis secondary to infected sacral wound  -Per primary care. Thrombocytopenia  -Platelet count gradually declining, 117 K today  -Discontinue heparin.  -Continue to monitor      Cardiology will sign off. Thank you  much for consult and giving us the opportunity in contributing in the care of this patient. Please feel free to call me for any questions. Dallas Goodell, PA-C, 3/11/2023 1:12 PM     CARDIOLOGY ATTENDING ADDENDUM    MEDICAL DECISION MAKING:    Elevated troponin in the setting of severe sepsis and end-stage renal disease:    Troponin trend is flat. EKG is nonischemic. At present I would continue with aspirin and carvedilol. No need for systemic anticoagulation in the absence of any ischemic symptoms or EKG changes. HPI:  I have reviewed the HPI  And agree with above   Alan Prince is a 62 y. o.year old who and presents with had no chief complaint listed for this encounter. No chief complaint on file. Please review addendum/changes made to note above   Interval history: Stable from cardiac standpoint. Physical Exam:  General:  Awake, alert, NAD  Head:normal  Eye:normal  Neck:  No JVD   Chest:  Clear to auscultation, respiration easy  Cardiovascular: Regular pulse   Abdomen:   nontender  Extremities: No edema  Pulses; palpable  Neuro: grossly normal        I agree with the plan, which was planned by myself and discussed with advanced level provider. My documented MDM is a substantive portion of the supervisory note. I have seen ,spoken to  and examined this patient personally, independently of the advanced level provider. I have spent substantiate  portion of this encounter independently myself in examining patient and developing the medical management plan .  I have reviewed the hospital care given to date and reviewed all pertinent labs and imaging. The plan was developed mutually at the time of the visit with the patient,  NP /PA  and myself. I have spoken with patient, nursing staff and provided written and verbal instructions . The above note has been reviewed and I agree with the assessment, diagnosis, and treatment plan with changes made by me as follows .     Viraj Rizo MD

## 2023-03-11 NOTE — PROGRESS NOTES
Pt having constant diarrhea. Had to remove wound vac and place wet to dry. Physician notified. Cdiff and GI PCR sent. Will continue to monitor.    Angely Gutierrez RN   4:11 PM  3/11/2023

## 2023-03-11 NOTE — PROGRESS NOTES
Patient seen and examined by me. Cardiology consulted for elevated troponin. Troponin non-ACS, EKG non-ischemic but showing prolonged Qtc. Most recent echo showing preserved EF, but septal hypokinesis was noted. Not complaining of any chest pain or shortness of breath at this time. Full note to follow.

## 2023-03-11 NOTE — PROGRESS NOTES
[address] [facility]  consulted by Dr. Emmitt Schwab for monitoring and adjustment. Indication for treatment: Vancomycin indication: SSTI with risk factors   Goal pre-HD level: 15-20 mcg/mL  Goal trough: Trough Goal: 10-15 mcg/mL      Risk Factors for MRSA Identified:   Patient on hemodialysis, Purulent and/or complicated SSTI    Pertinent Laboratory Values:   Temp Readings from Last 3 Encounters:   03/11/23 98.2 °F (36.8 °C) (Oral)   03/08/23 97.1 °F (36.2 °C) (Temporal)   02/21/23 97.2 °F (36.2 °C) (Temporal)     Recent Labs     03/08/23  1807 03/09/23  1030 03/10/23  0745 03/11/23  0421   WBC 29.2*  --  27.7* 33.5*   LACTATE  --  2.0* 1.9  --        Recent Labs     03/09/23  0433 03/10/23  0745 03/11/23  0421   BUN 48* 62* 33*   CREATININE 4.2* 4.6* 3.0*       Estimated Creatinine Clearance: 17 mL/min (A) (based on SCr of 3 mg/dL (H)). No intake or output data in the 24 hours ending 03/11/23 1324      Pertinent Cultures:   Date    Source    Results  3/09              MRSA Nasal   Negative  3/09              Wound    Beta strep    Vancomycin level:   TROUGH:  No results for input(s): VANCOTROUGH in the last 72 hours. RANDOM:    Recent Labs     03/11/23  0421   VANCORANDOM 18.1       Assessment:  HPI: 62 y.o female with pmh of ESRD on HD, hypertension, GERD, anxiety, diabetes, depression who presents with nausea and vomiting for few days along with pain around the sacral wound and back with fatigue for one week. On presentation, patient 's WBC was 29.2, lactic acid 2.3 and SBP 94. Dialyzes on Mondays and Wednesdays  Noted patient stated sensitivity to vancomycin (makes her very sick)  SCr, BUN, and urine output: ESRD on HD usually on Monday and Wednesday only, Pt did receive dialysis yesterday. Next HD scheduled for Monday 3/13.   Day(s) of therapy: 3 of 7 (ID following, vanco to continue)  Vancomycin concentration:  3/11 - 18.1, 11 hour level post 750mg dose, no dose today    Plan:  Continue intermittent dosing based on levels due to ESRD/HD  Vancomycin 750mg ivpb dose given last night after HD, vanco level @18.1 this am.  No vancomycin dose to be given today,  Recheck the vanco level pre-HD on Monday 3/13  Pharmacy will continue to monitor patient and adjust therapy as indicated    VANCOMYCIN CONCENTRATION SCHEDULED FOR 3/13 @06:00    Thank you for the consult. Danica Benjamin [facility]  3/11/2023 1:24 PM

## 2023-03-11 NOTE — PROGRESS NOTES
Upon shift assessment this nurse noticed a heparin gtt ordered @ 1454 which was not initiated. This nurse notified the provider Renea Nunes NP @ 2942. Heparin gtt initiated @ 2152 per provider order. Next aptt due @ 0400

## 2023-03-11 NOTE — CARE COORDINATION
NABOR called Gayatri with [facility] to check on referral given to her yesterday. NABOR left a message asking for a return call. Pt will need precert. Waiting on return call. NABOR received a return call from ORLÉANS. She was asking about pt other wound care other then the one that has the vac. NABOR spoke with pt LORE Aleman and she stated that the only other wound that need daily dressing changes in the one of pt's foot. They other are treated with meds and covered. NABOR informed ORLÉANS of this information.

## 2023-03-11 NOTE — PROGRESS NOTES
Nephrology Progress Note  3/11/2023 11:53 AM  Subjective: Interval History: Darlyn Aparicio is a 62 y.o. female with weak in bed in no acute distress        Data:   Scheduled Meds:   heparin (porcine)  60 Units/kg IntraVENous Once    carvedilol  6.25 mg Oral BID    aspirin  81 mg Oral Daily    atorvastatin  40 mg Oral Nightly    gabapentin  100 mg Oral TID    sodium chloride flush  5-40 mL IntraVENous 2 times per day    pantoprazole  40 mg Oral QAM AC    piperacillin-tazobactam  3,375 mg IntraVENous Q12H    vancomycin (VANCOCIN) intermittent dosing (placeholder)   Other RX Placeholder    sodium hypochlorite   Irrigation BID     Continuous Infusions:   sodium hypochlorite      sodium chloride      dextrose           CBC   Recent Labs     03/08/23  1807 03/10/23  0745 03/11/23  0421   WBC 29.2* 27.7* 33.5*   HGB 12.6 11.4* 11.2*   HCT 40.5 36.6* 37.4    132* 117*      BMP   Recent Labs     03/09/23  0433 03/10/23  0745 03/11/23 0421    131* 137   K 5.0 5.3* 4.0   CL 97* 94* 100   CO2 25 22 26   BUN 48* 62* 33*   CREATININE 4.2* 4.6* 3.0*     Hepatic:   Recent Labs     03/08/23 1807   AST 14*   ALT <5*   BILITOT 0.9   ALKPHOS 357*     Troponin: No results for input(s): TROPONINI in the last 72 hours. BNP: No results for input(s): BNP in the last 72 hours. Lipids: No results for input(s): CHOL, HDL in the last 72 hours. Invalid input(s): LDLCALCU  ABGs: No results found for: PHART, PO2ART, [id number]  INR: No results for input(s): INR in the last 72 hours.   Renal Labs  Albumin:    Lab Results   Component Value Date/Time    LABALBU 2.1 03/08/2023 06:07 PM     Calcium:    Lab Results   Component Value Date/Time    CALCIUM 7.9 03/11/2023 04:21 AM     Phosphorus:    Lab Results   Component Value Date/Time    PHOS 9.0 11/14/2022 11:47 AM     U/A:    Lab Results   Component Value Date/Time    NITRU NEGATIVE 01/07/2020 04:00 PM    COLORU YELLOW 01/07/2020 04:00 PM    WBCUA 0 TO 1 01/07/2020 04:00 PM RBCUA 2 TO 3 01/07/2020 04:00 PM    MUCUS 1+ 01/07/2020 04:00 PM    YEAST OCCASIONAL 11/17/2010 09:30 AM    BACTERIA OCCASIONAL 01/07/2020 04:00 PM    CLARITYU CLEAR 01/07/2020 04:00 PM    SPECGRAV 1.020 01/07/2020 04:00 PM    UROBILINOGEN 2.0 01/07/2020 04:00 PM    BILIRUBINUR NEGATIVE 01/07/2020 04:00 PM    BLOODU SMALL 01/07/2020 04:00 PM    KETUA NEGATIVE 01/07/2020 04:00 PM     ABG:  No results found for: PHART, DPW5NME, PO2ART, CHP9HZZ, BEART, THGBART, RQA2MGN, J5YONUHY  HgBA1c:    Lab Results   Component Value Date/Time    LABA1C 13.6 10/15/2014 09:56 AM     Microalbumen/Creatinine ratio:  No components found for: RUCREAT  TSH:  No results found for: TSH  IRON:  No results found for: IRON  Iron Saturation:  No components found for: PERCENTFE  TIBC:  No results found for: TIBC  FERRITIN:  No results found for: FERRITIN  RPR:  No results found for: RPR  COLE:  No results found for: ANATITER, COLE  24 Hour Urine for Creatinine Clearance:  No components found for: CREAT4, UHRS10, UTV10      Objective:   I/O: 03/10 0701 - 03/11 0700  In: 680 [P.O.:180]  Out: 1380   I/O last 3 completed shifts: In: 680 [P.O.:180]  Out: 1380   No intake/output data recorded. Vitals: BP (!) 135/51   Pulse 75   Temp 98 °F (36.7 °C) (Oral)   Resp 18   Ht 5' 3\" (1.6 m)   Wt 130 lb (59 kg)   SpO2 95%   BMI 23.03 kg/m²  {  General appearance: awake weak  HEENT: Head: Normal, normocephalic, atraumatic.   Neck: supple, symmetrical, trachea midline  Lungs: diminished breath sounds bilaterally  Heart: S1, S2 normal  Abdomen: abnormal findings:  soft nt  Extremities: edema trace right BKA left AV fistula  Neurologic: Mental status: alertness: alert        Assessment and Plan:      IMP:  #1 end-stage renal disease on dialysis Monday Wednesday Friday  #2 soft tissue infection with leukocytosis  #3 atherosclerotic cardiovascular disease    Plan     #1 do next dialysis on Monday ultrafiltrate as well  #2 no fever wound cultures group C beta strep treated for that  #3 cardiac status monitor supportive care             Yesica Caballero MD, MD

## 2023-03-11 NOTE — PROGRESS NOTES
Helping aide change patient. Patient is having several liquid brown stools- 6 total today. Bottom very red. Not open. Wound vac had leak and stool underneath the Tegaderm and into the wound. [place] floor nurse notified and wound vac removed, wound flushed with Normal saline several times brown drainage returned. Wound bed red with necrotic area from 9 to 12 on wound edges. Wet to dry dressing applied with sacral border dressing. Wound vac draining brown liquid. Barrier cream applied. Turned on left side. Stool sample obtained and placed on C- diff precautions until ruled out. New order for Rectal tube. Tatiana Rock talked to patient and wants to think about it.

## 2023-03-12 LAB
ANION GAP SERPL CALCULATED.3IONS-SCNC: 17 MMOL/L (ref 4–16)
BANDED NEUTROPHILS ABSOLUTE COUNT: 3.53 K/CU MM
BANDED NEUTROPHILS RELATIVE PERCENT: 11 % (ref 5–11)
BUN SERPL-MCNC: 43 MG/DL (ref 6–23)
CALCIUM SERPL-MCNC: 7.7 MG/DL (ref 8.3–10.6)
CHLORIDE BLD-SCNC: 97 MMOL/L (ref 99–110)
CO2: 19 MMOL/L (ref 21–32)
CREAT SERPL-MCNC: 3.6 MG/DL (ref 0.6–1.1)
CRP SERPL HS-MCNC: 126.6 MG/L
CULTURE: NORMAL
CULTURE: NORMAL
DIFFERENTIAL TYPE: ABNORMAL
GFR SERPL CREATININE-BSD FRML MDRD: 14 ML/MIN/1.73M2
GLUCOSE BLD-MCNC: 102 MG/DL (ref 70–99)
GLUCOSE BLD-MCNC: 156 MG/DL (ref 70–99)
GLUCOSE SERPL-MCNC: 91 MG/DL (ref 70–99)
HCT VFR BLD CALC: 35.4 % (ref 37–47)
HEMOGLOBIN: 10.6 GM/DL (ref 12.5–16)
LYMPHOCYTES ABSOLUTE: 1.9 K/CU MM
LYMPHOCYTES RELATIVE PERCENT: 6 % (ref 24–44)
Lab: NORMAL
Lab: NORMAL
MCH RBC QN AUTO: 31.3 PG (ref 27–31)
MCHC RBC AUTO-ENTMCNC: 29.9 % (ref 32–36)
MCV RBC AUTO: 104.4 FL (ref 78–100)
METAMYELOCYTES ABSOLUTE COUNT: 0.64 K/CU MM
METAMYELOCYTES PERCENT: 2 %
MONOCYTES ABSOLUTE: 0.3 K/CU MM
MONOCYTES RELATIVE PERCENT: 1 % (ref 0–4)
PDW BLD-RTO: 14.1 % (ref 11.7–14.9)
PLATELET # BLD: 110 K/CU MM (ref 140–440)
PLT MORPHOLOGY: ADEQUATE
PMV BLD AUTO: 11.5 FL (ref 7.5–11.1)
POLYCHROMASIA: ABNORMAL
POTASSIUM SERPL-SCNC: 4.4 MMOL/L (ref 3.5–5.1)
PROCALCITONIN SERPL-MCNC: 7.07 NG/ML
RBC # BLD: 3.39 M/CU MM (ref 4.2–5.4)
SEGMENTED NEUTROPHILS ABSOLUTE COUNT: 25.7 K/CU MM
SEGMENTED NEUTROPHILS RELATIVE PERCENT: 80 % (ref 36–66)
SODIUM BLD-SCNC: 133 MMOL/L (ref 135–145)
SPECIMEN: NORMAL
SPECIMEN: NORMAL
WBC # BLD: 32.1 K/CU MM (ref 4–10.5)

## 2023-03-12 PROCEDURE — 6370000000 HC RX 637 (ALT 250 FOR IP)

## 2023-03-12 PROCEDURE — 80048 BASIC METABOLIC PNL TOTAL CA: CPT

## 2023-03-12 PROCEDURE — 6360000002 HC RX W HCPCS: Performed by: STUDENT IN AN ORGANIZED HEALTH CARE EDUCATION/TRAINING PROGRAM

## 2023-03-12 PROCEDURE — 2580000003 HC RX 258: Performed by: STUDENT IN AN ORGANIZED HEALTH CARE EDUCATION/TRAINING PROGRAM

## 2023-03-12 PROCEDURE — 82962 GLUCOSE BLOOD TEST: CPT

## 2023-03-12 PROCEDURE — 6370000000 HC RX 637 (ALT 250 FOR IP): Performed by: STUDENT IN AN ORGANIZED HEALTH CARE EDUCATION/TRAINING PROGRAM

## 2023-03-12 PROCEDURE — 94761 N-INVAS EAR/PLS OXIMETRY MLT: CPT

## 2023-03-12 PROCEDURE — 6360000002 HC RX W HCPCS: Performed by: INTERNAL MEDICINE

## 2023-03-12 PROCEDURE — 6370000000 HC RX 637 (ALT 250 FOR IP): Performed by: SURGERY

## 2023-03-12 PROCEDURE — 2140000000 HC CCU INTERMEDIATE R&B

## 2023-03-12 PROCEDURE — 84145 PROCALCITONIN (PCT): CPT

## 2023-03-12 PROCEDURE — 6370000000 HC RX 637 (ALT 250 FOR IP): Performed by: INTERNAL MEDICINE

## 2023-03-12 PROCEDURE — 86140 C-REACTIVE PROTEIN: CPT

## 2023-03-12 PROCEDURE — 85027 COMPLETE CBC AUTOMATED: CPT

## 2023-03-12 PROCEDURE — 2580000003 HC RX 258: Performed by: INTERNAL MEDICINE

## 2023-03-12 PROCEDURE — 36415 COLL VENOUS BLD VENIPUNCTURE: CPT

## 2023-03-12 PROCEDURE — 85007 BL SMEAR W/DIFF WBC COUNT: CPT

## 2023-03-12 RX ORDER — LORAZEPAM 1 MG/1
1 TABLET ORAL EVERY 8 HOURS PRN
Status: DISCONTINUED | OUTPATIENT
Start: 2023-03-12 | End: 2023-03-17 | Stop reason: HOSPADM

## 2023-03-12 RX ORDER — HEPARIN SODIUM 5000 [USP'U]/ML
5000 INJECTION, SOLUTION INTRAVENOUS; SUBCUTANEOUS EVERY 8 HOURS SCHEDULED
Status: DISCONTINUED | OUTPATIENT
Start: 2023-03-13 | End: 2023-03-17 | Stop reason: HOSPADM

## 2023-03-12 RX ORDER — SODIUM CHLORIDE, SODIUM LACTATE, POTASSIUM CHLORIDE, CALCIUM CHLORIDE 600; 310; 30; 20 MG/100ML; MG/100ML; MG/100ML; MG/100ML
INJECTION, SOLUTION INTRAVENOUS CONTINUOUS
Status: DISCONTINUED | OUTPATIENT
Start: 2023-03-13 | End: 2023-03-13

## 2023-03-12 RX ADMIN — GABAPENTIN 100 MG: 100 CAPSULE ORAL at 20:30

## 2023-03-12 RX ADMIN — HYDROMORPHONE HYDROCHLORIDE 0.5 MG: 1 INJECTION, SOLUTION INTRAMUSCULAR; INTRAVENOUS; SUBCUTANEOUS at 03:37

## 2023-03-12 RX ADMIN — HYDROMORPHONE HYDROCHLORIDE 0.5 MG: 1 INJECTION, SOLUTION INTRAMUSCULAR; INTRAVENOUS; SUBCUTANEOUS at 10:10

## 2023-03-12 RX ADMIN — PANTOPRAZOLE SODIUM 40 MG: 40 TABLET, DELAYED RELEASE ORAL at 06:31

## 2023-03-12 RX ADMIN — PIPERACILLIN AND TAZOBACTAM 3375 MG: 3; .375 INJECTION, POWDER, LYOPHILIZED, FOR SOLUTION INTRAVENOUS at 06:37

## 2023-03-12 RX ADMIN — FIDAXOMICIN 200 MG: 200 TABLET, FILM COATED ORAL at 13:17

## 2023-03-12 RX ADMIN — FIDAXOMICIN 200 MG: 200 TABLET, FILM COATED ORAL at 22:00

## 2023-03-12 RX ADMIN — SODIUM CHLORIDE, PRESERVATIVE FREE 10 ML: 5 INJECTION INTRAVENOUS at 10:10

## 2023-03-12 RX ADMIN — ATORVASTATIN CALCIUM 40 MG: 40 TABLET, FILM COATED ORAL at 20:30

## 2023-03-12 RX ADMIN — GABAPENTIN 100 MG: 100 CAPSULE ORAL at 16:12

## 2023-03-12 RX ADMIN — GABAPENTIN 100 MG: 100 CAPSULE ORAL at 10:10

## 2023-03-12 RX ADMIN — HYDROMORPHONE HYDROCHLORIDE 0.5 MG: 1 INJECTION, SOLUTION INTRAMUSCULAR; INTRAVENOUS; SUBCUTANEOUS at 20:30

## 2023-03-12 RX ADMIN — SODIUM CHLORIDE, PRESERVATIVE FREE 10 ML: 5 INJECTION INTRAVENOUS at 20:30

## 2023-03-12 RX ADMIN — CEFTRIAXONE SODIUM 1000 MG: 1 INJECTION, POWDER, FOR SOLUTION INTRAMUSCULAR; INTRAVENOUS at 16:13

## 2023-03-12 RX ADMIN — SODIUM HYPOCHLORITE: 1.25 SOLUTION TOPICAL at 21:39

## 2023-03-12 RX ADMIN — ASPIRIN 81 MG CHEWABLE TABLET 81 MG: 81 TABLET CHEWABLE at 10:10

## 2023-03-12 RX ADMIN — CARVEDILOL 6.25 MG: 6.25 TABLET, FILM COATED ORAL at 20:30

## 2023-03-12 RX ADMIN — CARVEDILOL 6.25 MG: 6.25 TABLET, FILM COATED ORAL at 10:10

## 2023-03-12 ASSESSMENT — PAIN DESCRIPTION - ORIENTATION: ORIENTATION: RIGHT;LEFT

## 2023-03-12 ASSESSMENT — PAIN SCALES - GENERAL
PAINLEVEL_OUTOF10: 8
PAINLEVEL_OUTOF10: 8
PAINLEVEL_OUTOF10: 1
PAINLEVEL_OUTOF10: 2
PAINLEVEL_OUTOF10: 2
PAINLEVEL_OUTOF10: 0
PAINLEVEL_OUTOF10: 8

## 2023-03-12 ASSESSMENT — PAIN SCALES - WONG BAKER
WONGBAKER_NUMERICALRESPONSE: 0
WONGBAKER_NUMERICALRESPONSE: 0

## 2023-03-12 ASSESSMENT — PAIN - FUNCTIONAL ASSESSMENT: PAIN_FUNCTIONAL_ASSESSMENT: PREVENTS OR INTERFERES SOME ACTIVE ACTIVITIES AND ADLS

## 2023-03-12 ASSESSMENT — PAIN DESCRIPTION - LOCATION
LOCATION: GENERALIZED
LOCATION: GENERALIZED
LOCATION: LEG

## 2023-03-12 ASSESSMENT — PAIN DESCRIPTION - DESCRIPTORS
DESCRIPTORS: ACHING

## 2023-03-12 NOTE — PROGRESS NOTES
V2.0  [facility] [place]      Name:  Ashok Gomez /Age/Sex: 1965  (62 y.o. female)   MRN & CSN:  [id number] & [id number] Encounter Date/Time: 3/12/2023 2:38 PM EST    Location:  [facility]0/3120-A PCP: Meghan Damon MD       Hospital Day: 4    Assessment and Plan:   Ashok Gomez is a 62 y.o. female with pmh of  ESRD on HD, COPD, CAD, Type II DM, HTN, HLD, Anxiety, Depression, HFmrEF  who presents with Sepsis ([facility] [address]      Plan:    Severe Sepsis with ? Impending Septic shock - Present on admission- 2/2 Likely Infected Sacral Wound   -Presented with WBC 29.2, SBP 94, LA 2.3. Nausea and vomiting prior to coming in with fatigue and pain around sacral wound X 1 week. -Foul smelling sacral wound   Bx NG x 48  Continue Vanc and zosyn. ID and surgery following. No surgical intervention planned for now. Will continue wound care. LA trended down. CRP / PCT trend. Pending Ux and MRSA  Dilaudid for pain in sacral region from wound     Type II NSTEMI 2/2 likely Demand Ischemia 2/2 above   Denies chest pain   EKG without acute ischemic changes   Troponin trending up and Echo from yesterday showed septal hypokinesia. Will start Asprin,   Cardiology consulted and rec followed. Hypoglycemia 2/2 Severe Sepsis - Now resolved. Was briefly on IV dextrose. Mildly Acute Decompensated on Chronic HFmrEF   Pt. With mild edema LLE with CXR suggestive of vascular congestion   Rales on my exam with some conducted sounds  As per document EF was 45-50. But repeat yesterday was 55%. Grade I diastolic function      NIDDM   On oral meds at home      Prolonged Qtc  Qtc 520. Will repeat EKG     Wide Pulse pressure, ?AS   BP with wide pulse pressure   Last Echo with EF 45-50% (10/2022) with mild VHD     ESRD on HD   On  and  - twice a week only per patient      H/O HTN, HLD   Continue home meds    Debility - Due to her multiple co morbidities and worsen decubitus ulcer. PT/OT.  Dietary consult     H/O Anxiety/Depression     H/O PAD s/p Rt. BKA       Diet ADULT DIET; Regular; Low Potassium (Less than 3000 mg/day); 1800 ml   DVT Prophylaxis [] Lovenox, [x]  Heparin, [] SCDs, [] Ambulation,  [] Eliquis, [] Xarelto  [] Coumadin   Code Status Full Code   Disposition From: home  Expected Disposition: TBD  Estimated Date of Discharge: 3 Days  Patient requires continued admission due to Needing IV abx and monitoring for response   Surrogate Decision Maker/ POA      Subjective:     Chief Complaint: No chief complaint on file. Kirk Pascal is a 62 y.o. female who presents with nausea and vomiting X 1 day along with pain around sacral wound and back with fatigue X 1 week     Patient was seen in HD. Reports her sacral region in sore and overall is very tired. Denied any chest pain or SOB    Review of Systems:    Review of Systems    Denies chest pain, SOB, dizziness, abd pain, n/v. Bowel and bladder habits normal    Objective:   No intake or output data in the 24 hours ending 03/12/23 0138       Vitals:   Vitals:    03/12/23 0000   BP: (!) 105/38   Pulse: 63   Resp: 13   Temp:    SpO2: 95%       Physical Exam:     General: NAD  Eyes: EOMI  ENT: neck supple  Cardiovascular: Regular rate. Respiratory: Clear to auscultation  Gastrointestinal: Soft, non tender  Genitourinary: no suprapubic tenderness  Musculoskeletal: RT BKA  Skin: Sacral WOund  Neuro: Alert. Psych: Mood appropriate.      Medications:   Medications:    Fidaxomicin  200 mg Oral BID    heparin (porcine)  60 Units/kg IntraVENous Once    carvedilol  6.25 mg Oral BID    aspirin  81 mg Oral Daily    atorvastatin  40 mg Oral Nightly    gabapentin  100 mg Oral TID    sodium chloride flush  5-40 mL IntraVENous 2 times per day    pantoprazole  40 mg Oral QAM AC    piperacillin-tazobactam  3,375 mg IntraVENous Q12H    vancomycin (VANCOCIN) intermittent dosing (placeholder)   Other RX Placeholder    sodium hypochlorite   Irrigation BID      Infusions:    sodium hypochlorite      sodium chloride      dextrose       PRN Meds: HYDROmorphone, 0.5 mg, Q6H PRN  heparin (porcine), 60 Units/kg, PRN  heparin (porcine), 30 Units/kg, PRN  sodium hypochlorite, 480 mL, Continuous PRN  sodium chloride flush, 5-40 mL, PRN  sodium chloride, , PRN  acetaminophen, 650 mg, Q6H PRN   Or  acetaminophen, 650 mg, Q6H PRN  albuterol sulfate HFA, 2 puff, Q6H PRN  glucose, 4 tablet, PRN  dextrose bolus, 125 mL, PRN   Or  dextrose bolus, 250 mL, PRN  glucagon (rDNA), 1 mg, PRN  dextrose, , Continuous PRN      Labs      Recent Results (from the past 24 hour(s))   Troponin    Collection Time: 03/11/23  4:21 AM   Result Value Ref Range    Troponin T 0.080 (H) <0.01 NG/ML   APTT    Collection Time: 03/11/23  4:21 AM   Result Value Ref Range    aPTT 39.4 (H) 25.1 - 37.1 SECONDS   Basic Metabolic Panel w/ Reflex to MG    Collection Time: 03/11/23  4:21 AM   Result Value Ref Range    Sodium 137 135 - 145 MMOL/L    Potassium 4.0 3.5 - 5.1 MMOL/L    Chloride 100 99 - 110 mMol/L    CO2 26 21 - 32 MMOL/L    Anion Gap 11 4 - 16    BUN 33 (H) 6 - 23 MG/DL    Creatinine 3.0 (H) 0.6 - 1.1 MG/DL    Est, Glom Filt Rate 18 (L) >60 mL/min/1.73m2    Glucose 68 (L) 70 - 99 MG/DL    Calcium 7.9 (L) 8.3 - 10.6 MG/DL   CBC with Auto Differential    Collection Time: 03/11/23  4:21 AM   Result Value Ref Range    WBC 33.5 (HH) 4.0 - 10.5 K/CU MM    RBC 3.62 (L) 4.2 - 5.4 M/CU MM    Hemoglobin 11.2 (L) 12.5 - 16.0 GM/DL    Hematocrit 37.4 37 - 47 %    .3 (H) 78 - 100 FL    MCH 30.9 27 - 31 PG    MCHC 29.9 (L) 32.0 - 36.0 %    RDW 14.2 11.7 - 14.9 %    Platelets 260 (L) 112 - 440 K/CU MM    MPV 11.4 (H) 7.5 - 11.1 FL    Metamyelocytes Relative 1 (H) 0.0 %    Bands Relative 16 (H) 5 - 11 %    Segs Relative 73.0 (H) 36 - 66 %    Eosinophils % 2.0 0 - 3 %    Lymphocytes % 5.0 (L) 24 - 44 %    Monocytes % 3.0 0 - 4 %    Metamyelocytes Absolute 0.34 K/CU MM    Bands Absolute 5.36 K/CU MM    Segs Absolute 24.4 K/CU MM Eosinophils Absolute 0.7 K/CU MM    Lymphocytes Absolute 1.7 K/CU MM    Monocytes Absolute 1.0 K/CU MM    Differential Type MANUAL DIFFERENTIAL    C-Reactive Protein    Collection Time: 03/11/23  4:21 AM   Result Value Ref Range    CRP High Sensitivity 181.6 (H) <5.0 mg/L   Vancomycin Level, Random    Collection Time: 03/11/23  4:21 AM   Result Value Ref Range    Vancomycin Rm 18.1 UG/ML    DOSE AMOUNT DOSE AMT. GIVEN - 750mg     DOSE TIME DOSE TIME GIVEN - 3/10 @16:00    POCT Glucose    Collection Time: 03/11/23  5:22 AM   Result Value Ref Range    POC Glucose 70 70 - 99 MG/DL   Troponin    Collection Time: 03/11/23 12:20 PM   Result Value Ref Range    Troponin T 0.084 (H) <0.01 NG/ML   Lipid Panel    Collection Time: 03/11/23 12:20 PM   Result Value Ref Range    Triglycerides 261 (H) <150 MG/DL    Cholesterol 80 <200 MG/DL    HDL 12 (L) >40 MG/DL    LDL Calculated 16 <100 MG/DL   Hemoglobin A1C    Collection Time: 03/11/23 12:20 PM   Result Value Ref Range    Hemoglobin A1C 5.2 4.2 - 6.3 %    eAG 103 mg/dL   Clostridium Difficile Toxin/Antigen    Collection Time: 03/11/23  4:00 PM    Specimen: Stool   Result Value Ref Range    Source STOOL     C DIFF AG + TOXIN INDETERMINATE: Reflex testing to molecular method. (A) NEGATIVE: NO C. difficile antigen and toxin detected.    GI Disease Panel PCR    Collection Time: 03/11/23  4:00 PM    Specimen: Stool   Result Value Ref Range    Campylobacter PCR NOT DETECTED NOT DETECTED    Plesiomonas Shigelloides PCR NOT DETECTED NOT DETECTED    Salmonella PCR NOT DETECTED NOT DETECTED    Vibrio PCR NOT DETECTED NOT DETECTED    Vibrio Cholerae PCR NOT DETECTED NOT DETECTED    Yersinia Enterocolitica PCR NOT DETECTED NOT DETECTED    E Coli Enteroaggregative PCR NOT DETECTED NOT DETECTED    E Coli Enteropathogenic PCR NOT DETECTED NOT DETECTED    E Coli Enterotoxigenic PCR NOT DETECTED NOT DETECTED    E Coli Shiga Like Toxin PCR NOT DETECTED NOT DETECTED    E Coli O157 PCR NOT DETECTED NOT DETECTED    E Coli Shigella/Enteroinvasive PCR NOT DETECTED NOT DETECTED    Cryptosporidium PCR DETECTED BY PCR (A) NOT DETECTED    Cyclospora Cayetanensis PCR NOT DETECTED NOT DETECTED    Entamoeba Histolytica PCR NOT DETECTED NOT DETECTED    Giardia Lamblia PCR NOT DETECTED NOT DETECTED    Adenovirus F 40 41 PCR NOT DETECTED NOT DETECTED    Astrovirus PCR NOT DETECTED NOT DETECTED    Norovirus GI GII PCR NOT DETECTED NOT DETECTED    Rotavirus A PCR NOT DETECTED NOT DETECTED    Sapovirus PCR NOT DETECTED NOT DETECTED   C difficile Molecular/PCR    Collection Time: 03/11/23  4:00 PM   Result Value Ref Range    Clostridium difficile, PCR PATIENT IS POSITIVE FOR C DIFFICILE (A) PATIENT IS NEGATIVE FOR C DIFFICILE   POCT Glucose    Collection Time: 03/11/23  4:41 PM   Result Value Ref Range    POC Glucose 193 (H) 70 - 99 MG/DL   Troponin    Collection Time: 03/11/23  4:44 PM   Result Value Ref Range    Troponin T 0.073 (H) <0.01 NG/ML   Troponin    Collection Time: 03/11/23  9:29 PM   Result Value Ref Range    Troponin T 0.073 (H) <0.01 NG/ML        Imaging/Diagnostics Last 24 Hours   XR CHEST PORTABLE    Result Date: 3/8/2023  EXAMINATION: ONE XRAY VIEW OF THE CHEST 3/8/2023 6:24 pm COMPARISON: 10/12/2022 HISTORY: ORDERING SYSTEM PROVIDED HISTORY: Shortness of breath, fatigue and weakness TECHNOLOGIST PROVIDED HISTORY: Reason for exam:->Shortness of breath, fatigue and weakness Reason for Exam: Shortness of breath, fatigue and weakness Additional signs and symptoms: Shortness of breath, fatigue and weakness Relevant Medical/Surgical History: Shortness of breath, fatigue and weakness FINDINGS: Cardiac and mediastinal contours normal.  Sternotomy wires unchanged. Pulmonary vascular congestion with bilateral perihilar ground-glass opacity and pulmonary vasculature indistinctness. Chronic calcified lateral left mid lung granuloma unchanged. No pneumothorax or pleural effusion. No acute osseous abnormality. Aortic and carotid atherosclerotic calcifications present. Pulmonary vascular congestion with mild pulmonary edema.        Electronically signed by Anson Monroe MD on 3/12/2023 at 1:38 AM

## 2023-03-12 NOTE — PROGRESS NOTES
Changed pt wound dressing on coccyx 6 times this shift d/t loose stool. Pt emotional all shift and states she hates feeling so hopeless.

## 2023-03-12 NOTE — PROGRESS NOTES
Nephrology Progress Note  3/12/2023 8:38 AM  Subjective: Interval History: Marlena Pyle is a 62 y.o. female diagnosed with C. difficile tired weak resting in bed        Data:   Scheduled Meds:   Fidaxomicin  200 mg Oral BID    carvedilol  6.25 mg Oral BID    aspirin  81 mg Oral Daily    atorvastatin  40 mg Oral Nightly    gabapentin  100 mg Oral TID    sodium chloride flush  5-40 mL IntraVENous 2 times per day    pantoprazole  40 mg Oral QAM AC    piperacillin-tazobactam  3,375 mg IntraVENous Q12H    vancomycin (VANCOCIN) intermittent dosing (placeholder)   Other RX Placeholder    sodium hypochlorite   Irrigation BID     Continuous Infusions:   sodium hypochlorite      sodium chloride      dextrose           CBC   Recent Labs     03/10/23  0745 03/11/23 0421 03/12/23  0503   WBC 27.7* 33.5* 32.1*   HGB 11.4* 11.2* 10.6*   HCT 36.6* 37.4 35.4*   * 117* 110*      BMP   Recent Labs     03/10/23  0745 03/11/23 0421 03/12/23  0503   * 137 133*   K 5.3* 4.0 4.4   CL 94* 100 97*   CO2 22 26 19*   BUN 62* 33* 43*   CREATININE 4.6* 3.0* 3.6*     Hepatic:   No results for input(s): AST, ALT, ALB, BILITOT, ALKPHOS in the last 72 hours. Troponin: No results for input(s): TROPONINI in the last 72 hours. BNP: No results for input(s): BNP in the last 72 hours. Lipids:   Recent Labs     03/11/23  1220   CHOL 80   HDL 12*     ABGs: No results found for: PHART, PO2ART, WHS2VVV  INR: No results for input(s): INR in the last 72 hours.   Renal Labs  Albumin:    Lab Results   Component Value Date/Time    LABALBU 2.1 03/08/2023 06:07 PM     Calcium:    Lab Results   Component Value Date/Time    CALCIUM 7.7 03/12/2023 05:03 AM     Phosphorus:    Lab Results   Component Value Date/Time    PHOS 9.0 11/14/2022 11:47 AM     U/A:    Lab Results   Component Value Date/Time    NITRU NEGATIVE 01/07/2020 04:00 PM    COLORU YELLOW 01/07/2020 04:00 PM    WBCUA 0 TO 1 01/07/2020 04:00 PM    RBCUA 2 TO 3 01/07/2020 04:00 PM MUCUS 1+ 01/07/2020 04:00 PM    YEAST OCCASIONAL 11/17/2010 09:30 AM    BACTERIA OCCASIONAL 01/07/2020 04:00 PM    CLARITYU CLEAR 01/07/2020 04:00 PM    SPECGRAV 1.020 01/07/2020 04:00 PM    UROBILINOGEN 2.0 01/07/2020 04:00 PM    BILIRUBINUR NEGATIVE 01/07/2020 04:00 PM    BLOODU SMALL 01/07/2020 04:00 PM    KETUA NEGATIVE 01/07/2020 04:00 PM     ABG:  No results found for: PHART, ZGO8XGB, PO2ART, IKG5QKN, BEART, THGBART, PMX3EET, H4VEAFIL  HgBA1c:    Lab Results   Component Value Date/Time    LABA1C 5.2 03/11/2023 12:20 PM     Microalbumen/Creatinine ratio:  No components found for: RUCREAT  TSH:  No results found for: TSH  IRON:  No results found for: IRON  Iron Saturation:  No components found for: PERCENTFE  TIBC:  No results found for: TIBC  FERRITIN:  No results found for: FERRITIN  RPR:  No results found for: RPR  COLE:  No results found for: ANATITER, COLE  24 Hour Urine for Creatinine Clearance:  No components found for: CREAT4, UHRS10, UTV10      Objective:   I/O: No intake/output data recorded. No intake/output data recorded. No intake/output data recorded. Vitals: BP (!) 109/39   Pulse 62   Temp 97.8 °F (36.6 °C) (Oral)   Resp 12   Ht 5' 3\" (1.6 m)   Wt 130 lb (59 kg)   SpO2 96%   BMI 23.03 kg/m²  {  General appearance: awake weak  HEENT: Head: Normal, normocephalic, atraumatic.   Neck: supple, symmetrical, trachea midline  Lungs: diminished breath sounds bilaterally  Heart: S1, S2 normal  Abdomen: abnormal findings:  soft nt  Extremities: edema trace right BKA left AV fistula  Neurologic: Mental status: alertness: alert        Assessment and Plan:      IMP:  #1 end-stage renal disease on dialysis Monday Wednesday Friday  #2 soft tissue infection with leukocytosis  #3 atherosclerotic cardiovascular disease  #4 positive C. difficile and Cryptosporidium    Plan     #1 do next dialysis on Monday  #2 wound care monitor  #3 cardiac status stable monitor low blood pressure  #4 treated for C. Difficile  Monitor electrolytes correct with dialysis  We will monitor and follow closely             Bre Moody MD, MD

## 2023-03-12 NOTE — CARE COORDINATION
NABOR received call from [facility] with [facility]. [facility] will start precert. Need updated PT/OT notes. WB note placed.

## 2023-03-13 LAB
ALBUMIN SERPL-MCNC: 1.6 GM/DL (ref 3.4–5)
ALP BLD-CCNC: 376 IU/L (ref 40–128)
ALT SERPL-CCNC: 7 U/L (ref 10–40)
ANION GAP SERPL CALCULATED.3IONS-SCNC: 18 MMOL/L (ref 4–16)
AST SERPL-CCNC: 18 IU/L (ref 15–37)
BASOPHILS ABSOLUTE: 0 K/CU MM
BASOPHILS RELATIVE PERCENT: 0 % (ref 0–1)
BILIRUB SERPL-MCNC: 0.3 MG/DL (ref 0–1)
BUN SERPL-MCNC: 51 MG/DL (ref 6–23)
CALCIUM SERPL-MCNC: 7.5 MG/DL (ref 8.3–10.6)
CHLORIDE BLD-SCNC: 99 MMOL/L (ref 99–110)
CO2: 16 MMOL/L (ref 21–32)
CREAT SERPL-MCNC: 4.2 MG/DL (ref 0.6–1.1)
CULTURE: ABNORMAL
DIFFERENTIAL TYPE: ABNORMAL
EKG ATRIAL RATE: 83 BPM
EKG DIAGNOSIS: NORMAL
EKG P AXIS: 102 DEGREES
EKG P-R INTERVAL: 140 MS
EKG Q-T INTERVAL: 430 MS
EKG QRS DURATION: 142 MS
EKG QTC CALCULATION (BAZETT): 505 MS
EKG R AXIS: 51 DEGREES
EKG T AXIS: 178 DEGREES
EKG VENTRICULAR RATE: 83 BPM
EOSINOPHILS ABSOLUTE: 0.2 K/CU MM
EOSINOPHILS RELATIVE PERCENT: 0.5 % (ref 0–3)
GFR SERPL CREATININE-BSD FRML MDRD: 12 ML/MIN/1.73M2
GLUCOSE BLD-MCNC: 124 MG/DL (ref 70–99)
GLUCOSE BLD-MCNC: 129 MG/DL (ref 70–99)
GLUCOSE BLD-MCNC: 171 MG/DL (ref 70–99)
GLUCOSE SERPL-MCNC: 100 MG/DL (ref 70–99)
HCT VFR BLD CALC: 35.1 % (ref 37–47)
HEMOGLOBIN: 10.4 GM/DL (ref 12.5–16)
IMMATURE NEUTROPHIL %: 5.4 % (ref 0–0.43)
LYMPHOCYTES ABSOLUTE: 1.4 K/CU MM
LYMPHOCYTES RELATIVE PERCENT: 4.8 % (ref 24–44)
Lab: ABNORMAL
MCH RBC QN AUTO: 31.3 PG (ref 27–31)
MCHC RBC AUTO-ENTMCNC: 29.6 % (ref 32–36)
MCV RBC AUTO: 105.7 FL (ref 78–100)
MONOCYTES ABSOLUTE: 1.2 K/CU MM
MONOCYTES RELATIVE PERCENT: 4.1 % (ref 0–4)
NUCLEATED RBC %: 0 %
PDW BLD-RTO: 14.4 % (ref 11.7–14.9)
PLATELET # BLD: 130 K/CU MM (ref 140–440)
PMV BLD AUTO: 11.4 FL (ref 7.5–11.1)
POTASSIUM SERPL-SCNC: 4.7 MMOL/L (ref 3.5–5.1)
RBC # BLD: 3.32 M/CU MM (ref 4.2–5.4)
SEGMENTED NEUTROPHILS ABSOLUTE COUNT: 24.1 K/CU MM
SEGMENTED NEUTROPHILS RELATIVE PERCENT: 85.2 % (ref 36–66)
SODIUM BLD-SCNC: 133 MMOL/L (ref 135–145)
SPECIMEN: ABNORMAL
TOTAL IMMATURE NEUTOROPHIL: 1.54 K/CU MM
TOTAL NUCLEATED RBC: 0 K/CU MM
TOTAL PROTEIN: 4.5 GM/DL (ref 6.4–8.2)
WBC # BLD: 28.3 K/CU MM (ref 4–10.5)

## 2023-03-13 PROCEDURE — 99232 SBSQ HOSP IP/OBS MODERATE 35: CPT | Performed by: NURSE PRACTITIONER

## 2023-03-13 PROCEDURE — 85025 COMPLETE CBC W/AUTO DIFF WBC: CPT

## 2023-03-13 PROCEDURE — 6360000002 HC RX W HCPCS: Performed by: STUDENT IN AN ORGANIZED HEALTH CARE EDUCATION/TRAINING PROGRAM

## 2023-03-13 PROCEDURE — 6370000000 HC RX 637 (ALT 250 FOR IP): Performed by: STUDENT IN AN ORGANIZED HEALTH CARE EDUCATION/TRAINING PROGRAM

## 2023-03-13 PROCEDURE — 1200000000 HC SEMI PRIVATE

## 2023-03-13 PROCEDURE — 80048 BASIC METABOLIC PNL TOTAL CA: CPT

## 2023-03-13 PROCEDURE — 6360000002 HC RX W HCPCS: Performed by: INTERNAL MEDICINE

## 2023-03-13 PROCEDURE — 6370000000 HC RX 637 (ALT 250 FOR IP): Performed by: INTERNAL MEDICINE

## 2023-03-13 PROCEDURE — 2580000003 HC RX 258: Performed by: INTERNAL MEDICINE

## 2023-03-13 PROCEDURE — 36415 COLL VENOUS BLD VENIPUNCTURE: CPT

## 2023-03-13 PROCEDURE — 2580000003 HC RX 258: Performed by: NURSE PRACTITIONER

## 2023-03-13 PROCEDURE — 2580000003 HC RX 258: Performed by: STUDENT IN AN ORGANIZED HEALTH CARE EDUCATION/TRAINING PROGRAM

## 2023-03-13 PROCEDURE — 6360000002 HC RX W HCPCS: Performed by: NURSE PRACTITIONER

## 2023-03-13 PROCEDURE — 97530 THERAPEUTIC ACTIVITIES: CPT

## 2023-03-13 PROCEDURE — 6370000000 HC RX 637 (ALT 250 FOR IP): Performed by: SURGERY

## 2023-03-13 PROCEDURE — 80053 COMPREHEN METABOLIC PANEL: CPT

## 2023-03-13 PROCEDURE — 94761 N-INVAS EAR/PLS OXIMETRY MLT: CPT

## 2023-03-13 PROCEDURE — 93010 ELECTROCARDIOGRAM REPORT: CPT | Performed by: INTERNAL MEDICINE

## 2023-03-13 PROCEDURE — 6370000000 HC RX 637 (ALT 250 FOR IP): Performed by: NURSE PRACTITIONER

## 2023-03-13 PROCEDURE — 90935 HEMODIALYSIS ONE EVALUATION: CPT

## 2023-03-13 PROCEDURE — 93005 ELECTROCARDIOGRAM TRACING: CPT | Performed by: NURSE PRACTITIONER

## 2023-03-13 PROCEDURE — 97535 SELF CARE MNGMENT TRAINING: CPT

## 2023-03-13 RX ORDER — FLUCONAZOLE 100 MG/1
200 TABLET ORAL EVERY 24 HOURS
Status: DISCONTINUED | OUTPATIENT
Start: 2023-03-13 | End: 2023-03-15

## 2023-03-13 RX ADMIN — HEPARIN SODIUM 5000 UNITS: 5000 INJECTION INTRAVENOUS; SUBCUTANEOUS at 17:59

## 2023-03-13 RX ADMIN — FLUCONAZOLE 200 MG: 100 TABLET ORAL at 14:05

## 2023-03-13 RX ADMIN — ASPIRIN 81 MG CHEWABLE TABLET 81 MG: 81 TABLET CHEWABLE at 11:35

## 2023-03-13 RX ADMIN — SODIUM CHLORIDE, PRESERVATIVE FREE 10 ML: 5 INJECTION INTRAVENOUS at 20:59

## 2023-03-13 RX ADMIN — HYDROMORPHONE HYDROCHLORIDE 0.5 MG: 1 INJECTION, SOLUTION INTRAMUSCULAR; INTRAVENOUS; SUBCUTANEOUS at 10:41

## 2023-03-13 RX ADMIN — HYDROMORPHONE HYDROCHLORIDE 0.5 MG: 1 INJECTION, SOLUTION INTRAMUSCULAR; INTRAVENOUS; SUBCUTANEOUS at 03:24

## 2023-03-13 RX ADMIN — PIPERACILLIN AND TAZOBACTAM 3375 MG: 3; .375 INJECTION, POWDER, LYOPHILIZED, FOR SOLUTION INTRAVENOUS at 18:15

## 2023-03-13 RX ADMIN — FIDAXOMICIN 200 MG: 200 TABLET, FILM COATED ORAL at 12:48

## 2023-03-13 RX ADMIN — HEPARIN SODIUM 5000 UNITS: 5000 INJECTION INTRAVENOUS; SUBCUTANEOUS at 23:05

## 2023-03-13 RX ADMIN — HEPARIN SODIUM 5000 UNITS: 5000 INJECTION INTRAVENOUS; SUBCUTANEOUS at 11:36

## 2023-03-13 RX ADMIN — GABAPENTIN 100 MG: 100 CAPSULE ORAL at 20:59

## 2023-03-13 RX ADMIN — ACETAMINOPHEN 650 MG: 325 TABLET ORAL at 21:40

## 2023-03-13 RX ADMIN — SODIUM CHLORIDE, POTASSIUM CHLORIDE, SODIUM LACTATE AND CALCIUM CHLORIDE: 600; 310; 30; 20 INJECTION, SOLUTION INTRAVENOUS at 01:00

## 2023-03-13 RX ADMIN — FIDAXOMICIN 200 MG: 200 TABLET, FILM COATED ORAL at 23:05

## 2023-03-13 RX ADMIN — ATORVASTATIN CALCIUM 40 MG: 40 TABLET, FILM COATED ORAL at 20:59

## 2023-03-13 RX ADMIN — EPOETIN ALFA-EPBX 3000 UNITS: 3000 INJECTION, SOLUTION INTRAVENOUS; SUBCUTANEOUS at 08:24

## 2023-03-13 RX ADMIN — COLLAGENASE SANTYL: 250 OINTMENT TOPICAL at 21:19

## 2023-03-13 RX ADMIN — EPOETIN ALFA-EPBX 4000 UNITS: 4000 INJECTION, SOLUTION INTRAVENOUS; SUBCUTANEOUS at 08:23

## 2023-03-13 RX ADMIN — SODIUM HYPOCHLORITE 480 ML: 1.25 SOLUTION TOPICAL at 21:19

## 2023-03-13 RX ADMIN — HYDROMORPHONE HYDROCHLORIDE 0.5 MG: 1 INJECTION, SOLUTION INTRAMUSCULAR; INTRAVENOUS; SUBCUTANEOUS at 18:01

## 2023-03-13 ASSESSMENT — PAIN DESCRIPTION - DESCRIPTORS
DESCRIPTORS: ACHING
DESCRIPTORS: SHOOTING;POUNDING

## 2023-03-13 ASSESSMENT — PAIN DESCRIPTION - LOCATION
LOCATION: LEG
LOCATION: BUTTOCKS
LOCATION: GENERALIZED
LOCATION: BACK
LOCATION: GENERALIZED

## 2023-03-13 ASSESSMENT — PAIN SCALES - WONG BAKER: WONGBAKER_NUMERICALRESPONSE: 4

## 2023-03-13 ASSESSMENT — PAIN SCALES - GENERAL
PAINLEVEL_OUTOF10: 8
PAINLEVEL_OUTOF10: 10
PAINLEVEL_OUTOF10: 8
PAINLEVEL_OUTOF10: 1
PAINLEVEL_OUTOF10: 4
PAINLEVEL_OUTOF10: 10
PAINLEVEL_OUTOF10: 7

## 2023-03-13 ASSESSMENT — PAIN DESCRIPTION - ORIENTATION
ORIENTATION: RIGHT;LEFT
ORIENTATION: MID
ORIENTATION: LOWER

## 2023-03-13 ASSESSMENT — PAIN - FUNCTIONAL ASSESSMENT
PAIN_FUNCTIONAL_ASSESSMENT: PREVENTS OR INTERFERES SOME ACTIVE ACTIVITIES AND ADLS
PAIN_FUNCTIONAL_ASSESSMENT: PREVENTS OR INTERFERES SOME ACTIVE ACTIVITIES AND ADLS
PAIN_FUNCTIONAL_ASSESSMENT: ACTIVITIES ARE NOT PREVENTED

## 2023-03-13 NOTE — PROGRESS NOTES
Pt refused wound care again stated she wants it later she is still really tired, MD notified pt is tired, all vital signs in normal limits.

## 2023-03-13 NOTE — PROGRESS NOTES
Pt refused wound care due to being too exhausted from dialysis.  This nurse educated pt on why her wounds need cleaned and pt \"stated later please\"

## 2023-03-13 NOTE — CARE COORDINATION
Spoke with Gayatri/Jef. Notified her that pt is not medically ready for 2 days per Dr in [address] [facility] today. She states that she has not started the pre-cert d/t she is waiting for PT/OT notes. She will start pre-cert on day of d/c.  TE     ELECTRONIC PAS/RR COMPLETED AND PLACED IN PACKET.

## 2023-03-13 NOTE — PROGRESS NOTES
V2.0  [facility] [place]      Name:  Valeria Yates /Age/Sex: 1965  (62 y.o. female)   MRN & CSN:  [id number] & [id number] Encounter Date/Time: 3/12/2023 11:15 PM EDT    [facility] Corwin Bonilla MD       Hospital Day: 4    Assessment and Plan:   Valeria Yates is a 62 y.o. female with past medical history of end-stage renal disease on hemodialysis, COPD not on home oxygen coronary artery disease, type 2 diabetes mellitus, hypertension, hyperlipidemia, anxiety/depression, heart failure with midrange ejection fraction, GERD is admitted on 3/9/2023 with sepsis secondary to infected chronic decubitus ulcer. Severe Sepsis secondary to infected stage IV decubitus ulcer  Blood cultures no growth to date  MRSA -ve  Wound cultures positive for group C beta streptococci and Candida  Discontinue vancomycin and Zosyn  Start Rocephin to complete 5 days of therapy    Severe C. difficile colitis  Evidenced by wbc >15k  Started on fidaxomicin 200 mg twice daily 3/11/2023, plan to continue for 10 days. LR for fluid replacement  Infectious disease has been consulted    Type II NSTEMI  Chest pain free today  Likely due to underlying sepsis and poor clearance due to ESRD  Heparin drip was discontinued by cardiology echocardiogram with ejection fraction 84%, grade 1 diastolic dysfunction, hypokinetic septal wall  Continue aspirin and statin  No further ischemic work-up planned    Hypertension  Low normal blood pressure trend  Hold Coreg    End-stage renal disease on hemodialysis ()  Nephrology is following for inpatient management of HD    -Start DVT prophylaxis    Diet ADULT DIET;  Regular; Low Potassium (Less than 3000 mg/day); 1800 ml   DVT Prophylaxis [] Lovenox, [x]  Heparin, [] SCDs, [] Ambulation,  [] Eliquis, [] Xarelto  [] Coumadin   Code Status Full Code   Disposition From: Home  Expected Disposition: SNF  Estimated Date of Discharge: 3- 4 days  Patient requires continued admission due to C. difficile colitis   Surrogate Decision Maker/ POA Spouse     Subjective:     Chief Complaint: pain in sacral area       Marisa Sue is a 62 y.o. female who presents with pain in sacral area. Patient was seen and evaluated at bedside earlier this morning. Patient was weak and tearful because of constant diarrhea. Patient was alert oriented x3, hemodynamically stable with low normal blood pressure trend. No acute overnight events noted    Objective:   No intake or output data in the 24 hours ending 03/12/23 2336     Vitals:   Vitals:    03/12/23 2030   BP:    Pulse:    Resp: 14   Temp:    SpO2:        Physical Exam:   Physical Exam  Vitals reviewed. Constitutional:       Appearance: She is normal weight. She is ill-appearing. HENT:      Head: Normocephalic and atraumatic. Nose: Nose normal.      Mouth/Throat:      Mouth: Mucous membranes are dry. Pharynx: Oropharynx is clear. Eyes:      General: No scleral icterus. Conjunctiva/sclera: Conjunctivae normal.   Cardiovascular:      Rate and Rhythm: Normal rate and regular rhythm. Pulses: Normal pulses. Heart sounds: Normal heart sounds. No murmur heard. Pulmonary:      Effort: Pulmonary effort is normal.      Breath sounds: Normal breath sounds. No wheezing, rhonchi or rales. Abdominal:      General: Bowel sounds are normal. There is no distension. Palpations: Abdomen is soft. Tenderness: There is no abdominal tenderness. Musculoskeletal:         General: No deformity. Normal range of motion. Cervical back: Neck supple. No rigidity. Right lower leg: No edema. Left lower leg: No edema. Skin:     Coloration: Skin is not jaundiced or pale. Findings: Bruising and lesion present. Neurological:      General: No focal deficit present. Mental Status: She is alert and oriented to person, place, and time. Mental status is at baseline.           Medications: Medications:    Fidaxomicin  200 mg Oral BID    [START ON 3/13/2023] cefTRIAXone (ROCEPHIN) IV  1,000 mg IntraVENous Q24H    [START ON 3/13/2023] heparin (porcine)  5,000 Units SubCUTAneous 3 times per day    [Held by provider] carvedilol  6.25 mg Oral BID    aspirin  81 mg Oral Daily    atorvastatin  40 mg Oral Nightly    gabapentin  100 mg Oral TID    sodium chloride flush  5-40 mL IntraVENous 2 times per day    sodium hypochlorite   Irrigation BID      Infusions:    [START ON 3/13/2023] lactated ringers IV soln      sodium hypochlorite Stopped (03/12/23 1011)    sodium chloride      dextrose       PRN Meds: LORazepam, 1 mg, Q8H PRN  HYDROmorphone, 0.5 mg, Q6H PRN  sodium hypochlorite, 480 mL, Continuous PRN  sodium chloride flush, 5-40 mL, PRN  sodium chloride, , PRN  acetaminophen, 650 mg, Q6H PRN   Or  acetaminophen, 650 mg, Q6H PRN  albuterol sulfate HFA, 2 puff, Q6H PRN  glucose, 4 tablet, PRN  dextrose bolus, 125 mL, PRN   Or  dextrose bolus, 250 mL, PRN  glucagon (rDNA), 1 mg, PRN  dextrose, , Continuous PRN      Labs      Recent Results (from the past 24 hour(s))   Procalcitonin    Collection Time: 03/12/23  5:03 AM   Result Value Ref Range    Procalcitonin 7.96    Basic Metabolic Panel w/ Reflex to MG    Collection Time: 03/12/23  5:03 AM   Result Value Ref Range    Sodium 133 (L) 135 - 145 MMOL/L    Potassium 4.4 3.5 - 5.1 MMOL/L    Chloride 97 (L) 99 - 110 mMol/L    CO2 19 (L) 21 - 32 MMOL/L    Anion Gap 17 (H) 4 - 16    BUN 43 (H) 6 - 23 MG/DL    Creatinine 3.6 (H) 0.6 - 1.1 MG/DL    Est, Glom Filt Rate 14 (L) >60 mL/min/1.73m2    Glucose 91 70 - 99 MG/DL    Calcium 7.7 (L) 8.3 - 10.6 MG/DL   CBC with Auto Differential    Collection Time: 03/12/23  5:03 AM   Result Value Ref Range    WBC 32.1 (HH) 4.0 - 10.5 K/CU MM    RBC 3.39 (L) 4.2 - 5.4 M/CU MM    Hemoglobin 10.6 (L) 12.5 - 16.0 GM/DL    Hematocrit 35.4 (L) 37 - 47 %    .4 (H) 78 - 100 FL    MCH 31.3 (H) 27 - 31 PG    MCHC 29.9 (L) 32.0 - 36.0 %    RDW 14.1 11.7 - 14.9 %    Platelets 231 (L) 302 - 440 K/CU MM    MPV 11.5 (H) 7.5 - 11.1 FL    Metamyelocytes Relative 2 (H) 0.0 %    Bands Relative 11 5 - 11 %    Segs Relative 80.0 (H) 36 - 66 %    Lymphocytes % 6.0 (L) 24 - 44 %    Monocytes % 1.0 0 - 4 %    Metamyelocytes Absolute 0.64 K/CU MM    Bands Absolute 3.53 K/CU MM    Segs Absolute 25.7 K/CU MM    Lymphocytes Absolute 1.9 K/CU MM    Monocytes Absolute 0.3 K/CU MM    Differential Type MANUAL DIFFERENTIAL     Polychromasia 1+     PLT Morphology ADEQUATE    C-Reactive Protein    Collection Time: 03/12/23  5:03 AM   Result Value Ref Range    CRP High Sensitivity 126.6 (H) <5.0 mg/L   POCT Glucose    Collection Time: 03/12/23 10:14 AM   Result Value Ref Range    POC Glucose 156 (H) 70 - 99 MG/DL   POCT Glucose    Collection Time: 03/12/23  8:19 PM   Result Value Ref Range    POC Glucose 102 (H) 70 - 99 MG/DL        Imaging/Diagnostics Last 24 Hours   XR CHEST PORTABLE    Result Date: 3/11/2023  EXAMINATION: ONE XRAY VIEW OF THE CHEST 3/11/2023 9:29 am COMPARISON: 03/08/2023 HISTORY: ORDERING SYSTEM PROVIDED HISTORY: CHF TECHNOLOGIST PROVIDED HISTORY: Reason for exam:->CHF Reason for Exam: CHF FINDINGS: Cardiomegaly, diffuse bronchovascular marking prominence. Findings are consistent with congestive heart failure and/or diffuse bilateral atypical pneumonia/pneumonitis. Very small left pleural effusion may be present. No new or large areas of pulmonary consolidation. No pneumothorax or mediastinal widening. Findings consistent with congestive heart failure and/or diffuse pneumonitis/atypical pneumonia with very small left basilar pleural effusion. Findings may be accentuated by underlying chronic lung disease.        Electronically signed by Ricardo Bingham MD on 3/12/2023 at 11:36 PM

## 2023-03-13 NOTE — PROGRESS NOTES
Nephrology Progress Note  3/13/2023 11:47 AM        Subjective:   Admit Date: 3/9/2023  PCP: Jonathan Duval MD    Interval History:  patient seen in early morning, this is a late entry    Diet:  reported eating some    ROS:   no confusion or shortness of breath   no fever, acceptable blood pressure    Data:     Current meds:    epoetin francisco-epbx  7,000 Units IntraVENous Once in dialysis    Fidaxomicin  200 mg Oral BID    cefTRIAXone (ROCEPHIN) IV  1,000 mg IntraVENous Q24H    heparin (porcine)  5,000 Units SubCUTAneous 3 times per day    [Held by provider] carvedilol  6.25 mg Oral BID    aspirin  81 mg Oral Daily    atorvastatin  40 mg Oral Nightly    gabapentin  100 mg Oral TID    sodium chloride flush  5-40 mL IntraVENous 2 times per day    sodium hypochlorite   Irrigation BID      sodium hypochlorite Stopped (03/12/23 1011)    sodium chloride      dextrose           I/O last 3 completed shifts:   In: 860 [P.O.:360; I.V.:500]  Out: -     CBC:   Recent Labs     03/11/23  0421 03/12/23  0503 03/13/23  0135   WBC 33.5* 32.1* 28.3*   HGB 11.2* 10.6* 10.4*   * 110* 130*          Recent Labs     03/11/23 0421 03/12/23  0503 03/13/23  0135    133* 133*   K 4.0 4.4 4.7    97* 99   CO2 26 19* 16*   BUN 33* 43* 51*   CREATININE 3.0* 3.6* 4.2*   GLUCOSE 68* 91 100*       Lab Results   Component Value Date    CALCIUM 7.5 (L) 03/13/2023    PHOS 9.0 (H) 11/14/2022       Objective:     Vitals: BP (!) 124/50   Pulse 77   Temp 98 °F (36.7 °C)   Resp 18   Ht 5' 3\" (1.6 m)   Wt 166 lb 3.2 oz (75.4 kg)   SpO2 95%   BMI 29.44 kg/m² ,    General appearance:   thin, alert, awake and oriented  HEENT:   no gross conjunctival pallor or scleral icterus, she is a dentulous  Neck:   seems supple  Lungs:   no gross crackles but coarse breath sound  Heart:   seemed regular rate and rhythm  Abdomen:  soft, nontender on palpation  Extremities:   right below-knee amputation, left toe gangrene   left upper extremity brachiocephalic AV fistula with good thrill on palpation good bruit auscultation      Problem List :         Impression :      end-stage kidney disease on maintenance dialysis- she will get her dialysis today   peripheral arterial disease skin and soft tissue infection   C. difficile colitis now   underlying atherosclerotic cardiovascular disease, decline in overall condition, debility and frailty- also diabetes    Recommendation/Plan  :      hemodialysis and ultrafiltration today- with antimicrobial agent including treatment for C. difficile colitis with fidaxomicin- I have not had a chance to talk to her family but I did talk to her- we have a family meeting arranged by the dialysis unit on Wednesday- but I am unsure whether she will be discharged by then- if not we might have to arrange in the hospital- watch for iatrogenic and nosocomial complication- good glycemic control- follow clinically      Maxx Sharma MD MD

## 2023-03-13 NOTE — PROGRESS NOTES
Patient seen and examined. Discussed with WCN. Didn't tolerate NPWT over the weekend. Transition to dakins to the sacral area. Reports that her diarrhea has lessened. Still weak. No SOB or CP. HD this am.     PE:abd is soft, NR, NT. Left foot black eschars with betadine drsg's . Sacral area with drsg intact. Vitals:    03/13/23 1000 03/13/23 1005 03/13/23 1015 03/13/23 1041   BP: (!) 109/46 (!) 109/46 (!) 124/50    Pulse: 69 66 77    Resp: 13 12 11 18   Temp:   98 °F (36.7 °C)    TempSrc:       SpO2:   95%    Weight:       Height:             Labs:  CBC:    Lab Results   Component Value Date/Time    WBC 28.3 03/13/2023 01:35 AM    HGB 10.4 03/13/2023 01:35 AM    HCT 35.1 03/13/2023 01:35 AM     03/13/2023 01:35 AM     BMP:    Lab Results   Component Value Date/Time     03/13/2023 01:35 AM    K 4.7 03/13/2023 01:35 AM    CL 99 03/13/2023 01:35 AM    CO2 16 03/13/2023 01:35 AM    BUN 51 03/13/2023 01:35 AM    CREATININE 4.2 03/13/2023 01:35 AM    CALCIUM 7.5 03/13/2023 01:35 AM     PT/INR:    Lab Results   Component Value Date/Time    PROTIME 14.3 09/21/2022 06:10 AM    PROTIME 10.6 10/18/2010 11:21 AM    INR 1.11 09/21/2022 06:10 AM     PTT:    Lab Results   Component Value Date/Time    APTT 39.4 03/11/2023 04:21 AM     LFT:    Lab Results   Component Value Date/Time    LABALBU 1.6 03/13/2023 01:35 AM    BILITOT 0.3 03/13/2023 01:35 AM    AST 18 03/13/2023 01:35 AM    ALT 7 03/13/2023 01:35 AM    ALKPHOS 376 03/13/2023 01:35 AM       Assessment/Plan:  Pressure and diabetic ulcers to left great toe and left heel and left third finger. Black dry eschars. Stage IV sacral ulceration. On rocephin. Workup for infection. ESRD on HD via LUE AVF on MWF. COPD, CAD, IDDM- CPM for comorbiditis  Weak and deconditioned. Overall, her health status continues to decline. C. Difficile colitis, improving.   On dificid

## 2023-03-13 NOTE — PROGRESS NOTES
Wound vac to sacrum came off on Saturday due to stool incontinence. Had several incontinent loose stools yesterday as well and dressing had to be changed several times. Updated Dr. Suzanna Rubio. Will discontinue irrigating wound vac and will change treatment to Santyl/Rachellein's wet to dry BID. Updated nurse.

## 2023-03-13 NOTE — PROGRESS NOTES
Physical Therapy    Physical Therapy Treatment Note  Name: Valeria Yates MRN: [id number] :   1965   Date:  3/13/2023   Admission Date: 3/9/2023 Room:  [address] [place] [address]   Restrictions/Precautions:          general precautions, fall risk, contact isolation, hx R BKA  Communication with other providers:  discussed pt status /c nurse. Cotx /c CAVANAUGH  Subjective:  Patient states:  agreeable to PT  Pain:   Location, Type, Intensity (0/10 to 10/10):  does not rate, but states pain over entire body, very ttp    Objective:    Observation:  supine in bed upon entry  Treatment, including education/measures:  Transfers  Rolling: maxAx1  Supine to sit :maxAx2  Sit to supine :maxAx2  Scooting :maxA  Sit to stand :partial stand attempt x multiple maxAx2  Squat pivot:maxAx2 to/from commode  Vc for hand placement, increased set-up time, ttp/pain, multiple trials to achieve. Pt has minimal wb tolerance LLE. Multiple sets of rolling and bed mob training /c repositioning. EOB sitting balance training x ~5' iMndy/CGA, maxA on EOB after commode transfer. Pt fatigues as tx progresses. Unsupported trunk sittng balance on commode /c UE support throughout. Increased time to complete all activities. Pt very painful and weak. Safety  Patient left safely in the bed, pressure relief position, with call light/phone in reach with alarm applied. Gait belt was used for transfers and gait.     Assessment / Impression:    Patient's tolerance of treatment:  fair   Adverse Reaction: na  Significant change in status and impact:  na  Barriers to improvement:  weakness and endurance  Plan for Next Session:    Sitting balance, transfers  Time in:  1240  Time out:  1308  Timed treatment minutes:  28  Total treatment time:  28    Previously filed items:  Social/Functional History  Lives With: Spouse  Type of Home: House  Home Layout: One level  Home Access: Level entry  Bathroom Shower/Tub: Tub/Shower unit  Bathroom Toilet: Bedside commode  Bathroom Equipment: Tub transfer bench  Bathroom Accessibility: Accessible  Home Equipment: Arlander Baston, rolling, Wheelchair-manual  Has the patient had two or more falls in the past year or any fall with injury in the past year?: No  Receives Help From: Family  ADL Assistance: Needs assistance  Homemaking Assistance: Needs assistance  Ambulation Assistance: Non-ambulatory  Transfer Assistance: Needs assistance  Active : No  Patient's  Info: family assists        Short Term Goals  Time Frame for Short Term Goals: 1 week  Short Term Goal 1: pt to complete all bed mobility min A  Short Term Goal 2: pt to sit EOB 10 minutes SBA with no LOB  Short Term Goal 3: pt to complete stand pivot transfer with LRAD mod A  Short Term Goal 4: Pt to propel manual WC 50' min A    Electronically signed by:    Kranthi Gould, MIGUELANGEL  3/13/2023, 1:14 PM

## 2023-03-13 NOTE — DIALYSIS
Removed 317 ml. Pt requested tx stopped after 2 hrs. MD aware and okayed. Tried to call report x2 no answer. Patient Name: Jackelin Gomez  Patient : 1965  MRN: [id number]     Acct: [de-identified]  Date of Admission: 3/9/2023  Room/Bed: 3120/3120-A  Code Status:  Full Code  Allergies:    Allergies   Allergen Reactions    Latex     Codeine     Cortisone     Cortizone     Dilaudid [Hydromorphone]      \"They OD'd me\"    Iodides     Phenobarbital Other (See Comments)     Hallucinations      Diagnosis:    Patient Active Problem List   Diagnosis    CAD (coronary artery disease)    Hyperlipidemia    Diabetes mellitus (HCC)    Chest pain    Hoarseness of voice    Fracture of metacarpal bone    Wrist sprain    History of tobacco abuse    Wrist pain    Carpal tunnel syndrome    Cubital tunnel syndrome    Trigger finger    S/P CABG x 4    Diabetic foot ulcer (HCC)    Diabetic neuropathy ([address]    Diabetes mellitus type 2 with complications (HCC)    Claudication of both lower extremities (HCC)    SOB (shortness of breath)    Bilateral leg edema    SOB (shortness of breath)    Type 2 diabetes mellitus without complication (HCC)    Coronary artery disease involving coronary bypass graft of native heart with unstable angina pectoris (HCC)    S/P cardiac cath    Chronic renal failure, stage 2 (mild)    Chronic kidney disease (CKD) stage G4/A3, severely decreased glomerular filtration rate (GFR) between 15-29 mL/min/1.73 square meter and albuminuria creatinine ratio greater than 300 mg/g (HCC)    DM (diabetes mellitus) (HCC)    Coronary atherosclerosis    HTN (hypertension)    Proteinuria    Callus of foot    Non compliance with medical treatment    Diabetic ulcer of right heel associated with type 2 diabetes mellitus, with necrosis of bone (HCC)    Acute pain of right foot    Wound, open, foot with complication, left, sequela    ESRD on hemodialysis ([address]    Diabetic ulcer of right midfoot associated with type 2 diabetes mellitus, with necrosis of bone (HCC)    Pressure ulcer of sacral region, stage 3 (HCC)    S/P BKA (below knee amputation), right (HCC)    Diabetic ulcer of left foot associated with type 2 diabetes mellitus, with necrosis of muscle (HCC)    Ulcer of finger, with fat layer exposed ([facility]    Sepsis ([facility]    Severe malnutrition ([facility]    Infected decubitus ulcer, stage IV (HCC)    ESRD (end stage renal disease) ([facility]    Elevated troponin         Treatment:  Hemodilaysis 2:1  Priority: Routine  Location: Acute Room    Diabetic: Yes  NPO: No  Isolation Precautions: Contact     Consent for Treatment Verified: Yes  Blood Consent Verified: Not Applicable     Safety Verified: Identify (I), Consent (C), Equipment (E), HepB Status (B), Orders Complete (O), Access Verified (A), and Timeliness (T)  Time out performed prior to access at 0736 hours. Report Received from Primary RN at 0730 hours. Primary RN (First Initial, Last Name, Title): SHANTELL Peck RN  Incapacitated Nurse Education Completed: Not Applicable     HBsAg ONLY:  Date Drawn: February 22, 2023       Results: Negative  HBsAb:  Date Drawn:  March 13, 2023       Results: Unknown    Order  Dialysis Bath  K+ (Potassium): 3  Ca+ (Calcium): 2.5  Na+ (Sodium): 138  HCO3 (Bicarb): 35  Bicarbonate Concentrate Lot No.: [id number]  Acid Concentrate Lot No.: [id number]     Na+ Modeling: Not Applicable  Dialyzer: [id number]  Dialysate Temperature (C):  35  Blood Flow Rate (BFR):  300   Dialysate Flow Rate (DFR):   600        Access to be Utilized   Access: AVF  Location: Upper Extremity  Side: Left   Needle gauge:  15  + Bruit/Thrill: Yes    First Use X-ray Verified: Not Applicable  OK to use line order: Not Applicable    Site Assessment:  Signs and Symptoms of Infection/Inflammation: None  If yes: Not Applicable  Dressing: Dry and Intact  Site Prep: Medical Aseptic Technique  Dressing Changed this Treatment: Yes  Comment:    Flows: Good, Patent  If access problem, who was notified:     Pre and Post-Assessment  Patient Vitals for the past 8 hrs:   Level of Consciousness Heart Rhythm Respiratory Quality/Effort O2 Device Bilateral Breath Sounds Skin Condition/Temp Abdomen Inspection Bowel Sounds (All Quadrants) Edema Pain Level Response to Pain Intervention   03/13/23 0324 -- -- -- -- -- -- -- -- -- 10 --   03/13/23 0354 -- -- -- -- -- -- -- -- -- 1 Patient satisfied   03/13/23 0402 -- -- -- None (Room air) -- -- -- -- -- -- --   03/13/23 0745 0 Regular Unlabored None (Room air) Diminished Dry; Warm Soft Active None -- --   03/13/23 1015 -- -- -- -- -- -- -- -- -- 10 --     Labs  Recent Labs     03/11/23 0421 03/12/23  0503 03/13/23  0135   WBC 33.5* 32.1* 28.3*   HGB 11.2* 10.6* 10.4*   HCT 37.4 35.4* 35.1*   * 110* 130*                                                                  Recent Labs     03/11/23 0421 03/12/23  0503 03/13/23  0135    133* 133*   K 4.0 4.4 4.7    97* 99   CO2 26 19* 16*   BUN 33* 43* 51*   CREATININE 3.0* 3.6* 4.2*   GLUCOSE 68* 91 100*     IV Drips and Rate/Dose   sodium hypochlorite Stopped (03/12/23 1011)    sodium chloride      dextrose        Safety - Before each treatment:   Dialysis Machine No.: [id number]   Machine Number: [id number]  Dialyzer Lot No.: [id number]  RO Machine Log Sheet Completed: Yes  Machine Alarm Self Test: Completed; Passed (03/13/23 0745)     Air Foam Detector: Tested, Proper Function, pH Reading  Extracorporeal Circuit Tested for Integrity: Yes  Machine Conductivity: 13.9  Manual Conductivity: 13.8     Bicarbonate Concentrate Lot No.: [id number]  Acid Concentrate Lot No.: [id number]  Manual Ph: 7.4  Bleach Test (Neg): Yes  Bath Temperature: 95 °F (35 °C)  Tubing Lot#: [id number]  Conductivity Meter Serial #: [id number]  All Connections Secure?: Yes  Venous Parameters Set?: Yes  Arterial Parameters Set?: Yes  Saline Line Double Clamped?: Yes  Air Foam Detector Engaged?: Yes  Machine Functioning Alarm Free?  Yes  Prime Given: 200ml    Chlorine Testing - Before each treatment and every 4 hours:   Treatment  Time On: 0756  Time Off: 1005  Weight: 166 lb 3.2 oz (75.4 kg) (03/13/23 0731)  1st check: less than 0.1 ppm at: 0620 hours  2nd check: less than 0.1 ppm at: 1020 hours  3rd check: Not Applicable  (if greater than 0.1 ppm, then check every 30 minutes from secondary)    Access Flows and Pressures  Patient Vitals for the past 8 hrs:   Blood Flow Rate (ml/min) Ultrafiltration Rate (ml/hr) Arterial Pressure (mmHg) Venous Pressure (mmHg) TMP DFR Comments Access Visible   03/13/23 0756 300 ml/min 500 ml/hr -50 mmHg 140 20 600 tx initiated Yes   03/13/23 0815 300 ml/min 500 ml/hr -70 mmHg 120 30 600 lines secure Yes   03/13/23 0830 300 ml/min 500 ml/hr -70 mmHg 130 30 600 epo given Yes   03/13/23 0845 300 ml/min 500 ml/hr -80 mmHg 170 20 600 no distress Yes   03/13/23 0900 300 ml/min 500 ml/hr 70 mmHg 150 20 600 readjusted in bed Yes   03/13/23 0915 300 ml/min 210 ml/hr -80 mmHg 150 10 600 uf lowered, no distress Yes   03/13/23 0930 300 ml/min 210 ml/hr -70 mmHg 150 20 600 moving arm, lines secure Yes   03/13/23 0945 300 ml/min 210 ml/hr -70 mmHg 170 10 600 resting Yes   03/13/23 1000 300 ml/min 210 ml/hr -70 mmHg 180 10 600 asking to stop Yes   03/13/23 1005 -- -- -- -- -- -- tx ended Yes     Vital Signs  Patient Vitals for the past 8 hrs:   BP Temp Pulse Resp SpO2 Weight Weight Method Percent Weight Change   03/13/23 0324 -- -- -- 12 -- -- -- --   03/13/23 0354 -- -- -- 14 -- -- -- --   03/13/23 0402 -- 97.9 °F (36.6 °C) -- -- -- -- -- --   03/13/23 0731 -- -- -- -- -- 166 lb 3.2 oz (75.4 kg) Bed scale 0   03/13/23 0745 (!) 119/51 97.9 °F (36.6 °C) 68 11 95 % -- -- --   03/13/23 0756 (!) 124/51 -- 68 10 -- -- -- --   03/13/23 0815 (!) 114/51 -- 64 (!) 9 -- -- -- --   03/13/23 0830 (!) 111/49 -- 65 (!) 9 -- -- -- --   03/13/23 0845 (!) 108/51 -- 63 10 -- -- -- --   03/13/23 0900 (!) 113/49 -- 65 10 -- -- -- --   03/13/23 0915 (!) 82/50 -- 68 12 -- -- -- --   03/13/23 0930 (!) 108/44 -- 67 14 -- -- -- --   03/13/23 0945 (!) 110/46 -- 68 10 -- -- -- --   03/13/23 1000 (!) 109/46 -- 69 13 -- -- -- --   03/13/23 1005 (!) 109/46 -- 66 12 -- -- -- --   03/13/23 1015 (!) 124/50 98 °F (36.7 °C) 77 11 95 % -- -- --     Post-Dialysis  Arterial Catheter Locking Solution: Not Applicable  Venous Catheter Locking Solution: Not Applicable  Post-Treatment Procedures: Blood returned, Access bleeding time < 10 minutes  Machine Disinfection Process: Exterior Machine Disinfection  Rinseback Volume (ml): 300 ml  Blood Volume Processed (Liters): 37.7 l/min  Dialyzer Clearance: Lightly streaked  Duration of Treatment (minutes): 150 minutes     Hemodialysis Intake (ml): 500 ml  Hemodialysis Output (ml): 817 ml     Tolerated Treatment: Fair  Patient Response to Treatment: fair  Physician Notified: Yes       Provider Notification  Provider Notification  Reason for Communication: Patient request (03/13/23 1005)  Provider Name: Manpreet Wilkerson (03/13/23 1005)  Provider Notification: Physician (03/13/23 1005)  Method of Communication: Call (03/13/23 1005)  Response:  (ok to stop tx) (03/13/23 1005)  Notification Time: 1000 (03/13/23 1005)     Handoff complete and report given to Primary RN at 1025 hours. Primary RN (First Initial, Last Name, Title):  SHANTELL Alvarado RN     Education  Person Educated: Patient   Knowledge Base: Substantial  Barriers to Learning?: None  Preferred method of Learning: Oral  Topic(s): Access Care, Signs and Symptoms of Infection, Procedural, and Medications   Teaching Tools: Explanation   Response to Education: Verbalized Understanding     Electronically signed by Kian Soliz RN on 3/13/2023 at 10:27 AM

## 2023-03-13 NOTE — PROGRESS NOTES
Occupational Therapy    Occupational Therapy Treatment Note    Name: Jackelin Gomez MRN: [id number] :   1965   Date:  3/13/2023   Admission Date: 3/9/2023 Room:  3120/3120-A     Primary Problem:      Restrictions/Precautions:           general precautions, fall risk, contact isolation, hx R BKA    Communication with other providers: LORE Chan MD, PTA Sue Padilla     Subjective:  Patient states:  \"I am just so sorry. \"   Pain:   Location, Type, Intensity (0/10 to 10/10):  reports R sided pain and wound site, did not rate. Objective:    Observation: Pt presented supine in bed, agreeable to session. Objective Measures:  Tele, Pulse Ox, BP cuff     Treatment, including education:  Therapeutic Activity Training/Self-care: Therapeutic activity training was instructed today. Cues were given for safety, sequence, UE/LE placement, awareness, and balance. Activities performed today included bed mobility training, sup-sit, sit-stand, SPT. Supine to Sit with Max A X2  with max cuing for technique/initiation. Sitting EOB with CGA-Max A with fluctuating assist.     SPT from EOB <> BSC with Max A X2 with max cues for UE placement. Pt required max A X1 for partial stand from [facility] to doff depend with DEP assist.     SPT from [facility] <> EOB with Max A X2 with continued cuing for technique and pt demo flexed posture. Max A X2 for sit to supine. Hygiene/dressing completed in supine d/t poor standing tolerance/ability. Pt rolled R/L with Max A with DEP assist for kamla hygiene and to don depend with pt requiring multiple reps of rolling to complete. Observed large amount of puss discharge from sacral wound, MD present notified. Max A X2 to scoot up/reposition in bed. Patient educated on role of OT , benefits of OT and rationale for therapeutic intervention. Educated on need to be patient advocate, benefit of OOB activity.   Pt positioned for comfort with pt partially side lying, bed alarm on, call light within reach, and MD present. Pt reports difficulty feeding self d/t low vision, Tech notified. Assessment / Impression:  Pt fatigued from dialysis and limited d/t fatigue and pain. Patient's tolerance of treatment: Fair   Adverse Reaction: None  Significant change in status and impact: none   Barriers to improvement: Activity tolerance, Strength, anxiety       Plan for Next Session:    Cont OT POC     Time in:  1240  Time out:  1308  Timed treatment minutes:  28  Total treatment time:  28      Electronically signed by:     LUISITO Salguero,   3/13/2023, 1:11 PM

## 2023-03-14 LAB
ALBUMIN SERPL-MCNC: 1.6 GM/DL (ref 3.4–5)
ALP BLD-CCNC: 304 IU/L (ref 40–128)
ALT SERPL-CCNC: <5 U/L (ref 10–40)
ANION GAP SERPL CALCULATED.3IONS-SCNC: 13 MMOL/L (ref 4–16)
ANISOCYTOSIS: ABNORMAL
APTT: 34.4 SECONDS (ref 25.1–37.1)
AST SERPL-CCNC: 10 IU/L (ref 15–37)
BANDED NEUTROPHILS ABSOLUTE COUNT: 4.49 K/CU MM
BANDED NEUTROPHILS RELATIVE PERCENT: 14 % (ref 5–11)
BILIRUB SERPL-MCNC: 0.3 MG/DL (ref 0–1)
BUN SERPL-MCNC: 32 MG/DL (ref 6–23)
CALCIUM SERPL-MCNC: 7.5 MG/DL (ref 8.3–10.6)
CHLORIDE BLD-SCNC: 101 MMOL/L (ref 99–110)
CO2: 22 MMOL/L (ref 21–32)
CREAT SERPL-MCNC: 3.4 MG/DL (ref 0.6–1.1)
CRP SERPL HS-MCNC: 132.9 MG/L
DIFFERENTIAL TYPE: ABNORMAL
EKG ATRIAL RATE: 87 BPM
EKG DIAGNOSIS: NORMAL
EKG P AXIS: 59 DEGREES
EKG P-R INTERVAL: 150 MS
EKG Q-T INTERVAL: 406 MS
EKG QRS DURATION: 138 MS
EKG QTC CALCULATION (BAZETT): 488 MS
EKG R AXIS: 49 DEGREES
EKG T AXIS: 134 DEGREES
EKG VENTRICULAR RATE: 87 BPM
EOSINOPHILS ABSOLUTE: 0.3 K/CU MM
EOSINOPHILS RELATIVE PERCENT: 1 % (ref 0–3)
GFR SERPL CREATININE-BSD FRML MDRD: 15 ML/MIN/1.73M2
GLUCOSE BLD-MCNC: 150 MG/DL (ref 70–99)
GLUCOSE BLD-MCNC: 190 MG/DL (ref 70–99)
GLUCOSE BLD-MCNC: 206 MG/DL (ref 70–99)
GLUCOSE BLD-MCNC: 389 MG/DL (ref 70–99)
GLUCOSE BLD-MCNC: 79 MG/DL (ref 70–99)
GLUCOSE SERPL-MCNC: 92 MG/DL (ref 70–99)
HCT VFR BLD CALC: 34.9 % (ref 37–47)
HEMOGLOBIN: 10.8 GM/DL (ref 12.5–16)
INR BLD: 1.13 INDEX
LYMPHOCYTES ABSOLUTE: 1.9 K/CU MM
LYMPHOCYTES RELATIVE PERCENT: 6 % (ref 24–44)
MCH RBC QN AUTO: 31.6 PG (ref 27–31)
MCHC RBC AUTO-ENTMCNC: 30.9 % (ref 32–36)
MCV RBC AUTO: 102 FL (ref 78–100)
METAMYELOCYTES ABSOLUTE COUNT: 0.64 K/CU MM
METAMYELOCYTES PERCENT: 2 %
MONOCYTES ABSOLUTE: 2.2 K/CU MM
MONOCYTES RELATIVE PERCENT: 7 % (ref 0–4)
OTHER CELL MORPHOLOGY: ABNORMAL
PDW BLD-RTO: 14.5 % (ref 11.7–14.9)
PLATELET # BLD: 170 K/CU MM (ref 140–440)
PMV BLD AUTO: 11.3 FL (ref 7.5–11.1)
POTASSIUM SERPL-SCNC: 4.2 MMOL/L (ref 3.5–5.1)
PROCALCITONIN SERPL-MCNC: 4.64 NG/ML
PROTHROMBIN TIME: 14.6 SECONDS (ref 11.7–14.5)
RBC # BLD: 3.42 M/CU MM (ref 4.2–5.4)
RBC # BLD: ABNORMAL 10*6/UL
SEGMENTED NEUTROPHILS ABSOLUTE COUNT: 22.1 K/CU MM
SEGMENTED NEUTROPHILS RELATIVE PERCENT: 69 % (ref 36–66)
SODIUM BLD-SCNC: 136 MMOL/L (ref 135–145)
TOTAL PROTEIN: 4.5 GM/DL (ref 6.4–8.2)
UNDIFFERENTIATED CELL: 1 %
WBC # BLD: 32.1 K/CU MM (ref 4–10.5)

## 2023-03-14 PROCEDURE — 6360000002 HC RX W HCPCS: Performed by: STUDENT IN AN ORGANIZED HEALTH CARE EDUCATION/TRAINING PROGRAM

## 2023-03-14 PROCEDURE — 93005 ELECTROCARDIOGRAM TRACING: CPT | Performed by: NURSE PRACTITIONER

## 2023-03-14 PROCEDURE — 2580000003 HC RX 258: Performed by: INTERNAL MEDICINE

## 2023-03-14 PROCEDURE — 6370000000 HC RX 637 (ALT 250 FOR IP): Performed by: INTERNAL MEDICINE

## 2023-03-14 PROCEDURE — 85730 THROMBOPLASTIN TIME PARTIAL: CPT

## 2023-03-14 PROCEDURE — 1200000000 HC SEMI PRIVATE

## 2023-03-14 PROCEDURE — 86140 C-REACTIVE PROTEIN: CPT

## 2023-03-14 PROCEDURE — 94761 N-INVAS EAR/PLS OXIMETRY MLT: CPT

## 2023-03-14 PROCEDURE — 85027 COMPLETE CBC AUTOMATED: CPT

## 2023-03-14 PROCEDURE — 36415 COLL VENOUS BLD VENIPUNCTURE: CPT

## 2023-03-14 PROCEDURE — 6370000000 HC RX 637 (ALT 250 FOR IP): Performed by: STUDENT IN AN ORGANIZED HEALTH CARE EDUCATION/TRAINING PROGRAM

## 2023-03-14 PROCEDURE — 99232 SBSQ HOSP IP/OBS MODERATE 35: CPT | Performed by: NURSE PRACTITIONER

## 2023-03-14 PROCEDURE — 6360000002 HC RX W HCPCS: Performed by: NURSE PRACTITIONER

## 2023-03-14 PROCEDURE — 85007 BL SMEAR W/DIFF WBC COUNT: CPT

## 2023-03-14 PROCEDURE — 82962 GLUCOSE BLOOD TEST: CPT

## 2023-03-14 PROCEDURE — 85610 PROTHROMBIN TIME: CPT

## 2023-03-14 PROCEDURE — 84145 PROCALCITONIN (PCT): CPT

## 2023-03-14 PROCEDURE — 6370000000 HC RX 637 (ALT 250 FOR IP): Performed by: NURSE PRACTITIONER

## 2023-03-14 PROCEDURE — 80053 COMPREHEN METABOLIC PANEL: CPT

## 2023-03-14 PROCEDURE — 2580000003 HC RX 258: Performed by: NURSE PRACTITIONER

## 2023-03-14 PROCEDURE — 93010 ELECTROCARDIOGRAM REPORT: CPT | Performed by: INTERNAL MEDICINE

## 2023-03-14 RX ORDER — OXYCODONE HYDROCHLORIDE 5 MG/1
5 TABLET ORAL EVERY 4 HOURS PRN
Status: DISCONTINUED | OUTPATIENT
Start: 2023-03-14 | End: 2023-03-17 | Stop reason: HOSPADM

## 2023-03-14 RX ADMIN — SODIUM CHLORIDE, PRESERVATIVE FREE 10 ML: 5 INJECTION INTRAVENOUS at 22:10

## 2023-03-14 RX ADMIN — PIPERACILLIN AND TAZOBACTAM 3375 MG: 3; .375 INJECTION, POWDER, LYOPHILIZED, FOR SOLUTION INTRAVENOUS at 18:39

## 2023-03-14 RX ADMIN — SODIUM HYPOCHLORITE: 1.25 SOLUTION TOPICAL at 22:11

## 2023-03-14 RX ADMIN — FIDAXOMICIN 200 MG: 200 TABLET, FILM COATED ORAL at 14:47

## 2023-03-14 RX ADMIN — SODIUM CHLORIDE: 9 INJECTION, SOLUTION INTRAVENOUS at 18:38

## 2023-03-14 RX ADMIN — HYDROMORPHONE HYDROCHLORIDE 0.5 MG: 1 INJECTION, SOLUTION INTRAMUSCULAR; INTRAVENOUS; SUBCUTANEOUS at 06:50

## 2023-03-14 RX ADMIN — ACETAMINOPHEN 650 MG: 325 TABLET ORAL at 22:09

## 2023-03-14 RX ADMIN — FIDAXOMICIN 200 MG: 200 TABLET, FILM COATED ORAL at 22:14

## 2023-03-14 RX ADMIN — HEPARIN SODIUM 5000 UNITS: 5000 INJECTION INTRAVENOUS; SUBCUTANEOUS at 23:35

## 2023-03-14 RX ADMIN — COLLAGENASE SANTYL: 250 OINTMENT TOPICAL at 22:19

## 2023-03-14 RX ADMIN — SODIUM CHLORIDE 25 ML: 9 INJECTION, SOLUTION INTRAVENOUS at 06:40

## 2023-03-14 RX ADMIN — PIPERACILLIN AND TAZOBACTAM 3375 MG: 3; .375 INJECTION, POWDER, LYOPHILIZED, FOR SOLUTION INTRAVENOUS at 06:44

## 2023-03-14 RX ADMIN — OXYCODONE HYDROCHLORIDE 5 MG: 5 TABLET ORAL at 18:59

## 2023-03-14 RX ADMIN — GABAPENTIN 100 MG: 100 CAPSULE ORAL at 09:52

## 2023-03-14 RX ADMIN — SODIUM HYPOCHLORITE: 1.25 SOLUTION TOPICAL at 17:11

## 2023-03-14 RX ADMIN — ATORVASTATIN CALCIUM 40 MG: 40 TABLET, FILM COATED ORAL at 22:09

## 2023-03-14 RX ADMIN — OXYCODONE HYDROCHLORIDE 5 MG: 5 TABLET ORAL at 23:36

## 2023-03-14 RX ADMIN — ASPIRIN 81 MG CHEWABLE TABLET 81 MG: 81 TABLET CHEWABLE at 09:52

## 2023-03-14 RX ADMIN — HYDROMORPHONE HYDROCHLORIDE 0.5 MG: 1 INJECTION, SOLUTION INTRAMUSCULAR; INTRAVENOUS; SUBCUTANEOUS at 00:21

## 2023-03-14 RX ADMIN — GABAPENTIN 100 MG: 100 CAPSULE ORAL at 22:09

## 2023-03-14 RX ADMIN — GABAPENTIN 100 MG: 100 CAPSULE ORAL at 14:48

## 2023-03-14 RX ADMIN — COLLAGENASE SANTYL: 250 OINTMENT TOPICAL at 17:10

## 2023-03-14 RX ADMIN — OXYCODONE HYDROCHLORIDE 5 MG: 5 TABLET ORAL at 14:47

## 2023-03-14 RX ADMIN — HEPARIN SODIUM 5000 UNITS: 5000 INJECTION INTRAVENOUS; SUBCUTANEOUS at 09:51

## 2023-03-14 RX ADMIN — FLUCONAZOLE 200 MG: 100 TABLET ORAL at 14:49

## 2023-03-14 ASSESSMENT — PAIN DESCRIPTION - ORIENTATION
ORIENTATION: LOWER
ORIENTATION: LOWER
ORIENTATION: LOWER;MID
ORIENTATION: MID;LOWER

## 2023-03-14 ASSESSMENT — PAIN DESCRIPTION - DESCRIPTORS
DESCRIPTORS: ACHING
DESCRIPTORS: ACHING
DESCRIPTORS: ACHING;JABBING
DESCRIPTORS: ACHING;JABBING
DESCRIPTORS: ACHING
DESCRIPTORS: JABBING
DESCRIPTORS: ACHING

## 2023-03-14 ASSESSMENT — PAIN DESCRIPTION - FREQUENCY: FREQUENCY: INTERMITTENT

## 2023-03-14 ASSESSMENT — PAIN DESCRIPTION - LOCATION
LOCATION: BACK
LOCATION: GENERALIZED
LOCATION: BACK
LOCATION: BACK
LOCATION: GENERALIZED
LOCATION: BACK
LOCATION: GENERALIZED

## 2023-03-14 ASSESSMENT — PAIN - FUNCTIONAL ASSESSMENT
PAIN_FUNCTIONAL_ASSESSMENT: PREVENTS OR INTERFERES SOME ACTIVE ACTIVITIES AND ADLS

## 2023-03-14 ASSESSMENT — PAIN SCALES - GENERAL
PAINLEVEL_OUTOF10: 6
PAINLEVEL_OUTOF10: 5
PAINLEVEL_OUTOF10: 3
PAINLEVEL_OUTOF10: 8
PAINLEVEL_OUTOF10: 2
PAINLEVEL_OUTOF10: 9
PAINLEVEL_OUTOF10: 8
PAINLEVEL_OUTOF10: 3
PAINLEVEL_OUTOF10: 6
PAINLEVEL_OUTOF10: 10
PAINLEVEL_OUTOF10: 4

## 2023-03-14 ASSESSMENT — PAIN SCALES - WONG BAKER: WONGBAKER_NUMERICALRESPONSE: 2

## 2023-03-14 ASSESSMENT — PAIN DESCRIPTION - PAIN TYPE: TYPE: ACUTE PAIN

## 2023-03-14 NOTE — PROGRESS NOTES
Comprehensive Nutrition Assessment    Type and Reason for Visit:  Reassess    Nutrition Recommendations/Plan:   Continue current diet  Begin wound healing/renal oral nutrition supplements, between meals     Malnutrition Assessment:  Malnutrition Status:  Severe malnutrition (03/09/23 1527)    Context:  Acute Illness     Findings of the 6 clinical characteristics of malnutrition:  Energy Intake:  50% or less of estimated energy requirements for 5 or more days  Weight Loss:  Greater than 7.5% over 3 months     Body Fat Loss: Moderate body fat loss (Severe body fat) Triceps, Buccal region, Orbital   Muscle Mass Loss:   (Severe muscle mass loss) Clavicles (pectoralis & deltoids), Thigh (quadraceps), Calf (gastrocnemius)  Fluid Accumulation:  No significant fluid accumulation     Strength:  Not Performed    Nutrition Assessment:    Pt continues with poor po intake, including zero breakfast today, was sleeping. Able to feed self and tolerating fluids noted. Widely variable wts recorded dos. Will order oral supplements and continue to follow as high nutriton risk. Nutrition Related Findings:    BUN 32, Cr 3.4, GFR 15, Glu    Wound Type: Multiple, Pressure Injury, Stage III, Stage IV (Arterial x 3 wounds on left foot)       Current Nutrition Intake & Therapies:    Average Meal Intake: 0%  Average Supplements Intake: None Ordered  ADULT DIET; Regular; Low Potassium (Less than 3000 mg/day); 1800 ml    Anthropometric Measures:  Height: 5' 3\" (160 cm)  Ideal Body Weight (IBW): 115 lbs (52 kg)    Admission Body Weight: 116 lb 13.5 oz (53 kg)  Current Body Weight: 166 lb 3.2 oz (75.4 kg), 101.6 % IBW.  Weight Source: Bed Scale  Current BMI (kg/m2): 29.4  Usual Body Weight: 128 lb (58.1 kg) (12/5/22 post op, 166 lb 4 oz 9/8/22)  % Weight Change (Calculated): -8.7  Weight Adjustment For: Amputation  Total Adjusted Percentage (Calculated): 5.9  Adjusted Ideal Body Weight (lbs) (Calculated): 108.2 lbs  Adjusted Ideal Body Weight (kg) (Calculated): 49.18 kg  Adjusted % Ideal Body Weight (Calculated): 108  Adjusted BMI (kg/m2) (Calculated): 21.9  BMI Categories: Normal Weight (BMI 18.5-24.9) (adjusted)    Estimated Daily Nutrient Needs:  Energy Requirements Based On: Kcal/kg  Weight Used for Energy Requirements: Current  Energy (kcal/day): 1544-0290 (30-35 kcals/kg)  Weight Used for Protein Requirements: Adjusted  Protein (g/day): 59-74 (1.2-1.5 g/kg)  Method Used for Fluid Requirements: Standard Renal  Fluid (ml/day): 1800 ml    Nutrition Diagnosis:   Severe malnutrition, In context of acute illness or injury related to psychological cause or life stress, inadequate protein-energy intake, catabolic illness as evidenced by poor intake prior to admission, severe muscle loss, weight loss 7.5% in 3 months, moderate loss of subcutaneous fat  Inadequate oral intake related to increase demand for energy/nutrients as evidenced by wounds, dialysis    Nutrition Interventions:   Food and/or Nutrient Delivery: Continue Current Diet, Start Oral Nutrition Supplement  Nutrition Education/Counseling: Education initiated  Coordination of Nutrition Care: Continue to monitor while inpatient, Coordination of Care  Plan of Care discussed with: pt, dtr Antonio Cardona RN, PS MD over malnutrition dx    Goals:  Previous Goal Met: No Progress toward Goal(s)  Goals: Meet at least 75% of estimated needs       Nutrition Monitoring and Evaluation:   Behavioral-Environmental Outcomes: None Identified  Food/Nutrient Intake Outcomes: Food and Nutrient Intake, Supplement Intake  Physical Signs/Symptoms Outcomes: Biochemical Data, Meal Time Behavior, Nutrition Focused Physical Findings, Nausea or Vomiting, GI Status, Skin, Weight    Discharge Planning:    Continue Oral Nutrition Supplement     Huong Chacko, [address] [place] [address],   Contact: [phone]

## 2023-03-14 NOTE — CARE COORDINATION
Reviewed chart for continue discharge planning. Pt not medically ready for discharge yet/ will update Gayatri at [facility] when pt ready to be able to get precert started. CM to cont to follow.

## 2023-03-14 NOTE — PROGRESS NOTES
Physician Progress Note      PATIENT:               Momo Uribe  CSN #:                  [id number]  :                       1965  ADMIT DATE:       3/9/2023 12:26 AM  DISCH DATE:  RESPONDING  PROVIDER #:        Romario Lux MD          QUERY TEXT:    Internal Medicine,    Patient admitted with sepsis due to infected sacral pressure ulcer. Noted   documentation of type 2 NSTEMI by Internal Medicine and demand ischemia   documented by Cardiology. If possible, please provide clarification in the   progress notes and discharge summary if you are evaluating and /or treating   any of the following: The medical record reflects the following:  Risk Factors: sepsis  Clinical Indicators: type 2 NSTEMI documented by Internal Medicine;; Per   Cardiology documentation: \"1. Elevated troponin -Denying chest pain shortness   of breath at this time -EKG nonischemic, troponin non-ACS. Most likely demand   ischemia in setting of sepsis, end-stage renal disease on hemodialysis. \"  Treatment: labs, imaging, Cardiology consult, heparin gtt discontinued, ASA,   statins    Thank you,  Nicolas Muro RN [facility]  [phone]  Options provided:  -- Type 2 MI  -- Demand Ischemia with MI  -- Demand Ischemia only, no MI  -- Other - I will add my own diagnosis  -- Disagree - Not applicable / Not valid  -- Disagree - Clinically unable to determine / Unknown  -- Refer to Clinical Documentation Reviewer    PROVIDER RESPONSE TEXT:    This patient has demand ischemia only, no MI. Query created by: Ju Carvalho on 3/14/2023 2:15 PM      QUERY TEXT:    Internal Medicine,    Pt admitted with sepsis due to infected sacral pressure ulcer and has severe   malnutrition documented by the Dietitian consultant.   If possible, please   document in progress notes and discharge summary:    The medical record reflects the following:  Risk Factors: acute illness  Clinical Indicators: Nutritional Assessment: Energy Intake:  50% or less of estimated energy requirements for 5 or more days  Weight Loss:  Greater than 7.5% over 3 months   Moderate body fat loss (Severe   body fat) Triceps, Buccal region, Orbital  Muscle Mass Loss:   (Severe muscle mass loss) Clavicles (pectoralis &   deltoids), Thigh (quadraceps), Calf (gastrocnemius0;;nonhealing sacral ulcer  Treatment: labs, Dietitian consult, I&O, ONS    Thank you,  Davina Corley RN CDS  [phone]  Options provided:  -- Severe malnutrition confirmed present on admission  -- Other - I will add my own diagnosis  -- Disagree - Not applicable / Not valid  -- Disagree - Clinically unable to determine / Unknown  -- Refer to Clinical Documentation Reviewer    PROVIDER RESPONSE TEXT:    The diagnosis of severe malnutrition was confirmed as present on admission.     Query created by: Charis Tinsley on 3/14/2023 2:19 PM      Electronically signed by:  Josette Claude MD 3/14/2023 6:03 PM

## 2023-03-14 NOTE — PROGRESS NOTES
Infectious Disease Progress Note  3/14/2023   Patient Name: Edd Simons : 1965   Impression  Sepsis Secondary to Infected Acute on Chronic Decubitus Sacral Wound:  Chronic Necrotic Wounds: Left 3rd Finger, Left Hallux, Left Ankle and Heel:  C.dif Colitis:   Cryptosporidium Positive:  MSSA Screen Positive: Allergy to vancomycin (\"makes me very sick\"): Tolerating vancomycin this admit  Afebrile, Leukocytosis elevated but on DWT   CrCl 18 on HD  CRP and Pct on DWT showing positive response to ABX regimen  3/10-Reordered Sacral Wound Culture: Beta Strep Group C, diphtheroids, Candida albicans  3/8-BC 0/2 NGTD  3/11-Cdif positive  Cryptosporidium positive by PCR:  3/8-CXR: Pulmonary vascular congestion with mild pulmonary edema. 3/9-Complete Echo: EF 55%, Grade I DD, septal wall is hypokinetic. Sclerotic, but non-stenotic AV. MAC with mild MR. Mild TR. No evidence of pericardial effusion. , general surgery, rec Irrigating wound VAC, placement 3/10  DMII:  ESRD on HD:  Susan Boop onboard  CAD s/p CABG/ HFrEF:  PAD s/p RBKA :  Depression:  Multi-morbidity: per PMHx    Plan:  Continue IV Zosyn 3,375 mg q8h x 14 cumulative days to cover Strep C and diptheroids (end date 3/23/2023)  Continue po fluconazole 200 mg q24h, will observe closely as QTc is 520, as dose is low at 200 mg (end date 3/23/2023)  Continue po Dificid 200 mg bid x 10 days (end date 3/22/2023)  Orders placed for tunneled PICC per IR for ABX IV Access, ordered PT/PTT also  Ordered repeat CBC in am 3/15  Reviewed EKGs, this am QTc is now 488 which is no longer prolonged, plan to continue fluconazole  Trend CRP and Pct, ordered  Follow up with PCP  OK from ID standpoint to DC when ready, after placement of tunneled PICC    Ongoing Antimicrobial Therapy  Zosyn 3/13-  Dificid 3/12-? Fluconazole 3/13-  Completed Antimicrobial Therapy  Vancomycin 3/9-10  Zosyn 3/9-? Ceftriaxone 3/12-13  History:? Interval history noted.   Chief complaint: sepsis secondary to probable infected acute on chronic decubitus sacral wound. Chronic necrotic wounds of left 3rd finger, left hallux, left ankle and heel. Denies n/v/d/f or untoward effects of antibiotics. States had only 1 semi-formed BM yesterday and none today. Reports is overall feeling much better. Physical Exam:  Vital Signs: BP (!) 132/57   Pulse 91   Temp 98.1 °F (36.7 °C)   Resp 16   Ht 5' 3\" (1.6 m)   Wt 166 lb 3.2 oz (75.4 kg)   SpO2 90%   BMI 29.44 kg/m²     Gen: A&O x 4, no distress  Wounds: C/D/I multiple extremity necrotic wounds. Irrigating wound VAC intact Sacral/coccyx wound. Remote wound from RBKA well healed. Chest: no distress and CTA. Good air movement. Room air. Heart: NSR and no MRG. Abd: soft, non-distended, no tenderness, no hepatomegaly. Normoactive bowel sounds. Ext: no clubbing, cyanosis, or edema. S/p RBKA. Neuro: Mental status intact. CN 2-12 intact and no focal sensory or motor deficits     Radiologic / Imaging / TESTING  3/8/2023 XR Chest Portable:  Impression   Pulmonary vascular congestion with mild pulmonary edema. 3/9/2023 Echo Complete:    Summary   Ejection fraction is visually estimated at 55%. Grade I diastolic dysfunction. Septal wall is hypokinetic. Sclerotic, but non-stenotic aortic valve. MAC with mild mitral regurgitation. Mild tricuspid regurgitation; RVSP: 22 mmHg. No evidence of any pericardial effusion. 3/11/2023 XR Chest Portable:  Impression   Findings consistent with congestive heart failure and/or diffuse   pneumonitis/atypical pneumonia with very small left basilar pleural effusion. Findings may be accentuated by underlying chronic lung disease.      Labs:    Recent Results (from the past 24 hour(s))   POCT Glucose    Collection Time: 03/13/23  6:08 PM   Result Value Ref Range    POC Glucose 129 (H) 70 - 99 MG/DL   POCT Glucose    Collection Time: 03/13/23  9:43 PM   Result Value Ref Range    POC Glucose 124 (H) 70 - 99 MG/DL   POCT Glucose    Collection Time: 03/14/23  1:52 AM   Result Value Ref Range    POC Glucose 389 (H) 70 - 99 MG/DL   C-Reactive Protein    Collection Time: 03/14/23  3:46 AM   Result Value Ref Range    CRP High Sensitivity 132.9 (H) <5.0 mg/L   Procalcitonin    Collection Time: 03/14/23  3:46 AM   Result Value Ref Range    Procalcitonin 4.64    CBC with Auto Differential    Collection Time: 03/14/23  3:46 AM   Result Value Ref Range    WBC 32.1 (HH) 4.0 - 10.5 K/CU MM    RBC 3.42 (L) 4.2 - 5.4 M/CU MM    Hemoglobin 10.8 (L) 12.5 - 16.0 GM/DL    Hematocrit 34.9 (L) 37 - 47 %    .0 (H) 78 - 100 FL    MCH 31.6 (H) 27 - 31 PG    MCHC 30.9 (L) 32.0 - 36.0 %    RDW 14.5 11.7 - 14.9 %    Platelets 810 746 - 541 K/CU MM    MPV 11.3 (H) 7.5 - 11.1 FL    UNDIFFERENTIATED CELL 1.0 (HH) 0.0 %    Metamyelocytes Relative 2 (H) 0.0 %    Bands Relative 14 (H) 5 - 11 %    Segs Relative 69.0 (H) 36 - 66 %    Eosinophils % 1.0 0 - 3 %    Lymphocytes % 6.0 (L) 24 - 44 %    Monocytes % 7.0 (H) 0 - 4 %    Metamyelocytes Absolute 0.64 K/CU MM    Bands Absolute 4.49 K/CU MM    Segs Absolute 22.1 K/CU MM    Eosinophils Absolute 0.3 K/CU MM    Lymphocytes Absolute 1.9 K/CU MM    Monocytes Absolute 2.2 K/CU MM    Differential Type MANUAL DIFFERENTIAL     RBC Morphology OCCASIONAL     Anisocytosis 1+     Other Cell Morphology 1 PLASMOID TYPE LYMPH ALSO NOTED    Comprehensive Metabolic Panel w/ Reflex to MG    Collection Time: 03/14/23  3:46 AM   Result Value Ref Range    Sodium 136 135 - 145 MMOL/L    Potassium 4.2 3.5 - 5.1 MMOL/L    Chloride 101 99 - 110 mMol/L    CO2 22 21 - 32 MMOL/L    BUN 32 (H) 6 - 23 MG/DL    Creatinine 3.4 (H) 0.6 - 1.1 MG/DL    Est, Glom Filt Rate 15 (L) >60 mL/min/1.73m2    Glucose 92 70 - 99 MG/DL    Calcium 7.5 (L) 8.3 - 10.6 MG/DL    Albumin 1.6 (L) 3.4 - 5.0 GM/DL    Total Protein 4.5 (L) 6.4 - 8.2 GM/DL    Total Bilirubin 0.3 0.0 - 1.0 MG/DL    ALT <5 (L) 10 - 40 U/L    AST 10 (L) 15 - 37 IU/L    Alkaline Phosphatase 304 (H) 40 - 128 IU/L    Anion Gap 13 4 - 16   EKG 12 Lead    Collection Time: 03/14/23  6:22 AM   Result Value Ref Range    Ventricular Rate 87 BPM    Atrial Rate 87 BPM    P-R Interval 150 ms    QRS Duration 138 ms    Q-T Interval 406 ms    QTc Calculation (Bazett) 488 ms    P Axis 59 degrees    R Axis 49 degrees    T Axis 134 degrees    Diagnosis       Normal sinus rhythm  Nonspecific intraventricular block  Possible Anterolateral infarct , age undetermined  Abnormal ECG  When compared with ECG of 13-MAR-2023 14:00,  Nonspecific T wave abnormality no longer evident in Inferior leads     POCT Glucose    Collection Time: 03/14/23  7:55 AM   Result Value Ref Range    POC Glucose 206 (H) 70 - 99 MG/DL   POCT Glucose    Collection Time: 03/14/23 11:54 AM   Result Value Ref Range    POC Glucose 79 70 - 99 MG/DL     CULTURE results: Invalid input(s): BLOOD CULTURE,  URINE CULTURE, SURGICAL CULTURE    Diagnosis:  Patient Active Problem List   Diagnosis    CAD (coronary artery disease)    Hyperlipidemia    Diabetes mellitus (HCC)    Chest pain    Hoarseness of voice    Fracture of metacarpal bone    Wrist sprain    History of tobacco abuse    Wrist pain    Carpal tunnel syndrome    Cubital tunnel syndrome    Trigger finger    S/P CABG x 4    Diabetic foot ulcer (HCC)    Diabetic neuropathy (HCC)    Diabetes mellitus type 2 with complications (HCC)    Claudication of both lower extremities (HCC)    SOB (shortness of breath)    Bilateral leg edema    SOB (shortness of breath)    Type 2 diabetes mellitus without complication (HCC)    Coronary artery disease involving coronary bypass graft of native heart with unstable angina pectoris (HCC)    S/P cardiac cath    Chronic renal failure, stage 2 (mild)    Chronic kidney disease (CKD) stage G4/A3, severely decreased glomerular filtration rate (GFR) between 15-29 mL/min/1.73 square meter and albuminuria creatinine ratio greater than 300 mg/g ([address]    DM (diabetes mellitus) ([address]    Coronary atherosclerosis    HTN (hypertension)    Proteinuria    Callus of foot    Non compliance with medical treatment    Diabetic ulcer of right heel associated with type 2 diabetes mellitus, with necrosis of bone (HCC)    Acute pain of right foot    Wound, open, foot with complication, left, sequela    ESRD on hemodialysis ([address]    Diabetic ulcer of right midfoot associated with type 2 diabetes mellitus, with necrosis of bone (HCC)    Pressure ulcer of sacral region, stage 3 (HCC)    S/P BKA (below knee amputation), right (HCC)    Diabetic ulcer of left foot associated with type 2 diabetes mellitus, with necrosis of muscle (HCC)    Ulcer of finger, with fat layer exposed ([address]    Sepsis ([address]    Severe malnutrition (HCC)    Infected decubitus ulcer, stage IV (HCC)    ESRD (end stage renal disease) (HCC)    Elevated troponin       Active Problems  Principal Problem:    Sepsis ([address]  Active Problems:    Severe malnutrition (HCC)    Infected decubitus ulcer, stage IV (HCC)    ESRD (end stage renal disease) ([address]    Elevated troponin  Resolved Problems:    * No resolved hospital problems. *    Electronically signed by: Electronically signed by Tia Villanueva.  RYAN Godfrey CNP on 3/14/2023 at 2:57 PM

## 2023-03-14 NOTE — PROGRESS NOTES
V2.0  [facility] Hospitalist Progress Note      Name:  Medardo Rodas /Age/Sex: 1965  (62 y.o. female)   MRN & CSN:  [id number] & [id number] Encounter Date/Time: 3/14/2023 11:15 PM EDT    Location:  [facility]/[id number]- PCP: Adeline Berrios MD       Hospital Day: 6    Assessment and Plan:   Medardo Rodas is a 62 y.o. female with past medical history of end-stage renal disease on hemodialysis, COPD not on home oxygen coronary artery disease, type 2 diabetes mellitus, hypertension, hyperlipidemia, anxiety/depression, heart failure with midrange ejection fraction, GERD is admitted on 3/9/2023 with sepsis secondary to infected chronic decubitus ulcer. Severe Sepsis secondary to infected stage IV decubitus ulcer  Blood cultures no growth to date  MRSA -ve  Wound cultures positive for group C beta streptococci and Candida  Continue IV Zosyn and Fluconazole per ID (EKG with QTc less than 500 ms)  PICC line orders to complete 14 days of IV antibiotics  Local wound care    Severe C. difficile colitis  Evidenced by wbc >15k  Started on fidaxomicin 200 mg twice daily 3/11/2023, plan to continue for 10 days. Infectious disease following  Reports improvement in diarrhea, no episode today    Type II NSTEMI  CAD s/p CABG  Chest pain free   Likely due to underlying sepsis and poor clearance due to ESRD  Heparin drip was discontinued by cardiology echocardiogram with ejection fraction 84%, grade 1 diastolic dysfunction, hypokinetic septal wall  Continue aspirin and statin  No further ischemic work-up planned    Hypertension  Low normal blood pressure trend  Continue to hold Coreg    End-stage renal disease on hemodialysis ()  Nephrology is following for inpatient management of HD    PAD s/p R.  BKA in   Continue aspirin and statin    Non-insulin-dependent type 2 diabetes mellitus  Sliding scale insulin, acceptable blood glucose trend  Chronic heart failure with preserved ejection fraction  Appears euvolemic  Echocardiogram from 3/9/2023 with ejection fraction 55%, septal wall hypokinesis. Diet ADULT DIET; Regular; Low Potassium (Less than 3000 mg/day); 1800 ml  ADULT ORAL NUTRITION SUPPLEMENT; Breakfast, Dinner; Wound Healing Oral Supplement  ADULT ORAL NUTRITION SUPPLEMENT; Lunch, Dinner; Renal Oral Supplement   DVT Prophylaxis [] Lovenox, [x]  Heparin, [] SCDs, [] Ambulation,  [] Eliquis, [] Xarelto  [] Coumadin   Code Status Full Code   Disposition From: Home  Expected Disposition: SNF  Estimated Date of Discharge: 2-3  Patient requires continued admission due to C. difficile colitis   Surrogate Decision Maker/ POA Spouse     Subjective:     Chief Complaint: pain in sacral area       Frank Carrion is a 62 y.o. female who presents with pain in sacral area. Patient was seen and evaluated at bedside earlier this morning. Patient was weak but stated that she felt much better today. Patient was alert oriented x3, hemodynamically stable with low normal blood pressure trend. No acute overnight events noted    Objective:   No intake or output data in the 24 hours ending 03/14/23 1811       Vitals:   Vitals:    03/14/23 1517   BP:    Pulse:    Resp: 16   Temp:    SpO2:        Physical Exam:   Physical Exam  Vitals reviewed. Constitutional:       Appearance: She is normal weight. She is ill-appearing. HENT:      Head: Normocephalic and atraumatic. Nose: Nose normal.      Mouth/Throat:      Mouth: Mucous membranes are dry. Pharynx: Oropharynx is clear. Eyes:      General: No scleral icterus. Conjunctiva/sclera: Conjunctivae normal.   Cardiovascular:      Rate and Rhythm: Normal rate and regular rhythm. Pulses: Normal pulses. Heart sounds: Normal heart sounds. No murmur heard. Pulmonary:      Effort: Pulmonary effort is normal.      Breath sounds: Normal breath sounds. No wheezing, rhonchi or rales. Abdominal:      General: Bowel sounds are normal. There is no distension. Palpations: Abdomen is soft. Tenderness: There is no abdominal tenderness. Musculoskeletal:         General: No deformity. Normal range of motion. Cervical back: Neck supple. No rigidity. Left lower leg: No edema. Skin:     Coloration: Skin is not jaundiced or pale. Findings: Bruising and lesion present. Neurological:      General: No focal deficit present. Mental Status: She is alert and oriented to person, place, and time. Mental status is at baseline.           Medications:   Medications:    collagenase   Topical BID    fluconazole  200 mg Oral Q24H    piperacillin-tazobactam  3,375 mg IntraVENous Q12H    Fidaxomicin  200 mg Oral BID    heparin (porcine)  5,000 Units SubCUTAneous 3 times per day    [Held by provider] carvedilol  6.25 mg Oral BID    aspirin  81 mg Oral Daily    atorvastatin  40 mg Oral Nightly    gabapentin  100 mg Oral TID    sodium chloride flush  5-40 mL IntraVENous 2 times per day    sodium hypochlorite   Irrigation BID      Infusions:    sodium hypochlorite      sodium chloride 25 mL (03/14/23 0640)    dextrose       PRN Meds: oxyCODONE, 5 mg, Q4H PRN  LORazepam, 1 mg, Q8H PRN  sodium hypochlorite, 480 mL, Continuous PRN  sodium chloride flush, 5-40 mL, PRN  sodium chloride, , PRN  acetaminophen, 650 mg, Q6H PRN   Or  acetaminophen, 650 mg, Q6H PRN  albuterol sulfate HFA, 2 puff, Q6H PRN  glucose, 4 tablet, PRN  dextrose bolus, 125 mL, PRN   Or  dextrose bolus, 250 mL, PRN  glucagon (rDNA), 1 mg, PRN  dextrose, , Continuous PRN      Labs      Recent Results (from the past 24 hour(s))   POCT Glucose    Collection Time: 03/13/23  9:43 PM   Result Value Ref Range    POC Glucose 124 (H) 70 - 99 MG/DL   POCT Glucose    Collection Time: 03/14/23  1:52 AM   Result Value Ref Range    POC Glucose 389 (H) 70 - 99 MG/DL   C-Reactive Protein    Collection Time: 03/14/23  3:46 AM   Result Value Ref Range    CRP High Sensitivity 132.9 (H) <5.0 mg/L   Procalcitonin Collection Time: 03/14/23  3:46 AM   Result Value Ref Range    Procalcitonin 4.64    CBC with Auto Differential    Collection Time: 03/14/23  3:46 AM   Result Value Ref Range    WBC 32.1 (HH) 4.0 - 10.5 K/CU MM    RBC 3.42 (L) 4.2 - 5.4 M/CU MM    Hemoglobin 10.8 (L) 12.5 - 16.0 GM/DL    Hematocrit 34.9 (L) 37 - 47 %    .0 (H) 78 - 100 FL    MCH 31.6 (H) 27 - 31 PG    MCHC 30.9 (L) 32.0 - 36.0 %    RDW 14.5 11.7 - 14.9 %    Platelets 170 140 - 440 K/CU MM    MPV 11.3 (H) 7.5 - 11.1 FL    UNDIFFERENTIATED CELL 1.0 (HH) 0.0 %    Metamyelocytes Relative 2 (H) 0.0 %    Bands Relative 14 (H) 5 - 11 %    Segs Relative 69.0 (H) 36 - 66 %    Eosinophils % 1.0 0 - 3 %    Lymphocytes % 6.0 (L) 24 - 44 %    Monocytes % 7.0 (H) 0 - 4 %    Metamyelocytes Absolute 0.64 K/CU MM    Bands Absolute 4.49 K/CU MM    Segs Absolute 22.1 K/CU MM    Eosinophils Absolute 0.3 K/CU MM    Lymphocytes Absolute 1.9 K/CU MM    Monocytes Absolute 2.2 K/CU MM    Differential Type MANUAL DIFFERENTIAL     RBC Morphology OCCASIONAL     Anisocytosis 1+     Other Cell Morphology 1 PLASMOID TYPE LYMPH ALSO NOTED    Comprehensive Metabolic Panel w/ Reflex to MG    Collection Time: 03/14/23  3:46 AM   Result Value Ref Range    Sodium 136 135 - 145 MMOL/L    Potassium 4.2 3.5 - 5.1 MMOL/L    Chloride 101 99 - 110 mMol/L    CO2 22 21 - 32 MMOL/L    BUN 32 (H) 6 - 23 MG/DL    Creatinine 3.4 (H) 0.6 - 1.1 MG/DL    Est, Glom Filt Rate 15 (L) >60 mL/min/1.73m2    Glucose 92 70 - 99 MG/DL    Calcium 7.5 (L) 8.3 - 10.6 MG/DL    Albumin 1.6 (L) 3.4 - 5.0 GM/DL    Total Protein 4.5 (L) 6.4 - 8.2 GM/DL    Total Bilirubin 0.3 0.0 - 1.0 MG/DL    ALT <5 (L) 10 - 40 U/L    AST 10 (L) 15 - 37 IU/L    Alkaline Phosphatase 304 (H) 40 - 128 IU/L    Anion Gap 13 4 - 16   EKG 12 Lead    Collection Time: 03/14/23  6:22 AM   Result Value Ref Range    Ventricular Rate 87 BPM    Atrial Rate 87 BPM    P-R Interval 150 ms    QRS Duration 138 ms    Q-T Interval 406 ms    QTc  Calculation (Bazett) 488 ms    P Axis 59 degrees    R Axis 49 degrees    T Axis 134 degrees    Diagnosis       Normal sinus rhythm  Nonspecific intraventricular block  Possible Anterolateral infarct , age undetermined  Abnormal ECG  When compared with ECG of 13-MAR-2023 14:00,  Nonspecific T wave abnormality no longer evident in Inferior leads  Confirmed by Zoey Moctezuma ([id number]) on 3/14/2023 4:27:11 PM     POCT Glucose    Collection Time: 03/14/23  7:55 AM   Result Value Ref Range    POC Glucose 206 (H) 70 - 99 MG/DL   POCT Glucose    Collection Time: 03/14/23 11:54 AM   Result Value Ref Range    POC Glucose 79 70 - 99 MG/DL   POCT Glucose    Collection Time: 03/14/23  4:41 PM   Result Value Ref Range    POC Glucose 190 (H) 70 - 99 MG/DL   Protime/INR & PTT    Collection Time: 03/14/23  5:13 PM   Result Value Ref Range    Protime 14.6 (H) 11.7 - 14.5 SECONDS    INR 1.13 INDEX    aPTT 34.4 25.1 - 37.1 SECONDS        Imaging/Diagnostics Last 24 Hours   XR CHEST PORTABLE    Result Date: 3/11/2023  EXAMINATION: ONE XRAY VIEW OF THE CHEST 3/11/2023 9:29 am COMPARISON: 03/08/2023 HISTORY: ORDERING SYSTEM PROVIDED HISTORY: CHF TECHNOLOGIST PROVIDED HISTORY: Reason for exam:->CHF Reason for Exam: CHF FINDINGS: Cardiomegaly, diffuse bronchovascular marking prominence. Findings are consistent with congestive heart failure and/or diffuse bilateral atypical pneumonia/pneumonitis. Very small left pleural effusion may be present. No new or large areas of pulmonary consolidation. No pneumothorax or mediastinal widening. Findings consistent with congestive heart failure and/or diffuse pneumonitis/atypical pneumonia with very small left basilar pleural effusion. Findings may be accentuated by underlying chronic lung disease.        Electronically signed by July Jeter MD on 3/14/2023 at 6:11 PM

## 2023-03-14 NOTE — PROGRESS NOTES
V2.0  [facility] Hospitalist Progress Note      Name:  Darlyn Aparicio /Age/Sex: 1965  (62 y.o. female)   MRN & CSN:  [id number] & [id number] Encounter Date/Time: 3/13/2023 11:15 PM EDT    [facility]  [facility] PCP: Fercho Martin MD       Hospital Day: 5    Assessment and Plan:   Darlyn Aparicio is a 62 y.o. female with past medical history of end-stage renal disease on hemodialysis, COPD not on home oxygen coronary artery disease, type 2 diabetes mellitus, hypertension, hyperlipidemia, anxiety/depression, heart failure with midrange ejection fraction, GERD is admitted on 3/9/2023 with sepsis secondary to infected chronic decubitus ulcer. Severe Sepsis secondary to infected stage IV decubitus ulcer  Blood cultures no growth to date  MRSA -ve  Wound cultures positive for group C beta streptococci and Candida  Discontinued Rocephin and started Zosyn/Fluconazole by ID    Severe C. difficile colitis  Evidenced by wbc >15k  Started on fidaxomicin 200 mg twice daily 3/11/2023, plan to continue for 10 days. LR for fluid replacement  Infectious disease following  Reports improvement in diarrhea    Type II NSTEMI  Chest pain free today  Likely due to underlying sepsis and poor clearance due to ESRD  Heparin drip was discontinued by cardiology echocardiogram with ejection fraction 23%, grade 1 diastolic dysfunction, hypokinetic septal wall  Continue aspirin and statin  No further ischemic work-up planned    Hypertension  Low normal blood pressure trend  Hold Coreg    End-stage renal disease on hemodialysis ()  Nephrology is following for inpatient management of HD    Diet ADULT DIET;  Regular; Low Potassium (Less than 3000 mg/day); 1800 ml   DVT Prophylaxis [] Lovenox, [x]  Heparin, [] SCDs, [] Ambulation,  [] Eliquis, [] Xarelto  [] Coumadin   Code Status Full Code   Disposition From: Home  Expected Disposition: SNF  Estimated Date of Discharge: 3- 4 days  Patient requires continued admission due to C. difficile colitis   Surrogate Decision Maker/ POA Spouse     Subjective:     Chief Complaint: pain in sacral area       Edd Simons is a 62 y.o. female who presents with pain in sacral area. Patient was seen and evaluated at bedside earlier this morning. Patient was weak and tearful and stated that she has created problems for everyone, discussed with patient that it is a cause of empiric antibiotic therapy. Patient was alert oriented x3, hemodynamically stable with low normal blood pressure trend. No acute overnight events noted    Objective: Intake/Output Summary (Last 24 hours) at 3/13/2023 2005  Last data filed at 3/13/2023 1005  Gross per 24 hour   Intake 1359.97 ml   Output 817 ml   Net 542.97 ml          Vitals:   Vitals:    03/13/23 1954   BP: (!) 133/50   Pulse: 86   Resp: 12   Temp: 98.9 °F (37.2 °C)   SpO2:        Physical Exam:   Physical Exam  Vitals reviewed. Constitutional:       Appearance: She is normal weight. She is ill-appearing. HENT:      Head: Normocephalic and atraumatic. Nose: Nose normal.      Mouth/Throat:      Mouth: Mucous membranes are dry. Pharynx: Oropharynx is clear. Eyes:      General: No scleral icterus. Conjunctiva/sclera: Conjunctivae normal.   Cardiovascular:      Rate and Rhythm: Normal rate and regular rhythm. Pulses: Normal pulses. Heart sounds: Normal heart sounds. No murmur heard. Pulmonary:      Effort: Pulmonary effort is normal.      Breath sounds: Normal breath sounds. No wheezing, rhonchi or rales. Abdominal:      General: Bowel sounds are normal. There is no distension. Palpations: Abdomen is soft. Tenderness: There is no abdominal tenderness. Musculoskeletal:         General: No deformity. Normal range of motion. Cervical back: Neck supple. No rigidity. Right lower leg: No edema. Left lower leg: No edema. Skin:     Coloration: Skin is not jaundiced or pale.       Findings: Bruising and lesion present. Neurological:      General: No focal deficit present. Mental Status: She is alert and oriented to person, place, and time. Mental status is at baseline.           Medications:   Medications:    collagenase   Topical BID    fluconazole  200 mg Oral Q24H    piperacillin-tazobactam  3,375 mg IntraVENous Q12H    Fidaxomicin  200 mg Oral BID    heparin (porcine)  5,000 Units SubCUTAneous 3 times per day    [Held by provider] carvedilol  6.25 mg Oral BID    aspirin  81 mg Oral Daily    atorvastatin  40 mg Oral Nightly    gabapentin  100 mg Oral TID    sodium chloride flush  5-40 mL IntraVENous 2 times per day    sodium hypochlorite   Irrigation BID      Infusions:    sodium hypochlorite Stopped (03/12/23 1011)    sodium chloride      dextrose       PRN Meds: LORazepam, 1 mg, Q8H PRN  HYDROmorphone, 0.5 mg, Q6H PRN  sodium hypochlorite, 480 mL, Continuous PRN  sodium chloride flush, 5-40 mL, PRN  sodium chloride, , PRN  acetaminophen, 650 mg, Q6H PRN   Or  acetaminophen, 650 mg, Q6H PRN  albuterol sulfate HFA, 2 puff, Q6H PRN  glucose, 4 tablet, PRN  dextrose bolus, 125 mL, PRN   Or  dextrose bolus, 250 mL, PRN  glucagon (rDNA), 1 mg, PRN  dextrose, , Continuous PRN      Labs      Recent Results (from the past 24 hour(s))   POCT Glucose    Collection Time: 03/12/23  8:19 PM   Result Value Ref Range    POC Glucose 102 (H) 70 - 99 MG/DL   CBC with Auto Differential    Collection Time: 03/13/23  1:35 AM   Result Value Ref Range    WBC 28.3 (H) 4.0 - 10.5 K/CU MM    RBC 3.32 (L) 4.2 - 5.4 M/CU MM    Hemoglobin 10.4 (L) 12.5 - 16.0 GM/DL    Hematocrit 35.1 (L) 37 - 47 %    .7 (H) 78 - 100 FL    MCH 31.3 (H) 27 - 31 PG    MCHC 29.6 (L) 32.0 - 36.0 %    RDW 14.4 11.7 - 14.9 %    Platelets 452 (L) 632 - 440 K/CU MM    MPV 11.4 (H) 7.5 - 11.1 FL    Differential Type AUTOMATED DIFFERENTIAL     Segs Relative 85.2 (H) 36 - 66 %    Lymphocytes % 4.8 (L) 24 - 44 %    Monocytes % 4.1 (H) 0 - 4 % Eosinophils % 0.5 0 - 3 %    Basophils % 0.0 0 - 1 %    Segs Absolute 24.1 K/CU MM    Lymphocytes Absolute 1.4 K/CU MM    Monocytes Absolute 1.2 K/CU MM    Eosinophils Absolute 0.2 K/CU MM    Basophils Absolute 0.0 K/CU MM    Nucleated RBC % 0.0 %    Total Nucleated RBC 0.0 K/CU MM    Total Immature Neutrophil 1.54 K/CU MM    Immature Neutrophil % 5.4 (H) 0 - 0.43 %   Comprehensive Metabolic Panel w/ Reflex to MG    Collection Time: 03/13/23  1:35 AM   Result Value Ref Range    Sodium 133 (L) 135 - 145 MMOL/L    Potassium 4.7 3.5 - 5.1 MMOL/L    Chloride 99 99 - 110 mMol/L    CO2 16 (L) 21 - 32 MMOL/L    BUN 51 (H) 6 - 23 MG/DL    Creatinine 4.2 (H) 0.6 - 1.1 MG/DL    Est, Glom Filt Rate 12 (L) >60 mL/min/1.73m2    Glucose 100 (H) 70 - 99 MG/DL    Calcium 7.5 (L) 8.3 - 10.6 MG/DL    Albumin 1.6 (L) 3.4 - 5.0 GM/DL    Total Protein 4.5 (L) 6.4 - 8.2 GM/DL    Total Bilirubin 0.3 0.0 - 1.0 MG/DL    ALT 7 (L) 10 - 40 U/L    AST 18 15 - 37 IU/L    Alkaline Phosphatase 376 (H) 40 - 128 IU/L    Anion Gap 18 (H) 4 - 16   EKG 12 Lead    Collection Time: 03/13/23  2:00 PM   Result Value Ref Range    Ventricular Rate 83 BPM    Atrial Rate 83 BPM    P-R Interval 140 ms    QRS Duration 142 ms    Q-T Interval 430 ms    QTc Calculation (Bazett) 505 ms    P Axis 102 degrees    R Axis 51 degrees    T Axis 178 degrees    Diagnosis       Normal sinus rhythm  Left bundle branch block  Abnormal ECG  When compared with ECG of 08-MAR-2023 17:51,  Questionable change in QRS axis  Nonspecific T wave abnormality now evident in Inferior leads  Confirmed by Gilmer Briceno ([id number]) on 3/13/2023 5:32:45 PM     POCT Glucose    Collection Time: 03/13/23  2:08 PM   Result Value Ref Range    POC Glucose 171 (H) 70 - 99 MG/DL   POCT Glucose    Collection Time: 03/13/23  6:08 PM   Result Value Ref Range    POC Glucose 129 (H) 70 - 99 MG/DL        Imaging/Diagnostics Last 24 Hours   XR CHEST PORTABLE    Result Date: 3/11/2023  EXAMINATION: ONE XRAY VIEW OF THE CHEST 3/11/2023 9:29 am COMPARISON: 03/08/2023 HISTORY: ORDERING SYSTEM PROVIDED HISTORY: CHF TECHNOLOGIST PROVIDED HISTORY: Reason for exam:->CHF Reason for Exam: CHF FINDINGS: Cardiomegaly, diffuse bronchovascular marking prominence. Findings are consistent with congestive heart failure and/or diffuse bilateral atypical pneumonia/pneumonitis. Very small left pleural effusion may be present. No new or large areas of pulmonary consolidation. No pneumothorax or mediastinal widening. Findings consistent with congestive heart failure and/or diffuse pneumonitis/atypical pneumonia with very small left basilar pleural effusion. Findings may be accentuated by underlying chronic lung disease.        Electronically signed by Simon Lal MD on 3/13/2023 at 8:05 PM Statement Selected

## 2023-03-14 NOTE — PLAN OF CARE
Problem: Discharge Planning  Goal: Discharge to home or other facility with appropriate resources  3/14/2023 1527 by Reese Hollins LPN  Outcome: Progressing  3/14/2023 0333 by Leah Guerra LPN  Outcome: Progressing  Flowsheets (Taken 3/13/2023 1954 by Julia Schafer RN)  Discharge to home or other facility with appropriate resources:   Identify barriers to discharge with patient and caregiver   Arrange for needed discharge resources and transportation as appropriate     Problem: Skin/Tissue Integrity  Goal: Absence of new skin breakdown  Description: 1. Monitor for areas of redness and/or skin breakdown  2. Assess vascular access sites hourly  3. Every 4-6 hours minimum:  Change oxygen saturation probe site  4. Every 4-6 hours:  If on nasal continuous positive airway pressure, respiratory therapy assess nares and determine need for appliance change or resting period.   3/14/2023 1527 by Reese Hollins LPN  Outcome: Progressing  3/14/2023 0333 by Leah Guerra LPN  Outcome: Progressing     Problem: ABCDS Injury Assessment  Goal: Absence of physical injury  3/14/2023 1527 by Reese Hollins LPN  Outcome: Progressing  3/14/2023 0333 by Leah Guerra LPN  Outcome: Progressing     Problem: Safety - Adult  Goal: Free from fall injury  3/14/2023 1527 by Reese Hollins LPN  Outcome: Progressing  3/14/2023 0333 by Leah Guerra LPN  Outcome: Progressing     Problem: Pain  Goal: Verbalizes/displays adequate comfort level or baseline comfort level  3/14/2023 1527 by Reese Hollins LPN  Outcome: Progressing  3/14/2023 0333 by Leah Guerra LPN  Outcome: Progressing     Problem: Nutrition Deficit:  Goal: Optimize nutritional status  3/14/2023 1527 by Reese Hollins LPN  Outcome: Progressing  3/14/2023 0333 by Leah Guerra LPN  Outcome: Progressing

## 2023-03-14 NOTE — PROGRESS NOTES
Nephrology Progress Note  3/14/2023 8:45 AM        Subjective:   Admit Date: 3/9/2023  PCP: Earl Salmon MD    Interval History:  patient seen in early morning, this is a late entry    Diet:  poor oral intake    ROS:   her dialysis was prematurely terminated after 2 hours only had minimal ultrafiltration   no fever, acceptable blood pressure    Data:     Current meds:    collagenase   Topical BID    fluconazole  200 mg Oral Q24H    piperacillin-tazobactam  3,375 mg IntraVENous Q12H    Fidaxomicin  200 mg Oral BID    heparin (porcine)  5,000 Units SubCUTAneous 3 times per day    [Held by provider] carvedilol  6.25 mg Oral BID    aspirin  81 mg Oral Daily    atorvastatin  40 mg Oral Nightly    gabapentin  100 mg Oral TID    sodium chloride flush  5-40 mL IntraVENous 2 times per day    sodium hypochlorite   Irrigation BID      sodium hypochlorite      sodium chloride 25 mL (03/14/23 0640)    dextrose           I/O last 3 completed shifts:   In: 1360 [P.O.:360; I.V.:500]  Out: 817     CBC:   Recent Labs     03/12/23  0503 03/13/23  0135 03/14/23  0346   WBC 32.1* 28.3* 32.1*   HGB 10.6* 10.4* 10.8*   * 130* 170          Recent Labs     03/12/23  0503 03/13/23  0135 03/14/23  0346   * 133* 136   K 4.4 4.7 4.2   CL 97* 99 101   CO2 19* 16* 22   BUN 43* 51* 32*   CREATININE 3.6* 4.2* 3.4*   GLUCOSE 91 100* 92       Lab Results   Component Value Date    CALCIUM 7.5 (L) 03/14/2023    PHOS 9.0 (H) 11/14/2022       Objective:     Vitals: BP (!) 112/52   Pulse 87   Temp 98.4 °F (36.9 °C) (Oral)   Resp 18   Ht 5' 3\" (1.6 m)   Wt 166 lb 3.2 oz (75.4 kg)   SpO2 97%   BMI 29.44 kg/m² ,    General appearance:   cachectic, alert, awake and oriented  HEENT:   no gross conjunctival pallor or scleral icterus, she is of course a dental abscess  Neck:   supple no nuchal rigidity  Lungs:   no crackles on auscultation  Heart:   regular rate and rhythm  Abdomen:  soft, nontender on palpation  Extremities:   left brachiocephalic AV fistula in upper extremity with good thrill on palpation good bruit auscultation     she does have gangrene of several left lower extremity toes as well as right below-knee amputation      Problem List :         Impression :      end-stage kidney disease on maintenance dialysis- she is due for tomorrow although did not tolerate dialysis very well   significant peripheral arterial disease as well as C. difficile now with skin and soft tissue infection- obviously is a bad combination   atherosclerotic cardiovascular disease, diabetes, anemia, frailty and debility    Recommendation/Plan  :      I had good discussion with her niece who is in a healthcare sector- she understand that her progress is probably not very good- she will discuss with rest of the family and if necessary I will sit down with them or ask any questions- she is waiting to go to nursing home- her both short-term and long-term prognosis is not great- watch for iatrogenic and nosocomial complication and follow clinically- we will continue to do maintenance dialysis as long as she wishes to- but will get periodic discussion with her      Bakari Daily MD MD

## 2023-03-15 ENCOUNTER — APPOINTMENT (OUTPATIENT)
Dept: INTERVENTIONAL RADIOLOGY/VASCULAR | Age: 58
End: 2023-03-15
Attending: INTERNAL MEDICINE
Payer: MEDICARE

## 2023-03-15 ENCOUNTER — APPOINTMENT (OUTPATIENT)
Dept: GENERAL RADIOLOGY | Age: 58
End: 2023-03-15
Attending: INTERNAL MEDICINE
Payer: MEDICARE

## 2023-03-15 LAB
ALBUMIN SERPL-MCNC: 1.8 GM/DL (ref 3.4–5)
ALP BLD-CCNC: 280 IU/L (ref 40–128)
ALT SERPL-CCNC: <5 U/L (ref 10–40)
ANION GAP SERPL CALCULATED.3IONS-SCNC: 11 MMOL/L (ref 4–16)
ANISOCYTOSIS: ABNORMAL
AST SERPL-CCNC: 8 IU/L (ref 15–37)
BANDED NEUTROPHILS ABSOLUTE COUNT: 3.43 K/CU MM
BANDED NEUTROPHILS RELATIVE PERCENT: 11 % (ref 5–11)
BILIRUB SERPL-MCNC: 0.4 MG/DL (ref 0–1)
BUN SERPL-MCNC: 42 MG/DL (ref 6–23)
CALCIUM SERPL-MCNC: 7.8 MG/DL (ref 8.3–10.6)
CHLORIDE BLD-SCNC: 100 MMOL/L (ref 99–110)
CO2: 22 MMOL/L (ref 21–32)
CREAT SERPL-MCNC: 4.1 MG/DL (ref 0.6–1.1)
CRP SERPL HS-MCNC: 155.1 MG/L
DIFFERENTIAL TYPE: ABNORMAL
EOSINOPHILS ABSOLUTE: 0.6 K/CU MM
EOSINOPHILS RELATIVE PERCENT: 2 % (ref 0–3)
GFR SERPL CREATININE-BSD FRML MDRD: 12 ML/MIN/1.73M2
GLUCOSE BLD-MCNC: 162 MG/DL (ref 70–99)
GLUCOSE BLD-MCNC: 234 MG/DL (ref 70–99)
GLUCOSE BLD-MCNC: 92 MG/DL (ref 70–99)
GLUCOSE SERPL-MCNC: 81 MG/DL (ref 70–99)
HCT VFR BLD CALC: 37.6 % (ref 37–47)
HEMOGLOBIN: 11.1 GM/DL (ref 12.5–16)
LYMPHOCYTES ABSOLUTE: 2.8 K/CU MM
LYMPHOCYTES RELATIVE PERCENT: 9 % (ref 24–44)
MCH RBC QN AUTO: 31 PG (ref 27–31)
MCHC RBC AUTO-ENTMCNC: 29.5 % (ref 32–36)
MCV RBC AUTO: 105 FL (ref 78–100)
METAMYELOCYTES ABSOLUTE COUNT: 0.31 K/CU MM
METAMYELOCYTES PERCENT: 1 %
MONOCYTES ABSOLUTE: 1.6 K/CU MM
MONOCYTES RELATIVE PERCENT: 5 % (ref 0–4)
MYELOCYTE PERCENT: 2 %
MYELOCYTES ABSOLUTE COUNT: 0.62 K/CU MM
PDW BLD-RTO: 14.7 % (ref 11.7–14.9)
PLATELET # BLD: 226 K/CU MM (ref 140–440)
PMV BLD AUTO: 10.8 FL (ref 7.5–11.1)
POTASSIUM SERPL-SCNC: 4.1 MMOL/L (ref 3.5–5.1)
PROCALCITONIN SERPL-MCNC: 5.04 NG/ML
RBC # BLD: 3.58 M/CU MM (ref 4.2–5.4)
RBC # BLD: ABNORMAL 10*6/UL
SEGMENTED NEUTROPHILS ABSOLUTE COUNT: 21.8 K/CU MM
SEGMENTED NEUTROPHILS RELATIVE PERCENT: 70 % (ref 36–66)
SODIUM BLD-SCNC: 133 MMOL/L (ref 135–145)
TOTAL PROTEIN: 4.9 GM/DL (ref 6.4–8.2)
WBC # BLD: 31.2 K/CU MM (ref 4–10.5)
WBC # BLD: ABNORMAL 10*3/UL

## 2023-03-15 PROCEDURE — 94761 N-INVAS EAR/PLS OXIMETRY MLT: CPT

## 2023-03-15 PROCEDURE — 85027 COMPLETE CBC AUTOMATED: CPT

## 2023-03-15 PROCEDURE — 76937 US GUIDE VASCULAR ACCESS: CPT

## 2023-03-15 PROCEDURE — 0JH60WZ INSERTION OF TOTALLY IMPLANTABLE VASCULAR ACCESS DEVICE INTO CHEST SUBCUTANEOUS TISSUE AND FASCIA, OPEN APPROACH: ICD-10-PCS | Performed by: RADIOLOGY

## 2023-03-15 PROCEDURE — 02HV33Z INSERTION OF INFUSION DEVICE INTO SUPERIOR VENA CAVA, PERCUTANEOUS APPROACH: ICD-10-PCS | Performed by: RADIOLOGY

## 2023-03-15 PROCEDURE — 6360000002 HC RX W HCPCS: Performed by: NURSE PRACTITIONER

## 2023-03-15 PROCEDURE — 36558 INSERT TUNNELED CV CATH: CPT

## 2023-03-15 PROCEDURE — 6370000000 HC RX 637 (ALT 250 FOR IP): Performed by: STUDENT IN AN ORGANIZED HEALTH CARE EDUCATION/TRAINING PROGRAM

## 2023-03-15 PROCEDURE — 2580000003 HC RX 258: Performed by: INTERNAL MEDICINE

## 2023-03-15 PROCEDURE — 82962 GLUCOSE BLOOD TEST: CPT

## 2023-03-15 PROCEDURE — 1200000000 HC SEMI PRIVATE

## 2023-03-15 PROCEDURE — 99232 SBSQ HOSP IP/OBS MODERATE 35: CPT | Performed by: NURSE PRACTITIONER

## 2023-03-15 PROCEDURE — 90935 HEMODIALYSIS ONE EVALUATION: CPT

## 2023-03-15 PROCEDURE — 97530 THERAPEUTIC ACTIVITIES: CPT

## 2023-03-15 PROCEDURE — 84145 PROCALCITONIN (PCT): CPT

## 2023-03-15 PROCEDURE — 86140 C-REACTIVE PROTEIN: CPT

## 2023-03-15 PROCEDURE — 2580000003 HC RX 258: Performed by: NURSE PRACTITIONER

## 2023-03-15 PROCEDURE — 36415 COLL VENOUS BLD VENIPUNCTURE: CPT

## 2023-03-15 PROCEDURE — 77001 FLUOROGUIDE FOR VEIN DEVICE: CPT

## 2023-03-15 PROCEDURE — 6360000002 HC RX W HCPCS: Performed by: STUDENT IN AN ORGANIZED HEALTH CARE EDUCATION/TRAINING PROGRAM

## 2023-03-15 PROCEDURE — 74018 RADEX ABDOMEN 1 VIEW: CPT

## 2023-03-15 PROCEDURE — 80053 COMPREHEN METABOLIC PANEL: CPT

## 2023-03-15 PROCEDURE — 6370000000 HC RX 637 (ALT 250 FOR IP): Performed by: NURSE PRACTITIONER

## 2023-03-15 PROCEDURE — 2709999900 IR TUNNELED CVC PLACE WO SQ PORT/PUMP > 5 YEARS

## 2023-03-15 PROCEDURE — 85007 BL SMEAR W/DIFF WBC COUNT: CPT

## 2023-03-15 RX ORDER — FLUCONAZOLE 100 MG/1
400 TABLET ORAL EVERY 24 HOURS
Status: DISCONTINUED | OUTPATIENT
Start: 2023-03-15 | End: 2023-03-17 | Stop reason: HOSPADM

## 2023-03-15 RX ORDER — LINEZOLID 600 MG/1
600 TABLET, FILM COATED ORAL EVERY 12 HOURS SCHEDULED
Status: DISCONTINUED | OUTPATIENT
Start: 2023-03-15 | End: 2023-03-17 | Stop reason: HOSPADM

## 2023-03-15 RX ADMIN — GABAPENTIN 100 MG: 100 CAPSULE ORAL at 12:58

## 2023-03-15 RX ADMIN — ATORVASTATIN CALCIUM 40 MG: 40 TABLET, FILM COATED ORAL at 21:39

## 2023-03-15 RX ADMIN — HEPARIN SODIUM 5000 UNITS: 5000 INJECTION INTRAVENOUS; SUBCUTANEOUS at 16:07

## 2023-03-15 RX ADMIN — COLLAGENASE SANTYL: 250 OINTMENT TOPICAL at 22:30

## 2023-03-15 RX ADMIN — OXYCODONE HYDROCHLORIDE 5 MG: 5 TABLET ORAL at 03:34

## 2023-03-15 RX ADMIN — SODIUM HYPOCHLORITE: 1.25 SOLUTION TOPICAL at 13:02

## 2023-03-15 RX ADMIN — COLLAGENASE SANTYL: 250 OINTMENT TOPICAL at 12:59

## 2023-03-15 RX ADMIN — LINEZOLID 600 MG: 600 TABLET, FILM COATED ORAL at 21:39

## 2023-03-15 RX ADMIN — GABAPENTIN 100 MG: 100 CAPSULE ORAL at 21:39

## 2023-03-15 RX ADMIN — OXYCODONE HYDROCHLORIDE 5 MG: 5 TABLET ORAL at 07:29

## 2023-03-15 RX ADMIN — SODIUM HYPOCHLORITE: 1.25 SOLUTION TOPICAL at 22:30

## 2023-03-15 RX ADMIN — SODIUM CHLORIDE, PRESERVATIVE FREE 10 ML: 5 INJECTION INTRAVENOUS at 13:02

## 2023-03-15 RX ADMIN — FLUCONAZOLE 400 MG: 100 TABLET ORAL at 16:07

## 2023-03-15 RX ADMIN — FIDAXOMICIN 200 MG: 200 TABLET, FILM COATED ORAL at 16:07

## 2023-03-15 RX ADMIN — ASPIRIN 81 MG CHEWABLE TABLET 81 MG: 81 TABLET CHEWABLE at 12:58

## 2023-03-15 RX ADMIN — OXYCODONE HYDROCHLORIDE 5 MG: 5 TABLET ORAL at 22:23

## 2023-03-15 RX ADMIN — FIDAXOMICIN 200 MG: 200 TABLET, FILM COATED ORAL at 22:23

## 2023-03-15 RX ADMIN — OXYCODONE HYDROCHLORIDE 5 MG: 5 TABLET ORAL at 13:14

## 2023-03-15 RX ADMIN — OXYCODONE HYDROCHLORIDE 5 MG: 5 TABLET ORAL at 18:07

## 2023-03-15 RX ADMIN — GABAPENTIN 100 MG: 100 CAPSULE ORAL at 16:07

## 2023-03-15 RX ADMIN — PIPERACILLIN AND TAZOBACTAM 3375 MG: 3; .375 INJECTION, POWDER, LYOPHILIZED, FOR SOLUTION INTRAVENOUS at 06:45

## 2023-03-15 RX ADMIN — PIPERACILLIN AND TAZOBACTAM 3375 MG: 3; .375 INJECTION, POWDER, LYOPHILIZED, FOR SOLUTION INTRAVENOUS at 18:06

## 2023-03-15 ASSESSMENT — PAIN DESCRIPTION - DESCRIPTORS
DESCRIPTORS: THROBBING
DESCRIPTORS: THROBBING
DESCRIPTORS: ACHING;GNAWING
DESCRIPTORS: ACHING

## 2023-03-15 ASSESSMENT — PAIN DESCRIPTION - LOCATION
LOCATION: COCCYX
LOCATION: GENERALIZED

## 2023-03-15 ASSESSMENT — PAIN - FUNCTIONAL ASSESSMENT: PAIN_FUNCTIONAL_ASSESSMENT: ACTIVITIES ARE NOT PREVENTED

## 2023-03-15 ASSESSMENT — PAIN DESCRIPTION - ORIENTATION
ORIENTATION: LOWER
ORIENTATION: LOWER

## 2023-03-15 ASSESSMENT — PAIN SCALES - GENERAL
PAINLEVEL_OUTOF10: 6
PAINLEVEL_OUTOF10: 7
PAINLEVEL_OUTOF10: 6

## 2023-03-15 NOTE — OR NURSING
PROCEDURE PERFORMED: tunneled picc line    PRIMARY INDICATION FOR PROCEDURE: long term antibiotics     INFORMED CONSENT:  Obtained prior to procedure. Consent placed in chart. PT TRANSPORTED FROM:   4121              TO THE IR ROOM:    Large room                     ARRIVED TO ROOM: 1150    ASSESSMENT: A&O x4 with VSS on roomair        BARRIER PRECAUTIONS & STERILE TECHNIQUE:               Pt transferred to the IR table and positioned supinefor comfort. Warm blankets given. Pt placed on Cardiac Monitor. Pt prepped and draped in a sterile fashion with chlorhexadine.           PAIN/LOCAL ANESTHESIA/SEDATION MANAGEMENT:           Local: Lidocaine 1% given by  @2909             INTRAOPERATIVE:           ACCESS TIME:@ 0363 with micro puncture           US/FLUORO: US images x3  FT 0.3  K 5                CATHETER USED: 6 f x60 cm SL pro line tunneled picc cut at 20cm           FINAL IMAGE TAKEN TO CONFIRM PLACEMENT OF: tunneled picc          CONTRAST/CC: none     STERILE DRESSINGS: sutured,bio patch,tegaderm     SPECIMENS: none     EBL:      >5cc    FOLLOW- UP X-RAY: done in IR         COMPLICATIONS/ OUTCOME:pt tolerated procedure well            LEFT THE ROOM: 1230    REPORT CALLED TO: Terry Llamas [phone] via phone per [facility] RN @[phone]

## 2023-03-15 NOTE — PROGRESS NOTES
The wound on the coccyx has drained aprox 50-100mL of light brown drainage from this morning when the wound team was here till now, (4pm). This nurse has placed 3 ABD pads over the wound after stuffing it with a soaked 4X4.

## 2023-03-15 NOTE — CARE COORDINATION
Sent PS to Dr. Rebeca Cerrato to inquire when pt would be medically ready so precert can be initiated. He stated okay to initiated precert/ updated Gayatri at [facility], also updated that wound vac has been discontinued.

## 2023-03-15 NOTE — PROGRESS NOTES
Occupational Therapy  . Occupational Therapy Treatment Note    Name: [place] Thomas MRN: [id number] :   1965   Date:  3/15/2023   Admission Date: 3/9/2023 Room:  [address] [place], [place] [address]A     Primary Problem:      Restrictions/Precautions:          General precautions, fall risk  hx R BKA  contact      Communication with other providers:  cotx with PTA Martine Bienenstock for assist and safety as well as patient tolerance with therapy. RN- ubaldo   transport    Subjective:  Patient states:  thea been so busy today. Pain:   Location, Type, Intensity (0/10 to 10/10):  did not quantify    Objective:    Observation: patient is side lying. Transport is waiting for nursing to help transfer to [facility] to go for imaging. CAVANAUGH called upon PTA Martine Bienenstock to come assist with x2 transfer. Did return to room to transfer back to bed after imaging. Objective Measures:  pocket tele    Treatment, including education:    Therapeutic activity training was instructed today. Cues were given for safety, sequence, UE/LE placement, awareness, and balance. Activities performed today included bed mobility training, sup-sit, sit-stand, SPT. 1st session-     Rolling- Mod A L to R    Supine to Sit- Min x2 increased time end effort to scoot hips to EOB. EOB balance- Min A with progression to CGA. SPT to gurney- Max x2 with cues. Scooting hips back on gurney- Mod A With increased time and effort. Sit to supine- Mod A for trunk and position of BLE.     2nd session after return from imaging. Supine to Sit- Min x2 with encouragement. Increased time noted. Scooting hips to Eo gurney- Min A. Stand from [address] x2. However instead of performing pivot patient elects to place BUEs on bed and R knee on bed in a quadruped position despite cues throughout. Patient then moves into a prone position with increased effort and Min A for Bles.  Patient then requires Mod A for assist with moving trunk into center of bed then BLEs into midline. Patient then rolled from prone to R side with assist from bed rail and Mod x2. Patient is left side lying for RN to perform wound care. Boost to [facility]- DEP x2    Patient educated on role of OT , benefits of OT and rationale for therapeutic intervention. Benefit of OOB/EOB activities, benefit of movement. AE/AD, WS/EC,     All therapeutic intervention performed c emphasis on dynamic balance / standing tolerance to increase strength, endurance and activity tolerance for increased [place] c ADL tasks and func transfers / mobility. Patient left safely in bed at end of session, with call light in reach, alarm on and nursing aware. Gait belt was used for func transfers / mobility.       Assessment / Impression:   Patient's tolerance of treatment: fair  Adverse Reaction: None  Significant change in status and impact: Improved from initial evaluation  Barriers to improvement: decreased safety awareness, endurance, pain      Plan for Next Session:    Continue with OT POC      Time in:  2764-7884-=10 min  Time out:  1250-7988=13  Timed treatment minutes:  23  Total treatment time:  23      Electronically signed by:    LUISITO Hartley COTA/L [id number]    3/15/2023, 4:21 PM

## 2023-03-15 NOTE — PROCEDURES
Patient Name: Helene Huitron  Patient : 1965  MRN: [id number]     Acct: [de-identified]  Date of Admission: 3/9/2023  Room/Bed: 4121/4121-A  Code Status:  Full Code  Allergies:    Allergies   Allergen Reactions    Latex     Codeine     Cortisone     Cortizone     Dilaudid [Hydromorphone]      \"They OD'd me\"    Iodides     Phenobarbital Other (See Comments)     Hallucinations      Diagnosis:    Patient Active Problem List   Diagnosis    CAD (coronary artery disease)    Hyperlipidemia    Diabetes mellitus (HCC)    Chest pain    Hoarseness of voice    Fracture of metacarpal bone    Wrist sprain    History of tobacco abuse    Wrist pain    Carpal tunnel syndrome    Cubital tunnel syndrome    Trigger finger    S/P CABG x 4    Diabetic foot ulcer ([facility])    Diabetic neuropathy ([facility] [address]    Diabetes mellitus type 2 with complications ([facility])    Claudication of both lower extremities ([facility])    SOB (shortness of breath)    Bilateral leg edema    SOB (shortness of breath)    Type 2 diabetes mellitus without complication (HCC)    Coronary artery disease involving coronary bypass graft of native heart with unstable angina pectoris ([facility])    S/P cardiac cath    Chronic renal failure, stage 2 (mild)    Chronic kidney disease (CKD) stage G4/A3, severely decreased glomerular filtration rate (GFR) between 15-29 mL/min/1.73 square meter and albuminuria creatinine ratio greater than 300 mg/g (HCC)    DM (diabetes mellitus) ([facility])    Coronary atherosclerosis    HTN (hypertension)    Proteinuria    Callus of foot    Non compliance with medical treatment    Diabetic ulcer of right heel associated with type 2 diabetes mellitus, with necrosis of bone (HCC)    Acute pain of right foot    Wound, open, foot with complication, left, sequela    ESRD on hemodialysis ([facility] [address]    Diabetic ulcer of right midfoot associated with type 2 diabetes mellitus, with necrosis of bone (HCC)    Pressure ulcer of sacral region, stage 3 ([facility])    S/P BKA (below knee amputation), right ([facility] [address]    Diabetic ulcer of left foot associated with type 2 diabetes mellitus, with necrosis of muscle ([address]    Ulcer of finger, with fat layer exposed ([facility] [address]    Sepsis ([facility] [address]    Severe malnutrition ([facility] [address]    Infected decubitus ulcer, stage IV ([address]    ESRD (end stage renal disease) ([facility] [address]    Elevated troponin         Treatment:  Hemodilaysis 2:1  Priority: Routine  Location: Acute Room    Diabetic: Yes  NPO: No  Isolation Precautions: None     Consent for Treatment Verified: Yes  Blood Consent Verified: Not Applicable     Safety Verified: Identify (I), Consent (C), Equipment (E), HepB Status (B), Orders Complete (O), Access Verified (A), and Timeliness (T)  Time out performed prior to access at 0740 hours. Report Received from Primary RN at 0710 hours.   Primary RN (First Initial, Last Name, Title): Zackary Aly RN  Incapacitated Nurse Education Completed: Not Applicable     HBsAg ONLY:  Date Drawn: February 22, 2023       Results: Negative  HBsAb:  Date Drawn:  February 22, 2023       Results: Susceptible <10    Order  Dialysis Bath  K+ (Potassium): 2  Ca+ (Calcium): 2.5  Na+ (Sodium): 138  HCO3 (Bicarb): 30  Bicarbonate Concentrate Lot No.: [id number]  Acid Concentrate Lot No.: [id number]     Na+ Modeling: Not Applicable  Dialyzer: G179  Dialysate Temperature (C):  35  Blood Flow Rate (BFR):  350   Dialysate Flow Rate (DFR):   700        Access to be Utilized   Access: AVF  Location: Upper Extremity  Side: Left   Needle gauge:  16  + Bruit/Thrill: YES    First Use X-ray Verified: Not Applicable  OK to use line order: Not Applicable    Site Assessment:  Signs and Symptoms of Infection/Inflammation: None  If yes: Not Applicable  Dressing: Dry and Intact  Site Prep: Medical Aseptic Technique  Dressing Changed this Treatment: Yes  If yes, by whom: Joce Marrero RN  Date of Last Dressing Change: Not Applicable  Antimicrobial Patch in place?: No  Red Alcohol Caps in place?: NO  Gauze Dressing?: Yes  Non Dialysis Use?: No  Comment:    Flows: Good, Patent  If access problem, who was notified:     Pre and Post-Assessment  Patient Vitals for the past 8 hrs:   Pain Level   03/15/23 0334 6   03/15/23 0729 6     Labs  Recent Labs     03/13/23  0135 03/14/23  0346 03/15/23  0202   WBC 28.3* 32.1* 31.2*   HGB 10.4* 10.8* 11.1*   HCT 35.1* 34.9* 37.6   * 170 226                                                                  Recent Labs     03/13/23  0135 03/14/23  0346 03/15/23  0202   * 136 133*   K 4.7 4.2 4.1   CL 99 101 100   CO2 16* 22 22   BUN 51* 32* 42*   CREATININE 4.2* 3.4* 4.1*   GLUCOSE 100* 92 81     IV Drips and Rate/Dose   sodium hypochlorite      sodium chloride 25 mL/hr at 03/14/23 1838    dextrose        Safety - Before each treatment:   Dialysis Machine No.: [id number]   Machine Number: [id number]  Dialyzer Lot No.: [id number]   Machine Log Sheet Completed: Yes  Machine Alarm Self Test: Completed; Passed (03/15/23 2959)     Air Foam Detector: Tested, Proper Function  Extracorporeal Circuit Tested for Integrity: Yes  Machine Conductivity: 13.7  Manual Conductivity: 14     Bicarbonate Concentrate Lot No.: [id number]  Acid Concentrate Lot No.: [id number]  Manual Ph: 7.4  Bleach Test (Neg): Yes  Bath Temperature: 95 °F (35 °C)  Tubing Lot#: [id number]  Conductivity Meter Serial #: [id number]  All Connections Secure?: Yes  Venous Parameters Set?: Yes  Arterial Parameters Set?: Yes  Saline Line Double Clamped?: Yes  Air Foam Detector Engaged?: Yes  Machine Functioning Alarm Free?  Yes  Prime Given: 200ml    Chlorine Testing - Before each treatment and every 4 hours:   Treatment  Time On: 0756  Time Off: 1026  Weight: 166 lb 3.2 oz (75.4 kg) (03/13/23 0731)  1st check: less than 0.1 ppm at: 0645 hours  2nd check: less than 0.1 ppm at: 1030 hours  (if greater than 0.1 ppm, then check every 30 minutes from secondary)    Access Flows and Pressures  Patient Vitals for the past 8 hrs:   Blood Flow Rate (ml/min) Ultrafiltration Rate (ml/hr) Arterial Pressure (mmHg) Venous Pressure (mmHg) TMP DFR Comments Access Visible   03/15/23 0745 350 ml/min 830 ml/hr -80 mmHg 170 40 700 TX STARTED, PRIME GIVEN, LINES SECURE AND CALL LIGHT WITHIN REACH Yes   03/15/23 0800 350 ml/min 830 ml/hr -100 mmHg 200 40 700 AWAKE AND ALERT Yes   03/15/23 0815 350 ml/min 830 ml/hr -100 mmHg 200 40 700 AWAKE AND TALKING ON PHONE Yes   03/15/23 0830 350 ml/min 830 ml/hr -120 mmHg 190 40 700 AWAKE AND WATHCING TV Yes   03/15/23 0845 350 ml/min 830 ml/hr -120 mmHg 190 40 700 AWAKE AND ALERT Yes   03/15/23 0900 350 ml/min 830 ml/hr -120 mmHg 190 50 700 RESTING WITH EYES CLOSED Yes   03/15/23 0915 350 ml/min 830 ml/hr -130 mmHg 220 60 700 VSS Yes   03/15/23 0930 350 ml/min 830 ml/hr -130 mmHg 220 60 700 RSTING Yes   03/15/23 0945 350 ml/min 830 ml/hr -100 mmHg 220 70 700 VSS Yes   03/15/23 1000 350 ml/min 250 ml/hr -100 mmHg 190 40 700 time decreased per pt request,  notified Yes   03/15/23 1015 350 ml/min 250 ml/hr -120 mmHg 200 20 700 BP IMPROVING, UF GOAL REMAINS LOWRED Yes   03/15/23 1026 200 ml/min 0 ml/hr -10 mmHg 80 30 700 TX ENDED, RINSEBAACK GIVEN Yes     Vital Signs  Patient Vitals for the past 8 hrs:   BP Temp Pulse Resp SpO2   03/15/23 0404 -- -- -- 14 --   03/15/23 0729 -- -- -- 16 --   03/15/23 0742 (!) 147/49 97.4 °F (36.3 °C) 75 14 96 %   03/15/23 0745 (!) 141/50 -- 72 10 --   03/15/23 0800 (!) 134/44 -- 72 10 --   03/15/23 0815 (!) 129/43 -- 73 10 --   03/15/23 0830 (!) 127/51 -- 73 (!) 8 --   03/15/23 0845 (!) 114/46 -- 72 (!) 9 --   03/15/23 0900 (!) 99/47 -- 75 11 --   03/15/23 0915 (!) 114/44 -- 74 (!) 9 --   03/15/23 0930 (!) 103/45 -- 75 (!) 9 --   03/15/23 0945 (!) 110/49 -- 75 (!) 8 --   03/15/23 1000 (!) 102/45 -- 76 (!) 8 --   03/15/23 1015 (!) 101/43 -- 80 11 --   03/15/23 1026 (!) 123/41 97.9 °F (36.6 °C) 77 10 98 %     Post-Dialysis  Arterial Catheter Locking Solution:  NA  Venous Catheter Locking Solution: NA  Post-Treatment Procedures: Access bleeding time < 10 minutes  Machine Disinfection Process: Acid/Vinegar Clean, Heat Disinfect, Exterior Machine Disinfection  Rinseback Volume (ml): 300 ml  Blood Volume Processed (Liters): 50.6 l/min  Dialyzer Clearance: Lightly streaked  Duration of Treatment (minutes): 180 minutes     Hemodialysis Intake (ml): 500 ml  Hemodialysis Output (ml): 1900 ml     Tolerated Treatment: Good  Patient Response to Treatment: TOLERATED WELL  Physician Notified: No       Provider Notification  Provider Notification  Reason for Communication: Patient request (03/13/23 1005)  Provider Name: Venita Verde (03/13/23 1005)  Provider Notification: Physician (03/13/23 1005)  Method of Communication: Call (03/13/23 1005)  Response:  (ok to stop tx) (03/13/23 1005)  Notification Time: 1000 (03/13/23 1005)     Handoff complete and report given to Primary RN at 1045 hours.   Primary RN (First Initial, Last Name, Title): LORE KUMAR     Education  Person Educated: Patient   Knowledge Base: Minimal  Barriers to Learning?: None  Preferred method of Learning: Hands-on  Topic(s): Access Care, Signs and Symptoms of Infection, Fluid Management, Albumin, Procedural, Medications, Treatment Options, Potassium, Diet, and Transplant   Teaching Tools: Video, Handout, Demonstration, and Explanation   Response to Education: Verbalized Understanding, Return Demonstration, Teach Back, and Requires Follow-up     Electronically signed by Misti Kincaid RN on 3/15/2023 at 10:47 AM

## 2023-03-15 NOTE — PROGRESS NOTES
Nephrology Progress Note  3/15/2023 9:20 AM        Subjective:   Admit Date: 3/9/2023  PCP: Delvis Tabares MD    Interval History: Patient seen early morning, this is a late entry  No major event    Diet: She reported trying to eat better    ROS: No overt shortness of breath or confusion  No fever, variable blood pressure recorded    Data:     Current meds:    collagenase   Topical BID    fluconazole  200 mg Oral Q24H    piperacillin-tazobactam  3,375 mg IntraVENous Q12H    Fidaxomicin  200 mg Oral BID    heparin (porcine)  5,000 Units SubCUTAneous 3 times per day    [Held by provider] carvedilol  6.25 mg Oral BID    aspirin  81 mg Oral Daily    atorvastatin  40 mg Oral Nightly    gabapentin  100 mg Oral TID    sodium chloride flush  5-40 mL IntraVENous 2 times per day    sodium hypochlorite   Irrigation BID      sodium hypochlorite      sodium chloride 25 mL/hr at 03/14/23 1838    dextrose           No intake/output data recorded. CBC:   Recent Labs     03/13/23 0135 03/14/23  0346 03/15/23  0202   WBC 28.3* 32.1* 31.2*   HGB 10.4* 10.8* 11.1*   * 170 226          Recent Labs     03/13/23  0135 03/14/23  0346 03/15/23  0202   * 136 133*   K 4.7 4.2 4.1   CL 99 101 100   CO2 16* 22 22   BUN 51* 32* 42*   CREATININE 4.2* 3.4* 4.1*   GLUCOSE 100* 92 81       Lab Results   Component Value Date    CALCIUM 7.8 (L) 03/15/2023    PHOS 9.0 (H) 11/14/2022       Objective:     Vitals: BP (!) 99/47   Pulse 75   Temp 97.4 °F (36.3 °C)   Resp 11   Ht 5' 3\" (1.6 m)   Wt 166 lb 3.2 oz (75.4 kg)   SpO2 96%   BMI 29.44 kg/m² ,    General appearance: Cachectic, alert, awake without any acute distress  HEENT: At least 2+ conjunctival pallor no scleral icterus  Neck: Supple  Lungs:  Few rhonchi no gross crackles  Heart: Regular rate and rhythm  Abdomen: Soft, nontender  Extremities: Right below-knee amputation, gangrene of several toes on the other side, left brachiocephalic AV fistula with good thrill on palpation good bruit and auscultation      Problem List :         Impression :     End-stage kidney disease on maintenance dialysis-difficult to ultrafiltrate  Recent C. difficile colitis with chronic skin soft tissue and perhaps bone infection  Underlying diabetes and atherosclerotic cardiovascular disease and malnutrition and debility and frailty-and several sequela I affect    Recommendation/Plan  :     Attempt for ultrafiltration and dialysis today-I am supposed to have a family meeting over the phone today-she is getting appropriate therapy for her underlying infection-as well as wound care-she remains at high risk for pressure sore-she is also waiting for nursing home placement-good glycemic control, watch for iatrogenic nosocomial complication and follow clinically and biochemically      Monika Richardson MD MD

## 2023-03-15 NOTE — PROGRESS NOTES
Physical Therapy  Name: Darlyn Aparicio MRN: [id number] :   1965   Date:  3/15/2023   Admission Date: 3/9/2023 Room:  [address] [place], [place] [address]A   Restrictions/Precautions:        general precautions, fall risk, contact isolation, hx R BKA    Communication with other providers:  Lane Bowie RN states pt is ok to see for therapy  Cotx with Dustin Ackerman for safety and tolerance with rehab    Subjective:  Patient states:  Patient is disgruntled about mobility for xray imaging  Pain:   Location, Type, Intensity (0/10 to 10/10):  demos mild pain, Rt residual    Objective:    Observation:  Patient is LT side lying upon arrival. CAVANAUGH calls this therapist in to assist with x 2 transfer from bed to stretcher. Returned with CAVANAUGH after patient imagining to return patient to bed    Treatment, including education/measures:    Transfers with line management of [employer], IV  Transferred from bed to stretcher for x-ray  Rolling: Mod A x 1 to roll from Lt side to Rt side  Supine to sit :Min A x 2 or Ble off Eob and for trunk rise  Seated balance: Min A initially, progresses to CGA with cues for upright posture and BUE assist into bed  SPT: Max A x 2 to stand pivot from bed to stretcher  Sit to supine: Mod A for trunk and BLE into stretcher    Return from x-ray  Supine to sit :Min A x 2 or BLE off Eob and for trunk support during long sit pivot in stretcher  Seated balance: Min A initially, progresses to CGA with cues for upright posture and BUE assist into bed  Scooting: CGA for seated scooting to EOB  Stand to prone from stretcher to bed : Mod A x 2 for steadying and weight shift translation, patient elects to forgo pivot and place BUE into bed and Rt knee into bed/Quadruped position and moves into prone. Patient needs Mod A at trunk and Mod A with BLE to move to midline. Patient prone to Rt side lying Mod A x 2 .  Patient left in this postioning for RN wound care  Scooting :scoot to [facility] with patient in side lying Max A x 2    Safety  Patient left safely in the bed with RN, with call light/phone in reach with alarm applied. Gait belt and mask were used for transfers and gait. Assessment / Impression:   Patient has fair tolerance with rehab. Patient demo's unsafe SPT transfer techniques today   Patient's tolerance of treatment:  Fair   Adverse Reaction: pain  Significant change in status and impact:  none  Barriers to improvement:  endurance, pain, strength    Plan for Next Session:    Will cont to work towards pt's goals per patient tolerance  Time in:  0499 52 06 34, 1549  Time out:  1520, 1602  Timed treatment minutes:  10  Total treatment time:  5+13  Previously filed items:  Social/Functional History  Lives With: Spouse  Type of Home: House  Home Layout: One level  Home Access: Level entry  Bathroom Shower/Tub: Tub/Shower unit  Bathroom Toilet: Bedside commode  Bathroom Equipment: Tub transfer bench  Bathroom Accessibility: Accessible  Home Equipment: Darnella Belch, rolling, Wheelchair-manual  Has the patient had two or more falls in the past year or any fall with injury in the past year?: No  Receives Help From: Family  ADL Assistance: Needs assistance  Homemaking Assistance: Needs assistance  Ambulation Assistance: Non-ambulatory  Transfer Assistance: Needs assistance  Active : No  Patient's  Info: family assists  Short Term Goals  Time Frame for Short Term Goals: 1 week  Short Term Goal 1: pt to complete all bed mobility min A  Short Term Goal 2: pt to sit EOB 10 minutes SBA with no LOB  Short Term Goal 3: pt to complete stand pivot transfer with LRAD mod A  Short Term Goal 4: Pt to propel manual WC 50' min A       Electronically signed by:     Katelynn Kong PTA  3/15/2023, 4:07 PM

## 2023-03-15 NOTE — PROGRESS NOTES
Infectious Disease Progress Note  3/15/2023   Patient Name: Sterling Churchill : 1965   Impression  Sepsis Secondary to Infected Acute on Chronic Decubitus Sacral Wound:  Chronic Necrotic Wounds: Left 3rd Finger, Left Hallux, Left Ankle and Heel:  C.dif Colitis:   Cryptosporidium Positive:  MSSA Screen Positive: Allergy to vancomycin (\"makes me very sick\"): Tolerating vancomycin this admit  Afebrile, Leukocytosis elevated but on DWT   CrCl 15 on HD  CRP and Pct on DWT showing positive response to ABX regimen  3/10-Reordered Sacral Wound Culture: Beta Strep Group C, diphtheroids, Candida albicans  3/8-BC 0/2 NGTD  3/11-Cdif positive  Cryptosporidium positive by PCR:  3/8-CXR: Pulmonary vascular congestion with mild pulmonary edema. 3/9-Complete Echo: EF 55%, Grade I DD, septal wall is hypokinetic. Sclerotic, but non-stenotic AV. MAC with mild MR. Mild TR. No evidence of pericardial effusion. , general surgery, rec Irrigating wound VAC, placement 3/10  DMII:  ESRD on HD:  Tirso Modi onboard  CAD s/p CABG/ HFrEF:  PAD s/p RBKA :  Depression:  Multi-morbidity: per PMHx    Plan:  Continue IV Zosyn 3,375 mg q8h x 14 cumulative days to cover Strep C and diptheroids (end date 3/23/2023)  Start po linezolid 600 mg bid, concern for GPC from sacral wound as WBC, Pct and CRP have remained elevated  Continue po fluconazole, increase to 400 mg q24h, will observe closely as QTc is 520, as dose is low at 200 mg (end date 3/23/2023)  Continue po Dificid 200 mg bid x 10 days (end date 3/22/2023)  Ordered tunneled PICC for IV ABX access, DW Dr. Elspeth Dubin CRP, PCT and CBC, ordered  Please keep hospitalized until tomorrow, 3/16, to ensure WBC, CRP and Pct are on downward trend with the start of linezolid today    Ongoing Antimicrobial Therapy  Zosyn 3/13-  Dificid 3/12-? Fluconazole 3/13-  Linezolid 3/15-  Completed Antimicrobial Therapy  Vancomycin 3/9-10  Zosyn 3/9-12? Ceftriaxone 3/12-13  History:? Interval history noted. Chief complaint: sepsis secondary to probable infected acute on chronic decubitus sacral wound. Chronic necrotic wounds of left 3rd finger, left hallux, left ankle and heel. Denies n/v/d/f or untoward effects of antibiotics. Physical Exam:  Vital Signs: BP (!) 123/41   Pulse 77   Temp 97.9 °F (36.6 °C)   Resp 10   Ht 5' 3\" (1.6 m)   Wt 166 lb 3.2 oz (75.4 kg)   SpO2 98%   BMI 29.44 kg/m²     Gen: remains A&O x 4  Wounds: C/D/I multiple extremity necrotic wounds. Irrigating wound VAC intact Sacral/coccyx wound. Remote wound from RBKA well healed. Chest: no distress and CTA. Good air movement. Room air. Heart: NSR and no MRG. Abd: soft, non-distended, no tenderness, no hepatomegaly. Normoactive bowel sounds. Ext: no clubbing, cyanosis, or edema. S/p RBKA. Neuro: Mental status intact. CN 2-12 intact and no focal sensory or motor deficits     Radiologic / Imaging / TESTING  3/8/2023 XR Chest Portable:  Impression   Pulmonary vascular congestion with mild pulmonary edema. 3/9/2023 Echo Complete:    Summary   Ejection fraction is visually estimated at 55%. Grade I diastolic dysfunction. Septal wall is hypokinetic. Sclerotic, but non-stenotic aortic valve. MAC with mild mitral regurgitation. Mild tricuspid regurgitation; RVSP: 22 mmHg. No evidence of any pericardial effusion. 3/11/2023 XR Chest Portable:  Impression   Findings consistent with congestive heart failure and/or diffuse   pneumonitis/atypical pneumonia with very small left basilar pleural effusion. Findings may be accentuated by underlying chronic lung disease.      3/15/2023 IR Tunneled CVC Place WO SQ Port/Pump >5 Years:    Labs:    Recent Results (from the past 24 hour(s))   POCT Glucose    Collection Time: 03/14/23  4:41 PM   Result Value Ref Range    POC Glucose 190 (H) 70 - 99 MG/DL   Protime/INR & PTT    Collection Time: 03/14/23  5:13 PM   Result Value Ref Range    Protime 14.6 (H) 11.7 - 14.5 SECONDS    INR 1.13 INDEX    aPTT 34.4 25.1 - 37.1 SECONDS   POCT Glucose    Collection Time: 03/14/23  7:47 PM   Result Value Ref Range    POC Glucose 150 (H) 70 - 99 MG/DL   C-Reactive Protein    Collection Time: 03/15/23  2:02 AM   Result Value Ref Range    CRP High Sensitivity 155.1 (H) <5.0 mg/L   Procalcitonin    Collection Time: 03/15/23  2:02 AM   Result Value Ref Range    Procalcitonin 5.04    CBC with Auto Differential    Collection Time: 03/15/23  2:02 AM   Result Value Ref Range    WBC 31.2 (HH) 4.0 - 10.5 K/CU MM    RBC 3.58 (L) 4.2 - 5.4 M/CU MM    Hemoglobin 11.1 (L) 12.5 - 16.0 GM/DL    Hematocrit 37.6 37 - 47 %    .0 (H) 78 - 100 FL    MCH 31.0 27 - 31 PG    MCHC 29.5 (L) 32.0 - 36.0 %    RDW 14.7 11.7 - 14.9 %    Platelets 740 754 - 179 K/CU MM    MPV 10.8 7.5 - 11.1 FL    Myelocyte Percent 2 (H) 0.0 %    Metamyelocytes Relative 1 (H) 0.0 %    Bands Relative 11 5 - 11 %    Segs Relative 70.0 (H) 36 - 66 %    Eosinophils % 2.0 0 - 3 %    Lymphocytes % 9.0 (L) 24 - 44 %    Monocytes % 5.0 (H) 0 - 4 %    Myelocytes Absolute 0.62 K/CU MM    Metamyelocytes Absolute 0.31 K/CU MM    Bands Absolute 3.43 K/CU MM    Segs Absolute 21.8 K/CU MM    Eosinophils Absolute 0.6 K/CU MM    Lymphocytes Absolute 2.8 K/CU MM    Monocytes Absolute 1.6 K/CU MM    Differential Type MANUAL DIFFERENTIAL     RBC Morphology OCCASIONAL     Anisocytosis 1+     WBC Morphology RARE PLASMOID TYPE LYMPH ALSO NOTED    Comprehensive Metabolic Panel w/ Reflex to MG    Collection Time: 03/15/23  2:02 AM   Result Value Ref Range    Sodium 133 (L) 135 - 145 MMOL/L    Potassium 4.1 3.5 - 5.1 MMOL/L    Chloride 100 99 - 110 mMol/L    CO2 22 21 - 32 MMOL/L    BUN 42 (H) 6 - 23 MG/DL    Creatinine 4.1 (H) 0.6 - 1.1 MG/DL    Est, Glom Filt Rate 12 (L) >60 mL/min/1.73m2    Glucose 81 70 - 99 MG/DL    Calcium 7.8 (L) 8.3 - 10.6 MG/DL    Albumin 1.8 (L) 3.4 - 5.0 GM/DL    Total Protein 4.9 (L) 6.4 - 8.2 GM/DL    Total Bilirubin 0.4 0.0 - 1.0 MG/DL    ALT <5 (L) 10 - 40 U/L    AST 8 (L) 15 - 37 IU/L    Alkaline Phosphatase 280 (H) 40 - 128 IU/L    Anion Gap 11 4 - 16     CULTURE results: Invalid input(s): BLOOD CULTURE,  URINE CULTURE, SURGICAL CULTURE    Diagnosis:  Patient Active Problem List   Diagnosis    CAD (coronary artery disease)    Hyperlipidemia    Diabetes mellitus (HCC)    Chest pain    Hoarseness of voice    Fracture of metacarpal bone    Wrist sprain    History of tobacco abuse    Wrist pain    Carpal tunnel syndrome    Cubital tunnel syndrome    Trigger finger    S/P CABG x 4    Diabetic foot ulcer (HCC)    Diabetic neuropathy ([address]    Diabetes mellitus type 2 with complications ([facility])    Claudication of both lower extremities ([facility])    SOB (shortness of breath)    Bilateral leg edema    SOB (shortness of breath)    Type 2 diabetes mellitus without complication ([facility])    Coronary artery disease involving coronary bypass graft of native heart with unstable angina pectoris ([facility])    S/P cardiac cath    Chronic renal failure, stage 2 (mild)    Chronic kidney disease (CKD) stage G4/A3, severely decreased glomerular filtration rate (GFR) between 15-29 mL/min/1.73 square meter and albuminuria creatinine ratio greater than 300 mg/g (HCC)    DM (diabetes mellitus) (HCC)    Coronary atherosclerosis    HTN (hypertension)    Proteinuria    Callus of foot    Non compliance with medical treatment    Diabetic ulcer of right heel associated with type 2 diabetes mellitus, with necrosis of bone ([facility])    Acute pain of right foot    Wound, open, foot with complication, left, sequela    ESRD on hemodialysis ([address]    Diabetic ulcer of right midfoot associated with type 2 diabetes mellitus, with necrosis of bone (HCC)    Pressure ulcer of sacral region, stage 3 ([facility])    S/P BKA (below knee amputation), right (HCC)    Diabetic ulcer of left foot associated with type 2 diabetes mellitus, with necrosis of muscle (HCC)    Ulcer of finger, with fat layer exposed ([address]    Sepsis ([address]    Severe malnutrition ([address]    Infected decubitus ulcer, stage IV (HCC)    ESRD (end stage renal disease) ([address]    Elevated troponin       Active Problems  Principal Problem:    Sepsis ([address]  Active Problems:    Severe malnutrition (HCC)    Infected decubitus ulcer, stage IV (HCC)    ESRD (end stage renal disease) (HCC)    Elevated troponin  Resolved Problems:    * No resolved hospital problems. *    Electronically signed by: Electronically signed by Reno Shin.  RYAN Godfrey CNP on 3/15/2023 at 12:02 PM

## 2023-03-16 LAB
BASOPHILS ABSOLUTE: 0.1 K/CU MM
BASOPHILS RELATIVE PERCENT: 0.5 % (ref 0–1)
CRP SERPL HS-MCNC: 135.4 MG/L
DIFFERENTIAL TYPE: ABNORMAL
EOSINOPHILS ABSOLUTE: 0.2 K/CU MM
EOSINOPHILS RELATIVE PERCENT: 0.9 % (ref 0–3)
GLUCOSE BLD-MCNC: 130 MG/DL (ref 70–99)
GLUCOSE BLD-MCNC: 68 MG/DL (ref 70–99)
GLUCOSE BLD-MCNC: 71 MG/DL (ref 70–99)
GLUCOSE BLD-MCNC: 82 MG/DL (ref 70–99)
HBV SURFACE AB SERPL IA-ACNC: <3.5 M[IU]/ML
HBV SURFACE AG SERPL QL IA: NON REACTIVE
HCT VFR BLD CALC: 36.3 % (ref 37–47)
HEMOGLOBIN: 10.9 GM/DL (ref 12.5–16)
IMMATURE NEUTROPHIL %: 7 % (ref 0–0.43)
LYMPHOCYTES ABSOLUTE: 2.6 K/CU MM
LYMPHOCYTES RELATIVE PERCENT: 11 % (ref 24–44)
MCH RBC QN AUTO: 30.8 PG (ref 27–31)
MCHC RBC AUTO-ENTMCNC: 30 % (ref 32–36)
MCV RBC AUTO: 102.5 FL (ref 78–100)
MONOCYTES ABSOLUTE: 2 K/CU MM
MONOCYTES RELATIVE PERCENT: 8.5 % (ref 0–4)
NUCLEATED RBC %: 0.2 %
PDW BLD-RTO: 14.7 % (ref 11.7–14.9)
PLATELET # BLD: 251 K/CU MM (ref 140–440)
PMV BLD AUTO: 10.9 FL (ref 7.5–11.1)
PROCALCITONIN SERPL-MCNC: 3.69 NG/ML
RBC # BLD: 3.54 M/CU MM (ref 4.2–5.4)
SEGMENTED NEUTROPHILS ABSOLUTE COUNT: 17.1 K/CU MM
SEGMENTED NEUTROPHILS RELATIVE PERCENT: 72.1 % (ref 36–66)
TOTAL IMMATURE NEUTOROPHIL: 1.67 K/CU MM
TOTAL NUCLEATED RBC: 0 K/CU MM
WBC # BLD: 23.7 K/CU MM (ref 4–10.5)

## 2023-03-16 PROCEDURE — 36415 COLL VENOUS BLD VENIPUNCTURE: CPT

## 2023-03-16 PROCEDURE — 6360000002 HC RX W HCPCS: Performed by: STUDENT IN AN ORGANIZED HEALTH CARE EDUCATION/TRAINING PROGRAM

## 2023-03-16 PROCEDURE — 2580000003 HC RX 258: Performed by: NURSE PRACTITIONER

## 2023-03-16 PROCEDURE — 6360000002 HC RX W HCPCS: Performed by: NURSE PRACTITIONER

## 2023-03-16 PROCEDURE — 82962 GLUCOSE BLOOD TEST: CPT

## 2023-03-16 PROCEDURE — 1200000000 HC SEMI PRIVATE

## 2023-03-16 PROCEDURE — 99213 OFFICE O/P EST LOW 20 MIN: CPT

## 2023-03-16 PROCEDURE — 6370000000 HC RX 637 (ALT 250 FOR IP): Performed by: STUDENT IN AN ORGANIZED HEALTH CARE EDUCATION/TRAINING PROGRAM

## 2023-03-16 PROCEDURE — 86140 C-REACTIVE PROTEIN: CPT

## 2023-03-16 PROCEDURE — 36592 COLLECT BLOOD FROM PICC: CPT

## 2023-03-16 PROCEDURE — 87340 HEPATITIS B SURFACE AG IA: CPT

## 2023-03-16 PROCEDURE — 86706 HEP B SURFACE ANTIBODY: CPT

## 2023-03-16 PROCEDURE — 86704 HEP B CORE ANTIBODY TOTAL: CPT

## 2023-03-16 PROCEDURE — 84145 PROCALCITONIN (PCT): CPT

## 2023-03-16 PROCEDURE — 6370000000 HC RX 637 (ALT 250 FOR IP): Performed by: NURSE PRACTITIONER

## 2023-03-16 PROCEDURE — 85025 COMPLETE CBC W/AUTO DIFF WBC: CPT

## 2023-03-16 PROCEDURE — 99232 SBSQ HOSP IP/OBS MODERATE 35: CPT | Performed by: NURSE PRACTITIONER

## 2023-03-16 PROCEDURE — 2580000003 HC RX 258: Performed by: INTERNAL MEDICINE

## 2023-03-16 PROCEDURE — 94761 N-INVAS EAR/PLS OXIMETRY MLT: CPT

## 2023-03-16 RX ADMIN — OXYCODONE HYDROCHLORIDE 5 MG: 5 TABLET ORAL at 19:06

## 2023-03-16 RX ADMIN — SODIUM HYPOCHLORITE: 1.25 SOLUTION TOPICAL at 20:51

## 2023-03-16 RX ADMIN — SODIUM HYPOCHLORITE 480 ML: 1.25 SOLUTION TOPICAL at 08:29

## 2023-03-16 RX ADMIN — FLUCONAZOLE 400 MG: 100 TABLET ORAL at 14:55

## 2023-03-16 RX ADMIN — GABAPENTIN 100 MG: 100 CAPSULE ORAL at 14:55

## 2023-03-16 RX ADMIN — COLLAGENASE SANTYL: 250 OINTMENT TOPICAL at 20:50

## 2023-03-16 RX ADMIN — SODIUM CHLORIDE 25 ML: 9 INJECTION, SOLUTION INTRAVENOUS at 17:35

## 2023-03-16 RX ADMIN — COLLAGENASE SANTYL: 250 OINTMENT TOPICAL at 08:29

## 2023-03-16 RX ADMIN — OXYCODONE HYDROCHLORIDE 5 MG: 5 TABLET ORAL at 14:55

## 2023-03-16 RX ADMIN — ATORVASTATIN CALCIUM 40 MG: 40 TABLET, FILM COATED ORAL at 20:50

## 2023-03-16 RX ADMIN — GABAPENTIN 100 MG: 100 CAPSULE ORAL at 20:50

## 2023-03-16 RX ADMIN — OXYCODONE HYDROCHLORIDE 5 MG: 5 TABLET ORAL at 10:46

## 2023-03-16 RX ADMIN — LINEZOLID 600 MG: 600 TABLET, FILM COATED ORAL at 20:50

## 2023-03-16 RX ADMIN — LINEZOLID 600 MG: 600 TABLET, FILM COATED ORAL at 10:46

## 2023-03-16 RX ADMIN — PIPERACILLIN AND TAZOBACTAM 3375 MG: 3; .375 INJECTION, POWDER, LYOPHILIZED, FOR SOLUTION INTRAVENOUS at 06:40

## 2023-03-16 RX ADMIN — HEPARIN SODIUM 5000 UNITS: 5000 INJECTION INTRAVENOUS; SUBCUTANEOUS at 16:46

## 2023-03-16 RX ADMIN — ASPIRIN 81 MG CHEWABLE TABLET 81 MG: 81 TABLET CHEWABLE at 10:46

## 2023-03-16 RX ADMIN — HEPARIN SODIUM 5000 UNITS: 5000 INJECTION INTRAVENOUS; SUBCUTANEOUS at 10:46

## 2023-03-16 RX ADMIN — SODIUM HYPOCHLORITE: 1.25 SOLUTION TOPICAL at 10:45

## 2023-03-16 RX ADMIN — HEPARIN SODIUM 5000 UNITS: 5000 INJECTION INTRAVENOUS; SUBCUTANEOUS at 00:41

## 2023-03-16 RX ADMIN — FIDAXOMICIN 200 MG: 200 TABLET, FILM COATED ORAL at 14:55

## 2023-03-16 RX ADMIN — GABAPENTIN 100 MG: 100 CAPSULE ORAL at 10:46

## 2023-03-16 RX ADMIN — SODIUM CHLORIDE, PRESERVATIVE FREE 10 ML: 5 INJECTION INTRAVENOUS at 10:45

## 2023-03-16 RX ADMIN — LORAZEPAM 1 MG: 1 TABLET ORAL at 17:38

## 2023-03-16 RX ADMIN — OXYCODONE HYDROCHLORIDE 5 MG: 5 TABLET ORAL at 05:16

## 2023-03-16 RX ADMIN — FIDAXOMICIN 200 MG: 200 TABLET, FILM COATED ORAL at 20:50

## 2023-03-16 RX ADMIN — PIPERACILLIN AND TAZOBACTAM 3375 MG: 3; .375 INJECTION, POWDER, LYOPHILIZED, FOR SOLUTION INTRAVENOUS at 17:41

## 2023-03-16 ASSESSMENT — PAIN SCALES - GENERAL
PAINLEVEL_OUTOF10: 5
PAINLEVEL_OUTOF10: 7
PAINLEVEL_OUTOF10: 5
PAINLEVEL_OUTOF10: 7

## 2023-03-16 ASSESSMENT — PAIN DESCRIPTION - LOCATION
LOCATION: GENERALIZED

## 2023-03-16 ASSESSMENT — PAIN - FUNCTIONAL ASSESSMENT
PAIN_FUNCTIONAL_ASSESSMENT: ACTIVITIES ARE NOT PREVENTED

## 2023-03-16 ASSESSMENT — PAIN DESCRIPTION - FREQUENCY
FREQUENCY: INTERMITTENT

## 2023-03-16 ASSESSMENT — PAIN SCALES - WONG BAKER
WONGBAKER_NUMERICALRESPONSE: 2
WONGBAKER_NUMERICALRESPONSE: 2

## 2023-03-16 ASSESSMENT — PAIN DESCRIPTION - ORIENTATION
ORIENTATION: LOWER

## 2023-03-16 ASSESSMENT — PAIN DESCRIPTION - DESCRIPTORS
DESCRIPTORS: THROBBING

## 2023-03-16 ASSESSMENT — PAIN DESCRIPTION - PAIN TYPE
TYPE: ACUTE PAIN

## 2023-03-16 ASSESSMENT — PAIN DESCRIPTION - ONSET
ONSET: ON-GOING

## 2023-03-16 NOTE — PROGRESS NOTES
Infectious Disease Progress Note  3/16/2023   Patient Name: Valeria Yates : 1965   Impression  Sepsis Secondary to Infected Acute on Chronic Decubitus Sacral Wound:  Chronic Necrotic Wounds: Left 3rd Finger, Left Hallux, Left Ankle and Heel:  C.dif Colitis:   Cryptosporidium Positive:  MSSA Screen Positive: Allergy to vancomycin (\"makes me very sick\"): Tolerating vancomycin this admit  Afebrile, Leukocytosis elevated but on DWT, CRP and Pct on DWT showing positive response to ABX therapy, clinically has improved, and diarrhea has ceased. CrCl 15 on HD  3/10-Reordered Sacral Wound Culture: Beta Strep Group C, diphtheroids, Candida albicans  3/8-BC 0/2 NGTD  3/11-Cdif positive  Cryptosporidium positive by PCR:  3/8-CXR: Pulmonary vascular congestion with mild pulmonary edema. 3/9-Complete Echo: EF 55%, Grade I DD, septal wall is hypokinetic. Sclerotic, but non-stenotic AV. MAC with mild MR. Mild TR. No evidence of pericardial effusion. , general surgery, rec Irrigating wound VAC, placement 3/10  DMII:  ESRD on HD:  Buck Mauricio onboard  CAD s/p CABG/ HFrEF:  PAD s/p RBKA :  Depression:  Multi-morbidity: per PMHx    Plan:  Continue IV Zosyn 3,375 mg q8h x 14 cumulative days to cover Strep C and diptheroids (end date 3/23/2023)  Continue po linezolid 600 mg bid x 14 days (end date 3/29/2023)  Continue po fluconazole, increase to 400 mg q24h, will observe closely as QTc is 520, as dose is low at 200 mg (end date 3/23/2023)  Continue po Dificid 200 mg bid x 10 days (end date 3/22/2023)  Ordered tunneled PICC, intact  Follow up with general surgery and nephrology after DC  OK from ID standpoint to DC when ready    Ongoing Antimicrobial Therapy  Zosyn 3/13-  Dificid 3/12-? Fluconazole 3/13-  Linezolid 3/15-  Completed Antimicrobial Therapy  Vancomycin 3/9-10  Zosyn 3/9-? Ceftriaxone 3/12-  History:? Interval history noted.   Chief complaint: sepsis secondary to probable infected acute on chronic decubitus sacral wound. Chronic necrotic wounds of left 3rd finger, left hallux, left ankle and heel. Denies n/v/d/f or untoward effects of antibiotics. Physical Exam:  Vital Signs: BP (!) 126/50   Pulse 89   Temp 98.8 °F (37.1 °C) (Oral)   Resp 16   Ht 5' 3\" (1.6 m)   Wt 166 lb 3.2 oz (75.4 kg)   SpO2 100%   BMI 29.44 kg/m²     Gen: alert and oriented x 4, no distress  Wounds: C/D/I multiple extremity necrotic wounds. Irrigating wound VAC intact Sacral/coccyx wound. Remote wound from RBKA well healed. Chest: no distress and CTA. Good air movement. Room air. Heart: NSR and no MRG. Abd: soft, non-distended, no tenderness, no hepatomegaly. Normoactive bowel sounds. Ext: no clubbing, cyanosis, or edema. S/p RBKA. Neuro: Mental status intact. CN 2-12 intact and no focal sensory or motor deficits     Radiologic / Imaging / TESTING  3/8/2023 XR Chest Portable:  Impression   Pulmonary vascular congestion with mild pulmonary edema. 3/9/2023 Echo Complete:    Summary   Ejection fraction is visually estimated at 55%. Grade I diastolic dysfunction. Septal wall is hypokinetic. Sclerotic, but non-stenotic aortic valve. MAC with mild mitral regurgitation. Mild tricuspid regurgitation; RVSP: 22 mmHg. No evidence of any pericardial effusion. 3/11/2023 XR Chest Portable:  Impression   Findings consistent with congestive heart failure and/or diffuse   pneumonitis/atypical pneumonia with very small left basilar pleural effusion. Findings may be accentuated by underlying chronic lung disease. 3/15/2023 IR Tunneled CVC Place WO SQ Port/Pump >5 Years:  Impression   Successful ultrasound and fluoroscopy guided Port-A-Cath/power PICC placement   via ultrasound-guided right internal jugular venous approach. 3/15/2023 XR Abdomen:  Impression   1. Unremarkable bowel gas pattern. 2. No radiopaque urinary collecting system calculus evident. 3. Cholecystectomy.      Labs:    Recent Results (from the past 24 hour(s))   POCT Glucose    Collection Time: 03/15/23 12:56 PM   Result Value Ref Range    POC Glucose 92 70 - 99 MG/DL   POCT Glucose    Collection Time: 03/15/23  6:14 PM   Result Value Ref Range    POC Glucose 234 (H) 70 - 99 MG/DL   POCT Glucose    Collection Time: 03/15/23  7:50 PM   Result Value Ref Range    POC Glucose 162 (H) 70 - 99 MG/DL   C-Reactive Protein    Collection Time: 03/16/23  5:50 AM   Result Value Ref Range    CRP High Sensitivity 135.4 (H) <5.0 mg/L   Procalcitonin    Collection Time: 03/16/23  5:50 AM   Result Value Ref Range    Procalcitonin 3.69    CBC with Auto Differential    Collection Time: 03/16/23  5:50 AM   Result Value Ref Range    WBC 23.7 (H) 4.0 - 10.5 K/CU MM    RBC 3.54 (L) 4.2 - 5.4 M/CU MM    Hemoglobin 10.9 (L) 12.5 - 16.0 GM/DL    Hematocrit 36.3 (L) 37 - 47 %    .5 (H) 78 - 100 FL    MCH 30.8 27 - 31 PG    MCHC 30.0 (L) 32.0 - 36.0 %    RDW 14.7 11.7 - 14.9 %    Platelets 058 683 - 296 K/CU MM    MPV 10.9 7.5 - 11.1 FL    Differential Type AUTOMATED DIFFERENTIAL     Segs Relative 72.1 (H) 36 - 66 %    Lymphocytes % 11.0 (L) 24 - 44 %    Monocytes % 8.5 (H) 0 - 4 %    Eosinophils % 0.9 0 - 3 %    Basophils % 0.5 0 - 1 %    Segs Absolute 17.1 K/CU MM    Lymphocytes Absolute 2.6 K/CU MM    Monocytes Absolute 2.0 K/CU MM    Eosinophils Absolute 0.2 K/CU MM    Basophils Absolute 0.1 K/CU MM    Nucleated RBC % 0.2 %    Total Nucleated RBC 0.0 K/CU MM    Total Immature Neutrophil 1.67 K/CU MM    Immature Neutrophil % 7.0 (H) 0 - 0.43 %   POCT Glucose    Collection Time: 03/16/23  7:37 AM   Result Value Ref Range    POC Glucose 82 70 - 99 MG/DL     CULTURE results: Invalid input(s): BLOOD CULTURE,  URINE CULTURE, SURGICAL CULTURE    Diagnosis:  Patient Active Problem List   Diagnosis    CAD (coronary artery disease)    Hyperlipidemia    Diabetes mellitus (HCC)    Chest pain    Hoarseness of voice    Fracture of metacarpal bone    Wrist sprain History of tobacco abuse    Wrist pain    Carpal tunnel syndrome    Cubital tunnel syndrome    Trigger finger    S/P CABG x 4    Diabetic foot ulcer (HCC)    Diabetic neuropathy (HCC)    Diabetes mellitus type 2 with complications (HCC)    Claudication of both lower extremities (HCC)    SOB (shortness of breath)    Bilateral leg edema    SOB (shortness of breath)    Type 2 diabetes mellitus without complication (HCC)    Coronary artery disease involving coronary bypass graft of native heart with unstable angina pectoris (HCC)    S/P cardiac cath    Chronic renal failure, stage 2 (mild)    Chronic kidney disease (CKD) stage G4/A3, severely decreased glomerular filtration rate (GFR) between 15-29 mL/min/1.73 square meter and albuminuria creatinine ratio greater than 300 mg/g (HCC)    DM (diabetes mellitus) (HCC)    Coronary atherosclerosis    HTN (hypertension)    Proteinuria    Callus of foot    Non compliance with medical treatment    Diabetic ulcer of right heel associated with type 2 diabetes mellitus, with necrosis of bone (HCC)    Acute pain of right foot    Wound, open, foot with complication, left, sequela    ESRD on hemodialysis ([address]    Diabetic ulcer of right midfoot associated with type 2 diabetes mellitus, with necrosis of bone (HCC)    Pressure ulcer of sacral region, stage 3 (HCC)    S/P BKA (below knee amputation), right (HCC)    Diabetic ulcer of left foot associated with type 2 diabetes mellitus, with necrosis of muscle (HCC)    Ulcer of finger, with fat layer exposed ([address]    Sepsis ([address]    Severe malnutrition ([address]    Infected decubitus ulcer, stage IV (HCC)    ESRD (end stage renal disease) ([address]    Elevated troponin       Active Problems  Principal Problem:    Sepsis ([address]  Active Problems:    Severe malnutrition ([address]    Infected decubitus ulcer, stage IV (HCC)    ESRD (end stage renal disease) ([address]    Elevated troponin  Resolved Problems:    * No resolved hospital problems. *    Electronically signed by: Electronically signed by RYAN Whitley CNP on 3/16/2023 at 9:18 AM

## 2023-03-16 NOTE — PROGRESS NOTES
V2.0  [facility] [place]      Name:  Haroldo Alejandro /Age/Sex: 1965  (62 y.o. female)   MRN & CSN:  [id number] & [id number] Encounter Date/Time: 3/16/2023 11:15 PM EDT    Location:  [address] [place] [address] PCP: Benito Ramachandran MD       Hospital Day: 8    Assessment and Plan:   Haroldo Alejandro is a 62 y.o. female with past medical history of end-stage renal disease on hemodialysis, COPD not on home oxygen coronary artery disease, type 2 diabetes mellitus, hypertension, hyperlipidemia, anxiety/depression, heart failure with midrange ejection fraction, GERD is admitted on 3/9/2023 with sepsis secondary to infected chronic decubitus ulcer. Severe Sepsis secondary to infected stage IV decubitus ulcer  Blood cultures no growth to date  MRSA -ve  Wound cultures positive for group C beta streptococci and Candida  Continue IV Zosyn and Fluconazole per ID (EKG with QTc less than 500 ms)  Zyvox  Tunneled CVC placed to complete 14 days of IV antibiotics  Local wound care    Severe C. difficile colitis  Evidenced by wbc >15k  Started on fidaxomicin 200 mg twice daily 3/11/2023, plan to continue for 10 days. Infectious disease following  Reports improvement in diarrhea, no episode today    Type II NSTEMI  CAD s/p CABG  Chest pain free   Likely due to underlying sepsis and poor clearance due to ESRD  Heparin drip was discontinued by cardiology echocardiogram with ejection fraction 61%, grade 1 diastolic dysfunction, hypokinetic septal wall  Continue aspirin and statin  No further ischemic work-up planned    Hypertension  Low normal blood pressure trend  Continue to hold Coreg    End-stage renal disease on hemodialysis ()  Nephrology is following for inpatient management of HD    PAD s/p R.  BKA in   Continue aspirin and statin    Non-insulin-dependent type 2 diabetes mellitus  Sliding scale insulin, acceptable blood glucose trend  Chronic heart failure with preserved ejection fraction  Appears euvolemic  Echocardiogram from 3/9/2023 with ejection fraction 55%, septal wall hypokinesis. Diet ADULT DIET; Regular; Low Potassium (Less than 3000 mg/day); 1800 ml  ADULT ORAL NUTRITION SUPPLEMENT; Breakfast, Dinner; Wound Healing Oral Supplement  ADULT ORAL NUTRITION SUPPLEMENT; Lunch, Dinner; Renal Oral Supplement   DVT Prophylaxis [] Lovenox, [x]  Heparin, [] SCDs, [] Ambulation,  [] Eliquis, [] Xarelto  [] Coumadin   Code Status Full Code   Disposition From: Home  Expected Disposition: SNF  Estimated Date of Discharge: 1-2  Patient requires continued admission due to placement   Surrogate Decision Maker/ POA Spouse     Subjective:     Chief Complaint: pain in sacral area       Ashok Gomez is a 62 y.o. female who presents with pain in sacral area. Patient was seen and evaluated at bedside earlier this morning. Patient was weak but stated that she felt much better today. Patient was alert oriented x3, hemodynamically stable with acceptable pressure trend. No acute overnight events noted. Objective: Intake/Output Summary (Last 24 hours) at 3/16/2023 1853  Last data filed at 3/16/2023 1045  Gross per 24 hour   Intake 10 ml   Output --   Net 10 ml          Vitals:   Vitals:    03/16/23 1540   BP: (!) 144/55   Pulse: 90   Resp: 16   Temp: 98.2 °F (36.8 °C)   SpO2: 100%       Physical Exam:   Physical Exam  Vitals reviewed. Constitutional:       Appearance: She is normal weight. She is ill-appearing. HENT:      Head: Normocephalic and atraumatic. Nose: Nose normal.      Mouth/Throat:      Mouth: Mucous membranes are dry. Pharynx: Oropharynx is clear. Eyes:      General: No scleral icterus. Conjunctiva/sclera: Conjunctivae normal.   Cardiovascular:      Rate and Rhythm: Normal rate and regular rhythm. Pulses: Normal pulses. Heart sounds: Normal heart sounds. No murmur heard.   Pulmonary:      Effort: Pulmonary effort is normal.      Breath sounds: Normal breath sounds. No wheezing, rhonchi or rales. Abdominal:      General: Bowel sounds are normal. There is no distension. Palpations: Abdomen is soft. Tenderness: There is no abdominal tenderness. Musculoskeletal:         General: No deformity. Normal range of motion. Cervical back: Neck supple. No rigidity. Left lower leg: No edema. Skin:     Coloration: Skin is not jaundiced or pale. Findings: Bruising and lesion present. Neurological:      General: No focal deficit present. Mental Status: She is alert and oriented to person, place, and time. Mental status is at baseline.           Medications:   Medications:    linezolid  600 mg Oral 2 times per day    fluconazole  400 mg Oral Q24H    collagenase   Topical BID    piperacillin-tazobactam  3,375 mg IntraVENous Q12H    Fidaxomicin  200 mg Oral BID    heparin (porcine)  5,000 Units SubCUTAneous 3 times per day    [Held by provider] carvedilol  6.25 mg Oral BID    aspirin  81 mg Oral Daily    atorvastatin  40 mg Oral Nightly    gabapentin  100 mg Oral TID    sodium chloride flush  5-40 mL IntraVENous 2 times per day    sodium hypochlorite   Irrigation BID      Infusions:    sodium chloride 25 mL (03/16/23 1735)    dextrose       PRN Meds: oxyCODONE, 5 mg, Q4H PRN  LORazepam, 1 mg, Q8H PRN  sodium chloride flush, 5-40 mL, PRN  sodium chloride, , PRN  acetaminophen, 650 mg, Q6H PRN   Or  acetaminophen, 650 mg, Q6H PRN  albuterol sulfate HFA, 2 puff, Q6H PRN  glucose, 4 tablet, PRN  dextrose bolus, 125 mL, PRN   Or  dextrose bolus, 250 mL, PRN  glucagon (rDNA), 1 mg, PRN  dextrose, , Continuous PRN      Labs      Recent Results (from the past 24 hour(s))   POCT Glucose    Collection Time: 03/15/23  7:50 PM   Result Value Ref Range    POC Glucose 162 (H) 70 - 99 MG/DL   C-Reactive Protein    Collection Time: 03/16/23  5:50 AM   Result Value Ref Range    CRP High Sensitivity 135.4 (H) <5.0 mg/L   Procalcitonin    Collection Time: 03/16/23 5:50 AM   Result Value Ref Range    Procalcitonin 3.69    CBC with Auto Differential    Collection Time: 03/16/23  5:50 AM   Result Value Ref Range    WBC 23.7 (H) 4.0 - 10.5 K/CU MM    RBC 3.54 (L) 4.2 - 5.4 M/CU MM    Hemoglobin 10.9 (L) 12.5 - 16.0 GM/DL    Hematocrit 36.3 (L) 37 - 47 %    .5 (H) 78 - 100 FL    MCH 30.8 27 - 31 PG    MCHC 30.0 (L) 32.0 - 36.0 %    RDW 14.7 11.7 - 14.9 %    Platelets 853 338 - 406 K/CU MM    MPV 10.9 7.5 - 11.1 FL    Differential Type AUTOMATED DIFFERENTIAL     Segs Relative 72.1 (H) 36 - 66 %    Lymphocytes % 11.0 (L) 24 - 44 %    Monocytes % 8.5 (H) 0 - 4 %    Eosinophils % 0.9 0 - 3 %    Basophils % 0.5 0 - 1 %    Segs Absolute 17.1 K/CU MM    Lymphocytes Absolute 2.6 K/CU MM    Monocytes Absolute 2.0 K/CU MM    Eosinophils Absolute 0.2 K/CU MM    Basophils Absolute 0.1 K/CU MM    Nucleated RBC % 0.2 %    Total Nucleated RBC 0.0 K/CU MM    Total Immature Neutrophil 1.67 K/CU MM    Immature Neutrophil % 7.0 (H) 0 - 0.43 %   Hepatitis B Surface Antigen    Collection Time: 03/16/23  5:50 AM   Result Value Ref Range    Hepatitis B Surface Ag NON REACTIVE NON REACTIVE   Hepatitis B Surface Antibody    Collection Time: 03/16/23  5:50 AM   Result Value Ref Range    Hep B S Ab <3.5    POCT Glucose    Collection Time: 03/16/23  7:37 AM   Result Value Ref Range    POC Glucose 82 70 - 99 MG/DL   POCT Glucose    Collection Time: 03/16/23 11:55 AM   Result Value Ref Range    POC Glucose 71 70 - 99 MG/DL   POCT Glucose    Collection Time: 03/16/23  4:49 PM   Result Value Ref Range    POC Glucose 68 (L) 70 - 99 MG/DL        Imaging/Diagnostics Last 24 Hours   XR CHEST PORTABLE    Result Date: 3/11/2023  EXAMINATION: ONE XRAY VIEW OF THE CHEST 3/11/2023 9:29 am COMPARISON: 03/08/2023 HISTORY: ORDERING SYSTEM PROVIDED HISTORY: CHF TECHNOLOGIST PROVIDED HISTORY: Reason for exam:->CHF Reason for Exam: CHF FINDINGS: Cardiomegaly, diffuse bronchovascular marking prominence.   Findings are consistent with congestive heart failure and/or diffuse bilateral atypical pneumonia/pneumonitis. Very small left pleural effusion may be present. No new or large areas of pulmonary consolidation. No pneumothorax or mediastinal widening. Findings consistent with congestive heart failure and/or diffuse pneumonitis/atypical pneumonia with very small left basilar pleural effusion. Findings may be accentuated by underlying chronic lung disease.        Electronically signed by Héctor Sotelo MD on 3/16/2023 at 6:53 PM

## 2023-03-16 NOTE — CARE COORDINATION
Per [facility] pt has been approved by insurance to admit however the need Hep panel to approve the dialysis (once hep b surface antigen back they can admit pt and wait for other results). Sent PS to Dr. Micheal Garcia to update, he ordered labs and they have been drawn and pending. Updated [facility] on final plan from ID with IV Abx, pt has PICC in place. Notified [facility] that Hep results are available.

## 2023-03-16 NOTE — PROGRESS NOTES
Nephrology Progress Note  3/16/2023 8:24 AM        Subjective:   Admit Date: 3/9/2023  PCP: Andre Ayala MD    Interval History: She did 2-1/2 hours of dialysis    Diet: Some    ROS: No shortness of breath or confusion  acceptable blood pressure, no fever    Data:     Current meds:    linezolid  600 mg Oral 2 times per day    fluconazole  400 mg Oral Q24H    collagenase   Topical BID    piperacillin-tazobactam  3,375 mg IntraVENous Q12H    Fidaxomicin  200 mg Oral BID    heparin (porcine)  5,000 Units SubCUTAneous 3 times per day    [Held by provider] carvedilol  6.25 mg Oral BID    aspirin  81 mg Oral Daily    atorvastatin  40 mg Oral Nightly    gabapentin  100 mg Oral TID    sodium chloride flush  5-40 mL IntraVENous 2 times per day    sodium hypochlorite   Irrigation BID      sodium hypochlorite      sodium chloride 25 mL/hr at 03/14/23 1838    dextrose           I/O last 3 completed shifts: In: 500   Out: 1900     CBC:   Recent Labs     03/14/23  0346 03/15/23  0202 03/16/23  0550   WBC 32.1* 31.2* 23.7*   HGB 10.8* 11.1* 10.9*    226 251          Recent Labs     03/14/23  0346 03/15/23  0202    133*   K 4.2 4.1    100   CO2 22 22   BUN 32* 42*   CREATININE 3.4* 4.1*   GLUCOSE 92 81       Lab Results   Component Value Date    CALCIUM 7.8 (L) 03/15/2023    PHOS 9.0 (H) 11/14/2022       Objective:     Vitals: BP (!) 126/50   Pulse 89   Temp 98.8 °F (37.1 °C) (Oral)   Resp 16   Ht 5' 3\" (1.6 m)   Wt 166 lb 3.2 oz (75.4 kg)   SpO2 100%   BMI 29.44 kg/m² ,    General appearance: Cachectic, alert, awake and oriented  HEENT: At least 2+ conjunctival pallor  Neck: Seems supple  Lungs: No gross crackles on auscultation anteriorly only  Heart:  The rate and rhythm  Abdomen: Soft, nontender  Extremities: Right below-knee amputation, left leg no gross edema, has some necrosis of toes-also likely stage II-III decubitus ulcer at coccyx area      Problem List :         Impression :     End-stage kidney disease intermittent dialysis-she wanted to dialyze only twice a day as she was doing as an outpatient-we will see how she does  C. difficile colitis along with decubitus ulcer and skin and soft tissue infection  Underlying atherosclerotic cardiovascular disease, diabetes, other comorbid disease  Frailty and debility    Recommendation/Plan  :     I had a long discussion with her and her family over the phone yesterday-they understand her current situation and what to expect in the near future-we will continue to do the best we can do-dialyzed twice a week if necessary 3 times a week-appropriate treatment for her current infection-good wound care-good glycemic control-appropriate rehabilitation-she we will go to nursing home hopefully [facility]-should be able to follow heart failure      Deann Teixeira MD MD

## 2023-03-16 NOTE — CONSULTS
[address] [id number] Continence Nurse  Consult Note       Thu Durbin  AGE: 62 y.o. GENDER: female  : 1965  TODAY'S DATE:  3/16/2023    Subjective:     Reason for  Evaluation and Assessment: wound care reassessment.        Thu Durbin is a 62 y.o. female referred by:   [x] Physician  [] Nursing  [] Other:     Wound Identification:  Wound Type: arterial and pressure,diabetic  Contributing Factors: diabetes, chronic pressure, decreased mobility, and arterial insufficiency        PAST MEDICAL HISTORY        Diagnosis Date    Acute pain of right foot 2022    CAD (coronary artery disease)     s/p CABG x 4;  follows with Dr Theron Whitten of foot 2018    Carpal tunnel syndrome on both sides     CHF (congestive heart failure) ([address] 10/2010    Chronic diastolic; EF 47%    Chronic kidney disease     stage 4 kidney disease    Chronic ulcer of left ankle with fat layer exposed ([address] 10/07/2015    Chronic ulcer of left foot with fat layer exposed ([address] 2016    Chronic ulcer of right ankle with fat layer exposed ([address] 2016    Chronic ulcer of right foot with fat layer exposed ([address] 2016    Decubitus ulcer of sacral region, stage 1 2018    Depression     Diabetes mellitus ([address]     Diabetes mellitus with neurological manifestations ([address]     Diabetes mellitus with ulcer of ankle ([address]     Diabetic ulcer of left foot associated with type 2 diabetes mellitus, with fat layer exposed ([address] 2018    Diabetic ulcer of right heel associated with type 2 diabetes mellitus, with muscle involvement without evidence of necrosis ([address] 2022    Diabetic ulcer of right heel associated with type 2 diabetes mellitus, with necrosis of bone ([address] 2022    ESRD on hemodialysis ([address] 2022    Family history of cardiovascular disease     Mother    GERD (gastroesophageal reflux disease)     H/O cardiac catheterization 10/18/2010, 6/3/2010    10/18/2010-Four bypass grafts all widely patent. 6/3/2010- Moderate to severe triple vessel disease, preserved LV systolic function. H/O cardiovascular stress test 7/26/2012, 8/3/2011, 10/14/2010, 5/24/2010 7/26/2012-Lexiscan- Normal Myocardial Perfusion Study. Post stress myocardial perfusion images show a normal pattern of perfusion in all regions. Post stress LV normal size. Normal perfusion in the distribution of all coronaries. Normal LV size and function. Rest EF 70%    H/O chest x-ray 7/12/2012, 6/2/2010 7/12/2012-Perihilar peribronchial cuffing with mild basilar predominant interstital prominence, which may reflect interstitial edema or atypical pneumonia. H/O Doppler ultrasound 06/04/2010    CAROTID DOPPLER-6/4/2010-No Doppler evidence of hemodynamically significant Carotid Stenosis with antegrade flow in the vertebral arteries bilaterally. 6/4/2010 PERIPHERAL VENOUS DOPPLER_ LEFT Saphenous Vein mapping    H/O echocardiogram 7/26/2012, 8/3/2011, 10/2010, 7/23/2010, 6/8/2010 7/26/2012-LV normal size. Normal LV wall thickness. LV systolic function normal. EF => 55%. LV wall motion normal. Mild MR. Mild TR. History of blood transfusion     History of complete ECG 7/26/2012 [facility]), 7/13/2012 [facility]), 7/25/2011, 3/25/2011, 10/18/2010, 10/11/2010, 6/23/2010, 5/7/2010    Hx of cardiovascular stress test 09/03/2015    lexiscan-normal,EF66%    Hx of Doppler ultrasound 04/05/2016    Arterial: There is a fluid collection in the left thigh, please refer to PCP for further monitoring, no vascular in etiology. Hx of echocardiogram     4/16 EF40% Mild MR/TR and mild Pulm HTN. 9/15 EF 45-50%, Mildly dilated L atrium, mildly dilated R ventricle. Mild MR & mild TR     Hyperlipidemia     Neuropathy of foot     Pt reports starting approx. 6-7 years ago    Neuropathy of hand     Pt reports starting approx.  6-7 years ago    Non compliance with medical treatment 07/02/2018    Pressure ulcer of sacral region, stage 2 ([address] 11/29/2022 Pressure ulcer of sacral region, stage 3 ([address] 11/29/2022    S/P BKA (below knee amputation), right ([address] 11/29/2022    S/P CABG x 4 06/05/2010    LIMA-> LAD;  VG->CX;  VG->Diagonal; VG-> distal RCA  per  Dr Micki Essex    Type 2 diabetes mellitus with diabetic polyneuropathy, with long-term current use of insulin ([address] 04/05/2016    Type II or unspecified type diabetes mellitus with neurological manifestations, not stated as uncontrolled(250.60)     Type II or unspecified type diabetes mellitus with other specified manifestations, not stated as uncontrolled     Ulcer of finger, with fat layer exposed ([address] 2/7/2023    Ulcer of left foot, with fat layer exposed ([address] 12/19/2013    Ulcer of other part of foot        PAST SURGICAL HISTORY    Past Surgical History:   Procedure Laterality Date    APPENDECTOMY      CARDIAC SURGERY      CARPAL TUNNEL RELEASE      L hand Nov. 2011, R hand Jan. 2012    CARPAL TUNNEL RELEASE Right 6/10/2013    recurrent    CARPAL TUNNEL RELEASE Left 7/29/2013    with ulnar nerve transpostion and L middle finger release    CHOLECYSTECTOMY      COLONOSCOPY      CORONARY ARTERY BYPASS GRAFT  6/5/2010 6/5/2010-LIMA->LAD;  VG-> Diagonal;  VG-> Obtuse marginal;  VG->Distal RCA-   Dr Suman Cox Right 6/10/2013    HYSTERECTOMY, TOTAL ABDOMINAL (CERVIX REMOVED)      IR TUNNELED CATHETER PLACEMENT GREATER THAN 5 YEARS  3/15/2023    IR TUNNELED CATHETER PLACEMENT GREATER THAN 5 YEARS 3/15/2023 [facility] SPECIAL PROCEDURES    LEG AMPUTATION BELOW KNEE Right 11/11/2022    RIGHT LEG AMPUTATION BELOW KNEE WITH IPOP performed by Betty Anderson MD at [address] Left 02/14/144th toe    TUBAL LIGATION      ULNAR TUNNEL RELEASE Right 6/10/2013       FAMILY HISTORY    Family History   Problem Relation Age of Onset    Heart Disease Mother     Kidney Disease Mother         dialysis    Cancer Father        SOCIAL HISTORY    Social History     Tobacco Use Smoking status: Every Day     Packs/day: 0.25     Years: 30.00     Pack years: 7.50     Types: Cigarettes     Last attempt to quit: 2022     Years since quittin.4    Smokeless tobacco: Never    Tobacco comments:     quit smoking mia 16   Vaping Use    Vaping Use: Every day    Substances: Never   Substance Use Topics    Alcohol use: No     Alcohol/week: 0.0 standard drinks     Comment:                                    CAFFEINE: 2 sodas daily    Drug use: No       ALLERGIES    Allergies   Allergen Reactions    Latex     Codeine     Cortisone     Cortizone     Dilaudid [Hydromorphone]      \"They OD'd me\"    Iodides     Phenobarbital Other (See Comments)     Hallucinations        MEDICATIONS    No current facility-administered medications on file prior to encounter. Current Outpatient Medications on File Prior to Encounter   Medication Sig Dispense Refill    clobetasol (TEMOVATE) 0.05 % ointment Apply topically 2 times daily. 45 g 1    silver sulfADIAZINE (SILVADENE) 1 % cream Apply topically daily. 1000 g 1    calcium acetate (PHOSLO) 667 MG CAPS capsule Take 1 capsule with each meal and 1 tablet with each snack. 540 capsule 3    gabapentin (NEURONTIN) 400 MG capsule Take 800 mg by mouth in the morning and at bedtime.       OXYGEN Inhale 2 L into the lungs daily as needed      carvedilol (COREG) 6.25 MG tablet 6.25 mg 2 times daily       albuterol sulfate HFA (PROVENTIL;VENTOLIN;PROAIR) 108 (90 Base) MCG/ACT inhaler Inhale 2 puffs into the lungs every 6 hours as needed for Wheezing      esomeprazole Magnesium (NEXIUM) 40 MG PACK Take 20 mg by mouth 2 times daily 2 tabs      LORazepam (ATIVAN) 0.5 MG tablet Take 0.5 mg by mouth every 12 hours Indications: One Tablet twice daily       lisinopril (PRINIVIL;ZESTRIL) 10 MG tablet Take 20 mg by mouth daily      furosemide (LASIX) 20 MG tablet Take 40 mg by mouth 2 times daily            Objective:      BP (!) 126/50   Pulse 89   Temp 98.8 °F (37.1 °C) (Oral)   Resp 16   Ht 5' 3\" (1.6 m)   Wt 166 lb 3.2 oz (75.4 kg)   SpO2 100%   BMI 29.44 kg/m²   Grady Risk Score: Grady Scale Score: 12    LABS    CBC:   Lab Results   Component Value Date/Time    WBC 23.7 03/16/2023 05:50 AM    RBC 3.54 03/16/2023 05:50 AM    HGB 10.9 03/16/2023 05:50 AM    HCT 36.3 03/16/2023 05:50 AM    .5 03/16/2023 05:50 AM    MCH 30.8 03/16/2023 05:50 AM    MCHC 30.0 03/16/2023 05:50 AM    RDW 14.7 03/16/2023 05:50 AM     03/16/2023 05:50 AM    MPV 10.9 03/16/2023 05:50 AM     CMP:    Lab Results   Component Value Date/Time     03/15/2023 02:02 AM    K 4.1 03/15/2023 02:02 AM     03/15/2023 02:02 AM    CO2 22 03/15/2023 02:02 AM    BUN 42 03/15/2023 02:02 AM    CREATININE 4.1 03/15/2023 02:02 AM    GFRAA 9 10/12/2022 02:45 PM    LABGLOM 12 03/15/2023 02:02 AM    GLUCOSE 81 03/15/2023 02:02 AM    PROT 4.9 03/15/2023 02:02 AM    PROT 6.2 02/01/2013 07:55 AM    LABALBU 1.8 03/15/2023 02:02 AM    CALCIUM 7.8 03/15/2023 02:02 AM    BILITOT 0.4 03/15/2023 02:02 AM    ALKPHOS 280 03/15/2023 02:02 AM    AST 8 03/15/2023 02:02 AM    ALT <5 03/15/2023 02:02 AM     Albumin:    Lab Results   Component Value Date/Time    LABALBU 1.8 03/15/2023 02:02 AM     PT/INR:    Lab Results   Component Value Date/Time    PROTIME 14.6 03/14/2023 05:13 PM    PROTIME 10.6 10/18/2010 11:21 AM    INR 1.13 03/14/2023 05:13 PM     HgBA1c:    Lab Results   Component Value Date/Time    LABA1C 5.2 03/11/2023 12:20 PM         Assessment:     Patient Active Problem List   Diagnosis    CAD (coronary artery disease)    Hyperlipidemia    Diabetes mellitus (HCC)    Chest pain    Hoarseness of voice    Fracture of metacarpal bone    Wrist sprain    History of tobacco abuse    Wrist pain    Carpal tunnel syndrome    Cubital tunnel syndrome    Trigger finger    S/P CABG x 4    Diabetic foot ulcer ([address]    Diabetic neuropathy ([address]    Diabetes mellitus type 2 with complications ([address] Claudication of both lower extremities (HCC)    SOB (shortness of breath)    Bilateral leg edema    SOB (shortness of breath)    Type 2 diabetes mellitus without complication (HCC)    Coronary artery disease involving coronary bypass graft of native heart with unstable angina pectoris (HCC)    S/P cardiac cath    Chronic renal failure, stage 2 (mild)    Chronic kidney disease (CKD) stage G4/A3, severely decreased glomerular filtration rate (GFR) between 15-29 mL/min/1.73 square meter and albuminuria creatinine ratio greater than 300 mg/g (HCC)    DM (diabetes mellitus) (HCC)    Coronary atherosclerosis    HTN (hypertension)    Proteinuria    Callus of foot    Non compliance with medical treatment    Diabetic ulcer of right heel associated with type 2 diabetes mellitus, with necrosis of bone (HCC)    Acute pain of right foot    Wound, open, foot with complication, left, sequela    ESRD on hemodialysis ([address]    Diabetic ulcer of right midfoot associated with type 2 diabetes mellitus, with necrosis of bone (HCC)    Pressure ulcer of sacral region, stage 3 (HCC)    S/P BKA (below knee amputation), right ([address]    Diabetic ulcer of left foot associated with type 2 diabetes mellitus, with necrosis of muscle (HCC)    Ulcer of finger, with fat layer exposed ([address]    Sepsis ([address]    Severe malnutrition ([address]    Infected decubitus ulcer, stage IV ([address]    ESRD (end stage renal disease) ([address]    Elevated troponin       Measurements:  Incision 02/14/14 Toe (Comment  which one) (Active)   Number of days: 3316       Wound 11/01/22 #2 Sacrum (Active)   Wound Image   03/16/23 0827   Wound Etiology Pressure Stage 4 03/16/23 0827   Dressing Status New dressing applied 03/16/23 0827   Wound Cleansed Cleansed with saline 03/16/23 0827   Dressing/Treatment Pharmaceutical agent (see MAR); Moist to dry;ABD 03/16/23 0827   Offloading for Diabetic Foot Ulcers Other (comment) 03/09/23 0450   Dressing Change Due 03/09/23 03/09/23 1013   Wound Length (cm) 3 cm 03/16/23 0827   Wound Width (cm) 2 cm 03/16/23 0827   Wound Depth (cm) 2 cm 03/16/23 0827   Wound Surface Area (cm^2) 6 cm^2 03/16/23 0827   Change in Wound Size % (l*w) -20 03/16/23 0827   Wound Volume (cm^3) 12 cm^3 03/16/23 0827   Wound Healing % -2300 03/16/23 0827   Post-Procedure Length (cm) 3 cm 02/21/23 1004   Post-Procedure Width (cm) 1.1 cm 02/21/23 1004   Post-Procedure Depth (cm) 1.5 cm 02/21/23 1004   Post-Procedure Surface Area (cm^2) 3.3 cm^2 02/21/23 1004   Post-Procedure Volume (cm^3) 4.95 cm^3 02/21/23 1004   Distance Tunneling (cm) 0 cm 03/16/23 0827   Tunneling Position ___ O'Clock 0 03/16/23 0827   Undermining Starts ___ O'Clock 8 03/16/23 0827   Undermining Ends___ O'Clock 3 03/16/23 0827   Undermining Maxium Distance (cm) 4.7 03/16/23 0827   Wound Assessment Pink/red;Slough 03/16/23 0827   Drainage Amount Large 03/16/23 0827   Drainage Description Serosanguinous;Purulent 03/16/23 0827   Odor None 03/16/23 0827   Lora-wound Assessment Excoriated 03/16/23 0827   Margins Defined edges 03/16/23 0827   Wound Thickness Description not for Pressure Injury Full thickness 03/16/23 0827   Number of days: 135       Wound 11/11/22  #1 Left Great Toe (Active)   Wound Image   03/16/23 0827   Wound Etiology Arterial 03/16/23 0827   Dressing Status Clean; Intact 03/14/23 2031   Wound Cleansed Cleansed with saline 03/16/23 0827   Dressing/Treatment Betadine swabs/povidone iodine 03/16/23 0827   Offloading for Diabetic Foot Ulcers Other (comment) 03/09/23 0450   Dressing Change Due 03/10/23 03/09/23 1013   Wound Length (cm) 2 cm 03/16/23 0827   Wound Width (cm) 3 cm 03/16/23 0827   Wound Depth (cm) 0.1 cm 03/16/23 0827   Wound Surface Area (cm^2) 6 cm^2 03/16/23 0827   Change in Wound Size % (l*w) -33.33 03/16/23 0827   Wound Volume (cm^3) 0.6 cm^3 03/16/23 0827   Post-Procedure Length (cm) 1.8 cm 02/21/23 1004   Post-Procedure Width (cm) 3.6 cm 02/21/23 1004   Post-Procedure Depth (cm) 0.1 cm 02/21/23 1004   Post-Procedure Surface Area (cm^2) 6.48 cm^2 02/21/23 1004   Post-Procedure Volume (cm^3) 0.648 cm^3 02/21/23 1004   Distance Tunneling (cm) 0 cm 03/16/23 0827   Tunneling Position ___ O'Clock 0 03/16/23 0827   Undermining Starts ___ O'Clock 0 03/16/23 0827   Undermining Ends___ O'Clock 0 03/16/23 0827   Undermining Maxium Distance (cm) 0 03/16/23 0827   Wound Assessment Eschar dry 03/16/23 0827   Drainage Amount None 03/16/23 0827   Odor None 03/09/23 1013   Lora-wound Assessment Dry/flaky 03/16/23 0827   Margins Attached edges 03/16/23 0827   Wound Thickness Description not for Pressure Injury Full thickness 03/09/23 1013   Number of days: 124       Wound 11/15/22 #3 Left Posterior Ankle Cluster (Active)   Wound Image   03/16/23 0827   Wound Etiology Arterial 03/16/23 0827   Dressing Status New dressing applied 03/16/23 0827   Wound Cleansed Cleansed with saline 03/16/23 0827   Dressing/Treatment Betadine swabs/povidone iodine;ABD;Roll gauze 03/16/23 0827   Offloading for Diabetic Foot Ulcers Other (comment) 03/09/23 0450   Dressing Change Due 03/10/23 03/09/23 1013   Wound Length (cm) 6.5 cm 03/16/23 0827   Wound Width (cm) 4.5 cm 03/16/23 0827   Wound Depth (cm) 0.1 cm 03/16/23 0827   Wound Surface Area (cm^2) 29.25 cm^2 03/16/23 0827   Change in Wound Size % (l*w) -1362.5 03/16/23 0827   Wound Volume (cm^3) 2.925 cm^3 03/16/23 0827   Wound Healing % -1363 03/16/23 0827   Post-Procedure Length (cm) 1.9 cm 02/21/23 1004   Post-Procedure Width (cm) 2.5 cm 02/21/23 1004   Post-Procedure Depth (cm) 0.3 cm 02/21/23 1004   Post-Procedure Surface Area (cm^2) 4.75 cm^2 02/21/23 1004   Post-Procedure Volume (cm^3) 1.425 cm^3 02/21/23 1004   Distance Tunneling (cm) 0 cm 03/16/23 0827   Tunneling Position ___ O'Clock 0 03/16/23 0827   Undermining Starts ___ O'Clock 0 03/16/23 0827   Undermining Ends___ O'Clock 0 03/16/23 0827   Undermining Maxium Distance (cm) 0 03/16/23 0827   Wound Assessment Eschar dry;Pink/red 03/16/23 0827   Drainage Amount Small 03/16/23 0827   Drainage Description Serosanguinous 03/16/23 0827   Odor None 03/16/23 0827   Lora-wound Assessment Fragile 03/16/23 0827   Margins Attached edges 03/16/23 0827   Wound Thickness Description not for Pressure Injury Full thickness 03/16/23 0827   Number of days: 120       Wound 11/15/22 #4 Left Heel (Active)   Wound Image   03/09/23 1013   Wound Etiology Arterial 03/16/23 0827   Dressing Status New dressing applied 03/16/23 0827   Wound Cleansed Cleansed with saline 03/16/23 0827   Dressing/Treatment Betadine swabs/povidone iodine;ABD 03/16/23 0827   Offloading for Diabetic Foot Ulcers Other (comment) 03/09/23 0450   Dressing Change Due 03/10/23 03/09/23 1013   Wound Length (cm) 5.5 cm 03/16/23 0827   Wound Width (cm) 3.5 cm 03/16/23 0827   Wound Depth (cm) 0.1 cm 03/16/23 0827   Wound Surface Area (cm^2) 19.25 cm^2 03/16/23 0827   Change in Wound Size % (l*w) -285 03/16/23 0827   Wound Volume (cm^3) 1.925 cm^3 03/16/23 0827   Wound Healing % -285 03/16/23 0827   Post-Procedure Length (cm) 4 cm 02/21/23 1004   Post-Procedure Width (cm) 3.5 cm 02/21/23 1004   Post-Procedure Depth (cm) 0.1 cm 02/21/23 1004   Post-Procedure Surface Area (cm^2) 14 cm^2 02/21/23 1004   Post-Procedure Volume (cm^3) 1.4 cm^3 02/21/23 1004   Distance Tunneling (cm) 0 cm 03/16/23 0827   Tunneling Position ___ O'Clock 0 03/16/23 0827   Undermining Starts ___ O'Clock 0 03/16/23 0827   Undermining Ends___ O'Clock 0 03/16/23 0827   Undermining Maxium Distance (cm) 0 03/16/23 0827   Wound Assessment Eschar dry 03/16/23 0827   Drainage Amount None 03/16/23 0827   Odor None 03/16/23 0827   Lora-wound Assessment Dry/flaky 03/16/23 0827   Margins Attached edges 03/16/23 0827   Wound Thickness Description not for Pressure Injury Full thickness 03/16/23 0827   Number of days: 120       Wound 02/07/23 #5 Left 3rd Finger (Active)   Wound Image   03/16/23 0827   Wound Etiology Other 03/16/23 0827   Dressing Status Other (Comment) 03/13/23 1530   Wound Cleansed Cleansed with saline 03/16/23 0827   Dressing/Treatment Betadine swabs/povidone iodine 03/16/23 0827   Dressing Change Due 03/10/23 03/09/23 1013   Wound Length (cm) 1.5 cm 03/16/23 0827   Wound Width (cm) 1.2 cm 03/16/23 0827   Wound Depth (cm) 0.1 cm 03/16/23 0827   Wound Surface Area (cm^2) 1.8 cm^2 03/16/23 0827   Change in Wound Size % (l*w) -25 03/16/23 0827   Wound Volume (cm^3) 0.18 cm^3 03/16/23 0827   Wound Healing % 38 03/16/23 0827   Distance Tunneling (cm) 0 cm 03/16/23 0827   Tunneling Position ___ O'Clock 0 03/16/23 0827   Undermining Starts ___ O'Clock 0 03/16/23 0827   Undermining Ends___ O'Clock 0 03/16/23 0827   Undermining Maxium Distance (cm) 0 03/16/23 0827   Wound Assessment Eschar dry 03/16/23 0827   Drainage Amount None 03/16/23 0827   Odor None 03/16/23 0827   Lora-wound Assessment Dry/flaky 03/16/23 0827   Margins Attached edges 03/16/23 0827   Wound Thickness Description not for Pressure Injury Full thickness 03/09/23 1013   Number of days: 36       Wound 03/09/23 Left Medial Foot (Active)   Wound Image   03/16/23 0827   Wound Etiology Pressure Stage 3 03/16/23 0827   Dressing Status New dressing applied 03/16/23 0827   Wound Cleansed Cleansed with saline 03/16/23 0827   Dressing/Treatment Betadine swabs/povidone iodine;ABD;Roll gauze 03/16/23 0827   Dressing Change Due 03/10/23 03/09/23 1013   Wound Length (cm) 0.5 cm 03/16/23 0827   Wound Width (cm) 0.5 cm 03/16/23 0827   Wound Depth (cm) 0.1 cm 03/16/23 0827   Wound Surface Area (cm^2) 0.25 cm^2 03/16/23 0827   Change in Wound Size % (l*w) 0 03/16/23 0827   Wound Volume (cm^3) 0.025 cm^3 03/16/23 0827   Wound Healing % 50 03/16/23 0827   Distance Tunneling (cm) 0 cm 03/16/23 0827   Tunneling Position ___ O'Clock 0 03/16/23 0827   Undermining Starts ___ O'Clock 0 03/16/23 0827   Undermining Ends___ O'Clock 0 03/16/23 0827   Undermining Maxium Distance (cm) 0 03/16/23 0827   Wound Assessment Eschar dry 03/16/23 0827   Drainage Amount None 03/16/23 0827   Drainage Description Yellow 03/09/23 1013   Odor None 03/16/23 0827   Lora-wound Assessment Dry/flaky; Hyperkeratosis (callous) 03/09/23 1013   Margins Attached edges 03/16/23 0827   Wound Thickness Description not for Pressure Injury Full thickness 03/16/23 0827   Number of days: 6       Wound 03/10/23 Right Hip (Active)   Wound Image   03/16/23 0827   Wound Etiology Deep tissue/Injury 03/16/23 0827   Dressing Status New dressing applied 03/16/23 0827   Wound Cleansed Not Cleansed 03/16/23 0827   Dressing/Treatment Silicone border 68/24/66 0827   Dressing Change Due 03/13/23 03/11/23 1532   Wound Length (cm) 2.5 cm 03/16/23 0827   Wound Width (cm) 2 cm 03/16/23 0827   Wound Depth (cm) 0 cm 03/16/23 0827   Wound Surface Area (cm^2) 5 cm^2 03/16/23 0827   Change in Wound Size % (l*w) 0 03/16/23 0827   Wound Volume (cm^3) 0 cm^3 03/16/23 0827   Distance Tunneling (cm) 0 cm 03/16/23 0827   Tunneling Position ___ O'Clock 0 03/16/23 0827   Undermining Starts ___ O'Clock 0 03/16/23 0827   Undermining Ends___ O'Clock 0 03/16/23 0827   Undermining Maxium Distance (cm) 0 03/16/23 0827   Wound Assessment Purple/maroon 03/16/23 0827   Drainage Amount None 03/16/23 0827   Odor None 03/10/23 1304   Lora-wound Assessment Non-blanchable erythema 03/10/23 1304   Margins Attached edges 03/16/23 0827   Number of days: 5       Response to treatment:  With complaints of pain.      Pain Assessment:  Severity:  moderate  Quality of pain: sore  Wound Pain Timing/Severity: wound care  Premedicated: no    Plan:     Plan of Care: Wound 02/07/23 #5 Left 3rd Finger-Dressing/Treatment: Betadine swabs/povidone iodine  Wound 11/11/22  #1 Left Great Toe-Dressing/Treatment: Betadine swabs/povidone iodine  Wound 11/15/22 #3 Left Posterior Ankle Cluster-Dressing/Treatment: Betadine swabs/povidone iodine, ABD, Roll gauze  Wound 11/15/22 #4 Left Heel-Dressing/Treatment: Betadine swabs/povidone iodine, ABD (abd/kerlix)  Wound 11/01/22 #2 Sacrum-Dressing/Treatment: Pharmaceutical agent (see MAR), Moist to dry, ABD (santyl)  Wound 03/09/23 Left Medial Foot-Dressing/Treatment: Betadine swabs/povidone iodine, ABD, Roll gauze  Wound 03/10/23 Right Hip-Dressing/Treatment: Silicone border    Patient in bed agreeable to wound care eval. Pt has diabetic/arterial/pressure wounds to sacrum stage 4, left foot wounds arterial/diabetic, left 3rd finger eschar. Cleansed with NS. Measured and pictured. Painted left foot with betadine abd kerlix. Left 3rd finger painted with betadine. Sacrum pressure stage 4 cleansed with NS measured and pictured. Purulent drainage noted. Lora wound with excoriation blanching redness. Applied santyl dakins moist gauze. Rt hip with deep tissue injury/scar measured and pictured applied foam border. Pt turned to lt side with pillow support. Educated importance of frequent repositioning. Left heel floated. Pt has rt BKA intact. Pt is at high risk for skin breakdown AEB guerda. Follow guerda orders. Specialty Bed Required : yes  [] Low Air Loss   [] Pressure Redistribution  [x] Fluid Immersion  [] Bariatric  [] Total Pressure Relief  [] Other:     Discharge Plan:  Placement for patient upon discharge: tbd  Hospice Care: no  Patient appropriate for Outpatient [address]: yes    Patient/Caregiver Teaching:  Level of patient/caregiver understanding able to: pt voiced understanding. Electronically signed by Alisha Zuniga.  LORE Edmondson,  on 3/16/2023 at 9:53 AM

## 2023-03-16 NOTE — PLAN OF CARE
Problem: Discharge Planning  Goal: Discharge to home or other facility with appropriate resources  3/16/2023 1239 by Newell Estimable, LPN  Outcome: Progressing  3/16/2023 1238 by Newell Estimable, LPN  Outcome: Progressing     Problem: Skin/Tissue Integrity  Goal: Absence of new skin breakdown  Description: 1. Monitor for areas of redness and/or skin breakdown  2. Assess vascular access sites hourly  3. Every 4-6 hours minimum:  Change oxygen saturation probe site  4. Every 4-6 hours:  If on nasal continuous positive airway pressure, respiratory therapy assess nares and determine need for appliance change or resting period.   3/16/2023 1239 by Newell Estimable, LPN  Outcome: Progressing  3/16/2023 1238 by Newell Estimable, LPN  Outcome: Progressing     Problem: ABCDS Injury Assessment  Goal: Absence of physical injury  3/16/2023 1239 by Newell Estimable, LPN  Outcome: Progressing  3/16/2023 1238 by Newell Estimable, LPN  Outcome: Progressing     Problem: Safety - Adult  Goal: Free from fall injury  3/16/2023 1239 by Newell Estimable, LPN  Outcome: Progressing  3/16/2023 1238 by Newell Estimable, LPN  Outcome: Progressing     Problem: Chronic Conditions and Co-morbidities  Goal: Patient's chronic conditions and co-morbidity symptoms are monitored and maintained or improved  3/16/2023 1239 by Newell Estimable, LPN  Outcome: Progressing  3/16/2023 1238 by Newell Estimable, LPN  Outcome: Progressing     Problem: Nutrition Deficit:  Goal: Optimize nutritional status  3/16/2023 1239 by Newell Estimable, LPN  Outcome: Progressing  3/16/2023 1238 by Newell Estimable, LPN  Outcome: Progressing     Problem: Pain  Goal: Verbalizes/displays adequate comfort level or baseline comfort level  3/16/2023 1239 by Newell Estimable, LPN  Outcome: Progressing  3/16/2023 1238 by Newell Estimable, LPN  Outcome: Progressing

## 2023-03-16 NOTE — PROGRESS NOTES
V2.0  [facility] [place]      Name:  Frank Carrion /Age/Sex: 1965  (62 y.o. female)   MRN & CSN:  [id number] & [id number] Encounter Date/Time: 3/15/2023 11:15 PM EDT    Location:  [facility]/2464-F PCP: Meghan Arciniega MD       Hospital Day: 7    Assessment and Plan:   Frank Carrion is a 62 y.o. female with past medical history of end-stage renal disease on hemodialysis, COPD not on home oxygen coronary artery disease, type 2 diabetes mellitus, hypertension, hyperlipidemia, anxiety/depression, heart failure with midrange ejection fraction, GERD is admitted on 3/9/2023 with sepsis secondary to infected chronic decubitus ulcer. Severe Sepsis secondary to infected stage IV decubitus ulcer  Blood cultures no growth to date  MRSA -ve  Wound cultures positive for group C beta streptococci and Candida  Continue IV Zosyn and Fluconazole per ID (EKG with QTc less than 500 ms)  Started Zyvox  Tunneled CVC placed to complete 14 days of IV antibiotics  Local wound care    Severe C. difficile colitis  Evidenced by wbc >15k  Started on fidaxomicin 200 mg twice daily 3/11/2023, plan to continue for 10 days. Infectious disease following  Reports improvement in diarrhea, no episode today    Type II NSTEMI  CAD s/p CABG  Chest pain free   Likely due to underlying sepsis and poor clearance due to ESRD  Heparin drip was discontinued by cardiology echocardiogram with ejection fraction 22%, grade 1 diastolic dysfunction, hypokinetic septal wall  Continue aspirin and statin  No further ischemic work-up planned    Hypertension  Low normal blood pressure trend  Continue to hold Coreg    End-stage renal disease on hemodialysis ()  Nephrology is following for inpatient management of HD    PAD s/p R.  BKA in   Continue aspirin and statin    Non-insulin-dependent type 2 diabetes mellitus  Sliding scale insulin, acceptable blood glucose trend  Chronic heart failure with preserved ejection fraction  Appears euvolemic  Echocardiogram from 3/9/2023 with ejection fraction 55%, septal wall hypokinesis. Diet ADULT DIET; Regular; Low Potassium (Less than 3000 mg/day); 1800 ml  ADULT ORAL NUTRITION SUPPLEMENT; Breakfast, Dinner; Wound Healing Oral Supplement  ADULT ORAL NUTRITION SUPPLEMENT; Lunch, Dinner; Renal Oral Supplement   DVT Prophylaxis [] Lovenox, [x]  Heparin, [] SCDs, [] Ambulation,  [] Eliquis, [] Xarelto  [] Coumadin   Code Status Full Code   Disposition From: Home  Expected Disposition: SNF  Estimated Date of Discharge: 1-2  Patient requires continued admission due to C. difficile colitis   Surrogate Decision Maker/ POA Spouse     Subjective:     Chief Complaint: pain in sacral area       Georgette Caicedo is a 62 y.o. female who presents with pain in sacral area. Patient was seen and evaluated at bedside earlier this morning. Patient was weak but stated that she felt much better today. Patient was alert oriented x3, hemodynamically stable with low normal blood pressure trend. No acute overnight events noted    Objective: Intake/Output Summary (Last 24 hours) at 3/15/2023 2215  Last data filed at 3/15/2023 1026  Gross per 24 hour   Intake 500 ml   Output 1900 ml   Net -1400 ml          Vitals:   Vitals:    03/15/23 2056   BP: (!) 143/69   Pulse: 96   Resp: 16   Temp: 98.9 °F (37.2 °C)   SpO2:        Physical Exam:   Physical Exam  Vitals reviewed. Constitutional:       Appearance: She is normal weight. She is ill-appearing. HENT:      Head: Normocephalic and atraumatic. Nose: Nose normal.      Mouth/Throat:      Mouth: Mucous membranes are dry. Pharynx: Oropharynx is clear. Eyes:      General: No scleral icterus. Conjunctiva/sclera: Conjunctivae normal.   Cardiovascular:      Rate and Rhythm: Normal rate and regular rhythm. Pulses: Normal pulses. Heart sounds: Normal heart sounds. No murmur heard.   Pulmonary:      Effort: Pulmonary effort is normal. Breath sounds: Normal breath sounds. No wheezing, rhonchi or rales. Abdominal:      General: Bowel sounds are normal. There is no distension. Palpations: Abdomen is soft. Tenderness: There is no abdominal tenderness. Musculoskeletal:         General: No deformity. Normal range of motion. Cervical back: Neck supple. No rigidity. Left lower leg: No edema. Skin:     Coloration: Skin is not jaundiced or pale. Findings: Bruising and lesion present. Neurological:      General: No focal deficit present. Mental Status: She is alert and oriented to person, place, and time. Mental status is at baseline.           Medications:   Medications:    linezolid  600 mg Oral 2 times per day    fluconazole  400 mg Oral Q24H    collagenase   Topical BID    piperacillin-tazobactam  3,375 mg IntraVENous Q12H    Fidaxomicin  200 mg Oral BID    heparin (porcine)  5,000 Units SubCUTAneous 3 times per day    [Held by provider] carvedilol  6.25 mg Oral BID    aspirin  81 mg Oral Daily    atorvastatin  40 mg Oral Nightly    gabapentin  100 mg Oral TID    sodium chloride flush  5-40 mL IntraVENous 2 times per day    sodium hypochlorite   Irrigation BID      Infusions:    sodium hypochlorite      sodium chloride 25 mL/hr at 03/14/23 1838    dextrose       PRN Meds: oxyCODONE, 5 mg, Q4H PRN  LORazepam, 1 mg, Q8H PRN  sodium hypochlorite, 480 mL, Continuous PRN  sodium chloride flush, 5-40 mL, PRN  sodium chloride, , PRN  acetaminophen, 650 mg, Q6H PRN   Or  acetaminophen, 650 mg, Q6H PRN  albuterol sulfate HFA, 2 puff, Q6H PRN  glucose, 4 tablet, PRN  dextrose bolus, 125 mL, PRN   Or  dextrose bolus, 250 mL, PRN  glucagon (rDNA), 1 mg, PRN  dextrose, , Continuous PRN      Labs      Recent Results (from the past 24 hour(s))   C-Reactive Protein    Collection Time: 03/15/23  2:02 AM   Result Value Ref Range    CRP High Sensitivity 155.1 (H) <5.0 mg/L   Procalcitonin    Collection Time: 03/15/23  2:02 AM   Result Value Ref Range    Procalcitonin 5.04    CBC with Auto Differential    Collection Time: 03/15/23  2:02 AM   Result Value Ref Range    WBC 31.2 (HH) 4.0 - 10.5 K/CU MM    RBC 3.58 (L) 4.2 - 5.4 M/CU MM    Hemoglobin 11.1 (L) 12.5 - 16.0 GM/DL    Hematocrit 37.6 37 - 47 %    .0 (H) 78 - 100 FL    MCH 31.0 27 - 31 PG    MCHC 29.5 (L) 32.0 - 36.0 %    RDW 14.7 11.7 - 14.9 %    Platelets 871 672 - 560 K/CU MM    MPV 10.8 7.5 - 11.1 FL    Myelocyte Percent 2 (H) 0.0 %    Metamyelocytes Relative 1 (H) 0.0 %    Bands Relative 11 5 - 11 %    Segs Relative 70.0 (H) 36 - 66 %    Eosinophils % 2.0 0 - 3 %    Lymphocytes % 9.0 (L) 24 - 44 %    Monocytes % 5.0 (H) 0 - 4 %    Myelocytes Absolute 0.62 K/CU MM    Metamyelocytes Absolute 0.31 K/CU MM    Bands Absolute 3.43 K/CU MM    Segs Absolute 21.8 K/CU MM    Eosinophils Absolute 0.6 K/CU MM    Lymphocytes Absolute 2.8 K/CU MM    Monocytes Absolute 1.6 K/CU MM    Differential Type MANUAL DIFFERENTIAL     RBC Morphology OCCASIONAL     Anisocytosis 1+     WBC Morphology RARE PLASMOID TYPE LYMPH ALSO NOTED    Comprehensive Metabolic Panel w/ Reflex to MG    Collection Time: 03/15/23  2:02 AM   Result Value Ref Range    Sodium 133 (L) 135 - 145 MMOL/L    Potassium 4.1 3.5 - 5.1 MMOL/L    Chloride 100 99 - 110 mMol/L    CO2 22 21 - 32 MMOL/L    BUN 42 (H) 6 - 23 MG/DL    Creatinine 4.1 (H) 0.6 - 1.1 MG/DL    Est, Glom Filt Rate 12 (L) >60 mL/min/1.73m2    Glucose 81 70 - 99 MG/DL    Calcium 7.8 (L) 8.3 - 10.6 MG/DL    Albumin 1.8 (L) 3.4 - 5.0 GM/DL    Total Protein 4.9 (L) 6.4 - 8.2 GM/DL    Total Bilirubin 0.4 0.0 - 1.0 MG/DL    ALT <5 (L) 10 - 40 U/L    AST 8 (L) 15 - 37 IU/L    Alkaline Phosphatase 280 (H) 40 - 128 IU/L    Anion Gap 11 4 - 16   POCT Glucose    Collection Time: 03/15/23 12:56 PM   Result Value Ref Range    POC Glucose 92 70 - 99 MG/DL   POCT Glucose    Collection Time: 03/15/23  6:14 PM   Result Value Ref Range    POC Glucose 234 (H) 70 - 99 MG/DL   POCT Glucose    Collection Time: 03/15/23  7:50 PM   Result Value Ref Range    POC Glucose 162 (H) 70 - 99 MG/DL        Imaging/Diagnostics Last 24 Hours   XR CHEST PORTABLE    Result Date: 3/11/2023  EXAMINATION: ONE XRAY VIEW OF THE CHEST 3/11/2023 9:29 am COMPARISON: 03/08/2023 HISTORY: ORDERING SYSTEM PROVIDED HISTORY: CHF TECHNOLOGIST PROVIDED HISTORY: Reason for exam:->CHF Reason for Exam: CHF FINDINGS: Cardiomegaly, diffuse bronchovascular marking prominence. Findings are consistent with congestive heart failure and/or diffuse bilateral atypical pneumonia/pneumonitis. Very small left pleural effusion may be present. No new or large areas of pulmonary consolidation. No pneumothorax or mediastinal widening. Findings consistent with congestive heart failure and/or diffuse pneumonitis/atypical pneumonia with very small left basilar pleural effusion. Findings may be accentuated by underlying chronic lung disease.        Electronically signed by Clau Jarquin MD on 3/15/2023 at 10:15 PM

## 2023-03-17 VITALS
DIASTOLIC BLOOD PRESSURE: 24 MMHG | BODY MASS INDEX: 29.3 KG/M2 | TEMPERATURE: 98.1 F | OXYGEN SATURATION: 92 % | RESPIRATION RATE: 18 BRPM | HEART RATE: 96 BPM | HEIGHT: 63 IN | SYSTOLIC BLOOD PRESSURE: 106 MMHG | WEIGHT: 165.34 LBS

## 2023-03-17 LAB
GLUCOSE BLD-MCNC: 60 MG/DL (ref 70–99)
GLUCOSE BLD-MCNC: 73 MG/DL (ref 70–99)
GLUCOSE BLD-MCNC: 88 MG/DL (ref 70–99)
HBV CORE AB SERPL QL IA: NEGATIVE

## 2023-03-17 PROCEDURE — 90935 HEMODIALYSIS ONE EVALUATION: CPT

## 2023-03-17 PROCEDURE — 2580000003 HC RX 258: Performed by: INTERNAL MEDICINE

## 2023-03-17 PROCEDURE — 6360000002 HC RX W HCPCS: Performed by: STUDENT IN AN ORGANIZED HEALTH CARE EDUCATION/TRAINING PROGRAM

## 2023-03-17 PROCEDURE — 6370000000 HC RX 637 (ALT 250 FOR IP): Performed by: STUDENT IN AN ORGANIZED HEALTH CARE EDUCATION/TRAINING PROGRAM

## 2023-03-17 PROCEDURE — 36592 COLLECT BLOOD FROM PICC: CPT

## 2023-03-17 PROCEDURE — 99232 SBSQ HOSP IP/OBS MODERATE 35: CPT | Performed by: NURSE PRACTITIONER

## 2023-03-17 PROCEDURE — 2580000003 HC RX 258: Performed by: NURSE PRACTITIONER

## 2023-03-17 PROCEDURE — 94761 N-INVAS EAR/PLS OXIMETRY MLT: CPT

## 2023-03-17 PROCEDURE — 82962 GLUCOSE BLOOD TEST: CPT

## 2023-03-17 PROCEDURE — 6360000002 HC RX W HCPCS: Performed by: NURSE PRACTITIONER

## 2023-03-17 PROCEDURE — 6370000000 HC RX 637 (ALT 250 FOR IP): Performed by: NURSE PRACTITIONER

## 2023-03-17 RX ORDER — ASPIRIN 81 MG/1
81 TABLET, CHEWABLE ORAL DAILY
Qty: 30 TABLET | Refills: 3 | Status: SHIPPED | OUTPATIENT
Start: 2023-03-18

## 2023-03-17 RX ORDER — LINEZOLID 600 MG/1
600 TABLET, FILM COATED ORAL EVERY 12 HOURS SCHEDULED
Qty: 24 TABLET | Refills: 0 | Status: SHIPPED | OUTPATIENT
Start: 2023-03-17 | End: 2023-03-29

## 2023-03-17 RX ORDER — CARVEDILOL 6.25 MG/1
6.25 TABLET ORAL 2 TIMES DAILY
Qty: 60 TABLET | Refills: 3 | Status: SHIPPED | OUTPATIENT
Start: 2023-03-17

## 2023-03-17 RX ORDER — FLUCONAZOLE 200 MG/1
400 TABLET ORAL EVERY 24 HOURS
Qty: 12 TABLET | Refills: 0 | Status: SHIPPED | OUTPATIENT
Start: 2023-03-17 | End: 2023-03-23

## 2023-03-17 RX ORDER — OXYCODONE HYDROCHLORIDE 5 MG/1
5 TABLET ORAL EVERY 8 HOURS PRN
Qty: 9 TABLET | Refills: 0 | Status: SHIPPED | OUTPATIENT
Start: 2023-03-17 | End: 2023-03-20

## 2023-03-17 RX ORDER — ATORVASTATIN CALCIUM 40 MG/1
40 TABLET, FILM COATED ORAL NIGHTLY
Qty: 30 TABLET | Refills: 3 | Status: SHIPPED | OUTPATIENT
Start: 2023-03-17

## 2023-03-17 RX ORDER — GABAPENTIN 100 MG/1
100 CAPSULE ORAL 2 TIMES DAILY
Qty: 60 CAPSULE | Refills: 0 | Status: SHIPPED | OUTPATIENT
Start: 2023-03-17 | End: 2023-04-16

## 2023-03-17 RX ORDER — SODIUM HYPOCHLORITE 1.25 MG/ML
SOLUTION TOPICAL DAILY
Qty: 3 ML | Refills: 0 | Status: SHIPPED | OUTPATIENT
Start: 2023-03-17

## 2023-03-17 RX ADMIN — HEPARIN SODIUM 5000 UNITS: 5000 INJECTION INTRAVENOUS; SUBCUTANEOUS at 10:14

## 2023-03-17 RX ADMIN — OXYCODONE HYDROCHLORIDE 5 MG: 5 TABLET ORAL at 04:55

## 2023-03-17 RX ADMIN — ASPIRIN 81 MG CHEWABLE TABLET 81 MG: 81 TABLET CHEWABLE at 10:15

## 2023-03-17 RX ADMIN — HEPARIN SODIUM 5000 UNITS: 5000 INJECTION INTRAVENOUS; SUBCUTANEOUS at 00:45

## 2023-03-17 RX ADMIN — SODIUM CHLORIDE, PRESERVATIVE FREE 10 ML: 5 INJECTION INTRAVENOUS at 10:16

## 2023-03-17 RX ADMIN — OXYCODONE HYDROCHLORIDE 5 MG: 5 TABLET ORAL at 07:53

## 2023-03-17 RX ADMIN — OXYCODONE HYDROCHLORIDE 5 MG: 5 TABLET ORAL at 11:25

## 2023-03-17 RX ADMIN — GABAPENTIN 100 MG: 100 CAPSULE ORAL at 10:15

## 2023-03-17 RX ADMIN — FIDAXOMICIN 200 MG: 200 TABLET, FILM COATED ORAL at 10:15

## 2023-03-17 RX ADMIN — LINEZOLID 600 MG: 600 TABLET, FILM COATED ORAL at 10:15

## 2023-03-17 RX ADMIN — OXYCODONE HYDROCHLORIDE 5 MG: 5 TABLET ORAL at 00:48

## 2023-03-17 RX ADMIN — PIPERACILLIN AND TAZOBACTAM 3375 MG: 3; .375 INJECTION, POWDER, LYOPHILIZED, FOR SOLUTION INTRAVENOUS at 06:01

## 2023-03-17 ASSESSMENT — PAIN DESCRIPTION - DESCRIPTORS
DESCRIPTORS: ACHING;DISCOMFORT;THROBBING
DESCRIPTORS: ACHING
DESCRIPTORS: SHOOTING;SHARP;BURNING

## 2023-03-17 ASSESSMENT — PAIN DESCRIPTION - LOCATION
LOCATION: LEG
LOCATION: GENERALIZED

## 2023-03-17 ASSESSMENT — PAIN SCALES - GENERAL
PAINLEVEL_OUTOF10: 7
PAINLEVEL_OUTOF10: 10
PAINLEVEL_OUTOF10: 7
PAINLEVEL_OUTOF10: 9
PAINLEVEL_OUTOF10: 7

## 2023-03-17 ASSESSMENT — PAIN - FUNCTIONAL ASSESSMENT
PAIN_FUNCTIONAL_ASSESSMENT: PREVENTS OR INTERFERES SOME ACTIVE ACTIVITIES AND ADLS
PAIN_FUNCTIONAL_ASSESSMENT: PREVENTS OR INTERFERES SOME ACTIVE ACTIVITIES AND ADLS

## 2023-03-17 ASSESSMENT — PAIN DESCRIPTION - ORIENTATION: ORIENTATION: RIGHT;LEFT

## 2023-03-17 NOTE — PROGRESS NOTES
This nurse attempted to call [facility] to give report on PT. Call was unsuccessful, was not able to a message.

## 2023-03-17 NOTE — PROCEDURES
Patient Name: Jackelin Gomez  Patient : 1965  MRN: [id number]     Acct: [de-identified]  Date of Admission: 3/9/2023  Room/Bed: 4121/4121-A  Code Status:  Full Code  Allergies:    Allergies   Allergen Reactions    Latex     Codeine     Cortisone     Cortizone     Dilaudid [Hydromorphone]      \"They OD'd me\"    Iodides     Phenobarbital Other (See Comments)     Hallucinations      Diagnosis:    Patient Active Problem List   Diagnosis    CAD (coronary artery disease)    Hyperlipidemia    Diabetes mellitus (HCC)    Chest pain    Hoarseness of voice    Fracture of metacarpal bone    Wrist sprain    History of tobacco abuse    Wrist pain    Carpal tunnel syndrome    Cubital tunnel syndrome    Trigger finger    S/P CABG x 4    Diabetic foot ulcer ([facility])    Diabetic neuropathy ([facility] [address]    Diabetes mellitus type 2 with complications ([facility])    Claudication of both lower extremities ([facility])    SOB (shortness of breath)    Bilateral leg edema    SOB (shortness of breath)    Type 2 diabetes mellitus without complication (HCC)    Coronary artery disease involving coronary bypass graft of native heart with unstable angina pectoris ([facility])    S/P cardiac cath    Chronic renal failure, stage 2 (mild)    Chronic kidney disease (CKD) stage G4/A3, severely decreased glomerular filtration rate (GFR) between 15-29 mL/min/1.73 square meter and albuminuria creatinine ratio greater than 300 mg/g (HCC)    DM (diabetes mellitus) (HCC)    Coronary atherosclerosis    HTN (hypertension)    Proteinuria    Callus of foot    Non compliance with medical treatment    Diabetic ulcer of right heel associated with type 2 diabetes mellitus, with necrosis of bone (HCC)    Acute pain of right foot    Wound, open, foot with complication, left, sequela    ESRD on hemodialysis ([facility] [address]    Diabetic ulcer of right midfoot associated with type 2 diabetes mellitus, with necrosis of bone (HCC)    Pressure ulcer of sacral region, stage 3 ([facility])    S/P BKA (below knee amputation), right ([facility] [address]    Diabetic ulcer of left foot associated with type 2 diabetes mellitus, with necrosis of muscle ([facility] [address]    Ulcer of finger, with fat layer exposed ([facility] [address]    Sepsis ([facility] [address]    Severe malnutrition ([facility] [address]    Infected decubitus ulcer, stage IV ([facility] [address]    ESRD (end stage renal disease) ([facility] [address]    Elevated troponin         Treatment:  Hemodilaysis 2:1  Priority: Routine  Location: Acute Room    Diabetic: Yes  NPO: No  Isolation Precautions: None     Consent for Treatment Verified: Yes  Blood Consent Verified: Not Applicable     Safety Verified: Identify (I), Consent (C), Equipment (E), HepB Status (B), Orders Complete (O), Access Verified (A), and Timeliness (T)  Time out performed prior to access at 0725 hours. Report Received from Primary RN at 0715 hours.   Primary RN (First Initial, Last Name, Title): eugenie osorio rn  Incapacitated Nurse Education Completed: Not Applicable     HBsAg ONLY:  Date Drawn: February 22, 2023       Results: Negative  HBsAb:  Date Drawn:  February 22, 2023       Results: Susceptible <10    Order  Dialysis Bath  K+ (Potassium): 2  Ca+ (Calcium): 2.5  Na+ (Sodium): 138  HCO3 (Bicarb): 35  Bicarbonate Concentrate Lot No.: [id number]  Acid Concentrate Lot No.: [id number]     Na+ Modeling: Not Applicable  Dialyzer: [id number]  Dialysate Temperature (C):  35  Blood Flow Rate (BFR):  350   Dialysate Flow Rate (DFR):   700        Access to be Utilized   Access: AVF  Location: Upper Extremity  Side: Left   Needle gauge:  15  + Bruit/Thrill: Yes    First Use X-ray Verified: Not Applicable  OK to use line order: Not Applicable    Site Assessment:  Signs and Symptoms of Infection/Inflammation: None  If yes: Not Applicable  Dressing: Dry and Intact  Site Prep: Medical Aseptic Technique  Dressing Changed this Treatment: Yes  If yes, by whom: Piter Leigh RN  Date of Last Dressing Change: Not Applicable  Antimicrobial Patch in place?: No  Red Alcohol Caps in place?: No  Gauze Dressing?: Yes  Non Dialysis Use?: No  Comment:    Flows: Good, Patent  If access problem, who was notified:     Pre and Post-Assessment  Patient Vitals for the past 8 hrs:   Level of Consciousness Oriented X Heart Rhythm Respiratory Quality/Effort O2 Device Bilateral Breath Sounds Skin Color Skin Condition/Temp Abdomen Inspection Bowel Sounds (All Quadrants) Edema Edema Generalized Comments Pain Level   03/17/23 0455 -- -- -- -- -- -- -- -- -- -- -- -- -- 9   03/17/23 0730 0 4 Regular Unlabored None (Room air) Diminished Pink Warm;Diaphoretic Flat;Soft Active Generalized Non-pitting bs 60, Rn notified, pt requests pain med as well 7   03/17/23 0753 -- -- -- -- -- -- -- -- -- -- -- -- -- 10     Labs  Recent Labs     03/15/23  0202 03/16/23  0550   WBC 31.2* 23.7*   HGB 11.1* 10.9*   HCT 37.6 36.3*    251                                                                  Recent Labs     03/15/23  0202   *   K 4.1      CO2 22   BUN 42*   CREATININE 4.1*   GLUCOSE 81     IV Drips and Rate/Dose   sodium chloride 25 mL (03/16/23 1735)    dextrose        Safety - Before each treatment:   Dialysis Machine No.: [id number]   Machine Number: [id number]  Dialyzer Lot No.: [id number]   Machine Log Sheet Completed: Yes  Machine Alarm Self Test: Completed; Passed (03/17/23 0725)     Air Foam Detector: Tested, Proper Function  Extracorporeal Circuit Tested for Integrity: Yes  Machine Conductivity: 13.9  Manual Conductivity: 13.8     Bicarbonate Concentrate Lot No.: [id number]  Acid Concentrate Lot No.: [id number]  Manual Ph: 7.4  Bleach Test (Neg): Yes  Bath Temperature: 95 °F (35 °C)  Tubing Lot#: [id number]  Conductivity Meter Serial #: [id number]  All Connections Secure?: Yes  Venous Parameters Set?: Yes  Arterial Parameters Set?: Yes  Saline Line Double Clamped?: Yes  Air Foam Detector Engaged?: Yes  Machine Functioning Alarm Free?  Yes  Prime Given: 200ml    Chlorine Testing - Before each treatment and every 4 hours:   Treatment  Time On: 0730  Time Off: 0930  Weight: 165 lb 5.5 oz (75 kg) (03/16/23 0600)  1st check: less than 0.1 ppm at: 0650 hours    (if greater than 0.1 ppm, then check every 30 minutes from secondary)    Access Flows and Pressures  Patient Vitals for the past 8 hrs:   Blood Flow Rate (ml/min) Ultrafiltration Rate (ml/hr) Arterial Pressure (mmHg) Venous Pressure (mmHg) TMP DFR Comments Access Visible   03/17/23 0730 200 ml/min 900 ml/hr -80 mmHg 90 80 893 on circ per policy, lines secure Yes   03/17/23 0800 350 ml/min 900 ml/hr -80 mmHg 160 80 700 vss Yes   03/17/23 0815 350 ml/min 900 ml/hr -80 mmHg 160 80 700 resting, vss Yes   03/17/23 0830 350 ml/min 460 ml/hr -100 mmHg 170 60 700 vss, goal decreased for dexreasing bp Yes   03/17/23 0845 350 ml/min 0 ml/hr -80 mmHg 170 70 700 sleeping Yes   03/17/23 0900 350 ml/min 420 ml/hr -80 mmHg 150 100 700 vss Yes   03/17/23 0915 350 ml/min 420 ml/hr -80 mmHg 180 70 700 fsbs rechecked, vss Yes     Vital Signs  Patient Vitals for the past 8 hrs:   BP Temp Pulse Resp SpO2   03/17/23 0459 (!) 133/55 98.2 °F (36.8 °C) 94 16 97 %   03/17/23 0525 -- -- -- 16 --   03/17/23 0730 (!) 126/30 98.8 °F (37.1 °C) 96 16 97 %   03/17/23 0753 -- -- -- 14 --   03/17/23 0800 99/80 -- (!) 103 10 --   03/17/23 0815 99/64 -- (!) 101 (!) 9 --   03/17/23 0830 86/70 -- (!) 102 (!) 7 --   03/17/23 0845 86/76 -- (!) 102 (!) 7 --   03/17/23 0900 (!) 86/27 -- (!) 101 (!) 8 --   03/17/23 0915 (!) 89/41 -- 99 (!) 7 --   03/17/23 0930 (!) 93/38 -- 99 (!) 8 --     Post-Dialysis  Arterial Catheter Locking Solution: Not Applicable  Venous Catheter Locking Solution: Not Applicable  Post-Treatment Procedures: Blood returned, Access bleeding time < 10 minutes  Machine Disinfection Process: Exterior Machine Disinfection, Acid/Vinegar Clean, Heat Disinfect  Rinseback Volume (ml): 300 ml  Blood Volume Processed (Liters): 48 l/min  Dialyzer Clearance: Lightly streaked  Duration of Treatment (minutes): 120 minutes     Hemodialysis Intake (ml): 300 ml  Hemodialysis Output (ml): 1100 ml     Tolerated Treatment: Fair  Patient Response to Treatment: tolerates tx with only 800 ml removed  Physician Notified: No       Provider Notification  Provider Notification  Reason for Communication: Patient request (03/17/23 0930)  Provider Name: Servando Cantu (03/13/23 1005)  Provider Notification: Physician (03/13/23 1005)  Method of Communication: Call (03/13/23 1005)  Response:  (ok to stop tx) (03/13/23 1005)  Notification Time: 1000 (03/13/23 1005)     Handoff complete and report given to Primary RN at 0930 hours.   Primary RN (First Initial, Last Name, Title):  eugenie osorio rn       Education  Person Educated: Patient   Knowledge Base: Minimal  Barriers to Learning?: None  Preferred method of Learning: Hands-on  Topic(s): Signs and Symptoms of Infection   Teaching Tools: Explanation   Response to Education: Verbalized Understanding and Requires Follow-up     Electronically signed by Courtney Guido RN on 3/17/2023 at 9:47 AM

## 2023-03-17 NOTE — PROGRESS NOTES
03/17/23 0703   Encounter Summary   Encounter Overview/Reason  Spiritual/Emotional Needs   Service Provided For: Patient   Referral/Consult From: Nurse   Support System Spouse;Children   Last Encounter  03/17/23  (Had prayer and emotional support per patient request. Patient might go to a nursing home today and not feeling well. Prayed for healing.)   Complexity of Encounter Low   Begin Time 0645   End Time  0705   Total Time Calculated 20 min   Encounter    Type Initial Screen/Assessment   Spiritual/Emotional needs   Type Emotional Distress   Grief, Loss, and Adjustments   Type Adjustment to illness   Assessment/Intervention/Outcome   Assessment Sad;Coping   Intervention Active listening;Discussed belief system/Confucianist practices/[place];Discussed illness injury and it’s impact;Discussed relationship with God;Discussed meaning/purpose;Nurtured Hope;Prayer (assurance of)/[place];Sustaining Presence/Ministry of presence   Outcome Encouraged;Engaged in conversation;Expressed feelings of [place], Peace and/or Love;Expressed Gratitude;Optimistic   Plan and Referrals   Plan/Referrals Continue Support (comment)  (if not discharged)

## 2023-03-17 NOTE — PROGRESS NOTES
Infectious Disease Progress Note  3/17/2023   Patient Name: Jovanny Valente : 1965   Impression  Sepsis Secondary to Infected Acute on Chronic Decubitus Sacral Wound:  Chronic Necrotic Wounds: Left 3rd Finger, Left Hallux, Left Ankle and Heel:  C.dif Colitis:   Cryptosporidium Positive:  MSSA Screen Positive: Allergy to vancomycin (\"makes me very sick\"): Tolerating vancomycin this admit  Afebrile, Leukocytosis elevated but on DWT, CRP and Pct on DWT showing positive response to ABX therapy, clinically has improved, and diarrhea has ceased. CrCl 15 on HD  3/10-Reordered Sacral Wound Culture: Beta Strep Group C, diphtheroids, Candida albicans  3/8-BC 0/ NGTD  3/11-Cdif positive  Cryptosporidium positive by PCR:  3/8-CXR: Pulmonary vascular congestion with mild pulmonary edema. 3/9-Complete Echo: EF 55%, Grade I DD, septal wall is hypokinetic. Sclerotic, but non-stenotic AV. MAC with mild MR. Mild TR. No evidence of pericardial effusion. , general surgery, rec Irrigating wound VAC, placement 3/10  DMII:  ESRD on HD:  Ramiro Pena onboard  CAD s/p CABG/ HFrEF:  PAD s/p RBKA :  Depression:  Multi-morbidity: per PMHx    Plan:  Continue IV Zosyn 3,375 mg q8h x 14 cumulative days to cover Strep C and diptheroids (end date 3/23/2023)  Continue po linezolid 600 mg bid x 14 days (end date 3/29/2023)  Continue po fluconazole, increase to 400 mg q24h, will observe closely as QTc is 520, as dose is low at 200 mg (end date 3/23/2023)  Continue po Dificid 200 mg bid x 10 days (end date 3/22/2023)  Follow up with general surgery and nephrology after DC  OK from ID standpoint to DC when ready    Ongoing Antimicrobial Therapy  Zosyn 3/13-  Dificid 3/12-? Fluconazole 3/13-  Linezolid 3/15-  Completed Antimicrobial Therapy  Vancomycin 3/9-10  Zosyn 3/9-? Ceftriaxone 3/12-  History:? Interval history noted. Chief complaint: sepsis secondary to probable infected acute on chronic decubitus sacral wound. Chronic necrotic wounds of left 3rd finger, left hallux, left ankle and heel. Denies n/v/d/f or untoward effects of antibiotics. Physical Exam:  Vital Signs: BP (!) 117/19   Pulse 98   Temp 98.5 °F (36.9 °C) (Oral)   Resp 15   Ht 5' 3\" (1.6 m)   Wt 165 lb 5.5 oz (75 kg)   SpO2 95%   BMI 29.29 kg/m²     Gen: remains A&O x 4  Wounds: C/D/I multiple extremity necrotic wounds. Irrigating wound VAC intact Sacral/coccyx wound. Remote wound from RBKA well healed. Chest: no distress and CTA. Good air movement. Room air. Heart: NSR and no MRG. Abd: soft, non-distended, no tenderness, no hepatomegaly. Normoactive bowel sounds. Ext: no clubbing, cyanosis, or edema. S/p RBKA. Neuro: Mental status intact. CN 2-12 intact and no focal sensory or motor deficits     Radiologic / Imaging / TESTING  3/8/2023 XR Chest Portable:  Impression   Pulmonary vascular congestion with mild pulmonary edema. 3/9/2023 Echo Complete:    Summary   Ejection fraction is visually estimated at 55%. Grade I diastolic dysfunction. Septal wall is hypokinetic. Sclerotic, but non-stenotic aortic valve. MAC with mild mitral regurgitation. Mild tricuspid regurgitation; RVSP: 22 mmHg. No evidence of any pericardial effusion. 3/11/2023 XR Chest Portable:  Impression   Findings consistent with congestive heart failure and/or diffuse   pneumonitis/atypical pneumonia with very small left basilar pleural effusion. Findings may be accentuated by underlying chronic lung disease. 3/15/2023 IR Tunneled CVC Place WO SQ Port/Pump >5 Years:  Impression   Successful ultrasound and fluoroscopy guided Port-A-Cath/power PICC placement   via ultrasound-guided right internal jugular venous approach. 3/15/2023 XR Abdomen:  Impression   1. Unremarkable bowel gas pattern. 2. No radiopaque urinary collecting system calculus evident. 3. Cholecystectomy.      Labs:    Recent Results (from the past 24 hour(s))   POCT Glucose Collection Time: 03/16/23  4:49 PM   Result Value Ref Range    POC Glucose 68 (L) 70 - 99 MG/DL   POCT Glucose    Collection Time: 03/16/23  7:39 PM   Result Value Ref Range    POC Glucose 130 (H) 70 - 99 MG/DL   POCT Glucose    Collection Time: 03/17/23  7:30 AM   Result Value Ref Range    POC Glucose 60 (L) 70 - 99 MG/DL   POCT Glucose    Collection Time: 03/17/23  9:13 AM   Result Value Ref Range    POC Glucose 88 70 - 99 MG/DL   POCT Glucose    Collection Time: 03/17/23 11:50 AM   Result Value Ref Range    POC Glucose 73 70 - 99 MG/DL     CULTURE results: Invalid input(s): BLOOD CULTURE,  URINE CULTURE, SURGICAL CULTURE    Diagnosis:  Patient Active Problem List   Diagnosis    CAD (coronary artery disease)    Hyperlipidemia    Diabetes mellitus ([facility])    Chest pain    Hoarseness of voice    Fracture of metacarpal bone    Wrist sprain    History of tobacco abuse    Wrist pain    Carpal tunnel syndrome    Cubital tunnel syndrome    Trigger finger    S/P CABG x 4    Diabetic foot ulcer ([facility])    Diabetic neuropathy ([address]    Diabetes mellitus type 2 with complications ([facility])    Claudication of both lower extremities ([facility])    SOB (shortness of breath)    Bilateral leg edema    SOB (shortness of breath)    Type 2 diabetes mellitus without complication (HCC)    Coronary artery disease involving coronary bypass graft of native heart with unstable angina pectoris ([facility])    S/P cardiac cath    Chronic renal failure, stage 2 (mild)    Chronic kidney disease (CKD) stage G4/A3, severely decreased glomerular filtration rate (GFR) between 15-29 mL/min/1.73 square meter and albuminuria creatinine ratio greater than 300 mg/g (HCC)    DM (diabetes mellitus) (HCC)    Coronary atherosclerosis    HTN (hypertension)    Proteinuria    Callus of foot    Non compliance with medical treatment    Diabetic ulcer of right heel associated with type 2 diabetes mellitus, with necrosis of bone ([facility])    Acute pain of right foot    Wound, open, foot with complication, left, sequela    ESRD on hemodialysis ([address]    Diabetic ulcer of right midfoot associated with type 2 diabetes mellitus, with necrosis of bone (HCC)    Pressure ulcer of sacral region, stage 3 (HCC)    S/P BKA (below knee amputation), right (HCC)    Diabetic ulcer of left foot associated with type 2 diabetes mellitus, with necrosis of muscle (HCC)    Ulcer of finger, with fat layer exposed ([address]    Sepsis (HCC)    Severe malnutrition (HCC)    Infected decubitus ulcer, stage IV (HCC)    ESRD (end stage renal disease) (HCC)    Elevated troponin       Active Problems  Principal Problem:    Sepsis ([address]  Active Problems:    Severe malnutrition (HCC)    Infected decubitus ulcer, stage IV (HCC)    ESRD (end stage renal disease) (HCC)    Elevated troponin  Resolved Problems:    * No resolved hospital problems. *    Electronically signed by: Electronically signed by Reno Shni.  RYAN Godfrey CNP on 3/17/2023 at 2:12 PM

## 2023-03-17 NOTE — PROGRESS NOTES
Nephrology Progress Note  3/17/2023 11:22 AM        Subjective:   Admit Date: 3/9/2023  PCP: Ulises Mcgee MD    Interval History:  patient seen in early morning, this is a late entry   she has been approved for nursing home so she should be able to go today    Diet:  some    ROS:   no shortness of breath or confusion   acceptable blood pressure no fever    Data:     Current meds:    linezolid  600 mg Oral 2 times per day    fluconazole  400 mg Oral Q24H    collagenase   Topical BID    piperacillin-tazobactam  3,375 mg IntraVENous Q12H    Fidaxomicin  200 mg Oral BID    heparin (porcine)  5,000 Units SubCUTAneous 3 times per day    [Held by provider] carvedilol  6.25 mg Oral BID    aspirin  81 mg Oral Daily    atorvastatin  40 mg Oral Nightly    gabapentin  100 mg Oral TID    sodium chloride flush  5-40 mL IntraVENous 2 times per day    sodium hypochlorite   Irrigation BID      sodium chloride 25 mL/hr at 03/17/23 1011    dextrose           I/O last 3 completed shifts:   In: 10 [I.V.:10]  Out: -     CBC:   Recent Labs     03/15/23  0202 03/16/23  0550   WBC 31.2* 23.7*   HGB 11.1* 10.9*    251          Recent Labs     03/15/23  0202   *   K 4.1      CO2 22   BUN 42*   CREATININE 4.1*   GLUCOSE 81       Lab Results   Component Value Date    CALCIUM 7.8 (L) 03/15/2023    PHOS 9.0 (H) 11/14/2022       Objective:     Vitals: BP (!) 117/19   Pulse 98   Temp 98.5 °F (36.9 °C) (Oral)   Resp 15   Ht 5' 3\" (1.6 m)   Wt 165 lb 5.5 oz (75 kg)   SpO2 95%   BMI 29.29 kg/m² ,    General appearance:   cachectic, alert, awake and oriented  HEENT:   positive conjunctival pallor no scleral icterus  Neck:   supple, right IJ tunneled PICC  Lungs:   no gross crackles  Heart:   seemed regular rate and rhythm  Abdomen:  soft, nontender  Extremities:   right below-knee amputation, left leg no gross edema has some ischemic toes   left upper extremity brachiocephalic fistula due to low blood pressures good bruit and auscultation      Problem List :         Impression :      end-stage kidney disease on maintenance dialysis- 2 h dialysis today- with some ultrafiltration   recent C. difficile colitis as well as skin and soft tissue infection   underlying atherosclerotic cardiovascular disease, diabetes,   protein energy wasting, frailty and debility     Recommendation/Plan  :      hemodialysis and ultrafiltration today- once her antibiotic is done remove PICC- she will go to nursing home today hopefully on dialysis Monday and Friday- I will see her at [facility]- good glycemic control- is a mention yesterday had good meeting with her family members- they all understand what to expect- we are hoping for the best- she will only do twice a week Monday and Friday dialysis unless needed more frequently      Abelardo Vidal MD MD

## 2023-03-17 NOTE — PLAN OF CARE
Problem: Discharge Planning  Goal: Discharge to home or other facility with appropriate resources  3/16/2023 2342 by Madisyn Nair RN  Outcome: Progressing  3/16/2023 1239 by Jordan Fish LPN  Outcome: Progressing  3/16/2023 1238 by Jordan Fish LPN  Outcome: Progressing     Problem: Skin/Tissue Integrity  Goal: Absence of new skin breakdown  Description: 1. Monitor for areas of redness and/or skin breakdown  2. Assess vascular access sites hourly  3. Every 4-6 hours minimum:  Change oxygen saturation probe site  4. Every 4-6 hours:  If on nasal continuous positive airway pressure, respiratory therapy assess nares and determine need for appliance change or resting period.   3/16/2023 2342 by Madisyn Nair RN  Outcome: Progressing  3/16/2023 1239 by Jordan Fish LPN  Outcome: Progressing  3/16/2023 1238 by Jordan Fish LPN  Outcome: Progressing     Problem: ABCDS Injury Assessment  Goal: Absence of physical injury  3/16/2023 2342 by Madisyn Nair RN  Outcome: Progressing  3/16/2023 1239 by Jordan Fish LPN  Outcome: Progressing  3/16/2023 1238 by Jordan Fish LPN  Outcome: Progressing     Problem: Safety - Adult  Goal: Free from fall injury  3/16/2023 2342 by Madisny Nair RN  Outcome: Progressing  3/16/2023 1239 by Jordan Fish LPN  Outcome: Progressing  3/16/2023 1238 by Jordan Fish LPN  Outcome: Progressing     Problem: Chronic Conditions and Co-morbidities  Goal: Patient's chronic conditions and co-morbidity symptoms are monitored and maintained or improved  3/16/2023 2342 by Madisyn Nair RN  Outcome: Progressing  3/16/2023 1239 by Jordan Fish LPN  Outcome: Progressing  3/16/2023 1238 by Jordan Fish LPN  Outcome: Progressing     Problem: Nutrition Deficit:  Goal: Optimize nutritional status  3/16/2023 2342 by Madisyn Nair RN  Outcome: Progressing  3/16/2023 1239 by Jordan Fish LPN  Outcome: Progressing  3/16/2023 1238 by Jordan Fish LPN  Outcome: Progressing     Problem: Pain  Goal: Verbalizes/displays adequate comfort level or baseline comfort level  3/16/2023 2342 by Keara Bagley RN  Outcome: Progressing  3/16/2023 1239 by Maurice Justice LPN  Outcome: Progressing  3/16/2023 1238 by Maurice Justice LPN  Outcome: Progressing

## 2023-03-17 NOTE — PROGRESS NOTES
Patient seen and examined this AM during her dialysis treatment. This is a late entry. We will continue to use Betadine on the sacral on the left great toe and left heel. We are using Dakin's solution on her sacral pressure ulcer. I did examine this area and there was no tissue that needed debrided. Afebrile hemodynamically stable. PE: Please see the wound care photos in the media section. Black dry eschars are present on the left great toe, heel and Achilles tendon area. Sacral ulceration with minimal periwound redness. No purulent drainage. Left upper arm AV fistula accessed at this time for hemodialysis. Vitals:    03/17/23 1015 03/17/23 1125 03/17/23 1155 03/17/23 1547   BP: (!) 117/19   (!) 106/24   Pulse: 98   96   Resp: 15 16 15 18   Temp: 98.5 °F (36.9 °C)   98.1 °F (36.7 °C)   TempSrc: Oral   Oral   SpO2: 95%   92%   Weight:       Height:           Labs:  CBC:    Lab Results   Component Value Date/Time    WBC 23.7 03/16/2023 05:50 AM    HGB 10.9 03/16/2023 05:50 AM    HCT 36.3 03/16/2023 05:50 AM     03/16/2023 05:50 AM     BMP:    Lab Results   Component Value Date/Time     03/15/2023 02:02 AM    K 4.1 03/15/2023 02:02 AM     03/15/2023 02:02 AM    CO2 22 03/15/2023 02:02 AM    BUN 42 03/15/2023 02:02 AM    CREATININE 4.1 03/15/2023 02:02 AM    CALCIUM 7.8 03/15/2023 02:02 AM     PT/INR:    Lab Results   Component Value Date/Time    PROTIME 14.6 03/14/2023 05:13 PM    PROTIME 10.6 10/18/2010 11:21 AM    INR 1.13 03/14/2023 05:13 PM     PTT:    Lab Results   Component Value Date/Time    APTT 34.4 03/14/2023 05:13 PM     LFT:    Lab Results   Component Value Date/Time    LABALBU 1.8 03/15/2023 02:02 AM    BILITOT 0.4 03/15/2023 02:02 AM    AST 8 03/15/2023 02:02 AM    ALT <5 03/15/2023 02:02 AM    ALKPHOS 280 03/15/2023 02:02 AM       Assessment/Plan:  Continue to use Dakin's to the sacral pressure ulceration and change daily.   If this is not present at the nursing home she can use Betadine. Continue Betadine soaks to the left great toe and foot. Patient can follow-up with me at the [facility] wound care [place] on Wednesday mornings. Patient to continue IV Zosyn for 14 days per ID recommendations. She will also be on Diflucan, linezolid and Dificid for C. difficile colitis. End-stage renal artery disease on hemodialysis to be done 3 times a week per nephrology. CPM for comorbidities. Patient to be discharged today.

## 2023-03-17 NOTE — DISCHARGE INSTRUCTIONS
CBC every 5 days for 2 weeks  EKG every other day, stop fluconazole if QTc above 500ms  Wound healing oral supplement with breakfast and renal oral supplement at dinner

## 2023-03-17 NOTE — CARE COORDINATION
Received call from [facility] [phone], updated on discharge plan. She requested pt call her today prior to leaving. Gave pt info/number.

## 2023-03-17 NOTE — PROGRESS NOTES
This nurse made a second attempt to give report, this nurse was unable to speak to anyone. PT is currently being D/C'd and will be transported via [place].

## 2023-03-17 NOTE — CARE COORDINATION
Received SNF approval/ sent PS to Dr. Nanette Keenan to update. Per Dr. Nanette Keenan pt to be discharged today; Set up with [facility] for 4pm.  Notified Gilbert Rea of transport time.

## 2023-03-17 NOTE — DISCHARGE SUMMARY
V2.0  Discharge Summary    Name:  Ashok Gomez /Age/Sex: 1965 (62 y.o. female)   Admit Date: 3/9/2023  Discharge Date: 3/17/23    MRN & CSN:  [id number] & [id number] Encounter Date and Time 3/17/23 1:27 PM EDT    Attending:  No att. providers found Discharging Provider: Angy Rodrigues MD       Hospital Course:     Brief HPI: Ashok Gomez is a 62 y.o. female with past medical history of end-stage renal disease on hemodialysis, COPD not on home oxygen coronary artery disease, type 2 diabetes mellitus, hypertension, hyperlipidemia, anxiety/depression, heart failure with midrange ejection fraction, GERD is admitted on 3/9/2023 with sepsis secondary to infected chronic decubitus ulcer. Brief Problem Based Course:     Severe Sepsis secondary to infected stage IV decubitus ulcer  Blood cultures no growth to date  MRSA -ve  Wound cultures positive for group C beta streptococci and Candida  Continue IV Zosyn and Fluconazole(EKG done on  with Qtc 488ms) with addition of Zyvox per infectious disease recommendations. White blood cell count and inflammatory markers trended down appropriately with antibiotic therapy. Advised to repeat EKG at [facility] every other day and hold fluconazole if QTc above 500 ms. Tunneled CVC placed, appropriate line care and removal once IV antibiotics are finished. Local wound care instructions were provided in [address] document. General surgery also followed the patient and on the day of discharge wound was examined and there was no tissue that needed debridement. Outpatient follow-up at [facility] wound care center was also recommended. Severe C. difficile colitis  Severity evidenced by wbc >15k  Started on fidaxomicin 200 mg twice daily 3/11/2023, plan to continue for 10 days. Infectious disease following  Reports improvement in diarrhea, no episode on the day of discharge.     Severe malnutrition  Dietitian was following for inpatient recommendations for supplementation  Instructions were provided for supplementation on continuity of care document     Type II NSTEMI  CAD s/p CABG  Chest pain free   Likely due to underlying sepsis and poor clearance due to ESRD  Heparin drip was discontinued by cardiology echocardiogram with ejection fraction 38%, grade 1 diastolic dysfunction, hypokinetic septal wall  Continue aspirin and statin  No further ischemic work-up planned by cardiology. Hypertension  Low normal blood pressure trend  Continue to hold Coreg  Recommended to resume once hemodynamically stable     End-stage renal disease on hemodialysis (Monday Wednesday Friday)  Nephrology will follow the patient for inpatient management of hemodialysis. PAD s/p R. BKA in 2022  Continue aspirin and statin  Continue wound care of multiple black dry eschars due to peripheral artery disease on left great toe, heel and Achilles tendon. Non-insulin-dependent type 2 diabetes mellitus  Sliding scale insulin, acceptable blood glucose trend  Chronic heart failure with preserved ejection fraction  Appears euvolemic  Echocardiogram from 3/9/2023 with ejection fraction 55%, septal wall hypokinesis. The patient expressed appropriate understanding of, and agreement with the discharge recommendations, medications, and plan. Consults this admission:  IP CONSULT TO GENERAL SURGERY  IP CONSULT TO NEPHROLOGY  IP CONSULT TO INFECTIOUS DISEASES  IP CONSULT TO DIETITIAN  IP CONSULT TO IV TEAM  IP CONSULT TO CARDIOLOGY  IP CONSULT TO INTERVENTIONAL RADIOLOGY  IP CONSULT TO SPIRITUAL SERVICES    Subjective: Patient was seen and evaluated at bedside earlier this morning. Patient was very weak although alert oriented x3 and hemodynamically stable with low normal blood pressure trend. Patient denied chest pain, diarrheal episode, nausea/vomiting. Patient stated that she is tolerating p.o. diet.     Discharge Diagnosis:   Severe sepsis present on admission  Infected stage IV decubitus ulcer  Severe C. difficile colitis type II NSTEMI  Severe malnutrition  Hypertension  Peripheral artery disease status post right BKA  End-stage renal disease with hemodialysis  Discharge Instruction:   Follow up appointments: Primary care physician, general surgery, nephrology, infectious disease  Primary care physician: Jody Alvarez MD within 2 weeks  Diet: renal diet with dietary supplementation  Activity: activity as tolerated  Disposition: Discharged to:   []Home, [][facility], [x]SNF, []Acute Rehab, []Hospice   Condition on discharge: Stable  Labs and Tests to be Followed up as an outpatient by PCP or Specialist: None    Discharge Medications:        Medication List        START taking these medications      aspirin 81 MG chewable tablet  Take 1 tablet by mouth daily     atorvastatin 40 MG tablet  Commonly known as: LIPITOR  Take 1 tablet by mouth nightly     collagenase 250 UNIT/GM ointment  Apply topically daily. Fidaxomicin 200 MG Tabs tablet  Commonly known as: DIFICID  Take 200 mg by mouth 2 times daily for 10 doses     fluconazole 200 MG tablet  Commonly known as: DIFLUCAN  Take 2 tablets by mouth every 24 hours for 6 days     linezolid 600 MG tablet  Commonly known as: ZYVOX  Take 1 tablet by mouth every 12 hours for 24 doses     oxyCODONE 5 MG immediate release tablet  Commonly known as: ROXICODONE  Take 1 tablet by mouth every 8 hours as needed for Pain for up to 3 days. Max Daily Amount: 15 mg     piperacillin-tazobactam 3-0.375 GM per 50ML IVPB  Commonly known as: ZOSYN  Infuse 50 mLs intravenously 2 times daily for 6 days     sodium hypochlorite 0.125 % Soln external solution  Commonly known as: DAKINS  Apply topically daily            CHANGE how you take these medications      carvedilol 6.25 MG tablet  Commonly known as: COREG  Take 1 tablet by mouth 2 times daily Please monitor blood pressure twice daily. If systolic blood pressure above 130, start taking Coreg.   What changed:   how to take this  additional instructions     gabapentin 100 MG capsule  Commonly known as: NEURONTIN  Take 1 capsule by mouth 2 times daily for 30 days. What changed:   medication strength  how much to take  when to take this            CONTINUE taking these medications      albuterol sulfate  (90 Base) MCG/ACT inhaler  Commonly known as: PROVENTIL;VENTOLIN;PROAIR     calcium acetate 667 MG Caps capsule  Commonly known as: PhosLo  Take 1 capsule with each meal and 1 tablet with each snack. clobetasol 0.05 % ointment  Commonly known as: Temovate  Apply topically 2 times daily. esomeprazole Magnesium 40 MG Pack  Commonly known as: NEXIUM     LORazepam 0.5 MG tablet  Commonly known as: ATIVAN     OXYGEN     silver sulfADIAZINE 1 % cream  Commonly known as: SILVADENE  Apply topically daily.             STOP taking these medications      furosemide 20 MG tablet  Commonly known as: LASIX     lisinopril 10 MG tablet  Commonly known as: PRINIVIL;ZESTRIL     NOVOLOG FLEXPEN SC               Where to Get Your Medications        These medications were sent to [facility] [id number] [address] Me[address] -  [phone]  [address] [place] [address] 02, [facility]      Phone: [phone]   aspirin 81 MG chewable tablet  atorvastatin 40 MG tablet  carvedilol 6.25 MG tablet  collagenase 250 UNIT/GM ointment  Fidaxomicin 200 MG Tabs tablet  fluconazole 200 MG tablet  gabapentin 100 MG capsule  linezolid 600 MG tablet  piperacillin-tazobactam 3-0.375 GM per 50ML IVPB  sodium hypochlorite 0.125 % Soln external solution       You can get these medications from any pharmacy    Bring a paper prescription for each of these medications  oxyCODONE 5 MG immediate release tablet        Objective Findings at Discharge:   BP (!) 106/24   Pulse 96   Temp 98.1 °F (36.7 °C) (Oral)   Resp 18   Ht 5' 3\" (1.6 m)   Wt 165 lb 5.5 oz (75 kg)   SpO2 92%   BMI 29.29 kg/m²       Physical Exam: Physical Exam  Vitals reviewed. Constitutional:       Appearance: Normal appearance. She is ill-appearing. HENT:      Head: Normocephalic and atraumatic. Nose: Nose normal.      Mouth/Throat:      Mouth: Mucous membranes are dry. Pharynx: Oropharynx is clear. Eyes:      General: No scleral icterus. Conjunctiva/sclera: Conjunctivae normal.   Cardiovascular:      Rate and Rhythm: Normal rate and regular rhythm. Pulses: Normal pulses. Heart sounds: No murmur heard. Gallop present. Pulmonary:      Breath sounds: Normal breath sounds. No wheezing, rhonchi or rales. Abdominal:      General: Abdomen is flat. Bowel sounds are normal. There is no distension. Palpations: Abdomen is soft. Tenderness: There is no abdominal tenderness. Musculoskeletal:         General: Deformity present. Cervical back: Neck supple. No rigidity. Left lower leg: No edema. Comments: Right BKA   Skin:     Coloration: Skin is not jaundiced or pale. Findings: Bruising and lesion present. Comments: Stage IV decubitus ulcer in sacral area   Neurological:      General: No focal deficit present. Mental Status: She is alert and oriented to person, place, and time. Mental status is at baseline. Labs and Imaging   No results found. CBC:   Recent Labs     03/16/23  0550   WBC 23.7*   HGB 10.9*        BMP:    No results for input(s): NA, K, CL, CO2, BUN, CREATININE, GLUCOSE in the last 72 hours. Hepatic:   No results for input(s): AST, ALT, ALB, BILITOT, ALKPHOS in the last 72 hours.     Lipids:   Lab Results   Component Value Date/Time    CHOL 80 03/11/2023 12:20 PM    HDL 12 03/11/2023 12:20 PM    TRIG 261 03/11/2023 12:20 PM     Hemoglobin A1C:   Lab Results   Component Value Date/Time    LABA1C 5.2 03/11/2023 12:20 PM     TSH: No results found for: TSH  Troponin:   Lab Results   Component Value Date/Time    TROPONINT 0.073 03/11/2023 09:29 PM TROPONINT 0.073 03/11/2023 04:44 PM    TROPONINT 0.084 03/11/2023 12:20 PM     Lactic Acid: No results for input(s): LACTA in the last 72 hours. BNP: No results for input(s): PROBNP in the last 72 hours.   UA:  Lab Results   Component Value Date/Time    NITRU NEGATIVE 01/07/2020 04:00 PM    COLORU YELLOW 01/07/2020 04:00 PM    WBCUA 0 TO 1 01/07/2020 04:00 PM    RBCUA 2 TO 3 01/07/2020 04:00 PM    MUCUS 1+ 01/07/2020 04:00 PM    YEAST OCCASIONAL 11/17/2010 09:30 AM    BACTERIA OCCASIONAL 01/07/2020 04:00 PM    CLARITYU CLEAR 01/07/2020 04:00 PM    SPECGRAV 1.020 01/07/2020 04:00 PM    LEUKOCYTESUR NEGATIVE 01/07/2020 04:00 PM    UROBILINOGEN 2.0 01/07/2020 04:00 PM    BILIRUBINUR NEGATIVE 01/07/2020 04:00 PM    BLOODU SMALL 01/07/2020 04:00 PM    KETUA NEGATIVE 01/07/2020 04:00 PM     Urine Cultures: No results found for: LABURIN  Blood Cultures: No results found for: BC  No results found for: BLOODCULT2  Organism:   Lab Results   Component Value Date/Time    [facility] 06/05/2017 09:12 AM       Time Spent Discharging patient 36 minutes    Electronically signed by July Jeter MD on 3/18/2023 at 7:38 PM

## 2023-03-21 ENCOUNTER — TELEPHONE (OUTPATIENT)
Dept: WOUND CARE | Age: 58
End: 2023-03-21

## 2023-04-03 NOTE — TELEPHONE ENCOUNTER
Attempted to reach patient by phone. No answer. Message  was not left due to voicemail being full. Will attempt to reach again next week.     Electronically signed by Bernadette Mayo RN on 4/3/2023 at 1:31 PM

## 2023-04-10 PROBLEM — R77.8 ELEVATED TROPONIN: Status: RESOLVED | Noted: 2023-03-11 | Resolved: 2023-04-10

## 2023-04-11 ENCOUNTER — TELEPHONE (OUTPATIENT)
Dept: WOUND CARE | Age: 58
End: 2023-04-11

## 2023-04-11 DIAGNOSIS — L97.414 DIABETIC ULCER OF RIGHT HEEL ASSOCIATED WITH TYPE 2 DIABETES MELLITUS, WITH NECROSIS OF BONE (HCC): Primary | ICD-10-CM

## 2023-04-11 DIAGNOSIS — E11.621 DIABETIC ULCER OF RIGHT HEEL ASSOCIATED WITH TYPE 2 DIABETES MELLITUS, WITH NECROSIS OF BONE (HCC): Primary | ICD-10-CM

## 2023-04-11 DIAGNOSIS — M79.671 ACUTE PAIN OF RIGHT FOOT: ICD-10-CM

## 2023-04-14 ENCOUNTER — HOSPITAL ENCOUNTER (EMERGENCY)
Age: 58
Discharge: HOME | End: 2023-04-14
Attending: EMERGENCY MEDICINE
Payer: MEDICARE

## 2023-04-14 VITALS
SYSTOLIC BLOOD PRESSURE: 116 MMHG | OXYGEN SATURATION: 92 % | TEMPERATURE: 97.5 F | WEIGHT: 165 LBS | HEART RATE: 76 BPM | DIASTOLIC BLOOD PRESSURE: 51 MMHG | BODY MASS INDEX: 29.23 KG/M2 | RESPIRATION RATE: 18 BRPM

## 2023-04-14 DIAGNOSIS — R53.83 FATIGUE, UNSPECIFIED TYPE: Primary | ICD-10-CM

## 2023-04-14 PROCEDURE — 99284 EMERGENCY DEPT VISIT MOD MDM: CPT

## 2023-04-14 NOTE — CARE COORDINATION
Pt noted for possible readmission. Pt was recently admitted 3/9-3/17/23 sepsis. Pt lives with spouse, has insurance and PCP. DME rolling walker and power chair. Pt was seen by PT/OT due to weakness and was d/c to [address] [place] [address] for skilled rehab. Pt returns today for fatigue. Pt was evaluated and d/c back to [facility] to finish skilled rehab.

## 2023-04-14 NOTE — ED PROVIDER NOTES
Triage Chief Complaint:    Fatigue    HPI   Haydee Bell is a 62 y.o. female that presents for fatigue which is normal for her after her dialysis which she completed today. She has no acute complaints and says she feels like she is at her baseline. Denies fevers, chills, cough, congestion, melena, hematochezia, CP, SOB, abdominal pain, nausea or vomiting. Has a follow with wound care for the wounds on her feet. Blood sugars have been normal    History from : Patient and EMS    Limitations to history : None    ROS:  10 systems reviewed and negative except as above.      Past Medical History:   Diagnosis Date    Acute pain of right foot 09/13/2022    CAD (coronary artery disease)     s/p CABG x 4;  follows with Dr Katherine Da Silva of foot 02/05/2018    Carpal tunnel syndrome on both sides     CHF (congestive heart failure) ([address] 10/2010    Chronic diastolic; EF 65%    Chronic kidney disease     stage 4 kidney disease    Chronic ulcer of left ankle with fat layer exposed ([address] 10/07/2015    Chronic ulcer of left foot with fat layer exposed ([address] 03/17/2016    Chronic ulcer of right ankle with fat layer exposed ([address] 03/17/2016    Chronic ulcer of right foot with fat layer exposed ([address] 03/17/2016    Decubitus ulcer of sacral region, stage 1 09/17/2018    Depression     Diabetes mellitus ([address]     Diabetes mellitus with neurological manifestations ([address]     Diabetes mellitus with ulcer of ankle ([address]     Diabetic ulcer of left foot associated with type 2 diabetes mellitus, with fat layer exposed ([address] 08/06/2018    Diabetic ulcer of right heel associated with type 2 diabetes mellitus, with muscle involvement without evidence of necrosis ([address] 09/13/2022    Diabetic ulcer of right heel associated with type 2 diabetes mellitus, with necrosis of bone ([address] 09/13/2022    ESRD on hemodialysis ([address] 09/21/2022    Family history of cardiovascular disease     Mother    GERD (gastroesophageal reflux disease)     H/O cardiac

## 2023-04-14 NOTE — ED TRIAGE NOTES
Pt presents to ED due to sister wanting  her to be seen, states \" she seems really tired\" pt had dialysis this morning and states this is how she always is after dialysis.  Denies other complaints or concerns

## 2023-04-17 NOTE — TELEPHONE ENCOUNTER
Attempted to reach patient by phone. No answer. Message  was left. Will attempt to reach again next week.     Electronically signed by Maxim Monk RN on 4/17/2023 at 8:42 AM

## 2023-04-19 ENCOUNTER — TELEPHONE (OUTPATIENT)
Dept: WOUND CARE | Age: 58
End: 2023-04-19

## 2023-04-19 DIAGNOSIS — M79.671 ACUTE PAIN OF RIGHT FOOT: ICD-10-CM

## 2023-04-19 DIAGNOSIS — E11.621 DIABETIC ULCER OF RIGHT HEEL ASSOCIATED WITH TYPE 2 DIABETES MELLITUS, WITH NECROSIS OF BONE (HCC): Primary | ICD-10-CM

## 2023-04-19 DIAGNOSIS — L97.414 DIABETIC ULCER OF RIGHT HEEL ASSOCIATED WITH TYPE 2 DIABETES MELLITUS, WITH NECROSIS OF BONE (HCC): Primary | ICD-10-CM

## 2023-04-20 NOTE — TELEPHONE ENCOUNTER
Patient is now residing in nursing home and is unable to come to wound center at this time. Will call back if situation changes.      Electronically signed by Zita Weeks RN on 4/20/2023 at 9:30 AM

## (undated) DEVICE — DRAPE,U/ SHT,SPLIT,PLAS,STERIL: Brand: MEDLINE

## (undated) DEVICE — MARKER SURG SKIN UTIL REGULAR/FINE 2 TIP W/ RUL AND 9 LBL

## (undated) DEVICE — SPONGE LAP W18XL18IN WHT COT 4 PLY FLD STRUNG RADPQ DISP ST

## (undated) DEVICE — SUTURE ETHLN SZ 3-0 L18IN NONABSORBABLE BLK FS-1 L24MM 3/8 663H

## (undated) DEVICE — YANKAUER,FLEXIBLE HANDLE,REGLR CAPACITY: Brand: MEDLINE INDUSTRIES, INC.

## (undated) DEVICE — Device: Brand: MEDICAL ACTION INDUSTRIES

## (undated) DEVICE — SUTURE VCRL SZ 3-0 L18IN ABSRB UD L26MM SH 1/2 CIR J864D

## (undated) DEVICE — SHEET,DRAPE,53X77,STERILE: Brand: MEDLINE

## (undated) DEVICE — SYRINGE IRRIG 60ML SFT PLIABLE BLB EZ TO GRP 1 HND USE W/

## (undated) DEVICE — Device

## (undated) DEVICE — SUTURE PERMAHAND SZ 2-0 L17X18IN NONABSORBABLE BLK SILK SA65H

## (undated) DEVICE — GOWN,ECLIPSE,POLYRNF,BRTHSLV,L,30/CS: Brand: MEDLINE

## (undated) DEVICE — DRESSING,GAUZE,XEROFORM,CURAD,5"X9",ST: Brand: CURAD

## (undated) DEVICE — GLOVE ORANGE PI 7 1/2   MSG9075

## (undated) DEVICE — PENCIL ES CRD L10FT HND SWCHING ROCK SWCH W/ EDGE COAT BLDE

## (undated) DEVICE — DRAPE,EXTREMITY,89X128,STERILE: Brand: MEDLINE

## (undated) DEVICE — BANDAGE,ELASTIC,ESMARK,STERILE,6"X9',LF: Brand: MEDLINE

## (undated) DEVICE — BANDAGE COMPR ELASTIC 9 FTX6 IN STRL ESMARCH LF

## (undated) DEVICE — SPONGE GZ W4XL8IN COT WVN 12 PLY

## (undated) DEVICE — TOWEL,OR,DSP,ST,BLUE,STD,6/PK,12PK/CS: Brand: MEDLINE

## (undated) DEVICE — GOWN,SIRUS,POLYRNF,BRTHSLV,XLN/XL,20/CS: Brand: MEDLINE

## (undated) DEVICE — GLOVE SURG SZ 75 L12IN THK75MIL DK GRN LTX FREE

## (undated) DEVICE — FOAM BUMP ROUND LARGE: Brand: MEDLINE INDUSTRIES, INC.

## (undated) DEVICE — SUTURE VCRL SZ 0 L18IN ABSRB UD L36MM CT-1 1/2 CIR J840D

## (undated) DEVICE — GLOVE SURG SZ 7 CRM LTX FREE POLYISOPRENE POLYMER BEAD ANTI

## (undated) DEVICE — COUNTER NDL 30 COUNT FOAM STRP SGL MAG

## (undated) DEVICE — SUTURE PERMAHAND SZ 2-0 L18IN NONABSORBABLE BLK L26MM SH C012D

## (undated) DEVICE — SUTURE ABSRB L18IN SZ 2-0 WHT CTB-1L36MM 1/2 CIR BLNT PNT JB840

## (undated) DEVICE — GLOVE ORANGE PI 7   MSG9070

## (undated) DEVICE — PACK,BASIC,SIRUS,V: Brand: MEDLINE

## (undated) DEVICE — INTENDED FOR TISSUE SEPARATION, AND OTHER PROCEDURES THAT REQUIRE A SHARP SURGICAL BLADE TO PUNCTURE OR CUT.: Brand: BARD-PARKER ® STAINLESS STEEL BLADES

## (undated) DEVICE — TUBING, SUCTION, 9/32" X 10', STRAIGHT: Brand: MEDLINE